# Patient Record
Sex: FEMALE | Race: WHITE | NOT HISPANIC OR LATINO | Employment: OTHER | URBAN - METROPOLITAN AREA
[De-identification: names, ages, dates, MRNs, and addresses within clinical notes are randomized per-mention and may not be internally consistent; named-entity substitution may affect disease eponyms.]

---

## 2017-01-03 ENCOUNTER — LAB VISIT (OUTPATIENT)
Dept: LAB | Facility: HOSPITAL | Age: 67
End: 2017-01-03
Attending: INTERNAL MEDICINE
Payer: MEDICARE

## 2017-01-03 DIAGNOSIS — N18.30 CKD (CHRONIC KIDNEY DISEASE) STAGE 3, GFR 30-59 ML/MIN: ICD-10-CM

## 2017-01-03 DIAGNOSIS — R73.9 HYPERGLYCEMIA: ICD-10-CM

## 2017-01-03 LAB
ALBUMIN SERPL BCP-MCNC: 3.3 G/DL
ANION GAP SERPL CALC-SCNC: 10 MMOL/L
BUN SERPL-MCNC: 23 MG/DL
CALCIUM SERPL-MCNC: 9.4 MG/DL
CHLORIDE SERPL-SCNC: 103 MMOL/L
CO2 SERPL-SCNC: 25 MMOL/L
CREAT SERPL-MCNC: 1.2 MG/DL
EST. GFR  (AFRICAN AMERICAN): 53.7 ML/MIN/1.73 M^2
EST. GFR  (NON AFRICAN AMERICAN): 46.5 ML/MIN/1.73 M^2
GLUCOSE SERPL-MCNC: 97 MG/DL
PHOSPHATE SERPL-MCNC: 2.3 MG/DL
POTASSIUM SERPL-SCNC: 4.1 MMOL/L
PTH-INTACT SERPL-MCNC: 114 PG/ML
SODIUM SERPL-SCNC: 138 MMOL/L

## 2017-01-03 PROCEDURE — 83970 ASSAY OF PARATHORMONE: CPT

## 2017-01-03 PROCEDURE — 83036 HEMOGLOBIN GLYCOSYLATED A1C: CPT

## 2017-01-03 PROCEDURE — 36415 COLL VENOUS BLD VENIPUNCTURE: CPT | Mod: PO

## 2017-01-03 PROCEDURE — 80069 RENAL FUNCTION PANEL: CPT

## 2017-01-04 LAB
ESTIMATED AVG GLUCOSE: 123 MG/DL
HBA1C MFR BLD HPLC: 5.9 %

## 2017-01-10 ENCOUNTER — OFFICE VISIT (OUTPATIENT)
Dept: NEPHROLOGY | Facility: CLINIC | Age: 67
End: 2017-01-10
Payer: MEDICARE

## 2017-01-10 VITALS
TEMPERATURE: 98 F | WEIGHT: 200.63 LBS | DIASTOLIC BLOOD PRESSURE: 58 MMHG | BODY MASS INDEX: 34.71 KG/M2 | HEART RATE: 80 BPM | OXYGEN SATURATION: 94 % | SYSTOLIC BLOOD PRESSURE: 124 MMHG

## 2017-01-10 DIAGNOSIS — I10 ESSENTIAL HYPERTENSION: ICD-10-CM

## 2017-01-10 DIAGNOSIS — E66.01 SEVERE OBESITY (BMI 35.0-39.9) WITH COMORBIDITY: ICD-10-CM

## 2017-01-10 DIAGNOSIS — N18.30 CKD (CHRONIC KIDNEY DISEASE) STAGE 3, GFR 30-59 ML/MIN: Primary | ICD-10-CM

## 2017-01-10 DIAGNOSIS — J44.9 CHRONIC OBSTRUCTIVE PULMONARY DISEASE, UNSPECIFIED COPD TYPE: ICD-10-CM

## 2017-01-10 DIAGNOSIS — J84.9 INTERSTITIAL LUNG DISEASE: ICD-10-CM

## 2017-01-10 DIAGNOSIS — I11.9 LVH (LEFT VENTRICULAR HYPERTROPHY) DUE TO HYPERTENSIVE DISEASE, WITHOUT HEART FAILURE: ICD-10-CM

## 2017-01-10 DIAGNOSIS — I35.0 AORTIC VALVE STENOSIS, UNSPECIFIED ETIOLOGY: ICD-10-CM

## 2017-01-10 PROCEDURE — 99213 OFFICE O/P EST LOW 20 MIN: CPT | Mod: S$PBB,,, | Performed by: INTERNAL MEDICINE

## 2017-01-10 PROCEDURE — 99999 PR PBB SHADOW E&M-EST. PATIENT-LVL II: CPT | Mod: PBBFAC,,, | Performed by: INTERNAL MEDICINE

## 2017-01-10 PROCEDURE — 99212 OFFICE O/P EST SF 10 MIN: CPT | Mod: PBBFAC,PO | Performed by: INTERNAL MEDICINE

## 2017-01-11 NOTE — PROGRESS NOTES
Subjective:       Patient ID: Betty Bacon is a 66 y.o. White female who presents for follow-up evaluation of Chronic Kidney Disease    HPI     She reports she is feeling well. No edema nor SOB, has lung disease without any COPD flares. Her appetite is 'too good' she is trying to lose weight by decreasing calories. No new medications. She does report increased frequency of stress incontinence, otherwise no problems with urination    Review of Systems   Constitutional: Negative for activity change, appetite change, fatigue and unexpected weight change.   HENT: Negative for facial swelling.    Respiratory: Negative for shortness of breath.    Cardiovascular: Negative for chest pain and leg swelling.   Gastrointestinal: Negative for abdominal pain.   Genitourinary: Negative for difficulty urinating and dysuria.   Musculoskeletal: Positive for arthralgias (no NSAID use).   Neurological: Negative for weakness.       Objective:      Physical Exam   Constitutional: She is oriented to person, place, and time. She appears well-nourished. No distress.   HENT:   Mouth/Throat: Oropharynx is clear and moist.   Neck: No JVD present.   Cardiovascular: S1 normal and S2 normal.  Exam reveals no friction rub.    Pulmonary/Chest: Breath sounds normal. She has no wheezes. She has no rales.   Abdominal: Soft.   Musculoskeletal: She exhibits no edema.   Neurological: She is alert and oriented to person, place, and time.   Skin: Skin is warm and dry.   Psychiatric: She has a normal mood and affect.   Vitals reviewed.      Assessment:       1. CKD (chronic kidney disease) stage 3, GFR 30-59 ml/min    2. Essential hypertension    3. LVH (left ventricular hypertrophy) due to hypertensive disease, without heart failure    4. Interstitial lung disease    5. Severe obesity (BMI 35.0-39.9) with comorbidity    6. Aortic valve stenosis, unspecified etiology    7. Chronic obstructive pulmonary disease, unspecified COPD type        Plan:              CKD stage 3 with stable kidney function.      HTN is controlled    FERNANDEZ--po Fe causes severe constipation. Could try iron 2X week       RTC 5 months with labs prior

## 2017-01-23 ENCOUNTER — DOCUMENTATION ONLY (OUTPATIENT)
Dept: FAMILY MEDICINE | Facility: CLINIC | Age: 67
End: 2017-01-23

## 2017-01-23 ENCOUNTER — TELEPHONE (OUTPATIENT)
Dept: FAMILY MEDICINE | Facility: CLINIC | Age: 67
End: 2017-01-23

## 2017-01-23 NOTE — PROGRESS NOTES
Pre-Visit Chart Review  For Appointment Scheduled on 1/24/17    There are no preventive care reminders to display for this patient.

## 2017-01-23 NOTE — TELEPHONE ENCOUNTER
Patient has complaints of chest discomfort burning after eating.notified patient if she develops any chest pain and SOB if any of her symptoms gets worst notified patient to go to ER patient states understanding.

## 2017-01-23 NOTE — TELEPHONE ENCOUNTER
----- Message from Spenser Sotomayor sent at 1/23/2017  8:48 AM CST -----  Contact: same  Patient called in and is requesting to be seen today 1/23/17.  Patient is having bad issues with her acid reflux and being able to eat.  Patient stated she will see one of the NP if they can fit her in.    Patient call back number is [phone]

## 2017-01-25 ENCOUNTER — TELEPHONE (OUTPATIENT)
Dept: FAMILY MEDICINE | Facility: CLINIC | Age: 67
End: 2017-01-25

## 2017-01-25 NOTE — TELEPHONE ENCOUNTER
----- Message from Divya Glover sent at 1/25/2017 10:15 AM CST -----  Contact: self: [phone]  Patient has been in hospital and was advised to see Dr. Bishop (Gastroenterolgy). The hospital advised her to get off of one of her medications. She would like to talk to office concerning this. Please call with advice.

## 2017-01-31 ENCOUNTER — DOCUMENTATION ONLY (OUTPATIENT)
Dept: FAMILY MEDICINE | Facility: CLINIC | Age: 67
End: 2017-01-31

## 2017-01-31 NOTE — PROGRESS NOTES
Pre-Visit Chart Review  For Appointment Scheduled on 2/1/17    There are no preventive care reminders to display for this patient.

## 2017-02-01 ENCOUNTER — OFFICE VISIT (OUTPATIENT)
Dept: FAMILY MEDICINE | Facility: CLINIC | Age: 67
End: 2017-02-01
Payer: MEDICARE

## 2017-02-01 VITALS
TEMPERATURE: 98 F | BODY MASS INDEX: 33.46 KG/M2 | DIASTOLIC BLOOD PRESSURE: 56 MMHG | SYSTOLIC BLOOD PRESSURE: 137 MMHG | WEIGHT: 196 LBS | HEART RATE: 68 BPM | HEIGHT: 64 IN | OXYGEN SATURATION: 95 %

## 2017-02-01 DIAGNOSIS — E66.9 OBESITY (BMI 30.0-34.9): ICD-10-CM

## 2017-02-01 DIAGNOSIS — K30 INDIGESTION: ICD-10-CM

## 2017-02-01 DIAGNOSIS — R13.10 DYSPHAGIA, UNSPECIFIED TYPE: ICD-10-CM

## 2017-02-01 DIAGNOSIS — R07.9 CHEST PAIN, UNSPECIFIED TYPE: ICD-10-CM

## 2017-02-01 DIAGNOSIS — Z09 HOSPITAL DISCHARGE FOLLOW-UP: Primary | ICD-10-CM

## 2017-02-01 DIAGNOSIS — I10 ESSENTIAL HYPERTENSION: ICD-10-CM

## 2017-02-01 PROCEDURE — 99999 PR PBB SHADOW E&M-EST. PATIENT-LVL IV: CPT | Mod: PBBFAC,,, | Performed by: PHYSICIAN ASSISTANT

## 2017-02-01 PROCEDURE — 99214 OFFICE O/P EST MOD 30 MIN: CPT | Mod: S$PBB,,, | Performed by: PHYSICIAN ASSISTANT

## 2017-02-01 PROCEDURE — 99214 OFFICE O/P EST MOD 30 MIN: CPT | Mod: PBBFAC,PO | Performed by: PHYSICIAN ASSISTANT

## 2017-02-01 NOTE — PROGRESS NOTES
Subjective:       Patient ID: Betty Bacon is a 66 y.o. female.    Chief Complaint: Hospital Follow Up    HPI   Patient is a 66 year old  female presenting to the clinic for hospital f/u from [facility] after presenting to the ER on 1/23/17 with c/o CP associated with nausea, left arm numbness, & shortness of breath x 1 week. Patient reports that she had been having some indigestion the week prior to hospitalization, but started getting more concerned when she developed more symptoms. Patient has a history of CAD w/stent placement. Patient was admitted to hospital services to r/o ACS. Cardiac enzymes negative x 3. She underwent Myoview stress test with EF 61% & negative concern for ischemia. JENNIFER shows some mild mitral regurg, mild aortic stenosis, and moderate aortic regurg with preserved EF at 55%. Patient reports seeing Dr. Pink in [facility] & would like us to fax these records to him. She was d/c with apt to see Dr. Trevizo on 2/2/17 due to the concerns of indigestion, difficulty swallowing, sensation of food getting stuck in esophagus. She has continued to take her Nexium 20mg daily. She reports one episode of reguargitation of food on Saturday which caused an episode of vomiting. Otherwise, she feel as though she has been improving. She denies any further CP, left arm numbness. She does report VILLA, but this is stable & chronic due to COPD.  Review of Systems   Constitutional: Negative for activity change, appetite change, chills, diaphoresis, fatigue and fever.   HENT: Negative for congestion, postnasal drip and rhinorrhea.    Respiratory: Positive for shortness of breath (chronic, stable). Negative for cough and wheezing.    Cardiovascular: Negative.  Negative for chest pain.   Gastrointestinal: Positive for nausea and vomiting. Negative for abdominal pain, blood in stool, constipation and diarrhea.        BRBPR x 1 with hard bowel movement; history of hemorrhoids     Genitourinary: Negative for  dysuria, frequency, hematuria and urgency.   Musculoskeletal: Negative.    Skin: Negative.  Negative for color change and rash.   Neurological: Negative for dizziness and syncope.   Psychiatric/Behavioral: Negative for agitation, behavioral problems and confusion.       Objective:      Physical Exam   Constitutional: Vital signs are normal. She appears well-developed and well-nourished. No distress.   Cardiovascular: Normal rate, regular rhythm, S1 normal, S2 normal and normal heart sounds.  Exam reveals no gallop.    No murmur heard.  Pulses:       Radial pulses are 2+ on the right side, and 2+ on the left side.   <2sec cap refill fingers bilat     Pulmonary/Chest: Effort normal and breath sounds normal. No respiratory distress. She has no wheezes. She has no rhonchi.   Abdominal: Normal appearance. There is tenderness in the epigastric area. There is no rigidity, no guarding, no tenderness at McBurney's point and negative Neff's sign.   Skin: Skin is warm and dry. She is not diaphoretic.   Appropriate skin turgor   Psychiatric: She has a normal mood and affect. Her speech is normal and behavior is normal. Judgment and thought content normal. Cognition and memory are normal.       Assessment:       1. Hospital discharge follow-up    2. Chest pain, unspecified type    3. Dysphagia, unspecified type    4. Indigestion    5. Essential hypertension    6. Obesity (BMI 30.0-34.9)        Plan:     Betty was seen today for hospital follow up.    Diagnoses and all orders for this visit:    Hospital discharge follow-up    Chest pain, unspecified type  Resolved; thought to be likely related to upper GI concern due to recent negative cardiac testing    Records faxed to cardiologist, Dr. Pink    Dysphagia, unspecified type  Continue PPI; may need to increase; will leave this up to GI physician tomorrow  -     Ambulatory referral to Gastroenterology  Apt with Dr. Trevizo scheduled for tomorrow    Indigestion  -     Ambulatory  referral to Gastroenterology  Apt with Dr. Trevizo scheduled for tomorrow    Essential hypertension  Good control  No changes needed    Obesity (BMI 30.0-34.9)  Patient readiness: acceptance and barriers:none    During the course of the visit the patient was educated and counseled about the following:     Hypertension:   Medication: no change.  Obesity:   Regular aerobic exercise program discussed.    Goals: Hypertension: Reduce Blood Pressure and Obesity: Reduce calorie intake and BMI    Did patient meet goals/outcomes: No    The following self management tools provided: declined    Patient Instructions (the written plan) was given to the patient/family.     Time spent with patient: 30 minutes

## 2017-02-01 NOTE — MR AVS SNAPSHOT
[facility]  [address]  [place] [address]  Phone: [phone]  Fax: [phone]                  Betty Bacon   2017 11:20 AM   Office Visit    Description:  Female : 1950   Provider:  JEFFREY Lomeli   Department:  [place] [facility]           Reason for Visit     Hospital Follow Up           Diagnoses this Visit        Comments    Dysphagia, unspecified type    -  Primary     Indigestion                To Do List           Future Appointments        Provider Department Dept Phone    2017 10:40 AM Celeste Barker MD [facility] [phone]    2017 10:00 AM LAB, [facility] - [facility] [phone]    2017 10:15 AM LAB, [facility] - Lab [phone]      Goals (5 Years of Data)     None      [facility]     [facility] On Call Nurse [facility] -  Assistance  Registered nurses in the [facility] Center provide clinical advisement, health education, appointment booking, and other advisory services.  Call for this free service at [phone].             Medications           Message regarding Medications     Verify the changes and/or additions to your medication regime listed below are the same as discussed with your clinician today.  If any of these changes or additions are incorrect, please notify your healthcare provider.             Verify that the below list of medications is an accurate representation of the medications you are currently taking.  If none reported, the list may be blank. If incorrect, please contact your healthcare provider. Carry this list with you in case of emergency.           Current Medications     albuterol sulfate (PROAIR RESPICLICK) 90 mcg/actuation AePB Inhale into the lungs.    alendronate (FOSAMAX) 70 MG tablet Take 1 tablet (70 mg total) by mouth every 7 days.    alprazolam (XANAX) 1 MG tablet Take 1 tablet (1 mg total) by mouth 2 (two) times daily as  "needed.    ASMANEX TWISTHALER 220 mcg (60 doses) AePB Take 2 puffs by mouth once daily.    aspirin 81 mg Tab Take 81 mg by mouth once daily. Every day    atorvastatin (LIPITOR) 10 MG tablet TAKE 1 TABLET BY MOUTH EVERY DAY    azelastine (ASTELIN) 137 mcg (0.1 %) nasal spray USE 1 SPRAY INTRANASALLY 2 TIMES PER DAY IN EACH NOSTRIL    clopidogrel (PLAVIX) 75 mg tablet TAKE 1 TABLET BY MOUTH EVERY DAY    econazole nitrate 1 % cream Apply to AA on face and folds BID PRN flare    esomeprazole (NEXIUM) 40 MG capsule TAKE 1 CAPSULE (40 MG TOTAL) BY MOUTH BEFORE BREAKFAST.    isosorbide mononitrate (IMDUR) 60 MG 24 hr tablet TAKE 1 TABLET BY MOUTH EVERY DAY    metoprolol succinate (TOPROL-XL) 50 MG 24 hr tablet TAKE 1 TABLET BY MOUTH EVERY DAY    nitroGLYCERIN (NITROSTAT) 0.3 MG SL tablet Place 1 tablet (0.3 mg total) under the tongue every 5 (five) minutes as needed for Chest pain.    paroxetine (PAXIL) 40 MG tablet TAKE 1 TABLET EVERY MORNING    tiotropium (SPIRIVA WITH HANDIHALER) 18 mcg inhalation capsule USE 1 PUFF DAILY    trazodone (DESYREL) 100 MG tablet Take 1 tablet (100 mg total) by mouth every evening.    triamterene-hydrochlorothiazide 37.5-25 mg (DYAZIDE) 37.5-25 mg per capsule Take 1 capsule by mouth once daily.           Clinical Reference Information           Vital Signs - Last Recorded  Most recent update: 2/1/2017 11:23 AM by Kacy Renee    BP Pulse Temp Ht    (!) 137/56 (BP Location: Right arm, Patient Position: Sitting, BP Method: Automatic) 68 97.9 °F (36.6 °C) (Oral) 5' 3.75" (1.619 m)    Wt SpO2 BMI    88.9 kg (195 lb 15.8 oz) 95% 33.91 kg/m2      Blood Pressure          Most Recent Value    BP  (!)  137/56      Allergies as of 2/1/2017     Darvocet A500 [Propoxyphene N-acetaminophen]    Codeine      Immunizations Administered on Date of Encounter - 2/1/2017     None      Orders Placed During Today's Visit      Normal Orders This Visit    Ambulatory referral to Gastroenterology       "

## 2017-02-02 ENCOUNTER — TELEPHONE (OUTPATIENT)
Dept: CARDIOLOGY | Facility: CLINIC | Age: 67
End: 2017-02-02

## 2017-02-03 ENCOUNTER — TELEPHONE (OUTPATIENT)
Dept: CARDIOLOGY | Facility: CLINIC | Age: 67
End: 2017-02-03

## 2017-03-02 RX ORDER — ISOSORBIDE MONONITRATE 60 MG/1
TABLET, EXTENDED RELEASE ORAL
Qty: 30 TABLET | Refills: 6 | Status: SHIPPED | OUTPATIENT
Start: 2017-03-02 | End: 2017-11-21 | Stop reason: SDUPTHER

## 2017-03-07 ENCOUNTER — TELEPHONE (OUTPATIENT)
Dept: FAMILY MEDICINE | Facility: CLINIC | Age: 67
End: 2017-03-07

## 2017-03-07 DIAGNOSIS — J45.30 MILD PERSISTENT ASTHMA WITHOUT COMPLICATION: ICD-10-CM

## 2017-03-07 RX ORDER — TIOTROPIUM BROMIDE 18 UG/1
CAPSULE ORAL; RESPIRATORY (INHALATION)
Qty: 30 CAPSULE | Refills: 11 | Status: SHIPPED | OUTPATIENT
Start: 2017-03-07 | End: 2017-11-14

## 2017-03-07 NOTE — TELEPHONE ENCOUNTER
----- Message from Carolyn Whyte sent at 3/7/2017 10:19 AM CST -----  [facility] pharmacy called regarding Spiriva Inhaler for the above patient. There are requesting refill  Authorization contact pharmacy . Pharmacy states Feli White wrote original prescriptionThanks.        [facility]/pharmacy #[id number] - [place], [place] - [address] AT [facility]  [address]  [place] [address]  Phone: [phone] Fax: [phone]

## 2017-03-27 ENCOUNTER — LAB VISIT (OUTPATIENT)
Dept: LAB | Facility: HOSPITAL | Age: 67
End: 2017-03-27
Attending: INTERNAL MEDICINE
Payer: MEDICARE

## 2017-03-27 DIAGNOSIS — K58.0 IRRITABLE BOWEL SYNDROME WITH DIARRHEA: Primary | ICD-10-CM

## 2017-03-27 LAB — IGA SERPL-MCNC: 122 MG/DL

## 2017-03-27 PROCEDURE — 83516 IMMUNOASSAY NONANTIBODY: CPT

## 2017-03-27 PROCEDURE — 36415 COLL VENOUS BLD VENIPUNCTURE: CPT

## 2017-03-27 PROCEDURE — 82784 ASSAY IGA/IGD/IGG/IGM EACH: CPT

## 2017-03-29 LAB — TTG IGA SER IA-ACNC: 4 UNITS

## 2017-04-10 RX ORDER — MOMETASONE FUROATE 220 UG/1
INHALANT RESPIRATORY (INHALATION)
Qty: 1 EACH | Refills: 2 | Status: SHIPPED | OUTPATIENT
Start: 2017-04-10 | End: 2017-07-05 | Stop reason: SDUPTHER

## 2017-05-18 ENCOUNTER — DOCUMENTATION ONLY (OUTPATIENT)
Dept: FAMILY MEDICINE | Facility: CLINIC | Age: 67
End: 2017-05-18

## 2017-05-23 ENCOUNTER — OFFICE VISIT (OUTPATIENT)
Dept: FAMILY MEDICINE | Facility: CLINIC | Age: 67
End: 2017-05-23
Payer: MEDICARE

## 2017-05-23 ENCOUNTER — LAB VISIT (OUTPATIENT)
Dept: LAB | Facility: HOSPITAL | Age: 67
End: 2017-05-23
Attending: FAMILY MEDICINE
Payer: MEDICARE

## 2017-05-23 VITALS
DIASTOLIC BLOOD PRESSURE: 50 MMHG | HEIGHT: 64 IN | SYSTOLIC BLOOD PRESSURE: 138 MMHG | TEMPERATURE: 98 F | WEIGHT: 194.88 LBS | BODY MASS INDEX: 33.27 KG/M2 | HEART RATE: 63 BPM

## 2017-05-23 DIAGNOSIS — R73.9 HYPERGLYCEMIA: ICD-10-CM

## 2017-05-23 DIAGNOSIS — E61.1 LOW IRON: ICD-10-CM

## 2017-05-23 DIAGNOSIS — I25.10 CORONARY ARTERY DISEASE INVOLVING NATIVE CORONARY ARTERY OF NATIVE HEART WITHOUT ANGINA PECTORIS: ICD-10-CM

## 2017-05-23 DIAGNOSIS — I10 ESSENTIAL HYPERTENSION: ICD-10-CM

## 2017-05-23 DIAGNOSIS — E66.9 OBESITY, CLASS I, BMI 30-34.9: ICD-10-CM

## 2017-05-23 DIAGNOSIS — I10 ESSENTIAL HYPERTENSION: Primary | ICD-10-CM

## 2017-05-23 DIAGNOSIS — E78.5 HYPERLIPIDEMIA, UNSPECIFIED HYPERLIPIDEMIA TYPE: ICD-10-CM

## 2017-05-23 LAB
ALBUMIN SERPL BCP-MCNC: 3.5 G/DL
ALP SERPL-CCNC: 127 U/L
ALT SERPL W/O P-5'-P-CCNC: 10 U/L
ANION GAP SERPL CALC-SCNC: 8 MMOL/L
AST SERPL-CCNC: 13 U/L
BASOPHILS # BLD AUTO: 0.01 K/UL
BASOPHILS NFR BLD: 0.1 %
BILIRUB SERPL-MCNC: 0.3 MG/DL
BUN SERPL-MCNC: 25 MG/DL
CALCIUM SERPL-MCNC: 9.8 MG/DL
CHLORIDE SERPL-SCNC: 104 MMOL/L
CHOLEST/HDLC SERPL: 4.1 {RATIO}
CO2 SERPL-SCNC: 26 MMOL/L
CREAT SERPL-MCNC: 1.2 MG/DL
DIFFERENTIAL METHOD: ABNORMAL
EOSINOPHIL # BLD AUTO: 0.2 K/UL
EOSINOPHIL NFR BLD: 3.2 %
ERYTHROCYTE [DISTWIDTH] IN BLOOD BY AUTOMATED COUNT: 15 %
EST. GFR  (AFRICAN AMERICAN): 53.3 ML/MIN/1.73 M^2
EST. GFR  (NON AFRICAN AMERICAN): 46.2 ML/MIN/1.73 M^2
FERRITIN SERPL-MCNC: 101 NG/ML
GLUCOSE SERPL-MCNC: 107 MG/DL
HCT VFR BLD AUTO: 39 %
HDL/CHOLESTEROL RATIO: 24.3 %
HDLC SERPL-MCNC: 152 MG/DL
HDLC SERPL-MCNC: 37 MG/DL
HGB BLD-MCNC: 12.3 G/DL
IRON SERPL-MCNC: 50 UG/DL
LDLC SERPL CALC-MCNC: 86.8 MG/DL
LYMPHOCYTES # BLD AUTO: 1.4 K/UL
LYMPHOCYTES NFR BLD: 19.1 %
MCH RBC QN AUTO: 28.9 PG
MCHC RBC AUTO-ENTMCNC: 31.5 %
MCV RBC AUTO: 92 FL
MONOCYTES # BLD AUTO: 0.4 K/UL
MONOCYTES NFR BLD: 5.5 %
NEUTROPHILS # BLD AUTO: 5.4 K/UL
NEUTROPHILS NFR BLD: 71.8 %
NONHDLC SERPL-MCNC: 115 MG/DL
PLATELET # BLD AUTO: 235 K/UL
PMV BLD AUTO: 11 FL
POTASSIUM SERPL-SCNC: 4.4 MMOL/L
PROT SERPL-MCNC: 7 G/DL
RBC # BLD AUTO: 4.25 M/UL
SATURATED IRON: 14 %
SODIUM SERPL-SCNC: 138 MMOL/L
TOTAL IRON BINDING CAPACITY: 352 UG/DL
TRANSFERRIN SERPL-MCNC: 238 MG/DL
TRIGL SERPL-MCNC: 141 MG/DL
WBC # BLD AUTO: 7.47 K/UL

## 2017-05-23 PROCEDURE — 80061 LIPID PANEL: CPT

## 2017-05-23 PROCEDURE — 83540 ASSAY OF IRON: CPT

## 2017-05-23 PROCEDURE — 99999 PR PBB SHADOW E&M-EST. PATIENT-LVL III: CPT | Mod: PBBFAC,,, | Performed by: FAMILY MEDICINE

## 2017-05-23 PROCEDURE — 84466 ASSAY OF TRANSFERRIN: CPT

## 2017-05-23 PROCEDURE — 36415 COLL VENOUS BLD VENIPUNCTURE: CPT | Mod: PO

## 2017-05-23 PROCEDURE — 82728 ASSAY OF FERRITIN: CPT

## 2017-05-23 PROCEDURE — 85025 COMPLETE CBC W/AUTO DIFF WBC: CPT

## 2017-05-23 PROCEDURE — 99214 OFFICE O/P EST MOD 30 MIN: CPT | Mod: S$PBB,,, | Performed by: FAMILY MEDICINE

## 2017-05-23 PROCEDURE — 80053 COMPREHEN METABOLIC PANEL: CPT

## 2017-05-23 PROCEDURE — 83036 HEMOGLOBIN GLYCOSYLATED A1C: CPT

## 2017-05-23 NOTE — PROGRESS NOTES
CHIEF COMPLAINT:  Follow up       HISTORY OF PRESENT ILLNESS:  Betty Bacon is a 67 y.o. female who presents to clinic for follow up    1. HTN: patient is on dyazide, imdur, toprol XL, norvasc. She denies any CP, SOB, edema    2. Hyperlipidemia, CAD: she is on lipitor, plavix. She is due for labs.    3. low iron: due for repeat iron levels. She is no longer on iron supplementation due to constipation.    4. Hyperglycemia:  He has been working on weight loss and lost 10 lbs but has gained some of it back.     REVIEW OF SYSTEMS:  The patient denies any fever, chills, night sweats, headaches, vision changes, difficulty speaking or swallowing, decreased hearing, weight loss, weight gain, chest pain, palpitations, shortness of breath, cough, nausea, vomiting, abdominal pain, dysuria, diarrhea, constipation, hematuria, hematochezia, melena, changes in her hair,nails, numbness or weakness in her extremities, erythema,  swelling over any of her joints, myalgias, swollen glands, easy bruising, fatigue, edema,.      MEDICATIONS:   Reviewed and/or reconciled in EPIC    ALLERGIES:  Reviewed and/or reconciled in [employer]    PAST MEDICAL/SURGICAL HISTORY:   Past Medical History:   Diagnosis Date    Acute coronary artery obstruction without MI 10/2012    Benign hypertension     COPD (chronic obstructive pulmonary disease)     Coronary artery disease     History of electroconvulsive therapy     Hyperlipidemia LDL goal < 70     Left ankle sprain     Major depressive disorder, recurrent episode, severe     s/p ECT    PVD (peripheral vascular disease)       Past Surgical History:   Procedure Laterality Date    CHOLECYSTECTOMY      CORONARY ANGIOPLASTY      CORONARY ANGIOPLASTY WITH STENT PLACEMENT  10/2012    2 stents RCA (100% stenosis)    EYE SURGERY      cataract surgery    HYSTERECTOMY      LINA, ovaries intact. uterine prolapse    ILIAC ARTERY STENT      TONSILLECTOMY      TOTAL VAGINAL HYSTERECTOMY    "      FAMILY HISTORY:    Family History   Problem Relation Age of Onset    Diabetes Mother     Heart disease Mother     Stroke Father     Heart disease Father     Heart disease Brother     Stroke Brother     Hypertension Daughter     Diabetes Maternal Aunt     Heart disease Maternal Aunt     Heart disease Maternal Uncle     Heart disease Paternal Aunt     Heart disease Paternal Uncle     Heart disease Maternal Grandfather     Diabetes Sister     Heart disease Sister     Cancer Sister      lung    Kidney disease Sister      mass, benign    Melanoma Neg Hx     Psoriasis Neg Hx     Lupus Neg Hx     Eczema Neg Hx        SOCIAL HISTORY:    Social History     Social History    Marital status:      Spouse name: N/A    Number of children: N/A    Years of education: N/A     Occupational History    Not on file.     Social History Main Topics    Smoking status: Former Smoker     Packs/day: 2.00     Years: 40.00     Types: Cigarettes     Quit date: 12/5/2009    Smokeless tobacco: Never Used    Alcohol use No    Drug use: No    Sexual activity: No     Other Topics Concern    Not on file     Social History Narrative    No narrative on file       PHYSICAL EXAM:  VITAL SIGNS:   Vitals:    05/23/17 1102   BP: (!) 157/61   BP Location: Left arm   Patient Position: Sitting   BP Method: Automatic   Pulse: 63   Temp: 97.6 °F (36.4 °C)   TempSrc: Oral   Weight: 88.4 kg (194 lb 14.2 oz)   Height: 5' 3.75" (1.619 m)     GENERAL:  Patient appears well nourished, sitting on exam table, in no acute distress.  HEENT:  Atraumatic, normocephalic, PERRLA, EOMI, no conjunctival injection, sclerae are anicteric, normal external auditory canals,TMs clear b/l, gross hearing intact to whisper, MMM, no oropharygneal erythema or exudate.  NECK:  Supple, normal ROM, trachea is midline , no supraclavicular or cervical LAD or masses palpated.  Thyroid gland not palpable.  CARDIOVASCULAR:  RRR, normal S1 and S2, no " m/r/g.  RESPIRATORY:  CTA b/l, no wheezes, rhonchi, rales.  No increased work of breathing, no  use of accessory muscles.  ABDOMEN:  Soft, nontender, nondistended, normoactive bowel sounds in all four quadrants, no rebound or guarding, no HSM or masses palpated.  Normal percussion.  EXTREMITIES:  2+ DP pulses b/l, no edema.  SKIN:  Warm, confluent erythematous areas in b/l inguinal areas, no evidence of skin breakdown at this time  NEUROMUSCULAR: . Cranial nerves II-XII grossly intact.  No clubbing or cyanosis of digits/nails.  PSYCH:  Patient is alert and oriented to person, time, place. They are appropriately dressed and groomed. There is normal eye contact. Rate and tone of speech is normal. Normal insight, judgement. Normal thought content and process.     LABORATORY/IMAGING STUDIES: pending    ASSESSMENT/PLAN: This is a 67 y.o. female who presents to clinic for evaluation of the following concerns  1. HTN: see below  2. Obesity: see below  3. Hyperlipidemia: obtain CMP, lipid panel  4. Low iron: CBC, iron levels  5. Hyperglycemia: FBG, HgA1c     Patient readiness: acceptance and barriers:none    During the course of the visit the patient was educated and counseled about the following:     Hypertension:   Medication: no change.    Obesity:   General weight loss/lifestyle modification strategies discussed (elicit support from others; identify saboteurs; non-food rewards, etc).  Diet interventions: moderate (500 kCal/d) deficit diet.  Informal exercise measures discussed, e.g. taking stairs instead of elevator.  Regular aerobic exercise program discussed.    Goals: Hypertension: Reduce Blood Pressure and Obesity: Reduce calorie intake and BMI    Did patient meet goals/outcomes: No    The following self management tools provided: declined    Patient Instructions (the written plan) was given to the patient/family.     Time spent with patient: 30 minutes        Celeste Barker MD

## 2017-05-24 LAB
ESTIMATED AVG GLUCOSE: 128 MG/DL
HBA1C MFR BLD HPLC: 6.1 %

## 2017-06-01 ENCOUNTER — TELEPHONE (OUTPATIENT)
Dept: NEPHROLOGY | Facility: CLINIC | Age: 67
End: 2017-06-01

## 2017-06-09 ENCOUNTER — LAB VISIT (OUTPATIENT)
Dept: LAB | Facility: HOSPITAL | Age: 67
End: 2017-06-09
Attending: INTERNAL MEDICINE
Payer: MEDICARE

## 2017-06-09 DIAGNOSIS — N18.30 CKD (CHRONIC KIDNEY DISEASE) STAGE 3, GFR 30-59 ML/MIN: ICD-10-CM

## 2017-06-09 LAB
ALBUMIN SERPL BCP-MCNC: 3.4 G/DL
ANION GAP SERPL CALC-SCNC: 8 MMOL/L
BUN SERPL-MCNC: 32 MG/DL
CALCIUM SERPL-MCNC: 9.8 MG/DL
CHLORIDE SERPL-SCNC: 106 MMOL/L
CO2 SERPL-SCNC: 26 MMOL/L
CREAT SERPL-MCNC: 1.3 MG/DL
EST. GFR  (AFRICAN AMERICAN): 48.4 ML/MIN/1.73 M^2
EST. GFR  (NON AFRICAN AMERICAN): 42 ML/MIN/1.73 M^2
GLUCOSE SERPL-MCNC: 109 MG/DL
PHOSPHATE SERPL-MCNC: 3.3 MG/DL
POTASSIUM SERPL-SCNC: 4.3 MMOL/L
PTH-INTACT SERPL-MCNC: 81 PG/ML
SODIUM SERPL-SCNC: 140 MMOL/L

## 2017-06-09 PROCEDURE — 83970 ASSAY OF PARATHORMONE: CPT

## 2017-06-09 PROCEDURE — 36415 COLL VENOUS BLD VENIPUNCTURE: CPT | Mod: PO

## 2017-06-09 PROCEDURE — 80069 RENAL FUNCTION PANEL: CPT

## 2017-06-13 ENCOUNTER — OFFICE VISIT (OUTPATIENT)
Dept: NEPHROLOGY | Facility: CLINIC | Age: 67
End: 2017-06-13
Payer: MEDICARE

## 2017-06-13 VITALS
HEART RATE: 83 BPM | OXYGEN SATURATION: 94 % | BODY MASS INDEX: 34.13 KG/M2 | DIASTOLIC BLOOD PRESSURE: 60 MMHG | SYSTOLIC BLOOD PRESSURE: 148 MMHG | TEMPERATURE: 98 F | WEIGHT: 197.31 LBS

## 2017-06-13 DIAGNOSIS — J84.9 INTERSTITIAL LUNG DISEASE: ICD-10-CM

## 2017-06-13 DIAGNOSIS — Z87.891 FORMER SMOKER: ICD-10-CM

## 2017-06-13 DIAGNOSIS — I35.0 AORTIC VALVE STENOSIS, UNSPECIFIED ETIOLOGY: ICD-10-CM

## 2017-06-13 DIAGNOSIS — I25.10 CORONARY ARTERY DISEASE INVOLVING NATIVE CORONARY ARTERY OF NATIVE HEART WITHOUT ANGINA PECTORIS: ICD-10-CM

## 2017-06-13 DIAGNOSIS — I11.9 LVH (LEFT VENTRICULAR HYPERTROPHY) DUE TO HYPERTENSIVE DISEASE, WITHOUT HEART FAILURE: ICD-10-CM

## 2017-06-13 DIAGNOSIS — I10 ESSENTIAL HYPERTENSION: ICD-10-CM

## 2017-06-13 DIAGNOSIS — N18.30 CKD (CHRONIC KIDNEY DISEASE) STAGE 3, GFR 30-59 ML/MIN: Primary | ICD-10-CM

## 2017-06-13 DIAGNOSIS — I73.9 PAD (PERIPHERAL ARTERY DISEASE): ICD-10-CM

## 2017-06-13 PROCEDURE — 1126F AMNT PAIN NOTED NONE PRSNT: CPT | Mod: ,,, | Performed by: INTERNAL MEDICINE

## 2017-06-13 PROCEDURE — 99999 PR PBB SHADOW E&M-EST. PATIENT-LVL II: CPT | Mod: PBBFAC,,, | Performed by: INTERNAL MEDICINE

## 2017-06-13 PROCEDURE — 99212 OFFICE O/P EST SF 10 MIN: CPT | Mod: PBBFAC,PO | Performed by: INTERNAL MEDICINE

## 2017-06-13 PROCEDURE — 1159F MED LIST DOCD IN RCRD: CPT | Mod: ,,, | Performed by: INTERNAL MEDICINE

## 2017-06-13 PROCEDURE — 99214 OFFICE O/P EST MOD 30 MIN: CPT | Mod: S$PBB,,, | Performed by: INTERNAL MEDICINE

## 2017-06-14 RX ORDER — ALENDRONATE SODIUM 70 MG/1
TABLET ORAL
Qty: 4 TABLET | Refills: 10 | Status: SHIPPED | OUTPATIENT
Start: 2017-06-14 | End: 2018-06-07 | Stop reason: SDUPTHER

## 2017-06-18 ENCOUNTER — TELEPHONE (OUTPATIENT)
Dept: FAMILY MEDICINE | Facility: CLINIC | Age: 67
End: 2017-06-18

## 2017-06-18 NOTE — TELEPHONE ENCOUNTER
Blood sugar and HgA1c are still consistent with prediabetes, needs to continue with low carb diet, weight loss and we will recheck in 6 months.    Renal function is low needs to follow up with nephrology    New quidelines recommend that she be on a high intensity statin such as lipitor 40 or 80 mg, let me know if i can increase it.

## 2017-06-19 RX ORDER — ATORVASTATIN CALCIUM 40 MG/1
40 TABLET, FILM COATED ORAL DAILY
Qty: 90 TABLET | Refills: 1 | Status: SHIPPED | OUTPATIENT
Start: 2017-06-19 | End: 2017-11-24 | Stop reason: SDUPTHER

## 2017-06-30 PROBLEM — Z87.891 FORMER SMOKER: Status: ACTIVE | Noted: 2017-06-30

## 2017-06-30 PROBLEM — I10 HTN (HYPERTENSION): Status: ACTIVE | Noted: 2017-06-30

## 2017-06-30 NOTE — PROGRESS NOTES
Subjective:       Patient ID: Betty Bacon is a 67 y.o. White female who presents for follow-up evaluation of Chronic Kidney Disease    HPI     She reports she is doing very well. No edema and no SOB, she follows a low sodium diet. She remains very active physically and socially. No LUTS. No new medications since last visit. No ER visits, just routine follow ups. She saw The Lion Usman at the [facility] and enjoyed herself thoroughly    Review of Systems   Constitutional: Negative for activity change, appetite change, fatigue and unexpected weight change.   HENT: Negative for facial swelling.    Eyes: Negative for visual disturbance.   Respiratory: Negative for shortness of breath.    Cardiovascular: Negative for chest pain and leg swelling.   Gastrointestinal: Negative for constipation and diarrhea.   Genitourinary: Negative for difficulty urinating, dysuria and hematuria.   Musculoskeletal: Negative for arthralgias.   Neurological: Negative for weakness and headaches.       Objective:      Physical Exam   Constitutional: She is oriented to person, place, and time. She appears well-nourished.   HENT:   Mouth/Throat: Oropharynx is clear and moist.   Neck: No JVD present.   Cardiovascular: S1 normal and S2 normal.  Exam reveals no friction rub.    Pulmonary/Chest: Breath sounds normal. She has no wheezes. She has no rales.   Abdominal: Soft.   Musculoskeletal: She exhibits no edema.   Neurological: She is alert and oriented to person, place, and time.   Skin: Skin is warm and dry.   Psychiatric: She has a normal mood and affect.   Vitals reviewed.      Assessment:       1. CKD (chronic kidney disease) stage 3, GFR 30-59 ml/min    2. Essential hypertension    3. LVH (left ventricular hypertrophy) due to hypertensive disease, without heart failure    4. Coronary artery disease involving native coronary artery of native heart without angina pectoris    5. Interstitial lung disease    6. Aortic valve stenosis,  unspecified etiology    7. PAD (peripheral artery disease)        Plan:             CKD Stage 3 with stable kidney function and proteinuria.      HTN--elevated today. BP log in one week    Mineral and Bone Disease--continue D3    Pulmonary--stable      RTC 5 months with labs prior

## 2017-07-05 RX ORDER — MOMETASONE FUROATE 220 UG/1
INHALANT RESPIRATORY (INHALATION)
Qty: 1 EACH | Refills: 6 | Status: SHIPPED | OUTPATIENT
Start: 2017-07-05 | End: 2018-04-26 | Stop reason: SDUPTHER

## 2017-07-11 ENCOUNTER — TELEPHONE (OUTPATIENT)
Dept: FAMILY MEDICINE | Facility: CLINIC | Age: 67
End: 2017-07-11

## 2017-07-11 DIAGNOSIS — Z12.31 ENCOUNTER FOR SCREENING MAMMOGRAM FOR MALIGNANT NEOPLASM OF BREAST: ICD-10-CM

## 2017-07-11 DIAGNOSIS — Z12.39 SCREENING FOR BREAST CANCER: Primary | ICD-10-CM

## 2017-07-11 NOTE — TELEPHONE ENCOUNTER
----- Message from Eryn Osman sent at 7/11/2017  1:05 PM CDT -----  Contact: Patient  Patient called advising that she received a recall letter for a mammogram.  However, an order is not in the system.  Please call patient back at [phone] to schedule appointment once order is placed.  Thank you!

## 2017-07-19 ENCOUNTER — HOSPITAL ENCOUNTER (OUTPATIENT)
Dept: RADIOLOGY | Facility: CLINIC | Age: 67
Discharge: HOME | End: 2017-07-19
Attending: FAMILY MEDICINE
Payer: MEDICARE

## 2017-07-19 VITALS — BODY MASS INDEX: 33.69 KG/M2 | HEIGHT: 64 IN | WEIGHT: 197.31 LBS

## 2017-07-19 DIAGNOSIS — Z12.31 ENCOUNTER FOR SCREENING MAMMOGRAM FOR MALIGNANT NEOPLASM OF BREAST: ICD-10-CM

## 2017-07-19 DIAGNOSIS — Z12.39 SCREENING FOR BREAST CANCER: ICD-10-CM

## 2017-07-19 PROCEDURE — 77067 SCR MAMMO BI INCL CAD: CPT | Mod: 26,,, | Performed by: RADIOLOGY

## 2017-07-19 PROCEDURE — 77067 SCR MAMMO BI INCL CAD: CPT | Mod: TC

## 2017-07-19 PROCEDURE — 77063 BREAST TOMOSYNTHESIS BI: CPT | Mod: 26,,, | Performed by: RADIOLOGY

## 2017-08-09 DIAGNOSIS — K21.9 GASTROESOPHAGEAL REFLUX DISEASE, ESOPHAGITIS PRESENCE NOT SPECIFIED: ICD-10-CM

## 2017-08-09 RX ORDER — ESOMEPRAZOLE MAGNESIUM 40 MG/1
CAPSULE, DELAYED RELEASE ORAL
Qty: 30 CAPSULE | Refills: 10 | Status: SHIPPED | OUTPATIENT
Start: 2017-08-09 | End: 2018-08-20 | Stop reason: SDUPTHER

## 2017-09-06 RX ORDER — PAROXETINE HYDROCHLORIDE 40 MG/1
TABLET, FILM COATED ORAL
Qty: 30 TABLET | Refills: 11 | Status: SHIPPED | OUTPATIENT
Start: 2017-09-06 | End: 2018-09-17 | Stop reason: SDUPTHER

## 2017-10-31 ENCOUNTER — OFFICE VISIT (OUTPATIENT)
Dept: FAMILY MEDICINE | Facility: CLINIC | Age: 67
End: 2017-10-31
Payer: MEDICARE

## 2017-10-31 VITALS
WEIGHT: 196.19 LBS | HEIGHT: 63 IN | HEART RATE: 74 BPM | TEMPERATURE: 98 F | DIASTOLIC BLOOD PRESSURE: 50 MMHG | SYSTOLIC BLOOD PRESSURE: 126 MMHG | BODY MASS INDEX: 34.76 KG/M2

## 2017-10-31 DIAGNOSIS — J44.9 CHRONIC OBSTRUCTIVE PULMONARY DISEASE, UNSPECIFIED COPD TYPE: ICD-10-CM

## 2017-10-31 DIAGNOSIS — R09.82 PND (POST-NASAL DRIP): ICD-10-CM

## 2017-10-31 DIAGNOSIS — J32.9 RHINOSINUSITIS: Primary | ICD-10-CM

## 2017-10-31 DIAGNOSIS — R05.9 COUGH: ICD-10-CM

## 2017-10-31 PROCEDURE — 99999 PR PBB SHADOW E&M-EST. PATIENT-LVL III: CPT | Mod: PBBFAC,,, | Performed by: NURSE PRACTITIONER

## 2017-10-31 PROCEDURE — 99213 OFFICE O/P EST LOW 20 MIN: CPT | Mod: PBBFAC,PO | Performed by: NURSE PRACTITIONER

## 2017-10-31 PROCEDURE — 99213 OFFICE O/P EST LOW 20 MIN: CPT | Mod: S$PBB,,, | Performed by: NURSE PRACTITIONER

## 2017-10-31 RX ORDER — BENZONATATE 200 MG/1
200 CAPSULE ORAL 3 TIMES DAILY PRN
Qty: 30 CAPSULE | Refills: 0 | Status: SHIPPED | OUTPATIENT
Start: 2017-10-31 | End: 2017-11-24

## 2017-10-31 RX ORDER — FLUTICASONE FUROATE AND VILANTEROL TRIFENATATE 200; 25 UG/1; UG/1
1 POWDER RESPIRATORY (INHALATION) DAILY
Refills: 11 | COMMUNITY
Start: 2017-10-09 | End: 2021-11-24

## 2017-10-31 NOTE — PATIENT INSTRUCTIONS
"  Viral Syndrome (Adult)  A viral illness may cause a number of symptoms. The symptoms depend on the part of the body that the virus affects. If it settles in your nose, throat, and lungs, it may cause cough, sore throat, congestion, and sometimes headache. If it settles in your stomach and intestinal tract, it may cause vomiting and diarrhea. Sometimes it causes vague symptoms like "aching all over," feeling tired, loss of appetite, or fever.  A viral illness usually lasts 1 to 2 weeks, but sometimes it lasts longer. In some cases, a more serious infection can look like a viral syndrome in the first few days of the illness. You may need another exam and additional tests to know the difference. Watch for the warning signs listed below.  Home care  Follow these guidelines for taking care of yourself at home:  · If symptoms are severe, rest at home for the first 2 to 3 days.  · Stay away from cigarette smoke - both your smoke and the smoke from others.  · You may use over-the-counter acetaminophen or ibuprofen for fever, muscle aching, and headache, unless another medicine was prescribed for this. If you have chronic liver or kidney disease or ever had a stomach ulcer or GI bleeding, talk with your doctor before using these medicines. No one who is younger than 18 and ill with a fever should take aspirin. It may cause severe disease or death.  · Your appetite may be poor, so a light diet is fine. Avoid dehydration by drinking 8 to 12 8-ounce glasses of fluids each day. This may include water; orange juice; lemonade; apple, grape, and cranberry juice; clear fruit drinks; electrolyte replacement and sports drinks; and decaffeinated teas and coffee. If you have been diagnosed with a kidney disease, ask your doctor how much and what types of fluids you should drink to prevent dehydration. If you have kidney disease, drinking too much fluid can cause it build up in the your body and be dangerous to your " health.  · Over-the-counter remedies won't shorten the length of the illness but may be helpful for cough, sore throat; and nasal and sinus congestion. Don't use decongestants if you have high blood pressure.  Follow-up care  Follow up with your healthcare provider if you do not improve over the next week.  Call 911  Get emergency medical care if any of the following occur:  · Convulsion  · Feeling weak, dizzy, or like you are going to faint  · Chest pain, shortness of breath, wheezing, or difficulty breathing  When to seek medical advice  Call your healthcare provider right away if any of these occur:  · Cough with lots of colored sputum (mucus) or blood in your sputum  · Chest pain, shortness of breath, wheezing, or difficulty breathing  · Severe headache; face, neck, or ear pain  · Severe, constant pain in the lower right side of your belly (abdominal)  · Continued vomiting (cant keep liquids down)  · Frequent diarrhea (more than 5 times a day); blood (red or black color) or mucus in diarrhea  · Feeling weak, dizzy, or like you are going to faint  · Extreme thirst  · Fever of 100.4°F (38°C) or higher, or as directed by your healthcare provider  Date Last Reviewed: 9/25/2015  © 6887-6362 [employer]. [address] [place] [address], [place] [address]. All rights reserved. This information is not intended as a substitute for professional medical care. Always follow your healthcare professional's instructions.

## 2017-10-31 NOTE — PROGRESS NOTES
Subjective:       Patient ID: Betty Bacon is a 67 y.o. female.    Chief Complaint: Cough    Dr. Curiel is patient's pulmonologist      Cough   This is a new (new episode but does have intermittent cough with COPD ) problem. The current episode started in the past 7 days. The problem has been waxing and waning. The problem occurs every few hours. The cough is productive of sputum. Associated symptoms include chills, ear congestion, headaches (above eyes sinus pressure), nasal congestion, postnasal drip, a sore throat, shortness of breath (intermittent) and wheezing. Pertinent negatives include no chest pain, ear pain, eye redness, fever, heartburn, hemoptysis, myalgias, rash, rhinorrhea or sweats. Nothing aggravates the symptoms. Risk factors for lung disease include animal exposure. She has tried a beta-agonist inhaler, ipratropium inhaler and rest for the symptoms. The treatment provided mild relief. Her past medical history is significant for COPD and environmental allergies. There is no history of asthma, bronchiectasis, bronchitis, emphysema or pneumonia.     Review of Systems   Constitutional: Positive for chills and fatigue. Negative for fever.   HENT: Positive for congestion, postnasal drip, sinus pain, sinus pressure, sore throat and trouble swallowing. Negative for ear pain and rhinorrhea.    Eyes: Negative for pain, redness and itching.   Respiratory: Positive for cough, shortness of breath (intermittent) and wheezing. Negative for hemoptysis and choking.    Cardiovascular: Negative for chest pain and leg swelling.   Gastrointestinal: Negative for abdominal pain, diarrhea, heartburn, nausea and vomiting.   Genitourinary: Negative for difficulty urinating, dyspareunia and dysuria.   Musculoskeletal: Positive for back pain (intermittent chronic ). Negative for myalgias.   Skin: Negative for pallor and rash.   Allergic/Immunologic: Positive for environmental allergies.   Neurological: Positive for  headaches (above eyes sinus pressure).   Hematological: Negative for adenopathy.   All other systems reviewed and are negative.      Objective:      Physical Exam   Constitutional: She is oriented to person, place, and time. She appears well-developed and well-nourished. No distress.   obese   HENT:   Head: Normocephalic and atraumatic.   Right Ear: External ear normal.   Left Ear: External ear normal.   Mouth/Throat: No oropharyngeal exudate.   Nose: nasal mucosa erythematous and edematous more on the right than the left. There is small amount clear rhinorrhea.  Throat: mildly erythematous with small amount post nasal drip.  Airway patent tongue midline.    Eyes: Conjunctivae are normal. Pupils are equal, round, and reactive to light. Right eye exhibits no discharge. Left eye exhibits no discharge. No scleral icterus.   Neck: Normal range of motion. Neck supple. No tracheal deviation present.   Cardiovascular: Normal rate, regular rhythm and normal heart sounds.    Pulmonary/Chest: Effort normal and breath sounds normal. No respiratory distress. She has no wheezes. She has no rales.   Abdominal: Soft. Bowel sounds are normal. She exhibits no distension. There is no tenderness.   Musculoskeletal: Normal range of motion. She exhibits no edema.   Lymphadenopathy:     She has no cervical adenopathy.   Neurological: She is alert and oriented to person, place, and time.   Skin: Skin is warm and dry. Capillary refill takes less than 2 seconds. She is not diaphoretic.   Psychiatric: She has a normal mood and affect. Her behavior is normal.   Nursing note and vitals reviewed.      Assessment:       1. Rhinosinusitis    2. PND (post-nasal drip)    3. Chronic obstructive pulmonary disease, unspecified COPD type    4. Cough        Plan:       Rhinosinusitis  -     benzonatate (TESSALON) 200 MG capsule; Take 1 capsule (200 mg total) by mouth 3 (three) times daily as needed for Cough.  Dispense: 30 capsule; Refill: 0    PND  (post-nasal drip)  -     benzonatate (TESSALON) 200 MG capsule; Take 1 capsule (200 mg total) by mouth 3 (three) times daily as needed for Cough.  Dispense: 30 capsule; Refill: 0    Chronic obstructive pulmonary disease, unspecified COPD type  -     benzonatate (TESSALON) 200 MG capsule; Take 1 capsule (200 mg total) by mouth 3 (three) times daily as needed for Cough.  Dispense: 30 capsule; Refill: 0  -     POCT Influenza A/B    Cough  -     benzonatate (TESSALON) 200 MG capsule; Take 1 capsule (200 mg total) by mouth 3 (three) times daily as needed for Cough.  Dispense: 30 capsule; Refill: 0  -     POCT Influenza A/B      Counseled patient symptoms likely viral.  BBS rales, rhonchi or wheezes no need for steroid or antibiotic therapy.  Pt. To increase oral fluids and rest today.  Continue current home medications and f/u with specialists as scheduled.  Influenza A/B negative today.Tessalon to further aid in symptom relief.      I have reviewed the patient's past medical/surgical and social histories and updated as appropriate. Medications were reviewed and discussed as appropriate including side effects and risks versus benefit.     Plan of care was reviewed and agreed upon with the patient.  An opportunity to ask questions was provided and explanation given. Patient verbalized understanding on all information reviewed and discussed.  The patient will follow up at his/her routinely scheduled appointment with PCP or sooner if needed. If symptoms worsen patient may call for ASAP appointment or report to the emergency department for further evaluation.

## 2017-11-07 ENCOUNTER — LAB VISIT (OUTPATIENT)
Dept: LAB | Facility: HOSPITAL | Age: 67
End: 2017-11-07
Attending: INTERNAL MEDICINE
Payer: MEDICARE

## 2017-11-07 DIAGNOSIS — N18.30 CKD (CHRONIC KIDNEY DISEASE) STAGE 3, GFR 30-59 ML/MIN: ICD-10-CM

## 2017-11-07 LAB
ALBUMIN SERPL BCP-MCNC: 2.6 G/DL
ANION GAP SERPL CALC-SCNC: 11 MMOL/L
BUN SERPL-MCNC: 25 MG/DL
CALCIUM SERPL-MCNC: 9.5 MG/DL
CHLORIDE SERPL-SCNC: 102 MMOL/L
CO2 SERPL-SCNC: 25 MMOL/L
CREAT SERPL-MCNC: 1.2 MG/DL
EST. GFR  (AFRICAN AMERICAN): 53.3 ML/MIN/1.73 M^2
EST. GFR  (NON AFRICAN AMERICAN): 46.2 ML/MIN/1.73 M^2
GLUCOSE SERPL-MCNC: 109 MG/DL
PHOSPHATE SERPL-MCNC: 2.4 MG/DL
POTASSIUM SERPL-SCNC: 4.2 MMOL/L
PTH-INTACT SERPL-MCNC: 154 PG/ML
SODIUM SERPL-SCNC: 138 MMOL/L

## 2017-11-07 PROCEDURE — 83970 ASSAY OF PARATHORMONE: CPT

## 2017-11-07 PROCEDURE — 80069 RENAL FUNCTION PANEL: CPT

## 2017-11-07 PROCEDURE — 36415 COLL VENOUS BLD VENIPUNCTURE: CPT | Mod: PO

## 2017-11-08 ENCOUNTER — TELEPHONE (OUTPATIENT)
Dept: CARDIOLOGY | Facility: CLINIC | Age: 67
End: 2017-11-08

## 2017-11-08 NOTE — TELEPHONE ENCOUNTER
----- Message from Nisha Caal sent at 11/8/2017  2:05 PM CST -----  Contact: pt [phone]  Patient called and asked if you want to see her for her annual appointment she did not receive a letter she just wanted to make sure. If so do you want her to have orders for labs as well.

## 2017-11-14 ENCOUNTER — OFFICE VISIT (OUTPATIENT)
Dept: NEPHROLOGY | Facility: CLINIC | Age: 67
End: 2017-11-14
Payer: MEDICARE

## 2017-11-14 VITALS
TEMPERATURE: 99 F | DIASTOLIC BLOOD PRESSURE: 70 MMHG | OXYGEN SATURATION: 96 % | RESPIRATION RATE: 18 BRPM | HEIGHT: 63 IN | WEIGHT: 193.13 LBS | BODY MASS INDEX: 34.22 KG/M2 | SYSTOLIC BLOOD PRESSURE: 110 MMHG | HEART RATE: 80 BPM

## 2017-11-14 DIAGNOSIS — I10 ESSENTIAL HYPERTENSION: ICD-10-CM

## 2017-11-14 DIAGNOSIS — I73.9 PAD (PERIPHERAL ARTERY DISEASE): ICD-10-CM

## 2017-11-14 DIAGNOSIS — I25.10 CORONARY ARTERY DISEASE INVOLVING NATIVE CORONARY ARTERY OF NATIVE HEART WITHOUT ANGINA PECTORIS: ICD-10-CM

## 2017-11-14 DIAGNOSIS — I35.0 AORTIC VALVE STENOSIS, ETIOLOGY OF CARDIAC VALVE DISEASE UNSPECIFIED: ICD-10-CM

## 2017-11-14 DIAGNOSIS — J44.9 CHRONIC OBSTRUCTIVE PULMONARY DISEASE, UNSPECIFIED COPD TYPE: ICD-10-CM

## 2017-11-14 DIAGNOSIS — Z87.891 FORMER SMOKER: ICD-10-CM

## 2017-11-14 DIAGNOSIS — N18.30 CKD (CHRONIC KIDNEY DISEASE) STAGE 3, GFR 30-59 ML/MIN: Primary | ICD-10-CM

## 2017-11-14 PROCEDURE — 99213 OFFICE O/P EST LOW 20 MIN: CPT | Mod: S$PBB,,, | Performed by: INTERNAL MEDICINE

## 2017-11-14 PROCEDURE — 99214 OFFICE O/P EST MOD 30 MIN: CPT | Mod: PBBFAC,PO | Performed by: INTERNAL MEDICINE

## 2017-11-14 PROCEDURE — 99999 PR PBB SHADOW E&M-EST. PATIENT-LVL IV: CPT | Mod: PBBFAC,,, | Performed by: INTERNAL MEDICINE

## 2017-11-21 ENCOUNTER — DOCUMENTATION ONLY (OUTPATIENT)
Dept: FAMILY MEDICINE | Facility: CLINIC | Age: 67
End: 2017-11-21

## 2017-11-21 NOTE — PROGRESS NOTES
Pre-Visit Chart Review  For Appointment Scheduled on 11/24/17.    There are no preventive care reminders to display for this patient.

## 2017-11-22 RX ORDER — METOPROLOL SUCCINATE 50 MG/1
TABLET, EXTENDED RELEASE ORAL
Qty: 30 TABLET | Refills: 10 | Status: SHIPPED | OUTPATIENT
Start: 2017-11-22 | End: 2018-12-18 | Stop reason: SDUPTHER

## 2017-11-22 RX ORDER — CLOPIDOGREL BISULFATE 75 MG/1
TABLET ORAL
Qty: 30 TABLET | Refills: 10 | Status: SHIPPED | OUTPATIENT
Start: 2017-11-22 | End: 2018-12-18 | Stop reason: SDUPTHER

## 2017-11-22 RX ORDER — ISOSORBIDE MONONITRATE 60 MG/1
TABLET, EXTENDED RELEASE ORAL
Qty: 30 TABLET | Refills: 6 | Status: SHIPPED | OUTPATIENT
Start: 2017-11-22 | End: 2018-07-11 | Stop reason: SDUPTHER

## 2017-11-24 ENCOUNTER — HOSPITAL ENCOUNTER (OUTPATIENT)
Dept: RADIOLOGY | Facility: CLINIC | Age: 67
Discharge: HOME | End: 2017-11-24
Attending: FAMILY MEDICINE
Payer: MEDICARE

## 2017-11-24 ENCOUNTER — OFFICE VISIT (OUTPATIENT)
Dept: FAMILY MEDICINE | Facility: CLINIC | Age: 67
End: 2017-11-24
Payer: MEDICARE

## 2017-11-24 VITALS
DIASTOLIC BLOOD PRESSURE: 50 MMHG | TEMPERATURE: 98 F | HEART RATE: 66 BPM | WEIGHT: 193.13 LBS | SYSTOLIC BLOOD PRESSURE: 158 MMHG | BODY MASS INDEX: 34.22 KG/M2 | HEIGHT: 63 IN

## 2017-11-24 DIAGNOSIS — E66.9 OBESITY, CLASS I, BMI 30-34.9: ICD-10-CM

## 2017-11-24 DIAGNOSIS — R73.9 HYPERGLYCEMIA: ICD-10-CM

## 2017-11-24 DIAGNOSIS — M94.9 DISORDER OF BONE AND CARTILAGE: ICD-10-CM

## 2017-11-24 DIAGNOSIS — I10 ESSENTIAL HYPERTENSION: Primary | ICD-10-CM

## 2017-11-24 DIAGNOSIS — R19.7 DIARRHEA, UNSPECIFIED TYPE: ICD-10-CM

## 2017-11-24 DIAGNOSIS — E78.5 HYPERLIPIDEMIA, UNSPECIFIED HYPERLIPIDEMIA TYPE: ICD-10-CM

## 2017-11-24 DIAGNOSIS — I25.10 CORONARY ARTERY DISEASE INVOLVING NATIVE CORONARY ARTERY OF NATIVE HEART WITHOUT ANGINA PECTORIS: ICD-10-CM

## 2017-11-24 DIAGNOSIS — M85.80 OSTEOPENIA, UNSPECIFIED LOCATION: ICD-10-CM

## 2017-11-24 DIAGNOSIS — M89.9 DISORDER OF BONE AND CARTILAGE: ICD-10-CM

## 2017-11-24 DIAGNOSIS — N18.30 CKD (CHRONIC KIDNEY DISEASE) STAGE 3, GFR 30-59 ML/MIN: ICD-10-CM

## 2017-11-24 PROCEDURE — 99214 OFFICE O/P EST MOD 30 MIN: CPT | Mod: PBBFAC,25,PO | Performed by: FAMILY MEDICINE

## 2017-11-24 PROCEDURE — 99214 OFFICE O/P EST MOD 30 MIN: CPT | Mod: S$PBB,,, | Performed by: FAMILY MEDICINE

## 2017-11-24 PROCEDURE — 77080 DXA BONE DENSITY AXIAL: CPT | Mod: 26,,, | Performed by: RADIOLOGY

## 2017-11-24 PROCEDURE — 99999 PR PBB SHADOW E&M-EST. PATIENT-LVL IV: CPT | Mod: PBBFAC,,, | Performed by: FAMILY MEDICINE

## 2017-11-24 PROCEDURE — 77080 DXA BONE DENSITY AXIAL: CPT | Mod: TC,PO

## 2017-11-24 RX ORDER — MONTELUKAST SODIUM 4 MG/1
1 TABLET, CHEWABLE ORAL 2 TIMES DAILY
Qty: 180 TABLET | Refills: 3 | Status: SHIPPED | OUTPATIENT
Start: 2017-11-24 | End: 2018-10-25 | Stop reason: CLARIF

## 2017-11-24 RX ORDER — TRAZODONE HYDROCHLORIDE 100 MG/1
100 TABLET ORAL NIGHTLY
Qty: 90 TABLET | Refills: 3 | Status: SHIPPED | OUTPATIENT
Start: 2017-11-24 | End: 2018-11-14 | Stop reason: SDUPTHER

## 2017-11-24 RX ORDER — ATORVASTATIN CALCIUM 40 MG/1
40 TABLET, FILM COATED ORAL DAILY
Qty: 90 TABLET | Refills: 3 | Status: SHIPPED | OUTPATIENT
Start: 2017-11-24 | End: 2018-12-18 | Stop reason: SDUPTHER

## 2017-11-24 RX ORDER — ALPRAZOLAM 1 MG/1
1 TABLET ORAL 2 TIMES DAILY PRN
Qty: 60 TABLET | Refills: 1 | Status: SHIPPED | OUTPATIENT
Start: 2017-11-24 | End: 2018-10-25 | Stop reason: CLARIF

## 2017-11-24 NOTE — PROGRESS NOTES
CHIEF COMPLAINT:  Follow up       HISTORY OF PRESENT ILLNESS:  Betty Bacon is a 67 y.o. female who presents to clinic for follow up    1. HTN: Patient is on dyazide, imdur, toprol XL, norvasc. She denies any CP, SOB, edema.  She has an upcoming appointment with cardiology.     2. Hyperlipidemia, CAD: She is on lipitor, plavix. She is due for labs.    3. Hyperglycemia:  He has been working on weight loss.  She is due for repeat labs    4. She continues to have diarrhea and occasional fecal incontinence. She followed up with GI who suggested she use imodium but she finds that this is too constipating after several doses.       REVIEW OF SYSTEMS:  The patient denies any fever, chills, night sweats, headaches, vision changes, difficulty speaking or swallowing, decreased hearing, weight loss, weight gain, chest pain, palpitations, shortness of breath, cough, nausea, vomiting, abdominal pain, dysuria, , constipation, hematuria, hematochezia, melena, changes in her hair,nails, numbness or weakness in her extremities, erythema,  swelling over any of her joints, myalgias, swollen glands, easy bruising, fatigue, edema,.      MEDICATIONS:   Reviewed and/or reconciled in EPIC    ALLERGIES:  Reviewed and/or reconciled in [facility]    PAST MEDICAL/SURGICAL HISTORY:   Past Medical History:   Diagnosis Date    Acute coronary artery obstruction without MI 10/2012    Benign hypertension     COPD (chronic obstructive pulmonary disease)     Coronary artery disease     Disorder of kidney and ureter     Dr. Morrow    History of electroconvulsive therapy     Hyperlipidemia LDL goal < 70     Left ankle sprain     Major depressive disorder, recurrent episode, severe     s/p ECT    PVD (peripheral vascular disease)       Past Surgical History:   Procedure Laterality Date    CHOLECYSTECTOMY      CORONARY ANGIOPLASTY      CORONARY ANGIOPLASTY WITH STENT PLACEMENT  10/2012    2 stents RCA (100% stenosis)    EYE SURGERY    "   cataract surgery    HYSTERECTOMY      LINA, ovaries intact. uterine prolapse    ILIAC ARTERY STENT      TONSILLECTOMY      TOTAL VAGINAL HYSTERECTOMY         FAMILY HISTORY:    Family History   Problem Relation Age of Onset    Diabetes Mother     Heart disease Mother     Stroke Father     Heart disease Father     Heart disease Brother     Stroke Brother     Hypertension Daughter     Diabetes Maternal Aunt     Heart disease Maternal Aunt     Heart disease Maternal Uncle     Heart disease Paternal Aunt     Heart disease Paternal Uncle     Heart disease Maternal Grandfather     Diabetes Sister     Heart disease Sister     Cancer Sister      lung    Kidney disease Sister      mass, benign    Breast cancer Sister     Melanoma Neg Hx     Psoriasis Neg Hx     Lupus Neg Hx     Eczema Neg Hx        SOCIAL HISTORY:    Social History     Social History    Marital status:      Spouse name: N/A    Number of children: N/A    Years of education: N/A     Occupational History    Not on file.     Social History Main Topics    Smoking status: Former Smoker     Packs/day: 2.00     Years: 40.00     Types: Cigarettes     Quit date: 12/5/2009    Smokeless tobacco: Never Used    Alcohol use No    Drug use: No    Sexual activity: No     Other Topics Concern    Not on file     Social History Narrative    No narrative on file       PHYSICAL EXAM:  VITAL SIGNS:   Vitals:    11/24/17 1038   BP: (!) 162/65   BP Location: Right arm   Patient Position: Sitting   BP Method: Small (Automatic)   Pulse: 66   Temp: 98 °F (36.7 °C)   TempSrc: Oral   Weight: 87.6 kg (193 lb 2 oz)   Height: 5' 3" (1.6 m)     GENERAL:  Patient appears well nourished, sitting on exam table, in no acute distress.  HEENT:  Atraumatic, normocephalic, PERRLA, EOMI, no conjunctival injection, sclerae are anicteric, normal external auditory canals,TMs clear b/l, gross hearing intact to whisper, MMM, no oropharygneal erythema or " exudate.  NECK:  Supple, normal ROM, trachea is midline , no supraclavicular or cervical LAD or masses palpated.  Thyroid gland not palpable.  CARDIOVASCULAR:  RRR, normal S1 and S2, no m/r/g.  RESPIRATORY:  CTA b/l, no wheezes, rhonchi, rales.  No increased work of breathing, no  use of accessory muscles.  ABDOMEN:  Soft, nontender, nondistended, normoactive bowel sounds in all four quadrants, no rebound or guarding, no HSM or masses palpated.  Normal percussion.  EXTREMITIES:  2+ DP pulses b/l, no edema.  SKIN:  Warm, confluent erythematous areas in b/l inguinal areas, no evidence of skin breakdown at this time  NEUROMUSCULAR: . Cranial nerves II-XII grossly intact.  No clubbing or cyanosis of digits/nails.  PSYCH:  Patient is alert and oriented to person, time, place. They are appropriately dressed and groomed. There is normal eye contact. Rate and tone of speech is normal. Normal insight, judgement. Normal thought content and process.     LABORATORY/IMAGING STUDIES: pending    ASSESSMENT/PLAN: This is a 67 y.o. female who presents to clinic for evaluation of the following concerns    1. Essential hypertension  See below    2. Hyperlipidemia, unspecified hyperlipidemia type, CAD  - Comprehensive metabolic panel; Future  - Lipid panel; Future    3. CKD (chronic kidney disease) stage 3, GFR 30-59 ml/min  Follow up with nephrology    4. Hyperglycemia  - Comprehensive metabolic panel; Future  - Hemoglobin A1c; Future    5 Osteopenia, unspecified location  - Vitamin D; Future  - DXA Bone Density Spine And Hip; Future    6. Obesity, Class I, BMI 30-34.9  See below    7. Diarrhea, unspecified type  Place on trial of colestipol, consider second opinion from GI    Patient readiness: acceptance and barriers:none    During the course of the visit the patient was educated and counseled about the following:     Hypertension:   Medication: no change.  Keep follow up appointment with cardiology  Obesity:   General weight  loss/lifestyle modification strategies discussed (elicit support from others; identify saboteurs; non-food rewards, etc).  Diet interventions: moderate (500 kCal/d) deficit diet.  Informal exercise measures discussed, e.g. taking stairs instead of elevator.  Regular aerobic exercise program discussed.    Goals: Hypertension: Reduce Blood Pressure and Obesity: Reduce calorie intake and BMI    Did patient meet goals/outcomes: No    The following self management tools provided: declined    Patient Instructions (the written plan) was given to the patient/family.     Time spent with patient: 30 minutes        Celeste Barker MD

## 2017-12-04 ENCOUNTER — OFFICE VISIT (OUTPATIENT)
Dept: CARDIOLOGY | Facility: CLINIC | Age: 67
End: 2017-12-04
Payer: MEDICARE

## 2017-12-04 VITALS
SYSTOLIC BLOOD PRESSURE: 160 MMHG | WEIGHT: 194.88 LBS | DIASTOLIC BLOOD PRESSURE: 63 MMHG | HEART RATE: 64 BPM | HEIGHT: 63 IN | BODY MASS INDEX: 34.53 KG/M2

## 2017-12-04 DIAGNOSIS — I73.9 PAD (PERIPHERAL ARTERY DISEASE): ICD-10-CM

## 2017-12-04 DIAGNOSIS — I10 ESSENTIAL HYPERTENSION: ICD-10-CM

## 2017-12-04 DIAGNOSIS — I25.10 CORONARY ARTERY DISEASE INVOLVING NATIVE CORONARY ARTERY OF NATIVE HEART WITHOUT ANGINA PECTORIS: Primary | ICD-10-CM

## 2017-12-04 DIAGNOSIS — E78.5 HYPERLIPIDEMIA, UNSPECIFIED HYPERLIPIDEMIA TYPE: ICD-10-CM

## 2017-12-04 DIAGNOSIS — I35.0 AORTIC VALVE STENOSIS, ETIOLOGY OF CARDIAC VALVE DISEASE UNSPECIFIED: ICD-10-CM

## 2017-12-04 PROCEDURE — 99212 OFFICE O/P EST SF 10 MIN: CPT | Mod: PBBFAC,PO | Performed by: INTERNAL MEDICINE

## 2017-12-04 PROCEDURE — 99214 OFFICE O/P EST MOD 30 MIN: CPT | Mod: S$PBB,,, | Performed by: INTERNAL MEDICINE

## 2017-12-04 PROCEDURE — 99999 PR PBB SHADOW E&M-EST. PATIENT-LVL II: CPT | Mod: PBBFAC,,, | Performed by: INTERNAL MEDICINE

## 2017-12-04 NOTE — PROGRESS NOTES
Subjective:    Patient ID:  Betty Bacon is a 67 y.o. female who presents for follow-up of cad    HPI  She comes with no complaints, no chest pain, no shortness of breath      Review of Systems   Constitution: Negative for decreased appetite, weakness, malaise/fatigue, weight gain and weight loss.   Cardiovascular: Negative for chest pain, dyspnea on exertion, leg swelling, palpitations and syncope.   Respiratory: Negative for cough and shortness of breath.    Gastrointestinal: Negative.    All other systems reviewed and are negative.       Objective:    Physical Exam   Constitutional: She is oriented to person, place, and time. She appears well-developed and well-nourished.   HENT:   Head: Normocephalic.   Eyes: Pupils are equal, round, and reactive to light.   Neck: Normal range of motion. Neck supple. No JVD present. Carotid bruit is not present. No thyromegaly present.   Cardiovascular: Normal rate, regular rhythm, intact distal pulses and normal pulses.  PMI is not displaced.  Exam reveals no gallop.    Murmur heard.   Harsh midsystolic murmur is present with a grade of 3/6  at the upper right sternal border radiating to the neck  High-pitched blowing decrescendo early diastolic murmur is present with a grade of 2/6  at the upper right sternal border radiating to the apex  Pulmonary/Chest: Effort normal and breath sounds normal.   Abdominal: Soft. Normal appearance. She exhibits no mass. There is no hepatosplenomegaly. There is no tenderness.   Musculoskeletal: Normal range of motion. She exhibits no edema.   Neurological: She is alert and oriented to person, place, and time. She has normal strength and normal reflexes. No sensory deficit.   Skin: Skin is warm and intact.   Psychiatric: She has a normal mood and affect.   Nursing note and vitals reviewed.        Assessment:       1. Coronary artery disease involving native coronary artery of native heart without angina pectoris    2. PAD (peripheral  artery disease)    3. Aortic valve stenosis, etiology of cardiac valve disease unspecified    4. Essential hypertension    5. Hyperlipidemia, unspecified hyperlipidemia type         Plan:     Continue all cardiac medications  Regular exercise program  Weight loss  1 yr f/u with [facility], carotid doppler

## 2017-12-19 RX ORDER — TRIAMTERENE AND HYDROCHLOROTHIAZIDE 37.5; 25 MG/1; MG/1
1 CAPSULE ORAL DAILY
Qty: 90 CAPSULE | Refills: 2 | Status: SHIPPED | OUTPATIENT
Start: 2017-12-19 | End: 2018-11-14 | Stop reason: SDUPTHER

## 2018-02-01 ENCOUNTER — TELEPHONE (OUTPATIENT)
Dept: FAMILY MEDICINE | Facility: CLINIC | Age: 68
End: 2018-02-01

## 2018-02-01 DIAGNOSIS — E55.9 VITAMIN D DEFICIENCY: Primary | ICD-10-CM

## 2018-02-01 RX ORDER — ASPIRIN 325 MG
50000 TABLET, DELAYED RELEASE (ENTERIC COATED) ORAL WEEKLY
Qty: 8 CAPSULE | Refills: 0 | Status: SHIPPED | OUTPATIENT
Start: 2018-02-01 | End: 2018-03-23

## 2018-02-01 NOTE — TELEPHONE ENCOUNTER
The vitamin D level is low. I am starting high dose vitamin D supplementation for 8 weeks to be followed with a repeat vitamin D level.      Cholesterol is at goal.    There is no evidence of diabetes, but blood sugar is still within the prediabetic range. Needs to work on eating low carb/sugar diet, continue with weight loss.    Renal function is improving.

## 2018-02-28 RX ORDER — MOMETASONE FUROATE 220 UG/1
INHALANT RESPIRATORY (INHALATION)
Qty: 1 EACH | Refills: 6 | OUTPATIENT
Start: 2018-02-28

## 2018-03-01 NOTE — TELEPHONE ENCOUNTER
Spoke to Yessica at [facility], she will remove the medication from pts profile

## 2018-03-27 ENCOUNTER — LAB VISIT (OUTPATIENT)
Dept: LAB | Facility: HOSPITAL | Age: 68
End: 2018-03-27
Attending: FAMILY MEDICINE
Payer: MEDICARE

## 2018-03-27 DIAGNOSIS — E55.9 VITAMIN D DEFICIENCY: ICD-10-CM

## 2018-03-27 LAB — 25(OH)D3+25(OH)D2 SERPL-MCNC: 50 NG/ML

## 2018-03-27 PROCEDURE — 36415 COLL VENOUS BLD VENIPUNCTURE: CPT | Mod: PO

## 2018-03-27 PROCEDURE — 82306 VITAMIN D 25 HYDROXY: CPT

## 2018-04-24 ENCOUNTER — LAB VISIT (OUTPATIENT)
Dept: LAB | Facility: HOSPITAL | Age: 68
End: 2018-04-24
Attending: INTERNAL MEDICINE
Payer: MEDICARE

## 2018-04-24 DIAGNOSIS — N18.30 CKD (CHRONIC KIDNEY DISEASE) STAGE 3, GFR 30-59 ML/MIN: ICD-10-CM

## 2018-04-24 LAB
CREAT UR-MCNC: 150 MG/DL
PROT UR-MCNC: 13 MG/DL
PROT/CREAT RATIO, UR: 0.09

## 2018-04-24 PROCEDURE — 82570 ASSAY OF URINE CREATININE: CPT

## 2018-04-26 NOTE — TELEPHONE ENCOUNTER
----- Message from Sandra Rosen sent at 4/26/2018 10:11 AM CDT -----  Type:  RX Refill Request    Who Called:  Betty  Refill or New Rx:  refill  RX Name and Strength:  ASMANEX TWISTHALER 220 mcg (60 doses) AePB  How is the patient currently taking it? (ex. 1XDay):  INHALE 2 PUFFS INTO THE LUNGS ONCE A DAY  Is this a 30 day or 90 day RX: 30 with 6 refills  Preferred Pharmacy with phone number:    [facility]/pharmacy #[id number]   [address]  [place] [address]  Phone: [phone] Fax: [phone]    Local or Mail Order: local  Ordering Provider:  Lucero Montalvo Call Back Number:  [phone]  Additional Information:  Pharmacy can not refill, per her request, they have it noted as cancel.

## 2018-04-26 NOTE — TELEPHONE ENCOUNTER
Pharmacy has medication was cancelled ??  I dont see where it was cancelled.  Spoke to patient,patient states she is taking asmanex,breo and albuterol.  Patient states she takes asmanex daily

## 2018-05-01 ENCOUNTER — OFFICE VISIT (OUTPATIENT)
Dept: NEPHROLOGY | Facility: CLINIC | Age: 68
End: 2018-05-01
Payer: MEDICARE

## 2018-05-01 VITALS
HEIGHT: 62 IN | RESPIRATION RATE: 14 BRPM | SYSTOLIC BLOOD PRESSURE: 136 MMHG | DIASTOLIC BLOOD PRESSURE: 60 MMHG | HEART RATE: 70 BPM | BODY MASS INDEX: 36.71 KG/M2 | WEIGHT: 199.5 LBS

## 2018-05-01 DIAGNOSIS — I11.9 HYPERTENSIVE LEFT VENTRICULAR HYPERTROPHY, WITHOUT HEART FAILURE: ICD-10-CM

## 2018-05-01 DIAGNOSIS — N18.30 CKD (CHRONIC KIDNEY DISEASE) STAGE 3, GFR 30-59 ML/MIN: Primary | ICD-10-CM

## 2018-05-01 DIAGNOSIS — I10 ESSENTIAL HYPERTENSION: ICD-10-CM

## 2018-05-01 DIAGNOSIS — J84.9 INTERSTITIAL LUNG DISEASE: ICD-10-CM

## 2018-05-01 DIAGNOSIS — Z87.891 FORMER SMOKER: ICD-10-CM

## 2018-05-01 PROCEDURE — 99214 OFFICE O/P EST MOD 30 MIN: CPT | Mod: PBBFAC,PO | Performed by: INTERNAL MEDICINE

## 2018-05-01 PROCEDURE — 99999 PR PBB SHADOW E&M-EST. PATIENT-LVL IV: CPT | Mod: PBBFAC,,, | Performed by: INTERNAL MEDICINE

## 2018-05-01 PROCEDURE — 99213 OFFICE O/P EST LOW 20 MIN: CPT | Mod: S$PBB,,, | Performed by: INTERNAL MEDICINE

## 2018-05-07 NOTE — PROGRESS NOTES
Subjective:       Patient ID: Betty Bacon is a 68 y.o. White female who presents for follow-up evaluation of Chronic Kidney Disease    HPI     She reports she is doing well. She escaped the flu. . No LE edema and no SOB. Her appetite is stable and she is pushing fluids. No new medications since last visit No LUTS    Review of Systems   Constitutional: Negative for activity change, appetite change, fatigue and unexpected weight change.   HENT: Negative for facial swelling.    Eyes: Negative for visual disturbance.   Respiratory: Negative for shortness of breath.    Cardiovascular: Negative for chest pain and leg swelling.   Gastrointestinal: Negative for constipation and diarrhea.   Genitourinary: Negative for difficulty urinating, dysuria and hematuria.   Musculoskeletal: Negative for arthralgias.   Neurological: Negative for weakness and headaches.       Objective:      Physical Exam   Constitutional: She is oriented to person, place, and time. She appears well-nourished.   HENT:   Mouth/Throat: Oropharynx is clear and moist.   Neck: No JVD present.   Cardiovascular: S1 normal and S2 normal.  Exam reveals no friction rub.    Pulmonary/Chest: Breath sounds normal. She has no wheezes. She has no rales.   Abdominal: Soft.   Musculoskeletal: She exhibits no edema.   Neurological: She is alert and oriented to person, place, and time.   Skin: Skin is warm and dry.   Psychiatric: She has a normal mood and affect.   Vitals reviewed.      Assessment:       1. CKD (chronic kidney disease) stage 3, GFR 30-59 ml/min    2. Essential hypertension    3. Hypertensive left ventricular hypertrophy, without heart failure    4. Interstitial lung disease    5. Former smoker        Plan:             CKD Stage 3 with stable kidney function and proteinuria.      HTN--controlled    Mineral and Bone Disease--continue D3          RTC 6 months with labs prior

## 2018-05-15 ENCOUNTER — TELEPHONE (OUTPATIENT)
Dept: CARDIOLOGY | Facility: CLINIC | Age: 68
End: 2018-05-15

## 2018-05-15 ENCOUNTER — TELEPHONE (OUTPATIENT)
Dept: FAMILY MEDICINE | Facility: CLINIC | Age: 68
End: 2018-05-15

## 2018-05-15 NOTE — TELEPHONE ENCOUNTER
Studies have actually shown that that is not the case and patient's can be on both nexium and plavix.

## 2018-05-15 NOTE — TELEPHONE ENCOUNTER
----- Message from Malissa Herndon sent at 5/15/2018 10:26 AM CDT -----  Pharmacy is calling to report interaction between esomeprazole (NEXIUM) 40 MG capsule & clopidogrel (PLAVIX) 75 mg tablet, please call to advise    [facility]/pharmacy #[id number] - [place], [place] - [address] AT [facility]  [address]  [place] [address]  Phone: [phone] Fax: [phone]

## 2018-05-15 NOTE — TELEPHONE ENCOUNTER
Spoke to [facility] pharmacist states plavix is less effective while patient is taking nexium patient has high risk for blood clot. Pharmacist is holding rx for nexium

## 2018-05-15 NOTE — TELEPHONE ENCOUNTER
----- Message from Lashawn Bose sent at 5/15/2018 10:56 AM CDT -----  Contact: Chacho with [facility]  Chacho is calling regarding a drug interaction, clopidogrel (PLAVIX) 75 mg tablet is interacting with the esomeprazole (NEXIUM) 40 MG capsule.  Dr. Barker was notified too.  Call Back#[phone]  Thanks     [facility]/pharmacy #[id number] - [place], [place] - [address] AT [facility]  [address]  [place] [address]  Phone: [phone] Fax: [phone]

## 2018-05-30 ENCOUNTER — DOCUMENTATION ONLY (OUTPATIENT)
Dept: FAMILY MEDICINE | Facility: CLINIC | Age: 68
End: 2018-05-30

## 2018-05-30 NOTE — PROGRESS NOTES
Pre-Visit Chart Review  For Appointment Scheduled on 6/7/18.    Health Maintenance Due   Topic Date Due    Mammogram  07/19/2018

## 2018-06-07 ENCOUNTER — OFFICE VISIT (OUTPATIENT)
Dept: FAMILY MEDICINE | Facility: CLINIC | Age: 68
End: 2018-06-07
Payer: MEDICARE

## 2018-06-07 ENCOUNTER — LAB VISIT (OUTPATIENT)
Dept: LAB | Facility: HOSPITAL | Age: 68
End: 2018-06-07
Attending: FAMILY MEDICINE
Payer: MEDICARE

## 2018-06-07 VITALS
WEIGHT: 203.94 LBS | DIASTOLIC BLOOD PRESSURE: 58 MMHG | BODY MASS INDEX: 37.53 KG/M2 | SYSTOLIC BLOOD PRESSURE: 130 MMHG | HEART RATE: 63 BPM | TEMPERATURE: 98 F | HEIGHT: 62 IN

## 2018-06-07 DIAGNOSIS — R73.01 IMPAIRED FASTING BLOOD SUGAR: ICD-10-CM

## 2018-06-07 DIAGNOSIS — E78.5 HYPERLIPIDEMIA, UNSPECIFIED HYPERLIPIDEMIA TYPE: ICD-10-CM

## 2018-06-07 DIAGNOSIS — I10 ESSENTIAL HYPERTENSION: Primary | ICD-10-CM

## 2018-06-07 DIAGNOSIS — I10 ESSENTIAL HYPERTENSION: ICD-10-CM

## 2018-06-07 DIAGNOSIS — H69.92 DYSFUNCTION OF LEFT EUSTACHIAN TUBE: ICD-10-CM

## 2018-06-07 DIAGNOSIS — I25.10 CORONARY ARTERY DISEASE INVOLVING NATIVE CORONARY ARTERY OF NATIVE HEART WITHOUT ANGINA PECTORIS: ICD-10-CM

## 2018-06-07 DIAGNOSIS — E66.01 SEVERE OBESITY (BMI 35.0-39.9): ICD-10-CM

## 2018-06-07 LAB
ALBUMIN SERPL BCP-MCNC: 3.4 G/DL
ALP SERPL-CCNC: 120 U/L
ALT SERPL W/O P-5'-P-CCNC: 23 U/L
ANION GAP SERPL CALC-SCNC: 9 MMOL/L
AST SERPL-CCNC: 23 U/L
BILIRUB SERPL-MCNC: 0.4 MG/DL
BUN SERPL-MCNC: 23 MG/DL
CALCIUM SERPL-MCNC: 9.6 MG/DL
CHLORIDE SERPL-SCNC: 105 MMOL/L
CHOLEST SERPL-MCNC: 185 MG/DL
CHOLEST/HDLC SERPL: 4.5 {RATIO}
CO2 SERPL-SCNC: 26 MMOL/L
CREAT SERPL-MCNC: 1.1 MG/DL
EST. GFR  (AFRICAN AMERICAN): 58.8 ML/MIN/1.73 M^2
EST. GFR  (NON AFRICAN AMERICAN): 51 ML/MIN/1.73 M^2
ESTIMATED AVG GLUCOSE: 114 MG/DL
GLUCOSE SERPL-MCNC: 105 MG/DL
HBA1C MFR BLD HPLC: 5.6 %
HDLC SERPL-MCNC: 41 MG/DL
HDLC SERPL: 22.2 %
LDLC SERPL CALC-MCNC: 119 MG/DL
NONHDLC SERPL-MCNC: 144 MG/DL
POTASSIUM SERPL-SCNC: 4.5 MMOL/L
PROT SERPL-MCNC: 6.8 G/DL
SODIUM SERPL-SCNC: 140 MMOL/L
TRIGL SERPL-MCNC: 125 MG/DL

## 2018-06-07 PROCEDURE — 80053 COMPREHEN METABOLIC PANEL: CPT

## 2018-06-07 PROCEDURE — 99214 OFFICE O/P EST MOD 30 MIN: CPT | Mod: PBBFAC,PO | Performed by: FAMILY MEDICINE

## 2018-06-07 PROCEDURE — 80061 LIPID PANEL: CPT

## 2018-06-07 PROCEDURE — 83036 HEMOGLOBIN GLYCOSYLATED A1C: CPT

## 2018-06-07 PROCEDURE — 99214 OFFICE O/P EST MOD 30 MIN: CPT | Mod: S$PBB,,, | Performed by: FAMILY MEDICINE

## 2018-06-07 PROCEDURE — 99999 PR PBB SHADOW E&M-EST. PATIENT-LVL IV: CPT | Mod: PBBFAC,,, | Performed by: FAMILY MEDICINE

## 2018-06-07 PROCEDURE — 36415 COLL VENOUS BLD VENIPUNCTURE: CPT | Mod: PO

## 2018-06-07 RX ORDER — AMOXICILLIN 500 MG/1
500 TABLET, FILM COATED ORAL EVERY 12 HOURS
Qty: 20 TABLET | Refills: 0 | Status: SHIPPED | OUTPATIENT
Start: 2018-06-07 | End: 2018-06-17

## 2018-06-07 RX ORDER — ALENDRONATE SODIUM 70 MG/1
70 TABLET ORAL
Qty: 4 TABLET | Refills: 11 | Status: SHIPPED | OUTPATIENT
Start: 2018-06-07 | End: 2018-12-04 | Stop reason: SDUPTHER

## 2018-06-07 RX ORDER — FLUTICASONE PROPIONATE 50 MCG
SPRAY, SUSPENSION (ML) NASAL
Qty: 1 BOTTLE | Refills: 1 | Status: SHIPPED | OUTPATIENT
Start: 2018-06-07 | End: 2018-10-25 | Stop reason: CLARIF

## 2018-06-07 NOTE — PROGRESS NOTES
CHIEF COMPLAINT:  Follow up HTN, hyperlipidemia, CAD      HISTORY OF PRESENT ILLNESS:  Betty Bacon is a 68 y.o. female who presents to clinic for follow up    1. HTN: Patient is on dyazide, imdur, toprol XL. She denies any CP, SOB, edema.      2. Hyperlipidemia, CAD: She is on lipitor, plavix. She denies any myalgia, dark colored urine.She is due for labs.    3. Hyperglycemia:  She has been working on weight loss but recently had weight gain due to eating out.  She is due for repeat labs    4. She describes 2 week course of left ear pain, popping, cracking sound. She has had some nasal congestion. She is not treating this with anything.       REVIEW OF SYSTEMS:  The patient denies any fever, chills, night sweats, headaches, vision changes, difficulty speaking or swallowing, decreased hearing, , chest pain, palpitations, shortness of breath, cough, nausea, vomiting, abdominal pain, dysuria, , constipation, hematuria, hematochezia, melena, changes in her hair,nails, numbness or weakness in her extremities, erythema,  swelling over any of her joints, myalgias, swollen glands, easy bruising, fatigue, edema,.      MEDICATIONS:   Reviewed and/or reconciled in EPIC    ALLERGIES:  Reviewed and/or reconciled in Psychiatric    PAST MEDICAL/SURGICAL HISTORY:   Past Medical History:   Diagnosis Date    Acute coronary artery obstruction without MI 10/2012    Benign hypertension     COPD (chronic obstructive pulmonary disease)     Coronary artery disease     Disorder of kidney and ureter     Dr. Morrow    History of electroconvulsive therapy     Hyperlipidemia LDL goal < 70     Left ankle sprain     Major depressive disorder, recurrent episode, severe     s/p ECT    PVD (peripheral vascular disease)       Past Surgical History:   Procedure Laterality Date    CHOLECYSTECTOMY      CORONARY ANGIOPLASTY      CORONARY ANGIOPLASTY WITH STENT PLACEMENT  10/2012    2 stents RCA (100% stenosis)    EYE SURGERY       cataract surgery    HYSTERECTOMY      LINA, ovaries intact. uterine prolapse    ILIAC ARTERY STENT      TONSILLECTOMY      TOTAL VAGINAL HYSTERECTOMY         FAMILY HISTORY:    Family History   Problem Relation Age of Onset    Diabetes Mother     Heart disease Mother     Stroke Father     Heart disease Father     Heart disease Brother     Stroke Brother     Hypertension Daughter     Diabetes Maternal Aunt     Heart disease Maternal Aunt     Heart disease Maternal Uncle     Heart disease Paternal Aunt     Heart disease Paternal Uncle     Heart disease Maternal Grandfather     Diabetes Sister     Heart disease Sister     Cancer Sister         lung    Kidney disease Sister         mass, benign    Breast cancer Sister     Stroke Sister     Dementia Sister     Melanoma Neg Hx     Psoriasis Neg Hx     Lupus Neg Hx     Eczema Neg Hx        SOCIAL HISTORY:    Social History     Social History    Marital status:      Spouse name: N/A    Number of children: N/A    Years of education: N/A     Occupational History    Not on file.     Social History Main Topics    Smoking status: Former Smoker     Packs/day: 2.00     Years: 40.00     Types: Cigarettes     Quit date: 12/5/2009    Smokeless tobacco: Never Used    Alcohol use No    Drug use: No    Sexual activity: No     Other Topics Concern    Not on file     Social History Narrative    No narrative on file       PHYSICAL EXAM:  VITAL SIGNS:   There were no vitals filed for this visit.  GENERAL:  Patient appears well nourished, sitting on exam table, in no acute distress.  HEENT:  Atraumatic, normocephalic, PERRLA, EOMI, no conjunctival injection, sclerae are anicteric, normal external auditory canals,TMs clear b/l, however there is fluid behind left TM, gross hearing intact to whisper, MMM, no oropharygneal erythema or exudate.  NECK:  Supple, normal ROM, trachea is midline , no supraclavicular or cervical LAD or masses palpated.   Thyroid gland not palpable.  CARDIOVASCULAR:  RRR, normal S1 and S2, no m/r/g.  RESPIRATORY:  CTA b/l, no wheezes, rhonchi, rales.  No increased work of breathing, no  use of accessory muscles.  ABDOMEN:  Soft, nontender, nondistended, normoactive bowel sounds in all four quadrants, no rebound or guarding, no HSM or masses palpated.  Normal percussion.  EXTREMITIES:  2+ DP pulses b/l, no edema.  SKIN:  Warm, confluent erythematous areas in b/l inguinal areas, no evidence of skin breakdown at this time  NEUROMUSCULAR: . Cranial nerves II-XII grossly intact.  No clubbing or cyanosis of digits/nails.  PSYCH:  Patient is alert and oriented to person, time, place. They are appropriately dressed and groomed. There is normal eye contact. Rate and tone of speech is normal. Normal insight, judgement. Normal thought content and process.     LABORATORY/IMAGING STUDIES: pending    ASSESSMENT/PLAN: This is a 68 y.o. female who presents to clinic for evaluation of the following concerns      1. Essential hypertension  See below  - Comprehensive metabolic panel; Future    2. Coronary artery disease involving native coronary artery of native heart without angina pectoris  - Comprehensive metabolic panel; Future  - Lipid panel; Future    3. Hyperlipidemia, unspecified hyperlipidemia type  - Comprehensive metabolic panel; Future  - Lipid panel; Future    4. Impaired fasting blood sugar  - Hemoglobin A1c; Future    5. Severe obesity (BMI 35.0-39.9)  See below    6. Left eustacian tube dysfunction  Start flonase, add OTC antihistamine, and place on course of amoxicillin    Patient readiness: acceptance and barriers:none    During the course of the visit the patient was educated and counseled about the following:     Hypertension:   Medication: no change.    Obesity:   General weight loss/lifestyle modification strategies discussed (elicit support from others; identify saboteurs; non-food rewards, etc).  Diet interventions: moderate  (500 kCal/d) deficit diet.  Informal exercise measures discussed, e.g. taking stairs instead of elevator.  Regular aerobic exercise program discussed.    Goals: Hypertension: Reduce Blood Pressure and Obesity: Reduce calorie intake and BMI    Did patient meet goals/outcomes: No    The following self management tools provided: declined    Patient Instructions (the written plan) was given to the patient/family.     Time spent with patient: 30 minutes        Celeste Barker MD

## 2018-07-11 RX ORDER — ISOSORBIDE MONONITRATE 60 MG/1
TABLET, EXTENDED RELEASE ORAL
Qty: 30 TABLET | Refills: 6 | Status: SHIPPED | OUTPATIENT
Start: 2018-07-11 | End: 2019-01-07 | Stop reason: SDUPTHER

## 2018-07-18 ENCOUNTER — TELEPHONE (OUTPATIENT)
Dept: FAMILY MEDICINE | Facility: CLINIC | Age: 68
End: 2018-07-18

## 2018-07-18 DIAGNOSIS — Z12.39 SCREENING FOR BREAST CANCER: Primary | ICD-10-CM

## 2018-07-18 NOTE — TELEPHONE ENCOUNTER
----- Message from Desi Olmos sent at 7/18/2018 10:47 AM CDT -----  Contact: Michel  Type:  Mammogram    Caller is requesting to schedule their annual mammogram appointment.  Order is not listed in EPIC.  Please enter order and contact patient to schedule.    Name of Caller:  patient  Where would they like the mammogram performed?  [facility] on 7/25/18 as has an appointment that day for 10 am.  Best Call Back Number:  [phone]  Additional Information: na

## 2018-07-25 ENCOUNTER — HOSPITAL ENCOUNTER (OUTPATIENT)
Dept: RADIOLOGY | Facility: CLINIC | Age: 68
Discharge: HOME | End: 2018-07-25
Attending: FAMILY MEDICINE
Payer: MEDICARE

## 2018-07-25 ENCOUNTER — OFFICE VISIT (OUTPATIENT)
Dept: DERMATOLOGY | Facility: CLINIC | Age: 68
End: 2018-07-25
Payer: MEDICARE

## 2018-07-25 VITALS — WEIGHT: 203 LBS | BODY MASS INDEX: 37.36 KG/M2 | HEIGHT: 62 IN

## 2018-07-25 DIAGNOSIS — K13.0 ANGULAR CHEILITIS: Primary | ICD-10-CM

## 2018-07-25 DIAGNOSIS — Z12.39 SCREENING FOR BREAST CANCER: ICD-10-CM

## 2018-07-25 DIAGNOSIS — L71.0 PERIORAL DERMATITIS: ICD-10-CM

## 2018-07-25 DIAGNOSIS — L29.9 PRURITUS: ICD-10-CM

## 2018-07-25 DIAGNOSIS — L82.1 SEBORRHEIC KERATOSIS: ICD-10-CM

## 2018-07-25 DIAGNOSIS — L91.8 SKIN TAG: ICD-10-CM

## 2018-07-25 PROCEDURE — 99213 OFFICE O/P EST LOW 20 MIN: CPT | Mod: PBBFAC,PO | Performed by: DERMATOLOGY

## 2018-07-25 PROCEDURE — 99999 PR PBB SHADOW E&M-EST. PATIENT-LVL III: CPT | Mod: PBBFAC,,, | Performed by: DERMATOLOGY

## 2018-07-25 PROCEDURE — 77063 BREAST TOMOSYNTHESIS BI: CPT | Mod: TC,PO

## 2018-07-25 PROCEDURE — 77063 BREAST TOMOSYNTHESIS BI: CPT | Mod: 26,,, | Performed by: RADIOLOGY

## 2018-07-25 PROCEDURE — 77067 SCR MAMMO BI INCL CAD: CPT | Mod: 26,,, | Performed by: RADIOLOGY

## 2018-07-25 PROCEDURE — 99214 OFFICE O/P EST MOD 30 MIN: CPT | Mod: S$PBB,,, | Performed by: DERMATOLOGY

## 2018-07-25 RX ORDER — METRONIDAZOLE 7.5 MG/G
CREAM TOPICAL
Qty: 45 G | Refills: 3 | Status: SHIPPED | OUTPATIENT
Start: 2018-07-25 | End: 2019-04-16 | Stop reason: ALTCHOICE

## 2018-07-25 RX ORDER — TRIAMCINOLONE ACETONIDE 1 MG/G
CREAM TOPICAL
Qty: 60 G | Refills: 0 | Status: SHIPPED | OUTPATIENT
Start: 2018-07-25 | End: 2019-04-16

## 2018-07-25 RX ORDER — KETOCONAZOLE 20 MG/G
CREAM TOPICAL
Qty: 30 G | Refills: 0 | Status: SHIPPED | OUTPATIENT
Start: 2018-07-25 | End: 2019-04-16 | Stop reason: ALTCHOICE

## 2018-07-25 NOTE — PROGRESS NOTES
Subjective:       Patient ID:  Betty Bacon is a 68 y.o. female who presents for   Chief Complaint   Patient presents with    Rash     face     New patient presents today with complaint of redness and itching intermittingly to face over last two months.  Also has redness and itching to both upper arms.  No treatment tried.  Previously seen in 6/2016 by Dr. Soto for intertrigo and treated with diflucan and econazole cream.  Worked well and never needed again.    Neg PMHx skin cancer  Neg FMHx skin cancer      Rash         Review of Systems   Constitutional: Negative for weight loss, weight gain and night sweats.   Skin: Positive for rash, activity-related sunscreen use and wears hat. Negative for daily sunscreen use.   Hematologic/Lymphatic: Bruises/bleeds easily.        Objective:    Physical Exam   Constitutional: She appears well-developed and well-nourished. She is obese.  No distress.   HENT:   Mouth/Throat: Abnormal lips.    Eyes: Lids are normal.  No conjunctival no injection.   Neurological: She is alert and oriented to person, place, and time. She is not disoriented.   Psychiatric: She has a normal mood and affect.   Skin:   Areas Examined (abnormalities noted in diagram):   Head / Face Inspection Performed  Neck Inspection Performed  Chest / Axilla Inspection Performed  Back Inspection Performed  RUE Inspected  LUE Inspection Performed                   Diagram Legend     Erythematous scaling macule/papule c/w actinic keratosis       Vascular papule c/w angioma      Pigmented verrucoid papule/plaque c/w seborrheic keratosis      Yellow umbilicated papule c/w sebaceous hyperplasia      Irregularly shaped tan macule c/w lentigo     1-2 mm smooth white papules consistent with Milia      Movable subcutaneous cyst with punctum c/w epidermal inclusion cyst      Subcutaneous movable cyst c/w pilar cyst      Firm pink to brown papule c/w dermatofibroma      Pedunculated fleshy papule(s) c/w skin  tag(s)      Evenly pigmented macule c/w junctional nevus     Mildly variegated pigmented, slightly irregular-bordered macule c/w mildly atypical nevus      Flesh colored to evenly pigmented papule c/w intradermal nevus       Pink pearly papule/plaque c/w basal cell carcinoma      Erythematous hyperkeratotic cursted plaque c/w SCC      Surgical scar with no sign of skin cancer recurrence      Open and closed comedones      Inflammatory papules and pustules      Verrucoid papule consistent consistent with wart     Erythematous eczematous patches and plaques     Dystrophic onycholytic nail with subungual debris c/w onychomycosis     Umbilicated papule    Erythematous-base heme-crusted tan verrucoid plaque consistent with inflamed seborrheic keratosis     Erythematous Silvery Scaling Plaque c/w Psoriasis     See annotation      Assessment / Plan:        Angular cheilitis  Discussed contributing factors: moisture, poor fitting dentures, downward curvature of angles of mouth, deep rhytids at angles of mouth, and drooling.    Recommend patient apply white vinegar on a cotton-tipped applicator to area then blot dry then apply zinc oxide or other barrier to corners of mouth before bedtime.     -     ketoconazole (NIZORAL) 2 % cream; AAA corners of mouth bid  Dispense: 30 g; Refill: 0    Pruritus  No significant dermatitis today  Trial TCS  discussed avoiding chronic use and to use only on affected areas- risk atrophy, striae, ecchymoses, hypopigmentation    -     triamcinolone acetonide 0.1% (KENALOG) 0.1 % cream; AAA arms bid only prn itching  Dispense: 60 g; Refill: 0    Perioral dermatitis vs other, mild today  -     metronidazole 0.75% (METROCREAM) 0.75 % Crea; AAA nose and cheeks bid  Dispense: 45 g; Refill: 3    Seborrheic keratosis, neck  These are benign inherited growths without a malignant potential. Reassurance given to patient. No treatment is necessary.       Skin tag, neck  Reassurance given to patient. No  treatment is necessary.   Treatment of benign, asymptomatic lesions may be considered cosmetic.               Follow-up in about 6 weeks (around 9/5/2018).

## 2018-08-20 DIAGNOSIS — K21.9 GASTROESOPHAGEAL REFLUX DISEASE, ESOPHAGITIS PRESENCE NOT SPECIFIED: ICD-10-CM

## 2018-08-20 RX ORDER — ESOMEPRAZOLE MAGNESIUM 40 MG/1
CAPSULE, DELAYED RELEASE ORAL
Qty: 30 CAPSULE | Refills: 10 | Status: SHIPPED | OUTPATIENT
Start: 2018-08-20 | End: 2018-11-21 | Stop reason: SDUPTHER

## 2018-09-17 RX ORDER — PAROXETINE HYDROCHLORIDE 40 MG/1
TABLET, FILM COATED ORAL
Qty: 30 TABLET | Refills: 10 | Status: SHIPPED | OUTPATIENT
Start: 2018-09-17 | End: 2018-11-12 | Stop reason: SDUPTHER

## 2018-09-18 ENCOUNTER — PES CALL (OUTPATIENT)
Dept: ADMINISTRATIVE | Facility: CLINIC | Age: 68
End: 2018-09-18

## 2018-10-25 ENCOUNTER — DOCUMENTATION ONLY (OUTPATIENT)
Dept: FAMILY MEDICINE | Facility: CLINIC | Age: 68
End: 2018-10-25

## 2018-10-25 ENCOUNTER — HOSPITAL ENCOUNTER (EMERGENCY)
Facility: HOSPITAL | Age: 68
Discharge: HOME | End: 2018-10-25
Attending: EMERGENCY MEDICINE
Payer: MEDICARE

## 2018-10-25 ENCOUNTER — TELEPHONE (OUTPATIENT)
Dept: FAMILY MEDICINE | Facility: CLINIC | Age: 68
End: 2018-10-25

## 2018-10-25 VITALS
TEMPERATURE: 98 F | OXYGEN SATURATION: 93 % | WEIGHT: 205 LBS | SYSTOLIC BLOOD PRESSURE: 152 MMHG | HEIGHT: 63 IN | RESPIRATION RATE: 17 BRPM | BODY MASS INDEX: 36.32 KG/M2 | DIASTOLIC BLOOD PRESSURE: 62 MMHG | HEART RATE: 72 BPM

## 2018-10-25 DIAGNOSIS — M79.605 LEFT LEG PAIN: ICD-10-CM

## 2018-10-25 PROCEDURE — 99284 EMERGENCY DEPT VISIT MOD MDM: CPT

## 2018-10-25 PROCEDURE — 25000003 PHARM REV CODE 250: Performed by: PHYSICIAN ASSISTANT

## 2018-10-25 RX ORDER — HYDROCODONE BITARTRATE AND ACETAMINOPHEN 5; 325 MG/1; MG/1
1 TABLET ORAL
Status: COMPLETED | OUTPATIENT
Start: 2018-10-25 | End: 2018-10-25

## 2018-10-25 RX ORDER — HYDROCODONE BITARTRATE AND ACETAMINOPHEN 5; 325 MG/1; MG/1
1 TABLET ORAL 4 TIMES DAILY PRN
Qty: 12 TABLET | Refills: 0 | Status: SHIPPED | OUTPATIENT
Start: 2018-10-25 | End: 2018-11-13

## 2018-10-25 RX ADMIN — HYDROCODONE BITARTRATE AND ACETAMINOPHEN 1 TABLET: 5; 325 TABLET ORAL at 05:10

## 2018-10-25 NOTE — TELEPHONE ENCOUNTER
Called pt regarding below message. Informed pt that Dr. Barker will not be able to call in medication without seeing pt. Encouraged pt to go to ER or urgent care if pain worsens. Pt verbalized understanding with no further questions.     ----- Message from Divya Glover sent at 10/25/2018  2:32 PM CDT -----  Type:  Patient Returning Call    Who Called:  Patient  Who Left Message for Patient:  WILLOW MEDINA  Does the patient know what this is regarding?:  Her foot  Best Call Back Number:  [phone]. She is having trouble with her phone.

## 2018-10-25 NOTE — ED PROVIDER NOTES
Encounter Date: 10/25/2018    SCRIBE #1 NOTE: Toma POLANCO, gómez scribing for, and in the presence of, Shoshana Palacio PA-C.       History     Chief Complaint   Patient presents with    Leg Pain     atraumatic, Lt ankle to Lt knee since early this AM       Time seen by provider: 5:37 PM on 10/25/2018    Betty Bacon is a 68 y.o. female who presents to the ED complaining of leg pain since she woke up this morning. She describes a constant, sharp, shooting pain that starts in her toes and extends to below her knee. She also notes intermittent loss of feeling in her toes. Pt rates her pain as 8/10 and states it is worsened with movement. She took Tylenol with no relief. The patient denies any recent fall or injury. She denies fever, nausea, or vomiting. PMHx includes HLD, HTN, and major depressive disorder. SHx includes cholecystectomy and coronary angioplasty. Allergies include Darvocet A500 and Codeine.      The history is provided by the patient.     Review of patient's allergies indicates:   Allergen Reactions    Darvocet a500 [propoxyphene n-acetaminophen]      Other reaction(s): Unknown    Codeine Anxiety     Past Medical History:   Diagnosis Date    Acute coronary artery obstruction without MI 10/2012    Benign hypertension     COPD (chronic obstructive pulmonary disease)     Coronary artery disease     Disorder of kidney and ureter     Dr. Morrow    History of electroconvulsive therapy     Hyperlipidemia LDL goal < 70     Left ankle sprain     Major depressive disorder, recurrent episode, severe     s/p ECT    PVD (peripheral vascular disease)      Past Surgical History:   Procedure Laterality Date    CATHETERIZATION, HEART, LEFT Left 3/28/2014    Performed by [facility]don[facility] at [facility] C[facility] LAB    CHOLECYSTECTOMY      CORONARY ANGIOPLASTY      CORONARY ANGIOPLASTY WITH STENT PLACEMENT  10/2012    2 stents RCA (100% stenosis)    EYE SURGERY      cataract surgery     HYSTERECTOMY      LINA, ovaries intact. uterine prolapse    ILIAC ARTERY STENT      TONSILLECTOMY      TOTAL VAGINAL HYSTERECTOMY       Family History   Problem Relation Age of Onset    Diabetes Mother     Heart disease Mother     Stroke Father     Heart disease Father     Heart disease Brother     Stroke Brother     Hypertension Daughter     Diabetes Maternal Aunt     Heart disease Maternal Aunt     Heart disease Maternal Uncle     Heart disease Paternal Aunt     Heart disease Paternal Uncle     Heart disease Maternal Grandfather     Diabetes Sister     Heart disease Sister     Cancer Sister         lung    Kidney disease Sister         mass, benign    Breast cancer Sister     Stroke Sister     Dementia Sister     Melanoma Neg Hx     Psoriasis Neg Hx     Lupus Neg Hx     Eczema Neg Hx      Social History     Tobacco Use    Smoking status: Former Smoker     Packs/day: 2.00     Years: 40.00     Pack years: 80.00     Types: Cigarettes     Last attempt to quit: 2009     Years since quittin.8    Smokeless tobacco: Never Used   Substance Use Topics    Alcohol use: No    Drug use: No     Review of Systems   Constitutional: Negative for chills and fever.   HENT: Negative for facial swelling and trouble swallowing.    Eyes: Negative for discharge.   Respiratory: Negative for cough, chest tightness, shortness of breath and wheezing.    Cardiovascular: Negative for chest pain and palpitations.   Gastrointestinal: Negative for abdominal pain, diarrhea, nausea and vomiting.   Genitourinary: Negative for dysuria and hematuria.   Musculoskeletal: Positive for myalgias (left leg). Negative for arthralgias, back pain, joint swelling, neck pain and neck stiffness.   Skin: Negative for color change, pallor, rash and wound.   Neurological: Positive for numbness (left toes). Negative for dizziness, syncope, weakness, light-headedness and headaches.   Hematological: Does not bruise/bleed easily.    Psychiatric/Behavioral: The patient is not nervous/anxious.    All other systems reviewed and are negative.      Physical Exam     Initial Vitals [10/25/18 1705]   BP Pulse Resp Temp SpO2   (!) 214/85 72 17 98 °F (36.7 °C) 95 %      MAP       --         Physical Exam    Nursing note reviewed.  Constitutional: She appears well-developed and well-nourished. She is not diaphoretic. No distress.   HENT:   Head: Normocephalic and atraumatic.   Cardiovascular: Intact distal pulses.   Murmur heard.  Musculoskeletal: Normal range of motion. She exhibits tenderness. She exhibits no edema.        Left lower leg: She exhibits tenderness.        Left foot: There is tenderness.   Mild left calf tenderness noted. Tenderness to palpation of left dorsal foot, but no bony tenderness noted to ankle or knee.  No erythema or warmth.  No swelling or edema.  No decreased ROM, decreased strength or loss of sensation to LLE.  Palpable 2+ pedal pulse.    Neurological: She is alert and oriented to person, place, and time. She has normal strength. No sensory deficit.   Skin: Skin is warm and dry. No rash and no abscess noted. No erythema.   Psychiatric: She has a normal mood and affect.         ED Course   Procedures  Labs Reviewed - No data to display       Imaging Results    None          Medical Decision Making:   History:   Old Medical Records: I decided to obtain old medical records.  Differential Diagnosis:   Fracture  Dislocation  Sprain  Contusion  Strain  Spasm  Arterial insufficiency  DVT    Clinical Tests:   Radiological Study: Ordered and Reviewed            Scribe Attestation:   Scribe #1: I performed the above scribed service and the documentation accurately describes the services I performed. I attest to the accuracy of the note.    Attending Attestation:     Physician Attestation Statement for NP/PA:   I have conducted a face to face encounter with this patient in addition to the NP/PA, due to Medical Complexity    Other  NP/PA Attestation Additions:      Medical Decision Making: Betty Bacon is a 68 y.o. female presenting with atraumatic left leg pain. Low suspicion for acute infectious cause with no erythema in this well-appearing, afebrile patient.  Unclear etiology but with bounding, strong peripheral pulses and brisk capillary refill and arterial Dopplers with no critical stenoses.  ABIs also reviewed.  I do not think emergent vascular intervention is indicated.  She is appropriate for outpatient follow-up.  No history of trauma.  There is ecchymosis to the anterior tib-fib area noted with x-ray of the tib-fib showing no clear fracture or dislocation.  I do not think further CT is indicated as there is no traumatic history and she is able to bear weight.  There is no distal neurovascular compromise.  At this point close outpatient PCP is vascular follow-up seems most appropriate with detailed return precautions reviewed with the patient.  No sign of compartment syndrome and compartments are soft.  Detailed return precautions reviewed.                I, Shoshana Palacio PA-C, personally performed the services described in this documentation. All medical record entries made by the scribe were at my direction and in my presence.  I have reviewed the chart and agree that the record reflects my personal performance and is accurate and complete. Shoshana Palacio PA-C.  1:17 AM 10/26/2018        ED Course as of Oct 25 2059   Thu Oct 25, 2018   1920 XR L tib-fib:  No fx or dislocation. (my read)  [MR]   2049 US b/l arteries LEs:  No acute arterial occlusion or evidence of focal hemodynamically significant stenosis.  Borderline left JIMBO  (rad read)  [MR]      ED Course User Index  [MR] Cristian Jeffery MD     Clinical Impression:   The encounter diagnosis was Left leg pain.  1. Left leg pain          Disposition:   Disposition: Discharged  Condition: Stable                        Shoshana Palacio PA-C  10/26/18  0110

## 2018-10-25 NOTE — TELEPHONE ENCOUNTER
Called pt regarding below message. Pt states she was awoken from sleep with pain in Left foot to knee. Pain with flexion/extension pain in arch to back of leg to knee.   Pt has scheduled appt for tomorrow at 3pm with Seda MURPHY but is requesting pain medication until appt. Informed pt I will send this information to Dr. Barker and return her call with any additional information.     ----- Message from Jose Silver sent at 10/25/2018 11:15 AM CDT -----  Type: Needs Medical Advice    Who Called:  Patient  Best Call Back Number: [phone]  Additional Information: Patient would like to see if a few pain tablets can be called in for her pain - please call to advise.

## 2018-10-25 NOTE — PROGRESS NOTES
Pre-Visit Chart Review  For Appointment Scheduled on 10/26/2018    There are no preventive care reminders to display for this patient.

## 2018-10-26 ENCOUNTER — OFFICE VISIT (OUTPATIENT)
Dept: FAMILY MEDICINE | Facility: CLINIC | Age: 68
End: 2018-10-26
Payer: MEDICARE

## 2018-10-26 VITALS
DIASTOLIC BLOOD PRESSURE: 69 MMHG | SYSTOLIC BLOOD PRESSURE: 166 MMHG | HEIGHT: 63 IN | BODY MASS INDEX: 36.29 KG/M2 | HEART RATE: 72 BPM | WEIGHT: 204.81 LBS | TEMPERATURE: 98 F

## 2018-10-26 DIAGNOSIS — M79.672 LEFT FOOT PAIN: Primary | ICD-10-CM

## 2018-10-26 DIAGNOSIS — A46 ERYSIPELAS: ICD-10-CM

## 2018-10-26 DIAGNOSIS — M79.675 PAIN AND SWELLING OF TOE OF LEFT FOOT: ICD-10-CM

## 2018-10-26 DIAGNOSIS — M79.89 PAIN AND SWELLING OF TOE OF LEFT FOOT: ICD-10-CM

## 2018-10-26 PROCEDURE — 99999 PR PBB SHADOW E&M-EST. PATIENT-LVL V: CPT | Mod: PBBFAC,,, | Performed by: PHYSICIAN ASSISTANT

## 2018-10-26 PROCEDURE — 99213 OFFICE O/P EST LOW 20 MIN: CPT | Mod: S$PBB,,, | Performed by: PHYSICIAN ASSISTANT

## 2018-10-26 PROCEDURE — 99215 OFFICE O/P EST HI 40 MIN: CPT | Mod: PBBFAC,PO | Performed by: PHYSICIAN ASSISTANT

## 2018-10-26 RX ORDER — CEPHALEXIN 500 MG/1
500 CAPSULE ORAL EVERY 12 HOURS
Qty: 20 CAPSULE | Refills: 0 | Status: SHIPPED | OUTPATIENT
Start: 2018-10-26 | End: 2018-11-13

## 2018-10-26 NOTE — ED NOTES
Pt in room 8 for evaluation of left leg pain. Pt is awake, alert and oriented. Resp even and unlabored. Pt denies chest pain or sob.  Pt denies injury to left leg.

## 2018-10-26 NOTE — PROGRESS NOTES
Subjective:       Patient ID: Betty Bacon is a 68 y.o. female.    Chief Complaint: Follow-up    Patient presents for ER follow-up of acute left foot and lower extremity pain. She was seen in the emergency department last night.  X-ray of the tib-fib was unremarkable.  ABIs were unremarkable.  The patient has a history of peripheral artery disease. She was treated with oral pain medication and pain improved slightly.  She has been taking the pain medication every few hours with some relief of the pain.  She is now able to bear weight on the left foot where is yesterday she was not able to bear any weight at all.  She denies injury.  Patients patient medical/surgical, social and family histories have been reviewed         Review of Systems   Constitutional: Positive for fatigue. Negative for appetite change, chills, diaphoresis and fever.   Musculoskeletal: Positive for arthralgias, gait problem and joint swelling.   Skin: Positive for color change and wound. Negative for rash.   Hematological: Negative for adenopathy.       Objective:      Physical Exam   Constitutional: She appears well-developed and well-nourished. No distress.   Cardiovascular: Normal rate, regular rhythm and normal heart sounds.   Pulmonary/Chest: Effort normal and breath sounds normal.   Musculoskeletal:        Left ankle: She exhibits swelling. She exhibits normal range of motion. No tenderness. Achilles tendon normal.        Left lower leg: She exhibits no tenderness and no edema.        Left foot: There is tenderness and swelling. There is normal range of motion.        Feet:    Skin:   There is ill-defined blanchable erythema of the entire aspect of the dorsum of left foot   Vitals reviewed.      Assessment:       1. Left foot pain    2. Pain and swelling of toe of left foot    3. Erysipelas        Plan:       Betty was seen today for follow-up.    Diagnoses and all orders for this visit:    Left foot pain    Pain and swelling  of toe of left foot    Erysipelas  -     cephALEXin (KEFLEX) 500 MG capsule; Take 1 capsule (500 mg total) by mouth every 12 (twelve) hours.  Elevation and ice recommended.  If symptoms worsen over the weekend seek emergency room for further evaluation recommendations  Follow-up for 7-10 days with me .

## 2018-10-31 ENCOUNTER — DOCUMENTATION ONLY (OUTPATIENT)
Dept: FAMILY MEDICINE | Facility: CLINIC | Age: 68
End: 2018-10-31

## 2018-10-31 NOTE — PROGRESS NOTES
Pre-Visit Chart Review  For Appointment Scheduled on 11/01/2018    There are no preventive care reminders to display for this patient.

## 2018-11-01 ENCOUNTER — OFFICE VISIT (OUTPATIENT)
Dept: FAMILY MEDICINE | Facility: CLINIC | Age: 68
End: 2018-11-01
Payer: MEDICARE

## 2018-11-01 VITALS
HEIGHT: 63 IN | WEIGHT: 205 LBS | DIASTOLIC BLOOD PRESSURE: 64 MMHG | HEART RATE: 62 BPM | TEMPERATURE: 98 F | SYSTOLIC BLOOD PRESSURE: 134 MMHG | BODY MASS INDEX: 36.32 KG/M2

## 2018-11-01 DIAGNOSIS — A46 ERYSIPELAS: Primary | ICD-10-CM

## 2018-11-01 DIAGNOSIS — M79.89 PAIN AND SWELLING OF TOE OF LEFT FOOT: ICD-10-CM

## 2018-11-01 DIAGNOSIS — M79.672 LEFT FOOT PAIN: ICD-10-CM

## 2018-11-01 DIAGNOSIS — M79.675 PAIN AND SWELLING OF TOE OF LEFT FOOT: ICD-10-CM

## 2018-11-01 PROCEDURE — 99215 OFFICE O/P EST HI 40 MIN: CPT | Mod: PBBFAC,PO | Performed by: PHYSICIAN ASSISTANT

## 2018-11-01 PROCEDURE — 99213 OFFICE O/P EST LOW 20 MIN: CPT | Mod: S$PBB,,, | Performed by: PHYSICIAN ASSISTANT

## 2018-11-01 PROCEDURE — 99999 PR PBB SHADOW E&M-EST. PATIENT-LVL V: CPT | Mod: PBBFAC,,, | Performed by: PHYSICIAN ASSISTANT

## 2018-11-01 NOTE — TELEPHONE ENCOUNTER
Returned call to patient regarding her message , unable to leave message due to mailbox not being set up, will send email.

## 2018-11-01 NOTE — PROGRESS NOTES
Subjective:       Patient ID: Betty Bacon is a 68 y.o. female.    Chief Complaint: Follow-up foot pain    Patient presents for 1 week follow-up of pain and swelling to the left foot secondary to erysipelas.  She was started on oral Keflex.  She reports significant improvement in her left ft pain swelling and redness.  She has no other complaints today.  She is tolerating the oral antibiotics without side effects.  Patients patient medical/surgical, social and family histories have been reviewed         Review of Systems   Constitutional: Negative for appetite change, chills, diaphoresis, fatigue and fever.   Musculoskeletal: Negative for arthralgias, gait problem and joint swelling.   Skin: Negative for rash and wound.   Neurological: Negative for weakness and numbness.       Objective:      Physical Exam   Constitutional: She appears well-developed and well-nourished. No distress.   Musculoskeletal:        Left ankle: She exhibits normal range of motion. No tenderness. Achilles tendon normal.        Left foot: There is normal range of motion and no swelling.   Skin: Skin is warm and dry. Capillary refill takes less than 2 seconds. No erythema.   Vitals reviewed.      Assessment:       1. Erysipelas    2. Left foot pain    3. Pain and swelling of toe of left foot        Plan:       Betty was seen today for follow-up foot pain.    Diagnoses and all orders for this visit:    Erysipelas, significantly improved   Complete antibiotics as prescribed  Left foot pain, resolved    Pain and swelling of toe of left foot, resolved    May discontinue elevation and ice

## 2018-11-12 ENCOUNTER — LAB VISIT (OUTPATIENT)
Dept: LAB | Facility: HOSPITAL | Age: 68
End: 2018-11-12
Attending: INTERNAL MEDICINE
Payer: MEDICARE

## 2018-11-12 DIAGNOSIS — K21.9 GASTROESOPHAGEAL REFLUX DISEASE, ESOPHAGITIS PRESENCE NOT SPECIFIED: ICD-10-CM

## 2018-11-12 DIAGNOSIS — F32.A DEPRESSION, UNSPECIFIED DEPRESSION TYPE: Primary | ICD-10-CM

## 2018-11-12 DIAGNOSIS — N18.30 CKD (CHRONIC KIDNEY DISEASE) STAGE 3, GFR 30-59 ML/MIN: ICD-10-CM

## 2018-11-12 LAB
ALBUMIN SERPL BCP-MCNC: 3.3 G/DL
ANION GAP SERPL CALC-SCNC: 8 MMOL/L
BASOPHILS # BLD AUTO: 0.05 K/UL
BASOPHILS NFR BLD: 0.7 %
BUN SERPL-MCNC: 25 MG/DL
CALCIUM SERPL-MCNC: 9.9 MG/DL
CHLORIDE SERPL-SCNC: 106 MMOL/L
CO2 SERPL-SCNC: 26 MMOL/L
CREAT SERPL-MCNC: 1.3 MG/DL
DIFFERENTIAL METHOD: ABNORMAL
EOSINOPHIL # BLD AUTO: 0.2 K/UL
EOSINOPHIL NFR BLD: 3.1 %
ERYTHROCYTE [DISTWIDTH] IN BLOOD BY AUTOMATED COUNT: 13.8 %
EST. GFR  (AFRICAN AMERICAN): 48 ML/MIN/1.73 M^2
EST. GFR  (NON AFRICAN AMERICAN): 41.7 ML/MIN/1.73 M^2
GLUCOSE SERPL-MCNC: 97 MG/DL
HCT VFR BLD AUTO: 39.8 %
HGB BLD-MCNC: 12.1 G/DL
IMM GRANULOCYTES # BLD AUTO: 0.05 K/UL
IMM GRANULOCYTES NFR BLD AUTO: 0.7 %
LYMPHOCYTES # BLD AUTO: 1.4 K/UL
LYMPHOCYTES NFR BLD: 18.8 %
MCH RBC QN AUTO: 28.8 PG
MCHC RBC AUTO-ENTMCNC: 30.4 G/DL
MCV RBC AUTO: 95 FL
MONOCYTES # BLD AUTO: 0.5 K/UL
MONOCYTES NFR BLD: 6.9 %
NEUTROPHILS # BLD AUTO: 5.1 K/UL
NEUTROPHILS NFR BLD: 69.8 %
NRBC BLD-RTO: 0 /100 WBC
PHOSPHATE SERPL-MCNC: 3.2 MG/DL
PLATELET # BLD AUTO: 208 K/UL
PMV BLD AUTO: 11.5 FL
POTASSIUM SERPL-SCNC: 4.8 MMOL/L
PTH-INTACT SERPL-MCNC: 68 PG/ML
RBC # BLD AUTO: 4.2 M/UL
SODIUM SERPL-SCNC: 140 MMOL/L
WBC # BLD AUTO: 7.36 K/UL

## 2018-11-12 PROCEDURE — 83970 ASSAY OF PARATHORMONE: CPT

## 2018-11-12 PROCEDURE — 80069 RENAL FUNCTION PANEL: CPT

## 2018-11-12 PROCEDURE — 85025 COMPLETE CBC W/AUTO DIFF WBC: CPT

## 2018-11-12 PROCEDURE — 36415 COLL VENOUS BLD VENIPUNCTURE: CPT | Mod: PO

## 2018-11-12 RX ORDER — ESOMEPRAZOLE MAGNESIUM 40 MG/1
40 CAPSULE, DELAYED RELEASE ORAL
Qty: 30 CAPSULE | Refills: 10 | Status: CANCELLED | OUTPATIENT
Start: 2018-11-12

## 2018-11-12 RX ORDER — PAROXETINE HYDROCHLORIDE 40 MG/1
40 TABLET, FILM COATED ORAL EVERY MORNING
Qty: 30 TABLET | Refills: 2 | Status: SHIPPED | OUTPATIENT
Start: 2018-11-12 | End: 2019-11-15 | Stop reason: SDUPTHER

## 2018-11-13 ENCOUNTER — OFFICE VISIT (OUTPATIENT)
Dept: NEPHROLOGY | Facility: CLINIC | Age: 68
End: 2018-11-13
Payer: MEDICARE

## 2018-11-13 DIAGNOSIS — I25.10 CORONARY ARTERY DISEASE INVOLVING NATIVE CORONARY ARTERY OF NATIVE HEART WITHOUT ANGINA PECTORIS: ICD-10-CM

## 2018-11-13 DIAGNOSIS — N25.81 SECONDARY HYPERPARATHYROIDISM OF RENAL ORIGIN: ICD-10-CM

## 2018-11-13 DIAGNOSIS — Z87.891 FORMER SMOKER: ICD-10-CM

## 2018-11-13 DIAGNOSIS — I11.9 HYPERTENSIVE LEFT VENTRICULAR HYPERTROPHY, WITHOUT HEART FAILURE: ICD-10-CM

## 2018-11-13 DIAGNOSIS — J84.9 INTERSTITIAL LUNG DISEASE: ICD-10-CM

## 2018-11-13 DIAGNOSIS — N18.30 CKD (CHRONIC KIDNEY DISEASE) STAGE 3, GFR 30-59 ML/MIN: Primary | ICD-10-CM

## 2018-11-13 DIAGNOSIS — J44.9 CHRONIC OBSTRUCTIVE PULMONARY DISEASE, UNSPECIFIED COPD TYPE: ICD-10-CM

## 2018-11-13 DIAGNOSIS — E66.01 CLASS 2 SEVERE OBESITY WITH SERIOUS COMORBIDITY AND BODY MASS INDEX (BMI) OF 35.0 TO 35.9 IN ADULT, UNSPECIFIED OBESITY TYPE: ICD-10-CM

## 2018-11-13 DIAGNOSIS — E78.2 MIXED HYPERLIPIDEMIA: ICD-10-CM

## 2018-11-13 DIAGNOSIS — I10 ESSENTIAL HYPERTENSION: ICD-10-CM

## 2018-11-13 PROCEDURE — 99999 PR PBB SHADOW E&M-EST. PATIENT-LVL III: CPT | Mod: PBBFAC,,, | Performed by: INTERNAL MEDICINE

## 2018-11-13 PROCEDURE — 99213 OFFICE O/P EST LOW 20 MIN: CPT | Mod: PBBFAC,PO | Performed by: INTERNAL MEDICINE

## 2018-11-13 PROCEDURE — 99214 OFFICE O/P EST MOD 30 MIN: CPT | Mod: S$PBB,,, | Performed by: INTERNAL MEDICINE

## 2018-11-14 RX ORDER — TRAZODONE HYDROCHLORIDE 100 MG/1
100 TABLET ORAL NIGHTLY
Qty: 90 TABLET | Refills: 3 | Status: SHIPPED | OUTPATIENT
Start: 2018-11-14 | End: 2019-05-20

## 2018-11-14 RX ORDER — TRIAMTERENE AND HYDROCHLOROTHIAZIDE 37.5; 25 MG/1; MG/1
1 CAPSULE ORAL DAILY
Qty: 90 CAPSULE | Refills: 2 | Status: SHIPPED | OUTPATIENT
Start: 2018-11-14 | End: 2019-09-24

## 2018-11-21 DIAGNOSIS — K21.9 GASTROESOPHAGEAL REFLUX DISEASE, ESOPHAGITIS PRESENCE NOT SPECIFIED: ICD-10-CM

## 2018-11-21 RX ORDER — ESOMEPRAZOLE MAGNESIUM 40 MG/1
40 CAPSULE, DELAYED RELEASE ORAL
Qty: 90 CAPSULE | Refills: 3 | Status: SHIPPED | OUTPATIENT
Start: 2018-11-21 | End: 2019-04-16

## 2018-11-23 VITALS
SYSTOLIC BLOOD PRESSURE: 182 MMHG | HEART RATE: 65 BPM | BODY MASS INDEX: 36.72 KG/M2 | WEIGHT: 207.25 LBS | HEIGHT: 63 IN | DIASTOLIC BLOOD PRESSURE: 64 MMHG | OXYGEN SATURATION: 93 %

## 2018-11-23 PROBLEM — E66.9 OBESITY: Status: ACTIVE | Noted: 2018-11-23

## 2018-11-23 PROBLEM — N25.81 SECONDARY HYPERPARATHYROIDISM OF RENAL ORIGIN: Status: ACTIVE | Noted: 2018-11-23

## 2018-11-23 NOTE — PROGRESS NOTES
Subjective:       Patient ID: Betty Bacon is a 68 y.o. White female who presents for follow-up evaluation of Chronic Kidney Disease    HPI     She reports she is doing well. She escaped the flu. . No LE edema and no SOB. Her appetite is too good and she is still pushing fluids. No new medications since last visit. No LUTS. Since last visit her nephew and her sister with advanced dementia passed away. She is not taking her BP at home    She received the flu vaccine. Repeat O2 level was 93% after resting for 10 minutes     Review of Systems   Constitutional: Negative for activity change, appetite change, fatigue and unexpected weight change.   HENT: Negative for facial swelling.    Eyes: Negative for visual disturbance.   Respiratory: Negative for shortness of breath.    Cardiovascular: Negative for chest pain and leg swelling.   Gastrointestinal: Negative for constipation and diarrhea.   Genitourinary: Negative for difficulty urinating, dysuria and hematuria.   Musculoskeletal: Negative for arthralgias.   Neurological: Negative for weakness and headaches.       Objective:      Physical Exam   Constitutional: She is oriented to person, place, and time. She appears well-nourished.   HENT:   Mouth/Throat: Oropharynx is clear and moist.   Neck: No JVD present.   Cardiovascular: S1 normal and S2 normal. Exam reveals no friction rub.   Pulmonary/Chest: Breath sounds normal. She has no wheezes. She has no rales.   Abdominal: Soft.   Musculoskeletal: She exhibits no edema.   Neurological: She is alert and oriented to person, place, and time.   Skin: Skin is warm and dry.   Psychiatric: She has a normal mood and affect.   Vitals reviewed.      Assessment:       1. CKD (chronic kidney disease) stage 3, GFR 30-59 ml/min    2. Former smoker    3. Essential hypertension    4. Coronary artery disease involving native coronary artery of native heart without angina pectoris    5. Mixed hyperlipidemia    6. Hypertensive  left ventricular hypertrophy, without heart failure    7. Interstitial lung disease    8. Chronic obstructive pulmonary disease, unspecified COPD type    9. Class 2 severe obesity with serious comorbidity and body mass index (BMI) of 35.0 to 35.9 in adult, unspecified obesity type    10. Secondary hyperparathyroidism of renal origin        Plan:             CKD Stage 3 with stable kidney function and proteinuria.      HTN--BP elevated today. Home BP log in one week    Mineral and Bone Disease--continue D3, D level and PTH at goal    Hyperglycemia--highest Hba1c was 6.2, most recent is 5.6    Former smoker/COPD/ILD--continue Pulmonary follow up         RTC 5 months with labs prior

## 2018-12-04 RX ORDER — ALENDRONATE SODIUM 70 MG/1
70 TABLET ORAL
Qty: 4 TABLET | Refills: 11 | Status: SHIPPED | OUTPATIENT
Start: 2018-12-04 | End: 2019-12-13 | Stop reason: SDUPTHER

## 2018-12-17 DIAGNOSIS — J44.9 CHRONIC OBSTRUCTIVE PULMONARY DISEASE, UNSPECIFIED COPD TYPE: ICD-10-CM

## 2018-12-17 DIAGNOSIS — J84.9 INTERSTITIAL LUNG DISEASE: Primary | ICD-10-CM

## 2018-12-18 DIAGNOSIS — Z79.01 LONG TERM (CURRENT) USE OF ANTICOAGULANTS: ICD-10-CM

## 2018-12-18 DIAGNOSIS — I10 ESSENTIAL HYPERTENSION: Primary | ICD-10-CM

## 2018-12-18 RX ORDER — ATORVASTATIN CALCIUM 40 MG/1
40 TABLET, FILM COATED ORAL DAILY
Qty: 90 TABLET | Refills: 3 | Status: SHIPPED | OUTPATIENT
Start: 2018-12-18 | End: 2019-12-23 | Stop reason: SDUPTHER

## 2018-12-18 RX ORDER — METOPROLOL SUCCINATE 50 MG/1
50 TABLET, EXTENDED RELEASE ORAL DAILY
Qty: 90 TABLET | Refills: 3 | Status: SHIPPED | OUTPATIENT
Start: 2018-12-18 | End: 2019-09-24 | Stop reason: SDUPTHER

## 2018-12-18 RX ORDER — CLOPIDOGREL BISULFATE 75 MG/1
75 TABLET ORAL DAILY
Qty: 90 TABLET | Refills: 3 | Status: SHIPPED | OUTPATIENT
Start: 2018-12-18 | End: 2020-01-02

## 2018-12-19 ENCOUNTER — DOCUMENTATION ONLY (OUTPATIENT)
Dept: FAMILY MEDICINE | Facility: CLINIC | Age: 68
End: 2018-12-19

## 2018-12-19 NOTE — PROGRESS NOTES
Pre-Visit Chart Review  For Appointment Scheduled on 12/26/18.    There are no preventive care reminders to display for this patient.

## 2018-12-21 ENCOUNTER — TELEPHONE (OUTPATIENT)
Dept: FAMILY MEDICINE | Facility: CLINIC | Age: 68
End: 2018-12-21

## 2018-12-21 NOTE — TELEPHONE ENCOUNTER
Called pt regarding below message. Discussed on separate encounter.     ----- Message from Anisha Quintanilla sent at 12/21/2018  8:30 AM CST -----  Type: Needs Medical Advice    Who Called:  Self   Symptoms (please be specific):  NA   How long has patient had these symptoms:  NA   Pharmacy name and phone #:  [facility] [place]  Best Call Back Number: [phone] Additional Information: Patient called asking for an update for Copd medication inhaler

## 2018-12-21 NOTE — TELEPHONE ENCOUNTER
Called pt regarding below message. Pt states she takes both Asmanex and Breo Elipta. Pt is requesting refill be sent to [place] due to currently being out of medication. Informed pt I will send this request to Navya MURPHY and return her call with any concerns. Pt verbalized understanding with no further questions.

## 2018-12-26 ENCOUNTER — LAB VISIT (OUTPATIENT)
Dept: LAB | Facility: HOSPITAL | Age: 68
End: 2018-12-26
Attending: FAMILY MEDICINE
Payer: MEDICARE

## 2018-12-26 ENCOUNTER — OFFICE VISIT (OUTPATIENT)
Dept: FAMILY MEDICINE | Facility: CLINIC | Age: 68
End: 2018-12-26
Payer: MEDICARE

## 2018-12-26 VITALS
WEIGHT: 206.38 LBS | HEIGHT: 63 IN | DIASTOLIC BLOOD PRESSURE: 58 MMHG | HEART RATE: 63 BPM | TEMPERATURE: 98 F | SYSTOLIC BLOOD PRESSURE: 164 MMHG | BODY MASS INDEX: 36.57 KG/M2

## 2018-12-26 DIAGNOSIS — H91.90 HEARING LOSS, UNSPECIFIED HEARING LOSS TYPE, UNSPECIFIED LATERALITY: ICD-10-CM

## 2018-12-26 DIAGNOSIS — I25.10 CORONARY ARTERY DISEASE INVOLVING NATIVE CORONARY ARTERY OF NATIVE HEART WITHOUT ANGINA PECTORIS: ICD-10-CM

## 2018-12-26 DIAGNOSIS — E66.01 SEVERE OBESITY WITH BODY MASS INDEX (BMI) OF 35.0 TO 39.9 WITH SERIOUS COMORBIDITY: ICD-10-CM

## 2018-12-26 DIAGNOSIS — R73.01 IMPAIRED FASTING BLOOD SUGAR: ICD-10-CM

## 2018-12-26 DIAGNOSIS — E78.2 MIXED HYPERLIPIDEMIA: ICD-10-CM

## 2018-12-26 DIAGNOSIS — H69.93 DYSFUNCTION OF BOTH EUSTACHIAN TUBES: ICD-10-CM

## 2018-12-26 DIAGNOSIS — F32.A DEPRESSION, UNSPECIFIED DEPRESSION TYPE: ICD-10-CM

## 2018-12-26 DIAGNOSIS — M85.80 OSTEOPENIA, UNSPECIFIED LOCATION: ICD-10-CM

## 2018-12-26 DIAGNOSIS — I10 ESSENTIAL HYPERTENSION: Primary | ICD-10-CM

## 2018-12-26 LAB
25(OH)D3+25(OH)D2 SERPL-MCNC: 32 NG/ML
ALBUMIN SERPL BCP-MCNC: 3.4 G/DL
ALP SERPL-CCNC: 138 U/L
ALT SERPL W/O P-5'-P-CCNC: 11 U/L
ANION GAP SERPL CALC-SCNC: 11 MMOL/L
AST SERPL-CCNC: 14 U/L
BILIRUB SERPL-MCNC: 0.5 MG/DL
BUN SERPL-MCNC: 24 MG/DL
CALCIUM SERPL-MCNC: 9.8 MG/DL
CHLORIDE SERPL-SCNC: 104 MMOL/L
CHOLEST SERPL-MCNC: 131 MG/DL
CHOLEST/HDLC SERPL: 3.4 {RATIO}
CO2 SERPL-SCNC: 25 MMOL/L
CREAT SERPL-MCNC: 1.2 MG/DL
EST. GFR  (AFRICAN AMERICAN): 52.9 ML/MIN/1.73 M^2
EST. GFR  (NON AFRICAN AMERICAN): 45.9 ML/MIN/1.73 M^2
ESTIMATED AVG GLUCOSE: 111 MG/DL
GLUCOSE SERPL-MCNC: 101 MG/DL
HBA1C MFR BLD HPLC: 5.5 %
HDLC SERPL-MCNC: 38 MG/DL
HDLC SERPL: 29 %
LDLC SERPL CALC-MCNC: 73 MG/DL
NONHDLC SERPL-MCNC: 93 MG/DL
POTASSIUM SERPL-SCNC: 4.2 MMOL/L
PROT SERPL-MCNC: 7.3 G/DL
SODIUM SERPL-SCNC: 140 MMOL/L
TRIGL SERPL-MCNC: 100 MG/DL

## 2018-12-26 PROCEDURE — 99999 PR PBB SHADOW E&M-EST. PATIENT-LVL IV: CPT | Mod: PBBFAC,,, | Performed by: FAMILY MEDICINE

## 2018-12-26 PROCEDURE — 82306 VITAMIN D 25 HYDROXY: CPT

## 2018-12-26 PROCEDURE — 80061 LIPID PANEL: CPT

## 2018-12-26 PROCEDURE — 99214 OFFICE O/P EST MOD 30 MIN: CPT | Mod: PBBFAC,PO | Performed by: FAMILY MEDICINE

## 2018-12-26 PROCEDURE — 83036 HEMOGLOBIN GLYCOSYLATED A1C: CPT

## 2018-12-26 PROCEDURE — 36415 COLL VENOUS BLD VENIPUNCTURE: CPT | Mod: PO

## 2018-12-26 PROCEDURE — 99214 OFFICE O/P EST MOD 30 MIN: CPT | Mod: S$PBB,,, | Performed by: FAMILY MEDICINE

## 2018-12-26 PROCEDURE — 80053 COMPREHEN METABOLIC PANEL: CPT

## 2018-12-26 RX ORDER — MONTELUKAST SODIUM 4 MG/1
1 TABLET, CHEWABLE ORAL 2 TIMES DAILY
Qty: 180 TABLET | Refills: 3 | Status: SHIPPED | OUTPATIENT
Start: 2018-12-26 | End: 2020-12-21 | Stop reason: ALTCHOICE

## 2018-12-26 RX ORDER — PAROXETINE 10 MG/1
10 TABLET, FILM COATED ORAL EVERY MORNING
Qty: 90 TABLET | Refills: 3 | Status: SHIPPED | OUTPATIENT
Start: 2018-12-26 | End: 2019-11-29 | Stop reason: SDUPTHER

## 2018-12-26 NOTE — PROGRESS NOTES
CHIEF COMPLAINT:  Follow up HTN, hyperlipidemia, CAD      HISTORY OF PRESENT ILLNESS:  Betty Bacon is a 68 y.o. female who presents to clinic for follow up    1. HTN: Patient is on dyazide, imdur, toprol XL. She denies any CP, SOB, edema.      2. Hyperlipidemia, CAD: She is on lipitor, plavix. She denies any myalgia, dark colored urine.She is due for labs.    3. Hyperglycemia:  She has been working on weight loss but recently had weight gain due to eating out.  She is due for repeat labs    4. Osteopenia: She is currently on fosamax. She denies any side effects.     5. She feels that her depression is slightly worse around the holidays due to her  passing and her sister and a nephew passed away several months ago.     6. She continues to have b/l eustachian tube dysfunction and is also now noticing some hearing loss.    REVIEW OF SYSTEMS:  The patient denies any fever, chills, night sweats, headaches, vision changes, difficulty speaking or swallowing, decreased hearing, , chest pain, palpitations, shortness of breath, cough, nausea, vomiting, abdominal pain, dysuria, , constipation, hematuria, hematochezia, melena, changes in her hair,nails, numbness or weakness in her extremities, erythema,  swelling over any of her joints, myalgias, swollen glands, easy bruising, fatigue, edema,.      MEDICATIONS:   Reviewed and/or reconciled in EPIC    ALLERGIES:  Reviewed and/or reconciled in [facility]    PAST MEDICAL/SURGICAL HISTORY:   Past Medical History:   Diagnosis Date    Acute coronary artery obstruction without MI 10/2012    Benign hypertension     COPD (chronic obstructive pulmonary disease)     Coronary artery disease     Disorder of kidney and ureter     Dr. Morrow    History of electroconvulsive therapy     Hyperlipidemia LDL goal < 70     Left ankle sprain     Major depressive disorder, recurrent episode, severe     s/p ECT    PVD (peripheral vascular disease)       Past Surgical History:    Procedure Laterality Date    CATHETERIZATION, HEART, LEFT Left 3/28/2014    Performed by Maldonado Borjas MD at [facility]    CHOLECYSTECTOMY      CORONARY ANGIOPLASTY      CORONARY ANGIOPLASTY WITH STENT PLACEMENT  10/2012    2 stents RCA (100% stenosis)    EYE SURGERY      cataract surgery    HYSTERECTOMY      LINA, ovaries intact. uterine prolapse    ILIAC ARTERY STENT      TONSILLECTOMY      TOTAL VAGINAL HYSTERECTOMY         FAMILY HISTORY:    Family History   Problem Relation Age of Onset    Diabetes Mother     Heart disease Mother     Stroke Father     Heart disease Father     Heart disease Brother     Stroke Brother     Hypertension Daughter     Diabetes Maternal Aunt     Heart disease Maternal Aunt     Heart disease Maternal Uncle     Heart disease Paternal Aunt     Heart disease Paternal Uncle     Heart disease Maternal Grandfather     Diabetes Sister     Heart disease Sister     Cancer Sister         lung    Kidney disease Sister         mass, benign    Breast cancer Sister     Stroke Sister     Dementia Sister     Melanoma Neg Hx     Psoriasis Neg Hx     Lupus Neg Hx     Eczema Neg Hx        SOCIAL HISTORY:    Social History     Socioeconomic History    Marital status:      Spouse name: Not on file    Number of children: Not on file    Years of education: Not on file    Highest education level: Not on file   Social Needs    Financial resource strain: Not on file    Food insecurity - worry: Not on file    Food insecurity - inability: Not on file    Transportation needs - medical: Not on file    Transportation needs - non-medical: Not on file   Occupational History    Not on file   Tobacco Use    Smoking status: Former Smoker     Packs/day: 2.00     Years: 40.00     Pack years: 80.00     Types: Cigarettes     Last attempt to quit: 2009     Years since quittin.0    Smokeless tobacco: Never Used   Substance and Sexual Activity    Alcohol  "use: No    Drug use: No    Sexual activity: No     Birth control/protection: Abstinence   Other Topics Concern    Are you pregnant or think you may be? Not Asked    Breast-feeding Not Asked   Social History Narrative    Not on file       PHYSICAL EXAM:  VITAL SIGNS:   Vitals:    12/26/18 0957   BP: (!) 178/59   Pulse: 63   Temp: 97.8 °F (36.6 °C)   Weight: 93.6 kg (206 lb 5.6 oz)   Height: 5' 3" (1.6 m)     GENERAL:  Patient appears well nourished, sitting on exam table, in no acute distress.  HEENT:  Atraumatic, normocephalic, PERRLA, EOMI, no conjunctival injection, sclerae are anicteric, normal external auditory canals,TMs clear b/l, however there is fluid behind left TM, gross hearing intact to whisper, MMM, no oropharygneal erythema or exudate.  NECK:  Supple, normal ROM, trachea is midline , no supraclavicular or cervical LAD or masses palpated.  Thyroid gland not palpable.  CARDIOVASCULAR:  RRR, normal S1 and S2, no m/r/g.  RESPIRATORY:  CTA b/l, no wheezes, rhonchi, rales.  No increased work of breathing, no  use of accessory muscles.  ABDOMEN:  Soft, nontender, nondistended, normoactive bowel sounds in all four quadrants, no rebound or guarding, no HSM or masses palpated.  Normal percussion.  EXTREMITIES:  2+ DP pulses b/l, no edema.  SKIN:  Warm, confluent erythematous areas in b/l inguinal areas, no evidence of skin breakdown at this time  NEUROMUSCULAR: . Cranial nerves II-XII grossly intact.  No clubbing or cyanosis of digits/nails.  PSYCH:  Patient is alert and oriented to person, time, place. They are appropriately dressed and groomed. There is normal eye contact. Rate and tone of speech is normal. Normal insight, judgement. Normal thought content and process.     LABORATORY/IMAGING STUDIES: pending    ASSESSMENT/PLAN: This is a 68 y.o. female who presents to clinic for evaluation of the following concerns      1. Essential hypertension  See below    2. Coronary artery disease involving native " coronary artery of native heart without angina pectoris  - Comprehensive metabolic panel; Future  - Lipid panel; Future    3. Mixed hyperlipidemia  - Comprehensive metabolic panel; Future  - Lipid panel; Future    4. Impaired fasting blood sugar  - Hemoglobin A1c; Future    5. Severe obesity with body mass index (BMI) of 35.0 to 39.9 with serious comorbidity  See below    6. Osteopenia, unspecified location  - Vitamin D; Future  -will need repeat DEXA in 2019.    7. Depression, unspecified depression type  Increase paxil to 50 mg daily. She will update clinic in 2 weeks. She declines referral to [facility] at this time.    8. Hearing loss, unspecified hearing loss type, unspecified laterality  - Ambulatory referral to ENT    9. Dysfunction of both eustachian tubes  - Ambulatory referral to ENT      Patient readiness: acceptance and barriers:none    During the course of the visit the patient was educated and counseled about the following:     Hypertension:   Medication: no change.  Discussed BP monitoring and if greater than 140/90 she was instructed to notify her cardiologist.   Obesity:   General weight loss/lifestyle modification strategies discussed (elicit support from others; identify saboteurs; non-food rewards, etc).  Diet interventions: moderate (500 kCal/d) deficit diet.  Informal exercise measures discussed, e.g. taking stairs instead of elevator.  Regular aerobic exercise program discussed.    Goals: Hypertension: Reduce Blood Pressure and Obesity: Reduce calorie intake and BMI    Did patient meet goals/outcomes: No    The following self management tools provided: declined    Patient Instructions (the written plan) was given to the patient/family.     Time spent with patient: 30 minutes        Celeste Barker MD

## 2019-01-07 DIAGNOSIS — I10 ESSENTIAL HYPERTENSION: Primary | ICD-10-CM

## 2019-01-08 RX ORDER — ISOSORBIDE MONONITRATE 60 MG/1
60 TABLET, EXTENDED RELEASE ORAL DAILY
Qty: 90 TABLET | Refills: 3 | Status: SHIPPED | OUTPATIENT
Start: 2019-01-08 | End: 2019-01-16 | Stop reason: SDUPTHER

## 2019-01-14 DIAGNOSIS — J44.9 CHRONIC OBSTRUCTIVE PULMONARY DISEASE, UNSPECIFIED COPD TYPE: ICD-10-CM

## 2019-01-14 DIAGNOSIS — J84.9 INTERSTITIAL LUNG DISEASE: ICD-10-CM

## 2019-01-15 ENCOUNTER — PATIENT MESSAGE (OUTPATIENT)
Dept: CARDIOLOGY | Facility: CLINIC | Age: 69
End: 2019-01-15

## 2019-01-15 DIAGNOSIS — I20.0 UNSTABLE ANGINA: ICD-10-CM

## 2019-01-15 DIAGNOSIS — E78.2 MIXED HYPERLIPIDEMIA: ICD-10-CM

## 2019-01-15 DIAGNOSIS — I25.10 CORONARY ARTERY DISEASE INVOLVING NATIVE CORONARY ARTERY OF NATIVE HEART WITHOUT ANGINA PECTORIS: Primary | ICD-10-CM

## 2019-01-15 DIAGNOSIS — I73.9 PAD (PERIPHERAL ARTERY DISEASE): ICD-10-CM

## 2019-01-15 DIAGNOSIS — I35.0 AORTIC VALVE STENOSIS, ETIOLOGY OF CARDIAC VALVE DISEASE UNSPECIFIED: ICD-10-CM

## 2019-01-16 DIAGNOSIS — I10 ESSENTIAL HYPERTENSION: ICD-10-CM

## 2019-01-17 RX ORDER — ISOSORBIDE MONONITRATE 60 MG/1
60 TABLET, EXTENDED RELEASE ORAL DAILY
Qty: 90 TABLET | Refills: 3 | Status: SHIPPED | OUTPATIENT
Start: 2019-01-17 | End: 2020-03-27

## 2019-01-31 ENCOUNTER — TELEPHONE (OUTPATIENT)
Dept: FAMILY MEDICINE | Facility: CLINIC | Age: 69
End: 2019-01-31

## 2019-01-31 DIAGNOSIS — R74.8 ELEVATED ALKALINE PHOSPHATASE LEVEL: Primary | ICD-10-CM

## 2019-01-31 NOTE — TELEPHONE ENCOUNTER
Pt verbalized understanding of results. Lab scheduled for 2/28/19      FYL for Dr Barker  Pt states she has been seeing Dr AMANDA Cuevas just wanted Dr Barker to know.              ----- Message from Radha Chester sent at 1/31/2019  4:05 PM CST -----  Contact: patient  Type:  Patient Returning Call    Who Called:  patient  Who Left Message for Patient:    Does the patient know what this is regarding?:    Best Call Back Number:  [phone]  Additional Information:

## 2019-02-28 ENCOUNTER — LAB VISIT (OUTPATIENT)
Dept: LAB | Facility: HOSPITAL | Age: 69
End: 2019-02-28
Attending: NURSE PRACTITIONER
Payer: MEDICARE

## 2019-02-28 DIAGNOSIS — R74.8 ELEVATED ALKALINE PHOSPHATASE LEVEL: ICD-10-CM

## 2019-02-28 LAB — GGT SERPL-CCNC: 18 U/L

## 2019-02-28 PROCEDURE — 36415 COLL VENOUS BLD VENIPUNCTURE: CPT | Mod: PO

## 2019-02-28 PROCEDURE — 82977 ASSAY OF GGT: CPT

## 2019-03-06 ENCOUNTER — LAB VISIT (OUTPATIENT)
Dept: LAB | Facility: HOSPITAL | Age: 69
End: 2019-03-06
Attending: NURSE PRACTITIONER
Payer: MEDICARE

## 2019-03-06 DIAGNOSIS — R74.8 ELEVATED ALKALINE PHOSPHATASE LEVEL: ICD-10-CM

## 2019-03-06 LAB
ALBUMIN SERPL BCP-MCNC: 3.1 G/DL
ALP SERPL-CCNC: 132 U/L
ALT SERPL W/O P-5'-P-CCNC: 10 U/L
ANION GAP SERPL CALC-SCNC: 9 MMOL/L
AST SERPL-CCNC: 15 U/L
BILIRUB SERPL-MCNC: 0.6 MG/DL
BUN SERPL-MCNC: 20 MG/DL
CALCIUM SERPL-MCNC: 9.6 MG/DL
CHLORIDE SERPL-SCNC: 102 MMOL/L
CO2 SERPL-SCNC: 25 MMOL/L
CREAT SERPL-MCNC: 1.3 MG/DL
EST. GFR  (AFRICAN AMERICAN): 47.7 ML/MIN/1.73 M^2
EST. GFR  (NON AFRICAN AMERICAN): 41.4 ML/MIN/1.73 M^2
GLUCOSE SERPL-MCNC: 96 MG/DL
POTASSIUM SERPL-SCNC: 4.5 MMOL/L
PROT SERPL-MCNC: 7 G/DL
SODIUM SERPL-SCNC: 136 MMOL/L

## 2019-03-06 PROCEDURE — 36415 COLL VENOUS BLD VENIPUNCTURE: CPT | Mod: PO

## 2019-03-06 PROCEDURE — 80053 COMPREHEN METABOLIC PANEL: CPT

## 2019-03-08 ENCOUNTER — CLINICAL SUPPORT (OUTPATIENT)
Dept: URGENT CARE | Facility: CLINIC | Age: 69
End: 2019-03-08
Payer: MEDICARE

## 2019-03-08 ENCOUNTER — TELEPHONE (OUTPATIENT)
Dept: FAMILY MEDICINE | Facility: CLINIC | Age: 69
End: 2019-03-08

## 2019-03-08 VITALS
SYSTOLIC BLOOD PRESSURE: 153 MMHG | HEART RATE: 70 BPM | OXYGEN SATURATION: 95 % | HEIGHT: 63 IN | BODY MASS INDEX: 35.61 KG/M2 | TEMPERATURE: 98 F | DIASTOLIC BLOOD PRESSURE: 53 MMHG | RESPIRATION RATE: 16 BRPM | WEIGHT: 201 LBS

## 2019-03-08 DIAGNOSIS — L03.116 CELLULITIS OF LEFT LOWER EXTREMITY: Primary | ICD-10-CM

## 2019-03-08 PROCEDURE — 99204 OFFICE O/P NEW MOD 45 MIN: CPT | Mod: S$GLB,,, | Performed by: NURSE PRACTITIONER

## 2019-03-08 PROCEDURE — 99204 PR OFFICE/OUTPT VISIT, NEW, LEVL IV, 45-59 MIN: ICD-10-PCS | Mod: S$GLB,,, | Performed by: NURSE PRACTITIONER

## 2019-03-08 RX ORDER — CEPHALEXIN 500 MG/1
500 CAPSULE ORAL EVERY 12 HOURS
Qty: 20 CAPSULE | Refills: 0 | Status: SHIPPED | OUTPATIENT
Start: 2019-03-08 | End: 2019-03-18

## 2019-03-08 RX ORDER — HYDROCODONE BITARTRATE AND ACETAMINOPHEN 5; 325 MG/1; MG/1
1 TABLET ORAL EVERY 6 HOURS PRN
Qty: 15 TABLET | Refills: 0 | Status: SHIPPED | OUTPATIENT
Start: 2019-03-08 | End: 2019-04-16 | Stop reason: ALTCHOICE

## 2019-03-08 NOTE — PROGRESS NOTES
"Subjective:       Patient ID: Betty Bacon is a 69 y.o. female.    Vitals:  height is 5' 3" (1.6 m) and weight is 91.2 kg (201 lb). Her oral temperature is 97.9 °F (36.6 °C). Her blood pressure is 153/53 (abnormal) and her pulse is 70. Her respiration is 16 and oxygen saturation is 95%.     Chief Complaint: Ankle Pain    Ankle Pain    The incident occurred 2 days ago. The incident occurred at home. There was no injury mechanism. The pain is present in the left ankle. The quality of the pain is described as aching. The pain is at a severity of 8/10. The pain has been constant since onset. Associated symptoms include an inability to bear weight, muscle weakness, numbness and tingling. She reports no foreign bodies present. The symptoms are aggravated by weight bearing. She has tried nothing for the symptoms. The treatment provided no relief.       Constitution: Negative for chills, fatigue and fever.   HENT: Negative for congestion and sore throat.    Neck: Negative for painful lymph nodes.   Cardiovascular: Negative for chest pain and leg swelling.   Eyes: Negative for double vision and blurred vision.   Respiratory: Negative for cough and shortness of breath.    Gastrointestinal: Negative for nausea, vomiting and diarrhea.   Genitourinary: Negative for dysuria, frequency, urgency and history of kidney stones.   Musculoskeletal: Negative for joint pain, joint swelling, muscle cramps and muscle ache.   Skin: Positive for erythema. Negative for color change, pale, rash and bruising.   Allergic/Immunologic: Negative for seasonal allergies.   Neurological: Positive for numbness. Negative for dizziness, history of vertigo, light-headedness, passing out and headaches.   Hematologic/Lymphatic: Negative for swollen lymph nodes.   Psychiatric/Behavioral: Negative for nervous/anxious, sleep disturbance and depression. The patient is not nervous/anxious.        Objective:      Physical Exam   Constitutional: She is " oriented to person, place, and time. She appears well-developed and well-nourished. She is cooperative.  Non-toxic appearance. She does not appear ill. No distress.   HENT:   Head: Normocephalic and atraumatic.   Right Ear: Hearing, tympanic membrane, external ear and ear canal normal.   Left Ear: Hearing, tympanic membrane, external ear and ear canal normal.   Nose: Nose normal. No mucosal edema, rhinorrhea or nasal deformity. No epistaxis. Right sinus exhibits no maxillary sinus tenderness and no frontal sinus tenderness. Left sinus exhibits no maxillary sinus tenderness and no frontal sinus tenderness.   Mouth/Throat: Uvula is midline, oropharynx is clear and moist and mucous membranes are normal. No trismus in the jaw. Normal dentition. No uvula swelling. No posterior oropharyngeal erythema.   Eyes: Conjunctivae and lids are normal. Right eye exhibits no discharge. Left eye exhibits no discharge. No scleral icterus.   Sclera clear bilat   Neck: Trachea normal, normal range of motion, full passive range of motion without pain and phonation normal. Neck supple.   Cardiovascular: Normal rate, regular rhythm, normal heart sounds, intact distal pulses and normal pulses.   Pulmonary/Chest: Effort normal and breath sounds normal. No respiratory distress.   Abdominal: Soft. Normal appearance and bowel sounds are normal. She exhibits no distension, no pulsatile midline mass and no mass. There is no tenderness.   Musculoskeletal: Normal range of motion. She exhibits no edema or deformity.        Feet:    Neurological: She is alert and oriented to person, place, and time. She exhibits normal muscle tone. Coordination normal.   Skin: Skin is warm, dry and intact. She is not diaphoretic. There is erythema. No pallor.   Psychiatric: She has a normal mood and affect. Her speech is normal and behavior is normal. Judgment and thought content normal. Cognition and memory are normal.   Nursing note and vitals reviewed.       Assessment:       1. Cellulitis of left lower extremity        Plan:         Cellulitis of left lower extremity    Other orders  -     cephALEXin (KEFLEX) 500 MG capsule; Take 1 capsule (500 mg total) by mouth every 12 (twelve) hours. for 10 days  Dispense: 20 capsule; Refill: 0  -     HYDROcodone-acetaminophen (NORCO) 5-325 mg per tablet; Take 1 tablet by mouth every 6 (six) hours as needed for Pain.  Dispense: 15 tablet; Refill: 0

## 2019-03-08 NOTE — TELEPHONE ENCOUNTER
I recommend that the patient be seen for an urgent care visit today if possible.  If no one here in the clinic has any availability recommend [facility] with follow-up here next week

## 2019-03-08 NOTE — TELEPHONE ENCOUNTER
Pt stated that she has soreness/redness on the left foot. She stated she was seen by JEFFREY Lewis before. Now she's having a hard time walking on it. Per note on 11/01/18 visit, pt was moriah w/Amy. Offered earliest appt on 03/11/19 @ 8:40am, pt accepted. Advised urgent care if the pain worsened, warm to touch or any infection characteristics. Understanding verbalized.

## 2019-03-08 NOTE — PATIENT INSTRUCTIONS
Discharge Instructions for Cellulitis  You have been diagnosed with cellulitis. This is an infection in the deepest layer of the skin. In some cases, the infection also affects the muscle. Cellulitis is caused by bacteria. The bacteria can enter the body through broken skin. This can happen with a cut, scratch, animal bite, or an insect bite that has been scratched. You may have been treated in the hospital with antibiotics and fluids. You will likely be given a prescription for antibiotics to take at home. This sheet will help you take care of yourself at home.  Home care  When you are home:  · Take the prescribed antibiotic medicine you are given as directed until it is gone. Take it even if you feel better. It treats the infection and stops it from returning. Not taking all the medicine can make future infections hard to treat.  · Keep the infected area clean.  · When possible, raise the infected area above the level of your heart. This helps keep swelling down.  · Talk with your healthcare provider if you are in pain. Ask what kind of over-the-counter medicine you can take for pain.  · Apply clean bandages as advised.  · Take your temperature once a day for a week.  · Wash your hands often to prevent spreading the infection.  In the future, wash your hands before and after you touch cuts, scratches, or bandages. This will help prevent infection.   When to call your healthcare provider  Call your healthcare provider immediately if you have any of the following:  · Difficulty or pain when moving the joints above or below the infected area  · Discharge or pus draining from the area  · Fever of 100.4°F (38°C) or higher, or as directed by your healthcare provider  · Pain that gets worse in or around the infected   · Redness that gets worse in or around the infected area, particularly if the area of redness expands to a wider area  · Shaking chills  · Swelling of the infected area  · Vomiting   Date Last Reviewed:  8/1/2016  © 8178-1012 [employer]. [address] [place] [address], [place] [address]. All rights reserved. This information is not intended as a substitute for professional medical care. Always follow your healthcare professional's instructions.

## 2019-03-08 NOTE — TELEPHONE ENCOUNTER
Advised pt message from provider. Understanding verbalized. She stated that she is going to go to [facility] today.

## 2019-03-08 NOTE — TELEPHONE ENCOUNTER
----- Message from Ruben Fountain sent at 3/8/2019 10:50 AM CST -----  Type:  Same Day Appointment Request    Caller is requesting a same day appointment.  Caller declined first available appointment listed below.      Name of Caller:  Patient  When is the first available appointment?  03/11  Symptoms:  Pain and redness in left foot  Best Call Back Number:  [phone]  Additional Information:

## 2019-03-18 ENCOUNTER — TELEPHONE (OUTPATIENT)
Dept: FAMILY MEDICINE | Facility: CLINIC | Age: 69
End: 2019-03-18

## 2019-03-18 ENCOUNTER — HOSPITAL ENCOUNTER (OUTPATIENT)
Dept: RADIOLOGY | Facility: HOSPITAL | Age: 69
Discharge: HOME | End: 2019-03-18
Attending: PHYSICIAN ASSISTANT
Payer: MEDICARE

## 2019-03-18 ENCOUNTER — OFFICE VISIT (OUTPATIENT)
Dept: FAMILY MEDICINE | Facility: CLINIC | Age: 69
End: 2019-03-18
Payer: MEDICARE

## 2019-03-18 ENCOUNTER — DOCUMENTATION ONLY (OUTPATIENT)
Dept: FAMILY MEDICINE | Facility: CLINIC | Age: 69
End: 2019-03-18

## 2019-03-18 VITALS
SYSTOLIC BLOOD PRESSURE: 130 MMHG | BODY MASS INDEX: 35.55 KG/M2 | DIASTOLIC BLOOD PRESSURE: 54 MMHG | HEART RATE: 66 BPM | HEIGHT: 63 IN | WEIGHT: 200.63 LBS | TEMPERATURE: 98 F

## 2019-03-18 DIAGNOSIS — M79.662 PAIN OF LEFT CALF: ICD-10-CM

## 2019-03-18 DIAGNOSIS — M79.662 PAIN OF LEFT CALF: Primary | ICD-10-CM

## 2019-03-18 DIAGNOSIS — L03.116 CELLULITIS OF LEFT FOOT: ICD-10-CM

## 2019-03-18 PROCEDURE — 93971 EXTREMITY STUDY: CPT | Mod: TC,LT

## 2019-03-18 PROCEDURE — 99213 PR OFFICE/OUTPT VISIT, EST, LEVL III, 20-29 MIN: ICD-10-PCS | Mod: S$PBB,,, | Performed by: PHYSICIAN ASSISTANT

## 2019-03-18 PROCEDURE — 99213 OFFICE O/P EST LOW 20 MIN: CPT | Mod: PBBFAC,25,PO | Performed by: PHYSICIAN ASSISTANT

## 2019-03-18 PROCEDURE — 99999 PR PBB SHADOW E&M-EST. PATIENT-LVL III: ICD-10-PCS | Mod: PBBFAC,,, | Performed by: PHYSICIAN ASSISTANT

## 2019-03-18 PROCEDURE — 93971 EXTREMITY STUDY: CPT | Mod: 26,LT,, | Performed by: RADIOLOGY

## 2019-03-18 PROCEDURE — 99213 OFFICE O/P EST LOW 20 MIN: CPT | Mod: S$PBB,,, | Performed by: PHYSICIAN ASSISTANT

## 2019-03-18 PROCEDURE — 93971 US LOWER EXTREMITY VEINS LEFT: ICD-10-PCS | Mod: 26,LT,, | Performed by: RADIOLOGY

## 2019-03-18 PROCEDURE — 99999 PR PBB SHADOW E&M-EST. PATIENT-LVL III: CPT | Mod: PBBFAC,,, | Performed by: PHYSICIAN ASSISTANT

## 2019-03-18 RX ORDER — DOXYCYCLINE HYCLATE 100 MG
100 TABLET ORAL 2 TIMES DAILY
Qty: 20 TABLET | Refills: 0 | Status: SHIPPED | OUTPATIENT
Start: 2019-03-18 | End: 2019-09-24 | Stop reason: ALTCHOICE

## 2019-03-18 NOTE — TELEPHONE ENCOUNTER
----- Message from RT Ekta sent at 3/18/2019  3:07 PM CDT -----  Contact: pt    pt , called pod, returned missed call, thanks.

## 2019-03-18 NOTE — PROGRESS NOTES
Subjective:       Patient ID: Betty Bacon is a 69 y.o. female.    Chief Complaint: Follow-up    HPI     This is a 69 year old  female who presents to the clinic for left foot redness and swelling. She was seen at [facility] [place] urgent [facility] 3/8 and diagnosed with cellulitis. She was given Keflex 500 BID for 10 days. She states her symptoms have improved greatly since beginning Keflex, however she still reports residual redness and mild swelling. Of note, she was seen 10/25/18 for similar symptoms and treated for erysipelas with same dose of Keflex. She states symptoms seemed to improve more quickly at that time.     She is additionally complaining of new left calf pain when ambulating. Denies any history of DVT. Denies any CP or SOB.   Review of Systems   Constitutional: Negative for activity change, appetite change, chills, diaphoresis, fatigue and fever.   HENT: Negative for congestion, postnasal drip and rhinorrhea.    Respiratory: Negative.  Negative for cough, shortness of breath and wheezing.    Cardiovascular: Negative.  Negative for chest pain.   Gastrointestinal: Negative for abdominal pain, blood in stool, constipation, diarrhea, nausea and vomiting.   Genitourinary: Negative for dysuria, frequency, hematuria and urgency.   Musculoskeletal: Negative.         Left calf pain   Skin: Positive for color change.        Left foot redness and swelling   Neurological: Negative for dizziness and syncope.   Psychiatric/Behavioral: Negative for agitation, behavioral problems and confusion.       Objective:      Physical Exam   Constitutional: She is oriented to person, place, and time. Vital signs are normal. She appears well-developed and well-nourished. No distress.   HENT:   Head: Normocephalic and atraumatic.   Right Ear: External ear normal.   Left Ear: External ear normal.   Cardiovascular: Normal rate, regular rhythm, S1 normal, S2 normal and normal heart sounds. Exam reveals no gallop.   No  murmur heard.  Pulses:       Radial pulses are 2+ on the right side, and 2+ on the left side.        Dorsalis pedis pulses are 2+ on the right side, and 2+ on the left side.        Posterior tibial pulses are 2+ on the right side, and 2+ on the left side.   <2sec cap refill fingers bilat     Pulmonary/Chest: Effort normal and breath sounds normal. No respiratory distress. She has no wheezes. She has no rhonchi.   Musculoskeletal:        Feet:    Full range of motion in bilateral feet. 5/5 strength in bilateral feet and ankles. Mild pain on active range of motion in left foot. No tenderness in bilateral calves. Negative Nir's sign.   Neurological: She is alert and oriented to person, place, and time.   Skin: Skin is warm and dry. She is not diaphoretic.   Appropriate skin turgor   Psychiatric: She has a normal mood and affect. Her speech is normal and behavior is normal. Judgment and thought content normal. Cognition and memory are normal.       Assessment:       1. Pain of left calf    2. Cellulitis of left foot        Plan:       Betty was seen today for follow-up.    Diagnoses and all orders for this visit:    Pain of left calf  -     US Lower Extremity Veins Left; Future  -     Will follow up with US results. Please go directly to the ED if you experience any worsened pain, chest pain or shortness of breath.    Cellulitis of left foot  -     doxycycline (VIBRA-TABS) 100 MG tablet; Take 1 tablet (100 mg total) by mouth 2 (two) times daily.        -      Discontinue Kelflex. Start doxycycline. Follow up with me in 1 week.             Patient readiness: acceptance and barriers:none    During the course of the visit the patient was educated and counseled about the following:     Hypertension:   Regular aerobic exercise.  Obesity:   General weight loss/lifestyle modification strategies discussed (elicit support from others; identify saboteurs; non-food rewards, etc).    Goals: Hypertension: Reduce Blood Pressure  and Obesity: Reduce calorie intake and BMI    Did patient meet goals/outcomes: HTN: yes, Obesity: No    The following self management tools provided: blood pressure log  excercise log    Patient Instructions (the written plan) was given to the patient/family.     Time spent with patient: 30 minutes    Barriers to medications present (no )    Adverse reactions to current medications (no)    Over the counter medications reviewed (Yes)

## 2019-03-18 NOTE — PROGRESS NOTES
Pre-Visit Chart Review  For Appointment Scheduled on 03/18/19    Health Maintenance Due   Topic Date Due    LDCT Lung Screen  06/19/2016

## 2019-03-25 ENCOUNTER — TELEPHONE (OUTPATIENT)
Dept: FAMILY MEDICINE | Facility: CLINIC | Age: 69
End: 2019-03-25

## 2019-03-25 ENCOUNTER — OFFICE VISIT (OUTPATIENT)
Dept: FAMILY MEDICINE | Facility: CLINIC | Age: 69
End: 2019-03-25
Payer: MEDICARE

## 2019-03-25 VITALS
OXYGEN SATURATION: 92 % | HEART RATE: 62 BPM | BODY MASS INDEX: 35.7 KG/M2 | HEIGHT: 63 IN | SYSTOLIC BLOOD PRESSURE: 152 MMHG | TEMPERATURE: 98 F | WEIGHT: 201.5 LBS | DIASTOLIC BLOOD PRESSURE: 54 MMHG

## 2019-03-25 DIAGNOSIS — L03.116 CELLULITIS OF LEFT FOOT: Primary | ICD-10-CM

## 2019-03-25 DIAGNOSIS — I10 ESSENTIAL HYPERTENSION: ICD-10-CM

## 2019-03-25 PROCEDURE — 99999 PR PBB SHADOW E&M-EST. PATIENT-LVL V: CPT | Mod: PBBFAC,,, | Performed by: NURSE PRACTITIONER

## 2019-03-25 PROCEDURE — 99215 OFFICE O/P EST HI 40 MIN: CPT | Mod: PBBFAC,PO | Performed by: NURSE PRACTITIONER

## 2019-03-25 PROCEDURE — 99213 PR OFFICE/OUTPT VISIT, EST, LEVL III, 20-29 MIN: ICD-10-PCS | Mod: S$PBB,,, | Performed by: NURSE PRACTITIONER

## 2019-03-25 PROCEDURE — 99213 OFFICE O/P EST LOW 20 MIN: CPT | Mod: S$PBB,,, | Performed by: NURSE PRACTITIONER

## 2019-03-25 PROCEDURE — 99999 PR PBB SHADOW E&M-EST. PATIENT-LVL V: ICD-10-PCS | Mod: PBBFAC,,, | Performed by: NURSE PRACTITIONER

## 2019-03-25 NOTE — PROGRESS NOTES
Subjective:       Patient ID: Betty Bacon is a 69 y.o. female.    Chief Complaint: Follow-up    HPI   Ms. Bacon is a 70 yo female who presetns today for one week follow up.  She was previously seen on 3/18/2019 by ALEKSANDRA Ortiz PA-C for left foot pain and swelling.  See previous HPI below.  U/S of the lower extremity negative for DVT.  Given doxycycline and told to follow up in one week.  Today she reports the foot and pain are much improved.  She reports only mild tenderness to the tenderness remains.  She has not quite completed the course of doxycycline.  Her blood pressure is somewhat elevated today, she has not taken her medication yet this morning.        HPI  This is a 69 year old  female who presents to the clinic for left foot redness and swelling. She was seen at [place] urgent [facility] 3/8 and diagnosed with cellulitis. She was given Keflex 500 BID for 10 days. She states her symptoms have improved greatly since beginning Keflex, however she still reports residual redness and mild swelling. Of note, she was seen 10/25/18 for similar symptoms and treated for erysipelas with same dose of Keflex. She states symptoms seemed to improve more quickly at that time.     Review of Systems   Constitutional: Negative for chills, fatigue, fever and unexpected weight change.   HENT: Negative for congestion, hearing loss, nosebleeds, postnasal drip, sinus pressure, sinus pain and sore throat.    Eyes: Negative for pain, discharge, redness and visual disturbance.   Respiratory: Negative for cough, chest tightness and shortness of breath.    Cardiovascular: Negative for chest pain and palpitations.   Gastrointestinal: Negative for abdominal pain, constipation, diarrhea, nausea and vomiting.   Endocrine: Negative for cold intolerance and heat intolerance.   Musculoskeletal: Negative for back pain.        Left foot pain   Neurological: Negative for dizziness, syncope, light-headedness and headaches.    Psychiatric/Behavioral: Negative for agitation and dysphoric mood. The patient is not nervous/anxious.        Objective:      Physical Exam   Constitutional: She is oriented to person, place, and time. She appears well-developed and well-nourished.   HENT:   Head: Normocephalic and atraumatic.   Neck: Normal range of motion.   Cardiovascular: Normal rate and regular rhythm.   Pulses:       Dorsalis pedis pulses are 2+ on the left side.   Pulmonary/Chest: Effort normal and breath sounds normal.   Musculoskeletal: Normal range of motion.        Left foot: There is tenderness (per patinet mild). There is normal range of motion, no bony tenderness, no swelling, normal capillary refill and no deformity.   No edema or erythema noted to left foot.     Feet:   Left Foot:   Skin Integrity: Negative for ulcer, blister, skin breakdown, erythema, warmth, callus or dry skin.   Neurological: She is alert and oriented to person, place, and time.   Skin: Skin is warm and dry. No pallor.       Assessment:       1. Cellulitis of left foot    2. Essential hypertension        Plan:       Cellulitis of left foot  - Improved, with only slight tenderness reamining  - Complete course of antibiotics  - Notify provider if edema, erythema, or pain reoccurs   Essential hypertension  - Elevated today, patient has not taken BP medication this morning      Patient readiness: acceptance and barriers:none    During the course of the visit the patient was educated and counseled about the following:     Hypertension:   Medication: no change.    Goals: Hypertension: Reduce Blood Pressure    Did patient meet goals/outcomes: Yes    The following self management tools provided: declined    Patient Instructions (the written plan) was given to the patient/family.     Time spent with patient: 15 minutes    Barriers to medications present (no )    Adverse reactions to current medications (no)    Over the counter medications reviewed (Yes)

## 2019-03-26 ENCOUNTER — CLINICAL SUPPORT (OUTPATIENT)
Dept: CARDIOLOGY | Facility: CLINIC | Age: 69
End: 2019-03-26
Attending: INTERNAL MEDICINE
Payer: MEDICARE

## 2019-03-26 VITALS
HEIGHT: 63 IN | HEART RATE: 70 BPM | SYSTOLIC BLOOD PRESSURE: 160 MMHG | DIASTOLIC BLOOD PRESSURE: 50 MMHG | WEIGHT: 201 LBS | BODY MASS INDEX: 35.61 KG/M2

## 2019-03-26 DIAGNOSIS — I20.0 UNSTABLE ANGINA: ICD-10-CM

## 2019-03-26 DIAGNOSIS — I25.10 CORONARY ARTERY DISEASE INVOLVING NATIVE CORONARY ARTERY OF NATIVE HEART WITHOUT ANGINA PECTORIS: ICD-10-CM

## 2019-03-26 DIAGNOSIS — I73.9 PAD (PERIPHERAL ARTERY DISEASE): ICD-10-CM

## 2019-03-26 DIAGNOSIS — I35.0 AORTIC VALVE STENOSIS, ETIOLOGY OF CARDIAC VALVE DISEASE UNSPECIFIED: ICD-10-CM

## 2019-03-26 DIAGNOSIS — E78.2 MIXED HYPERLIPIDEMIA: ICD-10-CM

## 2019-03-26 LAB
LEFT ARM DIASTOLIC BLOOD PRESSURE: 50 MMHG
LEFT ARM SYSTOLIC BLOOD PRESSURE: 160 MMHG
LEFT CBA DIAS: 7 CM/S
LEFT CBA SYS: 78 CM/S
LEFT CCA DIST DIAS: 6 CM/S
LEFT CCA DIST SYS: 68 CM/S
LEFT CCA MID DIAS: 8 CM/S
LEFT CCA MID SYS: 71 CM/S
LEFT CCA PROX DIAS: 4 CM/S
LEFT CCA PROX SYS: 70 CM/S
LEFT ECA DIAS: 0 CM/S
LEFT ECA SYS: 182 CM/S
LEFT ICA DIST DIAS: 9 CM/S
LEFT ICA DIST SYS: 111 CM/S
LEFT ICA MID DIAS: 9 CM/S
LEFT ICA MID SYS: 103 CM/S
LEFT ICA PROX DIAS: 10 CM/S
LEFT ICA PROX SYS: 119 CM/S
LEFT VERTEBRAL DIAS: 5 CM/S
LEFT VERTEBRAL SYS: 33 CM/S
OHS CV CAROTID RIGHT ICA EDV HIGHEST: 10
OHS CV CAROTID ULTRASOUND LEFT ICA/CCA RATIO: 1.68
OHS CV CAROTID ULTRASOUND RIGHT ICA/CCA RATIO: 1.42
OHS CV PV CAROTID LEFT HIGHEST CCA: 71
OHS CV PV CAROTID LEFT HIGHEST ICA: 119
OHS CV PV CAROTID RIGHT HIGHEST CCA: 84
OHS CV PV CAROTID RIGHT HIGHEST ICA: 119
OHS CV US CAROTID LEFT HIGHEST EDV: 10
RIGHT ARM DIASTOLIC BLOOD PRESSURE: 50 MMHG
RIGHT ARM SYSTOLIC BLOOD PRESSURE: 160 MMHG
RIGHT CBA DIAS: 4 CM/S
RIGHT CBA SYS: 171 CM/S
RIGHT CCA DIST DIAS: 6 CM/S
RIGHT CCA DIST SYS: 83 CM/S
RIGHT CCA MID DIAS: 3 CM/S
RIGHT CCA MID SYS: 84 CM/S
RIGHT CCA PROX DIAS: 7 CM/S
RIGHT CCA PROX SYS: 73 CM/S
RIGHT ECA DIAS: 0 CM/S
RIGHT ECA SYS: 215 CM/S
RIGHT ICA DIST DIAS: 10 CM/S
RIGHT ICA DIST SYS: 119 CM/S
RIGHT ICA MID DIAS: 7 CM/S
RIGHT ICA MID SYS: 95 CM/S
RIGHT ICA PROX DIAS: 5 CM/S
RIGHT ICA PROX SYS: 89 CM/S
RIGHT VERTEBRAL DIAS: 8 CM/S
RIGHT VERTEBRAL SYS: 77 CM/S

## 2019-03-26 PROCEDURE — 99999 PR PBB SHADOW E&M-EST. PATIENT-LVL II: ICD-10-PCS | Mod: PBBFAC,,,

## 2019-03-26 PROCEDURE — 93306 TRANSTHORACIC ECHO (TTE) COMPLETE (CUPID ONLY): ICD-10-PCS | Mod: 26,S$PBB,, | Performed by: INTERNAL MEDICINE

## 2019-03-26 PROCEDURE — 93880 CV US DOPPLER CAROTID (CUPID ONLY): ICD-10-PCS | Mod: 26,S$PBB,, | Performed by: INTERNAL MEDICINE

## 2019-03-26 PROCEDURE — 93306 TTE W/DOPPLER COMPLETE: CPT | Mod: PBBFAC,PO | Performed by: INTERNAL MEDICINE

## 2019-03-26 PROCEDURE — 93880 EXTRACRANIAL BILAT STUDY: CPT | Mod: PBBFAC,PO | Performed by: INTERNAL MEDICINE

## 2019-03-26 PROCEDURE — 99999 PR PBB SHADOW E&M-EST. PATIENT-LVL II: CPT | Mod: PBBFAC,,,

## 2019-03-26 PROCEDURE — 99212 OFFICE O/P EST SF 10 MIN: CPT | Mod: PBBFAC,25,PO

## 2019-03-28 LAB
ASCENDING AORTA: 3.58 CM
AV INDEX (PROSTH): 0.31
AV MEAN GRADIENT: 20.79 MMHG
AV PEAK GRADIENT: 36.48 MMHG
AV VALVE AREA: 1.08 CM2
AV VELOCITY RATIO: 0.31
BSA FOR ECHO PROCEDURE: 2.01 M2
CV ECHO LV RWT: 0.39 CM
DOP CALC AO PEAK VEL: 3.02 M/S
DOP CALC AO VTI: 84.04 CM
DOP CALC LVOT AREA: 3.46 CM2
DOP CALC LVOT DIAMETER: 2.1 CM
DOP CALC LVOT PEAK VEL: 0.92 M/S
DOP CALC LVOT STROKE VOLUME: 90.39 CM3
DOP CALCLVOT PEAK VEL VTI: 26.11 CM
E WAVE DECELERATION TIME: 155.34 MSEC
E/A RATIO: 1.17
E/E' RATIO: 12.94
ECHO LV POSTERIOR WALL: 1.02 CM (ref 0.6–1.1)
FRACTIONAL SHORTENING: 32 % (ref 28–44)
INTERVENTRICULAR SEPTUM: 1.04 CM (ref 0.6–1.1)
IVRT: 0.05 MSEC
LA MAJOR: 5.53 CM
LA MINOR: 5.19 CM
LA WIDTH: 4.71 CM
LEFT ATRIUM SIZE: 4.55 CM
LEFT ATRIUM VOLUME INDEX: 50.3 ML/M2
LEFT ATRIUM VOLUME: 97.54 CM3
LEFT INTERNAL DIMENSION IN SYSTOLE: 3.54 CM (ref 2.1–4)
LEFT VENTRICLE DIASTOLIC VOLUME INDEX: 67.06 ML/M2
LEFT VENTRICLE DIASTOLIC VOLUME: 129.95 ML
LEFT VENTRICLE MASS INDEX: 104.6 G/M2
LEFT VENTRICLE SYSTOLIC VOLUME INDEX: 27.1 ML/M2
LEFT VENTRICLE SYSTOLIC VOLUME: 52.45 ML
LEFT VENTRICULAR INTERNAL DIMENSION IN DIASTOLE: 5.21 CM (ref 3.5–6)
LEFT VENTRICULAR MASS: 202.63 G
LV LATERAL E/E' RATIO: 11
LV SEPTAL E/E' RATIO: 15.71
MV PEAK A VEL: 0.94 M/S
MV PEAK E VEL: 1.1 M/S
PISA TR MAX VEL: 2.89 M/S
PULM VEIN S/D RATIO: 1
PV PEAK D VEL: 0.49 M/S
PV PEAK S VEL: 0.49 M/S
RA MAJOR: 4.94 CM
RA PRESSURE: 3 MMHG
RA WIDTH: 4.36 CM
RIGHT VENTRICULAR END-DIASTOLIC DIMENSION: 4.35 CM
RV TISSUE DOPPLER FREE WALL SYSTOLIC VELOCITY 1 (APICAL 4 CHAMBER VIEW): 12.43 M/S
SINUS: 3.41 CM
STJ: 3.23 CM
TDI LATERAL: 0.1
TDI SEPTAL: 0.07
TDI: 0.09
TR MAX PG: 33.41 MMHG
TRICUSPID ANNULAR PLANE SYSTOLIC EXCURSION: 2.29 CM
TV REST PULMONARY ARTERY PRESSURE: 36 MMHG

## 2019-04-05 ENCOUNTER — OFFICE VISIT (OUTPATIENT)
Dept: CARDIOLOGY | Facility: CLINIC | Age: 69
End: 2019-04-05
Attending: INTERNAL MEDICINE
Payer: MEDICARE

## 2019-04-05 VITALS
DIASTOLIC BLOOD PRESSURE: 69 MMHG | HEIGHT: 63 IN | WEIGHT: 199.75 LBS | HEART RATE: 72 BPM | BODY MASS INDEX: 35.39 KG/M2 | SYSTOLIC BLOOD PRESSURE: 169 MMHG

## 2019-04-05 DIAGNOSIS — I35.0 AORTIC VALVE STENOSIS, ETIOLOGY OF CARDIAC VALVE DISEASE UNSPECIFIED: ICD-10-CM

## 2019-04-05 DIAGNOSIS — I11.9 HYPERTENSIVE LEFT VENTRICULAR HYPERTROPHY, WITHOUT HEART FAILURE: ICD-10-CM

## 2019-04-05 DIAGNOSIS — E78.2 MIXED HYPERLIPIDEMIA: ICD-10-CM

## 2019-04-05 DIAGNOSIS — I25.10 CORONARY ARTERY DISEASE INVOLVING NATIVE CORONARY ARTERY OF NATIVE HEART WITHOUT ANGINA PECTORIS: Primary | ICD-10-CM

## 2019-04-05 DIAGNOSIS — I10 ESSENTIAL HYPERTENSION: ICD-10-CM

## 2019-04-05 DIAGNOSIS — I73.9 PAD (PERIPHERAL ARTERY DISEASE): ICD-10-CM

## 2019-04-05 PROCEDURE — 99213 OFFICE O/P EST LOW 20 MIN: CPT | Mod: PBBFAC,PO | Performed by: INTERNAL MEDICINE

## 2019-04-05 PROCEDURE — 99214 PR OFFICE/OUTPT VISIT, EST, LEVL IV, 30-39 MIN: ICD-10-PCS | Mod: S$PBB,,, | Performed by: INTERNAL MEDICINE

## 2019-04-05 PROCEDURE — 99999 PR PBB SHADOW E&M-EST. PATIENT-LVL III: ICD-10-PCS | Mod: PBBFAC,,, | Performed by: INTERNAL MEDICINE

## 2019-04-05 PROCEDURE — 99214 OFFICE O/P EST MOD 30 MIN: CPT | Mod: S$PBB,,, | Performed by: INTERNAL MEDICINE

## 2019-04-05 PROCEDURE — 99999 PR PBB SHADOW E&M-EST. PATIENT-LVL III: CPT | Mod: PBBFAC,,, | Performed by: INTERNAL MEDICINE

## 2019-04-05 NOTE — PROGRESS NOTES
Subjective:    Patient ID:  Betty Bacon is a 69 y.o. female who presents for follow-up of cad, as    HPI  She comes with no complaints, no chest pain, no shortness of breath  FC II    Review of Systems   Constitution: Negative for decreased appetite, malaise/fatigue, weight gain and weight loss.   Cardiovascular: Negative for chest pain, dyspnea on exertion, leg swelling, palpitations and syncope.   Respiratory: Negative for cough and shortness of breath.    Gastrointestinal: Negative.    Neurological: Negative for weakness.   All other systems reviewed and are negative.       Objective:      Physical Exam   Constitutional: She is oriented to person, place, and time. She appears well-developed and well-nourished.   HENT:   Head: Normocephalic.   Eyes: Pupils are equal, round, and reactive to light.   Neck: Normal range of motion. Neck supple. No JVD present. Carotid bruit is not present. No thyromegaly present.   Cardiovascular: Normal rate, regular rhythm, intact distal pulses and normal pulses. PMI is not displaced. Exam reveals no gallop.   Murmur heard.   Harsh midsystolic murmur is present with a grade of 3/6 at the upper right sternal border radiating to the neck.  Pulmonary/Chest: Effort normal and breath sounds normal.   Abdominal: Soft. Normal appearance. She exhibits no mass. There is no hepatosplenomegaly. There is no tenderness.   Musculoskeletal: Normal range of motion. She exhibits no edema.   Neurological: She is alert and oriented to person, place, and time. She has normal strength and normal reflexes. No sensory deficit.   Skin: Skin is warm and intact.   Psychiatric: She has a normal mood and affect.   Nursing note and vitals reviewed.    Echo, carotids reviewed      Assessment:       1. Coronary artery disease involving native coronary artery of native heart without angina pectoris    2. Hypertensive left ventricular hypertrophy, without heart failure    3. PAD (peripheral artery  disease)    4. Aortic valve stenosis, etiology of cardiac valve disease unspecified    5. Essential hypertension    6. Mixed hyperlipidemia         Plan:     Continue all cardiac medications  Regular exercise program  Weight loss  1 yr f/u with [facility]

## 2019-04-09 ENCOUNTER — LAB VISIT (OUTPATIENT)
Dept: LAB | Facility: HOSPITAL | Age: 69
End: 2019-04-09
Attending: INTERNAL MEDICINE
Payer: MEDICARE

## 2019-04-09 DIAGNOSIS — J84.9 INTERSTITIAL LUNG DISEASE: ICD-10-CM

## 2019-04-09 DIAGNOSIS — N18.30 CKD (CHRONIC KIDNEY DISEASE) STAGE 3, GFR 30-59 ML/MIN: ICD-10-CM

## 2019-04-09 DIAGNOSIS — I25.10 CORONARY ARTERY DISEASE INVOLVING NATIVE CORONARY ARTERY OF NATIVE HEART WITHOUT ANGINA PECTORIS: ICD-10-CM

## 2019-04-09 DIAGNOSIS — Z87.891 FORMER SMOKER: ICD-10-CM

## 2019-04-09 DIAGNOSIS — I10 ESSENTIAL HYPERTENSION: ICD-10-CM

## 2019-04-09 LAB
ALBUMIN SERPL BCP-MCNC: 2.9 G/DL (ref 3.5–5.2)
ANION GAP SERPL CALC-SCNC: 7 MMOL/L (ref 8–16)
BUN SERPL-MCNC: 16 MG/DL (ref 8–23)
CALCIUM SERPL-MCNC: 9.6 MG/DL (ref 8.7–10.5)
CHLORIDE SERPL-SCNC: 105 MMOL/L (ref 95–110)
CO2 SERPL-SCNC: 26 MMOL/L (ref 23–29)
CREAT SERPL-MCNC: 1.1 MG/DL (ref 0.5–1.4)
EST. GFR  (AFRICAN AMERICAN): 58.4 ML/MIN/1.73 M^2
EST. GFR  (NON AFRICAN AMERICAN): 50.6 ML/MIN/1.73 M^2
GLUCOSE SERPL-MCNC: 99 MG/DL (ref 70–110)
PHOSPHATE SERPL-MCNC: 2.5 MG/DL (ref 2.7–4.5)
POTASSIUM SERPL-SCNC: 4 MMOL/L (ref 3.5–5.1)
PTH-INTACT SERPL-MCNC: 84 PG/ML (ref 9–77)
SODIUM SERPL-SCNC: 138 MMOL/L (ref 136–145)

## 2019-04-09 PROCEDURE — 80069 RENAL FUNCTION PANEL: CPT

## 2019-04-09 PROCEDURE — 36415 COLL VENOUS BLD VENIPUNCTURE: CPT | Mod: PO

## 2019-04-09 PROCEDURE — 83970 ASSAY OF PARATHORMONE: CPT

## 2019-04-16 ENCOUNTER — OFFICE VISIT (OUTPATIENT)
Dept: NEPHROLOGY | Facility: CLINIC | Age: 69
End: 2019-04-16
Payer: MEDICARE

## 2019-04-16 VITALS
HEIGHT: 63 IN | OXYGEN SATURATION: 93 % | BODY MASS INDEX: 34.88 KG/M2 | HEART RATE: 70 BPM | WEIGHT: 196.88 LBS | DIASTOLIC BLOOD PRESSURE: 58 MMHG | SYSTOLIC BLOOD PRESSURE: 126 MMHG

## 2019-04-16 DIAGNOSIS — I11.9 HYPERTENSIVE LEFT VENTRICULAR HYPERTROPHY, WITHOUT HEART FAILURE: ICD-10-CM

## 2019-04-16 DIAGNOSIS — E78.2 MIXED HYPERLIPIDEMIA: ICD-10-CM

## 2019-04-16 DIAGNOSIS — J84.9 INTERSTITIAL LUNG DISEASE: ICD-10-CM

## 2019-04-16 DIAGNOSIS — I73.9 PAD (PERIPHERAL ARTERY DISEASE): ICD-10-CM

## 2019-04-16 DIAGNOSIS — F32.A DEPRESSION, UNSPECIFIED DEPRESSION TYPE: ICD-10-CM

## 2019-04-16 DIAGNOSIS — N18.30 CKD (CHRONIC KIDNEY DISEASE) STAGE 3, GFR 30-59 ML/MIN: Primary | ICD-10-CM

## 2019-04-16 DIAGNOSIS — N25.81 SECONDARY HYPERPARATHYROIDISM OF RENAL ORIGIN: ICD-10-CM

## 2019-04-16 DIAGNOSIS — E66.01 SEVERE OBESITY WITH BODY MASS INDEX (BMI) OF 35.0 TO 39.9 WITH SERIOUS COMORBIDITY: ICD-10-CM

## 2019-04-16 DIAGNOSIS — J44.9 CHRONIC OBSTRUCTIVE PULMONARY DISEASE, UNSPECIFIED COPD TYPE: ICD-10-CM

## 2019-04-16 DIAGNOSIS — F41.1 GENERALIZED ANXIETY DISORDER: ICD-10-CM

## 2019-04-16 DIAGNOSIS — I25.10 CORONARY ARTERY DISEASE INVOLVING NATIVE CORONARY ARTERY OF NATIVE HEART WITHOUT ANGINA PECTORIS: ICD-10-CM

## 2019-04-16 DIAGNOSIS — I35.0 AORTIC VALVE STENOSIS, ETIOLOGY OF CARDIAC VALVE DISEASE UNSPECIFIED: ICD-10-CM

## 2019-04-16 DIAGNOSIS — Z87.891 FORMER SMOKER: ICD-10-CM

## 2019-04-16 DIAGNOSIS — I10 ESSENTIAL HYPERTENSION: ICD-10-CM

## 2019-04-16 PROCEDURE — 99214 PR OFFICE/OUTPT VISIT, EST, LEVL IV, 30-39 MIN: ICD-10-PCS | Mod: S$PBB,,, | Performed by: INTERNAL MEDICINE

## 2019-04-16 PROCEDURE — 99213 OFFICE O/P EST LOW 20 MIN: CPT | Mod: PBBFAC,PO | Performed by: INTERNAL MEDICINE

## 2019-04-16 PROCEDURE — 99214 OFFICE O/P EST MOD 30 MIN: CPT | Mod: S$PBB,,, | Performed by: INTERNAL MEDICINE

## 2019-04-16 PROCEDURE — 99999 PR PBB SHADOW E&M-EST. PATIENT-LVL III: CPT | Mod: PBBFAC,,, | Performed by: INTERNAL MEDICINE

## 2019-04-16 PROCEDURE — 99999 PR PBB SHADOW E&M-EST. PATIENT-LVL III: ICD-10-PCS | Mod: PBBFAC,,, | Performed by: INTERNAL MEDICINE

## 2019-04-24 ENCOUNTER — HOSPITAL ENCOUNTER (OUTPATIENT)
Dept: RADIOLOGY | Facility: CLINIC | Age: 69
Discharge: HOME | End: 2019-04-24
Attending: NURSE PRACTITIONER
Payer: MEDICARE

## 2019-04-24 ENCOUNTER — TELEPHONE (OUTPATIENT)
Dept: FAMILY MEDICINE | Facility: CLINIC | Age: 69
End: 2019-04-24

## 2019-04-24 ENCOUNTER — OFFICE VISIT (OUTPATIENT)
Dept: FAMILY MEDICINE | Facility: CLINIC | Age: 69
End: 2019-04-24
Payer: MEDICARE

## 2019-04-24 ENCOUNTER — HOSPITAL ENCOUNTER (OUTPATIENT)
Dept: RADIOLOGY | Facility: HOSPITAL | Age: 69
Discharge: HOME | End: 2019-04-24
Attending: NURSE PRACTITIONER
Payer: MEDICARE

## 2019-04-24 VITALS
OXYGEN SATURATION: 96 % | HEIGHT: 63 IN | SYSTOLIC BLOOD PRESSURE: 140 MMHG | BODY MASS INDEX: 35.08 KG/M2 | WEIGHT: 198 LBS | TEMPERATURE: 99 F | DIASTOLIC BLOOD PRESSURE: 57 MMHG | HEART RATE: 64 BPM

## 2019-04-24 DIAGNOSIS — R07.9 ACUTE CHEST PAIN: ICD-10-CM

## 2019-04-24 DIAGNOSIS — J44.9 CHRONIC OBSTRUCTIVE PULMONARY DISEASE, UNSPECIFIED COPD TYPE: ICD-10-CM

## 2019-04-24 DIAGNOSIS — N25.81 SECONDARY HYPERPARATHYROIDISM OF RENAL ORIGIN: ICD-10-CM

## 2019-04-24 DIAGNOSIS — R07.89 COSTOCHONDRAL CHEST PAIN: ICD-10-CM

## 2019-04-24 DIAGNOSIS — I10 ESSENTIAL HYPERTENSION: ICD-10-CM

## 2019-04-24 DIAGNOSIS — E66.01 SEVERE OBESITY WITH BODY MASS INDEX (BMI) OF 35.0 TO 39.9 WITH SERIOUS COMORBIDITY: ICD-10-CM

## 2019-04-24 DIAGNOSIS — R07.89 COSTOCHONDRAL CHEST PAIN: Primary | ICD-10-CM

## 2019-04-24 PROCEDURE — 99999 PR PBB SHADOW E&M-EST. PATIENT-LVL V: ICD-10-PCS | Mod: PBBFAC,,, | Performed by: NURSE PRACTITIONER

## 2019-04-24 PROCEDURE — 71275 CT ANGIOGRAPHY CHEST: CPT | Mod: 26,,, | Performed by: RADIOLOGY

## 2019-04-24 PROCEDURE — 71046 X-RAY EXAM CHEST 2 VIEWS: CPT | Mod: 26,,, | Performed by: RADIOLOGY

## 2019-04-24 PROCEDURE — 71046 XR CHEST PA AND LATERAL: ICD-10-PCS | Mod: 26,,, | Performed by: RADIOLOGY

## 2019-04-24 PROCEDURE — 99214 PR OFFICE/OUTPT VISIT, EST, LEVL IV, 30-39 MIN: ICD-10-PCS | Mod: S$PBB,,, | Performed by: NURSE PRACTITIONER

## 2019-04-24 PROCEDURE — 71275 CTA CHEST NON CORONARY: ICD-10-PCS | Mod: 26,,, | Performed by: RADIOLOGY

## 2019-04-24 PROCEDURE — 99214 OFFICE O/P EST MOD 30 MIN: CPT | Mod: S$PBB,,, | Performed by: NURSE PRACTITIONER

## 2019-04-24 PROCEDURE — 99999 PR PBB SHADOW E&M-EST. PATIENT-LVL V: CPT | Mod: PBBFAC,,, | Performed by: NURSE PRACTITIONER

## 2019-04-24 PROCEDURE — 71275 CT ANGIOGRAPHY CHEST: CPT | Mod: TC

## 2019-04-24 PROCEDURE — 25500020 PHARM REV CODE 255: Performed by: NURSE PRACTITIONER

## 2019-04-24 PROCEDURE — 99215 OFFICE O/P EST HI 40 MIN: CPT | Mod: PBBFAC,PO | Performed by: NURSE PRACTITIONER

## 2019-04-24 PROCEDURE — 71046 X-RAY EXAM CHEST 2 VIEWS: CPT | Mod: TC,FY,PO

## 2019-04-24 RX ORDER — METHYLPREDNISOLONE 4 MG/1
TABLET ORAL
Qty: 1 PACKAGE | Refills: 0 | Status: SHIPPED | OUTPATIENT
Start: 2019-04-24 | End: 2019-09-24 | Stop reason: ALTCHOICE

## 2019-04-24 RX ADMIN — IOHEXOL 100 ML: 350 INJECTION, SOLUTION INTRAVENOUS at 05:04

## 2019-04-24 NOTE — PROGRESS NOTES
Subjective:       Patient ID: Betty Bacon is a 69 y.o. female.    Chief Complaint: No chief complaint on file.    Ms. Bacon presents to the clinic today for left sided thoracic back pain radiating to chest below left breast x 1 week.  This occurs with certain movements (potting plants) and with deep breaths. She is slightly more short of breath than usual. She has COPD.  She sees Dr. Grant and had pneumonia about 2 months ago.  She is still coughing but this is not unusual for her.  She had follow up with her Cardiologist on 4/5.    Review of Systems   Constitutional: Negative for chills and fever.   Respiratory: Positive for cough and shortness of breath. Negative for wheezing.    Cardiovascular: Positive for chest pain. Negative for leg swelling.       Objective:      Physical Exam   Constitutional: She is oriented to person, place, and time. She appears well-nourished. No distress.   HENT:   Head: Normocephalic and atraumatic.   Right Ear: External ear normal.   Left Ear: External ear normal.   Mouth/Throat: Oropharynx is clear and moist. No oropharyngeal exudate.   Eyes: Pupils are equal, round, and reactive to light. Right eye exhibits no discharge. Left eye exhibits no discharge.   Neck: Neck supple. No thyromegaly present.   Cardiovascular: Normal rate and regular rhythm. Exam reveals no gallop and no friction rub.   Murmur heard.   Systolic murmur is present with a grade of 3/6.  No LE edema noted   Pulmonary/Chest: Effort normal and breath sounds normal. No respiratory distress. She has no decreased breath sounds. She has no wheezes. She has no rhonchi. She has no rales. She exhibits tenderness.       Lymphadenopathy:     She has no cervical adenopathy.   Neurological: She is alert and oriented to person, place, and time. Coordination normal.   Skin: Skin is warm and dry.   Psychiatric: She has a normal mood and affect. Her behavior is normal. Thought content normal.   Vitals reviewed.           Current Outpatient Medications:     albuterol sulfate (PROAIR RESPICLICK) 90 mcg/actuation AePB, Inhale 2 puffs into the lungs every 4 to 6 hours as needed., Disp: 1 each, Rfl: 3    alendronate (FOSAMAX) 70 MG tablet, Take 1 tablet (70 mg total) by mouth every 7 days., Disp: 4 tablet, Rfl: 11    aspirin 81 mg Tab, Take 81 mg by mouth once daily. Every day, Disp: , Rfl:     atorvastatin (LIPITOR) 40 MG tablet, Take 1 tablet (40 mg total) by mouth once daily., Disp: 90 tablet, Rfl: 3    BREO ELLIPTA 200-25 mcg/dose DsDv diskus inhaler, INHALE 1 PUFF DAILY, Disp: , Rfl: 11    clopidogrel (PLAVIX) 75 mg tablet, Take 1 tablet (75 mg total) by mouth once daily., Disp: 90 tablet, Rfl: 3    colestipol (COLESTID) 1 gram Tab, Take 1 tablet (1 g total) by mouth 2 (two) times daily., Disp: 180 tablet, Rfl: 3    doxycycline (VIBRA-TABS) 100 MG tablet, Take 1 tablet (100 mg total) by mouth 2 (two) times daily., Disp: 20 tablet, Rfl: 0    isosorbide mononitrate (IMDUR) 60 MG 24 hr tablet, Take 1 tablet (60 mg total) by mouth once daily., Disp: 90 tablet, Rfl: 3    methylPREDNISolone (MEDROL DOSEPACK) 4 mg tablet, use as directed, Disp: 1 Package, Rfl: 0    metoprolol succinate (TOPROL-XL) 50 MG 24 hr tablet, Take 1 tablet (50 mg total) by mouth once daily., Disp: 90 tablet, Rfl: 3    paroxetine (PAXIL) 10 MG tablet, Take 1 tablet (10 mg total) by mouth every morning. In addition to 40 mg tablet for a total of 50 mg daily., Disp: 90 tablet, Rfl: 3    paroxetine (PAXIL) 40 MG tablet, Take 1 tablet (40 mg total) by mouth every morning., Disp: 30 tablet, Rfl: 2    traZODone (DESYREL) 100 MG tablet, Take 1 tablet (100 mg total) by mouth every evening., Disp: 90 tablet, Rfl: 3    triamterene-hydrochlorothiazide 37.5-25 mg (DYAZIDE) 37.5-25 mg per capsule, TAKE 1 CAPSULE BY MOUTH ONCE DAILY., Disp: 90 capsule, Rfl: 2  Assessment:       1. Costochondral chest pain    2. Chronic obstructive pulmonary disease,  unspecified COPD type    3. Essential hypertension    4. Secondary hyperparathyroidism of renal origin    5. Severe obesity with body mass index (BMI) of 35.0 to 39.9 with serious comorbidity        Plan:       Costochondral chest pain  Low suspicion for cardiac chest pain.  This sounds more like pleuritic chest pain.  Will rule out pneumonia and PE.    -     D dimer, quantitative; Future; Expected date: 04/24/2019  -     X-Ray Chest PA And Lateral; Future; Expected date: 04/24/2019  -     methylPREDNISolone (MEDROL DOSEPACK) 4 mg tablet; use as directed  Dispense: 1 Package; Refill: 0    Chronic obstructive pulmonary disease, unspecified COPD type  Stable, follow up Dr. Grant.    Essential hypertension  Stable, continue current medication.    Secondary hyperparathyroidism of renal origin  Stable, follow up Nephrology.    Severe obesity with body mass index (BMI) of 35.0 to 39.9 with serious comorbidity  Patient readiness: acceptance and barriers:none    During the course of the visit the patient was educated and counseled about the following:     Hypertension:   Medication: no change.  Dietary sodium restriction.  Regular aerobic exercise.  Obesity:   General weight loss/lifestyle modification strategies discussed (elicit support from others; identify saboteurs; non-food rewards, etc).    Goals: Obesity: Reduce calorie intake and BMI    Did patient meet goals/outcomes: No    The following self management tools provided: declined    Patient Instructions (the written plan) was given to the patient/family.     Time spent with patient: 15 minutes    Barriers to medications present (no )    Adverse reactions to current medications (no)    Over the counter medications reviewed (Yes)

## 2019-04-25 ENCOUNTER — TELEPHONE (OUTPATIENT)
Dept: FAMILY MEDICINE | Facility: CLINIC | Age: 69
End: 2019-04-25

## 2019-04-25 NOTE — TELEPHONE ENCOUNTER
----- Message from Gio Oliveira sent at 4/25/2019  9:38 AM CDT -----  Contact: pt  Type:  Test Results    Who Called:  pt  Name of Test (Lab/Mammo/Etc):  Blood work   Date of Test:  4.25.19  Ordering Provider:  Dread  Where the test was performed:  [facility]   Best Call Back Number:  [phone]  Additional Information:

## 2019-04-25 NOTE — TELEPHONE ENCOUNTER
Please let patient know her CTA chest was negative for PE but there is some inflammation in the lungs which is likely causing her symptoms so she should take the medrol dose pack and let me know if there is no improvement let me know.

## 2019-04-26 ENCOUNTER — TELEPHONE (OUTPATIENT)
Dept: FAMILY MEDICINE | Facility: CLINIC | Age: 69
End: 2019-04-26

## 2019-04-26 NOTE — TELEPHONE ENCOUNTER
----- Message from Radha Chester sent at 4/26/2019  8:40 AM CDT -----  Contact: patient  Type:  Patient Returning Call    Who Called:  patient  Who Left Message for Patient:  neel  Does the patient know what this is regarding?:    Best Call Back Number:  [phone]  Additional Information:

## 2019-04-27 NOTE — PROGRESS NOTES
Subjective:       Patient ID: Betty Bacon is a 69 y.o. White female who presents for follow-up evaluation of Chronic Kidney Disease    HPI     She reports she is doing well. She escaped the flu. . No LE edema and no SOB. Her appetite is too good and she is still pushing fluids. No new medications since last visit. No LUTS. Since last visit her nephew and her sister with advanced dementia passed away. She is not taking her BP at home  She received the flu vaccine. Repeat O2 level was 93% after resting for 10 minutes     Interval history April 2019:     Review of Systems   Constitutional: Negative for activity change, appetite change, fatigue and unexpected weight change.   HENT: Negative for facial swelling.    Eyes: Negative for visual disturbance.   Respiratory: Positive for shortness of breath (chronic).    Cardiovascular: Negative for chest pain and leg swelling.   Gastrointestinal: Negative for constipation and diarrhea.   Genitourinary: Negative for difficulty urinating, dysuria and hematuria.   Musculoskeletal: Negative for arthralgias.   Skin: Negative for rash.   Neurological: Negative for weakness and headaches.   Hematological: Bruises/bleeds easily.   Psychiatric/Behavioral: Negative for confusion.       Objective:      Physical Exam   Constitutional: She is oriented to person, place, and time. She appears well-nourished. No distress.   HENT:   Mouth/Throat: Oropharynx is clear and moist.   Eyes: No scleral icterus.   Neck: No JVD present.   Cardiovascular: S1 normal and S2 normal. Exam reveals no friction rub.   Pulmonary/Chest: Breath sounds normal. She has no wheezes. She has no rales.   Abdominal: Soft.   Musculoskeletal: She exhibits no edema.   Neurological: She is alert and oriented to person, place, and time.   Skin: Skin is warm and dry.   Psychiatric: She has a normal mood and affect.   Nursing note and vitals reviewed.      Assessment:       1. CKD (chronic kidney disease) stage 3,  GFR 30-59 ml/min    2. Essential hypertension    3. Hypertensive left ventricular hypertrophy, without heart failure    4. PAD (peripheral artery disease)    5. Coronary artery disease involving native coronary artery of native heart without angina pectoris    6. Aortic valve stenosis, etiology of cardiac valve disease unspecified    7. Secondary hyperparathyroidism of renal origin    8. Severe obesity with body mass index (BMI) of 35.0 to 39.9 with serious comorbidity    9. Former smoker    10. Interstitial lung disease    11. Chronic obstructive pulmonary disease, unspecified COPD type    12. Generalized anxiety disorder    13. Depression, unspecified depression type    14. Mixed hyperlipidemia        Plan:             CKD Stage 3 with stable kidney function and proteinuria.      HTN--is controlled    Mineral and Bone Disease--continue D3, D level and PTH at goal    Hyperglycemia--highest Hba1c was 6.2, most recent is 5.5    Former smoker/COPD/ILD--continue Pulmonary follow up         RTC 6 months with labs prior

## 2019-05-15 DIAGNOSIS — K21.9 GASTROESOPHAGEAL REFLUX DISEASE, ESOPHAGITIS PRESENCE NOT SPECIFIED: ICD-10-CM

## 2019-05-15 RX ORDER — ESOMEPRAZOLE MAGNESIUM 40 MG/1
40 CAPSULE, DELAYED RELEASE ORAL
Qty: 90 CAPSULE | Refills: 3 | Status: SHIPPED | OUTPATIENT
Start: 2019-05-15 | End: 2020-06-03 | Stop reason: SDUPTHER

## 2019-05-15 NOTE — TELEPHONE ENCOUNTER
----- Message from Kavon Renee sent at 5/15/2019  3:24 PM CDT -----  Contact: Pt  Pt would like to be called back asap regarding  why Nexium 40mg was canceled.   Pt stated she still needs to take medication.    Pt can be reached at [phone]

## 2019-05-15 NOTE — TELEPHONE ENCOUNTER
"Spoke to patient, advised that Nexium was d/c on 04/16/19 during visit with her nephrology with reason of "patient is no longer taking." Pt stated that she is still taking it and not sure why it was marked that she's not taking it anymore. Pt is requesting for refill.     LOV: 04/24/19  Lab: 03/06/19  "

## 2019-05-15 NOTE — TELEPHONE ENCOUNTER
Advised pt medication has been sent to [facility] formerly [facility] in [place]. Understanding verbalized.

## 2019-05-20 ENCOUNTER — OFFICE VISIT (OUTPATIENT)
Dept: FAMILY MEDICINE | Facility: CLINIC | Age: 69
End: 2019-05-20
Payer: MEDICARE

## 2019-05-20 VITALS
DIASTOLIC BLOOD PRESSURE: 54 MMHG | SYSTOLIC BLOOD PRESSURE: 145 MMHG | WEIGHT: 200.63 LBS | HEART RATE: 63 BPM | BODY MASS INDEX: 35.55 KG/M2 | HEIGHT: 63 IN | TEMPERATURE: 98 F

## 2019-05-20 DIAGNOSIS — N18.30 CKD (CHRONIC KIDNEY DISEASE) STAGE 3, GFR 30-59 ML/MIN: ICD-10-CM

## 2019-05-20 DIAGNOSIS — G89.29 CHRONIC LEFT-SIDED THORACIC BACK PAIN: Primary | ICD-10-CM

## 2019-05-20 DIAGNOSIS — G47.00 INSOMNIA, UNSPECIFIED TYPE: ICD-10-CM

## 2019-05-20 DIAGNOSIS — J44.9 CHRONIC OBSTRUCTIVE PULMONARY DISEASE, UNSPECIFIED COPD TYPE: ICD-10-CM

## 2019-05-20 DIAGNOSIS — F41.9 ANXIETY: Primary | ICD-10-CM

## 2019-05-20 DIAGNOSIS — I10 ESSENTIAL HYPERTENSION: ICD-10-CM

## 2019-05-20 DIAGNOSIS — E66.01 SEVERE OBESITY WITH BODY MASS INDEX (BMI) OF 35.0 TO 39.9 WITH SERIOUS COMORBIDITY: ICD-10-CM

## 2019-05-20 DIAGNOSIS — M54.6 CHRONIC LEFT-SIDED THORACIC BACK PAIN: Primary | ICD-10-CM

## 2019-05-20 DIAGNOSIS — Z28.39 IMMUNIZATION DEFICIENCY: ICD-10-CM

## 2019-05-20 PROCEDURE — 99999 PR PBB SHADOW E&M-EST. PATIENT-LVL V: CPT | Mod: PBBFAC,,, | Performed by: NURSE PRACTITIONER

## 2019-05-20 PROCEDURE — 99214 PR OFFICE/OUTPT VISIT, EST, LEVL IV, 30-39 MIN: ICD-10-PCS | Mod: S$PBB,,, | Performed by: NURSE PRACTITIONER

## 2019-05-20 PROCEDURE — 99214 OFFICE O/P EST MOD 30 MIN: CPT | Mod: S$PBB,,, | Performed by: NURSE PRACTITIONER

## 2019-05-20 PROCEDURE — 99215 OFFICE O/P EST HI 40 MIN: CPT | Mod: PBBFAC,PO | Performed by: NURSE PRACTITIONER

## 2019-05-20 PROCEDURE — 99999 PR PBB SHADOW E&M-EST. PATIENT-LVL V: ICD-10-PCS | Mod: PBBFAC,,, | Performed by: NURSE PRACTITIONER

## 2019-05-20 RX ORDER — ALPRAZOLAM 1 MG/1
1 TABLET ORAL 2 TIMES DAILY PRN
Qty: 60 TABLET | Refills: 3 | Status: ON HOLD | OUTPATIENT
Start: 2019-05-20 | End: 2023-01-31 | Stop reason: HOSPADM

## 2019-05-20 RX ORDER — TRAZODONE HYDROCHLORIDE 150 MG/1
150 TABLET ORAL NIGHTLY
Qty: 90 TABLET | Refills: 3 | Status: SHIPPED | OUTPATIENT
Start: 2019-05-20 | End: 2019-11-19

## 2019-05-20 RX ORDER — DICLOFENAC SODIUM 10 MG/G
2 GEL TOPICAL 3 TIMES DAILY PRN
Qty: 100 G | Refills: 5 | Status: SHIPPED | OUTPATIENT
Start: 2019-05-20 | End: 2019-09-24 | Stop reason: SDUPTHER

## 2019-05-20 NOTE — PATIENT INSTRUCTIONS
Back Spasm (No Trauma)    Spasm of the back muscles can occur after a sudden forceful twisting or bending force (such as in a car accident), after a simple awkward movement, or after lifting something heavy with poor body positioning. In any case, muscle spasm adds to the pain. Sleeping in an awkward position or on a poor quality mattress can also cause this. Some people respond to emotional stress by tensing the muscles of their back.  Pain that continues may need further evaluation or other types of treatment such as physical therapy.  You don't always need X-rays for the initial evaluation of back pain, unless you had a physical injury such as from a car accident or fall. If your pain continues and doesn't respond to medical treatment, X-rays and other tests may then be done.   Home care  · As soon as possible, start sitting or walking again to avoid problems from prolonged bed rest (muscle weakness, worsening back stiffness and pain, blood clots in the legs).  · When in bed, try to find a position of comfort. A firm mattress is best. Try lying flat on your back with pillows under your knees. You can also try lying on your side with your knees bent up toward your chest and a pillow between your knees.  · Avoid prolonged sitting, long car rides, or travel. This puts more stress on the lower back than standing or walking.   · During the first 24 to 72 hours after an injury or flare-up, apply an ice pack to the painful area for 20 minutes, then remove it for 20 minutes. Do this over a period of 60 to 90 minutes or several times a day. This will reduce swelling and pain. Always wrap ice packs in a thin towel.  · You can start with ice, then switch to heat. Heat (hot shower, hot bath, or heating pad) reduces pain, and works well for muscle spasms. Apply heat to the painful area for 20 minutes, then remove it for 20 minutes. Do this over a period of 60 to 90 minutes or several times a day. Do not sleep on a heating  pad as it can burn or damage skin.  · Alternate ice and heat therapies.  · Be aware of safe lifting methods and do not lift anything over 15 pounds until all the pain is gone.  Gentle stretching will help your back heal faster. Do this simple routine 2 to 3 times a day until your back is feeling better.  · Lie on your back with your knees bent and both feet on the ground  · Slowly raise your left knee to your chest as you flatten your lower back against the floor. Hold for 20 to 30 seconds.  · Relax and repeat the exercise with your right knee.  · Do 2 to 3 of these exercises for each leg.  · Repeat, hugging both knees to your chest at the same time.  · Do not bounce, but use a gentle pull.  Medicines  Talk to your doctor before using medicine, especially if you have other medical problems or are taking other medicines.  You may use acetaminophen or ibuprofen to control pain, unless your healthcare provider prescribed another pain medicine. If you have a chronic condition such as diabetes, liver or kidney disease, stomach ulcer, or gastrointestinal bleeding, or are taking blood thinners, talk with your healthcare provider before taking any medicines.  Be careful if you are given prescription pain medicine, narcotics, or medicine for muscle spasm. They can cause drowsiness, affect your coordination, reflexes, or judgment. Do not drive or operate heavy machinery when taking these medicines. Take pain medicine only as prescribed by your healthcare provider.  Follow-up care  Follow up with your doctor, or as advised. Physical therapy or further tests may be needed.  If X-rays were taken, they may be reviewed by a radiologist. You will be notified of any new findings that may affect your care.  Call 911  Seek emergency medical care if any of these occur:  · Trouble breathing  · Confusion  · Drowsiness or trouble awakening  · Fainting or loss of consciousness  · Rapid or very slow heart rate  · Loss of bowel or bladder  control  When to seek medical advice  Call your healthcare provider right away if any of these occur:  · Pain becomes worse or spreads to your legs  · Weakness or numbness in one or both legs  · Numbness in the groin or genital area  · Unexplained fever over 100.4ºF (38.0ºC)  · Burning or pain when passing urine  Date Last Reviewed: 6/1/2016  © 3648-7753 [employer]. [address] [place] [address], [place] [id number]. All rights reserved. This information is not intended as a substitute for professional medical care. Always follow your healthcare professional's instructions.

## 2019-05-22 NOTE — PROGRESS NOTES
Subjective:       Patient ID: Betty Bacon is a 69 y.o. female.    Chief Complaint: Back Pain and Breast Pain    Ms. Bacon presents to the clinic today for follow up for thoracic back pain radiating to left chest wall.  This is worse with activity and certain movements.  She reports this is more in the thoracic back area lately and she was having spasms in that area which has since improved.  She took medrol dose pack with no improvement.  She also complains of insomnia.     Review of Systems   Constitutional: Negative for chills and fever.   HENT: Negative for congestion, ear pain and sinus pressure.    Respiratory: Negative for cough and shortness of breath.    Cardiovascular: Negative for chest pain, palpitations and leg swelling.   Gastrointestinal: Negative for abdominal pain, constipation and diarrhea.   Musculoskeletal: Positive for back pain. Negative for gait problem.   Psychiatric/Behavioral: Positive for sleep disturbance. The patient is nervous/anxious.        Objective:      Physical Exam   Constitutional: She is oriented to person, place, and time. She appears well-nourished. No distress.   HENT:   Head: Normocephalic and atraumatic.   Right Ear: External ear normal.   Left Ear: External ear normal.   Mouth/Throat: Oropharynx is clear and moist. No oropharyngeal exudate.   Eyes: Pupils are equal, round, and reactive to light. Right eye exhibits no discharge. Left eye exhibits no discharge.   Neck: Neck supple. No thyromegaly present.   Cardiovascular: Normal rate and regular rhythm. Exam reveals no gallop and no friction rub.   No murmur heard.  Pulmonary/Chest: Effort normal and breath sounds normal. No respiratory distress. She has no wheezes. She has no rales.   Abdominal: Soft. She exhibits no distension. There is no tenderness.   Musculoskeletal:        Thoracic back: She exhibits tenderness. She exhibits no bony tenderness.        Back:    Lymphadenopathy:     She has no cervical  adenopathy.   Neurological: She is alert and oriented to person, place, and time. Coordination normal.   Skin: Skin is warm and dry.   Psychiatric: She has a normal mood and affect. Her behavior is normal. Thought content normal.   Vitals reviewed.          Current Outpatient Medications:     albuterol sulfate (PROAIR RESPICLICK) 90 mcg/actuation AePB, Inhale 2 puffs into the lungs every 4 to 6 hours as needed., Disp: 1 each, Rfl: 3    alendronate (FOSAMAX) 70 MG tablet, Take 1 tablet (70 mg total) by mouth every 7 days., Disp: 4 tablet, Rfl: 11    ALPRAZolam (XANAX) 1 MG tablet, Take 1 tablet (1 mg total) by mouth 2 (two) times daily as needed for Anxiety., Disp: 60 tablet, Rfl: 3    aspirin 81 mg Tab, Take 81 mg by mouth once daily. Every day, Disp: , Rfl:     atorvastatin (LIPITOR) 40 MG tablet, Take 1 tablet (40 mg total) by mouth once daily., Disp: 90 tablet, Rfl: 3    BREO ELLIPTA 200-25 mcg/dose DsDv diskus inhaler, INHALE 1 PUFF DAILY, Disp: , Rfl: 11    clopidogrel (PLAVIX) 75 mg tablet, Take 1 tablet (75 mg total) by mouth once daily., Disp: 90 tablet, Rfl: 3    colestipol (COLESTID) 1 gram Tab, Take 1 tablet (1 g total) by mouth 2 (two) times daily., Disp: 180 tablet, Rfl: 3    diclofenac sodium (VOLTAREN) 1 % Gel, Apply 2 g topically 3 (three) times daily as needed., Disp: 100 g, Rfl: 5    doxycycline (VIBRA-TABS) 100 MG tablet, Take 1 tablet (100 mg total) by mouth 2 (two) times daily., Disp: 20 tablet, Rfl: 0    esomeprazole (NEXIUM) 40 MG capsule, Take 1 capsule (40 mg total) by mouth before breakfast., Disp: 90 capsule, Rfl: 3    isosorbide mononitrate (IMDUR) 60 MG 24 hr tablet, Take 1 tablet (60 mg total) by mouth once daily., Disp: 90 tablet, Rfl: 3    methylPREDNISolone (MEDROL DOSEPACK) 4 mg tablet, use as directed, Disp: 1 Package, Rfl: 0    metoprolol succinate (TOPROL-XL) 50 MG 24 hr tablet, Take 1 tablet (50 mg total) by mouth once daily., Disp: 90 tablet, Rfl: 3    paroxetine  (PAXIL) 10 MG tablet, Take 1 tablet (10 mg total) by mouth every morning. In addition to 40 mg tablet for a total of 50 mg daily., Disp: 90 tablet, Rfl: 3    paroxetine (PAXIL) 40 MG tablet, Take 1 tablet (40 mg total) by mouth every morning., Disp: 30 tablet, Rfl: 2    traZODone (DESYREL) 150 MG tablet, Take 1 tablet (150 mg total) by mouth every evening., Disp: 90 tablet, Rfl: 3    triamterene-hydrochlorothiazide 37.5-25 mg (DYAZIDE) 37.5-25 mg per capsule, TAKE 1 CAPSULE BY MOUTH ONCE DAILY., Disp: 90 capsule, Rfl: 2  Assessment:       1. Chronic left-sided thoracic back pain    2. Insomnia, unspecified type    3. Immunization deficiency    4. Chronic obstructive pulmonary disease, unspecified COPD type    5. Essential hypertension    6. CKD (chronic kidney disease) stage 3, GFR 30-59 ml/min    7. Severe obesity with body mass index (BMI) of 35.0 to 39.9 with serious comorbidity        Plan:       Chronic left-sided thoracic back pain  Start PT. RTC 6 wks if no improvement.  -     Ambulatory Referral to Physical/Occupational Therapy  -     diclofenac sodium (VOLTAREN) 1 % Gel; Apply 2 g topically 3 (three) times daily as needed.  Dispense: 100 g; Refill: 5    Insomnia, unspecified type  Increase:  -     traZODone (DESYREL) 150 MG tablet; Take 1 tablet (150 mg total) by mouth every evening.  Dispense: 90 tablet; Refill: 3    Immunization deficiency  -     varicella-zoster [id number], PF, (SHINGRIX, PF,) 50 mcg/0.5 mL injection; Inject 0.5 mLs into the muscle once. for 1 dose  Dispense: 0.5 mL; Refill: 0    Chronic obstructive pulmonary disease, unspecified COPD type  Stable, continue current medication.    Essential hypertension  Stable, continue current medication.    CKD (chronic kidney disease) stage 3, GFR 30-59 ml/min  Stable, follow up Nephrology.    Severe obesity with body mass index (BMI) of 35.0 to 39.9 with serious comorbidity  3 lb weight gain    Patient readiness: acceptance and  barriers:none    During the course of the visit the patient was educated and counseled about the following:     Hypertension:   Medication: no change.  Obesity:   General weight loss/lifestyle modification strategies discussed (elicit support from others; identify saboteurs; non-food rewards, etc).  Diet interventions: moderate (500 kCal/d) deficit diet and qualitative changes (increase low-fat,  high-fiber foods).  Regular aerobic exercise program discussed.    Goals: Hypertension: Reduce Blood Pressure and Obesity: Reduce calorie intake and BMI    Did patient meet goals/outcomes: Yes    The following self management tools provided: declined    Patient Instructions (the written plan) was given to the patient/family.     Time spent with patient: 30 minutes    Barriers to medications present (no )    Adverse reactions to current medications (no)    Over the counter medications reviewed (Yes)    RTC 3 mos

## 2019-05-24 ENCOUNTER — DOCUMENTATION ONLY (OUTPATIENT)
Dept: FAMILY MEDICINE | Facility: CLINIC | Age: 69
End: 2019-05-24

## 2019-05-27 ENCOUNTER — CLINICAL SUPPORT (OUTPATIENT)
Dept: REHABILITATION | Facility: HOSPITAL | Age: 69
End: 2019-05-27
Payer: MEDICARE

## 2019-05-27 DIAGNOSIS — G89.29 CHRONIC LEFT-SIDED THORACIC BACK PAIN: Primary | ICD-10-CM

## 2019-05-27 DIAGNOSIS — M54.6 CHRONIC LEFT-SIDED THORACIC BACK PAIN: Primary | ICD-10-CM

## 2019-05-27 PROCEDURE — 97161 PT EVAL LOW COMPLEX 20 MIN: CPT | Mod: PN

## 2019-05-27 NOTE — PLAN OF CARE
TIME RECORD    Date: 05/27/2019    Start Time:  1100  Stop Time:  1145    PROCEDURES:    TIMED  Procedure Time Min.    Start:  Stop:     Start:  Stop:     Start:  Stop:     Start:  Stop:          UNTIMED  Procedure Time Min.   EVAL Start:  Stop:     Start:  Stop:      Total Timed Minutes:    Total Timed Units:    Total Untimed Units:  1  Charges Billed/# of units:  1    OUTPATIENT PHYSICAL THERAPY   PATIENT EVALUATION  Onset Date: Insidious.  Primary Diagnosis:   1. Chronic left-sided thoracic back pain       Treatment Diagnosis: Mid back pain.  Past Medical History:   Diagnosis Date    Acute coronary artery obstruction without MI 10/2012    Benign hypertension     COPD (chronic obstructive pulmonary disease)     Coronary artery disease     Disorder of kidney and ureter     Dr. Morrow    History of electroconvulsive therapy     Hyperlipidemia LDL goal < 70     Left ankle sprain     Major depressive disorder, recurrent episode, severe     s/p ECT    PVD (peripheral vascular disease)      Precautions: STANDARD.  Prior Therapy: ~ 10 YRS AGO for LBP.  Medications: Betty Bacon has a current medication list which includes the following prescription(s): albuterol sulfate, alendronate, alprazolam, aspirin, atorvastatin, breo ellipta, clopidogrel, colestipol, diclofenac sodium, doxycycline, esomeprazole, isosorbide mononitrate, methylprednisolone, metoprolol succinate, paroxetine, paroxetine, trazodone, and triamterene-hydrochlorothiazide 37.5-25 mg.  Nutrition:  Obese  History of Present Illness: Mid back pain started ~ 2/12 ago without trauma.  Prior Level of Function: Independent  Social History: Not working.  Place of Residence (Steps/Adaptations): In mobile home alone.3 steps to enter.  Functional Deficits Leading to Referral/Nature of Injury: Difficulties with ADLs,functional activities,homemaking,self care.  Patient Therapy Goals: Decrease pain.    Subjective     Betty Bacon  states Left side hurts more.    Pain:  Location: back ,thoracic.   Description: Aching, Dull, Burning, Throbbing, Sharp and Variable  Activities Which Increase Pain: Standing, Bending, Walking, Extension, Flexing and Lifting  Activities Which Decrease Pain: relaxation, pain medication and lying down  Pain Scale: 0/10 at best 2/10 now  10/10 at worst    Objective     Posture: Scoliosis,right lateral shift,lt shoulder elevated.  Palpation: Thoracic paraspinals tender,LT>RT.  Sensation: Intact.  DTRs:  Range of Motion/Strength: ROM of thoracic spine is WNL/WFL.  Strength is grossly 3+/5 in all planes.     Flexibility: Decreased due to pain.  Gait: Without AD  Analysis: SBA - guarded.  Bed Mobility:Independent  Transfers: Independent  Special Tests: Compression test neg (-).  Other: OSWESTRY 14/50, 28% IMPAIRED.   Treatment: EVAL    Assessment       Initial Assessment (Pertinent finding, problem list and factors affecting outcome): Pt presents ROM and strength deficits,decreased functional status due to pain and above listed deficits.  Rehab Potiential: good    Short Term Goals (3 Weeks): 1.Decrease pain x 1 grade. 2.Start HEP.  Long Term Goals (6 Weeks): 1.Pain 0-4/10.2.Independent HEP..3.Strength 5/5. 4.Posture WNL.5.OSWESTRY 10/50. 6.Restore previous LIZETH.    Plan     Certification Period: 5/27/19 to 7/27/19  Recommended Treatment Plan: 2 times per week for 6 weeks: Electrical Stimulation PRN., Manual Therapy, Moist Heat/ Ice, Patient Education, Therapeutic Activites, Therapeutic Exercise and Ultrasound  Other Recommendations: Dry needling PRN.IMS PRN.      Therapist: Nicholas Douglas, PT    I CERTIFY THE NEED FOR THESE SERVICES FURNISHED UNDER THIS PLAN OF TREATMENT AND WHILE UNDER MY CARE    Physician's comments: ________________________________________________________________________________________________________________________________________________      Physician's Name: ___________________________________

## 2019-06-05 ENCOUNTER — CLINICAL SUPPORT (OUTPATIENT)
Dept: REHABILITATION | Facility: HOSPITAL | Age: 69
End: 2019-06-05
Attending: NURSE PRACTITIONER
Payer: MEDICARE

## 2019-06-05 DIAGNOSIS — M54.6 CHRONIC LEFT-SIDED THORACIC BACK PAIN: Primary | ICD-10-CM

## 2019-06-05 DIAGNOSIS — G89.29 CHRONIC LEFT-SIDED THORACIC BACK PAIN: Primary | ICD-10-CM

## 2019-06-05 PROCEDURE — 97110 THERAPEUTIC EXERCISES: CPT | Mod: PN

## 2019-06-05 NOTE — PROGRESS NOTES
"Name: Betty Yang [facility] Number: [id number]  Date of Treatment: 06/05/2019   Diagnosis:   Encounter Diagnosis   Name Primary?    Chronic left-sided thoracic back pain Yes       Time in: 1301  Time Out: 1400  Total Treatment Time: 59      Subjective:    Betty reports "I don't feel any different that I did at my eval, I was not sore or anything afterwards."  Patient reports their pain to be 6/10 on a 0-10 scale with 0 being no pain and 10 being the worst pain imaginable.    Objective  Betty received therapeutic exercises to develop strength, endurance, ROM, flexibility and posture for 59 minutes including:   UBE 3'fwd/3'bwd to increase UE strengthening and endurance  OH pulleys x 4' in flexion for increase endurance with B UE's  Posterior shld rolls 2/10  Scap squeeze 3" hold 2/10  Shld shrugs 2/10  Scap squeeze 2/10 with LF RTB  Shld ext 2/10 with LF RTB  Horiz shld abd 2/10 LF RTB  Shld retraction  2/10 LF RTB    Written Home Exercises Provided: Dist thru Epic with LF RTB  Pt demo good understanding of the education provided. Betty demonstrated good return demonstration of activities.     Assessment:   Patient required TC/VC's for technique with therex, fatigued quickly.  Motivated to progress.  Cues to stand tall with upright posture.    Pt will continue to benefit from skilled PT intervention. Medical Necessity is demonstrated by:  Continued inability to participate in vocational pursuits, Pain limits function of effected part for some activities, Unable to participate fully in daily activities, Requires skilled supervision to complete and progress HEP and Weakness.    Patient is making good progress towards established goals.    Plan:  Continue with established Plan of Care towards PT goals.   "

## 2019-06-05 NOTE — PATIENT INSTRUCTIONS
Scapular Retraction (Standing)        With arms at sides, pinch shoulder blades together.  Repeat 10 times per set. Do 3 sets per session. Do 1 sessions per day.     [email or web address]     Copyright © [employer]. All rights reserved.   Strengthening: Resisted Extension        Hold tubing in right hand, arm forward. Pull arm back, elbow straight.  Repeat 10 times per set. Do 3 sets per session. Do 1 sessions per day.     https://[email or web address]     Copyright © [employer]. All rights reserved.   Strengthening: Chest Pull - Resisted        With resistive band looped around each hand, and arms straight out in front, stretch band across chest.  Repeat 10 times per set. Do 3 sets per session. Do 1 sessions per day.     [email or web address]     Copyright © [employer]. All rights reserved.     Scapular Retraction: Bilateral        Facing anchor, pull arms back, bringing shoulder blades together.  Repeat 10 times per set. Do 3 sets per session. Do 1 sessions per day.     https://[email or web address]     Copyright © [employer]. All rights reserved.   Extension: Resisted        Sitting backward in chair with resistive band held against chair back and looped around upper body, lean back against resistance of band.  Repeat 10 times per set. Do 3 sets per session. Do 1 sessions per day.     [email or web address]     Copyright © [employer]. All rights reserved.

## 2019-06-07 ENCOUNTER — CLINICAL SUPPORT (OUTPATIENT)
Dept: REHABILITATION | Facility: HOSPITAL | Age: 69
End: 2019-06-07
Attending: NURSE PRACTITIONER
Payer: MEDICARE

## 2019-06-07 DIAGNOSIS — M54.6 CHRONIC LEFT-SIDED THORACIC BACK PAIN: Primary | ICD-10-CM

## 2019-06-07 DIAGNOSIS — G89.29 CHRONIC LEFT-SIDED THORACIC BACK PAIN: Primary | ICD-10-CM

## 2019-06-07 PROCEDURE — 97110 THERAPEUTIC EXERCISES: CPT | Mod: PN

## 2019-06-07 NOTE — PROGRESS NOTES
"Name: Betty Yang [facility] Number: [id number]  Date of Treatment: 06/07/2019   Diagnosis:   Encounter Diagnosis   Name Primary?    Chronic left-sided thoracic back pain Yes       Time in: 1100  Time Out: 1200  Total Treatment Time: 60  Group Time: 0      Subjective:    Betty reports having low back pain today.  Patient reports their pain to be 5/10 on a 0-10 scale with 0 being no pain and 10 being the worst pain imaginable.    Objective    Betty received therapeutic exercises to develop strength, endurance, flexibility and core stabilization for 60 minutes including:     NuStep Lv3 10' with UE's  Hamstring stretch 3x30" sit R/L  Piriformis stretch 3x30" sit R/L  PPT x30  LTR x20 R/L  SLR 2x10 R/L  Pulleys 20# x20: rows, high rows, ext, adduction R/L    Written Home Exercises Provided: yes    Pt demo good understanding of the education provided. Betty demonstrated good return demonstration of activities with cues for direction and proper form.     Assessment:     Pt will continue to benefit from skilled PT intervention. Medical Necessity is demonstrated by:  Pain limits function of effected part for some activities, Unable to participate fully in daily activities, Requires skilled supervision to complete and progress HEP and Weakness.    Plan:    Continue with established Plan of Care towards PT goals.   "

## 2019-06-12 ENCOUNTER — CLINICAL SUPPORT (OUTPATIENT)
Dept: REHABILITATION | Facility: HOSPITAL | Age: 69
End: 2019-06-12
Attending: NURSE PRACTITIONER
Payer: MEDICARE

## 2019-06-12 DIAGNOSIS — M54.6 CHRONIC LEFT-SIDED THORACIC BACK PAIN: Primary | ICD-10-CM

## 2019-06-12 DIAGNOSIS — G89.29 CHRONIC LEFT-SIDED THORACIC BACK PAIN: Primary | ICD-10-CM

## 2019-06-12 PROCEDURE — 97110 THERAPEUTIC EXERCISES: CPT | Mod: PN

## 2019-06-12 NOTE — PROGRESS NOTES
"Name: Betty Yang [facility] Number: [id number]  Date of Treatment: 06/12/2019   Diagnosis:   Encounter Diagnosis   Name Primary?    Chronic left-sided thoracic back pain Yes       Time in: 1200  Time Out: 1300  Total Treatment Time: 60      Subjective:    Betty reports "I am getting better, my pain is going down, It is more in my middle back.  I have been doing my exercises, not all of them every day, but I do some one day and other the next."  Patient reports their pain to be 4/10 on a 0-10 scale with 0 being no pain and 10 being the worst pain imaginable.    Objective  Betty received therapeutic exercises to develop strength, endurance, ROM, flexibility, posture and core stabilization for 60 minutes including:    NuStep Lv3 10' with UE's   Hamstring stretch 3x30" sit R/L   Piriformis stretch 3x30" sit R/L   PPT x30   LTR x30 R/L   SLR 2x10 R/L   Pulleys 20# x20: rows, ext, adduction R/L    Written Home Exercises Provided: Cont with HEP  Pt demo good understanding of the education provided. Betty demonstrated good return demonstration of activities.     Assessment:   Pt with good technique with stretches and TE with min VC's.  Patient progressing well with therex, increasing reps without difficulty.    Pt will continue to benefit from skilled PT intervention. Medical Necessity is demonstrated by:  Pain limits function of effected part for some activities, Requires skilled supervision to complete and progress HEP and Weakness.    Patient is making good progress towards established goals.      Plan:  Continue with established Plan of Care towards PT goals, cont with core strengthening and stretches to increase flexibility.   "

## 2019-06-19 ENCOUNTER — CLINICAL SUPPORT (OUTPATIENT)
Dept: REHABILITATION | Facility: HOSPITAL | Age: 69
End: 2019-06-19
Attending: NURSE PRACTITIONER
Payer: MEDICARE

## 2019-06-19 DIAGNOSIS — G89.29 CHRONIC LEFT-SIDED THORACIC BACK PAIN: Primary | ICD-10-CM

## 2019-06-19 DIAGNOSIS — M54.6 CHRONIC LEFT-SIDED THORACIC BACK PAIN: Primary | ICD-10-CM

## 2019-06-19 PROCEDURE — 97110 THERAPEUTIC EXERCISES: CPT | Mod: PN

## 2019-06-19 NOTE — PROGRESS NOTES
Name: Betty Yang [facility] Number: [id number]  Date of Treatment: 06/19/2019   Diagnosis:   Encounter Diagnosis   Name Primary?    Chronic left-sided thoracic back pain Yes       Time in: 1200  Time Out: 1254  Total Treatment Time: 54        Subjective:    Betty reports she is having pain in her mid thaddeus lower back.  Patient reports their pain to be 4-5/10 on a 0-10 scale with 0 being no pain and 10 being the worst pain imaginable.    Objective  Betty received therapeutic exercises to develop strength, endurance, ROM, flexibility and posture for 54 minutes including:       nustep x 10 min L-4  HS 3 x 30 sec  Piriformis stretch 3 x 30 sec  Seated trunk flexion with large SB x 10 reps with 5 sec hold  PPT 3 x 10 reps  Bridging 3 x 10 reps  Ball squeezes 3 x 10 reps  LTR x 30 reps with SB  Pulleys 20# shld ext, rows, add x 30 reps each      Pt demo good understanding of the education provided. Betty demonstrated good return demonstration of activities.     Assessment:     No increased pain.  Minimal SOB noted/reported by patient.  Pt having to use asthma inhaler x 1 during treatment.  Pt will continue to benefit from skilled PT intervention. Medical Necessity is demonstrated by:  Requires skilled supervision to complete and progress HEP and Weakness.    Patient is making good progress towards established goals.    Plan:  Continue with established Plan of Care towards PT goals.

## 2019-07-02 ENCOUNTER — OUTPATIENT CASE MANAGEMENT (OUTPATIENT)
Dept: ADMINISTRATIVE | Facility: OTHER | Age: 69
End: 2019-07-02

## 2019-07-02 DIAGNOSIS — M54.6 CHRONIC LEFT-SIDED THORACIC BACK PAIN: ICD-10-CM

## 2019-07-02 DIAGNOSIS — I10 ESSENTIAL HYPERTENSION: Primary | ICD-10-CM

## 2019-07-02 DIAGNOSIS — G89.29 CHRONIC LEFT-SIDED THORACIC BACK PAIN: ICD-10-CM

## 2019-07-02 NOTE — PROGRESS NOTES
Please note, patient's information has been sent to Outpatient Care Management for high risk screening.    Reason for Referral:   Chronic pain  hypertension    Please contact [facility] with any questions (ext. [phone]).    Thank you,    Feli Damon RN,CCM

## 2019-07-15 ENCOUNTER — OUTPATIENT CASE MANAGEMENT (OUTPATIENT)
Dept: ADMINISTRATIVE | Facility: OTHER | Age: 69
End: 2019-07-15

## 2019-07-25 ENCOUNTER — TELEPHONE (OUTPATIENT)
Dept: FAMILY MEDICINE | Facility: CLINIC | Age: 69
End: 2019-07-25

## 2019-07-25 DIAGNOSIS — Z12.31 SCREENING MAMMOGRAM, ENCOUNTER FOR: Primary | ICD-10-CM

## 2019-07-25 NOTE — TELEPHONE ENCOUNTER
----- Message from Gregorio Mehta sent at 7/25/2019 12:27 PM CDT -----  Contact: Patient  Type:  Mammogram    Caller is requesting to schedule their annual mammogram appointment.  Order is not listed in EPIC.  Please enter order and contact patient to schedule.    Name of Caller:  Patient  Where would they like the mammogram performed?  [facility] Call Back Number:  [phone]  Additional Information: NA

## 2019-07-29 ENCOUNTER — HOSPITAL ENCOUNTER (OUTPATIENT)
Dept: RADIOLOGY | Facility: CLINIC | Age: 69
Discharge: HOME | End: 2019-07-29
Attending: FAMILY MEDICINE
Payer: MEDICARE

## 2019-07-29 DIAGNOSIS — Z12.31 SCREENING MAMMOGRAM, ENCOUNTER FOR: ICD-10-CM

## 2019-07-29 PROCEDURE — 77067 SCR MAMMO BI INCL CAD: CPT | Mod: 26,,, | Performed by: RADIOLOGY

## 2019-07-29 PROCEDURE — 77067 MAMMO DIGITAL SCREENING BILAT WITH TOMOSYNTHESIS_CAD: ICD-10-PCS | Mod: 26,,, | Performed by: RADIOLOGY

## 2019-07-29 PROCEDURE — 77067 SCR MAMMO BI INCL CAD: CPT | Mod: TC,PO

## 2019-07-29 PROCEDURE — 77063 BREAST TOMOSYNTHESIS BI: CPT | Mod: 26,,, | Performed by: RADIOLOGY

## 2019-07-29 PROCEDURE — 77063 MAMMO DIGITAL SCREENING BILAT WITH TOMOSYNTHESIS_CAD: ICD-10-PCS | Mod: 26,,, | Performed by: RADIOLOGY

## 2019-09-11 ENCOUNTER — OFFICE VISIT (OUTPATIENT)
Dept: FAMILY MEDICINE | Facility: CLINIC | Age: 69
End: 2019-09-11
Payer: MEDICARE

## 2019-09-11 VITALS
DIASTOLIC BLOOD PRESSURE: 60 MMHG | OXYGEN SATURATION: 95 % | BODY MASS INDEX: 36.25 KG/M2 | TEMPERATURE: 98 F | HEIGHT: 63 IN | HEART RATE: 63 BPM | SYSTOLIC BLOOD PRESSURE: 128 MMHG | WEIGHT: 204.56 LBS | RESPIRATION RATE: 16 BRPM

## 2019-09-11 DIAGNOSIS — M79.672 FOOT PAIN, LEFT: Primary | ICD-10-CM

## 2019-09-11 DIAGNOSIS — L03.116 CELLULITIS OF LEFT FOOT: ICD-10-CM

## 2019-09-11 DIAGNOSIS — E78.2 MIXED HYPERLIPIDEMIA: ICD-10-CM

## 2019-09-11 DIAGNOSIS — I10 ESSENTIAL HYPERTENSION: ICD-10-CM

## 2019-09-11 DIAGNOSIS — E66.9 OBESITY (BMI 30-39.9): ICD-10-CM

## 2019-09-11 DIAGNOSIS — G47.00 INSOMNIA, UNSPECIFIED TYPE: ICD-10-CM

## 2019-09-11 PROCEDURE — 99215 OFFICE O/P EST HI 40 MIN: CPT | Mod: PBBFAC,PO | Performed by: NURSE PRACTITIONER

## 2019-09-11 PROCEDURE — 99214 OFFICE O/P EST MOD 30 MIN: CPT | Mod: S$PBB,,, | Performed by: NURSE PRACTITIONER

## 2019-09-11 PROCEDURE — 99214 PR OFFICE/OUTPT VISIT, EST, LEVL IV, 30-39 MIN: ICD-10-PCS | Mod: S$PBB,,, | Performed by: NURSE PRACTITIONER

## 2019-09-11 PROCEDURE — 99999 PR PBB SHADOW E&M-EST. PATIENT-LVL V: ICD-10-PCS | Mod: PBBFAC,,, | Performed by: NURSE PRACTITIONER

## 2019-09-11 PROCEDURE — 99999 PR PBB SHADOW E&M-EST. PATIENT-LVL V: CPT | Mod: PBBFAC,,, | Performed by: NURSE PRACTITIONER

## 2019-09-11 RX ORDER — DOXYCYCLINE 100 MG/1
100 CAPSULE ORAL 2 TIMES DAILY
Qty: 20 CAPSULE | Refills: 0 | Status: SHIPPED | OUTPATIENT
Start: 2019-09-11 | End: 2019-09-21

## 2019-09-11 NOTE — PROGRESS NOTES
Subjective:       Patient ID: Betty Bacon is a 69 y.o. female.    Chief Complaint: foot pain (left foot )    Pain   This is a recurrent (left lateral foot) problem. The current episode started more than 1 month ago. The problem occurs intermittently. The problem has been waxing and waning. Associated symptoms include numbness. Pertinent negatives include no abdominal pain, chest pain, fever, headaches, rash, sore throat or weakness. The symptoms are aggravated by walking. She has tried acetaminophen for the symptoms. The treatment provided mild relief.            Past Medical History:   Diagnosis Date    Acute coronary artery obstruction without MI 10/2012    Benign hypertension     COPD (chronic obstructive pulmonary disease)     Coronary artery disease     Disorder of kidney and ureter     Dr. Morrow    History of electroconvulsive therapy     Hyperlipidemia LDL goal < 70     Left ankle sprain     Major depressive disorder, recurrent episode, severe     s/p ECT    PVD (peripheral vascular disease)        Review of patient's allergies indicates:   Allergen Reactions    Propoxyphene n-acetaminophen Other (See Comments)     Other reaction(s): Unknown    Codeine Anxiety         Current Outpatient Medications:     albuterol sulfate (PROAIR RESPICLICK) 90 mcg/actuation AePB, Inhale 2 puffs into the lungs every 4 to 6 hours as needed., Disp: 1 each, Rfl: 3    alendronate (FOSAMAX) 70 MG tablet, Take 1 tablet (70 mg total) by mouth every 7 days., Disp: 4 tablet, Rfl: 11    aspirin 81 mg Tab, Take 81 mg by mouth once daily. Every day, Disp: , Rfl:     atorvastatin (LIPITOR) 40 MG tablet, Take 1 tablet (40 mg total) by mouth once daily., Disp: 90 tablet, Rfl: 3    BREO ELLIPTA 200-25 mcg/dose DsDv diskus inhaler, INHALE 1 PUFF DAILY, Disp: , Rfl: 11    clopidogrel (PLAVIX) 75 mg tablet, Take 1 tablet (75 mg total) by mouth once daily., Disp: 90 tablet, Rfl: 3    colestipol (COLESTID) 1 gram  Tab, Take 1 tablet (1 g total) by mouth 2 (two) times daily., Disp: 180 tablet, Rfl: 3    diclofenac sodium (VOLTAREN) 1 % Gel, Apply 2 g topically 3 (three) times daily as needed., Disp: 100 g, Rfl: 5    doxycycline (VIBRA-TABS) 100 MG tablet, Take 1 tablet (100 mg total) by mouth 2 (two) times daily., Disp: 20 tablet, Rfl: 0    esomeprazole (NEXIUM) 40 MG capsule, Take 1 capsule (40 mg total) by mouth before breakfast., Disp: 90 capsule, Rfl: 3    isosorbide mononitrate (IMDUR) 60 MG 24 hr tablet, Take 1 tablet (60 mg total) by mouth once daily., Disp: 90 tablet, Rfl: 3    methylPREDNISolone (MEDROL DOSEPACK) 4 mg tablet, use as directed, Disp: 1 Package, Rfl: 0    metoprolol succinate (TOPROL-XL) 50 MG 24 hr tablet, Take 1 tablet (50 mg total) by mouth once daily., Disp: 90 tablet, Rfl: 3    paroxetine (PAXIL) 10 MG tablet, Take 1 tablet (10 mg total) by mouth every morning. In addition to 40 mg tablet for a total of 50 mg daily., Disp: 90 tablet, Rfl: 3    paroxetine (PAXIL) 40 MG tablet, Take 1 tablet (40 mg total) by mouth every morning., Disp: 30 tablet, Rfl: 2    traZODone (DESYREL) 150 MG tablet, Take 1 tablet (150 mg total) by mouth every evening., Disp: 90 tablet, Rfl: 3    triamterene-hydrochlorothiazide 37.5-25 mg (DYAZIDE) 37.5-25 mg per capsule, TAKE 1 CAPSULE BY MOUTH ONCE DAILY., Disp: 90 capsule, Rfl: 2    ALPRAZolam (XANAX) 1 MG tablet, Take 1 tablet (1 mg total) by mouth 2 (two) times daily as needed for Anxiety., Disp: 60 tablet, Rfl: 3    doxycycline (MONODOX) 100 MG capsule, Take 1 capsule (100 mg total) by mouth 2 (two) times daily. for 10 days, Disp: 20 capsule, Rfl: 0    Review of Systems   Constitutional: Negative for fever and unexpected weight change.   HENT: Negative for sore throat and trouble swallowing.    Eyes: Negative for visual disturbance.   Respiratory: Negative for shortness of breath.    Cardiovascular: Negative for chest pain, palpitations and leg swelling.  "  Gastrointestinal: Negative for abdominal pain and blood in stool.   Genitourinary: Negative for hematuria.   Skin: Negative for rash.   Allergic/Immunologic: Negative for immunocompromised state.   Neurological: Positive for numbness. Negative for weakness and headaches.   Hematological: Does not bruise/bleed easily.   Psychiatric/Behavioral: Negative for agitation. The patient is not nervous/anxious.        Objective:      /60 (BP Location: Right arm, Patient Position: Sitting, BP Method: Large (Manual))   Pulse 63   Temp 97.7 °F (36.5 °C) (Oral)   Resp 16   Ht 5' 3" (1.6 m)   Wt 92.8 kg (204 lb 9.4 oz)   SpO2 95%   BMI 36.24 kg/m²   Physical Exam   Constitutional: She is oriented to person, place, and time. She appears well-developed and well-nourished.   Eyes: Pupils are equal, round, and reactive to light. EOM are normal.   Neck: Normal range of motion.   Cardiovascular: Normal rate, regular rhythm and normal heart sounds.   Pulmonary/Chest: Effort normal and breath sounds normal.   Abdominal: Soft. Bowel sounds are normal.   Musculoskeletal: Normal range of motion.   Neurological: She is alert and oriented to person, place, and time.   Skin: Skin is warm and dry. Rash noted.   Psychiatric: She has a normal mood and affect. Her behavior is normal. Judgment and thought content normal.       Assessment:       1. Foot pain, left    2. Cellulitis of left foot    3. Essential hypertension    4. Mixed hyperlipidemia    5. Insomnia, unspecified type    6. Obesity (BMI 30-39.9)        Plan:       Foot pain, left  -     X-Ray Foot Complete Left; Future; Expected date: 09/11/2019  -     Ambulatory referral to Podiatry  -     doxycycline (MONODOX) 100 MG capsule; Take 1 capsule (100 mg total) by mouth 2 (two) times daily. for 10 days  Dispense: 20 capsule; Refill: 0    Cellulitis of left foot  -     doxycycline (MONODOX) 100 MG capsule; Take 1 capsule (100 mg total) by mouth 2 (two) times daily. for 10 days  " "Dispense: 20 capsule; Refill: 0    Essential hypertension  Good reading today  Low sodium diet  continue medication  BP Readings from Last 3 Encounters:   09/11/19 128/60   05/20/19 (!) 145/54   04/24/19 (!) 140/57     Mixed hyperlipidemia  On lipitor  The 10-year ASCVD risk score (Naye HENDERSON Jr., et al., 2013) is: 13.3%    Values used to calculate the score:      Age: 69 years      Sex: Female      Is Non- : No      Diabetic: No      Tobacco smoker: No      Systolic Blood Pressure: 128 mmHg      Is BP treated: Yes      HDL Cholesterol: 38 mg/dL      Total Cholesterol: 131 mg/dL  Insomnia, unspecified type  Stable, continue medication  Obesity (BMI 30-39.9)  Counseled patient on his ideal body weight, health consequences of being obese and current recommendations including weekly exercise and a heart healthy diet.  Current BMI is:Estimated body mass index is 36.24 kg/m² as calculated from the following:    Height as of this encounter: 5' 3" (1.6 m).    Weight as of this encounter: 92.8 kg (204 lb 9.4 oz)..  Patient is aware that ideal BMI < 25 or Weight in (lb) to have BMI = 25: 140.8.            Patient readiness: acceptance and barriers:none    During the course of the visit the patient was educated and counseled about the following:     Hypertension:   Dietary sodium restriction.  Regular aerobic exercise.  Obesity:   General weight loss/lifestyle modification strategies discussed (elicit support from others; identify saboteurs; non-food rewards, etc).  Regular aerobic exercise program discussed.    Goals: Hypertension: Reduce Blood Pressure and Obesity: Reduce calorie intake and BMI    Did patient meet goals/outcomes: No    The following self management tools provided: declined    Patient Instructions (the written plan) was given to the patient/family.     Time spent with patient: 30 minutes    Barriers to medications present (no )    Adverse reactions to current medications (no)    Over the " counter medications reviewed (Yes)

## 2019-09-12 ENCOUNTER — HOSPITAL ENCOUNTER (OUTPATIENT)
Dept: RADIOLOGY | Facility: CLINIC | Age: 69
Discharge: HOME | End: 2019-09-12
Attending: NURSE PRACTITIONER
Payer: MEDICARE

## 2019-09-12 DIAGNOSIS — M79.672 FOOT PAIN, LEFT: ICD-10-CM

## 2019-09-12 PROCEDURE — 73630 X-RAY EXAM OF FOOT: CPT | Mod: 26,LT,S$GLB, | Performed by: RADIOLOGY

## 2019-09-12 PROCEDURE — 73630 X-RAY EXAM OF FOOT: CPT | Mod: TC,FY,PO,LT

## 2019-09-12 PROCEDURE — 73630 XR FOOT COMPLETE 3 VIEW LEFT: ICD-10-PCS | Mod: 26,LT,S$GLB, | Performed by: RADIOLOGY

## 2019-09-19 ENCOUNTER — OFFICE VISIT (OUTPATIENT)
Dept: PODIATRY | Facility: CLINIC | Age: 69
End: 2019-09-19
Payer: MEDICARE

## 2019-09-19 ENCOUNTER — LAB VISIT (OUTPATIENT)
Dept: LAB | Facility: HOSPITAL | Age: 69
End: 2019-09-19
Attending: PODIATRIST
Payer: MEDICARE

## 2019-09-19 VITALS — WEIGHT: 202.38 LBS | BODY MASS INDEX: 35.86 KG/M2 | HEIGHT: 63 IN

## 2019-09-19 DIAGNOSIS — M10.9 ACUTE GOUT OF LEFT FOOT, UNSPECIFIED CAUSE: ICD-10-CM

## 2019-09-19 DIAGNOSIS — M10.9 ACUTE GOUT OF LEFT FOOT, UNSPECIFIED CAUSE: Primary | ICD-10-CM

## 2019-09-19 PROCEDURE — 99203 PR OFFICE/OUTPT VISIT, NEW, LEVL III, 30-44 MIN: ICD-10-PCS | Mod: S$PBB,,, | Performed by: PODIATRIST

## 2019-09-19 PROCEDURE — 99999 PR PBB SHADOW E&M-EST. PATIENT-LVL III: ICD-10-PCS | Mod: PBBFAC,,, | Performed by: PODIATRIST

## 2019-09-19 PROCEDURE — 36415 COLL VENOUS BLD VENIPUNCTURE: CPT | Mod: PO

## 2019-09-19 PROCEDURE — 99203 OFFICE O/P NEW LOW 30 MIN: CPT | Mod: S$PBB,,, | Performed by: PODIATRIST

## 2019-09-19 PROCEDURE — 84550 ASSAY OF BLOOD/URIC ACID: CPT

## 2019-09-19 PROCEDURE — 99999 PR PBB SHADOW E&M-EST. PATIENT-LVL III: CPT | Mod: PBBFAC,,, | Performed by: PODIATRIST

## 2019-09-19 PROCEDURE — 99213 OFFICE O/P EST LOW 20 MIN: CPT | Mod: PBBFAC,PO | Performed by: PODIATRIST

## 2019-09-19 NOTE — PATIENT INSTRUCTIONS
Warm packs affected part and limb foot/eriberto - protect skin against thermal damage with towel/other insulator.    Adequately hydrate up to about 4 liters water unless on fluid restriction.    Avoid purine containing foods - list provided.        Gout Diet  Gout is a painful condition caused by an excess of uric acid, a waste product made by the body. Uric acid forms crystals that collect in the joints. The immune response to these crystals brings on symptoms of joint pain and swelling. This is called a gout attack. Often, medications and diet changes are combined to manage gout. Below are some guidelines for changing your diet to help you manage gout and prevent attacks. Your health care provider will help you determine the best eating plan for you.     Eating to manage gout  Weight loss for those who are overweight may help reduce gout attacks.  Eat less of these foods  Eating too many foods containing purines may raise the levels of uric acid in your body. This raises your risk for a gout attack. Try to limit these foods and drinks:  · Alcohol, such as beer and red wine. You may be told to avoid alcohol completely.  · Soft drinks that contain sugar or high fructose corn syrup  · Certain fish, including anchovies, sardines, fish eggs, and herring  · Shellfish  · Certain meats, such as red meat, hot dogs, luncheon meats, and turkey  · Organ meats, such as liver, kidneys, and sweetbreads  · Legumes, such as dried beans and peas  · Other high fat foods such as gravy, whole milk, and high fat cheeses  · Vegetables such as asparagus, cauliflower, spinach, and mushrooms used to be thought to contribute to an increased risk for a gout attack, but recent studies show that high purine vegetables don't increase the risk for a gout attack.  Eat more of these foods  Other foods may be helpful for people with gout. Add some of these foods to your diet:  · Cherries contain chemicals that may lower uric acid.  · Omega fatty acids.  These are found in some fatty fish such as salmon, certain oils (flax, olive, or nut), and nuts themselves. Omega fatty acids may help prevent inflammation due to gout.  · Dairy products that are low-fat or fat-free, such as cheese and yogurt  · Complex carbohydrate foods, including whole grains, brown rice, oats, and beans  · Coffee, in moderation  · Water, approximately 64 ounces per day  Follow-up care  Follow up with your healthcare provider as advised.  When to seek medical advice  Call your healthcare provider right away if any of these occur:  · Return of gout symptoms, usually at night:  · Severe pain, swelling, and heat in a joint, especially the base of the big toe  · Affected joint is hard to move  · Skin of the affected joint is purple or red  · Fever of 100.4°F (38°C) or higher  · Pain that doesn't get better even with prescribed medicine   Date Last Reviewed: 1/12/2016  © 0532-1827 [employer]. [address] [place] [address], [place] [address]. All rights reserved. This information is not intended as a substitute for professional medical care. Always follow your healthcare professional's instructions.

## 2019-09-19 NOTE — LETTER
September 23, 2019      Juancarlos Varghese, RAMON  [address] [address]           [facility]  [address] [address]  Phone: [phone]          Patient: Betty Bacon   MR Number: [id number]   YOB: 1950   Date of Visit: 9/19/2019       Dear Juancarlos Varghese:    Thank you for referring Betty Bacon to me for evaluation. Attached you will find relevant portions of my assessment and plan of care.    If you have questions, please do not hesitate to call me. I look forward to following Betty Bacon along with you.    Sincerely,    Mindi Morales  CC:  No Recipients    If you would like to receive this communication electronically, please contact [email or web address] or [phone] to request more information on [employer] Link access.    For providers and/or their staff who would like to refer a patient to [facility], please contact us through our one-stop-shop provider referral line, Dahiana Ramírez, at [phone].    If you feel you have received this communication in error or would no longer like to receive these types of communications, please e-mail [email or web address]

## 2019-09-20 LAB — URATE SERPL-MCNC: 9.8 MG/DL (ref 2.4–5.7)

## 2019-09-24 ENCOUNTER — OFFICE VISIT (OUTPATIENT)
Dept: FAMILY MEDICINE | Facility: CLINIC | Age: 69
End: 2019-09-24
Payer: MEDICARE

## 2019-09-24 VITALS
OXYGEN SATURATION: 95 % | TEMPERATURE: 98 F | WEIGHT: 199.94 LBS | HEART RATE: 66 BPM | RESPIRATION RATE: 14 BRPM | BODY MASS INDEX: 35.43 KG/M2 | HEIGHT: 63 IN | DIASTOLIC BLOOD PRESSURE: 60 MMHG | SYSTOLIC BLOOD PRESSURE: 120 MMHG

## 2019-09-24 DIAGNOSIS — G89.29 CHRONIC LEFT-SIDED THORACIC BACK PAIN: ICD-10-CM

## 2019-09-24 DIAGNOSIS — Z87.891 PERSONAL HISTORY OF NICOTINE DEPENDENCE: ICD-10-CM

## 2019-09-24 DIAGNOSIS — R73.01 IMPAIRED FASTING BLOOD SUGAR: ICD-10-CM

## 2019-09-24 DIAGNOSIS — E78.2 MIXED HYPERLIPIDEMIA: ICD-10-CM

## 2019-09-24 DIAGNOSIS — I10 ESSENTIAL HYPERTENSION: ICD-10-CM

## 2019-09-24 DIAGNOSIS — E55.9 VITAMIN D DEFICIENCY: ICD-10-CM

## 2019-09-24 DIAGNOSIS — I25.10 CORONARY ARTERY DISEASE INVOLVING NATIVE CORONARY ARTERY OF NATIVE HEART WITHOUT ANGINA PECTORIS: ICD-10-CM

## 2019-09-24 DIAGNOSIS — N18.30 CKD (CHRONIC KIDNEY DISEASE) STAGE 3, GFR 30-59 ML/MIN: Primary | ICD-10-CM

## 2019-09-24 DIAGNOSIS — E66.01 SEVERE OBESITY WITH BODY MASS INDEX (BMI) OF 35.0 TO 39.9 WITH SERIOUS COMORBIDITY: ICD-10-CM

## 2019-09-24 DIAGNOSIS — M10.9 ACUTE GOUT OF LEFT FOOT, UNSPECIFIED CAUSE: ICD-10-CM

## 2019-09-24 DIAGNOSIS — M54.6 CHRONIC LEFT-SIDED THORACIC BACK PAIN: ICD-10-CM

## 2019-09-24 DIAGNOSIS — N25.81 SECONDARY HYPERPARATHYROIDISM OF RENAL ORIGIN: ICD-10-CM

## 2019-09-24 DIAGNOSIS — Z12.9 SCREENING FOR CANCER: ICD-10-CM

## 2019-09-24 DIAGNOSIS — I35.0 AORTIC VALVE STENOSIS, ETIOLOGY OF CARDIAC VALVE DISEASE UNSPECIFIED: ICD-10-CM

## 2019-09-24 PROCEDURE — 99214 PR OFFICE/OUTPT VISIT, EST, LEVL IV, 30-39 MIN: ICD-10-PCS | Mod: S$PBB,,, | Performed by: FAMILY MEDICINE

## 2019-09-24 PROCEDURE — 99999 PR PBB SHADOW E&M-EST. PATIENT-LVL IV: CPT | Mod: PBBFAC,,, | Performed by: FAMILY MEDICINE

## 2019-09-24 PROCEDURE — 99214 OFFICE O/P EST MOD 30 MIN: CPT | Mod: S$PBB,,, | Performed by: FAMILY MEDICINE

## 2019-09-24 PROCEDURE — 99999 PR PBB SHADOW E&M-EST. PATIENT-LVL IV: ICD-10-PCS | Mod: PBBFAC,,, | Performed by: FAMILY MEDICINE

## 2019-09-24 PROCEDURE — 99214 OFFICE O/P EST MOD 30 MIN: CPT | Mod: PBBFAC,PO | Performed by: FAMILY MEDICINE

## 2019-09-24 RX ORDER — METOPROLOL SUCCINATE 100 MG/1
100 TABLET, EXTENDED RELEASE ORAL DAILY
Qty: 90 TABLET | Refills: 3 | Status: SHIPPED | OUTPATIENT
Start: 2019-09-24 | End: 2020-03-04 | Stop reason: DRUGHIGH

## 2019-09-24 RX ORDER — DICLOFENAC SODIUM 10 MG/G
2 GEL TOPICAL 3 TIMES DAILY PRN
Qty: 100 G | Refills: 5 | Status: SHIPPED | OUTPATIENT
Start: 2019-09-24 | End: 2022-12-21

## 2019-09-24 NOTE — PATIENT INSTRUCTIONS
Stop dyazide (triamterene/hctz).   Increase metoprolol XL to 100mg a day  Follow low purine (gout) diet      Walking for Fitness  Fitness walking has something for everyone, even people who are already fit. Walking is one of the safest ways to condition your body aerobically. It can boost energy, help you lose weight, and reduce stress.    Physical benefits  · Walking strengthens your heart and lungs, and tones your muscles.  · When walking, your feet land with less impact than in other sports. This reduces chances of muscle, bone, and joint injury.  · Regular walking improves your cholesterol levels and lowers your risk of heart disease. And it helps you control your blood sugar if you have diabetes.  · Walking is a weight-bearing activity, which helps maintain bone density. This can help prevent osteoporosis.  Personal rewards  · Taking walks can help you relax and manage stress. And fitness walking may make you feel better about yourself.  · Walking can help you sleep better at night and make you less likely to be depressed.  · Regular walking may help maintain your memory as you get older.  · Walking is a great way to spend extra time with friends and family members. Be sure to invite your dog along!  Q&A about fitness walking  Q: Will walking keep me fit?  A: Yes. Regular walking at the right pace gives you all the benefits of other aerobic activities, such as jogging and swimming.  Q: Will walking help me lose weight and keep it off?  A: Yes. Per mile, walking can burn as many calories as jogging. Your health care provider can help work walking into your weight-loss plan.  Q: Is walking safe for my health?  A: Yes. Walking is safe if you have high blood pressure, diabetes, heart disease, or other conditions. Talk to your healthcare provider before you start.  Date Last Reviewed: 4/1/2017  © 1817-2964 [employer]. [address] [place] [address], [place] [address]. All rights reserved. This information  is not intended as a substitute for professional medical care. Always follow your healthcare professional's instructions.            Established High Blood Pressure    High blood pressure (hypertension) is a chronic disease. Often, healthcare providers dont know what causes it. But it can be caused by certain health conditions and medicines.  If you have high blood pressure, you may not have any symptoms. If you do have symptoms, they may include headache, dizziness, changes in your vision, chest pain, and shortness of breath. But even without symptoms, high blood pressure thats not treated raises your risk for heart attack and stroke. High blood pressure is a serious health risk and shouldnt be ignored.  A blood pressure reading is made up of two numbers: a higher number over a lower number. The top number is the systolic pressure. The bottom number is the diastolic pressure. A normal blood pressure is a systolic pressure of  less than 120 over a diastolic pressure of less than 80. You will see your blood pressure readings written together. For example, a person with a systolic pressure of 188 and a diastolic pressure of 78 will have 118/78 written in the medical record.  High blood pressure is when either the top number is 140 or higher, or the bottom number is 90 or higher. This must be the result when taking your blood pressure a number of times. The blood pressures between normal and high are called prehypertension.  Home care  If you have high blood pressure, you should do what is listed below to lower your blood pressure. If you are taking medicines for high blood pressure, these methods may reduce or end your need for medicines in the future.  · Begin a weight-loss program if you are overweight.  · Cut back on how much salt you get in your diet. Heres how to do this:  ¨ Dont eat foods that have a lot of salt. These include olives, pickles, smoked meats, and salted potato chips.  ¨ Dont add salt to your  food at the table.  ¨ Use only small amounts of salt when cooking.  · Start an exercise program. Talk with your healthcare provider about the type of exercise program that would be best for you. It doesn't have to be hard. Even brisk walking for 20 minutes 3 times a week is a good form of exercise.  · Dont take medicines that stimulate the heart. This includes many over-the-counter cold and sinus decongestant pills and sprays, as well as diet pills. Check the warnings about hypertension on the label. Before buying any over-the-counter medicines or supplements, always ask the pharmacist about the product's potential interaction with your high blood pressure and your high blood pressure medicines.  · Stimulants such as amphetamine or cocaine could be deadly for someone with high blood pressure. Never take these.  · Limit how much caffeine you get in your diet. Switch to caffeine-free products.  · Stop smoking. If you are a long-time smoker, this can be hard. Talk to your healthcare provider about medicines and nicotine replacement options to help you. Also, enroll in a stop-smoking program to make it more likely that you will quit for good.  · Learn how to handle stress. This is an important part of any program to lower blood pressure. Learn about relaxation methods like meditation, yoga, or biofeedback.  · If your provider prescribed medicines, take them exactly as directed. Missing doses may cause your blood pressure get out of control.  · If you miss a dose or doses, check with your healthcare provider or pharmacist about what to do.  · Consider buying an automatic blood pressure machine. Ask your provider for a recommendation. You can get one of these at most pharmacies.     The American Heart Association recommends the following guidelines for home blood pressure monitoring:  · Don't smoke or drink coffee for 30 minutes before taking your blood pressure.  · Go to the bathroom before the test.  · Relax for 5  minutes before taking the measurement.  · Sit with your back supported (don't sit on a couch or soft chair); keep your feet on the floor uncrossed. Place your arm on a solid flat surface (like a table) with the upper part of the arm at heart level. Place the middle of the cuff directly above the eye of the elbow. Check the monitor's instruction manual for an illustration.  · Take multiple readings. When you measure, take 2 to 3 readings one minute apart and record all of the results.  · Take your blood pressure at the same time every day, or as your healthcare provider recommends.  · Record the date, time, and blood pressure reading.  · Take the record with you to your next medical appointment. If your blood pressure monitor has a built-in memory, simply take the monitor with you to your next appointment.  · Call your provider if you have several high readings. Don't be frightened by a single high blood pressure reading, but if you get several high readings, check in with your healthcare provider.  · Note: When blood pressure reaches a systolic (top number) of 180 or higher OR diastolic (bottom number) of 110 or higher, seek emergency medical treatment.  Follow-up care  You will need to see your healthcare provider regularly. This is to check your blood pressure and to make changes to your medicines. Make a follow-up appointment as directed. Bring the record of your home blood pressure readings to the appointment.  When to seek medical advice  Call your healthcare provider right away if any of these occur:  · Blood pressure reaches a systolic (upper number) of 180 or higher OR a diastolic (bottom number) of 110 or higher  · Chest pain or shortness of breath  · Severe headache  · Throbbing or rushing sound in the ears  · Nosebleed  · Sudden severe pain in your belly (abdomen)  · Extreme drowsiness, confusion, or fainting  · Dizziness or spinning sensation (vertigo)  · Weakness of an arm or leg or one side of the  face  · You have problems speaking or seeing   Date Last Reviewed: 12/1/2016  © 0903-7367 [employer]. [address] [place] [address], [place] [address]. All rights reserved. This information is not intended as a substitute for professional medical care. Always follow your healthcare professional's instructions.            Weight Management: Getting Started  Healthy bodies come in all shapes and sizes. Not all bodies are made to be thin. For some people, a healthy weight is higher than the average weight listed on weight charts. Your healthcare provider can help you decide on a healthy weight for you.    Reasons to lose weight  Losing weight can help with some health problems, such as high blood pressure, heart disease, diabetes, sleep apnea, and arthritis. You may also feel more energy.  Set your long-term goal  Your goal doesn't even have to be a specific weight. You may decide on a fitness goal (such as being able to walk 10 miles a week), or a health goal (such as lowering your blood pressure). Choose a goal that is measurable and reasonable, so you know when you've reached it. A goal of reaching a BMI of less than 25 is not always reasonable (or possible).   Make an action plan  Habits dont change overnight. Setting your goals too high can leave you feeling discouraged if you cant reach them. Be realistic. Choose one or two small changes you can make now. Set an action plan for how you are going to make these changes. When you can stick to this plan, keep making a few more small changes. Taking small steps will help you stay on the path to success.  Track your progress  Write down your goals. Then, keep a daily record of your progress. Write down what you eat and how active you are. This record lets you look back on how much youve done. It may also help when youre feeling frustrated. Reward yourself for success. Even if you dont reach every goal, give yourself credit for what you do get done.  Get  support  Encouragement from others can help make losing weight easier. Ask your family members and friends for support. They may even want to join you. Also look to your healthcare provider, registered dietitian, and  for help. Your local hospital can give you more information about nutrition, exercise, and weight loss.  Date Last Reviewed: 1/31/2016  © 6502-5993 [employer]. [address] [place] [address], [place] [address]. All rights reserved. This information is not intended as a substitute for professional medical care. Always follow your healthcare professional's instructions.

## 2019-09-24 NOTE — PROGRESS NOTES
Subjective:       Patient ID: Betty Bacon is a 69 y.o. female.    Chief Complaint: Establish Care    HPI  Review of Systems   Constitutional: Negative for fatigue and unexpected weight change.   Respiratory: Negative for chest tightness and shortness of breath.    Cardiovascular: Negative for chest pain, palpitations and leg swelling.   Gastrointestinal: Negative for abdominal pain.   Musculoskeletal: Positive for arthralgias.   Neurological: Negative for dizziness, syncope, light-headedness and headaches.       Patient Active Problem List   Diagnosis    CKD (chronic kidney disease) stage 3, GFR 30-59 ml/min    COPD (chronic obstructive pulmonary disease)    LVH (left ventricular hypertrophy) due to hypertensive disease    Depression    Unstable angina    PAD (peripheral artery disease)    Generalized anxiety disorder    Aortic stenosis    CAD (coronary artery disease)    Abnormal stress test    GERD (gastroesophageal reflux disease)    Interstitial lung disease    Osteopenia    History of electroconvulsive therapy    Hyperlipidemia    HTN (hypertension)    Former smoker    Impaired fasting blood sugar    Severe obesity with body mass index (BMI) of 35.0 to 39.9 with serious comorbidity    Secondary hyperparathyroidism of renal origin    Chronic left-sided thoracic back pain     Patient is here to establish care  Card Dr. Luis Alberto BLAKE s/p stents, PVD on plavix and imdur    Psych hospitalized for depression in past 2003-04. Had ECT therapy.  Lives alone. Drives, does all ADLs by herself, cooks, cleans.  Xanax prn .  Lost  of 50 years.  Has good support from children and friends    Nephrology Dr. Morrow CKd stage 3, uric acid , PTH elevation    GI Dr. Santhosh mccarthy, dysphagia, IBS with diarrhea. meds helping    Podiatry Dr. Malave- gout attack.  Uric acid 9.8 . Has been counseled on low purine diet  Objective:      Physical Exam   Constitutional: She is oriented to person,  place, and time. She appears well-developed and well-nourished.   Cardiovascular: Normal rate and regular rhythm.   Murmur heard.  Pulmonary/Chest: Effort normal and breath sounds normal.   Musculoskeletal: She exhibits no edema.        Feet:    Neurological: She is alert and oriented to person, place, and time.   Skin: Skin is warm and dry.   Psychiatric: She has a normal mood and affect.   Nursing note and vitals reviewed.      Assessment:       1. CKD (chronic kidney disease) stage 3, GFR 30-59 ml/min    2. Screening for cancer    3. Personal history of nicotine dependence     4. Essential hypertension    5. Mixed hyperlipidemia    6. Impaired fasting blood sugar    7. Severe obesity with body mass index (BMI) of 35.0 to 39.9 with serious comorbidity    8. Secondary hyperparathyroidism of renal origin    9. Acute gout of left foot, unspecified cause    10. Vitamin D deficiency    11. Chronic left-sided thoracic back pain    12. Aortic valve stenosis, etiology of cardiac valve disease unspecified    13. Coronary artery disease involving native coronary artery of native heart without angina pectoris        Plan:         1. Screening for cancer  Screen and treat as indicated:    - CT Chest Lung Screening Low Dose; Future    2. Personal history of nicotine dependence   Screen and treat as indicated:    - CT Chest Lung Screening Low Dose; Future    3. Essential hypertension  D/c dyazide due to gout and increase to  - metoprolol succinate (TOPROL-XL) 100 MG 24 hr tablet; Take 1 tablet (100 mg total) by mouth once daily.  Dispense: 90 tablet; Refill: 3    4. CKD (chronic kidney disease) stage 3, GFR 30-59 ml/min  Stable and chronic.  Will continue to monitor q3-6 months and control chronic conditions as optimally as possible to preserve function.      5. Mixed hyperlipidemia  Stable condition.  Continue current medications.  Will adjust based on lab findings or if condition changes.    - Lipid panel; Future    6.  "Impaired fasting blood sugar   Your blood sugar is borderline high.  This means you are at risk for developing type 2 diabetes mellitus.  To lessen your risk you should exercise regularly, avoid excess carbohydrates and work toward a body mass index of less than 25.      - Comprehensive metabolic panel; Future  - Hemoglobin A1c; Future    7. Severe obesity with body mass index (BMI) of 35.0 to 39.9 with serious comorbidity  Counseled patient on his ideal body weight, health consequences of being obese and current recommendations including weekly exercise and a heart healthy diet.  Current BMI is:Estimated body mass index is 35.42 kg/m² as calculated from the following:    Height as of this encounter: 5' 3" (1.6 m).    Weight as of this encounter: 90.7 kg (199 lb 15.3 oz)..  Patient is aware that ideal BMI < 25 or Weight in (lb) to have BMI = 25: 140.8.        8. Secondary hyperparathyroidism of renal origin  Cont monitoring    9. Acute gout of left foot, unspecified cause  Cont low purine diet.  Stop diuretics.    - CBC auto differential; Future  - Uric acid; Future  Use prn  - diclofenac sodium (VOLTAREN) 1 % Gel; Apply 2 g topically 3 (three) times daily as needed.  Dispense: 100 g; Refill: 5    10. Vitamin D deficiency  Screen and treat as indicated:    - Vitamin D; Future    11. Chronic left-sided thoracic back pain  Cont current mgmt    12. Aortic valve stenosis, etiology of cardiac valve disease unspecified  Cont card monitoring    13. Coronary artery disease involving native coronary artery of native heart without angina pectoris  Cont card monitoring        Time spent with patient: 20 minutes    Patient with be reevaluated in 6 months and 4 weeks RAMON Mancera to f/u labs or sooner prn    Greater than 50% of this visit was spent counseling as described in above documentation:Yes  "

## 2019-09-30 NOTE — PROGRESS NOTES
Subjective:      Patient ID: Betty Bacon is a 69 y.o. female.    Chief Complaint: Foot Pain (Left)    Betty is a 69 y.o. female who presents to the podiatry clinic  with complaint of  left foot pain. Onset of the symptoms was several days ago. Precipitating event: none known. Current symptoms include: ability to bear weight, but with some pain, redness and swelling. Aggravating factors: any weight bearing and pressure to the area of concern. Symptoms have gradually worsened. Patient has had prior foot problems. Evaluation to date: none. Treatment to date: none. Patients rates pain 3/10 on pain scale.        Review of Systems   Constitution: Negative for chills and fever.   Cardiovascular: Positive for leg swelling. Negative for claudication.   Respiratory: Negative for shortness of breath.    Skin: Positive for nail changes. Negative for itching and rash.   Musculoskeletal: Positive for gout and joint pain. Negative for muscle cramps, muscle weakness and myalgias.   Gastrointestinal: Negative for nausea and vomiting.   Neurological: Negative for focal weakness, loss of balance, numbness and paresthesias.           Objective:      Physical Exam   Constitutional: She is oriented to person, place, and time. She appears well-developed and well-nourished. No distress.   Cardiovascular:   Pulses:       Dorsalis pedis pulses are 2+ on the right side, and 2+ on the left side.        Posterior tibial pulses are 2+ on the right side, and 2+ on the left side.   < 3 sec capillary refill time to toes 1-5 bilateral. Toes and feet are warm to touch proximally with normal distal cooling b/l. There is some hair growth on the feet and toes b/l. There is no edema b/l. No spider veins or varicosities present b/l.      Musculoskeletal:   Left medial foot pain to palpation with focal edema erythema and warmth to touch    Equinus noted b/l ankles with < 10 deg DF noted. MMT 5/5 in DF/PF/Inv/Ev resistance with no reproduction  of pain in any direction. Passive range of motion of ankle and pedal joints is painless b/l.     Neurological: She is alert and oriented to person, place, and time. She has normal strength. She displays no atrophy and no tremor. No sensory deficit. She exhibits normal muscle tone.   Negative tinel sign bilateral.   Skin: Skin is warm, dry and intact. No abrasion, no bruising, no burn, no ecchymosis, no laceration, no lesion, no petechiae and no rash noted. She is not diaphoretic. No cyanosis or erythema. No pallor. Nails show no clubbing.   Skin temperature, texture and turgor within normal limits.   Psychiatric: She has a normal mood and affect. Her behavior is normal.             Assessment:       Encounter Diagnosis   Name Primary?    Acute gout of left foot, unspecified cause Yes         Plan:       Betty was seen today for foot pain.    Diagnoses and all orders for this visit:    Acute gout of left foot, unspecified cause  -     Uric acid; Future      I counseled the patient on her conditions, their implications and medical management.    Has colchicine at home    Warm packs affected part and limb foot/eriberto - protect skin against thermal damage with towel/other insulator.    Adequately hydrate up to about 4 liters water unless on fluid restriction.    Avoid purine containing foods - list provided.    Return PRN if pain persists    Lenny Koch DPM

## 2019-10-04 ENCOUNTER — PATIENT MESSAGE (OUTPATIENT)
Dept: NEPHROLOGY | Facility: CLINIC | Age: 69
End: 2019-10-04

## 2019-10-08 ENCOUNTER — TELEPHONE (OUTPATIENT)
Dept: NEPHROLOGY | Facility: CLINIC | Age: 69
End: 2019-10-08

## 2019-10-08 ENCOUNTER — HOSPITAL ENCOUNTER (OUTPATIENT)
Dept: RADIOLOGY | Facility: HOSPITAL | Age: 69
Discharge: HOME | End: 2019-10-08
Attending: FAMILY MEDICINE
Payer: MEDICARE

## 2019-10-08 ENCOUNTER — PATIENT MESSAGE (OUTPATIENT)
Dept: NEPHROLOGY | Facility: CLINIC | Age: 69
End: 2019-10-08

## 2019-10-08 DIAGNOSIS — Z87.891 PERSONAL HISTORY OF NICOTINE DEPENDENCE: ICD-10-CM

## 2019-10-08 DIAGNOSIS — Z12.9 SCREENING FOR CANCER: ICD-10-CM

## 2019-10-08 DIAGNOSIS — N18.30 CKD (CHRONIC KIDNEY DISEASE) STAGE 3, GFR 30-59 ML/MIN: Primary | ICD-10-CM

## 2019-10-08 PROCEDURE — G0297 CT CHEST LUNG SCREENING LOW DOSE: ICD-10-PCS | Mod: 26,,, | Performed by: RADIOLOGY

## 2019-10-08 PROCEDURE — G0297 LDCT FOR LUNG CA SCREEN: HCPCS | Mod: 26,,, | Performed by: RADIOLOGY

## 2019-10-08 PROCEDURE — G0297 LDCT FOR LUNG CA SCREEN: HCPCS | Mod: TC

## 2019-10-08 NOTE — TELEPHONE ENCOUNTER
I spoke with the patient.  She requested her appointment be RS'd for Dr Morrow to April and coordinate with Suad.      Advised patient will do, so.   She is having labs on Oct 21.  I will ask Dr Foster to review those and advise if she needs to be seen sooner and what labs she will need for her April visit.

## 2019-10-21 ENCOUNTER — LAB VISIT (OUTPATIENT)
Dept: LAB | Facility: HOSPITAL | Age: 69
End: 2019-10-21
Attending: FAMILY MEDICINE
Payer: MEDICARE

## 2019-10-21 DIAGNOSIS — M10.9 ACUTE GOUT OF LEFT FOOT, UNSPECIFIED CAUSE: ICD-10-CM

## 2019-10-21 DIAGNOSIS — E55.9 VITAMIN D DEFICIENCY: ICD-10-CM

## 2019-10-21 DIAGNOSIS — R73.01 IMPAIRED FASTING BLOOD SUGAR: ICD-10-CM

## 2019-10-21 DIAGNOSIS — E78.2 MIXED HYPERLIPIDEMIA: ICD-10-CM

## 2019-10-21 DIAGNOSIS — N18.30 CKD (CHRONIC KIDNEY DISEASE) STAGE 3, GFR 30-59 ML/MIN: ICD-10-CM

## 2019-10-21 LAB
25(OH)D3+25(OH)D2 SERPL-MCNC: 32 NG/ML (ref 30–96)
ALBUMIN SERPL BCP-MCNC: 3.2 G/DL (ref 3.5–5.2)
ALP SERPL-CCNC: 153 U/L (ref 55–135)
ALT SERPL W/O P-5'-P-CCNC: 11 U/L (ref 10–44)
ANION GAP SERPL CALC-SCNC: 9 MMOL/L (ref 8–16)
AST SERPL-CCNC: 14 U/L (ref 10–40)
BASOPHILS # BLD AUTO: 0.02 K/UL (ref 0–0.2)
BASOPHILS NFR BLD: 0.3 % (ref 0–1.9)
BILIRUB SERPL-MCNC: 0.4 MG/DL (ref 0.1–1)
BUN SERPL-MCNC: 19 MG/DL (ref 8–23)
CALCIUM SERPL-MCNC: 9.2 MG/DL (ref 8.7–10.5)
CHLORIDE SERPL-SCNC: 107 MMOL/L (ref 95–110)
CHOLEST SERPL-MCNC: 116 MG/DL (ref 120–199)
CHOLEST/HDLC SERPL: 3.4 {RATIO} (ref 2–5)
CO2 SERPL-SCNC: 26 MMOL/L (ref 23–29)
CREAT SERPL-MCNC: 1.1 MG/DL (ref 0.5–1.4)
DIFFERENTIAL METHOD: ABNORMAL
EOSINOPHIL # BLD AUTO: 0.2 K/UL (ref 0–0.5)
EOSINOPHIL NFR BLD: 2.6 % (ref 0–8)
ERYTHROCYTE [DISTWIDTH] IN BLOOD BY AUTOMATED COUNT: 14.4 % (ref 11.5–14.5)
EST. GFR  (AFRICAN AMERICAN): 58.4 ML/MIN/1.73 M^2
EST. GFR  (NON AFRICAN AMERICAN): 50.6 ML/MIN/1.73 M^2
ESTIMATED AVG GLUCOSE: 126 MG/DL (ref 68–131)
GLUCOSE SERPL-MCNC: 100 MG/DL (ref 70–110)
HBA1C MFR BLD HPLC: 6 % (ref 4–5.6)
HCT VFR BLD AUTO: 36.8 % (ref 37–48.5)
HDLC SERPL-MCNC: 34 MG/DL (ref 40–75)
HDLC SERPL: 29.3 % (ref 20–50)
HGB BLD-MCNC: 11.4 G/DL (ref 12–16)
IMM GRANULOCYTES # BLD AUTO: 0.05 K/UL (ref 0–0.04)
IMM GRANULOCYTES NFR BLD AUTO: 0.6 % (ref 0–0.5)
LDLC SERPL CALC-MCNC: 62.4 MG/DL (ref 63–159)
LYMPHOCYTES # BLD AUTO: 0.9 K/UL (ref 1–4.8)
LYMPHOCYTES NFR BLD: 11.6 % (ref 18–48)
MCH RBC QN AUTO: 28.8 PG (ref 27–31)
MCHC RBC AUTO-ENTMCNC: 31 G/DL (ref 32–36)
MCV RBC AUTO: 93 FL (ref 82–98)
MONOCYTES # BLD AUTO: 0.5 K/UL (ref 0.3–1)
MONOCYTES NFR BLD: 6.8 % (ref 4–15)
NEUTROPHILS # BLD AUTO: 6.2 K/UL (ref 1.8–7.7)
NEUTROPHILS NFR BLD: 78.1 % (ref 38–73)
NONHDLC SERPL-MCNC: 82 MG/DL
NRBC BLD-RTO: 0 /100 WBC
PHOSPHATE SERPL-MCNC: 2.2 MG/DL (ref 2.7–4.5)
PLATELET # BLD AUTO: 191 K/UL (ref 150–350)
PMV BLD AUTO: 11.3 FL (ref 9.2–12.9)
POTASSIUM SERPL-SCNC: 4 MMOL/L (ref 3.5–5.1)
PROT SERPL-MCNC: 6.6 G/DL (ref 6–8.4)
RBC # BLD AUTO: 3.96 M/UL (ref 4–5.4)
SODIUM SERPL-SCNC: 142 MMOL/L (ref 136–145)
TRIGL SERPL-MCNC: 98 MG/DL (ref 30–150)
URATE SERPL-MCNC: 8.3 MG/DL (ref 2.4–5.7)
WBC # BLD AUTO: 7.96 K/UL (ref 3.9–12.7)

## 2019-10-21 PROCEDURE — 84550 ASSAY OF BLOOD/URIC ACID: CPT

## 2019-10-21 PROCEDURE — 80061 LIPID PANEL: CPT

## 2019-10-21 PROCEDURE — 80053 COMPREHEN METABOLIC PANEL: CPT

## 2019-10-21 PROCEDURE — 83036 HEMOGLOBIN GLYCOSYLATED A1C: CPT

## 2019-10-21 PROCEDURE — 84100 ASSAY OF PHOSPHORUS: CPT

## 2019-10-21 PROCEDURE — 85025 COMPLETE CBC W/AUTO DIFF WBC: CPT

## 2019-10-21 PROCEDURE — 82306 VITAMIN D 25 HYDROXY: CPT

## 2019-10-21 PROCEDURE — 36415 COLL VENOUS BLD VENIPUNCTURE: CPT | Mod: PO

## 2019-10-28 ENCOUNTER — PATIENT MESSAGE (OUTPATIENT)
Dept: CARDIOLOGY | Facility: CLINIC | Age: 69
End: 2019-10-28

## 2019-10-28 ENCOUNTER — TELEPHONE (OUTPATIENT)
Dept: CARDIOLOGY | Facility: CLINIC | Age: 69
End: 2019-10-28

## 2019-10-28 NOTE — TELEPHONE ENCOUNTER
Spoke with patient daughter. Patient scheduled to see ALEKSANDRA Bonds 10/29/2019 at 3:30pm. She verbally agreed----- Message from Fariha Gonzales sent at 10/28/2019 12:04 PM CDT -----  Type:  Same Day Appointment Request    Caller is requesting a same day appointment.  Caller declined first available appointment listed below.      Name of Caller:  Loree Sanchez                                                                                             When is the first available appointment?  12/16   Symptoms:  Saw Dr Grant / advised she see Dr Pink / for congestive heart failure   Best Call Back Number:  [phone]   Additional Information: she is on oxygen (only wants to see Dr Pink)

## 2019-10-29 ENCOUNTER — OFFICE VISIT (OUTPATIENT)
Dept: CARDIOLOGY | Facility: CLINIC | Age: 69
End: 2019-10-29
Payer: MEDICARE

## 2019-10-29 VITALS
DIASTOLIC BLOOD PRESSURE: 54 MMHG | OXYGEN SATURATION: 93 % | HEIGHT: 63 IN | HEART RATE: 70 BPM | BODY MASS INDEX: 35.82 KG/M2 | SYSTOLIC BLOOD PRESSURE: 147 MMHG | WEIGHT: 202.19 LBS

## 2019-10-29 DIAGNOSIS — I73.9 PAD (PERIPHERAL ARTERY DISEASE): ICD-10-CM

## 2019-10-29 DIAGNOSIS — I10 ESSENTIAL HYPERTENSION: ICD-10-CM

## 2019-10-29 DIAGNOSIS — I50.33 ACUTE ON CHRONIC DIASTOLIC HEART FAILURE: ICD-10-CM

## 2019-10-29 DIAGNOSIS — N18.30 CKD (CHRONIC KIDNEY DISEASE) STAGE 3, GFR 30-59 ML/MIN: ICD-10-CM

## 2019-10-29 DIAGNOSIS — N18.30 STAGE 3 CHRONIC KIDNEY DISEASE: Primary | ICD-10-CM

## 2019-10-29 DIAGNOSIS — I25.10 CORONARY ARTERY DISEASE INVOLVING NATIVE CORONARY ARTERY OF NATIVE HEART WITHOUT ANGINA PECTORIS: ICD-10-CM

## 2019-10-29 DIAGNOSIS — E66.01 SEVERE OBESITY WITH BODY MASS INDEX (BMI) OF 35.0 TO 39.9 WITH SERIOUS COMORBIDITY: ICD-10-CM

## 2019-10-29 DIAGNOSIS — I35.0 AORTIC VALVE STENOSIS, ETIOLOGY OF CARDIAC VALVE DISEASE UNSPECIFIED: ICD-10-CM

## 2019-10-29 DIAGNOSIS — J44.9 CHRONIC OBSTRUCTIVE PULMONARY DISEASE, UNSPECIFIED COPD TYPE: ICD-10-CM

## 2019-10-29 PROCEDURE — 99999 PR PBB SHADOW E&M-EST. PATIENT-LVL IV: ICD-10-PCS | Mod: PBBFAC,,, | Performed by: PHYSICIAN ASSISTANT

## 2019-10-29 PROCEDURE — 99214 OFFICE O/P EST MOD 30 MIN: CPT | Mod: PBBFAC,PO | Performed by: PHYSICIAN ASSISTANT

## 2019-10-29 PROCEDURE — 99214 PR OFFICE/OUTPT VISIT, EST, LEVL IV, 30-39 MIN: ICD-10-PCS | Mod: S$PBB,,, | Performed by: PHYSICIAN ASSISTANT

## 2019-10-29 PROCEDURE — 99214 OFFICE O/P EST MOD 30 MIN: CPT | Mod: S$PBB,,, | Performed by: PHYSICIAN ASSISTANT

## 2019-10-29 PROCEDURE — 99999 PR PBB SHADOW E&M-EST. PATIENT-LVL IV: CPT | Mod: PBBFAC,,, | Performed by: PHYSICIAN ASSISTANT

## 2019-10-29 RX ORDER — FUROSEMIDE 20 MG/1
20 TABLET ORAL DAILY
Refills: 0 | Status: ON HOLD | COMMUNITY
Start: 2019-10-23 | End: 2023-01-31 | Stop reason: SDUPTHER

## 2019-10-29 RX ORDER — SPIRONOLACTONE 25 MG/1
25 TABLET ORAL DAILY
Refills: 0 | COMMUNITY
Start: 2019-10-23 | End: 2023-01-01

## 2019-10-29 NOTE — ASSESSMENT & PLAN NOTE
Goal BP < 140/90.  Needs improved BP control.   Continue current medications for now, will adjust if she remains out of range.

## 2019-10-29 NOTE — ASSESSMENT & PLAN NOTE
Moderate  Aortic valve area is 1.08 cm2; peak velocity is 3.02 m/s; mean gradient is 20.79 mmHg, EF= 55% by echo in March 2019.  Needs repeat.   If severe, will refer for consideration of TAVR.

## 2019-10-29 NOTE — PROGRESS NOTES
Subjective:    Patient ID:  Betty Bacon is a 69 y.o. female who presents for follow-up of Congestive Heart Failure (Diagnosed last week  w/ Dr. Cuevas (Pulmonologist)) and Shortness of Breath      HPI  Ms. Bacon is a very pleasant lady who follows with Dr. Borjas. She has a hx of moderate AS, mild-moderate AI, COPD, Stage III CKD, Renal artery stenosis s/p RRA PTA, HTN, and HLD. She has noted worsening VILLA and orthopnea over the last 1.5 weeks, but it became particularly bad last Thursday. She thought it was her COPD, so she saw her pulmonologist, Dr. Cuevas. He examined her and felt she was volume overloaded. A BNP was >500. He started her on lasix and aldactone and asked her to f/u with us.     She has noted improvement in her symptoms since starting lasix and aldactone, but she continues to have a dry cough and VILLA. She has had some sharp pain under her left arm, but it is reproducible and worse with cough. She does not normally weigh herself. She does admit to increased salt in her diet.     Review of Systems   Constitution: Positive for malaise/fatigue. Negative for chills, diaphoresis, fever, weight gain and weight loss.   HENT: Negative for sore throat.    Eyes: Negative for blurred vision, vision loss in left eye, vision loss in right eye and visual disturbance.   Cardiovascular: Positive for dyspnea on exertion and orthopnea. Negative for chest pain, claudication, leg swelling, near-syncope, palpitations, paroxysmal nocturnal dyspnea and syncope.   Respiratory: Positive for cough and sputum production. Negative for hemoptysis, shortness of breath and wheezing.    Endocrine: Negative for cold intolerance and heat intolerance.   Hematologic/Lymphatic: Negative for adenopathy. Does not bruise/bleed easily.   Skin: Negative for rash.   Musculoskeletal: Negative for falls, muscle weakness and myalgias.   Gastrointestinal: Negative for abdominal pain, change in bowel habit, constipation,  "diarrhea, melena and nausea.   Genitourinary: Negative for bladder incontinence.   Neurological: Negative for dizziness, focal weakness, headaches, light-headedness, numbness and weakness.   Psychiatric/Behavioral: Negative for altered mental status.         Vitals:    10/29/19 1542   BP: (!) 147/54   BP Location: Right arm   Patient Position: Sitting   BP Method: Medium (Automatic)   Pulse: 70   SpO2: (!) 93%   Weight: 91.7 kg (202 lb 2.6 oz)   Height: 5' 3" (1.6 m)   Body mass index is 35.81 kg/m².    Objective:    Physical Exam   Constitutional: She is oriented to person, place, and time. She appears well-developed and well-nourished. No distress.   HENT:   Head: Normocephalic and atraumatic.   Mouth/Throat: Oropharynx is clear and moist.   Eyes: Pupils are equal, round, and reactive to light. Conjunctivae and EOM are normal. No scleral icterus.   Neck: Neck supple. No JVD present. No tracheal deviation present.   Cardiovascular: Normal rate and regular rhythm. Exam reveals no gallop and no friction rub.   Murmur heard.   Harsh midsystolic murmur is present with a grade of 3/6 at the upper right sternal border radiating to the neck.  Pulmonary/Chest: Effort normal. No respiratory distress. She has no wheezes. She has rales in the right lower field and the left lower field. She exhibits no tenderness.   Abdominal: Soft. Bowel sounds are normal. She exhibits no distension. There is no hepatosplenomegaly. There is no tenderness.   Musculoskeletal: She exhibits no edema or tenderness.   Neurological: She is alert and oriented to person, place, and time.   Skin: Skin is warm and dry. No rash noted. No erythema.   Psychiatric: She has a normal mood and affect. Her behavior is normal.         Assessment:       Aortic stenosis  Moderate  Aortic valve area is 1.08 cm2; peak velocity is 3.02 m/s; mean gradient is 20.79 mmHg, EF= 55% by echo in March 2019.  Needs repeat.   If severe, will refer for consideration of TAVR. "     Acute on chronic diastolic heart failure  Improving with diuretics.   Will repeat echo to assess LVSF.   See plan below.     HTN (hypertension)  Goal BP < 140/90.  Needs improved BP control.   Continue current medications for now, will adjust if she remains out of range.     CAD (coronary artery disease)  Non-obstructive disease by angiogram in 2014.  Hx of RCA PCI in 2012 and 2004.   Continue ASA, statin, Imdur, and b-blocker.     CKD (chronic kidney disease) stage 3, GFR 30-59 ml/min  Stable.   Follows with Dr. Morrow.     Severe obesity with body mass index (BMI) of 35.0 to 39.9 with serious comorbidity  Body mass index is 35.81 kg/m².  Discussed diet and exercise.     PAD (peripheral artery disease)  Hx of R Renal artery PTA.   BIlateral IRINA stents in 2004.     COPD (chronic obstructive pulmonary disease)  Follows with Dr. Cuevas.        Plan:       Echo to evaluate EF and aortic valve.   Continue Lasix and aldactone.   Check BMP next week if not already done by Pulmonology.   Written instructions given to weigh daily and increase lasix for weight gain >3lbs overnight or >5lbs in 1 week.   Educated about the importance of salt restrictions (2gm sodium restriction given).   Will contact patient with results of echo.   F/U with Dr. Borjas as scheduled.

## 2019-10-29 NOTE — ASSESSMENT & PLAN NOTE
Non-obstructive disease by angiogram in 2014.  Hx of RCA PCI in 2012 and 2004.   Continue ASA, statin, Imdur, and b-blocker.

## 2019-10-30 ENCOUNTER — PATIENT MESSAGE (OUTPATIENT)
Dept: FAMILY MEDICINE | Facility: CLINIC | Age: 69
End: 2019-10-30

## 2019-11-01 ENCOUNTER — LAB VISIT (OUTPATIENT)
Dept: LAB | Facility: HOSPITAL | Age: 69
End: 2019-11-01
Attending: PHYSICIAN ASSISTANT
Payer: MEDICARE

## 2019-11-01 DIAGNOSIS — N18.30 STAGE 3 CHRONIC KIDNEY DISEASE: ICD-10-CM

## 2019-11-01 LAB
ANION GAP SERPL CALC-SCNC: 10 MMOL/L (ref 8–16)
BUN SERPL-MCNC: 21 MG/DL (ref 8–23)
CALCIUM SERPL-MCNC: 10.3 MG/DL (ref 8.7–10.5)
CHLORIDE SERPL-SCNC: 102 MMOL/L (ref 95–110)
CO2 SERPL-SCNC: 27 MMOL/L (ref 23–29)
CREAT SERPL-MCNC: 1.2 MG/DL (ref 0.5–1.4)
EST. GFR  (AFRICAN AMERICAN): 52.5 ML/MIN/1.73 M^2
EST. GFR  (NON AFRICAN AMERICAN): 45.6 ML/MIN/1.73 M^2
GLUCOSE SERPL-MCNC: 96 MG/DL (ref 70–110)
POTASSIUM SERPL-SCNC: 4.3 MMOL/L (ref 3.5–5.1)
SODIUM SERPL-SCNC: 139 MMOL/L (ref 136–145)

## 2019-11-01 PROCEDURE — 36415 COLL VENOUS BLD VENIPUNCTURE: CPT | Mod: PO

## 2019-11-01 PROCEDURE — 80048 BASIC METABOLIC PNL TOTAL CA: CPT

## 2019-11-05 RX ORDER — TRIAMTERENE AND HYDROCHLOROTHIAZIDE 37.5; 25 MG/1; MG/1
1 CAPSULE ORAL DAILY
Qty: 30 CAPSULE | Refills: 8 | Status: SHIPPED | OUTPATIENT
Start: 2019-11-05 | End: 2020-08-27

## 2019-11-06 ENCOUNTER — TELEPHONE (OUTPATIENT)
Dept: NEPHROLOGY | Facility: CLINIC | Age: 69
End: 2019-11-06

## 2019-11-06 NOTE — TELEPHONE ENCOUNTER
----- Message from Nathan Foster MD sent at 10/22/2019  5:57 AM CDT -----  Labs look great.  Kidney function is normal.  Urine is normal.  No f/u this year needed.    ----- Message -----  From: Virginia Contreras LPN  Sent: 10/22/2019  To: Virginia Contreras LPN, Nathan Foster MD    She requested her appointment be RS'd for Dr Morrow to April and coordinate with Suad.    She is having labs on Oct 21.  I will ask Dr Foster to review those and advise if she needs to be seen sooner and what labs she will need for her April visit.

## 2019-11-15 ENCOUNTER — TELEPHONE (OUTPATIENT)
Dept: FAMILY MEDICINE | Facility: CLINIC | Age: 69
End: 2019-11-15

## 2019-11-15 DIAGNOSIS — F32.A DEPRESSION, UNSPECIFIED DEPRESSION TYPE: ICD-10-CM

## 2019-11-15 NOTE — TELEPHONE ENCOUNTER
----- Message from Dot Smith sent at 11/15/2019  4:35 PM CST -----  Contact: Michel  Type:  RX Refill Request    Who Called:  patient  Refill or New Rx:  refill  RX Name and Strength:  paroxetine (PAXIL) 40 MG tablet  How is the patient currently taking it? (ex. 1XDay):  As directed  Is this a 30 day or 90 day RX:  30  Preferred Pharmacy with phone number:    [facility]/pharmacy #[id number] - [place], [place] - [address] AT [facility]  [address]  [place] [address]  Phone: [phone] Fax: [phone]    Local or Mail Order:  local  Ordering Provider:  Lucero but now sees Júnior  [facility] Call Back Number:  [phone] (home)   Additional Information:  patient has 2 pills left--states pharmacy has been sending faxes but that they haven't heard anything--please advise--thank you

## 2019-11-15 NOTE — TELEPHONE ENCOUNTER
Called patient and received no answer and could not leave a voice message. Refill request sent to on call provider.

## 2019-11-17 RX ORDER — PAROXETINE HYDROCHLORIDE 40 MG/1
TABLET, FILM COATED ORAL
Qty: 30 TABLET | Refills: 5 | Status: SHIPPED | OUTPATIENT
Start: 2019-11-17 | End: 2020-01-29 | Stop reason: SDUPTHER

## 2019-11-18 ENCOUNTER — TELEPHONE (OUTPATIENT)
Dept: FAMILY MEDICINE | Facility: CLINIC | Age: 69
End: 2019-11-18

## 2019-11-18 NOTE — TELEPHONE ENCOUNTER
----- Message from Bernadette Bojorquez sent at 11/18/2019  9:10 AM CST -----  Contact: daughter, Loree Sanchez  Type:  Same Day Appointment Request    Caller is requesting a same day appointment.  Caller declined first available appointment listed below.      Name of Caller:  DaughterLoree  When is the first available appointment?  11/19/19  Symptoms:  Depression   Best Call Back Number:  [phone]  Additional Information:   Patient's daughter states patient needs to be seen today. She has taken her Xanax and it is not helping. Please call daughter to schedule. Thanks!

## 2019-11-18 NOTE — TELEPHONE ENCOUNTER
Pt daughter stating that mother feels like she is falling apart emotionally. Pt has been doing great on medications until recently. Pt has appt scheduled for tomorrow. Daughter requesting that appt be kept. Pt daughter advised that is they feel that mother is in danger to bring to ED for urgent treatment. Daughter verbalized understanding and confirm appt for tomorrow.

## 2019-11-19 ENCOUNTER — OFFICE VISIT (OUTPATIENT)
Dept: FAMILY MEDICINE | Facility: CLINIC | Age: 69
End: 2019-11-19
Payer: MEDICARE

## 2019-11-19 VITALS
RESPIRATION RATE: 14 BRPM | SYSTOLIC BLOOD PRESSURE: 138 MMHG | OXYGEN SATURATION: 95 % | BODY MASS INDEX: 34.8 KG/M2 | TEMPERATURE: 98 F | HEIGHT: 63 IN | WEIGHT: 196.44 LBS | DIASTOLIC BLOOD PRESSURE: 60 MMHG | HEART RATE: 80 BPM

## 2019-11-19 DIAGNOSIS — F41.9 ANXIETY: ICD-10-CM

## 2019-11-19 DIAGNOSIS — F32.2 SEVERE MAJOR DEPRESSION: ICD-10-CM

## 2019-11-19 DIAGNOSIS — G47.00 INSOMNIA, UNSPECIFIED TYPE: Primary | ICD-10-CM

## 2019-11-19 PROCEDURE — 99999 PR PBB SHADOW E&M-EST. PATIENT-LVL IV: ICD-10-PCS | Mod: PBBFAC,,, | Performed by: FAMILY MEDICINE

## 2019-11-19 PROCEDURE — 99214 PR OFFICE/OUTPT VISIT, EST, LEVL IV, 30-39 MIN: ICD-10-PCS | Mod: S$PBB,,, | Performed by: FAMILY MEDICINE

## 2019-11-19 PROCEDURE — 99999 PR PBB SHADOW E&M-EST. PATIENT-LVL IV: CPT | Mod: PBBFAC,,, | Performed by: FAMILY MEDICINE

## 2019-11-19 PROCEDURE — 99214 OFFICE O/P EST MOD 30 MIN: CPT | Mod: S$PBB,,, | Performed by: FAMILY MEDICINE

## 2019-11-19 PROCEDURE — 99214 OFFICE O/P EST MOD 30 MIN: CPT | Mod: PBBFAC,PO | Performed by: FAMILY MEDICINE

## 2019-11-19 RX ORDER — QUETIAPINE FUMARATE 50 MG/1
50 TABLET, FILM COATED ORAL NIGHTLY
Qty: 30 TABLET | Refills: 11 | Status: SHIPPED | OUTPATIENT
Start: 2019-11-19 | End: 2019-12-26 | Stop reason: SDUPTHER

## 2019-11-19 RX ORDER — CLONAZEPAM 0.5 MG/1
0.5 TABLET, ORALLY DISINTEGRATING ORAL 2 TIMES DAILY PRN
Qty: 60 TABLET | Refills: 0 | Status: SHIPPED | OUTPATIENT
Start: 2019-11-19 | End: 2020-01-15 | Stop reason: SDUPTHER

## 2019-11-19 NOTE — PROGRESS NOTES
Subjective:       Patient ID: Betty Bacon is a 69 y.o. female.    Chief Complaint: Follow-up (F/U depression)    HPI  Review of Systems   Constitutional: Negative for fatigue and unexpected weight change.   Respiratory: Negative for chest tightness and shortness of breath.    Cardiovascular: Negative for chest pain, palpitations and leg swelling.   Gastrointestinal: Negative for abdominal pain.   Musculoskeletal: Negative for arthralgias.   Neurological: Negative for dizziness, syncope, light-headedness and headaches.       Patient Active Problem List   Diagnosis    CKD (chronic kidney disease) stage 3, GFR 30-59 ml/min    COPD (chronic obstructive pulmonary disease)    LVH (left ventricular hypertrophy) due to hypertensive disease    Depression    Unstable angina    PAD (peripheral artery disease)    Generalized anxiety disorder    Aortic stenosis    CAD (coronary artery disease)    Abnormal stress test    GERD (gastroesophageal reflux disease)    Interstitial lung disease    Osteopenia    History of electroconvulsive therapy    Hyperlipidemia    HTN (hypertension)    Former smoker    Impaired fasting blood sugar    Severe obesity with body mass index (BMI) of 35.0 to 39.9 with serious comorbidity    Secondary hyperparathyroidism of renal origin    Chronic left-sided thoracic back pain    Acute on chronic diastolic heart failure     Patient is here for a chronic conditions follow up.    Patient brought in by her daughter due to significant mood change in last week.  Was doing well on paxil 50mg a day and stable until last week. Suddenly became very anxious, nervous, difficulty sleeping, cries easily, feeling down and social anxiety.  Has Had h/o severe depression requiring hospitalizations x 3 for overdose of xanax in suicidal attempts, ECT therapy 2004.  Also lost her  of 50 years 6 years ago.  Holidays are hard.  There has been no change in meds except diuretic restarted  by Dr. Grant after CHF exacerbation.  She has been avoiding taking xanax for fear of dependence and her family being scared she would try to overdose. She denies having any SI/HI now or intent.  She denies hallucinations.  Took 1 xanax last night and slept well. Use is limited to 1 x month.  She is eating, dressing, bathing and feels fine except for the mood change. She does not want to change paxil.  Trazodone she takes occ but is not helping  Objective:      Physical Exam   Constitutional: She is oriented to person, place, and time. She appears well-developed and well-nourished.   Cardiovascular: Normal rate, regular rhythm and normal heart sounds.   Pulmonary/Chest: Effort normal and breath sounds normal.   Musculoskeletal: She exhibits no edema.   Neurological: She is alert and oriented to person, place, and time.   Skin: Skin is warm and dry.   Psychiatric: She has a normal mood and affect.   Nursing note and vitals reviewed.      Assessment:       1. Insomnia, unspecified type    2. Severe major depression    3. Anxiety        Plan:     1. Insomnia, unspecified type  Add  - QUEtiapine (SEROQUEL) 50 MG tablet; Take 1 tablet (50 mg total) by mouth every evening.  Dispense: 30 tablet; Refill: 11  Stop trazodone  2. Severe major depression  Cont paxil and add for adjunctive treatment  - QUEtiapine (SEROQUEL) 50 MG tablet; Take 1 tablet (50 mg total) by mouth every evening.  Dispense: 30 tablet; Refill: 11  - Ambulatory referral to Psychology  I discussed with the patient the risks, side effects and the benefits of the medication including the black box warning regarding suicidal ideation/risk if applicable.  I counseled the patient on medication titration, length of time before maximum benefits are reached, and duration of treatment expected.  I advised the patient to return to clinic or go to the emergency department if suicidal thoughts occur, thought of hurting others, hallucinations, or other serious  symptoms.  Patient voiced no intention of self-harm.  The patient expressed verbal understanding and elected to proceed with treatment.  All questions were answered.    Patient agreed verbally to seek help immediately if SI occurs     3. Anxiety  Add as needed  - clonazePAM (KLONOPIN) 0.5 MG disintegrating tablet; Take 1 tablet (0.5 mg total) by mouth 2 (two) times daily as needed.  Dispense: 60 tablet; Refill: 0  - Ambulatory referral to Psychology    .      Time spent with patient: 20 minutes    Patient with be reevaluated in 2 weeks or sooner prn    Greater than 50% of this visit was spent counseling as described in above documentation:Yes

## 2019-12-01 RX ORDER — PAROXETINE 10 MG/1
10 TABLET, FILM COATED ORAL EVERY MORNING
Qty: 90 TABLET | Refills: 3 | Status: SHIPPED | OUTPATIENT
Start: 2019-12-01 | End: 2020-12-17

## 2019-12-04 ENCOUNTER — OFFICE VISIT (OUTPATIENT)
Dept: FAMILY MEDICINE | Facility: CLINIC | Age: 69
End: 2019-12-04
Payer: MEDICARE

## 2019-12-04 VITALS
WEIGHT: 193.81 LBS | SYSTOLIC BLOOD PRESSURE: 120 MMHG | HEART RATE: 62 BPM | OXYGEN SATURATION: 95 % | TEMPERATURE: 98 F | DIASTOLIC BLOOD PRESSURE: 60 MMHG | BODY MASS INDEX: 34.34 KG/M2 | HEIGHT: 63 IN | RESPIRATION RATE: 12 BRPM

## 2019-12-04 DIAGNOSIS — F32.A DEPRESSION, UNSPECIFIED DEPRESSION TYPE: ICD-10-CM

## 2019-12-04 DIAGNOSIS — F41.1 GENERALIZED ANXIETY DISORDER: Primary | ICD-10-CM

## 2019-12-04 PROCEDURE — 99999 PR PBB SHADOW E&M-EST. PATIENT-LVL III: CPT | Mod: PBBFAC,,, | Performed by: FAMILY MEDICINE

## 2019-12-04 PROCEDURE — 1126F PR PAIN SEVERITY QUANTIFIED, NO PAIN PRESENT: ICD-10-PCS | Mod: ,,, | Performed by: FAMILY MEDICINE

## 2019-12-04 PROCEDURE — 1159F MED LIST DOCD IN RCRD: CPT | Mod: ,,, | Performed by: FAMILY MEDICINE

## 2019-12-04 PROCEDURE — 99213 OFFICE O/P EST LOW 20 MIN: CPT | Mod: S$PBB,,, | Performed by: FAMILY MEDICINE

## 2019-12-04 PROCEDURE — 1159F PR MEDICATION LIST DOCUMENTED IN MEDICAL RECORD: ICD-10-PCS | Mod: ,,, | Performed by: FAMILY MEDICINE

## 2019-12-04 PROCEDURE — 99999 PR PBB SHADOW E&M-EST. PATIENT-LVL III: ICD-10-PCS | Mod: PBBFAC,,, | Performed by: FAMILY MEDICINE

## 2019-12-04 PROCEDURE — 1126F AMNT PAIN NOTED NONE PRSNT: CPT | Mod: ,,, | Performed by: FAMILY MEDICINE

## 2019-12-04 PROCEDURE — 99213 PR OFFICE/OUTPT VISIT, EST, LEVL III, 20-29 MIN: ICD-10-PCS | Mod: S$PBB,,, | Performed by: FAMILY MEDICINE

## 2019-12-04 PROCEDURE — 99213 OFFICE O/P EST LOW 20 MIN: CPT | Mod: PBBFAC,PO | Performed by: FAMILY MEDICINE

## 2019-12-04 NOTE — PROGRESS NOTES
Subjective:       Patient ID: Betty Bacon is a 69 y.o. female.    Chief Complaint: Follow-up (2wk f/u)    Patient Active Problem List   Diagnosis    CKD (chronic kidney disease) stage 3, GFR 30-59 ml/min    COPD (chronic obstructive pulmonary disease)    LVH (left ventricular hypertrophy) due to hypertensive disease    Depression    Unstable angina    PAD (peripheral artery disease)    Generalized anxiety disorder    Aortic stenosis    CAD (coronary artery disease)    Abnormal stress test    GERD (gastroesophageal reflux disease)    Interstitial lung disease    Osteopenia    History of electroconvulsive therapy    Hyperlipidemia    HTN (hypertension)    Former smoker    Impaired fasting blood sugar    Severe obesity with body mass index (BMI) of 35.0 to 39.9 with serious comorbidity    Secondary hyperparathyroidism of renal origin    Chronic left-sided thoracic back pain    Acute on chronic diastolic heart failure   Patient here with her daughter to follow up  significant mood change for which she was seen 11/19/19.  Was doing well on paxil 50mg a day and stable until the week prior. Suddenly became very anxious, nervous, difficulty sleeping, cries easily, feeling down and social anxiety.  Has Had h/o severe depression requiring hospitalizations x 3 for overdose of xanax in suicidal attempts, ECT therapy 2004.  Also lost her  of 50 years 6 years ago.  Holidays are hard.  There has been no change in meds except diuretic restarted by Dr. Grant after CHF exacerbation.  She has been avoiding taking xanax for fear of dependence and her family being scared she would try to overdose. She denies having any SI/HI now or intent.  She denies hallucinations. Xanax has helped her sleep well. Use is limited to 1 x month.  She is eating, dressing, bathing and feels fine except for the mood change. She does not want to change paxil.  Trazodone she takes occ but is not helping.  Seroquel  50mg added and has helped. She has taken klonopin a couple of times and at bedtime  Once which has helped anxiety. Mood has been better and she has been social going on outings daily for last 2 weeks.  Has cried less and been less anxious. Has spells but quick to resolve. Has great support with family and friends. Has not looked into counselor yet    HPI  Review of Systems   Constitutional: Negative for fatigue and unexpected weight change.   Respiratory: Negative for chest tightness and shortness of breath.    Cardiovascular: Negative for chest pain, palpitations and leg swelling.   Gastrointestinal: Negative for abdominal pain.   Musculoskeletal: Negative for arthralgias.   Neurological: Negative for dizziness, syncope, light-headedness and headaches.   Psychiatric/Behavioral: Negative for agitation, dysphoric mood and sleep disturbance. The patient is not nervous/anxious.        Objective:      Physical Exam   Constitutional: She is oriented to person, place, and time. She appears well-developed and well-nourished.   Cardiovascular: Normal rate, regular rhythm and normal heart sounds.   Pulmonary/Chest: Effort normal and breath sounds normal.   Musculoskeletal: She exhibits no edema.   Neurological: She is alert and oriented to person, place, and time.   Skin: Skin is warm and dry.   Psychiatric: She has a normal mood and affect.   Smiling several times during visit and appears much more relaxed   Nursing note and vitals reviewed.      Assessment:      1. Generalized anxiety disorder    2. Depression, unspecified depression type        Plan:     1. Generalized anxiety disorder  Controlled on current medications.  Continue current medications.  Use klonopin as needed       2. Depression, unspecified depression type  Improved with seroquel 50mg qhs.  Recommend counseling    Reeval 3 months or sooner prn

## 2019-12-13 NOTE — TELEPHONE ENCOUNTER
----- Message from Gio Oliveira sent at 12/13/2019 10:51 AM CST -----  Contact: pt  Type:  RX Refill Request    Who Called:  pt  Refill or New Rx:  refill  RX Name and Strength:  alendronate (FOSAMAX) 70 MG tablet  How is the patient currently taking it? (ex. 1XDay):  1 x week  Is this a 30 day or 90 day RX:  30  Preferred Pharmacy with phone number:    [facility]/pharmacy #[id number] - [place], [place] - [address] AT [facility]  [address]  [place] [address]  Phone: [phone] Fax: [phone]    Local or Mail Order:    Ordering Provider:    Best Call Back Number:  [phone]  Additional Information:  Pt is out a this time and wont need one until Monday.

## 2019-12-15 RX ORDER — ALENDRONATE SODIUM 70 MG/1
70 TABLET ORAL
Qty: 4 TABLET | Refills: 11 | Status: SHIPPED | OUTPATIENT
Start: 2019-12-15 | End: 2020-12-16

## 2019-12-20 ENCOUNTER — PATIENT MESSAGE (OUTPATIENT)
Dept: FAMILY MEDICINE | Facility: CLINIC | Age: 69
End: 2019-12-20

## 2019-12-23 RX ORDER — ATORVASTATIN CALCIUM 40 MG/1
40 TABLET, FILM COATED ORAL DAILY
Qty: 90 TABLET | Refills: 3 | COMMUNITY
Start: 2019-12-23 | End: 2020-02-26 | Stop reason: SDUPTHER

## 2019-12-26 ENCOUNTER — PATIENT MESSAGE (OUTPATIENT)
Dept: FAMILY MEDICINE | Facility: CLINIC | Age: 69
End: 2019-12-26

## 2019-12-26 DIAGNOSIS — F32.2 SEVERE MAJOR DEPRESSION: ICD-10-CM

## 2019-12-26 DIAGNOSIS — G47.00 INSOMNIA, UNSPECIFIED TYPE: ICD-10-CM

## 2019-12-26 RX ORDER — QUETIAPINE FUMARATE 100 MG/1
100 TABLET, FILM COATED ORAL NIGHTLY
Qty: 30 TABLET | Refills: 11 | Status: SHIPPED | OUTPATIENT
Start: 2019-12-26 | End: 2020-01-15 | Stop reason: SDUPTHER

## 2019-12-26 NOTE — TELEPHONE ENCOUNTER
Patient has enough quetiapine 50 mg tabs to take 100 mg daily for 1 week. Please send new rx for 100 mg tabs.

## 2019-12-31 ENCOUNTER — OFFICE VISIT (OUTPATIENT)
Dept: PSYCHIATRY | Facility: CLINIC | Age: 69
End: 2019-12-31
Payer: MEDICARE

## 2019-12-31 DIAGNOSIS — F41.1 GENERALIZED ANXIETY DISORDER: Primary | ICD-10-CM

## 2019-12-31 DIAGNOSIS — F43.21 GRIEF: ICD-10-CM

## 2019-12-31 PROCEDURE — 99999 PR PBB SHADOW E&M-EST. PATIENT-LVL I: ICD-10-PCS | Mod: PBBFAC,,, | Performed by: SOCIAL WORKER

## 2019-12-31 PROCEDURE — 90791 PR PSYCHIATRIC DIAGNOSTIC EVALUATION: ICD-10-PCS | Mod: ,,, | Performed by: SOCIAL WORKER

## 2019-12-31 PROCEDURE — 99211 OFF/OP EST MAY X REQ PHY/QHP: CPT | Mod: PBBFAC,PO | Performed by: SOCIAL WORKER

## 2019-12-31 PROCEDURE — 99999 PR PBB SHADOW E&M-EST. PATIENT-LVL I: CPT | Mod: PBBFAC,,, | Performed by: SOCIAL WORKER

## 2019-12-31 PROCEDURE — 90791 PSYCH DIAGNOSTIC EVALUATION: CPT | Mod: ,,, | Performed by: SOCIAL WORKER

## 2019-12-31 NOTE — PROGRESS NOTES
"Psychiatry Initial Visit (PhD/LCSW)  Diagnostic Interview - CPT 18063    Date: 2019    Site: [facility]  [facility] [place]    Referral source: Johanne Callejas MD    Clinical status of patient: Outpatient    Betty Bacon, a 69 y.o. female, for initial evaluation visit.  Met with patient.    Chief complaint/reason for encounter: depression and anxiety    History of present illness: Reviewed chart. No flowsheet data found. No flowsheet data found. No flowsheet data found. No flowsheet data found. No flowsheet data found.        Client reported that she was  for 50 years and that her spouse  6 years ago. She also reports that she had 6 siblings and the last one  around 2018. Client reports that her grief resurfaced around 2018 regarding her . Client also reports that her anxiety, which she always remembers having, has been worse for the past few months as well. Client reports that she catastrophizes a lot. Client states that her father had been an alcoholic growing up, and in speaking about his drinking, she felt anxious. Client states that she has difficulty setting boundaries and tends to be a "people pleaser." Therapist asked client to begin working on setting boundaries with others.     Pain: noncontributory    Symptoms:   · Mood: depressed mood and diminished interest  · Anxiety: excessive anxiety/worry and social phobia  · Substance abuse: denied  · Cognitive functioning: denied  · Health behaviors: noncontributory    Psychiatric history: none     Medical history:   Past Medical History:   Diagnosis Date    Acute coronary artery obstruction without MI 10/2012    Benign hypertension     COPD (chronic obstructive pulmonary disease)     Coronary artery disease     Disorder of kidney and ureter     Dr. Morrow    History of electroconvulsive therapy     Hyperlipidemia LDL goal < 70     Left ankle sprain  "    Major depressive disorder, recurrent episode, severe     s/p ECT    PVD (peripheral vascular disease)        Family history of psychiatric illness:   Family History   Problem Relation Age of Onset    Diabetes Mother     Heart disease Mother     Stroke Father     Heart disease Father     Heart disease Brother     Stroke Brother     Hypertension Daughter     Diabetes Maternal Aunt     Heart disease Maternal Aunt     Heart disease Maternal Uncle     Heart disease Paternal Aunt     Heart disease Paternal Uncle     Heart disease Maternal Grandfather     Diabetes Sister     Heart disease Sister     Cancer Sister         lung    Kidney disease Sister         mass, benign    Breast cancer Sister     Stroke Sister     Dementia Sister     Melanoma Neg Hx     Psoriasis Neg Hx     Lupus Neg Hx     Eczema Neg Hx        Social history (marriage, employment, etc.):   Social History     Tobacco Use    Smoking status: Former Smoker     Packs/day: 2.00     Years: 40.00     Pack years: 80.00     Types: Cigarettes     Last attempt to quit: 12/5/2009     Years since quitting: 10.0    Smokeless tobacco: Never Used   Substance Use Topics    Alcohol use: No    Drug use: No       Current medications and drug reactions (include OTC, herbal): see medication list     Strengths and liabilities: Strength: Patient accepts guidance/feedback, Strength: Patient is expressive/articulate., Strength: Patient is intelligent., Strength: Patient is motivated for change., Strength: Patient is physically healthy., Strength: Patient has positive support network., Strength: Patient has reasonable judgment., Strength: Patient is stable.    Current Evaluation:     Mental Status Exam:  General Appearance:  unremarkable, age appropriate   Speech: normal tone, normal rate, normal pitch, normal volume      Level of Cooperation: cooperative      Thought Processes: normal and logical   Mood: steady, anxious      Thought Content:  normal, no suicidality, no homicidality, delusions, or paranoia   Affect: congruent and appropriate   Orientation: Oriented x3   Memory: recent >  intact   Attention Span & Concentration: intact   Fund of General Knowledge: intact and appropriate to age and level of education   Abstract Reasoning: interpretation of similarities was abstract   Judgment & Insight: good     Language  intact     Diagnostic Impression - Plan:       ICD-10-CM ICD-9-CM   1. Generalized anxiety disorder F41.1 300.02   2. Grief F43.21 309.0       Plan:individual psychotherapy    Return to Clinic: 3 weeks    Length of Service (minutes): 45

## 2020-01-01 DIAGNOSIS — Z79.01 LONG TERM (CURRENT) USE OF ANTICOAGULANTS: ICD-10-CM

## 2020-01-02 RX ORDER — CLOPIDOGREL BISULFATE 75 MG/1
TABLET ORAL
Qty: 30 TABLET | Refills: 11 | Status: SHIPPED | OUTPATIENT
Start: 2020-01-02 | End: 2021-03-15

## 2020-01-03 DIAGNOSIS — I10 ESSENTIAL HYPERTENSION: ICD-10-CM

## 2020-01-03 RX ORDER — METOPROLOL SUCCINATE 50 MG/1
TABLET, EXTENDED RELEASE ORAL
Qty: 30 TABLET | Refills: 11 | Status: SHIPPED | OUTPATIENT
Start: 2020-01-03 | End: 2021-03-15

## 2020-01-10 ENCOUNTER — TELEPHONE (OUTPATIENT)
Dept: FAMILY MEDICINE | Facility: CLINIC | Age: 70
End: 2020-01-10

## 2020-01-10 NOTE — TELEPHONE ENCOUNTER
----- Message from Saeid Montes sent at 1/10/2020  1:50 PM CST -----  Contact: self  Pt calling in regards to medications she is currently on, is not helping her     Pt needs provider to contact her     Please advise pt can be reached at [phone]

## 2020-01-10 NOTE — TELEPHONE ENCOUNTER
She can take up to three in a day. May take every 8 hours as needed for extreme anxiety.  Make appt if feels she needs to take more than once a day consistently

## 2020-01-10 NOTE — TELEPHONE ENCOUNTER
Patient states she took her clonazepam this morning at 10 AM and it has not helped at all. Want to know what else she should do. She is exteremly nervous.  Reports hse has had other days where the clonazepam has not worked but this is the worst day by far.

## 2020-01-10 NOTE — TELEPHONE ENCOUNTER
No voicemail on mobile number. Attempted to call both numbers multiple times.  Left detailed voicemail at home number to inform of instructions.

## 2020-01-15 ENCOUNTER — OFFICE VISIT (OUTPATIENT)
Dept: FAMILY MEDICINE | Facility: CLINIC | Age: 70
End: 2020-01-15
Payer: MEDICARE

## 2020-01-15 ENCOUNTER — PATIENT MESSAGE (OUTPATIENT)
Dept: FAMILY MEDICINE | Facility: CLINIC | Age: 70
End: 2020-01-15

## 2020-01-15 ENCOUNTER — TELEPHONE (OUTPATIENT)
Dept: FAMILY MEDICINE | Facility: CLINIC | Age: 70
End: 2020-01-15

## 2020-01-15 VITALS
BODY MASS INDEX: 34.5 KG/M2 | WEIGHT: 194.69 LBS | TEMPERATURE: 98 F | HEIGHT: 63 IN | SYSTOLIC BLOOD PRESSURE: 137 MMHG | DIASTOLIC BLOOD PRESSURE: 57 MMHG | RESPIRATION RATE: 16 BRPM | HEART RATE: 88 BPM

## 2020-01-15 DIAGNOSIS — F32.2 SEVERE MAJOR DEPRESSION: ICD-10-CM

## 2020-01-15 DIAGNOSIS — G47.00 INSOMNIA, UNSPECIFIED TYPE: ICD-10-CM

## 2020-01-15 DIAGNOSIS — F41.9 ANXIETY: ICD-10-CM

## 2020-01-15 PROCEDURE — 99214 OFFICE O/P EST MOD 30 MIN: CPT | Mod: PBBFAC,PO | Performed by: FAMILY MEDICINE

## 2020-01-15 PROCEDURE — 1126F PR PAIN SEVERITY QUANTIFIED, NO PAIN PRESENT: ICD-10-PCS | Mod: ,,, | Performed by: FAMILY MEDICINE

## 2020-01-15 PROCEDURE — 1126F AMNT PAIN NOTED NONE PRSNT: CPT | Mod: ,,, | Performed by: FAMILY MEDICINE

## 2020-01-15 PROCEDURE — 1159F MED LIST DOCD IN RCRD: CPT | Mod: ,,, | Performed by: FAMILY MEDICINE

## 2020-01-15 PROCEDURE — 99213 OFFICE O/P EST LOW 20 MIN: CPT | Mod: S$PBB,,, | Performed by: FAMILY MEDICINE

## 2020-01-15 PROCEDURE — 99999 PR PBB SHADOW E&M-EST. PATIENT-LVL IV: ICD-10-PCS | Mod: PBBFAC,,, | Performed by: FAMILY MEDICINE

## 2020-01-15 PROCEDURE — 99999 PR PBB SHADOW E&M-EST. PATIENT-LVL IV: CPT | Mod: PBBFAC,,, | Performed by: FAMILY MEDICINE

## 2020-01-15 PROCEDURE — 1159F PR MEDICATION LIST DOCUMENTED IN MEDICAL RECORD: ICD-10-PCS | Mod: ,,, | Performed by: FAMILY MEDICINE

## 2020-01-15 PROCEDURE — 99213 PR OFFICE/OUTPT VISIT, EST, LEVL III, 20-29 MIN: ICD-10-PCS | Mod: S$PBB,,, | Performed by: FAMILY MEDICINE

## 2020-01-15 RX ORDER — CLONAZEPAM 1 MG/1
1 TABLET, ORALLY DISINTEGRATING ORAL 2 TIMES DAILY PRN
Qty: 60 TABLET | Refills: 0 | Status: SHIPPED | OUTPATIENT
Start: 2020-01-15 | End: 2020-03-04 | Stop reason: SDUPTHER

## 2020-01-15 RX ORDER — QUETIAPINE FUMARATE 50 MG/1
150 TABLET, FILM COATED ORAL NIGHTLY
Qty: 90 TABLET | Refills: 5 | Status: SHIPPED | OUTPATIENT
Start: 2020-01-15 | End: 2020-09-19

## 2020-01-15 NOTE — TELEPHONE ENCOUNTER
Patient requesting to be seen. Having a hard time with depression and anxiety. Scheduled to see Dr Callejas at 1:30 PM today.

## 2020-01-15 NOTE — TELEPHONE ENCOUNTER
----- Message from Jen Lee sent at 1/15/2020 10:29 AM CST -----  Patient needs call back to schedule appointment.[phone] (home)

## 2020-01-15 NOTE — TELEPHONE ENCOUNTER
Ms Bacon has been scheduled to be seen this afternoon at 1:30 PM. Patient and Daughter agree to time of appointment.

## 2020-01-15 NOTE — PROGRESS NOTES
Subjective:       Patient ID: Betty Bacon is a 69 y.o. female.    Chief Complaint: Anxiety and Depression    Patient Active Problem List   Diagnosis    CKD (chronic kidney disease) stage 3, GFR 30-59 ml/min    COPD (chronic obstructive pulmonary disease)    LVH (left ventricular hypertrophy) due to hypertensive disease    Depression    Unstable angina    PAD (peripheral artery disease)    Generalized anxiety disorder    Aortic stenosis    CAD (coronary artery disease)    Abnormal stress test    GERD (gastroesophageal reflux disease)    Interstitial lung disease    Osteopenia    History of electroconvulsive therapy    Hyperlipidemia    HTN (hypertension)    Former smoker    Impaired fasting blood sugar    Severe obesity with body mass index (BMI) of 35.0 to 39.9 with serious comorbidity    Secondary hyperparathyroidism of renal origin    Chronic left-sided thoracic back pain    Acute on chronic diastolic heart failure   Sx of anxiety and depression are worsening. Socially isolated. Sad mood, intense anxiety about leaving home.  Cries easily  HPI  Review of Systems   Constitutional: Negative for fatigue and unexpected weight change.   Respiratory: Negative for chest tightness and shortness of breath.    Cardiovascular: Negative for chest pain, palpitations and leg swelling.   Gastrointestinal: Negative for abdominal pain.   Musculoskeletal: Negative for arthralgias.   Neurological: Negative for dizziness, syncope, light-headedness and headaches.   Psychiatric/Behavioral: Positive for dysphoric mood. The patient is nervous/anxious.        Objective:      Physical Exam   Constitutional: She is oriented to person, place, and time. She appears well-developed and well-nourished.   Cardiovascular: Normal rate, regular rhythm and normal heart sounds.   Pulmonary/Chest: Effort normal and breath sounds normal.   Musculoskeletal: She exhibits no edema.   Neurological: She is alert and oriented  to person, place, and time.   Skin: Skin is warm and dry.   Psychiatric: Her speech is normal and behavior is normal. Thought content normal. Her mood appears anxious. She exhibits a depressed mood.   Nursing note and vitals reviewed.      Assessment:       1. Anxiety    2. Insomnia, unspecified type    3. Severe major depression        Plan:       1. Anxiety  Refer for eval and treat  - Ambulatory referral to Psychiatry  Increase to bid as needed  - clonazePAM (KLONOPIN) 1 MG disintegrating tablet; Take 1 tablet (1 mg total) by mouth 2 (two) times daily as needed.  Dispense: 60 tablet; Refill: 0    2. Insomnia, unspecified type  Increase  - Ambulatory referral to Psychiatry  - QUEtiapine (SEROQUEL) 50 MG tablet; Take 3 tablets (150 mg total) by mouth every evening.  Dispense: 90 tablet; Refill: 5    3. Severe major depression  Has responded well to ECT in past. Sx may be worsening to this point now  - Ambulatory referral to Psychiatry  - QUEtiapine (SEROQUEL) 50 MG tablet; Take 3 tablets (150 mg total) by mouth every evening.  Dispense: 90 tablet; Refill: 5      Reeval 2 weeks

## 2020-01-29 DIAGNOSIS — F32.A DEPRESSION, UNSPECIFIED DEPRESSION TYPE: ICD-10-CM

## 2020-01-29 RX ORDER — PAROXETINE HYDROCHLORIDE 40 MG/1
40 TABLET, FILM COATED ORAL DAILY
Qty: 90 TABLET | Refills: 3 | Status: SHIPPED | OUTPATIENT
Start: 2020-01-29 | End: 2021-02-25

## 2020-02-03 ENCOUNTER — PATIENT MESSAGE (OUTPATIENT)
Dept: FAMILY MEDICINE | Facility: CLINIC | Age: 70
End: 2020-02-03

## 2020-02-03 ENCOUNTER — OFFICE VISIT (OUTPATIENT)
Dept: FAMILY MEDICINE | Facility: CLINIC | Age: 70
End: 2020-02-03
Payer: MEDICARE

## 2020-02-03 VITALS
HEIGHT: 63 IN | DIASTOLIC BLOOD PRESSURE: 60 MMHG | HEART RATE: 70 BPM | OXYGEN SATURATION: 95 % | TEMPERATURE: 98 F | WEIGHT: 201.06 LBS | BODY MASS INDEX: 35.62 KG/M2 | SYSTOLIC BLOOD PRESSURE: 120 MMHG | RESPIRATION RATE: 14 BRPM

## 2020-02-03 DIAGNOSIS — F32.2 SEVERE MAJOR DEPRESSION: Primary | ICD-10-CM

## 2020-02-03 DIAGNOSIS — F41.9 ANXIETY: ICD-10-CM

## 2020-02-03 PROCEDURE — 99999 PR PBB SHADOW E&M-EST. PATIENT-LVL III: CPT | Mod: PBBFAC,,, | Performed by: FAMILY MEDICINE

## 2020-02-03 PROCEDURE — 99213 PR OFFICE/OUTPT VISIT, EST, LEVL III, 20-29 MIN: ICD-10-PCS | Mod: S$PBB,,, | Performed by: FAMILY MEDICINE

## 2020-02-03 PROCEDURE — 99213 OFFICE O/P EST LOW 20 MIN: CPT | Mod: PBBFAC,PO | Performed by: FAMILY MEDICINE

## 2020-02-03 PROCEDURE — 99213 OFFICE O/P EST LOW 20 MIN: CPT | Mod: S$PBB,,, | Performed by: FAMILY MEDICINE

## 2020-02-03 PROCEDURE — 99999 PR PBB SHADOW E&M-EST. PATIENT-LVL III: ICD-10-PCS | Mod: PBBFAC,,, | Performed by: FAMILY MEDICINE

## 2020-02-03 NOTE — PROGRESS NOTES
Subjective:       Patient ID: Betty Bacon is a 69 y.o. female.    Chief Complaint: Follow-up (2wk f/u anxiety)    HPI  Review of Systems   Constitutional: Negative for fatigue and unexpected weight change.   Respiratory: Negative for chest tightness and shortness of breath.    Cardiovascular: Negative for chest pain, palpitations and leg swelling.   Gastrointestinal: Negative for abdominal pain.   Musculoskeletal: Negative for arthralgias.   Neurological: Negative for dizziness, syncope, light-headedness and headaches.   Psychiatric/Behavioral: Negative for agitation, behavioral problems, dysphoric mood, self-injury, sleep disturbance and suicidal ideas. The patient is not nervous/anxious.        Patient Active Problem List   Diagnosis    CKD (chronic kidney disease) stage 3, GFR 30-59 ml/min    COPD (chronic obstructive pulmonary disease)    LVH (left ventricular hypertrophy) due to hypertensive disease    Depression    Unstable angina    PAD (peripheral artery disease)    Generalized anxiety disorder    Aortic stenosis    CAD (coronary artery disease)    Abnormal stress test    GERD (gastroesophageal reflux disease)    Interstitial lung disease    Osteopenia    History of electroconvulsive therapy    Hyperlipidemia    HTN (hypertension)    Former smoker    Impaired fasting blood sugar    Severe obesity with body mass index (BMI) of 35.0 to 39.9 with serious comorbidity    Secondary hyperparathyroidism of renal origin    Chronic left-sided thoracic back pain    Acute on chronic diastolic heart failure     Patient is here for f/u mood. Has been feeling much better with daily klonopin and increase in seroquel to 150mg.  Has been socializing more with friends and family. Less tearfulness and fearfulness of leaving home. Sleeping well. Has not been successful yet at getting list of psych MDs that accept her inusrance but is working on it    Objective:      Physical Exam    Constitutional: She is oriented to person, place, and time. She appears well-developed and well-nourished.   Cardiovascular: Normal rate, regular rhythm and normal heart sounds.   Pulmonary/Chest: Effort normal and breath sounds normal.   Musculoskeletal: She exhibits no edema.   Neurological: She is alert and oriented to person, place, and time.   Skin: Skin is warm and dry.   Psychiatric: She has a normal mood and affect. Her speech is normal and behavior is normal. Thought content normal.   Nursing note and vitals reviewed.      Assessment:       1. Severe major depression    2. Anxiety        Plan:       1. Severe major depression  Improving. Cont current meds. Seek psychiatry support     2. Anxiety  Cont current mgmt    Time spent with patient: 20 minutes    Patient with be reevaluated in 4 weeks or sooner prn    Greater than 50% of this visit was spent counseling as described in above documentation:Yes

## 2020-02-26 RX ORDER — ATORVASTATIN CALCIUM 40 MG/1
40 TABLET, FILM COATED ORAL DAILY
Qty: 90 TABLET | Refills: 3 | Status: SHIPPED | OUTPATIENT
Start: 2020-02-26 | End: 2021-02-25

## 2020-02-26 NOTE — TELEPHONE ENCOUNTER
Prescription refill request.   LOV: 02/03/2020  NOV: 03/04/2020  LDR: 12/03/2019  Last Labs: 11/01/2019

## 2020-03-04 ENCOUNTER — PATIENT MESSAGE (OUTPATIENT)
Dept: CARDIOLOGY | Facility: CLINIC | Age: 70
End: 2020-03-04

## 2020-03-04 ENCOUNTER — OFFICE VISIT (OUTPATIENT)
Dept: FAMILY MEDICINE | Facility: CLINIC | Age: 70
End: 2020-03-04
Payer: MEDICARE

## 2020-03-04 VITALS
BODY MASS INDEX: 35.55 KG/M2 | WEIGHT: 200.63 LBS | TEMPERATURE: 98 F | RESPIRATION RATE: 14 BRPM | HEART RATE: 68 BPM | DIASTOLIC BLOOD PRESSURE: 57 MMHG | HEIGHT: 63 IN | SYSTOLIC BLOOD PRESSURE: 110 MMHG | OXYGEN SATURATION: 95 %

## 2020-03-04 DIAGNOSIS — F41.9 ANXIETY: ICD-10-CM

## 2020-03-04 DIAGNOSIS — R73.01 IMPAIRED FASTING BLOOD SUGAR: ICD-10-CM

## 2020-03-04 DIAGNOSIS — F32.A DEPRESSION, UNSPECIFIED DEPRESSION TYPE: ICD-10-CM

## 2020-03-04 DIAGNOSIS — I25.10 CORONARY ARTERY DISEASE INVOLVING NATIVE CORONARY ARTERY OF NATIVE HEART WITHOUT ANGINA PECTORIS: ICD-10-CM

## 2020-03-04 DIAGNOSIS — E66.01 SEVERE OBESITY WITH BODY MASS INDEX (BMI) OF 35.0 TO 39.9 WITH SERIOUS COMORBIDITY: ICD-10-CM

## 2020-03-04 DIAGNOSIS — N18.30 CKD (CHRONIC KIDNEY DISEASE) STAGE 3, GFR 30-59 ML/MIN: Primary | ICD-10-CM

## 2020-03-04 DIAGNOSIS — I10 ESSENTIAL HYPERTENSION: ICD-10-CM

## 2020-03-04 DIAGNOSIS — E79.0 HYPERURICEMIA: ICD-10-CM

## 2020-03-04 DIAGNOSIS — E83.39 HYPOPHOSPHATEMIA: ICD-10-CM

## 2020-03-04 DIAGNOSIS — E78.2 MIXED HYPERLIPIDEMIA: ICD-10-CM

## 2020-03-04 DIAGNOSIS — N25.81 SECONDARY HYPERPARATHYROIDISM OF RENAL ORIGIN: ICD-10-CM

## 2020-03-04 PROCEDURE — 99214 OFFICE O/P EST MOD 30 MIN: CPT | Mod: PBBFAC,PO | Performed by: FAMILY MEDICINE

## 2020-03-04 PROCEDURE — 99214 OFFICE O/P EST MOD 30 MIN: CPT | Mod: S$PBB,,, | Performed by: FAMILY MEDICINE

## 2020-03-04 PROCEDURE — 99999 PR PBB SHADOW E&M-EST. PATIENT-LVL IV: ICD-10-PCS | Mod: PBBFAC,,, | Performed by: FAMILY MEDICINE

## 2020-03-04 PROCEDURE — 99214 PR OFFICE/OUTPT VISIT, EST, LEVL IV, 30-39 MIN: ICD-10-PCS | Mod: S$PBB,,, | Performed by: FAMILY MEDICINE

## 2020-03-04 PROCEDURE — 99999 PR PBB SHADOW E&M-EST. PATIENT-LVL IV: CPT | Mod: PBBFAC,,, | Performed by: FAMILY MEDICINE

## 2020-03-04 RX ORDER — CLONAZEPAM 1 MG/1
1 TABLET, ORALLY DISINTEGRATING ORAL 2 TIMES DAILY PRN
Qty: 60 TABLET | Refills: 0 | Status: SHIPPED | OUTPATIENT
Start: 2020-03-04 | End: 2021-09-14 | Stop reason: SDUPTHER

## 2020-03-04 NOTE — PATIENT INSTRUCTIONS

## 2020-03-04 NOTE — PROGRESS NOTES
Subjective:       Patient ID: Betty Bacon is a 70 y.o. female.    Chief Complaint: Follow-up (3mth f/u hypertension)    HPI  Review of Systems   Constitutional: Negative for fatigue and unexpected weight change.   Respiratory: Negative for chest tightness and shortness of breath.    Cardiovascular: Negative for chest pain, palpitations and leg swelling.   Gastrointestinal: Negative for abdominal pain.   Musculoskeletal: Negative for arthralgias.   Neurological: Negative for dizziness, syncope, light-headedness and headaches.       Patient Active Problem List   Diagnosis    CKD (chronic kidney disease) stage 3, GFR 30-59 ml/min    COPD (chronic obstructive pulmonary disease)    LVH (left ventricular hypertrophy) due to hypertensive disease    Depression    Unstable angina    PAD (peripheral artery disease)    Generalized anxiety disorder    Aortic stenosis    CAD (coronary artery disease)    Abnormal stress test    GERD (gastroesophageal reflux disease)    Interstitial lung disease    Osteopenia    History of electroconvulsive therapy    Hyperlipidemia    HTN (hypertension)    Former smoker    Impaired fasting blood sugar    Severe obesity with body mass index (BMI) of 35.0 to 39.9 with serious comorbidity    Secondary hyperparathyroidism of renal origin    Chronic left-sided thoracic back pain    Acute on chronic diastolic heart failure     Patient is here for a chronic conditions follow up.   Here to f/u mood which has been consistently better since increasing seroquel.  Has been more social, less down, mood is less labile and anxiety has been manageable.        History:   Card Dr. Sahu CAD s/p stents, PVD on plavix and imdur     Psych hospitalized for depression in past 2003-04. Had ECT therapy.  Lives alone. Drives, does all ADLs by herself, cooks, cleans.  Xanax prn .  Lost  of 50 years.  Has good support from children and friends     Nephrology Dr. Morrow CKd  stage 3, uric acid , PTH elevation     GI Dr. Trevizo -gerd, dysphagia, IBS with diarrhea. meds helping     Podiatry Dr. Malave- gout attack.  Uric acid 9.8 . Has been counseled on low purine diet  Objective:      Physical Exam   Constitutional: She is oriented to person, place, and time. She appears well-developed and well-nourished.   Cardiovascular: Normal rate, regular rhythm and normal heart sounds.   Pulmonary/Chest: Effort normal and breath sounds normal.   Musculoskeletal: She exhibits no edema.   Neurological: She is alert and oriented to person, place, and time.   Skin: Skin is warm and dry.   Psychiatric: She has a normal mood and affect.   Nursing note and vitals reviewed.      Assessment:       1. CKD (chronic kidney disease) stage 3, GFR 30-59 ml/min    2. Anxiety    3. Depression, unspecified depression type    4. Coronary artery disease involving native coronary artery of native heart without angina pectoris    5. Mixed hyperlipidemia    6. Essential hypertension    7. Impaired fasting blood sugar    8. Secondary hyperparathyroidism of renal origin    9. Severe obesity with body mass index (BMI) of 35.0 to 39.9 with serious comorbidity    10. Hypophosphatemia    11. Hyperuricemia        Plan:         1. Anxiety  Cont prn  - clonazePAM (KLONOPIN) 1 MG disintegrating tablet; Take 1 tablet (1 mg total) by mouth 2 (two) times daily as needed.  Dispense: 60 tablet; Refill: 0    2. CKD (chronic kidney disease) stage 3, GFR 30-59 ml/min  Stable and chronic.  Will continue to monitor q3-6 months and control chronic conditions as optimally as possible to preserve function.      3. Depression, unspecified depression type  Improved. monitor    4. Coronary artery disease involving native coronary artery of native heart without angina pectoris  Asx. Cont current mgmt    5. Mixed hyperlipidemia  Stable condition.  Continue current medications.  Will adjust based on lab findings or if condition changes.      6.  "Essential hypertension  Controlled on current medications.  Continue current medications.    - Lipid panel; Future    7. Impaired fasting blood sugar   Your blood sugar is borderline high.  This means you are at risk for developing type 2 diabetes mellitus.  To lessen your risk you should exercise regularly, avoid excess carbohydrates and work toward a body mass index of less than 25.      - CBC auto differential; Future  - Comprehensive metabolic panel; Future  - Hemoglobin A1c; Future    8. Secondary hyperparathyroidism of renal origin  Cont monitoring    9. Severe obesity with body mass index (BMI) of 35.0 to 39.9 with serious comorbidity  Counseled patient on his ideal body weight, health consequences of being obese and current recommendations including weekly exercise and a heart healthy diet.  Current BMI is:Estimated body mass index is 35.54 kg/m² as calculated from the following:    Height as of this encounter: 5' 3" (1.6 m).    Weight as of this encounter: 91 kg (200 lb 9.9 oz)..  Patient is aware that ideal BMI < 25 or Weight in (lb) to have BMI = 25: 140.8.        10. Hypophosphatemia  Screen and treat as indicated:    - PHOSPHORUS; Future    11. Hyperuricemia  Screen and treat as indicated:    - Uric acid; Future        Time spent with patient: 20 minutes    Patient with be reevaluated in 3 months or sooner prn    Greater than 50% of this visit was spent counseling as described in above documentation:Yes  "

## 2020-03-05 ENCOUNTER — PATIENT MESSAGE (OUTPATIENT)
Dept: CARDIOLOGY | Facility: CLINIC | Age: 70
End: 2020-03-05

## 2020-03-16 ENCOUNTER — PATIENT MESSAGE (OUTPATIENT)
Dept: CARDIOLOGY | Facility: CLINIC | Age: 70
End: 2020-03-16

## 2020-03-26 ENCOUNTER — TELEPHONE (OUTPATIENT)
Dept: NEPHROLOGY | Facility: CLINIC | Age: 70
End: 2020-03-26

## 2020-03-26 DIAGNOSIS — N18.30 CKD (CHRONIC KIDNEY DISEASE) STAGE 3, GFR 30-59 ML/MIN: Primary | ICD-10-CM

## 2020-03-26 DIAGNOSIS — I10 ESSENTIAL HYPERTENSION: ICD-10-CM

## 2020-03-27 RX ORDER — ISOSORBIDE MONONITRATE 60 MG/1
60 TABLET, EXTENDED RELEASE ORAL DAILY
Qty: 30 TABLET | Refills: 11 | Status: SHIPPED | OUTPATIENT
Start: 2020-03-27 | End: 2021-04-12

## 2020-04-02 NOTE — TELEPHONE ENCOUNTER
Advised patient Dr Morrow advised to move her appointments out to June or July.      Advised patient will send message to Dr Callejas staff for their advice as well, since their labs are coordinated with ours.     Also will message Dr Borjas since she like to coordinate her visits to [place] on same day.

## 2020-04-02 NOTE — PROGRESS NOTES
Outpatient Therapy Discharge Summary     Name: Betty Yang [facility] Number: [id number]    Therapy Diagnosis:   Encounter Diagnosis   Name Primary?    Chronic left-sided thoracic back pain Yes     Physician: Yuni Canada FNP*    Physician Orders: eval and treat  Medical Diagnosis: back pain  Evaluation Date: 5/27/19      Date of Last visit: 6/19/19  Total Visits Received: 5  Cancelled Visits: NA  No Show Visits: NA    Assessment    Goals: Not met    Discharge reason: Patient has not attended therapy since 6/19/19    Plan   This patient is discharged from Physical Therapy due to nonattendance.

## 2020-05-05 ENCOUNTER — PATIENT MESSAGE (OUTPATIENT)
Dept: ADMINISTRATIVE | Facility: HOSPITAL | Age: 70
End: 2020-05-05

## 2020-05-29 NOTE — PATIENT INSTRUCTIONS
Costochondritis    Costochondritis is inflammation of a rib or the cartilage that connects a rib to your breastbone (sternum). It causes tenderness, and sometimes chest pain may be sharp or aching, or it may feel like pressure. Pain may get worse with deep breathing, movement, or exercise. In some cases, the pain is mistaken for a heart attack. Despite this, the condition is not serious. Read on to learn more about the condition and how it can be treated.  What causes costochondritis?  The cause of costochondritis is not completely clear, but it may happen after a chest injury, chest infection, or coughing episode. Some physical activities can sometimes lead to costochondritis. Large-breasted women may be more likely to have the condition. Often, the reason for the inflammation is unknown.  Diagnosing costochondritis  There is no test for costochondritis. The condition is diagnosed by the symptoms you have. Your healthcare provider will perform a physical exam. He or she will ask you about your symptoms and examine your chest for tenderness. In some cases, tests are done to rule out more serious problems. These tests may include imaging tests such as chest X-ray, CT scan, or an ECG.  Treating costochondritis  If an underlying cause is found, treatment for that will likely relieve the problem. Costochondritis often goes away on its own. The course of the condition varies from person to person. It usually lasts from weeks to months. In some cases, mild symptoms continue for months to years. To ease symptoms:  · Take medicine as directed. These relieve pain and swelling. Ibuprofen or other NSAIDs are often recommended. In some cases, you may be given prescription medicine, such as muscle relaxants.  · Avoid activities that put stress on the chest or spine.  · Apply a heating pad (set to warm, not too high, heat) to the breastbone several times a day.  · Perform stretching exercises as directed.  Call the healthcare  provider right away if you have any of the following:  · Pain that is not relieved by medicine  · Shortness of breath  · Lightheadedness, dizziness, or fainting  · Feeling of irregular heartbeat or fast pulse  Anyone with chest pain should see a healthcare provider, especially those who are older and may be at risk for heart disease.   Date Last Reviewed: 10/1/2016  © 3426-1294 [employer]. [address] [place] [address], [place] [address]. All rights reserved. This information is not intended as a substitute for professional medical care. Always follow your healthcare professional's instructions.         Axis 57 degrees    R Axis 61 degrees    T Axis -

## 2020-06-03 DIAGNOSIS — K21.9 GASTROESOPHAGEAL REFLUX DISEASE, ESOPHAGITIS PRESENCE NOT SPECIFIED: ICD-10-CM

## 2020-06-03 NOTE — TELEPHONE ENCOUNTER
----- Message from Emilie Tyrell sent at 6/3/2020  2:20 PM CDT -----  Contact: Patient [phone]  Requesting an RX refill or new RX.  Is this a refill or new RX:  refill  RX name and strength: esomeprazole (NEXIUM) 40 MG capsule  Directions (copy/paste from chart):    Is this a 30 day or 90 day RX:    Local pharmacy or mail order pharmacy:  local  Pharmacy name and phone # (copy/paste from chart):   [id number] - [place], [place] - [address] AT [facility] [phone] (Phone)  [phone] (Fax)    Comments:

## 2020-06-05 RX ORDER — ESOMEPRAZOLE MAGNESIUM 40 MG/1
40 CAPSULE, DELAYED RELEASE ORAL
Qty: 90 CAPSULE | Refills: 3 | Status: SHIPPED | OUTPATIENT
Start: 2020-06-05 | End: 2021-07-12

## 2020-06-16 ENCOUNTER — PATIENT OUTREACH (OUTPATIENT)
Dept: ADMINISTRATIVE | Facility: OTHER | Age: 70
End: 2020-06-16

## 2020-06-17 ENCOUNTER — OFFICE VISIT (OUTPATIENT)
Dept: NEPHROLOGY | Facility: CLINIC | Age: 70
End: 2020-06-17
Payer: MEDICARE

## 2020-06-17 VITALS
WEIGHT: 205.5 LBS | HEART RATE: 72 BPM | BODY MASS INDEX: 36.41 KG/M2 | SYSTOLIC BLOOD PRESSURE: 134 MMHG | HEIGHT: 63 IN | DIASTOLIC BLOOD PRESSURE: 58 MMHG

## 2020-06-17 DIAGNOSIS — E78.2 MIXED HYPERLIPIDEMIA: ICD-10-CM

## 2020-06-17 DIAGNOSIS — E66.01 SEVERE OBESITY WITH BODY MASS INDEX (BMI) OF 35.0 TO 39.9 WITH SERIOUS COMORBIDITY: ICD-10-CM

## 2020-06-17 DIAGNOSIS — I11.9 HYPERTENSIVE LEFT VENTRICULAR HYPERTROPHY, WITHOUT HEART FAILURE: ICD-10-CM

## 2020-06-17 DIAGNOSIS — J84.9 INTERSTITIAL LUNG DISEASE: ICD-10-CM

## 2020-06-17 DIAGNOSIS — F41.1 GENERALIZED ANXIETY DISORDER: ICD-10-CM

## 2020-06-17 DIAGNOSIS — Z87.891 FORMER SMOKER: ICD-10-CM

## 2020-06-17 DIAGNOSIS — N25.81 SECONDARY HYPERPARATHYROIDISM OF RENAL ORIGIN: ICD-10-CM

## 2020-06-17 DIAGNOSIS — N18.30 CKD (CHRONIC KIDNEY DISEASE) STAGE 3, GFR 30-59 ML/MIN: Primary | ICD-10-CM

## 2020-06-17 DIAGNOSIS — E66.01 CLASS 2 SEVERE OBESITY WITH SERIOUS COMORBIDITY AND BODY MASS INDEX (BMI) OF 35.0 TO 35.9 IN ADULT, UNSPECIFIED OBESITY TYPE: ICD-10-CM

## 2020-06-17 DIAGNOSIS — I10 ESSENTIAL HYPERTENSION: ICD-10-CM

## 2020-06-17 DIAGNOSIS — I73.9 PAD (PERIPHERAL ARTERY DISEASE): ICD-10-CM

## 2020-06-17 DIAGNOSIS — I25.10 CORONARY ARTERY DISEASE INVOLVING NATIVE CORONARY ARTERY OF NATIVE HEART WITHOUT ANGINA PECTORIS: ICD-10-CM

## 2020-06-17 DIAGNOSIS — F32.A DEPRESSION, UNSPECIFIED DEPRESSION TYPE: ICD-10-CM

## 2020-06-17 DIAGNOSIS — I35.0 AORTIC VALVE STENOSIS, ETIOLOGY OF CARDIAC VALVE DISEASE UNSPECIFIED: ICD-10-CM

## 2020-06-17 DIAGNOSIS — J44.9 CHRONIC OBSTRUCTIVE PULMONARY DISEASE, UNSPECIFIED COPD TYPE: ICD-10-CM

## 2020-06-17 PROCEDURE — 99214 OFFICE O/P EST MOD 30 MIN: CPT | Mod: S$PBB,,, | Performed by: INTERNAL MEDICINE

## 2020-06-17 PROCEDURE — 99214 OFFICE O/P EST MOD 30 MIN: CPT | Mod: PBBFAC,PO | Performed by: INTERNAL MEDICINE

## 2020-06-17 PROCEDURE — 99999 PR PBB SHADOW E&M-EST. PATIENT-LVL IV: CPT | Mod: PBBFAC,,, | Performed by: INTERNAL MEDICINE

## 2020-06-17 PROCEDURE — 99999 PR PBB SHADOW E&M-EST. PATIENT-LVL IV: ICD-10-PCS | Mod: PBBFAC,,, | Performed by: INTERNAL MEDICINE

## 2020-06-17 PROCEDURE — 99214 PR OFFICE/OUTPT VISIT, EST, LEVL IV, 30-39 MIN: ICD-10-PCS | Mod: S$PBB,,, | Performed by: INTERNAL MEDICINE

## 2020-06-17 NOTE — PROGRESS NOTES
Subjective:       Patient ID: Betty Bacon is a 70 y.o. White female who presents for follow-up evaluation of Follow-up    Follow-up  Pertinent negatives include no arthralgias, chest pain, fatigue, headaches, rash or weakness.      She reports she is doing well. She escaped the flu. . No LE edema and no SOB. Her appetite is too good and she is still pushing fluids. No new medications since last visit. No LUTS. Since last visit her nephew and her sister with advanced dementia passed away. She is not taking her BP at home  She received the flu vaccine. Repeat O2 level was 93% after resting for 10 minutes     Interval history June 2020: She is doing very well. No recent bronchitis. No change in LE edema.     Review of Systems   Constitutional: Negative for activity change, appetite change, fatigue and unexpected weight change.   HENT: Negative for facial swelling.    Eyes: Negative for visual disturbance.   Respiratory: Positive for shortness of breath (chronic).    Cardiovascular: Negative for chest pain and leg swelling.   Gastrointestinal: Negative for constipation and diarrhea.   Genitourinary: Negative for difficulty urinating, dysuria and hematuria.   Musculoskeletal: Negative for arthralgias.   Skin: Negative for rash.   Neurological: Negative for weakness and headaches.   Hematological: Bruises/bleeds easily.   Psychiatric/Behavioral: Negative for confusion.       Objective:      Physical Exam  Vitals signs and nursing note reviewed.   Constitutional:       General: She is not in acute distress.  Eyes:      General: No scleral icterus.  Neck:      Vascular: No JVD.   Cardiovascular:      Heart sounds: S1 normal and S2 normal. No friction rub.   Pulmonary:      Breath sounds: Normal breath sounds. No wheezing or rales.   Abdominal:      Palpations: Abdomen is soft.   Skin:     General: Skin is warm and dry.   Neurological:      Mental Status: She is alert and oriented to person, place, and time.          Assessment:       1. CKD (chronic kidney disease) stage 3, GFR 30-59 ml/min    2. Essential hypertension    3. Hypertensive left ventricular hypertrophy, without heart failure    4. PAD (peripheral artery disease)    5. Coronary artery disease involving native coronary artery of native heart without angina pectoris    6. Aortic valve stenosis, etiology of cardiac valve disease unspecified    7. Secondary hyperparathyroidism of renal origin    8. Severe obesity with body mass index (BMI) of 35.0 to 39.9 with serious comorbidity    9. Former smoker    10. Interstitial lung disease    11. Chronic obstructive pulmonary disease, unspecified COPD type    12. Generalized anxiety disorder    13. Depression, unspecified depression type    14. Mixed hyperlipidemia    15. Class 2 severe obesity with serious comorbidity and body mass index (BMI) of 35.0 to 35.9 in adult, unspecified obesity type        Plan:             CKD Stage 3 with stable kidney function and proteinuria.  Discussed lab results in detail    HTN--is controlled    Mineral and Bone Disease--continue D3, D level and PTH were at goal, recheck PTH at next visit    Hyperglycemia--highest Hba1c was 6.2, most recent is 5.7    Former smoker/COPD/ILD--continue Pulmonary follow up         RTC 6 months with labs prior

## 2020-06-22 ENCOUNTER — OFFICE VISIT (OUTPATIENT)
Dept: FAMILY MEDICINE | Facility: CLINIC | Age: 70
End: 2020-06-22
Payer: MEDICARE

## 2020-06-22 ENCOUNTER — CLINICAL SUPPORT (OUTPATIENT)
Dept: CARDIOLOGY | Facility: CLINIC | Age: 70
End: 2020-06-22
Attending: PHYSICIAN ASSISTANT
Payer: MEDICARE

## 2020-06-22 ENCOUNTER — LAB VISIT (OUTPATIENT)
Dept: LAB | Facility: HOSPITAL | Age: 70
End: 2020-06-22
Attending: FAMILY MEDICINE
Payer: MEDICARE

## 2020-06-22 VITALS — WEIGHT: 205.5 LBS | BODY MASS INDEX: 37.81 KG/M2 | HEART RATE: 71 BPM | HEIGHT: 62 IN

## 2020-06-22 VITALS
DIASTOLIC BLOOD PRESSURE: 56 MMHG | HEART RATE: 65 BPM | RESPIRATION RATE: 14 BRPM | WEIGHT: 205.5 LBS | BODY MASS INDEX: 36.41 KG/M2 | SYSTOLIC BLOOD PRESSURE: 138 MMHG | OXYGEN SATURATION: 95 % | HEIGHT: 63 IN | TEMPERATURE: 97 F

## 2020-06-22 DIAGNOSIS — E78.2 MIXED HYPERLIPIDEMIA: ICD-10-CM

## 2020-06-22 DIAGNOSIS — N25.81 SECONDARY HYPERPARATHYROIDISM OF RENAL ORIGIN: ICD-10-CM

## 2020-06-22 DIAGNOSIS — J44.9 CHRONIC OBSTRUCTIVE PULMONARY DISEASE, UNSPECIFIED COPD TYPE: ICD-10-CM

## 2020-06-22 DIAGNOSIS — E66.01 SEVERE OBESITY WITH BODY MASS INDEX (BMI) OF 35.0 TO 39.9 WITH SERIOUS COMORBIDITY: ICD-10-CM

## 2020-06-22 DIAGNOSIS — I73.9 PAD (PERIPHERAL ARTERY DISEASE): ICD-10-CM

## 2020-06-22 DIAGNOSIS — N17.9 AKI (ACUTE KIDNEY INJURY): ICD-10-CM

## 2020-06-22 DIAGNOSIS — I50.32 CHRONIC DIASTOLIC HEART FAILURE: ICD-10-CM

## 2020-06-22 DIAGNOSIS — F32.A DEPRESSION, UNSPECIFIED DEPRESSION TYPE: ICD-10-CM

## 2020-06-22 DIAGNOSIS — Z12.31 ENCOUNTER FOR SCREENING MAMMOGRAM FOR MALIGNANT NEOPLASM OF BREAST: ICD-10-CM

## 2020-06-22 DIAGNOSIS — R73.01 IMPAIRED FASTING BLOOD SUGAR: ICD-10-CM

## 2020-06-22 DIAGNOSIS — I10 ESSENTIAL HYPERTENSION: Primary | ICD-10-CM

## 2020-06-22 DIAGNOSIS — I35.0 AORTIC VALVE STENOSIS, ETIOLOGY OF CARDIAC VALVE DISEASE UNSPECIFIED: ICD-10-CM

## 2020-06-22 DIAGNOSIS — N18.30 CKD (CHRONIC KIDNEY DISEASE) STAGE 3, GFR 30-59 ML/MIN: ICD-10-CM

## 2020-06-22 DIAGNOSIS — N95.9 MENOPAUSAL AND PERIMENOPAUSAL DISORDER: ICD-10-CM

## 2020-06-22 DIAGNOSIS — N18.30 STAGE 3 CHRONIC KIDNEY DISEASE: ICD-10-CM

## 2020-06-22 DIAGNOSIS — J84.9 INTERSTITIAL LUNG DISEASE: ICD-10-CM

## 2020-06-22 LAB
ANION GAP SERPL CALC-SCNC: 10 MMOL/L (ref 8–16)
ASCENDING AORTA: 3.19 CM
AV INDEX (PROSTH): 0.33
AV MEAN GRADIENT: 22 MMHG
AV PEAK GRADIENT: 36 MMHG
AV REGURGITATION PRESSURE HALF TIME: 600 MS
AV VALVE AREA: 1.09 CM2
AV VELOCITY RATIO: 0.32
BSA FOR ECHO PROCEDURE: 2.02 M2
BUN SERPL-MCNC: 28 MG/DL (ref 8–23)
CALCIUM SERPL-MCNC: 9.1 MG/DL (ref 8.7–10.5)
CHLORIDE SERPL-SCNC: 109 MMOL/L (ref 95–110)
CO2 SERPL-SCNC: 23 MMOL/L (ref 23–29)
CREAT SERPL-MCNC: 1.5 MG/DL (ref 0.5–1.4)
CV ECHO LV RWT: 0.36 CM
DOP CALC AO PEAK VEL: 2.99 M/S
DOP CALC AO VTI: 89.02 CM
DOP CALC LVOT AREA: 3.3 CM2
DOP CALC LVOT DIAMETER: 2.05 CM
DOP CALC LVOT PEAK VEL: 0.97 M/S
DOP CALC LVOT STROKE VOLUME: 97.09 CM3
DOP CALCLVOT PEAK VEL VTI: 29.43 CM
E WAVE DECELERATION TIME: 179.62 MSEC
E/A RATIO: 1.27
E/E' RATIO: 16.5 M/S
ECHO LV POSTERIOR WALL: 1.03 CM (ref 0.6–1.1)
EST. GFR  (AFRICAN AMERICAN): 39.8 ML/MIN/1.73 M^2
EST. GFR  (NON AFRICAN AMERICAN): 34.6 ML/MIN/1.73 M^2
FRACTIONAL SHORTENING: 44 % (ref 28–44)
GLUCOSE SERPL-MCNC: 115 MG/DL (ref 70–110)
INTERVENTRICULAR SEPTUM: 0.95 CM (ref 0.6–1.1)
IVRT: 156.86 MSEC
LA MAJOR: 5.63 CM
LA MINOR: 5.58 CM
LA WIDTH: 3.72 CM
LEFT ATRIUM SIZE: 4.13 CM
LEFT ATRIUM VOLUME INDEX: 37.9 ML/M2
LEFT ATRIUM VOLUME: 73.19 CM3
LEFT INTERNAL DIMENSION IN SYSTOLE: 3.19 CM (ref 2.1–4)
LEFT VENTRICLE DIASTOLIC VOLUME INDEX: 82.73 ML/M2
LEFT VENTRICLE DIASTOLIC VOLUME: 159.96 ML
LEFT VENTRICLE MASS INDEX: 116 G/M2
LEFT VENTRICLE SYSTOLIC VOLUME INDEX: 21 ML/M2
LEFT VENTRICLE SYSTOLIC VOLUME: 40.55 ML
LEFT VENTRICULAR INTERNAL DIMENSION IN DIASTOLE: 5.7 CM (ref 3.5–6)
LEFT VENTRICULAR MASS: 223.4 G
LV LATERAL E/E' RATIO: 14.14 M/S
LV SEPTAL E/E' RATIO: 19.8 M/S
MV PEAK A VEL: 0.78 M/S
MV PEAK E VEL: 0.99 M/S
MV STENOSIS PRESSURE HALF TIME: 52.09 MS
MV VALVE AREA P 1/2 METHOD: 4.22 CM2
PISA TR MAX VEL: 2.83 M/S
POTASSIUM SERPL-SCNC: 4.5 MMOL/L (ref 3.5–5.1)
PULM VEIN S/D RATIO: 1.18
PV PEAK D VEL: 0.28 M/S
PV PEAK S VEL: 0.33 M/S
PV PEAK VELOCITY: 0.97 CM/S
RA MAJOR: 5.48 CM
RA PRESSURE: 3 MMHG
RA WIDTH: 3.9 CM
RIGHT VENTRICULAR END-DIASTOLIC DIMENSION: 2.71 CM
RV TISSUE DOPPLER FREE WALL SYSTOLIC VELOCITY 1 (APICAL 4 CHAMBER VIEW): 10.62 CM/S
SINUS: 2.23 CM
SODIUM SERPL-SCNC: 142 MMOL/L (ref 136–145)
STJ: 2.77 CM
TDI LATERAL: 0.07 M/S
TDI SEPTAL: 0.05 M/S
TDI: 0.06 M/S
TR MAX PG: 32 MMHG
TRICUSPID ANNULAR PLANE SYSTOLIC EXCURSION: 2.05 CM
TV REST PULMONARY ARTERY PRESSURE: 35 MMHG

## 2020-06-22 PROCEDURE — 93356 MYOCRD STRAIN IMG SPCKL TRCK: CPT

## 2020-06-22 PROCEDURE — 93306 TTE W/DOPPLER COMPLETE: CPT | Mod: PBBFAC,PO | Performed by: INTERNAL MEDICINE

## 2020-06-22 PROCEDURE — 99999 PR PBB SHADOW E&M-EST. PATIENT-LVL IV: CPT | Mod: PBBFAC,,, | Performed by: FAMILY MEDICINE

## 2020-06-22 PROCEDURE — 99999 PR PBB SHADOW E&M-EST. PATIENT-LVL IV: ICD-10-PCS | Mod: PBBFAC,,, | Performed by: FAMILY MEDICINE

## 2020-06-22 PROCEDURE — 99212 OFFICE O/P EST SF 10 MIN: CPT | Mod: PBBFAC,27,PO,25

## 2020-06-22 PROCEDURE — 93356 PR IMG, MYOCARDIAL STRAIN, SPECKLE TRACKING: ICD-10-PCS | Mod: S$PBB,,, | Performed by: INTERNAL MEDICINE

## 2020-06-22 PROCEDURE — 99214 OFFICE O/P EST MOD 30 MIN: CPT | Mod: S$PBB,,, | Performed by: FAMILY MEDICINE

## 2020-06-22 PROCEDURE — 99999 PR PBB SHADOW E&M-EST. PATIENT-LVL II: ICD-10-PCS | Mod: PBBFAC,,,

## 2020-06-22 PROCEDURE — 99214 OFFICE O/P EST MOD 30 MIN: CPT | Mod: PBBFAC,PO | Performed by: FAMILY MEDICINE

## 2020-06-22 PROCEDURE — 99214 PR OFFICE/OUTPT VISIT, EST, LEVL IV, 30-39 MIN: ICD-10-PCS | Mod: S$PBB,,, | Performed by: FAMILY MEDICINE

## 2020-06-22 PROCEDURE — 93306 ECHO (CUPID ONLY): ICD-10-PCS | Mod: 26,S$PBB,, | Performed by: INTERNAL MEDICINE

## 2020-06-22 PROCEDURE — 80048 BASIC METABOLIC PNL TOTAL CA: CPT

## 2020-06-22 PROCEDURE — 99999 PR PBB SHADOW E&M-EST. PATIENT-LVL II: CPT | Mod: PBBFAC,,,

## 2020-06-22 PROCEDURE — 36415 COLL VENOUS BLD VENIPUNCTURE: CPT | Mod: PO

## 2020-06-22 PROCEDURE — 93356 MYOCRD STRAIN IMG SPCKL TRCK: CPT | Mod: S$PBB,,, | Performed by: INTERNAL MEDICINE

## 2020-06-22 NOTE — PROGRESS NOTES
Subjective:       Patient ID: Betty Bacon is a 70 y.o. female.    Chief Complaint: Follow-up (6mth f/u hypertension)    HPI  Review of Systems   Constitutional: Negative for fatigue and unexpected weight change.   Respiratory: Negative for chest tightness and shortness of breath.    Cardiovascular: Negative for chest pain, palpitations and leg swelling.   Gastrointestinal: Negative for abdominal pain.   Musculoskeletal: Negative for arthralgias.   Neurological: Negative for dizziness, syncope, light-headedness and headaches.       Patient Active Problem List   Diagnosis    CKD (chronic kidney disease) stage 3, GFR 30-59 ml/min    COPD (chronic obstructive pulmonary disease)    LVH (left ventricular hypertrophy) due to hypertensive disease    Depression    Unstable angina    PAD (peripheral artery disease)    Generalized anxiety disorder    Aortic stenosis    CAD (coronary artery disease)    Abnormal stress test    GERD (gastroesophageal reflux disease)    Interstitial lung disease    Osteopenia    History of electroconvulsive therapy    Hyperlipidemia    HTN (hypertension)    Former smoker    Impaired fasting blood sugar    Severe obesity with body mass index (BMI) of 35.0 to 39.9 with serious comorbidity    Secondary hyperparathyroidism of renal origin    Chronic left-sided thoracic back pain    Chronic diastolic heart failure     Patient is here for a chronic conditions follow up.   Reviewed labs 6/20 -uric acid 10.5. no gout attacks in over a year     IGT/prediabetes- A1c 5.7. lipids at goal. On asa 81mg, lipitor    GFR dropped from 51 to 35. BUN/cr indicated probable dehydration    Here to f/u mood with h/o severe depression. has been consistently better since increasing seroquel.  Has been more social, less down, mood is less labile and anxiety has been manageable.  Had some counseling LCSW 12/19. On paxil 50mg a day and seroquel 50mg 3 qhs         History:   Card Dr. Sahu  CAD s/p stents, PVD on plavix and imdur     Psych hospitalized for depression in past 2003-04. Had ECT therapy.  Lives alone. Drives, does all ADLs by herself, cooks, cleans.  Xanax prn .  Lost  of 50 years.  Has good support from children and friends     Nephrology Dr. Morrow CKd stage 3, uric acid , PTH elevation, mild anemia     GI Dr. Trevizo -gerd, dysphagia, IBS with diarrhea. meds helping     Podiatry Dr. Malave- gout attack.  Uric acid 9.8 . Has been counseled on low purine diet  Objective:      Physical Exam  Vitals signs and nursing note reviewed.   Constitutional:       Appearance: She is well-developed.   Cardiovascular:      Rate and Rhythm: Normal rate and regular rhythm.      Heart sounds: Normal heart sounds.   Pulmonary:      Effort: Pulmonary effort is normal.      Breath sounds: Normal breath sounds.   Skin:     General: Skin is warm and dry.   Neurological:      Mental Status: She is alert and oriented to person, place, and time.         Assessment:       1. Essential hypertension    2. Mixed hyperlipidemia    3. Chronic diastolic heart failure    4. Aortic valve stenosis, etiology of cardiac valve disease unspecified    5. CKD (chronic kidney disease) stage 3, GFR 30-59 ml/min    6. Impaired fasting blood sugar    7. Secondary hyperparathyroidism of renal origin    8. Severe obesity with body mass index (BMI) of 35.0 to 39.9 with serious comorbidity    9. Depression, unspecified depression type    10. Chronic obstructive pulmonary disease, unspecified COPD type    11. PAD (peripheral artery disease)    12. Interstitial lung disease    13. CARMELITA (acute kidney injury)    14. Encounter for screening mammogram for malignant neoplasm of breast    15. Menopausal and perimenopausal disorder        Plan:         1. Essential hypertension  Controlled on current medications.  Continue current medications.      2. Mixed hyperlipidemia  Controlled on current medications.  Continue current  "medications.    - Lipid Panel; Future    3. Chronic diastolic heart failure  Well compensated. Cont current mgmt    4. Aortic valve stenosis, etiology of cardiac valve disease unspecified  Cont card monitoring    5. CKD (chronic kidney disease) stage 3, GFR 30-59 ml/min  Stable and chronic.  Will continue to monitor q3-6 months and control chronic conditions as optimally as possible to preserve function.      6. Impaired fasting blood sugar  Controlled on current medications.  Continue current medications.    - CBC auto differential; Future  - Comprehensive metabolic panel; Future  - Hemoglobin A1C; Future    7. Secondary hyperparathyroidism of renal origin  Cont nephro monitoring    8. Severe obesity with body mass index (BMI) of 35.0 to 39.9 with serious comorbidity  Counseled patient on his ideal body weight, health consequences of being obese and current recommendations including weekly exercise and a heart healthy diet.  Current BMI is:Estimated body mass index is 36.4 kg/m² as calculated from the following:    Height as of this encounter: 5' 3" (1.6 m).    Weight as of this encounter: 93.2 kg (205 lb 7.5 oz)..  Patient is aware that ideal BMI < 25 or Weight in (lb) to have BMI = 25: 140.8.        9. Depression, unspecified depression type  Controlled on current medications.  Continue current medications.      10. Chronic obstructive pulmonary disease, unspecified COPD type  Controlled on current medications.  Continue current medications.      11. PAD (peripheral artery disease)  Cont monitoring    12. Interstitial lung disease  Cont current mgmt and monitoring    13. CARMELITA (acute kidney injury)  Repeat labs. Likely IVVD  - Basic metabolic panel; Future    14. Encounter for screening mammogram for malignant neoplasm of breast  Screen and treat as indicated:    - Mammo Digital Screening Bilateral With CAD; Future    15. Menopausal and perimenopausal disorder  Screen and treat as indicated:    - DXA Bone Density " Spine And Hip; Future        Time spent with patient: 20 minutes    Patient with be reevaluated in 6 months or sooner prn    Greater than 50% of this visit was spent counseling as described in above documentation:Yes

## 2020-06-23 ENCOUNTER — OFFICE VISIT (OUTPATIENT)
Dept: CARDIOLOGY | Facility: CLINIC | Age: 70
End: 2020-06-23
Attending: INTERNAL MEDICINE
Payer: MEDICARE

## 2020-06-23 VITALS
DIASTOLIC BLOOD PRESSURE: 56 MMHG | HEART RATE: 60 BPM | SYSTOLIC BLOOD PRESSURE: 139 MMHG | BODY MASS INDEX: 36.52 KG/M2 | WEIGHT: 206.13 LBS | HEIGHT: 63 IN

## 2020-06-23 DIAGNOSIS — I25.10 CORONARY ARTERY DISEASE INVOLVING NATIVE CORONARY ARTERY OF NATIVE HEART WITHOUT ANGINA PECTORIS: Primary | ICD-10-CM

## 2020-06-23 DIAGNOSIS — I50.32 CHRONIC DIASTOLIC HEART FAILURE: ICD-10-CM

## 2020-06-23 DIAGNOSIS — I10 ESSENTIAL HYPERTENSION: ICD-10-CM

## 2020-06-23 DIAGNOSIS — I73.9 PAD (PERIPHERAL ARTERY DISEASE): ICD-10-CM

## 2020-06-23 DIAGNOSIS — E78.2 MIXED HYPERLIPIDEMIA: ICD-10-CM

## 2020-06-23 DIAGNOSIS — I35.0 AORTIC VALVE STENOSIS, ETIOLOGY OF CARDIAC VALVE DISEASE UNSPECIFIED: ICD-10-CM

## 2020-06-23 PROCEDURE — 99999 PR PBB SHADOW E&M-EST. PATIENT-LVL III: ICD-10-PCS | Mod: PBBFAC,,, | Performed by: INTERNAL MEDICINE

## 2020-06-23 PROCEDURE — 99213 OFFICE O/P EST LOW 20 MIN: CPT | Mod: PBBFAC,PO | Performed by: INTERNAL MEDICINE

## 2020-06-23 PROCEDURE — 99999 PR PBB SHADOW E&M-EST. PATIENT-LVL III: CPT | Mod: PBBFAC,,, | Performed by: INTERNAL MEDICINE

## 2020-06-23 PROCEDURE — 99214 PR OFFICE/OUTPT VISIT, EST, LEVL IV, 30-39 MIN: ICD-10-PCS | Mod: S$PBB,,, | Performed by: INTERNAL MEDICINE

## 2020-06-23 PROCEDURE — 99214 OFFICE O/P EST MOD 30 MIN: CPT | Mod: S$PBB,,, | Performed by: INTERNAL MEDICINE

## 2020-06-23 NOTE — PROGRESS NOTES
Subjective:    Patient ID:  Betty Bacon is a 70 y.o. female who presents for follow-up of cad    HPI  She comes with no complaints, no chest pain, no shortness of breath  FC II    Review of Systems   Constitution: Negative for decreased appetite, malaise/fatigue, weight gain and weight loss.   Cardiovascular: Negative for chest pain, dyspnea on exertion, leg swelling, palpitations and syncope.   Respiratory: Negative for cough and shortness of breath.    Gastrointestinal: Negative.    Neurological: Negative for weakness.   All other systems reviewed and are negative.       Objective:      Physical Exam   Constitutional: She is oriented to person, place, and time. She appears well-developed and well-nourished.   HENT:   Head: Normocephalic.   Eyes: Pupils are equal, round, and reactive to light.   Neck: Normal range of motion. Neck supple. No JVD present. Carotid bruit is not present. No thyromegaly present.   Cardiovascular: Normal rate, regular rhythm, normal heart sounds, intact distal pulses and normal pulses. PMI is not displaced. Exam reveals no gallop.   No murmur heard.  Pulmonary/Chest: Effort normal and breath sounds normal.   Abdominal: Soft. Normal appearance. She exhibits no mass. There is no hepatosplenomegaly. There is no abdominal tenderness.   Musculoskeletal: Normal range of motion.         General: No edema.   Neurological: She is alert and oriented to person, place, and time. She has normal strength and normal reflexes. No sensory deficit.   Skin: Skin is warm and intact.   Psychiatric: She has a normal mood and affect.   Nursing note and vitals reviewed.    Echo reviewed      Assessment:       1. Coronary artery disease involving native coronary artery of native heart without angina pectoris    2. Aortic valve stenosis, etiology of cardiac valve disease unspecified    3. PAD (peripheral artery disease)    4. Mixed hyperlipidemia    5. Chronic diastolic heart failure    6. Essential  hypertension         Plan:     Continue all cardiac medications  Regular exercise program  Weight loss  1 yr f/u with [facility]cfd

## 2020-07-22 ENCOUNTER — OFFICE VISIT (OUTPATIENT)
Dept: PRIMARY CARE CLINIC | Facility: CLINIC | Age: 70
End: 2020-07-22
Payer: MEDICARE

## 2020-07-22 ENCOUNTER — NURSE TRIAGE (OUTPATIENT)
Dept: ADMINISTRATIVE | Facility: CLINIC | Age: 70
End: 2020-07-22

## 2020-07-22 VITALS
TEMPERATURE: 97 F | HEART RATE: 70 BPM | RESPIRATION RATE: 18 BRPM | SYSTOLIC BLOOD PRESSURE: 135 MMHG | OXYGEN SATURATION: 90 % | DIASTOLIC BLOOD PRESSURE: 63 MMHG

## 2020-07-22 DIAGNOSIS — Z20.822 SUSPECTED COVID-19 VIRUS INFECTION: Primary | ICD-10-CM

## 2020-07-22 DIAGNOSIS — Z03.818 ENCOUNTER FOR OBSERVATION FOR SUSPECTED EXPOSURE TO OTHER BIOLOGICAL AGENTS RULED OUT: ICD-10-CM

## 2020-07-22 PROCEDURE — U0003 INFECTIOUS AGENT DETECTION BY NUCLEIC ACID (DNA OR RNA); SEVERE ACUTE RESPIRATORY SYNDROME CORONAVIRUS 2 (SARS-COV-2) (CORONAVIRUS DISEASE [COVID-19]), AMPLIFIED PROBE TECHNIQUE, MAKING USE OF HIGH THROUGHPUT TECHNOLOGIES AS DESCRIBED BY CMS-2020-01-R: HCPCS

## 2020-07-22 PROCEDURE — 99213 OFFICE O/P EST LOW 20 MIN: CPT | Mod: S$GLB,,, | Performed by: NURSE PRACTITIONER

## 2020-07-22 PROCEDURE — 99213 PR OFFICE/OUTPT VISIT, EST, LEVL III, 20-29 MIN: ICD-10-PCS | Mod: S$GLB,,, | Performed by: NURSE PRACTITIONER

## 2020-07-22 NOTE — PATIENT INSTRUCTIONS
Instructions for Patients with Confirmed or Suspected COVID-19    If you are awaiting your test result, you will either be called or it will be released to the patient portal.  If you have any questions about your test, please visit [email or web address] or call our COVID-19 information line at [phone].      Instructions for non-hospitalized or discharged patients with confirmed or suspected COVID-19:       Stay home except to get medical care.    Separate yourself from other people and animals in your home.    Call ahead before visiting your doctor.    Wear a face mask.    Cover your coughs and sneezes.    Clean your hands often.    Avoid sharing personal household items.    Clean all high-touch surfaces every day.    Monitor your symptoms. Seek prompt medical attention if your illness is worsening (e.g., difficulty breathing). Before seeking care, call your healthcare provider.    If you have a medical emergency and must call 911, notify the dispatcher that you have or are being evaluated for COVID-19. If possible, put on a face mask before emergency medical services arrive.    Use the following symptom-based strategy to return to normal activity following a suspected or confirmed case of COVID-19. Continue isolation until:   o At least 3 days (72 hours) have passed since recovery defined as resolution of fever without the use of fever-reducing medications and improvement in respiratory symptoms (e.g. cough, shortness of breath), and   o At least 10 days have passed since the first positive test.       As one of the next steps, you will receive a call or text from the [place] Department of Health ([facility]) COVID-19 Tracing Team. See the contact information below so you know not to ignore the health departments call. It is important that you contact them back immediately so they can help.     Contact Tracer Number:  [phone]  Caller ID for most carriers: [place] Dept [facility]    What is  contact tracing?   Contact tracing is a process that helps identify everyone who has been in close contact with an infected person. Contact tracers let those people know they may have been exposed and guide them on next steps. Confidentiality is important for everyone; no one will be told who may have exposed them to the virus.   Your involvement is important. The more we know about where and how this virus is spreading, the better chance we have at stopping it from spreading further.  What does exposure mean?   Exposure means you have been within 6 feet for more than 15 minutes with a person who has or had COVID-19.  What kind of questions do the contact tracers ask?   A contact tracer will confirm your basic contact information including name, address, phone number, and next of kin, as well as asking about any symptoms you may have had. Theyll also ask you how you think you may have gotten sick, such as places where you may have been exposed to the virus, and people you were with. Those names will never be shared with anyone outside of that call, and will only be used to help trace and stop the spread of the virus.   I have privacy concerns. How will the state use my information?   Your privacy about your health is important. All calls are completed using call centers that use the appropriate health privacy protection measures (HIPAA compliance), meaning that your patient information is safe. No one will ever ask you any questions related to immigration status. Your health comes first.   Do I have to participate?   You do not have to participate, but we strongly encourage you to. Contact tracing can help us catch and control new outbreaks as theyre developing to keep your friends and family safe.   What if I dont hear from anyone?   If you dont receive a call within 24 hours, you can call the number above right away to inquire about your status. That line is open from 8:00 am - 8:00 p.m., 7 days a  week.  Contact tracing saves lives! Together, we have the power to beat this virus and keep our loved ones and neighbors safe.       Instructions for household members, intimate partners and caregivers in a non-healthcare setting of a patient with confirmed or suspected COVID-19:         Close contacts should monitor their health and call their healthcare provider right away if they develop symptoms suggestive of COVID-19 (e.g., fever, cough, shortness of breath).    Stay home except to get medical care. Separate yourself from other people and animals in the home.   Monitor the patients symptoms. If the patient is getting sicker, call his or her healthcare provider. If the patient has a medical emergency and you need to call 911, notify the dispatch personnel that the patient has or is being evaluated for COVID-19.    Wear a facemask when around other people such as sharing a room or vehicle and before entering a healthcare provider's office.   Cover coughs and sneezes with a tissue. Throw used tissues in a lined trash can immediately and wash hands.   Clean hands often with soap and water for at least 20 seconds or with an alcohol-based hand , rubbing hands together until they feel dry. Avoid touching your eyes, nose, and mouth with unwashed hands.   Clean all high-touch; surfaces every day, including counters, tabletops, doorknobs, bathroom fixtures, toilets, phones, keyboards, tablets, bedside tables, etc. Use a household cleaning spray or wipe according to label instructions.   Avoid sharing personal household items such as dishes, drinking glasses, cups, towels, bedding, etc. After these items are used, they should be washed thoroughly with soap and water.   Continue isolation until:   At least 3 days (72 hours) have passed since recovery defined as resolution of fever without the use of fever-reducing medications and improvement in respiratory symptoms (e.g. cough, shortness of breath),  and    At least 10 days have passed since the patients first positive test.    https://www.cdc.gov/coronavirus/2019-ncov/your-health/index.htm

## 2020-07-22 NOTE — PROGRESS NOTES
Subjective:        Time seen by provider: 3:43 PM on 07/22/2020    Betty Bacon is a 70 y.o. female with HTN and COPD who presents for an evaluation of possible COVID-19. She complains of SOB and generalized weakness. The patient states her symptoms began a couple of days ago and reports she was exposed to her son and DIL, who both recently tested positive. She is on home oxygen PRN and uses her Breo inhaler PRN. The patient called her PCP and was preferred to the clinic for testing. No pertinent PSHx.     Review of Systems   Constitutional: Negative for activity change, appetite change, fatigue and fever.   HENT: Negative for congestion, rhinorrhea and sore throat.    Respiratory: Positive for shortness of breath. Negative for cough, chest tightness and wheezing.    Cardiovascular: Negative for chest pain and palpitations.   Gastrointestinal: Negative for diarrhea, nausea and vomiting.   Musculoskeletal: Negative for arthralgias and myalgias.   Skin: Negative for rash.   Neurological: Positive for weakness (generalized). Negative for light-headedness, numbness and headaches.       Objective:      Physical Exam  Vitals signs and nursing note reviewed.   Constitutional:       General: She is not in acute distress.     Appearance: She is well-developed. She is not diaphoretic.   HENT:      Head: Normocephalic and atraumatic.      Nose: Nose normal.   Eyes:      Conjunctiva/sclera: Conjunctivae normal.   Neck:      Musculoskeletal: Normal range of motion.   Cardiovascular:      Rate and Rhythm: Normal rate and regular rhythm.      Heart sounds: Normal heart sounds. No murmur.   Pulmonary:      Effort: No respiratory distress.      Breath sounds: Normal breath sounds. No wheezing.   Musculoskeletal: Normal range of motion.   Skin:     General: Skin is warm and dry.   Neurological:      Mental Status: She is alert and oriented to person, place, and time.         Assessment:       1. Suspected Covid-19 Virus Infection     2. Encounter for observation for suspected exposure to other biological agents ruled out        Plan:       1. Suspected Covid-19 Virus Infection    2. Encounter for observation for suspected exposure to other biological agents ruled out    - COVID-19 Routine Screening    2. Discharge home and await results.   3. Return to clinic or ED for new or worsening symptoms.   4. Follow-up with PCP as needed.     Scribe Attestation:   I, Lilo Ocampo, am scribing for, and in the presence of, ADELFO Henderson. I performed the above scribed service and the documentation accurately describes the services I performed. I attest to the accuracy of the note.    I, ADELFO Arndt, personally performed the services described in this documentation. All medical record entries made by the scribe were at my direction and in my presence.  I have reviewed the chart and agree that the record reflects my personal performance and is accurate and complete. ADELFO Arndt.  3:57 PM 07/22/2020

## 2020-07-22 NOTE — TELEPHONE ENCOUNTER
Pt states her son and daughter in law tested positive for covid 19, last known exposure was yesterday. Pt states she is exposed to them often. Pt c/o COVID 19 symptoms that began over one week ago. Pt has COPD and c/o SOB. Pt able to speak in complete sentences and is a/a. Pt advised per protocol to ED now and pt verbalizes understanding.     Reason for Disposition   MODERATE difficulty breathing (e.g., speaks in phrases, SOB even at rest, pulse 100-120)    Additional Information   Negative: SEVERE difficulty breathing (e.g., struggling for each breath, speaks in single words)   Negative: Difficult to awaken or acting confused (e.g., disoriented, slurred speech)   Negative: Bluish (or gray) lips or face now   Negative: Shock suspected (e.g., cold/pale/clammy skin, too weak to stand, low BP, rapid pulse)   Negative: Sounds like a life-threatening emergency to the triager   Negative: SEVERE or constant chest pain or pressure (Exception: mild central chest pain, present only when coughing)    Protocols used: CORONAVIRUS (COVID-19) DIAGNOSED OR [id number]

## 2020-07-23 ENCOUNTER — TELEPHONE (OUTPATIENT)
Dept: FAMILY MEDICINE | Facility: CLINIC | Age: 70
End: 2020-07-23

## 2020-07-23 DIAGNOSIS — U07.1 COVID-19 VIRUS DETECTED: ICD-10-CM

## 2020-07-23 LAB — SARS-COV-2 RNA RESP QL NAA+PROBE: DETECTED

## 2020-07-23 NOTE — TELEPHONE ENCOUNTER
Patient wanted to notify office she is COVID positive.         ----- Message from Shoshana Sadler sent at 7/23/2020  2:31 PM CDT -----  Regarding: Info  Contact: Patient  Type: Needs Medical Advice    Who Called:  Patient    Best Call Back Number: [phone]    Additional Information:   Patient called to let office know she tested positive for COVID.  Stated she was not sure if she needed to let office know but her children advised her to do so.

## 2020-08-04 DIAGNOSIS — N17.9 AKI (ACUTE KIDNEY INJURY): Primary | ICD-10-CM

## 2020-08-06 ENCOUNTER — TELEPHONE (OUTPATIENT)
Dept: FAMILY MEDICINE | Facility: CLINIC | Age: 70
End: 2020-08-06

## 2020-08-06 DIAGNOSIS — Z12.31 ENCOUNTER FOR SCREENING MAMMOGRAM FOR MALIGNANT NEOPLASM OF BREAST: Primary | ICD-10-CM

## 2020-08-20 ENCOUNTER — LAB VISIT (OUTPATIENT)
Dept: LAB | Facility: HOSPITAL | Age: 70
End: 2020-08-20
Attending: FAMILY MEDICINE
Payer: MEDICARE

## 2020-08-20 DIAGNOSIS — N17.9 AKI (ACUTE KIDNEY INJURY): ICD-10-CM

## 2020-08-20 LAB
ANION GAP SERPL CALC-SCNC: 8 MMOL/L (ref 8–16)
BUN SERPL-MCNC: 26 MG/DL (ref 8–23)
CALCIUM SERPL-MCNC: 9.4 MG/DL (ref 8.7–10.5)
CHLORIDE SERPL-SCNC: 108 MMOL/L (ref 95–110)
CO2 SERPL-SCNC: 25 MMOL/L (ref 23–29)
CREAT SERPL-MCNC: 1.4 MG/DL (ref 0.5–1.4)
EST. GFR  (AFRICAN AMERICAN): 43.3 ML/MIN/1.73 M^2
EST. GFR  (NON AFRICAN AMERICAN): 37.6 ML/MIN/1.73 M^2
GLUCOSE SERPL-MCNC: 99 MG/DL (ref 70–110)
POTASSIUM SERPL-SCNC: 4.8 MMOL/L (ref 3.5–5.1)
SODIUM SERPL-SCNC: 141 MMOL/L (ref 136–145)

## 2020-08-20 PROCEDURE — 80048 BASIC METABOLIC PNL TOTAL CA: CPT

## 2020-08-20 PROCEDURE — 36415 COLL VENOUS BLD VENIPUNCTURE: CPT | Mod: PO

## 2020-09-28 ENCOUNTER — HOSPITAL ENCOUNTER (OUTPATIENT)
Dept: RADIOLOGY | Facility: HOSPITAL | Age: 70
Discharge: HOME | End: 2020-09-28
Attending: INTERNAL MEDICINE
Payer: MEDICARE

## 2020-09-28 DIAGNOSIS — U07.1 INFECTION DUE TO 2019-NCOV: Primary | ICD-10-CM

## 2020-09-28 DIAGNOSIS — U07.1 INFECTION DUE TO 2019-NCOV: ICD-10-CM

## 2020-09-28 PROCEDURE — 71046 X-RAY EXAM CHEST 2 VIEWS: CPT | Mod: TC,PO

## 2020-09-29 ENCOUNTER — PATIENT MESSAGE (OUTPATIENT)
Dept: OTHER | Facility: OTHER | Age: 70
End: 2020-09-29

## 2020-10-13 ENCOUNTER — PATIENT MESSAGE (OUTPATIENT)
Dept: FAMILY MEDICINE | Facility: CLINIC | Age: 70
End: 2020-10-13

## 2020-10-19 ENCOUNTER — TELEPHONE (OUTPATIENT)
Dept: FAMILY MEDICINE | Facility: CLINIC | Age: 70
End: 2020-10-19

## 2020-10-20 NOTE — TELEPHONE ENCOUNTER
Notify patient:Pharmacist reviewed meds and recommended  Change from nexium to h2 antagonist like pepcid or zantac twice daily due to possible risks (low magnesium, b12, possible increased risk of osteoporosis and/or kidney disease)      Also asking to discontinue either spironolactone or triamterene due to duplication of therapy. Will forward for review to your treating cardiologist to decide which to stop if agrees

## 2020-11-13 ENCOUNTER — TELEPHONE (OUTPATIENT)
Dept: NEPHROLOGY | Facility: CLINIC | Age: 70
End: 2020-11-13

## 2020-11-13 NOTE — TELEPHONE ENCOUNTER
----- Message from Aristeo Swan sent at 11/13/2020  4:24 PM CST -----  Regarding: pt  .Type:  Patient Returning Call    Who Called:py   Who Left Message for Patient:nurse   Does the patient know what this is regarding?:yes - her appt   Would the patient rather a call back or a response via [facility]? Call back   Best Call Back Number:.[phone] (home)   Additional Information:       Thank You ,   Aristeo Swan

## 2020-12-11 ENCOUNTER — PATIENT MESSAGE (OUTPATIENT)
Dept: OTHER | Facility: OTHER | Age: 70
End: 2020-12-11

## 2020-12-16 ENCOUNTER — HOSPITAL ENCOUNTER (OUTPATIENT)
Dept: RADIOLOGY | Facility: CLINIC | Age: 70
Discharge: HOME | End: 2020-12-16
Attending: FAMILY MEDICINE
Payer: MEDICARE

## 2020-12-16 DIAGNOSIS — N95.9 MENOPAUSAL AND PERIMENOPAUSAL DISORDER: ICD-10-CM

## 2020-12-16 DIAGNOSIS — Z12.31 ENCOUNTER FOR SCREENING MAMMOGRAM FOR MALIGNANT NEOPLASM OF BREAST: ICD-10-CM

## 2020-12-16 PROCEDURE — 77080 DXA BONE DENSITY AXIAL: CPT | Mod: TC,PO

## 2020-12-16 PROCEDURE — 77067 SCR MAMMO BI INCL CAD: CPT | Mod: 26,,, | Performed by: RADIOLOGY

## 2020-12-16 PROCEDURE — 77067 MAMMO DIGITAL SCREENING BILAT WITH TOMO: ICD-10-PCS | Mod: 26,,, | Performed by: RADIOLOGY

## 2020-12-16 PROCEDURE — 77080 DXA BONE DENSITY AXIAL: CPT | Mod: 26,,, | Performed by: RADIOLOGY

## 2020-12-16 PROCEDURE — 77063 BREAST TOMOSYNTHESIS BI: CPT | Mod: 26,,, | Performed by: RADIOLOGY

## 2020-12-16 PROCEDURE — 77063 MAMMO DIGITAL SCREENING BILAT WITH TOMO: ICD-10-PCS | Mod: 26,,, | Performed by: RADIOLOGY

## 2020-12-16 PROCEDURE — 77080 DEXA BONE DENSITY SPINE HIP: ICD-10-PCS | Mod: 26,,, | Performed by: RADIOLOGY

## 2020-12-16 PROCEDURE — 77067 SCR MAMMO BI INCL CAD: CPT | Mod: TC,PO

## 2020-12-21 ENCOUNTER — OFFICE VISIT (OUTPATIENT)
Dept: NEPHROLOGY | Facility: CLINIC | Age: 70
End: 2020-12-21
Payer: MEDICARE

## 2020-12-21 VITALS
HEART RATE: 50 BPM | DIASTOLIC BLOOD PRESSURE: 72 MMHG | OXYGEN SATURATION: 95 % | HEIGHT: 62 IN | SYSTOLIC BLOOD PRESSURE: 142 MMHG | WEIGHT: 210 LBS | BODY MASS INDEX: 38.64 KG/M2

## 2020-12-21 DIAGNOSIS — E66.01 SEVERE OBESITY WITH BODY MASS INDEX (BMI) OF 35.0 TO 39.9 WITH SERIOUS COMORBIDITY: ICD-10-CM

## 2020-12-21 DIAGNOSIS — I50.32 CHRONIC DIASTOLIC HEART FAILURE: ICD-10-CM

## 2020-12-21 DIAGNOSIS — E78.2 MIXED HYPERLIPIDEMIA: ICD-10-CM

## 2020-12-21 DIAGNOSIS — Z87.891 FORMER SMOKER: ICD-10-CM

## 2020-12-21 DIAGNOSIS — I11.9 HYPERTENSIVE LEFT VENTRICULAR HYPERTROPHY, WITHOUT HEART FAILURE: ICD-10-CM

## 2020-12-21 DIAGNOSIS — I35.0 AORTIC VALVE STENOSIS, ETIOLOGY OF CARDIAC VALVE DISEASE UNSPECIFIED: ICD-10-CM

## 2020-12-21 DIAGNOSIS — M85.80 OSTEOPENIA, UNSPECIFIED LOCATION: ICD-10-CM

## 2020-12-21 DIAGNOSIS — I73.9 PAD (PERIPHERAL ARTERY DISEASE): ICD-10-CM

## 2020-12-21 DIAGNOSIS — J44.9 CHRONIC OBSTRUCTIVE PULMONARY DISEASE, UNSPECIFIED COPD TYPE: ICD-10-CM

## 2020-12-21 DIAGNOSIS — N18.32 STAGE 3B CHRONIC KIDNEY DISEASE: Primary | ICD-10-CM

## 2020-12-21 DIAGNOSIS — I10 ESSENTIAL HYPERTENSION: ICD-10-CM

## 2020-12-21 DIAGNOSIS — N25.81 SECONDARY HYPERPARATHYROIDISM OF RENAL ORIGIN: ICD-10-CM

## 2020-12-21 DIAGNOSIS — K21.9 GASTROESOPHAGEAL REFLUX DISEASE, UNSPECIFIED WHETHER ESOPHAGITIS PRESENT: ICD-10-CM

## 2020-12-21 DIAGNOSIS — I25.10 CORONARY ARTERY DISEASE INVOLVING NATIVE CORONARY ARTERY OF NATIVE HEART WITHOUT ANGINA PECTORIS: ICD-10-CM

## 2020-12-21 DIAGNOSIS — J84.9 INTERSTITIAL LUNG DISEASE: ICD-10-CM

## 2020-12-21 PROCEDURE — 99214 OFFICE O/P EST MOD 30 MIN: CPT | Mod: S$PBB,,, | Performed by: INTERNAL MEDICINE

## 2020-12-21 PROCEDURE — 99214 PR OFFICE/OUTPT VISIT, EST, LEVL IV, 30-39 MIN: ICD-10-PCS | Mod: S$PBB,,, | Performed by: INTERNAL MEDICINE

## 2020-12-21 PROCEDURE — 99214 OFFICE O/P EST MOD 30 MIN: CPT | Mod: PBBFAC,PO | Performed by: INTERNAL MEDICINE

## 2020-12-21 PROCEDURE — 99999 PR PBB SHADOW E&M-EST. PATIENT-LVL IV: ICD-10-PCS | Mod: PBBFAC,,, | Performed by: INTERNAL MEDICINE

## 2020-12-21 PROCEDURE — 99999 PR PBB SHADOW E&M-EST. PATIENT-LVL IV: CPT | Mod: PBBFAC,,, | Performed by: INTERNAL MEDICINE

## 2020-12-21 RX ORDER — QUETIAPINE FUMARATE 100 MG/1
TABLET, FILM COATED ORAL
COMMUNITY
Start: 2020-09-19 | End: 2020-12-22 | Stop reason: SDUPTHER

## 2020-12-21 RX ORDER — CHOLECALCIFEROL (VITAMIN D3) 25 MCG
1000 TABLET ORAL DAILY
COMMUNITY
End: 2022-12-21

## 2020-12-21 RX ORDER — A/SINGAPORE/GP1908/2015 IVR-180 (AN A/MICHIGAN/45/2015 (H1N1)PDM09-LIKE VIRUS, A/HONG KONG/4801/2014, NYMC X-263B (H3N2) (AN A/HONG KONG/4801/2014-LIKE VIRUS), AND B/BRISBANE/60/2008, WILD TYPE (A B/BRISBANE/60/2008-LIKE VIRUS) 15; 15; 15 UG/.5ML; UG/.5ML; UG/.5ML
INJECTION, SUSPENSION INTRAMUSCULAR
COMMUNITY
Start: 2020-09-21 | End: 2021-09-14

## 2020-12-21 NOTE — PROGRESS NOTES
Subjective:       Patient ID: Betty Bacon is a 70 y.o. White female who presents for follow-up evaluation of Chronic Kidney Disease    Follow-up  Pertinent negatives include no arthralgias, chest pain, fatigue, headaches, rash or weakness.      She reports she is doing well. She escaped the flu. . No LE edema and no SOB. Her appetite is too good and she is still pushing fluids. No new medications since last visit. No LUTS. Since last visit her nephew and her sister with advanced dementia passed away. She is not taking her BP at home  She received the flu vaccine. Repeat O2 level was 93% after resting for 10 minutes     Interval history June 2020: She is doing very well. No recent bronchitis. No change in LE edema.     Interval history Dec 2020: She contracted COVID in July--did not require hospital stay. She feels great currently. No change in LE edema.     Review of Systems   Constitutional: Negative for activity change, appetite change, fatigue and unexpected weight change.   HENT: Negative for facial swelling.    Eyes: Negative for visual disturbance.   Respiratory: Positive for shortness of breath (chronic).    Cardiovascular: Negative for chest pain and leg swelling.   Gastrointestinal: Negative for constipation and diarrhea.   Genitourinary: Negative for difficulty urinating, dysuria and hematuria.   Musculoskeletal: Negative for arthralgias.   Skin: Negative for rash.   Neurological: Negative for weakness and headaches.   Hematological: Bruises/bleeds easily.   Psychiatric/Behavioral: Negative for confusion.       Objective:      Physical Exam  Vitals signs and nursing note reviewed.   Constitutional:       General: She is not in acute distress.  Eyes:      General: No scleral icterus.  Neck:      Vascular: No JVD.   Cardiovascular:      Heart sounds: S1 normal and S2 normal. No friction rub.   Pulmonary:      Breath sounds: Normal breath sounds. No wheezing or rales.   Abdominal:      Palpations:  Abdomen is soft.   Skin:     General: Skin is warm and dry.   Neurological:      Mental Status: She is alert and oriented to person, place, and time.         Assessment:       1. Stage 3b chronic kidney disease    2. Hypertensive left ventricular hypertrophy, without heart failure    3. Mixed hyperlipidemia    4. Essential hypertension    5. Chronic diastolic heart failure    6. Coronary artery disease involving native coronary artery of native heart without angina pectoris    7. Aortic valve stenosis, etiology of cardiac valve disease unspecified    8. Gastroesophageal reflux disease, unspecified whether esophagitis present    9. Secondary hyperparathyroidism of renal origin    10. Former smoker    11. Severe obesity with body mass index (BMI) of 35.0 to 39.9 with serious comorbidity    12. Osteopenia, unspecified location    13. PAD (peripheral artery disease)    14. Chronic obstructive pulmonary disease, unspecified COPD type    15. Interstitial lung disease        Plan:             CKD Stage 3 with stable kidney function and proteinuria.  Discussed lab results in detail    HTN--is controlled    Mineral and Bone Disease--continue D3, D level and PTH were at goal, recheck PTH at next visit prior to adding D analogue    Hyperglycemia--highest Hba1c was 6.2, most recent is 5.8    Former smoker/COPD/ILD--continue Pulmonary follow up         RTC 6 months with labs prior

## 2020-12-22 ENCOUNTER — LAB VISIT (OUTPATIENT)
Dept: LAB | Facility: HOSPITAL | Age: 70
End: 2020-12-22
Attending: FAMILY MEDICINE
Payer: MEDICARE

## 2020-12-22 ENCOUNTER — OFFICE VISIT (OUTPATIENT)
Dept: FAMILY MEDICINE | Facility: CLINIC | Age: 70
End: 2020-12-22
Payer: MEDICARE

## 2020-12-22 DIAGNOSIS — Z12.2 ENCOUNTER FOR SCREENING FOR MALIGNANT NEOPLASM OF RESPIRATORY ORGANS: ICD-10-CM

## 2020-12-22 DIAGNOSIS — Z87.891 PERSONAL HISTORY OF NICOTINE DEPENDENCE: ICD-10-CM

## 2020-12-22 DIAGNOSIS — R74.8 ELEVATED ALKALINE PHOSPHATASE LEVEL: ICD-10-CM

## 2020-12-22 DIAGNOSIS — R74.8 ELEVATED LIVER ENZYMES: Primary | ICD-10-CM

## 2020-12-22 DIAGNOSIS — G47.00 INSOMNIA, UNSPECIFIED TYPE: ICD-10-CM

## 2020-12-22 DIAGNOSIS — F32.2 SEVERE MAJOR DEPRESSION: ICD-10-CM

## 2020-12-22 LAB
ALBUMIN SERPL BCP-MCNC: 3.4 G/DL (ref 3.5–5.2)
ALP SERPL-CCNC: 173 U/L (ref 55–135)
ALT SERPL W/O P-5'-P-CCNC: 10 U/L (ref 10–44)
ANION GAP SERPL CALC-SCNC: 9 MMOL/L (ref 8–16)
AST SERPL-CCNC: 14 U/L (ref 10–40)
BILIRUB SERPL-MCNC: 0.4 MG/DL (ref 0.1–1)
BUN SERPL-MCNC: 24 MG/DL (ref 8–23)
CALCIUM SERPL-MCNC: 9.2 MG/DL (ref 8.7–10.5)
CHLORIDE SERPL-SCNC: 107 MMOL/L (ref 95–110)
CO2 SERPL-SCNC: 25 MMOL/L (ref 23–29)
CREAT SERPL-MCNC: 1.3 MG/DL (ref 0.5–1.4)
EST. GFR  (AFRICAN AMERICAN): 47.4 ML/MIN/1.73 M^2
EST. GFR  (NON AFRICAN AMERICAN): 41.1 ML/MIN/1.73 M^2
GGT SERPL-CCNC: 21 U/L (ref 8–55)
GLUCOSE SERPL-MCNC: 111 MG/DL (ref 70–110)
POTASSIUM SERPL-SCNC: 4.3 MMOL/L (ref 3.5–5.1)
PROT SERPL-MCNC: 7 G/DL (ref 6–8.4)
SODIUM SERPL-SCNC: 141 MMOL/L (ref 136–145)

## 2020-12-22 PROCEDURE — 82977 ASSAY OF GGT: CPT

## 2020-12-22 PROCEDURE — 99999 PR PBB SHADOW E&M-EST. PATIENT-LVL IV: ICD-10-PCS | Mod: PBBFAC,,, | Performed by: FAMILY MEDICINE

## 2020-12-22 PROCEDURE — 36415 COLL VENOUS BLD VENIPUNCTURE: CPT | Mod: PO

## 2020-12-22 PROCEDURE — 84080 ASSAY ALKALINE PHOSPHATASES: CPT

## 2020-12-22 PROCEDURE — 99214 OFFICE O/P EST MOD 30 MIN: CPT | Mod: PBBFAC,PO | Performed by: FAMILY MEDICINE

## 2020-12-22 PROCEDURE — 99999 PR PBB SHADOW E&M-EST. PATIENT-LVL IV: CPT | Mod: PBBFAC,,, | Performed by: FAMILY MEDICINE

## 2020-12-22 PROCEDURE — 80053 COMPREHEN METABOLIC PANEL: CPT

## 2020-12-22 PROCEDURE — 84075 ASSAY ALKALINE PHOSPHATASE: CPT

## 2020-12-22 PROCEDURE — 99214 OFFICE O/P EST MOD 30 MIN: CPT | Mod: S$PBB,,, | Performed by: FAMILY MEDICINE

## 2020-12-22 PROCEDURE — 99214 PR OFFICE/OUTPT VISIT, EST, LEVL IV, 30-39 MIN: ICD-10-PCS | Mod: S$PBB,,, | Performed by: FAMILY MEDICINE

## 2020-12-22 RX ORDER — QUETIAPINE FUMARATE 100 MG/1
TABLET, FILM COATED ORAL
Qty: 90 TABLET | Refills: 3 | Status: SHIPPED | OUTPATIENT
Start: 2020-12-22 | End: 2021-03-15

## 2020-12-22 RX ORDER — QUETIAPINE FUMARATE 50 MG/1
50 TABLET, FILM COATED ORAL NIGHTLY
Qty: 90 TABLET | Refills: 3 | Status: SHIPPED | OUTPATIENT
Start: 2020-12-22 | End: 2021-03-15

## 2020-12-28 ENCOUNTER — PATIENT MESSAGE (OUTPATIENT)
Dept: NEPHROLOGY | Facility: CLINIC | Age: 70
End: 2020-12-28

## 2020-12-29 LAB
ALP BONE CFR SERPL: 16 % (ref 28–66)
ALP INTEST CFR SERPL: 0 % (ref 1–24)
ALP ISOS SERPL-IMP: ABNORMAL
ALP LIVER CFR SERPL: 84 % (ref 25–69)
ALP MACROHEPATIC CFR SERPL: ABNORMAL %
ALP PLAC CFR SERPL: 0 %
ALP SERPL-CCNC: 157 U/L (ref 37–153)

## 2021-01-03 VITALS
DIASTOLIC BLOOD PRESSURE: 80 MMHG | RESPIRATION RATE: 14 BRPM | WEIGHT: 211.44 LBS | OXYGEN SATURATION: 95 % | SYSTOLIC BLOOD PRESSURE: 130 MMHG | TEMPERATURE: 98 F | BODY MASS INDEX: 38.91 KG/M2 | HEIGHT: 62 IN | HEART RATE: 68 BPM

## 2021-01-05 ENCOUNTER — HOSPITAL ENCOUNTER (OUTPATIENT)
Dept: RADIOLOGY | Facility: HOSPITAL | Age: 71
Discharge: HOME | End: 2021-01-05
Attending: FAMILY MEDICINE
Payer: MEDICARE

## 2021-01-05 DIAGNOSIS — Z87.891 PERSONAL HISTORY OF NICOTINE DEPENDENCE: ICD-10-CM

## 2021-01-05 DIAGNOSIS — Z12.2 ENCOUNTER FOR SCREENING FOR MALIGNANT NEOPLASM OF RESPIRATORY ORGANS: ICD-10-CM

## 2021-01-05 PROCEDURE — 71271 CT THORAX LUNG CANCER SCR C-: CPT | Mod: TC

## 2021-01-05 PROCEDURE — 71271 CT THORAX LUNG CANCER SCR C-: CPT | Mod: 26,,, | Performed by: RADIOLOGY

## 2021-01-05 PROCEDURE — 71271 CT CHEST LUNG SCREENING LOW DOSE: ICD-10-PCS | Mod: 26,,, | Performed by: RADIOLOGY

## 2021-02-16 DIAGNOSIS — R74.8 ELEVATED ALKALINE PHOSPHATASE LEVEL: Primary | ICD-10-CM

## 2021-02-17 ENCOUNTER — PATIENT MESSAGE (OUTPATIENT)
Dept: FAMILY MEDICINE | Facility: CLINIC | Age: 71
End: 2021-02-17

## 2021-02-19 ENCOUNTER — HOSPITAL ENCOUNTER (OUTPATIENT)
Dept: RADIOLOGY | Facility: HOSPITAL | Age: 71
Discharge: HOME | End: 2021-02-19
Attending: FAMILY MEDICINE
Payer: MEDICARE

## 2021-02-19 DIAGNOSIS — R74.8 ELEVATED ALKALINE PHOSPHATASE LEVEL: ICD-10-CM

## 2021-02-19 PROCEDURE — 76705 ECHO EXAM OF ABDOMEN: CPT | Mod: TC

## 2021-02-19 PROCEDURE — 76705 US ABDOMEN LIMITED: ICD-10-PCS | Mod: 26,,, | Performed by: RADIOLOGY

## 2021-02-19 PROCEDURE — 76705 ECHO EXAM OF ABDOMEN: CPT | Mod: 26,,, | Performed by: RADIOLOGY

## 2021-02-24 ENCOUNTER — OFFICE VISIT (OUTPATIENT)
Dept: HEPATOLOGY | Facility: CLINIC | Age: 71
End: 2021-02-24
Payer: MEDICARE

## 2021-02-24 ENCOUNTER — LAB VISIT (OUTPATIENT)
Dept: LAB | Facility: HOSPITAL | Age: 71
End: 2021-02-24
Payer: MEDICARE

## 2021-02-24 VITALS
RESPIRATION RATE: 18 BRPM | BODY MASS INDEX: 39.36 KG/M2 | HEIGHT: 62 IN | WEIGHT: 213.88 LBS | SYSTOLIC BLOOD PRESSURE: 162 MMHG | TEMPERATURE: 97 F | DIASTOLIC BLOOD PRESSURE: 78 MMHG | OXYGEN SATURATION: 97 % | HEART RATE: 73 BPM

## 2021-02-24 DIAGNOSIS — I10 BENIGN HYPERTENSION: ICD-10-CM

## 2021-02-24 DIAGNOSIS — R74.8 ELEVATED ALKALINE PHOSPHATASE LEVEL: ICD-10-CM

## 2021-02-24 DIAGNOSIS — R74.8 ELEVATED ALKALINE PHOSPHATASE LEVEL: Primary | ICD-10-CM

## 2021-02-24 DIAGNOSIS — E78.5 HYPERLIPIDEMIA WITH TARGET LOW DENSITY LIPOPROTEIN (LDL) CHOLESTEROL LESS THAN 70 MG/DL: ICD-10-CM

## 2021-02-24 DIAGNOSIS — K75.9 INFLAMMATORY LIVER DISEASE, UNSPECIFIED: ICD-10-CM

## 2021-02-24 LAB
A1AT SERPL-MCNC: 167 MG/DL (ref 100–190)
CERULOPLASMIN SERPL-MCNC: 35 MG/DL (ref 15–45)
INR PPP: 0.9 (ref 0.8–1.2)
PROTHROMBIN TIME: 10.1 SEC (ref 9–12.5)

## 2021-02-24 PROCEDURE — 86235 NUCLEAR ANTIGEN ANTIBODY: CPT | Mod: 59

## 2021-02-24 PROCEDURE — 99215 OFFICE O/P EST HI 40 MIN: CPT | Mod: PBBFAC | Performed by: NURSE PRACTITIONER

## 2021-02-24 PROCEDURE — 86706 HEP B SURFACE ANTIBODY: CPT

## 2021-02-24 PROCEDURE — 80053 COMPREHEN METABOLIC PANEL: CPT

## 2021-02-24 PROCEDURE — 86790 VIRUS ANTIBODY NOS: CPT

## 2021-02-24 PROCEDURE — 99214 PR OFFICE/OUTPT VISIT, EST, LEVL IV, 30-39 MIN: ICD-10-PCS | Mod: S$PBB,,, | Performed by: NURSE PRACTITIONER

## 2021-02-24 PROCEDURE — 85610 PROTHROMBIN TIME: CPT

## 2021-02-24 PROCEDURE — 86038 ANTINUCLEAR ANTIBODIES: CPT

## 2021-02-24 PROCEDURE — 82728 ASSAY OF FERRITIN: CPT

## 2021-02-24 PROCEDURE — 82784 ASSAY IGA/IGD/IGG/IGM EACH: CPT | Mod: 59

## 2021-02-24 PROCEDURE — 87340 HEPATITIS B SURFACE AG IA: CPT

## 2021-02-24 PROCEDURE — 86039 ANTINUCLEAR ANTIBODIES (ANA): CPT

## 2021-02-24 PROCEDURE — 99999 PR PBB SHADOW E&M-EST. PATIENT-LVL V: CPT | Mod: PBBFAC,,, | Performed by: NURSE PRACTITIONER

## 2021-02-24 PROCEDURE — 99214 OFFICE O/P EST MOD 30 MIN: CPT | Mod: S$PBB,,, | Performed by: NURSE PRACTITIONER

## 2021-02-24 PROCEDURE — 86235 NUCLEAR ANTIGEN ANTIBODY: CPT

## 2021-02-24 PROCEDURE — 82784 ASSAY IGA/IGD/IGG/IGM EACH: CPT

## 2021-02-24 PROCEDURE — 99999 PR PBB SHADOW E&M-EST. PATIENT-LVL V: ICD-10-PCS | Mod: PBBFAC,,, | Performed by: NURSE PRACTITIONER

## 2021-02-24 PROCEDURE — 86803 HEPATITIS C AB TEST: CPT

## 2021-02-24 PROCEDURE — 86256 FLUORESCENT ANTIBODY TITER: CPT | Mod: 91

## 2021-02-24 PROCEDURE — 82390 ASSAY OF CERULOPLASMIN: CPT

## 2021-02-24 PROCEDURE — 86704 HEP B CORE ANTIBODY TOTAL: CPT

## 2021-02-25 LAB
ALBUMIN SERPL BCP-MCNC: 3.4 G/DL (ref 3.5–5.2)
ALP SERPL-CCNC: 153 U/L (ref 55–135)
ALT SERPL W/O P-5'-P-CCNC: 15 U/L (ref 10–44)
ANA PATTERN 1: NORMAL
ANA SER QL IF: POSITIVE
ANA TITR SER IF: NORMAL {TITER}
ANION GAP SERPL CALC-SCNC: 9 MMOL/L (ref 8–16)
AST SERPL-CCNC: 16 U/L (ref 10–40)
BILIRUB SERPL-MCNC: 0.4 MG/DL (ref 0.1–1)
BUN SERPL-MCNC: 24 MG/DL (ref 8–23)
CALCIUM SERPL-MCNC: 8.5 MG/DL (ref 8.7–10.5)
CHLORIDE SERPL-SCNC: 108 MMOL/L (ref 95–110)
CO2 SERPL-SCNC: 24 MMOL/L (ref 23–29)
CREAT SERPL-MCNC: 1.4 MG/DL (ref 0.5–1.4)
EST. GFR  (AFRICAN AMERICAN): 43 ML/MIN/1.73 M^2
EST. GFR  (NON AFRICAN AMERICAN): 37.3 ML/MIN/1.73 M^2
FERRITIN SERPL-MCNC: 65 NG/ML (ref 20–300)
GLUCOSE SERPL-MCNC: 93 MG/DL (ref 70–110)
HBV CORE AB SERPL QL IA: POSITIVE
HBV SURFACE AB SER-ACNC: NEGATIVE M[IU]/ML
HBV SURFACE AG SERPL QL IA: NEGATIVE
HCV AB SERPL QL IA: NEGATIVE
HEPATITIS A ANTIBODY, IGG: POSITIVE
IGG SERPL-MCNC: 893 MG/DL (ref 650–1600)
IGM SERPL-MCNC: 66 MG/DL (ref 50–300)
POTASSIUM SERPL-SCNC: 4.4 MMOL/L (ref 3.5–5.1)
PROT SERPL-MCNC: 6.6 G/DL (ref 6–8.4)
SODIUM SERPL-SCNC: 141 MMOL/L (ref 136–145)

## 2021-02-26 LAB
ACE SERPL-CCNC: 44 U/L (ref 16–85)
MITOCHONDRIA AB TITR SER IF: NORMAL {TITER}
SMOOTH MUSCLE AB TITR SER IF: NORMAL {TITER}

## 2021-03-01 ENCOUNTER — TELEPHONE (OUTPATIENT)
Dept: FAMILY MEDICINE | Facility: CLINIC | Age: 71
End: 2021-03-01

## 2021-03-01 LAB
ANTI SM ANTIBODY: 0.07 RATIO (ref 0–0.99)
ANTI SM/RNP ANTIBODY: 0.07 RATIO (ref 0–0.99)
ANTI-SM INTERPRETATION: NEGATIVE
ANTI-SM/RNP INTERPRETATION: NEGATIVE
ANTI-SSA ANTIBODY: 0.05 RATIO (ref 0–0.99)
ANTI-SSA INTERPRETATION: NEGATIVE
ANTI-SSB ANTIBODY: 0.05 RATIO (ref 0–0.99)
ANTI-SSB INTERPRETATION: NEGATIVE
DSDNA AB SER-ACNC: NORMAL [IU]/ML

## 2021-03-03 ENCOUNTER — OFFICE VISIT (OUTPATIENT)
Dept: HEPATOLOGY | Facility: CLINIC | Age: 71
End: 2021-03-03
Payer: MEDICARE

## 2021-03-03 ENCOUNTER — TELEPHONE (OUTPATIENT)
Dept: HEPATOLOGY | Facility: CLINIC | Age: 71
End: 2021-03-03

## 2021-03-03 ENCOUNTER — PROCEDURE VISIT (OUTPATIENT)
Dept: HEPATOLOGY | Facility: CLINIC | Age: 71
End: 2021-03-03
Payer: MEDICARE

## 2021-03-03 VITALS
DIASTOLIC BLOOD PRESSURE: 85 MMHG | HEIGHT: 62 IN | WEIGHT: 213 LBS | SYSTOLIC BLOOD PRESSURE: 200 MMHG | RESPIRATION RATE: 16 BRPM | BODY MASS INDEX: 39.2 KG/M2 | HEART RATE: 88 BPM | OXYGEN SATURATION: 92 %

## 2021-03-03 DIAGNOSIS — R74.8 ELEVATED ALKALINE PHOSPHATASE LEVEL: ICD-10-CM

## 2021-03-03 DIAGNOSIS — K76.0 FATTY LIVER: Primary | ICD-10-CM

## 2021-03-03 DIAGNOSIS — R76.8 POSITIVE ANA (ANTINUCLEAR ANTIBODY): ICD-10-CM

## 2021-03-03 DIAGNOSIS — R76.8 HEPATITIS B CORE ANTIBODY POSITIVE: ICD-10-CM

## 2021-03-03 DIAGNOSIS — Z23 NEED FOR HEPATITIS B VACCINATION: ICD-10-CM

## 2021-03-03 PROCEDURE — 91200 LIVER ELASTOGRAPHY: CPT | Mod: PBBFAC | Performed by: NURSE PRACTITIONER

## 2021-03-03 PROCEDURE — 99215 OFFICE O/P EST HI 40 MIN: CPT | Mod: S$PBB,,, | Performed by: NURSE PRACTITIONER

## 2021-03-03 PROCEDURE — 99215 OFFICE O/P EST HI 40 MIN: CPT | Mod: PBBFAC | Performed by: NURSE PRACTITIONER

## 2021-03-03 PROCEDURE — 99999 PR PBB SHADOW E&M-EST. PATIENT-LVL V: CPT | Mod: PBBFAC,,, | Performed by: NURSE PRACTITIONER

## 2021-03-03 PROCEDURE — 99215 PR OFFICE/OUTPT VISIT, EST, LEVL V, 40-54 MIN: ICD-10-PCS | Mod: S$PBB,,, | Performed by: NURSE PRACTITIONER

## 2021-03-03 PROCEDURE — 91200 LIVER ELASTOGRAPHY: CPT | Mod: 26,S$PBB,, | Performed by: NURSE PRACTITIONER

## 2021-03-03 PROCEDURE — 99999 PR PBB SHADOW E&M-EST. PATIENT-LVL V: ICD-10-PCS | Mod: PBBFAC,,, | Performed by: NURSE PRACTITIONER

## 2021-03-03 PROCEDURE — 91200 FIBROSCAN (VIBRATION CONTROLLED TRANSIENT ELASTOGRAPHY): ICD-10-PCS | Mod: 26,S$PBB,, | Performed by: NURSE PRACTITIONER

## 2021-03-03 RX ORDER — HEPATITIS B VACCINE (RECOMBINANT) ADJUVANTED 20 UG/.5ML
20 INJECTION, SOLUTION INTRAMUSCULAR ONCE
Qty: 0.5 ML | Refills: 0 | Status: SHIPPED | OUTPATIENT
Start: 2021-03-03 | End: 2021-03-03

## 2021-03-04 ENCOUNTER — PATIENT MESSAGE (OUTPATIENT)
Dept: FAMILY MEDICINE | Facility: CLINIC | Age: 71
End: 2021-03-04

## 2021-03-24 ENCOUNTER — IMMUNIZATION (OUTPATIENT)
Dept: PRIMARY CARE CLINIC | Facility: CLINIC | Age: 71
End: 2021-03-24

## 2021-03-24 DIAGNOSIS — Z23 NEED FOR VACCINATION: Primary | ICD-10-CM

## 2021-03-24 PROCEDURE — 0001A COVID-19, MRNA, LNP-S, PF, 30 MCG/0.3 ML DOSE VACCINE: CPT | Mod: CV19,S$GLB,, | Performed by: FAMILY MEDICINE

## 2021-03-24 PROCEDURE — 91300 COVID-19, MRNA, LNP-S, PF, 30 MCG/0.3 ML DOSE VACCINE: CPT | Mod: S$GLB,,, | Performed by: FAMILY MEDICINE

## 2021-03-24 PROCEDURE — 0001A COVID-19, MRNA, LNP-S, PF, 30 MCG/0.3 ML DOSE VACCINE: ICD-10-PCS | Mod: CV19,S$GLB,, | Performed by: FAMILY MEDICINE

## 2021-03-24 PROCEDURE — 91300 COVID-19, MRNA, LNP-S, PF, 30 MCG/0.3 ML DOSE VACCINE: ICD-10-PCS | Mod: S$GLB,,, | Performed by: FAMILY MEDICINE

## 2021-04-14 ENCOUNTER — IMMUNIZATION (OUTPATIENT)
Dept: PRIMARY CARE CLINIC | Facility: CLINIC | Age: 71
End: 2021-04-14
Payer: MEDICARE

## 2021-04-14 DIAGNOSIS — Z23 NEED FOR VACCINATION: Primary | ICD-10-CM

## 2021-04-14 PROCEDURE — 91300 COVID-19, MRNA, LNP-S, PF, 30 MCG/0.3 ML DOSE VACCINE: ICD-10-PCS | Mod: S$GLB,,, | Performed by: FAMILY MEDICINE

## 2021-04-14 PROCEDURE — 0002A COVID-19, MRNA, LNP-S, PF, 30 MCG/0.3 ML DOSE VACCINE: CPT | Mod: CV19,S$GLB,, | Performed by: FAMILY MEDICINE

## 2021-04-14 PROCEDURE — 91300 COVID-19, MRNA, LNP-S, PF, 30 MCG/0.3 ML DOSE VACCINE: CPT | Mod: S$GLB,,, | Performed by: FAMILY MEDICINE

## 2021-04-14 PROCEDURE — 0002A COVID-19, MRNA, LNP-S, PF, 30 MCG/0.3 ML DOSE VACCINE: ICD-10-PCS | Mod: CV19,S$GLB,, | Performed by: FAMILY MEDICINE

## 2021-04-27 ENCOUNTER — TELEPHONE (OUTPATIENT)
Dept: NEPHROLOGY | Facility: CLINIC | Age: 71
End: 2021-04-27

## 2021-05-03 ENCOUNTER — LAB VISIT (OUTPATIENT)
Dept: LAB | Facility: HOSPITAL | Age: 71
End: 2021-05-03
Attending: INTERNAL MEDICINE
Payer: MEDICARE

## 2021-05-03 DIAGNOSIS — N18.32 STAGE 3B CHRONIC KIDNEY DISEASE: ICD-10-CM

## 2021-05-03 LAB
ALBUMIN SERPL BCP-MCNC: 3.2 G/DL (ref 3.5–5.2)
ANION GAP SERPL CALC-SCNC: 9 MMOL/L (ref 8–16)
BASOPHILS # BLD AUTO: 0.06 K/UL (ref 0–0.2)
BASOPHILS NFR BLD: 0.7 % (ref 0–1.9)
BUN SERPL-MCNC: 27 MG/DL (ref 8–23)
CALCIUM SERPL-MCNC: 8.7 MG/DL (ref 8.7–10.5)
CHLORIDE SERPL-SCNC: 106 MMOL/L (ref 95–110)
CO2 SERPL-SCNC: 24 MMOL/L (ref 23–29)
CREAT SERPL-MCNC: 1.3 MG/DL (ref 0.5–1.4)
DIFFERENTIAL METHOD: ABNORMAL
EOSINOPHIL # BLD AUTO: 0.3 K/UL (ref 0–0.5)
EOSINOPHIL NFR BLD: 3.7 % (ref 0–8)
ERYTHROCYTE [DISTWIDTH] IN BLOOD BY AUTOMATED COUNT: 14.6 % (ref 11.5–14.5)
EST. GFR  (AFRICAN AMERICAN): 47 ML/MIN/1.73 M^2
EST. GFR  (NON AFRICAN AMERICAN): 40.8 ML/MIN/1.73 M^2
GLUCOSE SERPL-MCNC: 112 MG/DL (ref 70–110)
HCT VFR BLD AUTO: 38 % (ref 37–48.5)
HGB BLD-MCNC: 12 G/DL (ref 12–16)
IMM GRANULOCYTES # BLD AUTO: 0.06 K/UL (ref 0–0.04)
IMM GRANULOCYTES NFR BLD AUTO: 0.7 % (ref 0–0.5)
LYMPHOCYTES # BLD AUTO: 1.2 K/UL (ref 1–4.8)
LYMPHOCYTES NFR BLD: 14.1 % (ref 18–48)
MCH RBC QN AUTO: 28.6 PG (ref 27–31)
MCHC RBC AUTO-ENTMCNC: 31.6 G/DL (ref 32–36)
MCV RBC AUTO: 91 FL (ref 82–98)
MONOCYTES # BLD AUTO: 0.6 K/UL (ref 0.3–1)
MONOCYTES NFR BLD: 7 % (ref 4–15)
NEUTROPHILS # BLD AUTO: 6.3 K/UL (ref 1.8–7.7)
NEUTROPHILS NFR BLD: 73.8 % (ref 38–73)
NRBC BLD-RTO: 0 /100 WBC
PHOSPHATE SERPL-MCNC: 2.9 MG/DL (ref 2.7–4.5)
PLATELET # BLD AUTO: 205 K/UL (ref 150–450)
PMV BLD AUTO: 12.8 FL (ref 9.2–12.9)
POTASSIUM SERPL-SCNC: 4.2 MMOL/L (ref 3.5–5.1)
PTH-INTACT SERPL-MCNC: 286 PG/ML (ref 9–77)
RBC # BLD AUTO: 4.2 M/UL (ref 4–5.4)
SODIUM SERPL-SCNC: 139 MMOL/L (ref 136–145)
WBC # BLD AUTO: 8.54 K/UL (ref 3.9–12.7)

## 2021-05-03 PROCEDURE — 85025 COMPLETE CBC W/AUTO DIFF WBC: CPT | Performed by: INTERNAL MEDICINE

## 2021-05-03 PROCEDURE — 83970 ASSAY OF PARATHORMONE: CPT | Performed by: INTERNAL MEDICINE

## 2021-05-03 PROCEDURE — 80069 RENAL FUNCTION PANEL: CPT | Performed by: INTERNAL MEDICINE

## 2021-05-03 PROCEDURE — 36415 COLL VENOUS BLD VENIPUNCTURE: CPT | Mod: PO | Performed by: INTERNAL MEDICINE

## 2021-05-05 ENCOUNTER — TELEPHONE (OUTPATIENT)
Dept: NEPHROLOGY | Facility: CLINIC | Age: 71
End: 2021-05-05

## 2021-05-20 ENCOUNTER — TELEPHONE (OUTPATIENT)
Dept: NEPHROLOGY | Facility: CLINIC | Age: 71
End: 2021-05-20

## 2021-06-01 ENCOUNTER — TELEPHONE (OUTPATIENT)
Dept: FAMILY MEDICINE | Facility: CLINIC | Age: 71
End: 2021-06-01

## 2021-06-01 ENCOUNTER — HOSPITAL ENCOUNTER (OUTPATIENT)
Dept: CARDIOLOGY | Facility: HOSPITAL | Age: 71
Discharge: HOME | End: 2021-06-01
Attending: INTERNAL MEDICINE
Payer: MEDICARE

## 2021-06-01 VITALS — BODY MASS INDEX: 39.2 KG/M2 | HEART RATE: 68 BPM | HEIGHT: 62 IN | WEIGHT: 213 LBS

## 2021-06-01 PROCEDURE — 93306 TTE W/DOPPLER COMPLETE: CPT | Mod: PO

## 2021-06-01 PROCEDURE — 93306 ECHO (CUPID ONLY): ICD-10-PCS | Mod: 26,,, | Performed by: INTERNAL MEDICINE

## 2021-06-01 PROCEDURE — 93306 TTE W/DOPPLER COMPLETE: CPT | Mod: 26,,, | Performed by: INTERNAL MEDICINE

## 2021-06-02 ENCOUNTER — TELEPHONE (OUTPATIENT)
Dept: FAMILY MEDICINE | Facility: CLINIC | Age: 71
End: 2021-06-02

## 2021-06-03 LAB
ASCENDING AORTA: 2.54 CM
AV INDEX (PROSTH): 0.37
AV MEAN GRADIENT: 21 MMHG
AV PEAK GRADIENT: 36 MMHG
AV VALVE AREA: 1.17 CM2
AV VELOCITY RATIO: 0.35
BSA FOR ECHO PROCEDURE: 2.06 M2
CV ECHO LV RWT: 0.55 CM
DOP CALC AO PEAK VEL: 2.98 M/S
DOP CALC AO VTI: 82.53 CM
DOP CALC LVOT AREA: 3.1 CM2
DOP CALC LVOT DIAMETER: 2 CM
DOP CALC LVOT PEAK VEL: 1.05 M/S
DOP CALC LVOT STROKE VOLUME: 96.46 CM3
DOP CALCLVOT PEAK VEL VTI: 30.72 CM
E WAVE DECELERATION TIME: 160.01 MSEC
E/A RATIO: 0.9
E/E' RATIO: 13 M/S
ECHO LV POSTERIOR WALL: 1.22 CM (ref 0.6–1.1)
EJECTION FRACTION: 55 %
FRACTIONAL SHORTENING: 32 % (ref 28–44)
INTERVENTRICULAR SEPTUM: 1.64 CM (ref 0.6–1.1)
IVRT: 82.78 MSEC
LA MAJOR: 6.13 CM
LA MINOR: 5.62 CM
LA WIDTH: 4.24 CM
LEFT ATRIUM SIZE: 4.53 CM
LEFT ATRIUM VOLUME INDEX: 48.8 ML/M2
LEFT ATRIUM VOLUME: 95.74 CM3
LEFT INTERNAL DIMENSION IN SYSTOLE: 3.03 CM (ref 2.1–4)
LEFT VENTRICLE DIASTOLIC VOLUME INDEX: 46.2 ML/M2
LEFT VENTRICLE DIASTOLIC VOLUME: 90.56 ML
LEFT VENTRICLE MASS INDEX: 129 G/M2
LEFT VENTRICLE SYSTOLIC VOLUME INDEX: 18.3 ML/M2
LEFT VENTRICLE SYSTOLIC VOLUME: 35.91 ML
LEFT VENTRICULAR INTERNAL DIMENSION IN DIASTOLE: 4.46 CM (ref 3.5–6)
LEFT VENTRICULAR MASS: 253.12 G
LV LATERAL E/E' RATIO: 10.11 M/S
LV SEPTAL E/E' RATIO: 18.2 M/S
MV A" WAVE DURATION": 13.7 MSEC
MV PEAK A VEL: 1.01 M/S
MV PEAK E VEL: 0.91 M/S
MV STENOSIS PRESSURE HALF TIME: 46.4 MS
MV VALVE AREA P 1/2 METHOD: 4.74 CM2
PISA MRMAX VEL: 0.04 M/S
PISA TR MAX VEL: 3.08 M/S
PULM VEIN S/D RATIO: 1.21
PV PEAK D VEL: 0.39 M/S
PV PEAK S VEL: 0.47 M/S
RA MAJOR: 5.92 CM
RA PRESSURE: 3 MMHG
RA WIDTH: 3.39 CM
RIGHT VENTRICULAR END-DIASTOLIC DIMENSION: 3.66 CM
RV TISSUE DOPPLER FREE WALL SYSTOLIC VELOCITY 1 (APICAL 4 CHAMBER VIEW): 14.65 CM/S
SINUS: 3.17 CM
STJ: 2.55 CM
TDI LATERAL: 0.09 M/S
TDI SEPTAL: 0.05 M/S
TDI: 0.07 M/S
TR MAX PG: 38 MMHG
TRICUSPID ANNULAR PLANE SYSTOLIC EXCURSION: 2.98 CM
TV REST PULMONARY ARTERY PRESSURE: 41 MMHG

## 2021-06-16 ENCOUNTER — OFFICE VISIT (OUTPATIENT)
Dept: CARDIOLOGY | Facility: CLINIC | Age: 71
End: 2021-06-16
Payer: MEDICARE

## 2021-06-16 VITALS
HEART RATE: 72 BPM | SYSTOLIC BLOOD PRESSURE: 154 MMHG | DIASTOLIC BLOOD PRESSURE: 64 MMHG | WEIGHT: 213.88 LBS | HEIGHT: 63 IN | BODY MASS INDEX: 37.89 KG/M2

## 2021-06-16 DIAGNOSIS — N18.30 STAGE 3 CHRONIC KIDNEY DISEASE, UNSPECIFIED WHETHER STAGE 3A OR 3B CKD: ICD-10-CM

## 2021-06-16 DIAGNOSIS — I73.9 PAD (PERIPHERAL ARTERY DISEASE): ICD-10-CM

## 2021-06-16 DIAGNOSIS — I25.10 CORONARY ARTERY DISEASE INVOLVING NATIVE CORONARY ARTERY OF NATIVE HEART WITHOUT ANGINA PECTORIS: Primary | ICD-10-CM

## 2021-06-16 DIAGNOSIS — I35.0 NONRHEUMATIC AORTIC VALVE STENOSIS: ICD-10-CM

## 2021-06-16 PROCEDURE — 99999 PR PBB SHADOW E&M-EST. PATIENT-LVL IV: CPT | Mod: PBBFAC,,, | Performed by: INTERNAL MEDICINE

## 2021-06-16 PROCEDURE — 99214 OFFICE O/P EST MOD 30 MIN: CPT | Mod: PBBFAC,PO | Performed by: INTERNAL MEDICINE

## 2021-06-16 PROCEDURE — 99214 OFFICE O/P EST MOD 30 MIN: CPT | Mod: S$PBB,,, | Performed by: INTERNAL MEDICINE

## 2021-06-16 PROCEDURE — 99214 PR OFFICE/OUTPT VISIT, EST, LEVL IV, 30-39 MIN: ICD-10-PCS | Mod: S$PBB,,, | Performed by: INTERNAL MEDICINE

## 2021-06-16 PROCEDURE — 99999 PR PBB SHADOW E&M-EST. PATIENT-LVL IV: ICD-10-PCS | Mod: PBBFAC,,, | Performed by: INTERNAL MEDICINE

## 2021-07-09 DIAGNOSIS — K21.9 GASTROESOPHAGEAL REFLUX DISEASE: ICD-10-CM

## 2021-07-12 RX ORDER — ESOMEPRAZOLE MAGNESIUM 40 MG/1
40 CAPSULE, DELAYED RELEASE ORAL
Qty: 90 CAPSULE | Refills: 3 | Status: SHIPPED | OUTPATIENT
Start: 2021-07-12 | End: 2021-11-24 | Stop reason: SDUPTHER

## 2021-08-02 ENCOUNTER — OFFICE VISIT (OUTPATIENT)
Dept: NEPHROLOGY | Facility: CLINIC | Age: 71
End: 2021-08-02
Payer: MEDICARE

## 2021-08-02 VITALS
SYSTOLIC BLOOD PRESSURE: 122 MMHG | BODY MASS INDEX: 37.03 KG/M2 | OXYGEN SATURATION: 89 % | HEIGHT: 63 IN | WEIGHT: 209 LBS | HEART RATE: 62 BPM | DIASTOLIC BLOOD PRESSURE: 80 MMHG

## 2021-08-02 DIAGNOSIS — I73.9 PAD (PERIPHERAL ARTERY DISEASE): ICD-10-CM

## 2021-08-02 DIAGNOSIS — Z87.891 FORMER SMOKER: ICD-10-CM

## 2021-08-02 DIAGNOSIS — I10 BENIGN HYPERTENSION: ICD-10-CM

## 2021-08-02 DIAGNOSIS — K21.9 GASTROESOPHAGEAL REFLUX DISEASE, UNSPECIFIED WHETHER ESOPHAGITIS PRESENT: ICD-10-CM

## 2021-08-02 DIAGNOSIS — N25.81 SECONDARY HYPERPARATHYROIDISM OF RENAL ORIGIN: ICD-10-CM

## 2021-08-02 DIAGNOSIS — E66.01 SEVERE OBESITY WITH BODY MASS INDEX (BMI) OF 35.0 TO 39.9 WITH SERIOUS COMORBIDITY: ICD-10-CM

## 2021-08-02 DIAGNOSIS — J44.9 CHRONIC OBSTRUCTIVE PULMONARY DISEASE, UNSPECIFIED COPD TYPE: ICD-10-CM

## 2021-08-02 DIAGNOSIS — E78.2 MIXED HYPERLIPIDEMIA: ICD-10-CM

## 2021-08-02 DIAGNOSIS — I35.0 AORTIC VALVE STENOSIS, ETIOLOGY OF CARDIAC VALVE DISEASE UNSPECIFIED: ICD-10-CM

## 2021-08-02 DIAGNOSIS — M85.80 OSTEOPENIA, UNSPECIFIED LOCATION: ICD-10-CM

## 2021-08-02 DIAGNOSIS — I50.32 CHRONIC DIASTOLIC HEART FAILURE: ICD-10-CM

## 2021-08-02 DIAGNOSIS — I10 ESSENTIAL HYPERTENSION: ICD-10-CM

## 2021-08-02 DIAGNOSIS — N18.32 STAGE 3B CHRONIC KIDNEY DISEASE: Primary | ICD-10-CM

## 2021-08-02 DIAGNOSIS — E55.9 VITAMIN D DEFICIENCY: ICD-10-CM

## 2021-08-02 DIAGNOSIS — I25.10 CORONARY ARTERY DISEASE INVOLVING NATIVE CORONARY ARTERY OF NATIVE HEART WITHOUT ANGINA PECTORIS: ICD-10-CM

## 2021-08-02 DIAGNOSIS — I11.9 HYPERTENSIVE LEFT VENTRICULAR HYPERTROPHY, WITHOUT HEART FAILURE: ICD-10-CM

## 2021-08-02 PROCEDURE — 99215 OFFICE O/P EST HI 40 MIN: CPT | Mod: S$PBB,,, | Performed by: INTERNAL MEDICINE

## 2021-08-02 PROCEDURE — 99215 PR OFFICE/OUTPT VISIT, EST, LEVL V, 40-54 MIN: ICD-10-PCS | Mod: S$PBB,,, | Performed by: INTERNAL MEDICINE

## 2021-08-02 PROCEDURE — 99999 PR PBB SHADOW E&M-EST. PATIENT-LVL IV: ICD-10-PCS | Mod: PBBFAC,,, | Performed by: INTERNAL MEDICINE

## 2021-08-02 PROCEDURE — 99999 PR PBB SHADOW E&M-EST. PATIENT-LVL IV: CPT | Mod: PBBFAC,,, | Performed by: INTERNAL MEDICINE

## 2021-08-02 PROCEDURE — 99214 OFFICE O/P EST MOD 30 MIN: CPT | Mod: PBBFAC,PO | Performed by: INTERNAL MEDICINE

## 2021-08-02 RX ORDER — ALENDRONATE SODIUM 70 MG/1
70 TABLET ORAL
COMMUNITY
Start: 2021-06-13 | End: 2021-09-13

## 2021-08-02 RX ORDER — QUETIAPINE FUMARATE 100 MG/1
100 TABLET, FILM COATED ORAL DAILY
Status: ON HOLD | COMMUNITY
Start: 2021-03-25 | End: 2023-06-14 | Stop reason: SDUPTHER

## 2021-08-05 ENCOUNTER — TELEPHONE (OUTPATIENT)
Dept: NEPHROLOGY | Facility: CLINIC | Age: 71
End: 2021-08-05

## 2021-08-13 DIAGNOSIS — G47.33 OSA (OBSTRUCTIVE SLEEP APNEA): Primary | ICD-10-CM

## 2021-08-13 DIAGNOSIS — Z01.818 OTHER SPECIFIED PRE-OPERATIVE EXAMINATION: ICD-10-CM

## 2021-08-13 DIAGNOSIS — G47.19 EXCESSIVE DAYTIME SLEEPINESS: ICD-10-CM

## 2021-08-13 DIAGNOSIS — R53.83 LOSS OF ENERGY: ICD-10-CM

## 2021-08-13 DIAGNOSIS — R06.83 SNORING: ICD-10-CM

## 2021-09-01 ENCOUNTER — PATIENT MESSAGE (OUTPATIENT)
Dept: FAMILY MEDICINE | Facility: CLINIC | Age: 71
End: 2021-09-01

## 2021-09-01 ENCOUNTER — OFFICE VISIT (OUTPATIENT)
Dept: FAMILY MEDICINE | Facility: CLINIC | Age: 71
End: 2021-09-01
Payer: MEDICARE

## 2021-09-01 ENCOUNTER — HOSPITAL ENCOUNTER (OUTPATIENT)
Dept: RADIOLOGY | Facility: HOSPITAL | Age: 71
Discharge: HOME | End: 2021-09-01
Attending: FAMILY MEDICINE
Payer: MEDICARE

## 2021-09-01 VITALS
HEIGHT: 63 IN | BODY MASS INDEX: 37.31 KG/M2 | HEART RATE: 79 BPM | WEIGHT: 210.56 LBS | DIASTOLIC BLOOD PRESSURE: 60 MMHG | OXYGEN SATURATION: 89 % | SYSTOLIC BLOOD PRESSURE: 120 MMHG

## 2021-09-01 DIAGNOSIS — M79.662 PAIN AND SWELLING OF LEFT LOWER LEG: Primary | ICD-10-CM

## 2021-09-01 DIAGNOSIS — Z79.899 NEW MEDICATION ADDED: ICD-10-CM

## 2021-09-01 DIAGNOSIS — M10.9 ACUTE GOUT OF LEFT KNEE, UNSPECIFIED CAUSE: ICD-10-CM

## 2021-09-01 DIAGNOSIS — M79.662 PAIN AND SWELLING OF LEFT LOWER LEG: ICD-10-CM

## 2021-09-01 DIAGNOSIS — M79.89 PAIN AND SWELLING OF LEFT LOWER LEG: Primary | ICD-10-CM

## 2021-09-01 DIAGNOSIS — M79.89 PAIN AND SWELLING OF LEFT LOWER LEG: ICD-10-CM

## 2021-09-01 DIAGNOSIS — M10.9 GOUT, UNSPECIFIED CAUSE, UNSPECIFIED CHRONICITY, UNSPECIFIED SITE: ICD-10-CM

## 2021-09-01 PROCEDURE — 93970 US LOWER EXTREMITY VEINS BILATERAL: ICD-10-PCS | Mod: 26,,, | Performed by: RADIOLOGY

## 2021-09-01 PROCEDURE — 99999 PR PBB SHADOW E&M-EST. PATIENT-LVL V: ICD-10-PCS | Mod: PBBFAC,,, | Performed by: FAMILY MEDICINE

## 2021-09-01 PROCEDURE — 99999 PR PBB SHADOW E&M-EST. PATIENT-LVL V: CPT | Mod: PBBFAC,,, | Performed by: FAMILY MEDICINE

## 2021-09-01 PROCEDURE — 93970 EXTREMITY STUDY: CPT | Mod: 26,,, | Performed by: RADIOLOGY

## 2021-09-01 PROCEDURE — 99214 PR OFFICE/OUTPT VISIT, EST, LEVL IV, 30-39 MIN: ICD-10-PCS | Mod: S$PBB,,, | Performed by: FAMILY MEDICINE

## 2021-09-01 PROCEDURE — 99215 OFFICE O/P EST HI 40 MIN: CPT | Mod: PBBFAC,PO | Performed by: FAMILY MEDICINE

## 2021-09-01 PROCEDURE — 99214 OFFICE O/P EST MOD 30 MIN: CPT | Mod: S$PBB,,, | Performed by: FAMILY MEDICINE

## 2021-09-01 PROCEDURE — 93970 EXTREMITY STUDY: CPT | Mod: TC

## 2021-09-01 RX ORDER — ALLOPURINOL 100 MG/1
100 TABLET ORAL DAILY
Qty: 30 TABLET | Refills: 0 | Status: SHIPPED | OUTPATIENT
Start: 2021-09-01 | End: 2021-09-14

## 2021-09-01 RX ORDER — COLCHICINE 0.6 MG/1
0.6 TABLET ORAL EVERY 6 HOURS
Qty: 3 TABLET | Refills: 0 | Status: SHIPPED | OUTPATIENT
Start: 2021-09-01 | End: 2022-02-10 | Stop reason: SDUPTHER

## 2021-09-02 ENCOUNTER — TELEPHONE (OUTPATIENT)
Dept: FAMILY MEDICINE | Facility: CLINIC | Age: 71
End: 2021-09-02

## 2021-09-02 ENCOUNTER — PATIENT MESSAGE (OUTPATIENT)
Dept: FAMILY MEDICINE | Facility: CLINIC | Age: 71
End: 2021-09-02

## 2021-09-07 ENCOUNTER — TELEPHONE (OUTPATIENT)
Dept: FAMILY MEDICINE | Facility: CLINIC | Age: 71
End: 2021-09-07

## 2021-09-07 ENCOUNTER — HOSPITAL ENCOUNTER (EMERGENCY)
Facility: HOSPITAL | Age: 71
Discharge: HOME | End: 2021-09-07
Attending: EMERGENCY MEDICINE
Payer: MEDICARE

## 2021-09-07 ENCOUNTER — PATIENT MESSAGE (OUTPATIENT)
Dept: FAMILY MEDICINE | Facility: CLINIC | Age: 71
End: 2021-09-07

## 2021-09-07 VITALS
HEART RATE: 79 BPM | WEIGHT: 210 LBS | BODY MASS INDEX: 37.2 KG/M2 | RESPIRATION RATE: 18 BRPM | TEMPERATURE: 98 F | OXYGEN SATURATION: 94 % | SYSTOLIC BLOOD PRESSURE: 188 MMHG | DIASTOLIC BLOOD PRESSURE: 74 MMHG

## 2021-09-07 DIAGNOSIS — K29.70 GASTRITIS, PRESENCE OF BLEEDING UNSPECIFIED, UNSPECIFIED CHRONICITY, UNSPECIFIED GASTRITIS TYPE: Primary | ICD-10-CM

## 2021-09-07 DIAGNOSIS — R19.5 DARK STOOLS: ICD-10-CM

## 2021-09-07 LAB
ABO + RH BLD: NORMAL
ALBUMIN SERPL BCP-MCNC: 3.5 G/DL (ref 3.5–5.2)
ALP SERPL-CCNC: 130 U/L (ref 55–135)
ALT SERPL W/O P-5'-P-CCNC: 12 U/L (ref 10–44)
ANION GAP SERPL CALC-SCNC: 11 MMOL/L (ref 8–16)
APTT PPP: 27.7 SEC (ref 25.6–35.8)
AST SERPL-CCNC: 18 U/L (ref 10–40)
BASOPHILS # BLD AUTO: 0.05 K/UL (ref 0–0.2)
BASOPHILS NFR BLD: 0.7 % (ref 0–1.9)
BILIRUB SERPL-MCNC: 0.7 MG/DL (ref 0.1–1)
BLD GP AB SCN CELLS X3 SERPL QL: NORMAL
BUN SERPL-MCNC: 27 MG/DL (ref 8–23)
CALCIUM SERPL-MCNC: 9.4 MG/DL (ref 8.7–10.5)
CHLORIDE SERPL-SCNC: 106 MMOL/L (ref 95–110)
CO2 SERPL-SCNC: 22 MMOL/L (ref 23–29)
CREAT SERPL-MCNC: 1.3 MG/DL (ref 0.5–1.4)
DIFFERENTIAL METHOD: ABNORMAL
EOSINOPHIL # BLD AUTO: 0.4 K/UL (ref 0–0.5)
EOSINOPHIL NFR BLD: 5.3 % (ref 0–8)
ERYTHROCYTE [DISTWIDTH] IN BLOOD BY AUTOMATED COUNT: 14.6 % (ref 11.5–14.5)
EST. GFR  (AFRICAN AMERICAN): 47 ML/MIN/1.73 M^2
EST. GFR  (NON AFRICAN AMERICAN): 40.8 ML/MIN/1.73 M^2
GLUCOSE SERPL-MCNC: 153 MG/DL (ref 70–110)
HCT VFR BLD AUTO: 39.1 % (ref 37–48.5)
HGB BLD-MCNC: 12.4 G/DL (ref 12–16)
IMM GRANULOCYTES # BLD AUTO: 0.04 K/UL (ref 0–0.04)
IMM GRANULOCYTES NFR BLD AUTO: 0.6 % (ref 0–0.5)
INR PPP: 1
LYMPHOCYTES # BLD AUTO: 1.1 K/UL (ref 1–4.8)
LYMPHOCYTES NFR BLD: 15.6 % (ref 18–48)
MCH RBC QN AUTO: 29.5 PG (ref 27–31)
MCHC RBC AUTO-ENTMCNC: 31.7 G/DL (ref 32–36)
MCV RBC AUTO: 93 FL (ref 82–98)
MONOCYTES # BLD AUTO: 0.4 K/UL (ref 0.3–1)
MONOCYTES NFR BLD: 6.1 % (ref 4–15)
NEUTROPHILS # BLD AUTO: 4.8 K/UL (ref 1.8–7.7)
NEUTROPHILS NFR BLD: 71.7 % (ref 38–73)
NRBC BLD-RTO: 0 /100 WBC
OB PNL STL: NEGATIVE
PLATELET # BLD AUTO: 225 K/UL (ref 150–450)
PMV BLD AUTO: 11.2 FL (ref 9.2–12.9)
POTASSIUM SERPL-SCNC: 4.4 MMOL/L (ref 3.5–5.1)
PROT SERPL-MCNC: 6.9 G/DL (ref 6–8.4)
PROTHROMBIN TIME: 12.8 SEC (ref 11.8–14.3)
RBC # BLD AUTO: 4.2 M/UL (ref 4–5.4)
SARS-COV-2 RDRP RESP QL NAA+PROBE: NEGATIVE
SODIUM SERPL-SCNC: 139 MMOL/L (ref 136–145)
WBC # BLD AUTO: 6.74 K/UL (ref 3.9–12.7)

## 2021-09-07 PROCEDURE — 80053 COMPREHEN METABOLIC PANEL: CPT | Performed by: NURSE PRACTITIONER

## 2021-09-07 PROCEDURE — 86900 BLOOD TYPING SEROLOGIC ABO: CPT | Performed by: NURSE PRACTITIONER

## 2021-09-07 PROCEDURE — 82272 OCCULT BLD FECES 1-3 TESTS: CPT | Performed by: EMERGENCY MEDICINE

## 2021-09-07 PROCEDURE — 85025 COMPLETE CBC W/AUTO DIFF WBC: CPT | Performed by: NURSE PRACTITIONER

## 2021-09-07 PROCEDURE — 99284 EMERGENCY DEPT VISIT MOD MDM: CPT | Mod: 25

## 2021-09-07 PROCEDURE — 85730 THROMBOPLASTIN TIME PARTIAL: CPT | Performed by: NURSE PRACTITIONER

## 2021-09-07 PROCEDURE — 96374 THER/PROPH/DIAG INJ IV PUSH: CPT

## 2021-09-07 PROCEDURE — 63600175 PHARM REV CODE 636 W HCPCS: Performed by: EMERGENCY MEDICINE

## 2021-09-07 PROCEDURE — 85610 PROTHROMBIN TIME: CPT | Performed by: NURSE PRACTITIONER

## 2021-09-07 PROCEDURE — U0002 COVID-19 LAB TEST NON-CDC: HCPCS | Performed by: NURSE PRACTITIONER

## 2021-09-07 PROCEDURE — 86870 RBC ANTIBODY IDENTIFICATION: CPT | Performed by: NURSE PRACTITIONER

## 2021-09-07 PROCEDURE — C9113 INJ PANTOPRAZOLE SODIUM, VIA: HCPCS | Performed by: EMERGENCY MEDICINE

## 2021-09-07 RX ORDER — PANTOPRAZOLE SODIUM 20 MG/1
40 TABLET, DELAYED RELEASE ORAL DAILY
Qty: 30 TABLET | Refills: 0 | Status: SHIPPED | OUTPATIENT
Start: 2021-09-07 | End: 2021-11-24

## 2021-09-07 RX ORDER — PANTOPRAZOLE SODIUM 40 MG/10ML
40 INJECTION, POWDER, LYOPHILIZED, FOR SOLUTION INTRAVENOUS
Status: COMPLETED | OUTPATIENT
Start: 2021-09-07 | End: 2021-09-07

## 2021-09-07 RX ADMIN — PANTOPRAZOLE SODIUM 40 MG: 40 INJECTION, POWDER, LYOPHILIZED, FOR SOLUTION INTRAVENOUS at 06:09

## 2021-09-08 ENCOUNTER — TELEPHONE (OUTPATIENT)
Dept: FAMILY MEDICINE | Facility: CLINIC | Age: 71
End: 2021-09-08

## 2021-09-08 DIAGNOSIS — K92.1 MELENA: Primary | ICD-10-CM

## 2021-09-08 LAB — BLOOD GROUP ANTIBODIES SERPL: NORMAL

## 2021-09-09 ENCOUNTER — TELEPHONE (OUTPATIENT)
Dept: FAMILY MEDICINE | Facility: CLINIC | Age: 71
End: 2021-09-09

## 2021-09-14 ENCOUNTER — OFFICE VISIT (OUTPATIENT)
Dept: FAMILY MEDICINE | Facility: CLINIC | Age: 71
End: 2021-09-14
Payer: MEDICARE

## 2021-09-14 VITALS
HEIGHT: 63 IN | BODY MASS INDEX: 36.91 KG/M2 | OXYGEN SATURATION: 95 % | SYSTOLIC BLOOD PRESSURE: 139 MMHG | HEART RATE: 73 BPM | TEMPERATURE: 98 F | WEIGHT: 208.31 LBS | DIASTOLIC BLOOD PRESSURE: 58 MMHG | RESPIRATION RATE: 20 BRPM

## 2021-09-14 DIAGNOSIS — I10 BENIGN HYPERTENSION: Primary | ICD-10-CM

## 2021-09-14 DIAGNOSIS — R19.7 DIARRHEA, UNSPECIFIED TYPE: ICD-10-CM

## 2021-09-14 DIAGNOSIS — K21.9 GASTROESOPHAGEAL REFLUX DISEASE, UNSPECIFIED WHETHER ESOPHAGITIS PRESENT: ICD-10-CM

## 2021-09-14 DIAGNOSIS — R73.01 IMPAIRED FASTING BLOOD SUGAR: ICD-10-CM

## 2021-09-14 DIAGNOSIS — F41.9 ANXIETY: ICD-10-CM

## 2021-09-14 DIAGNOSIS — E66.01 SEVERE OBESITY WITH BODY MASS INDEX (BMI) OF 35.0 TO 39.9 WITH SERIOUS COMORBIDITY: ICD-10-CM

## 2021-09-14 DIAGNOSIS — F32.A DEPRESSION, UNSPECIFIED DEPRESSION TYPE: ICD-10-CM

## 2021-09-14 DIAGNOSIS — R74.8 ELEVATED ALKALINE PHOSPHATASE LEVEL: ICD-10-CM

## 2021-09-14 DIAGNOSIS — N18.32 STAGE 3B CHRONIC KIDNEY DISEASE: ICD-10-CM

## 2021-09-14 DIAGNOSIS — E78.5 HYPERLIPIDEMIA WITH TARGET LOW DENSITY LIPOPROTEIN (LDL) CHOLESTEROL LESS THAN 70 MG/DL: ICD-10-CM

## 2021-09-14 PROCEDURE — 99999 PR PBB SHADOW E&M-EST. PATIENT-LVL III: CPT | Mod: PBBFAC,,, | Performed by: FAMILY MEDICINE

## 2021-09-14 PROCEDURE — 99214 PR OFFICE/OUTPT VISIT, EST, LEVL IV, 30-39 MIN: ICD-10-PCS | Mod: S$PBB,,, | Performed by: FAMILY MEDICINE

## 2021-09-14 PROCEDURE — 99213 OFFICE O/P EST LOW 20 MIN: CPT | Mod: PBBFAC,PO | Performed by: FAMILY MEDICINE

## 2021-09-14 PROCEDURE — 99999 PR PBB SHADOW E&M-EST. PATIENT-LVL III: ICD-10-PCS | Mod: PBBFAC,,, | Performed by: FAMILY MEDICINE

## 2021-09-14 PROCEDURE — 99214 OFFICE O/P EST MOD 30 MIN: CPT | Mod: S$PBB,,, | Performed by: FAMILY MEDICINE

## 2021-09-14 RX ORDER — MONTELUKAST SODIUM 4 MG/1
1 TABLET, CHEWABLE ORAL 2 TIMES DAILY PRN
Qty: 180 TABLET | Refills: 3 | Status: ON HOLD | OUTPATIENT
Start: 2021-09-14 | End: 2023-06-14 | Stop reason: HOSPADM

## 2021-09-14 RX ORDER — CLONAZEPAM 1 MG/1
1 TABLET, ORALLY DISINTEGRATING ORAL 2 TIMES DAILY PRN
Qty: 60 TABLET | Refills: 0 | Status: SHIPPED | OUTPATIENT
Start: 2021-09-14 | End: 2022-12-30 | Stop reason: SDUPTHER

## 2021-09-17 ENCOUNTER — HOSPITAL ENCOUNTER (OUTPATIENT)
Dept: PREADMISSION TESTING | Facility: HOSPITAL | Age: 71
Discharge: HOME | End: 2021-09-17
Attending: INTERNAL MEDICINE
Payer: MEDICARE

## 2021-09-17 DIAGNOSIS — Z01.818 OTHER SPECIFIED PRE-OPERATIVE EXAMINATION: ICD-10-CM

## 2021-09-17 LAB — SARS-COV-2 RDRP RESP QL NAA+PROBE: NEGATIVE

## 2021-09-17 PROCEDURE — U0002 COVID-19 LAB TEST NON-CDC: HCPCS | Performed by: INTERNAL MEDICINE

## 2021-09-19 ENCOUNTER — PROCEDURE VISIT (OUTPATIENT)
Dept: SLEEP MEDICINE | Facility: HOSPITAL | Age: 71
End: 2021-09-19
Attending: INTERNAL MEDICINE
Payer: MEDICARE

## 2021-09-19 DIAGNOSIS — G47.33 OSA (OBSTRUCTIVE SLEEP APNEA): ICD-10-CM

## 2021-09-19 DIAGNOSIS — R53.83 LOSS OF ENERGY: ICD-10-CM

## 2021-09-19 DIAGNOSIS — R06.83 SNORING: ICD-10-CM

## 2021-09-19 DIAGNOSIS — G47.19 EXCESSIVE DAYTIME SLEEPINESS: ICD-10-CM

## 2021-09-19 PROCEDURE — 95810 POLYSOM 6/> YRS 4/> PARAM: CPT

## 2021-10-13 DIAGNOSIS — I50.9 CHF (CONGESTIVE HEART FAILURE): ICD-10-CM

## 2021-10-13 DIAGNOSIS — G47.19 EXCESSIVE DAYTIME SLEEPINESS: ICD-10-CM

## 2021-10-13 DIAGNOSIS — Z01.818 OTHER SPECIFIED PRE-OPERATIVE EXAMINATION: ICD-10-CM

## 2021-10-13 DIAGNOSIS — G47.33 OSA (OBSTRUCTIVE SLEEP APNEA): Primary | ICD-10-CM

## 2021-10-13 DIAGNOSIS — R53.83 LOSS OF ENERGY: ICD-10-CM

## 2021-10-13 DIAGNOSIS — I25.10 CAD (CORONARY ARTERY DISEASE): ICD-10-CM

## 2021-10-13 DIAGNOSIS — R06.83 SNORING: ICD-10-CM

## 2021-10-21 ENCOUNTER — PATIENT MESSAGE (OUTPATIENT)
Dept: FAMILY MEDICINE | Facility: CLINIC | Age: 71
End: 2021-10-21
Payer: MEDICARE

## 2021-10-21 DIAGNOSIS — K92.1 MELENA: Primary | ICD-10-CM

## 2021-10-28 ENCOUNTER — PATIENT MESSAGE (OUTPATIENT)
Dept: FAMILY MEDICINE | Facility: CLINIC | Age: 71
End: 2021-10-28
Payer: MEDICARE

## 2021-11-08 ENCOUNTER — LAB VISIT (OUTPATIENT)
Dept: LAB | Facility: HOSPITAL | Age: 71
End: 2021-11-08
Attending: FAMILY MEDICINE
Payer: MEDICARE

## 2021-11-08 DIAGNOSIS — K92.1 MELENA: ICD-10-CM

## 2021-11-08 LAB
ALBUMIN SERPL BCP-MCNC: 3.2 G/DL (ref 3.5–5.2)
ALP SERPL-CCNC: 163 U/L (ref 55–135)
ALT SERPL W/O P-5'-P-CCNC: 12 U/L (ref 10–44)
ANION GAP SERPL CALC-SCNC: 11 MMOL/L (ref 8–16)
AST SERPL-CCNC: 15 U/L (ref 10–40)
BASOPHILS # BLD AUTO: 0.05 K/UL (ref 0–0.2)
BASOPHILS NFR BLD: 0.6 % (ref 0–1.9)
BILIRUB SERPL-MCNC: 0.5 MG/DL (ref 0.1–1)
BUN SERPL-MCNC: 20 MG/DL (ref 8–23)
CALCIUM SERPL-MCNC: 9.1 MG/DL (ref 8.7–10.5)
CHLORIDE SERPL-SCNC: 104 MMOL/L (ref 95–110)
CO2 SERPL-SCNC: 23 MMOL/L (ref 23–29)
CREAT SERPL-MCNC: 1.4 MG/DL (ref 0.5–1.4)
DIFFERENTIAL METHOD: ABNORMAL
EOSINOPHIL # BLD AUTO: 0.3 K/UL (ref 0–0.5)
EOSINOPHIL NFR BLD: 3.3 % (ref 0–8)
ERYTHROCYTE [DISTWIDTH] IN BLOOD BY AUTOMATED COUNT: 14.8 % (ref 11.5–14.5)
EST. GFR  (AFRICAN AMERICAN): 43 ML/MIN/1.73 M^2
EST. GFR  (NON AFRICAN AMERICAN): 37.3 ML/MIN/1.73 M^2
FERRITIN SERPL-MCNC: 138 NG/ML (ref 20–300)
GLUCOSE SERPL-MCNC: 115 MG/DL (ref 70–110)
HCT VFR BLD AUTO: 40.5 % (ref 37–48.5)
HGB BLD-MCNC: 12.2 G/DL (ref 12–16)
IMM GRANULOCYTES # BLD AUTO: 0.03 K/UL (ref 0–0.04)
IMM GRANULOCYTES NFR BLD AUTO: 0.3 % (ref 0–0.5)
IRON SERPL-MCNC: 59 UG/DL (ref 30–160)
LYMPHOCYTES # BLD AUTO: 1.1 K/UL (ref 1–4.8)
LYMPHOCYTES NFR BLD: 12.9 % (ref 18–48)
MCH RBC QN AUTO: 28.5 PG (ref 27–31)
MCHC RBC AUTO-ENTMCNC: 30.1 G/DL (ref 32–36)
MCV RBC AUTO: 95 FL (ref 82–98)
MONOCYTES # BLD AUTO: 0.5 K/UL (ref 0.3–1)
MONOCYTES NFR BLD: 5.9 % (ref 4–15)
NEUTROPHILS # BLD AUTO: 6.7 K/UL (ref 1.8–7.7)
NEUTROPHILS NFR BLD: 77 % (ref 38–73)
NRBC BLD-RTO: 0 /100 WBC
PLATELET # BLD AUTO: 201 K/UL (ref 150–450)
PMV BLD AUTO: 11.9 FL (ref 9.2–12.9)
POTASSIUM SERPL-SCNC: 4.4 MMOL/L (ref 3.5–5.1)
PROT SERPL-MCNC: 6.8 G/DL (ref 6–8.4)
RBC # BLD AUTO: 4.28 M/UL (ref 4–5.4)
SATURATED IRON: 16 % (ref 20–50)
SODIUM SERPL-SCNC: 138 MMOL/L (ref 136–145)
TOTAL IRON BINDING CAPACITY: 360 UG/DL (ref 250–450)
TRANSFERRIN SERPL-MCNC: 243 MG/DL (ref 200–375)
WBC # BLD AUTO: 8.75 K/UL (ref 3.9–12.7)

## 2021-11-08 PROCEDURE — 36415 COLL VENOUS BLD VENIPUNCTURE: CPT | Mod: PO | Performed by: FAMILY MEDICINE

## 2021-11-08 PROCEDURE — 80053 COMPREHEN METABOLIC PANEL: CPT | Performed by: FAMILY MEDICINE

## 2021-11-08 PROCEDURE — 85025 COMPLETE CBC W/AUTO DIFF WBC: CPT | Performed by: FAMILY MEDICINE

## 2021-11-08 PROCEDURE — 84466 ASSAY OF TRANSFERRIN: CPT | Performed by: FAMILY MEDICINE

## 2021-11-08 PROCEDURE — 82728 ASSAY OF FERRITIN: CPT | Performed by: FAMILY MEDICINE

## 2021-11-10 ENCOUNTER — TELEPHONE (OUTPATIENT)
Dept: FAMILY MEDICINE | Facility: CLINIC | Age: 71
End: 2021-11-10
Payer: MEDICARE

## 2021-11-24 ENCOUNTER — OFFICE VISIT (OUTPATIENT)
Dept: FAMILY MEDICINE | Facility: CLINIC | Age: 71
End: 2021-11-24
Payer: MEDICARE

## 2021-11-24 VITALS
TEMPERATURE: 99 F | BODY MASS INDEX: 36.88 KG/M2 | WEIGHT: 208.13 LBS | HEIGHT: 63 IN | HEART RATE: 77 BPM | SYSTOLIC BLOOD PRESSURE: 130 MMHG | DIASTOLIC BLOOD PRESSURE: 55 MMHG | OXYGEN SATURATION: 95 % | RESPIRATION RATE: 16 BRPM

## 2021-11-24 DIAGNOSIS — N18.32 STAGE 3B CHRONIC KIDNEY DISEASE: ICD-10-CM

## 2021-11-24 DIAGNOSIS — Z23 FLU VACCINE NEED: ICD-10-CM

## 2021-11-24 DIAGNOSIS — K21.9 GASTROESOPHAGEAL REFLUX DISEASE, UNSPECIFIED WHETHER ESOPHAGITIS PRESENT: ICD-10-CM

## 2021-11-24 DIAGNOSIS — K92.1 MELENA: Primary | ICD-10-CM

## 2021-11-24 DIAGNOSIS — K21.9 GASTROESOPHAGEAL REFLUX DISEASE: ICD-10-CM

## 2021-11-24 DIAGNOSIS — R73.01 IMPAIRED FASTING BLOOD SUGAR: ICD-10-CM

## 2021-11-24 DIAGNOSIS — G47.33 OSA (OBSTRUCTIVE SLEEP APNEA): ICD-10-CM

## 2021-11-24 DIAGNOSIS — Z12.31 ENCOUNTER FOR SCREENING MAMMOGRAM FOR MALIGNANT NEOPLASM OF BREAST: ICD-10-CM

## 2021-11-24 DIAGNOSIS — J44.9 CHRONIC OBSTRUCTIVE PULMONARY DISEASE, UNSPECIFIED COPD TYPE: ICD-10-CM

## 2021-11-24 DIAGNOSIS — R21 RASH OF FACE: ICD-10-CM

## 2021-11-24 PROCEDURE — 99214 OFFICE O/P EST MOD 30 MIN: CPT | Mod: PBBFAC,PO | Performed by: FAMILY MEDICINE

## 2021-11-24 PROCEDURE — 99999 PR PBB SHADOW E&M-EST. PATIENT-LVL IV: CPT | Mod: PBBFAC,,, | Performed by: FAMILY MEDICINE

## 2021-11-24 PROCEDURE — 99214 PR OFFICE/OUTPT VISIT, EST, LEVL IV, 30-39 MIN: ICD-10-PCS | Mod: S$PBB,,, | Performed by: FAMILY MEDICINE

## 2021-11-24 PROCEDURE — 99214 OFFICE O/P EST MOD 30 MIN: CPT | Mod: S$PBB,,, | Performed by: FAMILY MEDICINE

## 2021-11-24 PROCEDURE — 99999 PR PBB SHADOW E&M-EST. PATIENT-LVL IV: ICD-10-PCS | Mod: PBBFAC,,, | Performed by: FAMILY MEDICINE

## 2021-11-24 RX ORDER — ESOMEPRAZOLE MAGNESIUM 40 MG/1
40 CAPSULE, DELAYED RELEASE ORAL
Qty: 90 CAPSULE | Refills: 3 | Status: SHIPPED | OUTPATIENT
Start: 2021-11-24 | End: 2022-11-25

## 2021-11-24 RX ORDER — FLUTICASONE FUROATE, UMECLIDINIUM BROMIDE AND VILANTEROL TRIFENATATE 200; 62.5; 25 UG/1; UG/1; UG/1
1 POWDER RESPIRATORY (INHALATION) DAILY
Qty: 90 EACH | Refills: 3 | Status: SHIPPED | OUTPATIENT
Start: 2021-11-24 | End: 2022-06-30

## 2021-11-24 RX ORDER — CLOTRIMAZOLE AND BETAMETHASONE DIPROPIONATE 10; .64 MG/G; MG/G
CREAM TOPICAL 2 TIMES DAILY
Qty: 15 G | Refills: 0 | Status: SHIPPED | OUTPATIENT
Start: 2021-11-24 | End: 2022-03-14

## 2021-12-15 ENCOUNTER — PROCEDURE VISIT (OUTPATIENT)
Dept: SLEEP MEDICINE | Facility: HOSPITAL | Age: 71
End: 2021-12-15
Attending: INTERNAL MEDICINE
Payer: MEDICARE

## 2021-12-15 DIAGNOSIS — I25.10 CAD (CORONARY ARTERY DISEASE): ICD-10-CM

## 2021-12-15 DIAGNOSIS — R53.83 LOSS OF ENERGY: ICD-10-CM

## 2021-12-15 DIAGNOSIS — G47.19 EXCESSIVE DAYTIME SLEEPINESS: ICD-10-CM

## 2021-12-15 DIAGNOSIS — R06.83 SNORING: ICD-10-CM

## 2021-12-15 DIAGNOSIS — G47.33 OSA (OBSTRUCTIVE SLEEP APNEA): ICD-10-CM

## 2021-12-15 DIAGNOSIS — I50.9 CHF (CONGESTIVE HEART FAILURE): ICD-10-CM

## 2021-12-15 PROCEDURE — 95811 POLYSOM 6/>YRS CPAP 4/> PARM: CPT

## 2021-12-20 DIAGNOSIS — Z01.818 OTHER SPECIFIED PRE-OPERATIVE EXAMINATION: ICD-10-CM

## 2021-12-20 DIAGNOSIS — G47.33 OSA (OBSTRUCTIVE SLEEP APNEA): Primary | ICD-10-CM

## 2021-12-20 DIAGNOSIS — G47.19 EXCESSIVE DAYTIME SLEEPINESS: ICD-10-CM

## 2021-12-20 DIAGNOSIS — R53.83 LOSS OF ENERGY: ICD-10-CM

## 2021-12-20 DIAGNOSIS — R06.83 SNORING: ICD-10-CM

## 2022-01-14 ENCOUNTER — PROCEDURE VISIT (OUTPATIENT)
Dept: SLEEP MEDICINE | Facility: HOSPITAL | Age: 72
End: 2022-01-14
Attending: INTERNAL MEDICINE
Payer: MEDICARE

## 2022-01-14 DIAGNOSIS — G47.33 OSA (OBSTRUCTIVE SLEEP APNEA): ICD-10-CM

## 2022-01-14 DIAGNOSIS — R06.83 SNORING: ICD-10-CM

## 2022-01-14 DIAGNOSIS — R53.83 LOSS OF ENERGY: ICD-10-CM

## 2022-01-14 DIAGNOSIS — G47.19 EXCESSIVE DAYTIME SLEEPINESS: ICD-10-CM

## 2022-01-14 PROCEDURE — 95811 POLYSOM 6/>YRS CPAP 4/> PARM: CPT

## 2022-01-18 ENCOUNTER — LAB VISIT (OUTPATIENT)
Dept: LAB | Facility: HOSPITAL | Age: 72
End: 2022-01-18
Attending: FAMILY MEDICINE
Payer: MEDICARE

## 2022-01-18 DIAGNOSIS — R73.01 IMPAIRED FASTING BLOOD SUGAR: ICD-10-CM

## 2022-01-18 DIAGNOSIS — E78.5 HYPERLIPIDEMIA WITH TARGET LOW DENSITY LIPOPROTEIN (LDL) CHOLESTEROL LESS THAN 70 MG/DL: ICD-10-CM

## 2022-01-18 LAB
ALBUMIN SERPL BCP-MCNC: 3.2 G/DL (ref 3.5–5.2)
ALP SERPL-CCNC: 163 U/L (ref 55–135)
ALT SERPL W/O P-5'-P-CCNC: 10 U/L (ref 10–44)
ANION GAP SERPL CALC-SCNC: 8 MMOL/L (ref 8–16)
AST SERPL-CCNC: 16 U/L (ref 10–40)
BASOPHILS # BLD AUTO: 0.05 K/UL (ref 0–0.2)
BASOPHILS NFR BLD: 0.6 % (ref 0–1.9)
BILIRUB SERPL-MCNC: 0.4 MG/DL (ref 0.1–1)
BUN SERPL-MCNC: 24 MG/DL (ref 8–23)
CALCIUM SERPL-MCNC: 8.9 MG/DL (ref 8.7–10.5)
CHLORIDE SERPL-SCNC: 106 MMOL/L (ref 95–110)
CHOLEST SERPL-MCNC: 122 MG/DL (ref 120–199)
CHOLEST/HDLC SERPL: 3.4 {RATIO} (ref 2–5)
CO2 SERPL-SCNC: 24 MMOL/L (ref 23–29)
CREAT SERPL-MCNC: 1.3 MG/DL (ref 0.5–1.4)
DIFFERENTIAL METHOD: ABNORMAL
EOSINOPHIL # BLD AUTO: 0.3 K/UL (ref 0–0.5)
EOSINOPHIL NFR BLD: 3.8 % (ref 0–8)
ERYTHROCYTE [DISTWIDTH] IN BLOOD BY AUTOMATED COUNT: 14.7 % (ref 11.5–14.5)
EST. GFR  (AFRICAN AMERICAN): 47 ML/MIN/1.73 M^2
EST. GFR  (NON AFRICAN AMERICAN): 40.8 ML/MIN/1.73 M^2
ESTIMATED AVG GLUCOSE: 123 MG/DL (ref 68–131)
GLUCOSE SERPL-MCNC: 106 MG/DL (ref 70–110)
HBA1C MFR BLD: 5.9 % (ref 4–5.6)
HCT VFR BLD AUTO: 40 % (ref 37–48.5)
HDLC SERPL-MCNC: 36 MG/DL (ref 40–75)
HDLC SERPL: 29.5 % (ref 20–50)
HGB BLD-MCNC: 12.2 G/DL (ref 12–16)
IMM GRANULOCYTES # BLD AUTO: 0.05 K/UL (ref 0–0.04)
IMM GRANULOCYTES NFR BLD AUTO: 0.6 % (ref 0–0.5)
LDLC SERPL CALC-MCNC: 63.2 MG/DL (ref 63–159)
LYMPHOCYTES # BLD AUTO: 1 K/UL (ref 1–4.8)
LYMPHOCYTES NFR BLD: 12.5 % (ref 18–48)
MCH RBC QN AUTO: 28.6 PG (ref 27–31)
MCHC RBC AUTO-ENTMCNC: 30.5 G/DL (ref 32–36)
MCV RBC AUTO: 94 FL (ref 82–98)
MONOCYTES # BLD AUTO: 0.5 K/UL (ref 0.3–1)
MONOCYTES NFR BLD: 6.4 % (ref 4–15)
NEUTROPHILS # BLD AUTO: 6 K/UL (ref 1.8–7.7)
NEUTROPHILS NFR BLD: 76.1 % (ref 38–73)
NONHDLC SERPL-MCNC: 86 MG/DL
NRBC BLD-RTO: 0 /100 WBC
PLATELET # BLD AUTO: 193 K/UL (ref 150–450)
PMV BLD AUTO: 11.9 FL (ref 9.2–12.9)
POTASSIUM SERPL-SCNC: 4.4 MMOL/L (ref 3.5–5.1)
PROT SERPL-MCNC: 6.8 G/DL (ref 6–8.4)
RBC # BLD AUTO: 4.26 M/UL (ref 4–5.4)
SODIUM SERPL-SCNC: 138 MMOL/L (ref 136–145)
TRIGL SERPL-MCNC: 114 MG/DL (ref 30–150)
WBC # BLD AUTO: 7.91 K/UL (ref 3.9–12.7)

## 2022-01-18 PROCEDURE — 83036 HEMOGLOBIN GLYCOSYLATED A1C: CPT | Performed by: FAMILY MEDICINE

## 2022-01-18 PROCEDURE — 36415 COLL VENOUS BLD VENIPUNCTURE: CPT | Mod: PO | Performed by: FAMILY MEDICINE

## 2022-01-18 PROCEDURE — 80053 COMPREHEN METABOLIC PANEL: CPT | Performed by: FAMILY MEDICINE

## 2022-01-18 PROCEDURE — 85025 COMPLETE CBC W/AUTO DIFF WBC: CPT | Performed by: FAMILY MEDICINE

## 2022-01-18 PROCEDURE — 80061 LIPID PANEL: CPT | Performed by: FAMILY MEDICINE

## 2022-01-24 ENCOUNTER — OFFICE VISIT (OUTPATIENT)
Dept: FAMILY MEDICINE | Facility: CLINIC | Age: 72
End: 2022-01-24
Payer: MEDICARE

## 2022-01-24 VITALS
HEART RATE: 65 BPM | TEMPERATURE: 98 F | SYSTOLIC BLOOD PRESSURE: 140 MMHG | OXYGEN SATURATION: 95 % | DIASTOLIC BLOOD PRESSURE: 56 MMHG | WEIGHT: 217.13 LBS | HEIGHT: 63 IN | RESPIRATION RATE: 18 BRPM | BODY MASS INDEX: 38.47 KG/M2

## 2022-01-24 DIAGNOSIS — Z87.891 PERSONAL HISTORY OF NICOTINE DEPENDENCE: ICD-10-CM

## 2022-01-24 DIAGNOSIS — E78.5 HYPERLIPIDEMIA WITH TARGET LOW DENSITY LIPOPROTEIN (LDL) CHOLESTEROL LESS THAN 70 MG/DL: ICD-10-CM

## 2022-01-24 DIAGNOSIS — J44.9 CHRONIC OBSTRUCTIVE PULMONARY DISEASE, UNSPECIFIED COPD TYPE: ICD-10-CM

## 2022-01-24 DIAGNOSIS — R73.01 IMPAIRED FASTING BLOOD SUGAR: Primary | ICD-10-CM

## 2022-01-24 DIAGNOSIS — I10 BENIGN HYPERTENSION: ICD-10-CM

## 2022-01-24 DIAGNOSIS — N18.32 STAGE 3B CHRONIC KIDNEY DISEASE: ICD-10-CM

## 2022-01-24 DIAGNOSIS — K21.9 GASTROESOPHAGEAL REFLUX DISEASE, UNSPECIFIED WHETHER ESOPHAGITIS PRESENT: ICD-10-CM

## 2022-01-24 DIAGNOSIS — Z12.2 ENCOUNTER FOR SCREENING FOR LUNG CANCER: ICD-10-CM

## 2022-01-24 PROCEDURE — 99999 PR PBB SHADOW E&M-EST. PATIENT-LVL IV: CPT | Mod: PBBFAC,,, | Performed by: FAMILY MEDICINE

## 2022-01-24 PROCEDURE — 99999 PR PBB SHADOW E&M-EST. PATIENT-LVL IV: ICD-10-PCS | Mod: PBBFAC,,, | Performed by: FAMILY MEDICINE

## 2022-01-24 PROCEDURE — 99214 OFFICE O/P EST MOD 30 MIN: CPT | Mod: PBBFAC,PO | Performed by: FAMILY MEDICINE

## 2022-01-24 PROCEDURE — 99214 PR OFFICE/OUTPT VISIT, EST, LEVL IV, 30-39 MIN: ICD-10-PCS | Mod: S$PBB,,, | Performed by: FAMILY MEDICINE

## 2022-01-24 PROCEDURE — 99214 OFFICE O/P EST MOD 30 MIN: CPT | Mod: S$PBB,,, | Performed by: FAMILY MEDICINE

## 2022-01-24 NOTE — PROGRESS NOTES
Subjective:       Patient ID: Betty Bacon is a 71 y.o. female.    Chief Complaint: Follow-up (4mth f/u hypertension)    HPI  Review of Systems   Constitutional: Negative for fatigue and unexpected weight change.   Respiratory: Negative for chest tightness and shortness of breath.    Cardiovascular: Negative for chest pain, palpitations and leg swelling.   Gastrointestinal: Negative for abdominal pain and blood in stool.   Musculoskeletal: Negative for arthralgias.   Neurological: Negative for dizziness, syncope, light-headedness and headaches.       Patient Active Problem List   Diagnosis    CKD (chronic kidney disease) stage 3, GFR 30-59 ml/min    Hyperlipidemia LDL goal < 70    COPD (chronic obstructive pulmonary disease)    Benign hypertension    LVH (left ventricular hypertrophy) due to hypertensive disease    Depression    Unstable angina    PAD (peripheral artery disease)    Generalized anxiety disorder    Aortic stenosis    CAD (coronary artery disease)    Abnormal stress test    GERD (gastroesophageal reflux disease)    Interstitial lung disease    Osteopenia    History of electroconvulsive therapy    Former smoker    Impaired fasting blood sugar    Severe obesity with body mass index (BMI) of 35.0 to 39.9 with serious comorbidity    Secondary hyperparathyroidism of renal origin    Chronic left-sided thoracic back pain    Chronic diastolic heart failure    Elevated alkaline phosphatase level    Positive AKIRA (antinuclear antibody)    Hepatitis B core antibody positive    Acute gout of left knee    Pain and swelling of left lower leg     Patient is here for a chronic conditions follow up.    Reviewed labs 1/2022       Had COVID 19 7/2020 requiring hospitalization     Pulm  Dr. 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 Placed on protonix 40mg daily. Had been on  allopurinol for gout flare . Gi Dr. Trevizo did colonoscopy at [facility] 10/20/2021- 2 polyps removed and int hemorrhoids non bleeding seen. Black stools have cleared. Has stopped allopurinol and has had no further gout flares. EGD done 9/22/2021                          Found to have erythematous AVM in the stomach- ablated                          with gold probe                          - Normal esophagus.                          - 2 cm hiatal hernia.                          - A single non-bleeding angioectasia in the stomach.                          Treated with bipolar cautery.                          - Normal examined duodenum.                          - No specimens collected.             CT lung screening 1/2021 showed 1/2021-neg for lung cancer. Probable interstitial fibrosis (known and under care of Pulm).  A small calcified pleural plaque is seen posteriorly at the left midlung field.  Atherosclerosis including the coronary arteries.     Referred to hepatology NP Scroggs 2/2021 for elevated alk phos (liver source), enlarged liver on U/s . Serological workup was negative for Ranjit's, alpha-1 antitrypsin deficiency, hemochromatosis, autoimmune etiology, and viral hepatitis.         dexa and mammo 12/20-osteopenia, negative respectively. On fosomax > 5 years      IGT/prediabetes- A1c 5.8. lipids at goal. On asa 81mg, lipitor     GFR dropped from 51 to 35. BUN/cr indicated probable dehydration     Here to f/u mood with h/o severe depression. has been consistently better since increasing seroquel.  Has been more social, less down, mood is less labile and anxiety has been manageable.  Had some counseling LCSW 12/19. On paxil 50mg a day and seroquel 50mg 3 qhs         History:   Card Dr. Sahu CAD s/p stents, PVD on plavix and imdur     Psych hospitalized for depression in past 2003-04. Had ECT therapy.  Lives alone. Drives, does all ADLs by herself, cooks, cleans.  Xanax prn .  Lost  of 50 years.   Has good support from children and friends     Nephrology Dr. Morrow CKd stage 3, uric acid , PTH elevation, mild anemia     GI Dr. Trevizo -gerd, dysphagia, IBS with diarrhea. meds helping     Podiatry Dr. Malave- gout attack.  Uric acid 9.8 . Has been counseled on low purine diet  Objective:      Physical Exam  Vitals and nursing note reviewed.   Constitutional:       Appearance: She is well-developed and well-nourished.   Cardiovascular:      Rate and Rhythm: Normal rate and regular rhythm.      Heart sounds: Normal heart sounds.   Pulmonary:      Effort: Pulmonary effort is normal.      Breath sounds: Normal breath sounds.   Musculoskeletal:         General: No edema.   Skin:     General: Skin is warm and dry.   Neurological:      Mental Status: She is alert and oriented to person, place, and time.   Psychiatric:         Mood and Affect: Mood and affect normal.         Assessment:       1. Impaired fasting blood sugar    2. Encounter for screening for lung cancer    3. Personal history of nicotine dependence     4. Stage 3b chronic kidney disease    5. Hyperlipidemia with target low density lipoprotein (LDL) cholesterol less than 70 mg/dL    6. Benign hypertension    7. Chronic obstructive pulmonary disease, unspecified COPD type    8. Gastroesophageal reflux disease, unspecified whether esophagitis present        Plan:         1. Impaired fasting blood sugar   Your blood sugar is borderline high.  This means you are at risk for developing type 2 diabetes mellitus.  To lessen your risk you should exercise regularly, avoid excess carbohydrates and work toward a body mass index of less than 25.      - Hemoglobin A1C; Future    2. Encounter for screening for lung cancer  Screen and treat as indicated:    - CT Chest Lung Screening Low Dose; Future    3. Personal history of nicotine dependence   Screen and treat as indicated:    - CT Chest Lung Screening Low Dose; Future    4. Stage 3b chronic kidney  disease  Stable and chronic.  Will continue to monitor q3-6 months and control chronic conditions as optimally as possible to preserve function.      5. Hyperlipidemia with target low density lipoprotein (LDL) cholesterol less than 70 mg/dL  Stable condition.  Continue current medications.  Will adjust based on lab findings or if condition changes.    - CBC Auto Differential; Future  - Comprehensive Metabolic Panel; Future  - Lipid Panel; Future    6. Benign hypertension  Borderline. I counseled the patient on HTN education, management and recommendations.  I recommended weight loss toward a BMI < 25, avoidance of salt and the DASH diet, regular cardio exercise a minimum of 150 minutes per week and medications if indicated.  Printed materials were given. The goal is < 140/90 unless otherwise specified.      7. Chronic obstructive pulmonary disease, unspecified COPD type  Cont current regimen    8. Gastroesophageal reflux disease, unspecified whether esophagitis present  Counseled patient on prevention of reflux with changes in diet and behavior.  I recommended avoidance of greasy and spicy foods, caffeine and eating within 3 hours of bedtime.  I counseled the patient to avoid eating large meals and instead eating more frequent small meals.  I also recommended weight loss and elevation of the head of the bed by 6 inches.  If symptoms persist after these changes medication may be needed to control GERD.            Time spent with patient: 20 minutes    Patient with be reevaluated in 6 months or sooner prn    Greater than 50% of this visit was spent counseling as described in above documentation:Yes

## 2022-01-31 ENCOUNTER — HOSPITAL ENCOUNTER (OUTPATIENT)
Dept: RADIOLOGY | Facility: HOSPITAL | Age: 72
Discharge: HOME | End: 2022-01-31
Attending: FAMILY MEDICINE
Payer: MEDICARE

## 2022-01-31 DIAGNOSIS — Z87.891 PERSONAL HISTORY OF NICOTINE DEPENDENCE: ICD-10-CM

## 2022-01-31 DIAGNOSIS — Z12.2 ENCOUNTER FOR SCREENING FOR LUNG CANCER: ICD-10-CM

## 2022-01-31 PROCEDURE — 71271 CT CHEST LUNG SCREENING LOW DOSE: ICD-10-PCS | Mod: 26,,, | Performed by: RADIOLOGY

## 2022-01-31 PROCEDURE — 71271 CT THORAX LUNG CANCER SCR C-: CPT | Mod: 26,,, | Performed by: RADIOLOGY

## 2022-01-31 PROCEDURE — 71271 CT THORAX LUNG CANCER SCR C-: CPT | Mod: TC

## 2022-03-08 DIAGNOSIS — F32.A DEPRESSION, UNSPECIFIED DEPRESSION TYPE: ICD-10-CM

## 2022-03-08 DIAGNOSIS — R21 RASH OF FACE: ICD-10-CM

## 2022-03-08 NOTE — TELEPHONE ENCOUNTER
No new care gaps identified.  Powered by [employer]. Reference number: [id number].   3/08/2022 5:09:33 PM CST

## 2022-03-14 ENCOUNTER — TELEPHONE (OUTPATIENT)
Dept: FAMILY MEDICINE | Facility: CLINIC | Age: 72
End: 2022-03-14
Payer: MEDICARE

## 2022-03-14 RX ORDER — CLOTRIMAZOLE AND BETAMETHASONE DIPROPIONATE 10; .64 MG/G; MG/G
CREAM TOPICAL
Qty: 15 G | Refills: 0 | Status: SHIPPED | OUTPATIENT
Start: 2022-03-14 | End: 2022-03-27

## 2022-03-14 RX ORDER — PAROXETINE HYDROCHLORIDE 40 MG/1
TABLET, FILM COATED ORAL
Qty: 90 TABLET | Refills: 3 | Status: SHIPPED | OUTPATIENT
Start: 2022-03-14 | End: 2023-02-13

## 2022-03-14 RX ORDER — ATORVASTATIN CALCIUM 40 MG/1
TABLET, FILM COATED ORAL
Qty: 90 TABLET | Refills: 3 | Status: SHIPPED | OUTPATIENT
Start: 2022-03-14 | End: 2023-02-13

## 2022-03-14 NOTE — TELEPHONE ENCOUNTER
----- Message from Lucie Nuno sent at 3/14/2022  9:54 AM CDT -----  Type:  Patient Call Back    Who Called: Betty     What is the reqeust in detail: pt is requesting a call to refill some medication, she is sure the name of them. Please Advise     Can the clinic reply by [facility]?    Best Call Back Number: [phone]

## 2022-03-14 NOTE — TELEPHONE ENCOUNTER
Refill Routing Note   Medication(s) are not appropriate for processing by [facility] for the following reason(s):      - Outside of protocol  - Drug-Disease Interaction (paroxetine and Secondary hyperparathyroidism of renal origin)    [facility] action(s):  Defer  Route  Approve Medication-related problems identified: Drug-disease interaction        --->Care Gap information included in message below if applicable.   Medication reconciliation completed: No   Automatic Epic Generated Protocol Data:        Requested Prescriptions   Pending Prescriptions Disp Refills    clotrimazole-betamethasone 1-0.05% (LOTRISONE) cream [Pharmacy Med Name: CLOTRIMAZOLE-BETAMETHASONE CRM] 15 g 0     Sig: APPLY TO AFFECTED AREA TWICE A DAY       There is no refill protocol information for this order       paroxetine (PAXIL) 40 MG tablet [Pharmacy Med Name: PAROXETINE HCL 40 MG TABLET] 90 tablet 3     Sig: TAKE 1 TABLET BY MOUTH EVERY DAY       Psychiatry:  Antidepressants - SSRI Passed - 3/8/2022  5:08 PM        Passed - Patient is at least 18 years old        Passed - Valid encounter within last 15 months     Recent Visits  Date Type Provider Dept   01/24/22 Office Visit Johanne Callejas MD [facility]   11/24/21 Office Visit Johanne Callejas MD [facility]   09/14/21 Office Visit Johanne Callejas MD [facility]   12/22/20 Office Visit Johnane Callejas MD [facility]   06/22/20 Office Visit Johanne Callejas MD [facility]   Showing recent visits within past 720 days and meeting all other requirements  Future Appointments  No visits were found meeting these conditions.  Showing future appointments within next 150 days and meeting all other requirements      Future Appointments              In 2 months LYNDON ODOM - Cardiology, [place]    In 2 months MD Lyndon Pina - Cardiology, [place]    In 4 months [facility], [place]    In  4 months MD Layne Woodson - Family Medicine, [place]    In 4 months LAB, SL[facility]LL [facility] - Lab, [place]    In 4 months MD Jere Lester - Nephrology, [place]                  atorvastatin (LIPITOR) 40 MG tablet [Pharmacy Med Name: ATORVASTATIN 40 MG TABLET] 90 tablet 3     Sig: TAKE 1 TABLET BY MOUTH EVERY DAY       Cardiovascular:  Antilipid - Statins Passed - 3/8/2022  5:08 PM        Passed - Patient is at least 18 years old        Passed - Valid encounter within last 15 months     Recent Visits  Date Type Provider Dept   01/24/22 Office Visit Johanne Callejas MD [facility]   11/24/21 Office Visit Johanne Callejas MD [facility]   09/14/21 Office Visit Johanne Callejas MD [facility]   12/22/20 Office Visit Johanne Callejas MD [facility]   06/22/20 Office Visit Johanne Callejas MD [facility]   Showing recent visits within past 720 days and meeting all other requirements  Future Appointments  No visits were found meeting these conditions.  Showing future appointments within next 150 days and meeting all other requirements      Future Appointments              In 2 months ECHO, [facility]NDON [place] - Cardiology, [place]    In 2 months Maldonado Borjas MD [place] [facility]iology, [place]    In 4 months LAB, [facility] - [facility], [place]    In 4 months Johanne Callejas MD [place] [facility] [place] [facility], [place]    In 4 months LAB, [facility] - [facility], [place]    In 4 months MD Jere Lester - Nephrology, [place]                Passed - ALT is 131 or below and within 360 days     ALT   Date Value Ref Range Status   01/18/2022 10 10 - 44 U/L Final   11/08/2021 12 10 - 44 U/L Final   09/07/2021 12 10 - 44 U/L Final              Passed - AST is 119 or below and within 360 days     AST   Date Value Ref Range Status   01/18/2022 16 10 - 40 U/L Final   11/08/2021 15 10 - 40 U/L Final   09/07/2021 18 10 -  40 U/L Final   10/10/2012 19 10 - 30 UNIT/L Final              Passed - Total Cholesterol within 360 days     Lab Results   Component Value Date    CHOL 122 01/18/2022    CHOL 130 12/16/2020    CHOL 120 06/15/2020              Passed - LDL within 360 days     LDL Calculated   Date Value Ref Range Status   10/11/2012 63 MG/DL Final     Comment:     OPTIMAL:  <100 mg/dL               NEAR/ABOVE OPTIMAL: 100-129 mg/dL  BORDERLINE HIGH: 130-159 mg/dL     HIGH: 160-189 mg/dL  VERY HIGH: >/= 190 mg/dL     LDL Cholesterol   Date Value Ref Range Status   01/18/2022 63.2 63.0 - 159.0 mg/dL Final     Comment:     The National Cholesterol Education Program (NCEP) has set the  following guidelines (reference values) for LDL Cholesterol:  Optimal.......................<130 mg/dL  Borderline High...............130-159 mg/dL  High..........................160-189 mg/dL  Very High.....................>190 mg/dL              Passed - HDL within 360 days     HDL   Date Value Ref Range Status   01/18/2022 36 (L) 40 - 75 mg/dL Final     Comment:     The National Cholesterol Education Program (NCEP) has set the  following guidelines (reference values) for HDL Cholesterol:  Low...............<40 mg/dL  Optimal...........>60 mg/dL              Passed - Triglycerides within 360 days     Lab Results   Component Value Date    TRIG 114 01/18/2022    TRIG 159 (H) 12/16/2020    TRIG 127 06/15/2020                    Appointments  past 12m or future 3m with PCP    Date Provider   Last Visit   1/24/2022 Johanne Callejas MD   Next Visit   7/25/2022 Johanne Callejas MD   ED visits in past 90 days: 0        Note composed:3:39 PM 03/14/2022

## 2022-03-23 ENCOUNTER — PES CALL (OUTPATIENT)
Dept: ADMINISTRATIVE | Facility: CLINIC | Age: 72
End: 2022-03-23
Payer: MEDICARE

## 2022-03-24 DIAGNOSIS — R21 RASH OF FACE: ICD-10-CM

## 2022-03-27 RX ORDER — CLOTRIMAZOLE AND BETAMETHASONE DIPROPIONATE 10; .64 MG/G; MG/G
CREAM TOPICAL
Qty: 15 G | Refills: 0 | Status: SHIPPED | OUTPATIENT
Start: 2022-03-27 | End: 2022-05-30

## 2022-04-01 ENCOUNTER — OFFICE VISIT (OUTPATIENT)
Dept: FAMILY MEDICINE | Facility: CLINIC | Age: 72
End: 2022-04-01
Payer: MEDICARE

## 2022-04-01 VITALS
BODY MASS INDEX: 37.23 KG/M2 | OXYGEN SATURATION: 93 % | DIASTOLIC BLOOD PRESSURE: 56 MMHG | HEIGHT: 63 IN | SYSTOLIC BLOOD PRESSURE: 134 MMHG | HEART RATE: 82 BPM | WEIGHT: 210.13 LBS | TEMPERATURE: 98 F

## 2022-04-01 DIAGNOSIS — Z00.00 ENCOUNTER FOR PREVENTIVE HEALTH EXAMINATION: Primary | ICD-10-CM

## 2022-04-01 DIAGNOSIS — I50.32 CHRONIC DIASTOLIC HEART FAILURE: ICD-10-CM

## 2022-04-01 DIAGNOSIS — N18.32 STAGE 3B CHRONIC KIDNEY DISEASE: ICD-10-CM

## 2022-04-01 DIAGNOSIS — N25.81 SECONDARY HYPERPARATHYROIDISM OF RENAL ORIGIN: ICD-10-CM

## 2022-04-01 DIAGNOSIS — R26.9 ABNORMALITY OF GAIT AND MOBILITY: ICD-10-CM

## 2022-04-01 DIAGNOSIS — F33.41 RECURRENT MAJOR DEPRESSIVE DISORDER, IN PARTIAL REMISSION: ICD-10-CM

## 2022-04-01 DIAGNOSIS — I70.0 AORTIC ATHEROSCLEROSIS: ICD-10-CM

## 2022-04-01 DIAGNOSIS — J44.9 CHRONIC OBSTRUCTIVE PULMONARY DISEASE, UNSPECIFIED COPD TYPE: ICD-10-CM

## 2022-04-01 DIAGNOSIS — I73.9 PAD (PERIPHERAL ARTERY DISEASE): ICD-10-CM

## 2022-04-01 PROCEDURE — 99215 OFFICE O/P EST HI 40 MIN: CPT | Mod: PBBFAC,PO | Performed by: NURSE PRACTITIONER

## 2022-04-01 PROCEDURE — 99999 PR PBB SHADOW E&M-EST. PATIENT-LVL V: CPT | Mod: PBBFAC,,, | Performed by: NURSE PRACTITIONER

## 2022-04-01 PROCEDURE — G0439 PPPS, SUBSEQ VISIT: HCPCS | Mod: ,,, | Performed by: NURSE PRACTITIONER

## 2022-04-01 PROCEDURE — 99999 PR PBB SHADOW E&M-EST. PATIENT-LVL V: ICD-10-PCS | Mod: PBBFAC,,, | Performed by: NURSE PRACTITIONER

## 2022-04-01 PROCEDURE — G0439 PR MEDICARE ANNUAL WELLNESS SUBSEQUENT VISIT: ICD-10-PCS | Mod: ,,, | Performed by: NURSE PRACTITIONER

## 2022-04-01 NOTE — PATIENT INSTRUCTIONS
Counseling and Referral of Other Preventative  (Italic type indicates deductible and co-insurance are waived)    Patient Name: Betty Bacon  Today's Date: 4/1/2022    Health Maintenance       Date Due Completion Date    COVID-19 Vaccine (3 - Booster for Pfizer series) 09/14/2021 4/14/2021    Mammogram 12/16/2021 12/16/2020    DEXA Scan 12/16/2022 12/16/2020    LDCT Lung Screen 01/31/2023 1/31/2022    High Dose Statin 04/01/2023 4/1/2022    Aspirin/Antiplatelet Therapy 04/01/2023 4/1/2022    TETANUS VACCINE 06/16/2025 6/16/2015    Colorectal Cancer Screening 10/20/2026 10/20/2021    Override on 9/21/2009: Done    Lipid Panel 01/18/2027 1/18/2022        No orders of the defined types were placed in this encounter.    The following information is provided to all patients.  This information is to help you find resources for any of the problems found today that may be affecting your health:                Living healthy guide: www.[facility].[place].gov      Understanding Diabetes: www.diabetes.org      Eating healthy: www.cdc.gov/healthyweight      CDC home safety checklist: www.cdc.gov/steadi/patient.html      Agency on Aging: www.goea.[place].gov      Alcoholics anonymous (AA): www.aa.org      Physical Activity: www.aden.nih.gov/zo7dvoa      Tobacco use: www.quitwithusla.org

## 2022-04-01 NOTE — PROGRESS NOTES
"  Betty Bacon presented for a  Medicare AWV and comprehensive Health Risk Assessment today. The following components were reviewed and updated:    · Medical history  · Family History  · Social history  · Allergies and Current Medications  · Health Risk Assessment  · Health Maintenance  · Care Team         ** See Completed Assessments for Annual Wellness Visit within the encounter summary.**         The following assessments were completed:  · Living Situation  · CAGE  · Depression Screening  · Timed Get Up and Go  · Whisper Test  · Cognitive Function Screening  · Nutrition Screening  · ADL Screening  · PAQ Screening            Vitals:    04/01/22 1315   BP: (!) 134/56   Pulse: 82   Temp: 98.1 °F (36.7 °C)   SpO2: (!) 93%   Weight: 95.3 kg (210 lb 1.6 oz)   Height: 5' 3" (1.6 m)     Body mass index is 37.22 kg/m².  Physical Exam  Constitutional:       Appearance: She is well-developed.   HENT:      Head: Normocephalic and atraumatic.      Nose: Nose normal.   Eyes:      General: Lids are normal.      Conjunctiva/sclera: Conjunctivae normal.      Pupils: Pupils are equal, round, and reactive to light.   Cardiovascular:      Rate and Rhythm: Normal rate.   Pulmonary:      Effort: Pulmonary effort is normal.   Musculoskeletal:      Cervical back: Full passive range of motion without pain.   Skin:     General: Skin is warm and dry.   Neurological:      Mental Status: She is alert and oriented to person, place, and time.   Psychiatric:         Speech: Speech normal.         Behavior: Behavior normal.               Diagnoses and health risks identified today and associated recommendations/orders:    1. Encounter for preventive health examination  Discussed health maintenance guidelines appropriate for age.    Mammogram- deferred     2. Chronic obstructive pulmonary disease, unspecified COPD type  Stable, continue current medications/oxygen use prn  Followed by pulmonology     3. PAD (peripheral artery " disease)  Stable, continue to monitor  Stents in place  Followed by cardiology     4. Chronic diastolic heart failure  Stable, continue current medication regimen  Followed by cardiology     5. Aortic atherosclerosis  Stable, continue to monitor  Followed by pcp    6. Secondary hyperparathyroidism of renal origin  Stable, continue to monitor  Followed by nephrology     7. Recurrent major depressive disorder, in partial remission  Stable, continue current medication regimen  Followed by pcp    8. Abnormality of gait and mobility  Stable, continue to monitor     9. CKD stage 3 b  -Stable, continue to monitor  Followed by nephrology     Provided Betty with a 5-10 year written screening schedule and personal prevention plan. Recommendations were developed using the USPSTF age appropriate recommendations. Education, counseling, and referrals were provided as needed. After Visit Summary printed and given to patient which includes a list of additional screenings\tests needed.    Follow up for One year for Annual Wellness Visit.    Shivani Richter NP    I offered to discuss advanced care planning, including how to pick a person who would make decisions for you if you were unable to make them for yourself, called a health care power of , and what kind of decisions you might make such as use of life sustaining treatments such as ventilators and tube feeding when faced with a life limiting illness recorded on a living will that they will need to know. (How you want to be cared for as you near the end of your natural life)     X  Patient has advanced directives written and agrees to provide copies to the institution.

## 2022-05-17 ENCOUNTER — OFFICE VISIT (OUTPATIENT)
Dept: DERMATOLOGY | Facility: CLINIC | Age: 72
End: 2022-05-17
Payer: MEDICARE

## 2022-05-17 DIAGNOSIS — L71.0 PERIORAL DERMATITIS: Primary | ICD-10-CM

## 2022-05-17 DIAGNOSIS — L82.1 SEBORRHEIC KERATOSES: ICD-10-CM

## 2022-05-17 PROCEDURE — 99999 PR PBB SHADOW E&M-EST. PATIENT-LVL II: ICD-10-PCS | Mod: PBBFAC,,, | Performed by: STUDENT IN AN ORGANIZED HEALTH CARE EDUCATION/TRAINING PROGRAM

## 2022-05-17 PROCEDURE — 99212 OFFICE O/P EST SF 10 MIN: CPT | Mod: PBBFAC,PO | Performed by: STUDENT IN AN ORGANIZED HEALTH CARE EDUCATION/TRAINING PROGRAM

## 2022-05-17 PROCEDURE — 99999 PR PBB SHADOW E&M-EST. PATIENT-LVL II: CPT | Mod: PBBFAC,,, | Performed by: STUDENT IN AN ORGANIZED HEALTH CARE EDUCATION/TRAINING PROGRAM

## 2022-05-17 PROCEDURE — 99203 OFFICE O/P NEW LOW 30 MIN: CPT | Mod: S$PBB,,, | Performed by: STUDENT IN AN ORGANIZED HEALTH CARE EDUCATION/TRAINING PROGRAM

## 2022-05-17 PROCEDURE — 99203 PR OFFICE/OUTPT VISIT, NEW, LEVL III, 30-44 MIN: ICD-10-PCS | Mod: S$PBB,,, | Performed by: STUDENT IN AN ORGANIZED HEALTH CARE EDUCATION/TRAINING PROGRAM

## 2022-05-17 RX ORDER — DOXYCYCLINE HYCLATE 100 MG
TABLET ORAL
Qty: 60 TABLET | Refills: 1 | Status: SHIPPED | OUTPATIENT
Start: 2022-05-17 | End: 2022-06-29

## 2022-05-17 RX ORDER — PIMECROLIMUS 10 MG/G
CREAM TOPICAL 2 TIMES DAILY
Qty: 60 G | Refills: 0 | Status: SHIPPED | OUTPATIENT
Start: 2022-05-17 | End: 2022-12-21

## 2022-05-17 NOTE — PROGRESS NOTES
Subjective:       Patient ID:  Betty Bacon is a 72 y.o. female who presents for   Chief Complaint   Patient presents with    Rash     Around mouth, under nose, left eye     LOV 7/25/18 [facility]    Patient here today for rash around mouth, under nose, and near left eye x 2 months  It is sometimes itchy and burns.  Patient states she has had this before, used Ketoconazole cream and it resolved. Not resolving this time. She was given betamethasone-clotrimazole cream by PCP, not resolving.  Also uses an inhaler for COPD that has fluticasone in it.       Review of Systems   Constitutional: Negative for fever, chills and fatigue.   Respiratory: Negative for cough and shortness of breath.    Gastrointestinal: Negative for nausea and vomiting.   Skin: Positive for itching and rash.        Objective:    Physical Exam   Constitutional: She appears well-developed and well-nourished.   Neurological: She is alert and oriented to person, place, and time.   Psychiatric: She has a normal mood and affect.   Skin:   Areas Examined (abnormalities noted in diagram):   Head / Face Inspection Performed  Neck Inspection Performed                   Diagram Legend     Erythematous scaling macule/papule c/w actinic keratosis       Vascular papule c/w angioma      Pigmented verrucoid papule/plaque c/w seborrheic keratosis      Yellow umbilicated papule c/w sebaceous hyperplasia      Irregularly shaped tan macule c/w lentigo     1-2 mm smooth white papules consistent with Milia      Movable subcutaneous cyst with punctum c/w epidermal inclusion cyst      Subcutaneous movable cyst c/w pilar cyst      Firm pink to brown papule c/w dermatofibroma      Pedunculated fleshy papule(s) c/w skin tag(s)      Evenly pigmented macule c/w junctional nevus     Mildly variegated pigmented, slightly irregular-bordered macule c/w mildly atypical nevus      Flesh colored to evenly pigmented papule c/w intradermal nevus       Pink pearly  papule/plaque c/w basal cell carcinoma      Erythematous hyperkeratotic cursted plaque c/w SCC      Surgical scar with no sign of skin cancer recurrence      Open and closed comedones      Inflammatory papules and pustules      Verrucoid papule consistent consistent with wart     Erythematous eczematous patches and plaques     Dystrophic onycholytic nail with subungual debris c/w onychomycosis     Umbilicated papule    Erythematous-base heme-crusted tan verrucoid plaque consistent with inflamed seborrheic keratosis     Erythematous Silvery Scaling Plaque c/w Psoriasis     See annotation              Assessment / Plan:        Perioral dermatitis  -     doxycycline (VIBRA-TABS) 100 MG tablet; Take 1 po BID with food and not within 1 hour prior to lying down  Dispense: 60 tablet; Refill: 1  -     pimecrolimus (ELIDEL) 1 % cream; Apply topically 2 (two) times daily.  Dispense: 60 g; Refill: 0  Discussed benefits and risks of doxycyline therapy including but not limited to GI discomfort, esophageal irritation/ulceration, and increased sun sensitivity. Patient was counseled to take medicine with meals and at least 1 hour before lying down.   Likely worsened by topical steroid- stop all topical steroids    Seborrheic keratoses  These are benign inherited growths without a malignant potential. Reassurance given to patient. No treatment is necessary.            6 weeks  No follow-ups on file.

## 2022-05-23 ENCOUNTER — TELEPHONE (OUTPATIENT)
Dept: DERMATOLOGY | Facility: CLINIC | Age: 72
End: 2022-05-23
Payer: MEDICARE

## 2022-05-23 DIAGNOSIS — L30.9 DERMATITIS: Primary | ICD-10-CM

## 2022-05-23 RX ORDER — TACROLIMUS 1 MG/G
OINTMENT TOPICAL 2 TIMES DAILY
Qty: 30 G | Refills: 1 | Status: SHIPPED | OUTPATIENT
Start: 2022-05-23 | End: 2022-12-21

## 2022-05-23 NOTE — TELEPHONE ENCOUNTER
----- Message from Lupe Bartlett MD sent at 5/23/2022  1:52 PM CDT -----  Contact: Pt  Please let her know I sent protopic. Please let me know if this is not covered.   ----- Message -----  From: Ganesh Rodriguez LPN  Sent: 5/23/2022   1:47 PM CDT  To: Lupe Bartlett MD    Please advise?  ----- Message -----  From: Damaris Barajas  Sent: 5/23/2022  11:21 AM CDT  To: Dhruv Samaniego Staff    Type: Needs Medical Advice    Who Called:Pt  Best Call Back Number:[phone]    Additional Information Requesting a call back regarding Pt was calling in regards to medication for the pimecrolimus (ELIDEL) 1 % cream pt stated there insurance will not cover medication pt stated to please call when available Thank you  Please Advise-Thank you

## 2022-05-25 DIAGNOSIS — R21 RASH OF FACE: ICD-10-CM

## 2022-05-30 RX ORDER — CLOTRIMAZOLE AND BETAMETHASONE DIPROPIONATE 10; .64 MG/G; MG/G
CREAM TOPICAL
Qty: 15 G | Refills: 0 | Status: SHIPPED | OUTPATIENT
Start: 2022-05-30 | End: 2022-06-17

## 2022-06-02 ENCOUNTER — CLINICAL SUPPORT (OUTPATIENT)
Dept: CARDIOLOGY | Facility: HOSPITAL | Age: 72
End: 2022-06-02
Attending: INTERNAL MEDICINE
Payer: MEDICARE

## 2022-06-02 VITALS
SYSTOLIC BLOOD PRESSURE: 140 MMHG | BODY MASS INDEX: 37.21 KG/M2 | HEIGHT: 63 IN | WEIGHT: 210 LBS | DIASTOLIC BLOOD PRESSURE: 56 MMHG

## 2022-06-02 DIAGNOSIS — I35.0 NONRHEUMATIC AORTIC VALVE STENOSIS: ICD-10-CM

## 2022-06-02 LAB
ASCENDING AORTA: 3.34 CM
AV INDEX (PROSTH): 0.31
AV MEAN GRADIENT: 29 MMHG
AV PEAK GRADIENT: 46 MMHG
AV VALVE AREA: 1 CM2
AV VELOCITY RATIO: 0.27
BSA FOR ECHO PROCEDURE: 2.06 M2
CV ECHO LV RWT: 0.56 CM
DOP CALC AO PEAK VEL: 3.38 M/S
DOP CALC AO VTI: 91.35 CM
DOP CALC LVOT AREA: 3.3 CM2
DOP CALC LVOT DIAMETER: 2.04 CM
DOP CALC LVOT PEAK VEL: 0.9 M/S
DOP CALC LVOT STROKE VOLUME: 91.11 CM3
DOP CALCLVOT PEAK VEL VTI: 27.89 CM
E WAVE DECELERATION TIME: 180.58 MSEC
E/A RATIO: 0.88
E/E' RATIO: 12.46 M/S
ECHO LV POSTERIOR WALL: 1.4 CM (ref 0.6–1.1)
EJECTION FRACTION: 55 %
FRACTIONAL SHORTENING: 30 % (ref 28–44)
INTERVENTRICULAR SEPTUM: 1.24 CM (ref 0.6–1.1)
IVRT: 125.59 MSEC
LA MAJOR: 6.27 CM
LA MINOR: 5.64 CM
LA WIDTH: 4.2 CM
LEFT ATRIUM SIZE: 4.48 CM
LEFT ATRIUM VOLUME INDEX: 48.2 ML/M2
LEFT ATRIUM VOLUME: 94.98 CM3
LEFT INTERNAL DIMENSION IN SYSTOLE: 3.49 CM (ref 2.1–4)
LEFT VENTRICLE DIASTOLIC VOLUME INDEX: 60.53 ML/M2
LEFT VENTRICLE DIASTOLIC VOLUME: 119.25 ML
LEFT VENTRICLE MASS INDEX: 137 G/M2
LEFT VENTRICLE SYSTOLIC VOLUME INDEX: 25.6 ML/M2
LEFT VENTRICLE SYSTOLIC VOLUME: 50.42 ML
LEFT VENTRICULAR INTERNAL DIMENSION IN DIASTOLE: 5.02 CM (ref 3.5–6)
LEFT VENTRICULAR MASS: 269.29 G
LV LATERAL E/E' RATIO: 10.13 M/S
LV SEPTAL E/E' RATIO: 16.2 M/S
MV A" WAVE DURATION": 10.56 MSEC
MV PEAK A VEL: 0.92 M/S
MV PEAK E VEL: 0.81 M/S
MV STENOSIS PRESSURE HALF TIME: 52.37 MS
MV VALVE AREA P 1/2 METHOD: 4.2 CM2
PISA TR MAX VEL: 3.48 M/S
PULM VEIN S/D RATIO: 0.81
PV PEAK D VEL: 0.43 M/S
PV PEAK S VEL: 0.35 M/S
RA MAJOR: 5.7 CM
RA PRESSURE: 3 MMHG
RA WIDTH: 3.53 CM
RIGHT VENTRICULAR END-DIASTOLIC DIMENSION: 3.93 CM
RV TISSUE DOPPLER FREE WALL SYSTOLIC VELOCITY 1 (APICAL 4 CHAMBER VIEW): 10.37 CM/S
SINUS: 3.36 CM
STJ: 2.65 CM
TDI LATERAL: 0.08 M/S
TDI SEPTAL: 0.05 M/S
TDI: 0.07 M/S
TR MAX PG: 48 MMHG
TRICUSPID ANNULAR PLANE SYSTOLIC EXCURSION: 1.91 CM
TV REST PULMONARY ARTERY PRESSURE: 51 MMHG

## 2022-06-02 PROCEDURE — 93306 TTE W/DOPPLER COMPLETE: CPT | Mod: PO

## 2022-06-02 PROCEDURE — 93306 ECHO (CUPID ONLY): ICD-10-PCS | Mod: 26,,, | Performed by: INTERNAL MEDICINE

## 2022-06-02 PROCEDURE — 93306 TTE W/DOPPLER COMPLETE: CPT | Mod: 26,,, | Performed by: INTERNAL MEDICINE

## 2022-06-09 ENCOUNTER — PATIENT MESSAGE (OUTPATIENT)
Dept: NEPHROLOGY | Facility: CLINIC | Age: 72
End: 2022-06-09
Payer: MEDICARE

## 2022-06-21 ENCOUNTER — OFFICE VISIT (OUTPATIENT)
Dept: CARDIOLOGY | Facility: CLINIC | Age: 72
End: 2022-06-21
Payer: MEDICARE

## 2022-06-21 VITALS
BODY MASS INDEX: 37.62 KG/M2 | WEIGHT: 212.31 LBS | HEART RATE: 74 BPM | SYSTOLIC BLOOD PRESSURE: 178 MMHG | DIASTOLIC BLOOD PRESSURE: 69 MMHG | HEIGHT: 63 IN

## 2022-06-21 DIAGNOSIS — I73.9 PAD (PERIPHERAL ARTERY DISEASE): ICD-10-CM

## 2022-06-21 DIAGNOSIS — I25.10 CORONARY ARTERY DISEASE INVOLVING NATIVE CORONARY ARTERY OF NATIVE HEART WITHOUT ANGINA PECTORIS: ICD-10-CM

## 2022-06-21 DIAGNOSIS — I11.9 HYPERTENSIVE LEFT VENTRICULAR HYPERTROPHY, WITHOUT HEART FAILURE: ICD-10-CM

## 2022-06-21 DIAGNOSIS — N18.30 STAGE 3 CHRONIC KIDNEY DISEASE, UNSPECIFIED WHETHER STAGE 3A OR 3B CKD: ICD-10-CM

## 2022-06-21 DIAGNOSIS — I50.32 CHRONIC DIASTOLIC HEART FAILURE: ICD-10-CM

## 2022-06-21 DIAGNOSIS — I35.0 AORTIC VALVE STENOSIS, ETIOLOGY OF CARDIAC VALVE DISEASE UNSPECIFIED: Primary | ICD-10-CM

## 2022-06-21 PROCEDURE — 99999 PR PBB SHADOW E&M-EST. PATIENT-LVL IV: CPT | Mod: PBBFAC,,, | Performed by: INTERNAL MEDICINE

## 2022-06-21 PROCEDURE — 99214 OFFICE O/P EST MOD 30 MIN: CPT | Mod: PBBFAC,PO | Performed by: INTERNAL MEDICINE

## 2022-06-21 PROCEDURE — 99999 PR PBB SHADOW E&M-EST. PATIENT-LVL IV: ICD-10-PCS | Mod: PBBFAC,,, | Performed by: INTERNAL MEDICINE

## 2022-06-21 PROCEDURE — 99214 PR OFFICE/OUTPT VISIT, EST, LEVL IV, 30-39 MIN: ICD-10-PCS | Mod: S$PBB,,, | Performed by: INTERNAL MEDICINE

## 2022-06-21 PROCEDURE — 99214 OFFICE O/P EST MOD 30 MIN: CPT | Mod: S$PBB,,, | Performed by: INTERNAL MEDICINE

## 2022-06-21 NOTE — PROGRESS NOTES
Subjective:    Patient ID:  Betty Bacon is a 72 y.o. female who presents for follow-up of cad, as    HPI  She comes with no complaints, no chest pain, no worsening shortness of breath, no syncope/near syncope      Review of Systems   Constitutional: Negative for decreased appetite, malaise/fatigue, weight gain and weight loss.   Cardiovascular: Negative for chest pain, dyspnea on exertion, leg swelling, palpitations and syncope.   Respiratory: Negative for cough and shortness of breath.    Gastrointestinal: Negative.    Neurological: Negative for weakness.   All other systems reviewed and are negative.       Objective:      Physical Exam  Vitals and nursing note reviewed.   Constitutional:       Appearance: Normal appearance. She is well-developed.   HENT:      Head: Normocephalic.   Eyes:      Pupils: Pupils are equal, round, and reactive to light.   Neck:      Thyroid: No thyromegaly.      Vascular: No carotid bruit or JVD.   Cardiovascular:      Rate and Rhythm: Normal rate and regular rhythm.      Chest Wall: PMI is not displaced.      Pulses: Normal pulses and intact distal pulses.      Heart sounds: Murmur heard.    Harsh midsystolic murmur is present with a grade of 3/6 at the upper right sternal border radiating to the neck.    No gallop.   Pulmonary:      Effort: Pulmonary effort is normal.      Breath sounds: Normal breath sounds.   Abdominal:      Palpations: Abdomen is soft. There is no mass.      Tenderness: There is no abdominal tenderness.   Musculoskeletal:         General: Normal range of motion.      Cervical back: Normal range of motion and neck supple.   Skin:     General: Skin is warm.   Neurological:      Mental Status: She is alert and oriented to person, place, and time.      Sensory: No sensory deficit.      Deep Tendon Reflexes: Reflexes are normal and symmetric.     Echocardiogram reviewed      Assessment:       1. Aortic valve stenosis, etiology of cardiac valve disease  unspecified    2. Coronary artery disease involving native coronary artery of native heart without angina pectoris    3. Chronic diastolic heart failure    4. Hypertensive left ventricular hypertrophy, without heart failure    5. PAD (peripheral artery disease)    6. Stage 3 chronic kidney disease, unspecified whether stage 3a or 3b CKD         Plan:     Continue all cardiac medications  Regular exercise program  Weight loss  One year follow-up with echocardiogram or sooner if necessary

## 2022-07-20 ENCOUNTER — LAB VISIT (OUTPATIENT)
Dept: LAB | Facility: HOSPITAL | Age: 72
End: 2022-07-20
Attending: FAMILY MEDICINE
Payer: MEDICARE

## 2022-07-20 DIAGNOSIS — E78.5 HYPERLIPIDEMIA WITH TARGET LOW DENSITY LIPOPROTEIN (LDL) CHOLESTEROL LESS THAN 70 MG/DL: ICD-10-CM

## 2022-07-20 DIAGNOSIS — R73.01 IMPAIRED FASTING BLOOD SUGAR: ICD-10-CM

## 2022-07-20 DIAGNOSIS — E55.9 VITAMIN D DEFICIENCY: ICD-10-CM

## 2022-07-20 DIAGNOSIS — N18.32 STAGE 3B CHRONIC KIDNEY DISEASE: ICD-10-CM

## 2022-07-20 LAB
25(OH)D3+25(OH)D2 SERPL-MCNC: 18 NG/ML (ref 30–96)
ALBUMIN SERPL BCP-MCNC: 3.5 G/DL (ref 3.5–5.2)
ALBUMIN SERPL BCP-MCNC: 3.5 G/DL (ref 3.5–5.2)
ALP SERPL-CCNC: 155 U/L (ref 55–135)
ALT SERPL W/O P-5'-P-CCNC: 13 U/L (ref 10–44)
ANION GAP SERPL CALC-SCNC: 10 MMOL/L (ref 8–16)
ANION GAP SERPL CALC-SCNC: 11 MMOL/L (ref 8–16)
AST SERPL-CCNC: 15 U/L (ref 10–40)
BASOPHILS # BLD AUTO: 0.05 K/UL (ref 0–0.2)
BASOPHILS # BLD AUTO: 0.05 K/UL (ref 0–0.2)
BASOPHILS NFR BLD: 0.6 % (ref 0–1.9)
BASOPHILS NFR BLD: 0.6 % (ref 0–1.9)
BILIRUB SERPL-MCNC: 0.5 MG/DL (ref 0.1–1)
BUN SERPL-MCNC: 25 MG/DL (ref 8–23)
BUN SERPL-MCNC: 26 MG/DL (ref 8–23)
CALCIUM SERPL-MCNC: 9.6 MG/DL (ref 8.7–10.5)
CALCIUM SERPL-MCNC: 9.6 MG/DL (ref 8.7–10.5)
CHLORIDE SERPL-SCNC: 106 MMOL/L (ref 95–110)
CHLORIDE SERPL-SCNC: 107 MMOL/L (ref 95–110)
CHOLEST SERPL-MCNC: 128 MG/DL (ref 120–199)
CHOLEST/HDLC SERPL: 3.6 {RATIO} (ref 2–5)
CO2 SERPL-SCNC: 23 MMOL/L (ref 23–29)
CO2 SERPL-SCNC: 24 MMOL/L (ref 23–29)
CREAT SERPL-MCNC: 1.2 MG/DL (ref 0.5–1.4)
CREAT SERPL-MCNC: 1.3 MG/DL (ref 0.5–1.4)
DIFFERENTIAL METHOD: ABNORMAL
DIFFERENTIAL METHOD: ABNORMAL
EOSINOPHIL # BLD AUTO: 0.3 K/UL (ref 0–0.5)
EOSINOPHIL # BLD AUTO: 0.3 K/UL (ref 0–0.5)
EOSINOPHIL NFR BLD: 3.6 % (ref 0–8)
EOSINOPHIL NFR BLD: 3.8 % (ref 0–8)
ERYTHROCYTE [DISTWIDTH] IN BLOOD BY AUTOMATED COUNT: 14.6 % (ref 11.5–14.5)
ERYTHROCYTE [DISTWIDTH] IN BLOOD BY AUTOMATED COUNT: 14.9 % (ref 11.5–14.5)
EST. GFR  (AFRICAN AMERICAN): 46.7 ML/MIN/1.73 M^2
EST. GFR  (AFRICAN AMERICAN): 51.4 ML/MIN/1.73 M^2
EST. GFR  (NON AFRICAN AMERICAN): 40.5 ML/MIN/1.73 M^2
EST. GFR  (NON AFRICAN AMERICAN): 44.6 ML/MIN/1.73 M^2
ESTIMATED AVG GLUCOSE: 123 MG/DL (ref 68–131)
GLUCOSE SERPL-MCNC: 100 MG/DL (ref 70–110)
GLUCOSE SERPL-MCNC: 102 MG/DL (ref 70–110)
HBA1C MFR BLD: 5.9 % (ref 4–5.6)
HCT VFR BLD AUTO: 41.6 % (ref 37–48.5)
HCT VFR BLD AUTO: 42.4 % (ref 37–48.5)
HDLC SERPL-MCNC: 36 MG/DL (ref 40–75)
HDLC SERPL: 28.1 % (ref 20–50)
HGB BLD-MCNC: 12.8 G/DL (ref 12–16)
HGB BLD-MCNC: 12.8 G/DL (ref 12–16)
IMM GRANULOCYTES # BLD AUTO: 0.04 K/UL (ref 0–0.04)
IMM GRANULOCYTES # BLD AUTO: 0.05 K/UL (ref 0–0.04)
IMM GRANULOCYTES NFR BLD AUTO: 0.5 % (ref 0–0.5)
IMM GRANULOCYTES NFR BLD AUTO: 0.6 % (ref 0–0.5)
LDLC SERPL CALC-MCNC: 67.8 MG/DL (ref 63–159)
LYMPHOCYTES # BLD AUTO: 1.1 K/UL (ref 1–4.8)
LYMPHOCYTES # BLD AUTO: 1.1 K/UL (ref 1–4.8)
LYMPHOCYTES NFR BLD: 13.1 % (ref 18–48)
LYMPHOCYTES NFR BLD: 13.4 % (ref 18–48)
MCH RBC QN AUTO: 29.5 PG (ref 27–31)
MCH RBC QN AUTO: 29.6 PG (ref 27–31)
MCHC RBC AUTO-ENTMCNC: 30.2 G/DL (ref 32–36)
MCHC RBC AUTO-ENTMCNC: 30.8 G/DL (ref 32–36)
MCV RBC AUTO: 96 FL (ref 82–98)
MCV RBC AUTO: 98 FL (ref 82–98)
MONOCYTES # BLD AUTO: 0.5 K/UL (ref 0.3–1)
MONOCYTES # BLD AUTO: 0.6 K/UL (ref 0.3–1)
MONOCYTES NFR BLD: 6.3 % (ref 4–15)
MONOCYTES NFR BLD: 7 % (ref 4–15)
NEUTROPHILS # BLD AUTO: 6 K/UL (ref 1.8–7.7)
NEUTROPHILS # BLD AUTO: 6.1 K/UL (ref 1.8–7.7)
NEUTROPHILS NFR BLD: 75.2 % (ref 38–73)
NEUTROPHILS NFR BLD: 75.3 % (ref 38–73)
NONHDLC SERPL-MCNC: 92 MG/DL
NRBC BLD-RTO: 0 /100 WBC
NRBC BLD-RTO: 0 /100 WBC
PHOSPHATE SERPL-MCNC: 2.8 MG/DL (ref 2.7–4.5)
PLATELET # BLD AUTO: 205 K/UL (ref 150–450)
PLATELET # BLD AUTO: 217 K/UL (ref 150–450)
PMV BLD AUTO: 11.8 FL (ref 9.2–12.9)
PMV BLD AUTO: 12.3 FL (ref 9.2–12.9)
POTASSIUM SERPL-SCNC: 4.7 MMOL/L (ref 3.5–5.1)
POTASSIUM SERPL-SCNC: 4.8 MMOL/L (ref 3.5–5.1)
PROT SERPL-MCNC: 7.2 G/DL (ref 6–8.4)
PTH-INTACT SERPL-MCNC: 243.7 PG/ML (ref 9–77)
RBC # BLD AUTO: 4.32 M/UL (ref 4–5.4)
RBC # BLD AUTO: 4.34 M/UL (ref 4–5.4)
SODIUM SERPL-SCNC: 140 MMOL/L (ref 136–145)
SODIUM SERPL-SCNC: 141 MMOL/L (ref 136–145)
TRIGL SERPL-MCNC: 121 MG/DL (ref 30–150)
WBC # BLD AUTO: 7.97 K/UL (ref 3.9–12.7)
WBC # BLD AUTO: 8.12 K/UL (ref 3.9–12.7)

## 2022-07-20 PROCEDURE — 83036 HEMOGLOBIN GLYCOSYLATED A1C: CPT | Performed by: FAMILY MEDICINE

## 2022-07-20 PROCEDURE — 80061 LIPID PANEL: CPT | Performed by: FAMILY MEDICINE

## 2022-07-20 PROCEDURE — 82306 VITAMIN D 25 HYDROXY: CPT | Performed by: INTERNAL MEDICINE

## 2022-07-20 PROCEDURE — 80053 COMPREHEN METABOLIC PANEL: CPT | Performed by: FAMILY MEDICINE

## 2022-07-20 PROCEDURE — 85025 COMPLETE CBC W/AUTO DIFF WBC: CPT | Mod: 91 | Performed by: FAMILY MEDICINE

## 2022-07-20 PROCEDURE — 83970 ASSAY OF PARATHORMONE: CPT | Performed by: INTERNAL MEDICINE

## 2022-07-20 PROCEDURE — 85025 COMPLETE CBC W/AUTO DIFF WBC: CPT | Performed by: INTERNAL MEDICINE

## 2022-07-20 PROCEDURE — 36415 COLL VENOUS BLD VENIPUNCTURE: CPT | Mod: PO | Performed by: FAMILY MEDICINE

## 2022-07-25 ENCOUNTER — OFFICE VISIT (OUTPATIENT)
Dept: FAMILY MEDICINE | Facility: CLINIC | Age: 72
End: 2022-07-25
Payer: MEDICARE

## 2022-07-25 VITALS
TEMPERATURE: 98 F | DIASTOLIC BLOOD PRESSURE: 60 MMHG | BODY MASS INDEX: 37.57 KG/M2 | HEIGHT: 63 IN | RESPIRATION RATE: 18 BRPM | OXYGEN SATURATION: 95 % | WEIGHT: 212.06 LBS | SYSTOLIC BLOOD PRESSURE: 170 MMHG | HEART RATE: 77 BPM

## 2022-07-25 DIAGNOSIS — E78.5 HYPERLIPIDEMIA WITH TARGET LOW DENSITY LIPOPROTEIN (LDL) CHOLESTEROL LESS THAN 70 MG/DL: ICD-10-CM

## 2022-07-25 DIAGNOSIS — E55.9 VITAMIN D DEFICIENCY: ICD-10-CM

## 2022-07-25 DIAGNOSIS — I50.32 CHRONIC DIASTOLIC HEART FAILURE: ICD-10-CM

## 2022-07-25 DIAGNOSIS — I73.9 PAD (PERIPHERAL ARTERY DISEASE): ICD-10-CM

## 2022-07-25 DIAGNOSIS — I10 BENIGN HYPERTENSION: ICD-10-CM

## 2022-07-25 DIAGNOSIS — Z12.2 SCREENING FOR LUNG CANCER: ICD-10-CM

## 2022-07-25 DIAGNOSIS — E66.01 SEVERE OBESITY WITH BODY MASS INDEX (BMI) OF 35.0 TO 39.9 WITH SERIOUS COMORBIDITY: ICD-10-CM

## 2022-07-25 DIAGNOSIS — F33.41 RECURRENT MAJOR DEPRESSIVE DISORDER, IN PARTIAL REMISSION: ICD-10-CM

## 2022-07-25 DIAGNOSIS — N95.9 MENOPAUSAL AND POSTMENOPAUSAL DISORDER: ICD-10-CM

## 2022-07-25 DIAGNOSIS — N25.81 SECONDARY HYPERPARATHYROIDISM OF RENAL ORIGIN: ICD-10-CM

## 2022-07-25 DIAGNOSIS — R73.01 IMPAIRED FASTING BLOOD SUGAR: ICD-10-CM

## 2022-07-25 DIAGNOSIS — K21.9 GASTROESOPHAGEAL REFLUX DISEASE, UNSPECIFIED WHETHER ESOPHAGITIS PRESENT: ICD-10-CM

## 2022-07-25 DIAGNOSIS — M10.9 ACUTE GOUT, UNSPECIFIED CAUSE, UNSPECIFIED SITE: ICD-10-CM

## 2022-07-25 DIAGNOSIS — Z12.31 ENCOUNTER FOR SCREENING MAMMOGRAM FOR MALIGNANT NEOPLASM OF BREAST: ICD-10-CM

## 2022-07-25 DIAGNOSIS — R74.8 ELEVATED ALKALINE PHOSPHATASE LEVEL: ICD-10-CM

## 2022-07-25 DIAGNOSIS — J44.9 CHRONIC OBSTRUCTIVE PULMONARY DISEASE, UNSPECIFIED COPD TYPE: ICD-10-CM

## 2022-07-25 DIAGNOSIS — I70.0 AORTIC ATHEROSCLEROSIS: ICD-10-CM

## 2022-07-25 DIAGNOSIS — N18.32 STAGE 3B CHRONIC KIDNEY DISEASE: Primary | ICD-10-CM

## 2022-07-25 PROCEDURE — 99214 OFFICE O/P EST MOD 30 MIN: CPT | Mod: PBBFAC,PO | Performed by: FAMILY MEDICINE

## 2022-07-25 PROCEDURE — 99999 PR PBB SHADOW E&M-EST. PATIENT-LVL IV: ICD-10-PCS | Mod: PBBFAC,,, | Performed by: FAMILY MEDICINE

## 2022-07-25 PROCEDURE — 99999 PR PBB SHADOW E&M-EST. PATIENT-LVL IV: CPT | Mod: PBBFAC,,, | Performed by: FAMILY MEDICINE

## 2022-07-25 PROCEDURE — 99214 PR OFFICE/OUTPT VISIT, EST, LEVL IV, 30-39 MIN: ICD-10-PCS | Mod: S$PBB,,, | Performed by: FAMILY MEDICINE

## 2022-07-25 PROCEDURE — 99214 OFFICE O/P EST MOD 30 MIN: CPT | Mod: S$PBB,,, | Performed by: FAMILY MEDICINE

## 2022-07-25 RX ORDER — ERGOCALCIFEROL 1.25 MG/1
50000 CAPSULE ORAL
Qty: 12 CAPSULE | Refills: 1 | Status: SHIPPED | OUTPATIENT
Start: 2022-07-25 | End: 2022-11-01 | Stop reason: SDUPTHER

## 2022-07-25 NOTE — PROGRESS NOTES
Subjective:       Patient ID: Betty Bacon is a 72 y.o. female.    Chief Complaint: Follow-up (6mth f/u)    HPI  Review of Systems   Constitutional: Negative for fatigue and unexpected weight change.   Respiratory: Negative for chest tightness and shortness of breath.    Cardiovascular: Negative for chest pain, palpitations and leg swelling.   Gastrointestinal: Negative for abdominal pain.   Musculoskeletal: Negative for arthralgias.   Neurological: Negative for dizziness, syncope, light-headedness and headaches.       Patient Active Problem List   Diagnosis    CKD (chronic kidney disease) stage 3, GFR 30-59 ml/min    Hyperlipidemia LDL goal < 70    COPD (chronic obstructive pulmonary disease)    Benign hypertension    LVH (left ventricular hypertrophy) due to hypertensive disease    Depression    Unstable angina    PAD (peripheral artery disease)    Generalized anxiety disorder    Aortic stenosis    CAD (coronary artery disease)    Abnormal stress test    GERD (gastroesophageal reflux disease)    Interstitial lung disease    Osteopenia    History of electroconvulsive therapy    Former smoker    Impaired fasting blood sugar    Severe obesity with body mass index (BMI) of 35.0 to 39.9 with serious comorbidity    Secondary hyperparathyroidism of renal origin    Chronic left-sided thoracic back pain    Chronic diastolic heart failure    Elevated alkaline phosphatase level    Positive AKIRA (antinuclear antibody)    Hepatitis B core antibody positive    Acute gout of left knee    Pain and swelling of left lower leg    Aortic atherosclerosis    Recurrent major depressive disorder, in partial remission     Patient is here for a chronic conditions follow up.    Reviewed labs 7/2022-vit d 18,         Had COVID 19 7/2020 requiring hospitalization     Pulm  Dr. 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 Placed on protonix 40mg daily. Had been on allopurinol for gout flare . Gi Dr. Trevizo did colonoscopy at [facility] 10/20/2021- 2 polyps removed and int hemorrhoids non bleeding seen. Black stools have cleared. Has stopped allopurinol and has had no further gout flares. EGD done 9/22/2021                          Found to have erythematous AVM in the stomach- ablated                          with gold probe                          - Normal esophagus.                          - 2 cm hiatal hernia.                          - A single non-bleeding angioectasia in the stomach.                          Treated with bipolar cautery.                          - Normal examined duodenum.                          - No specimens collected.             CT lung screening 1/2021 showed 1/2021-neg for lung cancer. Probable interstitial fibrosis (known and under care of Pulm).  A small calcified pleural plaque is seen posteriorly at the left midlung field.  Atherosclerosis including the coronary arteries.     Referred to hepatology NP Scroggs 2/2021 for elevated alk phos (liver source), enlarged liver on U/s . Serological workup was negative for Ranjit's, alpha-1 antitrypsin deficiency, hemochromatosis, autoimmune etiology, and viral hepatitis.         dexa and mammo 12/20-osteopenia, negative respectively. On fosomax > 5 years . Stopped 2020.     IGT/prediabetes- A1c 5.9. lipids at goal. On asa 81mg, lipitor      Here to f/u mood with h/o severe depression. has been consistently better since increasing seroquel.  Has been more social, less down, mood is less labile and anxiety has been manageable.  Had some counseling LCSW 12/19. On paxil 50mg a day and seroquel 50mg 3 qhs         History:   Card Dr. Sahu CAD s/p stents, PVD on plavix and imdur     Psych hospitalized for depression in past 2003-04. Had ECT therapy.  Lives alone. Drives, does all ADLs by herself, cooks, cleans.  Xanax  prn .  Lost  of 50 years.  Has good support from children and friends     Nephrology Dr. Morrow CKd stage 3, uric acid , PTH elevation, mild anemia     GI Dr. Trevizo -gerd, dysphagia, IBS with diarrhea. meds helping     Podiatry Dr. Malave- gout attack.  Uric acid 9.8 . Has been counseled on low purine diet  Objective:      Physical Exam  Vitals and nursing note reviewed.   Constitutional:       Appearance: She is well-developed.   Cardiovascular:      Rate and Rhythm: Normal rate and regular rhythm.      Heart sounds: Normal heart sounds.   Pulmonary:      Effort: Pulmonary effort is normal.      Breath sounds: Normal breath sounds.   Skin:     General: Skin is warm and dry.   Neurological:      Mental Status: She is alert and oriented to person, place, and time.         Assessment:       1. Stage 3b chronic kidney disease    2. Chronic obstructive pulmonary disease, unspecified COPD type    3. PAD (peripheral artery disease)    4. Chronic diastolic heart failure    5. Aortic atherosclerosis    6. Secondary hyperparathyroidism of renal origin    7. Impaired fasting blood sugar    8. Acute gout, unspecified cause, unspecified site    9. Elevated alkaline phosphatase level    10. Recurrent major depressive disorder, in partial remission    11. Hyperlipidemia LDL goal < 70    12. Benign hypertension    13. Severe obesity with body mass index (BMI) of 35.0 to 39.9 with serious comorbidity    14. Gastroesophageal reflux disease, unspecified whether esophagitis present    15. Encounter for screening mammogram for malignant neoplasm of breast    16. Menopausal and postmenopausal disorder    17. Screening for lung cancer    18. Vitamin D deficiency        Plan:         1. Stage 3b chronic kidney disease  Stable and chronic.  Will continue to monitor q3-6 months and control chronic conditions as optimally as possible to preserve function.    - CBC Auto Differential; Future    2. Chronic obstructive pulmonary  "disease, unspecified COPD type  Cont current mgmt    3. PAD (peripheral artery disease)  Cont current mgmt and lower risk factors    4. Chronic diastolic heart failure  Cont current mgmt    5. Aortic atherosclerosis  Cont to lower risk factors    6. Secondary hyperparathyroidism of renal origin  Stable and chronic.  Will continue to monitor q3-6 months and control chronic conditions as optimally as possible to preserve function.      7. Impaired fasting blood sugar  Stable and chronic.  Will continue to monitor q3-6 months and control chronic conditions as optimally as possible to preserve function.    - Hemoglobin A1C; Future    8. Acute gout, unspecified cause, unspecified site  Screen and treat as indicated:    - Uric Acid; Future    9. Elevated alkaline phosphatase level  Cont monitoring and treat as indicated    10. Recurrent major depressive disorder, in partial remission  In remission. Cont current mgmt    11. Hyperlipidemia LDL goal < 70  Stable condition.  Continue current medications.  Will adjust based on lab findings or if condition changes.    - Comprehensive Metabolic Panel; Future  - Lipid Panel; Future    12. Benign hypertension  I counseled the patient on HTN education, management and recommendations.  I recommended weight loss toward a BMI < 25, avoidance of salt and the DASH diet, regular cardio exercise a minimum of 150 minutes per week and medications if indicated.  Printed materials were given. The goal is < 140/90 unless otherwise specified.      13. Severe obesity with body mass index (BMI) of 35.0 to 39.9 with serious comorbidity  Counseled patient on his ideal body weight, health consequences of being obese and current recommendations including weekly exercise and a heart healthy diet.  Current BMI is:Estimated body mass index is 38.17 kg/m² as calculated from the following:    Height as of this encounter: 5' 2.5" (1.588 m).    Weight as of this encounter: 96.2 kg (212 lb 1.3 oz)..  Patient " is aware that ideal BMI < 25 or Weight in (lb) to have BMI = 25: 138.6.        14. Gastroesophageal reflux disease, unspecified whether esophagitis present  Counseled patient on prevention of reflux with changes in diet and behavior.  I recommended avoidance of greasy and spicy foods, caffeine and eating within 3 hours of bedtime.  I counseled the patient to avoid eating large meals and instead eating more frequent small meals.  I also recommended weight loss and elevation of the head of the bed by 6 inches.  If symptoms persist after these changes medication may be needed to control GERD.        15. Encounter for screening mammogram for malignant neoplasm of breast  Screen and treat as indicated:    - Mammo Digital Screening Bilat; Future    16. Menopausal and postmenopausal disorder  Screen and treat as indicated:    - DXA Bone Density Spine And Hip; Future    17. Screening for lung cancer  Cont monitoring    18. Vitamin D deficiency  Screen and treat as indicated:    - Vitamin D; Future        Time spent with patient: 20 minutes    Patient with be reevaluated in 6 months or sooner prn    Greater than 50% of this visit was spent counseling as described in above documentation:Yes

## 2022-09-02 ENCOUNTER — HOSPITAL ENCOUNTER (OUTPATIENT)
Dept: RADIOLOGY | Facility: CLINIC | Age: 72
Discharge: HOME | End: 2022-09-02
Attending: FAMILY MEDICINE
Payer: MEDICARE

## 2022-09-02 DIAGNOSIS — Z12.31 ENCOUNTER FOR SCREENING MAMMOGRAM FOR MALIGNANT NEOPLASM OF BREAST: ICD-10-CM

## 2022-09-02 PROCEDURE — 77063 BREAST TOMOSYNTHESIS BI: CPT | Mod: 26,,, | Performed by: RADIOLOGY

## 2022-09-02 PROCEDURE — 77063 MAMMO DIGITAL SCREENING BILAT WITH TOMO: ICD-10-PCS | Mod: 26,,, | Performed by: RADIOLOGY

## 2022-09-02 PROCEDURE — 77067 MAMMO DIGITAL SCREENING BILAT WITH TOMO: ICD-10-PCS | Mod: 26,,, | Performed by: RADIOLOGY

## 2022-09-02 PROCEDURE — 77067 SCR MAMMO BI INCL CAD: CPT | Mod: 26,,, | Performed by: RADIOLOGY

## 2022-09-02 PROCEDURE — 77063 BREAST TOMOSYNTHESIS BI: CPT | Mod: TC,PO

## 2022-10-28 ENCOUNTER — TELEPHONE (OUTPATIENT)
Dept: NEPHROLOGY | Facility: CLINIC | Age: 72
End: 2022-10-28
Payer: MEDICARE

## 2022-10-28 DIAGNOSIS — M85.80 OSTEOPENIA, UNSPECIFIED LOCATION: ICD-10-CM

## 2022-10-28 DIAGNOSIS — N18.32 STAGE 3B CHRONIC KIDNEY DISEASE: Primary | ICD-10-CM

## 2022-10-31 ENCOUNTER — LAB VISIT (OUTPATIENT)
Dept: LAB | Facility: HOSPITAL | Age: 72
End: 2022-10-31
Attending: FAMILY MEDICINE
Payer: MEDICARE

## 2022-10-31 DIAGNOSIS — M85.80 OSTEOPENIA, UNSPECIFIED LOCATION: ICD-10-CM

## 2022-10-31 DIAGNOSIS — N18.32 STAGE 3B CHRONIC KIDNEY DISEASE: ICD-10-CM

## 2022-10-31 LAB
25(OH)D3+25(OH)D2 SERPL-MCNC: 21 NG/ML (ref 30–96)
ALBUMIN SERPL BCP-MCNC: 3.3 G/DL (ref 3.5–5.2)
ANION GAP SERPL CALC-SCNC: 9 MMOL/L (ref 8–16)
BASOPHILS # BLD AUTO: 0.04 K/UL (ref 0–0.2)
BASOPHILS NFR BLD: 0.5 % (ref 0–1.9)
BUN SERPL-MCNC: 22 MG/DL (ref 8–23)
CALCIUM SERPL-MCNC: 9.2 MG/DL (ref 8.7–10.5)
CHLORIDE SERPL-SCNC: 106 MMOL/L (ref 95–110)
CO2 SERPL-SCNC: 25 MMOL/L (ref 23–29)
CREAT SERPL-MCNC: 1.3 MG/DL (ref 0.5–1.4)
DIFFERENTIAL METHOD: ABNORMAL
EOSINOPHIL # BLD AUTO: 0.4 K/UL (ref 0–0.5)
EOSINOPHIL NFR BLD: 4.4 % (ref 0–8)
ERYTHROCYTE [DISTWIDTH] IN BLOOD BY AUTOMATED COUNT: 14.2 % (ref 11.5–14.5)
EST. GFR  (NO RACE VARIABLE): 43.1 ML/MIN/1.73 M^2
GLUCOSE SERPL-MCNC: 105 MG/DL (ref 70–110)
HCT VFR BLD AUTO: 43.4 % (ref 37–48.5)
HGB BLD-MCNC: 13.1 G/DL (ref 12–16)
IMM GRANULOCYTES # BLD AUTO: 0.06 K/UL (ref 0–0.04)
IMM GRANULOCYTES NFR BLD AUTO: 0.7 % (ref 0–0.5)
LYMPHOCYTES # BLD AUTO: 1.1 K/UL (ref 1–4.8)
LYMPHOCYTES NFR BLD: 13.2 % (ref 18–48)
MCH RBC QN AUTO: 28.8 PG (ref 27–31)
MCHC RBC AUTO-ENTMCNC: 30.2 G/DL (ref 32–36)
MCV RBC AUTO: 95 FL (ref 82–98)
MONOCYTES # BLD AUTO: 0.4 K/UL (ref 0.3–1)
MONOCYTES NFR BLD: 5.2 % (ref 4–15)
NEUTROPHILS # BLD AUTO: 6.4 K/UL (ref 1.8–7.7)
NEUTROPHILS NFR BLD: 76 % (ref 38–73)
NRBC BLD-RTO: 0 /100 WBC
PHOSPHATE SERPL-MCNC: 2.3 MG/DL (ref 2.7–4.5)
PLATELET # BLD AUTO: 230 K/UL (ref 150–450)
PMV BLD AUTO: 11.5 FL (ref 9.2–12.9)
POTASSIUM SERPL-SCNC: 4.8 MMOL/L (ref 3.5–5.1)
PTH-INTACT SERPL-MCNC: 213.3 PG/ML (ref 9–77)
RBC # BLD AUTO: 4.55 M/UL (ref 4–5.4)
SODIUM SERPL-SCNC: 140 MMOL/L (ref 136–145)
WBC # BLD AUTO: 8.42 K/UL (ref 3.9–12.7)

## 2022-10-31 PROCEDURE — 83970 ASSAY OF PARATHORMONE: CPT | Performed by: NURSE PRACTITIONER

## 2022-10-31 PROCEDURE — 80069 RENAL FUNCTION PANEL: CPT | Performed by: NURSE PRACTITIONER

## 2022-10-31 PROCEDURE — 82306 VITAMIN D 25 HYDROXY: CPT | Performed by: NURSE PRACTITIONER

## 2022-10-31 PROCEDURE — 36415 COLL VENOUS BLD VENIPUNCTURE: CPT | Mod: PO | Performed by: NURSE PRACTITIONER

## 2022-10-31 PROCEDURE — 85025 COMPLETE CBC W/AUTO DIFF WBC: CPT | Performed by: NURSE PRACTITIONER

## 2022-11-01 ENCOUNTER — OFFICE VISIT (OUTPATIENT)
Dept: NEPHROLOGY | Facility: CLINIC | Age: 72
End: 2022-11-01
Payer: MEDICARE

## 2022-11-01 DIAGNOSIS — I50.32 CHRONIC DIASTOLIC HEART FAILURE: ICD-10-CM

## 2022-11-01 DIAGNOSIS — J84.9 INTERSTITIAL LUNG DISEASE: ICD-10-CM

## 2022-11-01 DIAGNOSIS — J44.9 CHRONIC OBSTRUCTIVE PULMONARY DISEASE, UNSPECIFIED COPD TYPE: ICD-10-CM

## 2022-11-01 DIAGNOSIS — Z87.891 FORMER SMOKER: ICD-10-CM

## 2022-11-01 DIAGNOSIS — N25.81 SECONDARY HYPERPARATHYROIDISM OF RENAL ORIGIN: ICD-10-CM

## 2022-11-01 DIAGNOSIS — N18.32 STAGE 3B CHRONIC KIDNEY DISEASE: Primary | ICD-10-CM

## 2022-11-01 DIAGNOSIS — E55.9 VITAMIN D DEFICIENCY: ICD-10-CM

## 2022-11-01 DIAGNOSIS — E66.01 SEVERE OBESITY WITH BODY MASS INDEX (BMI) OF 35.0 TO 39.9 WITH SERIOUS COMORBIDITY: ICD-10-CM

## 2022-11-01 PROCEDURE — 99999 PR PBB SHADOW E&M-EST. PATIENT-LVL IV: ICD-10-PCS | Mod: PBBFAC,,, | Performed by: INTERNAL MEDICINE

## 2022-11-01 PROCEDURE — 99215 PR OFFICE/OUTPT VISIT, EST, LEVL V, 40-54 MIN: ICD-10-PCS | Mod: S$PBB,,, | Performed by: INTERNAL MEDICINE

## 2022-11-01 PROCEDURE — 99999 PR PBB SHADOW E&M-EST. PATIENT-LVL IV: CPT | Mod: PBBFAC,,, | Performed by: INTERNAL MEDICINE

## 2022-11-01 PROCEDURE — 99214 OFFICE O/P EST MOD 30 MIN: CPT | Mod: PBBFAC,PO | Performed by: INTERNAL MEDICINE

## 2022-11-01 PROCEDURE — 99215 OFFICE O/P EST HI 40 MIN: CPT | Mod: S$PBB,,, | Performed by: INTERNAL MEDICINE

## 2022-11-01 RX ORDER — ALENDRONATE SODIUM 70 MG/1
70 TABLET ORAL
COMMUNITY
Start: 2022-08-22 | End: 2022-11-01 | Stop reason: ALTCHOICE

## 2022-11-01 RX ORDER — ERGOCALCIFEROL 1.25 MG/1
50000 CAPSULE ORAL
Qty: 12 CAPSULE | Refills: 3 | Status: SHIPPED | OUTPATIENT
Start: 2022-11-01 | End: 2023-05-24

## 2022-11-01 NOTE — PROGRESS NOTES
Subjective:       Patient ID: Betty Bacon is a 72 y.o. White female who presents for follow-up evaluation of Chronic Kidney Disease       She reports she is doing well. She escaped the flu. . No LE edema and no SOB. Her appetite is too good and she is still pushing fluids. No new medications since last visit. No LUTS. Since last visit her nephew and her sister with advanced dementia passed away. She is not taking her BP at home  She received the flu vaccine. Repeat O2 level was 93% after resting for 10 minutes     Interval history June 2020: She is doing very well. No recent bronchitis. No change in LE edema.     Interval history Dec 2020: She contracted COVID in July--did not require hospital stay. She feels great currently. No change in LE edema.     Interval history Aug 2021: She feels well. Yes to COVID vaccine. Still on oxygen at night and prn. No LE edema. Seeing [facility] at [facility]     Review of Systems   Constitutional:  Negative for activity change, appetite change, fatigue and unexpected weight change.   HENT:  Negative for facial swelling.    Respiratory:  Positive for shortness of breath (chronic).    Cardiovascular:  Negative for chest pain and leg swelling.   Gastrointestinal:  Negative for constipation and diarrhea.   Genitourinary:  Negative for difficulty urinating.   Musculoskeletal:  Positive for arthralgias.   Allergic/Immunologic: Positive for immunocompromised state (due to CKD).   Hematological:  Bruises/bleeds easily.   Psychiatric/Behavioral:  Negative for confusion and decreased concentration.      Objective:      Physical Exam  Vitals and nursing note reviewed.   Constitutional:       General: She is not in acute distress.     Appearance: Normal appearance. She is not ill-appearing.   Eyes:      General: No scleral icterus.  Neck:      Vascular: No JVD.   Cardiovascular:      Rate and Rhythm: Normal rate.      Heart sounds: S1 normal and S2 normal.     No friction  rub.   Pulmonary:      Breath sounds: Normal breath sounds. No wheezing or rales.   Abdominal:      General: There is no distension.   Musculoskeletal:      Right lower leg: No edema.      Left lower leg: No edema.   Skin:     General: Skin is warm and dry.   Neurological:      Mental Status: She is alert and oriented to person, place, and time. Mental status is at baseline.   Psychiatric:         Mood and Affect: Mood normal.         Behavior: Behavior normal.         Thought Content: Thought content normal.         Judgment: Judgment normal.       Assessment:       1. Stage 3b chronic kidney disease    2. Chronic diastolic heart failure    3. Chronic obstructive pulmonary disease, unspecified COPD type    4. Interstitial lung disease    5. Former smoker    6. Secondary hyperparathyroidism of renal origin    7. Vitamin D deficiency    8. Severe obesity with body mass index (BMI) of 35.0 to 39.9 with serious comorbidity          Plan:             CKD Stage 3 with stable kidney function and proteinuria.      HTN--is adequately controlled    Mineral and Bone Disease/SHPT--PTH increased, monitor on increase D3. May need D analogue added     Hyperglycemia--highest Hba1c was 6.2, most recent is 5.8    Former smoker/COPD/ILD--continue inhalers and O2      RTC 6 months with labs prior    49 minutes of total time spent on the encounter, which includes face to face time and non-face to face time preparing to see the patient (eg, review of tests), Obtaining and/or reviewing separately obtained history, Documenting clinical information in the electronic or other health record, Independently interpreting results (not separately reported) and communicating results to the patient/family/caregiver, or Care coordination (not separately reported).

## 2022-11-13 VITALS
OXYGEN SATURATION: 96 % | HEART RATE: 76 BPM | DIASTOLIC BLOOD PRESSURE: 77 MMHG | HEIGHT: 63 IN | SYSTOLIC BLOOD PRESSURE: 132 MMHG | BODY MASS INDEX: 37.41 KG/M2 | WEIGHT: 211.13 LBS

## 2022-12-01 ENCOUNTER — HOSPITAL ENCOUNTER (INPATIENT)
Facility: HOSPITAL | Age: 72
LOS: 3 days | Discharge: HOME | DRG: 193 | End: 2022-12-04
Attending: EMERGENCY MEDICINE | Admitting: INTERNAL MEDICINE
Payer: MEDICARE

## 2022-12-01 DIAGNOSIS — N17.9 ACUTE KIDNEY INJURY: ICD-10-CM

## 2022-12-01 DIAGNOSIS — I35.0 AS (AORTIC STENOSIS): ICD-10-CM

## 2022-12-01 DIAGNOSIS — R06.02 SOB (SHORTNESS OF BREATH): ICD-10-CM

## 2022-12-01 DIAGNOSIS — R06.02 SHORTNESS OF BREATH: ICD-10-CM

## 2022-12-01 DIAGNOSIS — R09.02 HYPOXIA: ICD-10-CM

## 2022-12-01 DIAGNOSIS — J44.1 COPD EXACERBATION: ICD-10-CM

## 2022-12-01 DIAGNOSIS — J15.8 PNEUMONIA DUE TO AEROBIC BACTERIA: Primary | ICD-10-CM

## 2022-12-01 LAB
ALBUMIN SERPL BCP-MCNC: 3.1 G/DL (ref 3.5–5.2)
ALP SERPL-CCNC: 111 U/L (ref 55–135)
ALT SERPL W/O P-5'-P-CCNC: 17 U/L (ref 10–44)
ANION GAP SERPL CALC-SCNC: 12 MMOL/L (ref 8–16)
AST SERPL-CCNC: 26 U/L (ref 10–40)
BACTERIA #/AREA URNS HPF: ABNORMAL /HPF
BASOPHILS # BLD AUTO: 0.04 K/UL (ref 0–0.2)
BASOPHILS NFR BLD: 0.3 % (ref 0–1.9)
BILIRUB SERPL-MCNC: 1.4 MG/DL (ref 0.1–1)
BILIRUB UR QL STRIP: NEGATIVE
BNP SERPL-MCNC: 532 PG/ML (ref 0–99)
BUN SERPL-MCNC: 37 MG/DL (ref 8–23)
CALCIUM SERPL-MCNC: 8.1 MG/DL (ref 8.7–10.5)
CHLORIDE SERPL-SCNC: 104 MMOL/L (ref 95–110)
CLARITY UR: ABNORMAL
CO2 SERPL-SCNC: 18 MMOL/L (ref 23–29)
COLOR UR: YELLOW
CREAT SERPL-MCNC: 2 MG/DL (ref 0.5–1.4)
DIFFERENTIAL METHOD: ABNORMAL
EOSINOPHIL # BLD AUTO: 0.1 K/UL (ref 0–0.5)
EOSINOPHIL NFR BLD: 0.8 % (ref 0–8)
ERYTHROCYTE [DISTWIDTH] IN BLOOD BY AUTOMATED COUNT: 14.8 % (ref 11.5–14.5)
EST. GFR  (NO RACE VARIABLE): 25.7 ML/MIN/1.73 M^2
GLUCOSE SERPL-MCNC: 200 MG/DL (ref 70–110)
GLUCOSE SERPL-MCNC: 205 MG/DL (ref 70–110)
GLUCOSE SERPL-MCNC: 266 MG/DL (ref 70–110)
GLUCOSE UR QL STRIP: NEGATIVE
HCT VFR BLD AUTO: 36.1 % (ref 37–48.5)
HGB BLD-MCNC: 11.3 G/DL (ref 12–16)
HGB UR QL STRIP: ABNORMAL
HYALINE CASTS #/AREA URNS LPF: 41 /LPF
IMM GRANULOCYTES # BLD AUTO: 0.07 K/UL (ref 0–0.04)
IMM GRANULOCYTES NFR BLD AUTO: 0.5 % (ref 0–0.5)
INFLUENZA A, MOLECULAR: NEGATIVE
INFLUENZA B, MOLECULAR: NEGATIVE
KETONES UR QL STRIP: ABNORMAL
LACTATE SERPL-SCNC: 1.2 MMOL/L (ref 0.5–1.9)
LACTATE SERPL-SCNC: 1.9 MMOL/L (ref 0.5–1.9)
LEUKOCYTE ESTERASE UR QL STRIP: NEGATIVE
LYMPHOCYTES # BLD AUTO: 0.6 K/UL (ref 1–4.8)
LYMPHOCYTES NFR BLD: 4.5 % (ref 18–48)
MAGNESIUM SERPL-MCNC: 1 MG/DL (ref 1.6–2.6)
MCH RBC QN AUTO: 29.2 PG (ref 27–31)
MCHC RBC AUTO-ENTMCNC: 31.3 G/DL (ref 32–36)
MCV RBC AUTO: 93 FL (ref 82–98)
MICROSCOPIC COMMENT: ABNORMAL
MONOCYTES # BLD AUTO: 0.8 K/UL (ref 0.3–1)
MONOCYTES NFR BLD: 6.4 % (ref 4–15)
NEUTROPHILS # BLD AUTO: 11.4 K/UL (ref 1.8–7.7)
NEUTROPHILS NFR BLD: 87.5 % (ref 38–73)
NITRITE UR QL STRIP: NEGATIVE
NRBC BLD-RTO: 0 /100 WBC
PH UR STRIP: 6 [PH] (ref 5–8)
PLATELET # BLD AUTO: 206 K/UL (ref 150–450)
PMV BLD AUTO: 10.5 FL (ref 9.2–12.9)
POTASSIUM SERPL-SCNC: 3.9 MMOL/L (ref 3.5–5.1)
PROT SERPL-MCNC: 7.2 G/DL (ref 6–8.4)
PROT UR QL STRIP: ABNORMAL
RBC # BLD AUTO: 3.87 M/UL (ref 4–5.4)
RBC #/AREA URNS HPF: 40 /HPF (ref 0–4)
SARS-COV-2 RDRP RESP QL NAA+PROBE: NEGATIVE
SODIUM SERPL-SCNC: 134 MMOL/L (ref 136–145)
SP GR UR STRIP: 1.03 (ref 1–1.03)
SPECIMEN SOURCE: NORMAL
SQUAMOUS #/AREA URNS HPF: 19 /HPF
TROPONIN I SERPL HS-MCNC: 389.5 PG/ML (ref 0–14.9)
TROPONIN I SERPL HS-MCNC: 511 PG/ML (ref 0–14.9)
URN SPEC COLLECT METH UR: ABNORMAL
UROBILINOGEN UR STRIP-ACNC: NEGATIVE EU/DL
WBC # BLD AUTO: 13.05 K/UL (ref 3.9–12.7)
WBC #/AREA URNS HPF: 5 /HPF (ref 0–5)

## 2022-12-01 PROCEDURE — 99900035 HC TECH TIME PER 15 MIN (STAT)

## 2022-12-01 PROCEDURE — 84484 ASSAY OF TROPONIN QUANT: CPT | Performed by: EMERGENCY MEDICINE

## 2022-12-01 PROCEDURE — 25000242 PHARM REV CODE 250 ALT 637 W/ HCPCS: Performed by: INTERNAL MEDICINE

## 2022-12-01 PROCEDURE — 83880 ASSAY OF NATRIURETIC PEPTIDE: CPT | Performed by: NURSE PRACTITIONER

## 2022-12-01 PROCEDURE — 25000003 PHARM REV CODE 250: Performed by: INTERNAL MEDICINE

## 2022-12-01 PROCEDURE — 80053 COMPREHEN METABOLIC PANEL: CPT | Performed by: NURSE PRACTITIONER

## 2022-12-01 PROCEDURE — 93010 ELECTROCARDIOGRAM REPORT: CPT | Mod: ,,, | Performed by: INTERNAL MEDICINE

## 2022-12-01 PROCEDURE — 83605 ASSAY OF LACTIC ACID: CPT | Performed by: EMERGENCY MEDICINE

## 2022-12-01 PROCEDURE — 96367 TX/PROPH/DG ADDL SEQ IV INF: CPT

## 2022-12-01 PROCEDURE — 94640 AIRWAY INHALATION TREATMENT: CPT

## 2022-12-01 PROCEDURE — 63600175 PHARM REV CODE 636 W HCPCS: Performed by: EMERGENCY MEDICINE

## 2022-12-01 PROCEDURE — 85025 COMPLETE CBC W/AUTO DIFF WBC: CPT | Performed by: NURSE PRACTITIONER

## 2022-12-01 PROCEDURE — 21400001 HC TELEMETRY ROOM

## 2022-12-01 PROCEDURE — 87040 BLOOD CULTURE FOR BACTERIA: CPT | Performed by: EMERGENCY MEDICINE

## 2022-12-01 PROCEDURE — 96366 THER/PROPH/DIAG IV INF ADDON: CPT

## 2022-12-01 PROCEDURE — 87502 INFLUENZA DNA AMP PROBE: CPT | Performed by: EMERGENCY MEDICINE

## 2022-12-01 PROCEDURE — 84484 ASSAY OF TROPONIN QUANT: CPT | Mod: 91 | Performed by: INTERNAL MEDICINE

## 2022-12-01 PROCEDURE — 63600175 PHARM REV CODE 636 W HCPCS: Performed by: INTERNAL MEDICINE

## 2022-12-01 PROCEDURE — 93005 ELECTROCARDIOGRAM TRACING: CPT | Performed by: INTERNAL MEDICINE

## 2022-12-01 PROCEDURE — 81001 URINALYSIS AUTO W/SCOPE: CPT | Performed by: EMERGENCY MEDICINE

## 2022-12-01 PROCEDURE — 87086 URINE CULTURE/COLONY COUNT: CPT | Performed by: INTERNAL MEDICINE

## 2022-12-01 PROCEDURE — U0002 COVID-19 LAB TEST NON-CDC: HCPCS | Performed by: EMERGENCY MEDICINE

## 2022-12-01 PROCEDURE — 96365 THER/PROPH/DIAG IV INF INIT: CPT

## 2022-12-01 PROCEDURE — 96368 THER/DIAG CONCURRENT INF: CPT

## 2022-12-01 PROCEDURE — 99285 EMERGENCY DEPT VISIT HI MDM: CPT | Mod: 25

## 2022-12-01 PROCEDURE — 83735 ASSAY OF MAGNESIUM: CPT | Performed by: NURSE PRACTITIONER

## 2022-12-01 PROCEDURE — 25000003 PHARM REV CODE 250: Performed by: EMERGENCY MEDICINE

## 2022-12-01 PROCEDURE — 93010 EKG 12-LEAD: ICD-10-PCS | Mod: ,,, | Performed by: INTERNAL MEDICINE

## 2022-12-01 RX ORDER — IPRATROPIUM BROMIDE AND ALBUTEROL SULFATE 2.5; .5 MG/3ML; MG/3ML
3 SOLUTION RESPIRATORY (INHALATION)
Status: ACTIVE | OUTPATIENT
Start: 2022-12-01 | End: 2022-12-01

## 2022-12-01 RX ORDER — INSULIN ASPART 100 [IU]/ML
0-5 INJECTION, SOLUTION INTRAVENOUS; SUBCUTANEOUS
Status: DISCONTINUED | OUTPATIENT
Start: 2022-12-01 | End: 2022-12-04 | Stop reason: HOSPADM

## 2022-12-01 RX ORDER — METHYLPREDNISOLONE SOD SUCC 125 MG
125 VIAL (EA) INJECTION
Status: DISCONTINUED | OUTPATIENT
Start: 2022-12-01 | End: 2022-12-01

## 2022-12-01 RX ORDER — QUETIAPINE FUMARATE 100 MG/1
100 TABLET, FILM COATED ORAL NIGHTLY
Status: DISCONTINUED | OUTPATIENT
Start: 2022-12-01 | End: 2022-12-04 | Stop reason: HOSPADM

## 2022-12-01 RX ORDER — ALPRAZOLAM 0.5 MG/1
1 TABLET ORAL 2 TIMES DAILY PRN
Status: DISCONTINUED | OUTPATIENT
Start: 2022-12-01 | End: 2022-12-04 | Stop reason: HOSPADM

## 2022-12-01 RX ORDER — BUDESONIDE 0.5 MG/2ML
1 INHALANT ORAL EVERY 12 HOURS
Status: DISCONTINUED | OUTPATIENT
Start: 2022-12-01 | End: 2022-12-04 | Stop reason: HOSPADM

## 2022-12-01 RX ORDER — FUROSEMIDE 20 MG/1
20 TABLET ORAL DAILY
Status: DISCONTINUED | OUTPATIENT
Start: 2022-12-01 | End: 2022-12-02

## 2022-12-01 RX ORDER — ISOSORBIDE MONONITRATE 60 MG/1
60 TABLET, EXTENDED RELEASE ORAL DAILY
Status: DISCONTINUED | OUTPATIENT
Start: 2022-12-01 | End: 2022-12-04 | Stop reason: HOSPADM

## 2022-12-01 RX ORDER — METOPROLOL SUCCINATE 50 MG/1
50 TABLET, EXTENDED RELEASE ORAL DAILY
Status: DISCONTINUED | OUTPATIENT
Start: 2022-12-01 | End: 2022-12-04 | Stop reason: HOSPADM

## 2022-12-01 RX ORDER — FLUTICASONE FUROATE AND VILANTEROL 200; 25 UG/1; UG/1
1 POWDER RESPIRATORY (INHALATION) DAILY
Status: DISCONTINUED | OUTPATIENT
Start: 2022-12-01 | End: 2022-12-01

## 2022-12-01 RX ORDER — MAGNESIUM SULFATE HEPTAHYDRATE 40 MG/ML
2 INJECTION, SOLUTION INTRAVENOUS ONCE
Status: COMPLETED | OUTPATIENT
Start: 2022-12-01 | End: 2022-12-01

## 2022-12-01 RX ORDER — SODIUM CHLORIDE 0.9 % (FLUSH) 0.9 %
2 SYRINGE (ML) INJECTION EVERY 6 HOURS PRN
Status: DISCONTINUED | OUTPATIENT
Start: 2022-12-01 | End: 2022-12-04 | Stop reason: HOSPADM

## 2022-12-01 RX ORDER — IPRATROPIUM BROMIDE AND ALBUTEROL SULFATE 2.5; .5 MG/3ML; MG/3ML
SOLUTION RESPIRATORY (INHALATION)
Status: DISPENSED
Start: 2022-12-01 | End: 2022-12-01

## 2022-12-01 RX ORDER — IBUPROFEN 200 MG
24 TABLET ORAL
Status: DISCONTINUED | OUTPATIENT
Start: 2022-12-01 | End: 2022-12-04 | Stop reason: HOSPADM

## 2022-12-01 RX ORDER — ARFORMOTEROL TARTRATE 15 UG/2ML
15 SOLUTION RESPIRATORY (INHALATION) 2 TIMES DAILY
Status: DISCONTINUED | OUTPATIENT
Start: 2022-12-01 | End: 2022-12-04 | Stop reason: HOSPADM

## 2022-12-01 RX ORDER — NAPROXEN SODIUM 220 MG/1
81 TABLET, FILM COATED ORAL DAILY
Status: DISCONTINUED | OUTPATIENT
Start: 2022-12-02 | End: 2022-12-04 | Stop reason: HOSPADM

## 2022-12-01 RX ORDER — PAROXETINE 10 MG/1
10 TABLET, FILM COATED ORAL EVERY MORNING
Status: DISCONTINUED | OUTPATIENT
Start: 2022-12-01 | End: 2022-12-04 | Stop reason: HOSPADM

## 2022-12-01 RX ORDER — ATORVASTATIN CALCIUM 40 MG/1
40 TABLET, FILM COATED ORAL DAILY
Status: DISCONTINUED | OUTPATIENT
Start: 2022-12-01 | End: 2022-12-04 | Stop reason: HOSPADM

## 2022-12-01 RX ORDER — GLUCAGON 1 MG
1 KIT INJECTION
Status: DISCONTINUED | OUTPATIENT
Start: 2022-12-01 | End: 2022-12-04 | Stop reason: HOSPADM

## 2022-12-01 RX ORDER — CLOPIDOGREL BISULFATE 75 MG/1
75 TABLET ORAL DAILY
Status: DISCONTINUED | OUTPATIENT
Start: 2022-12-01 | End: 2022-12-04 | Stop reason: HOSPADM

## 2022-12-01 RX ORDER — ALBUTEROL SULFATE 0.83 MG/ML
2.5 SOLUTION RESPIRATORY (INHALATION)
Status: DISCONTINUED | OUTPATIENT
Start: 2022-12-01 | End: 2022-12-04 | Stop reason: HOSPADM

## 2022-12-01 RX ORDER — PANTOPRAZOLE SODIUM 40 MG/1
40 TABLET, DELAYED RELEASE ORAL DAILY
Status: DISCONTINUED | OUTPATIENT
Start: 2022-12-01 | End: 2022-12-04 | Stop reason: HOSPADM

## 2022-12-01 RX ORDER — ASPIRIN 325 MG
325 TABLET, DELAYED RELEASE (ENTERIC COATED) ORAL ONCE
Status: COMPLETED | OUTPATIENT
Start: 2022-12-01 | End: 2022-12-01

## 2022-12-01 RX ORDER — IBUPROFEN 200 MG
16 TABLET ORAL
Status: DISCONTINUED | OUTPATIENT
Start: 2022-12-01 | End: 2022-12-04 | Stop reason: HOSPADM

## 2022-12-01 RX ORDER — IPRATROPIUM BROMIDE AND ALBUTEROL SULFATE 2.5; .5 MG/3ML; MG/3ML
3 SOLUTION RESPIRATORY (INHALATION) EVERY 6 HOURS
Status: DISCONTINUED | OUTPATIENT
Start: 2022-12-01 | End: 2022-12-02

## 2022-12-01 RX ADMIN — IPRATROPIUM BROMIDE AND ALBUTEROL SULFATE 3 ML: .5; 3 SOLUTION RESPIRATORY (INHALATION) at 02:12

## 2022-12-01 RX ADMIN — QUETIAPINE 100 MG: 100 TABLET ORAL at 09:12

## 2022-12-01 RX ADMIN — MAGNESIUM SULFATE HEPTAHYDRATE 2 G: 40 INJECTION, SOLUTION INTRAVENOUS at 01:12

## 2022-12-01 RX ADMIN — DOXYCYCLINE 100 MG: 100 INJECTION, POWDER, LYOPHILIZED, FOR SOLUTION INTRAVENOUS at 10:12

## 2022-12-01 RX ADMIN — SODIUM CHLORIDE, SODIUM LACTATE, POTASSIUM CHLORIDE, AND CALCIUM CHLORIDE 2871 ML: .6; .31; .03; .02 INJECTION, SOLUTION INTRAVENOUS at 11:12

## 2022-12-01 RX ADMIN — MAGNESIUM SULFATE HEPTAHYDRATE 2 G: 40 INJECTION, SOLUTION INTRAVENOUS at 10:12

## 2022-12-01 RX ADMIN — PANTOPRAZOLE SODIUM 40 MG: 40 TABLET, DELAYED RELEASE ORAL at 01:12

## 2022-12-01 RX ADMIN — DOXYCYCLINE 100 MG: 100 INJECTION, POWDER, LYOPHILIZED, FOR SOLUTION INTRAVENOUS at 11:12

## 2022-12-01 RX ADMIN — CEFTRIAXONE 2 G: 2 INJECTION, SOLUTION INTRAVENOUS at 10:12

## 2022-12-01 RX ADMIN — METHYLPREDNISOLONE SODIUM SUCCINATE 60 MG: 40 INJECTION, POWDER, FOR SOLUTION INTRAMUSCULAR; INTRAVENOUS at 01:12

## 2022-12-01 RX ADMIN — ASPIRIN 325 MG: 325 TABLET, COATED ORAL at 01:12

## 2022-12-01 NOTE — ED PROVIDER NOTES
Encounter Date: 12/1/2022       History     Chief Complaint   Patient presents with    Shortness of Breath     72-year-old female presented emergency department with complaints of shortness of breath gradually worsening over the past 3-4 days.  Patient also complains of generalized malaise and fatigue and lack of appetite.  Denies dysuria or hematuria.  Patient has history of COPD.  Patient also has history of CHF.  Denies dysuria or hematuria.    Review of patient's allergies indicates:   Allergen Reactions    Propoxyphene n-acetaminophen Other (See Comments)     Other reaction(s): Unknown    Codeine Anxiety     Past Medical History:   Diagnosis Date    Acute coronary artery obstruction without MI 10/2012    Benign hypertension     COPD (chronic obstructive pulmonary disease)     Coronary artery disease     Disorder of kidney and ureter     Dr. Morrow    Hepatitis B core antibody positive 03/03/2021    Negative sAg, suggests previous exposure but no chronic/active Hep B. At risk for reactivation with any immunosuppression medication, steroids, chemo, etc.      History of electroconvulsive therapy     Hyperlipidemia LDL goal < 70     Left ankle sprain     Major depressive disorder, recurrent episode, severe     s/p ECT    Positive AKIRA (antinuclear antibody) 03/03/2021    PVD (peripheral vascular disease)      Past Surgical History:   Procedure Laterality Date    CHOLECYSTECTOMY      CORONARY ANGIOPLASTY      CORONARY ANGIOPLASTY WITH STENT PLACEMENT  10/2012    2 stents RCA (100% stenosis)    EYE SURGERY      cataract surgery    HYSTERECTOMY      LINA, ovaries intact. uterine prolapse    ILIAC ARTERY STENT      TONSILLECTOMY      TOTAL VAGINAL HYSTERECTOMY       Family History   Problem Relation Age of Onset    Diabetes Mother     Heart disease Mother     Stroke Father     Heart disease Father     Heart disease Brother     Stroke Brother     Hypertension Daughter     Diabetes Maternal Aunt     Heart disease  Maternal Aunt     Heart disease Maternal Uncle     Heart disease Paternal Aunt     Heart disease Paternal Uncle     Heart disease Maternal Grandfather     Diabetes Sister     Heart disease Sister     Cancer Sister         lung    Kidney disease Sister         mass, benign    Breast cancer Sister     Stroke Sister     Dementia Sister     Liver disease Sister     Melanoma Neg Hx     Psoriasis Neg Hx     Lupus Neg Hx     Eczema Neg Hx      Social History     Tobacco Use    Smoking status: Former     Packs/day: 2.00     Years: 40.00     Pack years: 80.00     Types: Cigarettes     Quit date: 2009     Years since quittin.9    Smokeless tobacco: Never   Substance Use Topics    Alcohol use: Yes     Comment: social    Drug use: No     Review of Systems   Constitutional:  Positive for fatigue.   HENT: Negative.     Eyes: Negative.    Respiratory:  Positive for cough, shortness of breath and wheezing.    Cardiovascular: Negative.  Negative for chest pain.   Gastrointestinal: Negative.    Endocrine: Negative.    Genitourinary: Negative.    Musculoskeletal: Negative.    Skin: Negative.    Allergic/Immunologic: Negative.    Neurological: Negative.    Hematological: Negative.    Psychiatric/Behavioral: Negative.     All other systems reviewed and are negative.    Physical Exam     Initial Vitals [22 0833]   BP Pulse Resp Temp SpO2   (!) 163/67 102 (!) 24 98.4 °F (36.9 °C) (!) 93 %      MAP       --         Physical Exam    Nursing note and vitals reviewed.  Constitutional: She appears well-developed and well-nourished.   HENT:   Head: Normocephalic and atraumatic.   Nose: Nose normal.   Mouth/Throat: Oropharynx is clear and moist.   Eyes: Conjunctivae and EOM are normal. Pupils are equal, round, and reactive to light.   Neck: Neck supple. No thyromegaly present. No tracheal deviation present. No JVD present.   Normal range of motion.  Cardiovascular:  Normal rate, regular rhythm, normal heart sounds and intact  distal pulses.           No murmur heard.  Pulmonary/Chest: No stridor. She is in respiratory distress. She has wheezes. She has no rales.   Abdominal: Abdomen is soft. Bowel sounds are normal. There is no abdominal tenderness.   Musculoskeletal:         General: No tenderness or edema. Normal range of motion.      Cervical back: Normal range of motion and neck supple.     Neurological: She is alert and oriented to person, place, and time. She has normal strength. No cranial nerve deficit or sensory deficit. GCS score is 15. GCS eye subscore is 4. GCS verbal subscore is 5. GCS motor subscore is 6.   Skin: Skin is warm. Capillary refill takes less than 2 seconds.   Psychiatric: She has a normal mood and affect. Thought content normal.       ED Course   Procedures  Labs Reviewed   CBC W/ AUTO DIFFERENTIAL - Abnormal; Notable for the following components:       Result Value    WBC 13.05 (*)     RBC 3.87 (*)     Hemoglobin 11.3 (*)     Hematocrit 36.1 (*)     MCHC 31.3 (*)     RDW 14.8 (*)     Gran # (ANC) 11.4 (*)     Immature Grans (Abs) 0.07 (*)     Lymph # 0.6 (*)     Gran % 87.5 (*)     Lymph % 4.5 (*)     All other components within normal limits   COMPREHENSIVE METABOLIC PANEL - Abnormal; Notable for the following components:    Sodium 134 (*)     CO2 18 (*)     Glucose 200 (*)     BUN 37 (*)     Creatinine 2.0 (*)     Calcium 8.1 (*)     Albumin 3.1 (*)     Total Bilirubin 1.4 (*)     eGFR 25.7 (*)     All other components within normal limits   MAGNESIUM - Abnormal; Notable for the following components:    Magnesium 1.0 (*)     All other components within normal limits   CULTURE, BLOOD   CULTURE, BLOOD   B-TYPE NATRIURETIC PEPTIDE   LACTIC ACID, PLASMA   LACTIC ACID, PLASMA   URINALYSIS, REFLEX TO URINE CULTURE   INFLUENZA A AND B ANTIGEN   SARS-COV-2 RNA AMPLIFICATION, QUAL     EKG Readings: (Independently Interpreted)   Initial Reading: No STEMI. Rhythm: Normal Sinus Rhythm. Ectopy: No Ectopy. Conduction:  Normal.     Imaging Results              X-Ray Chest PA And Lateral (Final result)  Result time 12/01/22 09:27:57      Final result by Miguel Dumont MD (12/01/22 09:27:57)                   Narrative:    Reason: Shortness of breath.    FINDINGS:    PA and lateral chest with comparison chest x-ray September 28, 2020 show normal cardiomediastinal silhouette.  Prominence of the interstitial lung is similar to previous exam. Hazy ill-defined opacity in the periphery of the right midlung noted. There are also linear and hazy opacities of the lung bases. Pulmonary vasculature is normal. No acute osseous abnormality.    IMPRESSION:    1.  Hazy ill-defined opacity in the periphery of the right midlung could reflect infectious infiltrate.  2.  Linear and hazy opacities of the lung bases could reflect atelectasis or infiltrate.  3.  Prominence of the interstitium of the lungs is similar to previous exam, consistent with COPD.    Electronically signed by:  Miguel Dumont DO  12/1/2022 9:27 AM CST Workstation: [id number]                                     Medications   albuterol-ipratropium 2.5 mg-0.5 mg/3 mL nebulizer solution 3 mL (has no administration in time range)   lactated ringers bolus 2,871 mL (has no administration in time range)   cefTRIAXone (ROCEPHIN) 2 g/50 mL D5W IVPB (has no administration in time range)   doxycycline (VIBRAMYCIN) 100mg in dextrose 5% 250 mL IVPB (ready to mix) (has no administration in time range)   magnesium sulfate 2g in water 50mL IVPB (premix) (has no administration in time range)   methylPREDNISolone sodium succinate injection 125 mg (has no administration in time range)     Medical Decision Making:   Differential Diagnosis:   72-year-old female presented emergency department with shortness of breath and generalized malaise and weakness and fatigue and decreased appetite and decreased oral intake over the past few days.  Patient does have evidence of pneumonia.  Hypomagnesemia  "treated.  Breathing treatments given and patient did have improvement of symptoms.  Given presentation with hypoxia and persistent shortness of breath Hospital Medicine consulted for evaluation for admission and further management.  Patient to be hydrated as well.  Clinical Tests:   Lab Tests: Reviewed  Radiological Study: Reviewed  Medical Tests: Reviewed  Sepsis Perfusion Assessment: "I attest a sepsis perfusion exam was performed within 6 hours of sepsis, severe sepsis, or septic shock presentation, following fluid resuscitation."                        Clinical Impression:   Final diagnoses:  [R06.02] SOB (shortness of breath)  [J15.8] Pneumonia due to aerobic bacteria (Primary)  [J44.1] COPD exacerbation  [R09.02] Hypoxia  [R06.02] Shortness of breath  [N17.9] Acute kidney injury        ED Disposition Condition    Admit Stable                Fawad Mar MD  12/01/22 1020       Fawad Mar MD  12/01/22 1022    "

## 2022-12-01 NOTE — PROGRESS NOTES
Automatic Inhaler to Nebulizer Interchange    fluticasone/vilanterol (Breo Ellipta) 200 mcg/25 mcg changed to budesonide 1 mg twice daily AND arformoterol 15 mcg twice daily per [facility] Automatic Therapeutic Substitutions Protocol.    Please contact pharmacy at extension [phone] with any questions.     Thank you,   Gregorio Waddell

## 2022-12-01 NOTE — FIRST PROVIDER EVALUATION
Medical screening examination initiated.  I have conducted a focused provider triage encounter, findings are as follows:    Brief history of present illness:  Pt is a 73 yo female presenting for increased SOB, worsening over the past few days.  Pt typically wears 4L NC Prn and nightly at home.  Pt states SOB worse this morning.      Vitals:    12/01/22 0833   BP: (!) 163/67   Pulse: 102   Resp: (!) 24   Temp: 98.4 °F (36.9 °C)   TempSrc: Oral   SpO2: (!) 93%   Weight: 95.7 kg (211 lb)       Pertinent physical exam:  Tachypnea on initial exam.  Scattered wheezing noted.      Brief workup plan:  labs, imaging, oxygen    Preliminary workup initiated; this workup will be continued and followed by the physician or advanced practice provider that is assigned to the patient when roomed.

## 2022-12-02 ENCOUNTER — CLINICAL SUPPORT (OUTPATIENT)
Dept: CARDIOLOGY | Facility: HOSPITAL | Age: 72
DRG: 193 | End: 2022-12-02
Payer: MEDICARE

## 2022-12-02 VITALS — WEIGHT: 204 LBS | HEIGHT: 62 IN | BODY MASS INDEX: 37.54 KG/M2

## 2022-12-02 PROBLEM — R06.02 SHORTNESS OF BREATH: Status: ACTIVE | Noted: 2022-12-02

## 2022-12-02 LAB
ANION GAP SERPL CALC-SCNC: 11 MMOL/L (ref 8–16)
AV INDEX (PROSTH): 0.34
AV MEAN GRADIENT: 31 MMHG
AV PEAK GRADIENT: 52 MMHG
AV REGURGITATION PRESSURE HALF TIME: 331.11 MS
AV VALVE AREA: 1.16 CM2
AV VELOCITY RATIO: 0.3
BASOPHILS # BLD AUTO: 0.01 K/UL (ref 0–0.2)
BASOPHILS NFR BLD: 0.1 % (ref 0–1.9)
BSA FOR ECHO PROCEDURE: 2.01 M2
BUN SERPL-MCNC: 51 MG/DL (ref 8–23)
CALCIUM SERPL-MCNC: 8.3 MG/DL (ref 8.7–10.5)
CHLORIDE SERPL-SCNC: 102 MMOL/L (ref 95–110)
CO2 SERPL-SCNC: 21 MMOL/L (ref 23–29)
CREAT SERPL-MCNC: 2.2 MG/DL (ref 0.5–1.4)
CV ECHO LV RWT: 0.74 CM
DIFFERENTIAL METHOD: ABNORMAL
DOP CALC AO PEAK VEL: 3.59 M/S
DOP CALC AO VTI: 80.7 CM
DOP CALC LVOT AREA: 3.4 CM2
DOP CALC LVOT DIAMETER: 2.09 CM
DOP CALC LVOT PEAK VEL: 1.06 M/S
DOP CALC LVOT STROKE VOLUME: 93.27 CM3
DOP CALCLVOT PEAK VEL VTI: 27.2 CM
E WAVE DECELERATION TIME: 158.85 MSEC
E/A RATIO: 0.96
E/E' RATIO: 11.56 M/S
ECHO LV POSTERIOR WALL: 1.61 CM (ref 0.6–1.1)
EJECTION FRACTION: 55 %
EOSINOPHIL # BLD AUTO: 0 K/UL (ref 0–0.5)
EOSINOPHIL NFR BLD: 0 % (ref 0–8)
ERYTHROCYTE [DISTWIDTH] IN BLOOD BY AUTOMATED COUNT: 14.9 % (ref 11.5–14.5)
EST. GFR  (NO RACE VARIABLE): 23 ML/MIN/1.73 M^2
FRACTIONAL SHORTENING: 9 % (ref 28–44)
GLUCOSE SERPL-MCNC: 170 MG/DL (ref 70–110)
GLUCOSE SERPL-MCNC: 170 MG/DL (ref 70–110)
GLUCOSE SERPL-MCNC: 177 MG/DL (ref 70–110)
GLUCOSE SERPL-MCNC: 182 MG/DL (ref 70–110)
GLUCOSE SERPL-MCNC: 209 MG/DL (ref 70–110)
HCT VFR BLD AUTO: 34.7 % (ref 37–48.5)
HGB BLD-MCNC: 10.7 G/DL (ref 12–16)
IMM GRANULOCYTES # BLD AUTO: 0.03 K/UL (ref 0–0.04)
IMM GRANULOCYTES NFR BLD AUTO: 0.3 % (ref 0–0.5)
INTERVENTRICULAR SEPTUM: 1.82 CM (ref 0.6–1.1)
LEFT ATRIUM SIZE: 4.49 CM
LEFT ATRIUM VOLUME INDEX MOD: 38.6 ML/M2
LEFT ATRIUM VOLUME MOD: 74.51 CM3
LEFT INTERNAL DIMENSION IN SYSTOLE: 3.96 CM (ref 2.1–4)
LEFT VENTRICLE DIASTOLIC VOLUME INDEX: 44.44 ML/M2
LEFT VENTRICLE DIASTOLIC VOLUME: 85.77 ML
LEFT VENTRICLE MASS INDEX: 168 G/M2
LEFT VENTRICLE SYSTOLIC VOLUME INDEX: 20.5 ML/M2
LEFT VENTRICLE SYSTOLIC VOLUME: 39.62 ML
LEFT VENTRICULAR INTERNAL DIMENSION IN DIASTOLE: 4.36 CM (ref 3.5–6)
LEFT VENTRICULAR MASS: 324.95 G
LV LATERAL E/E' RATIO: 11.56 M/S
LV SEPTAL E/E' RATIO: 11.56 M/S
LVOT MG: 2.66 MMHG
LVOT MV: 0.77 CM/S
LYMPHOCYTES # BLD AUTO: 0.6 K/UL (ref 1–4.8)
LYMPHOCYTES NFR BLD: 5.5 % (ref 18–48)
MAGNESIUM SERPL-MCNC: 2.1 MG/DL (ref 1.6–2.6)
MCH RBC QN AUTO: 28.9 PG (ref 27–31)
MCHC RBC AUTO-ENTMCNC: 30.8 G/DL (ref 32–36)
MCV RBC AUTO: 94 FL (ref 82–98)
MONOCYTES # BLD AUTO: 0.4 K/UL (ref 0.3–1)
MONOCYTES NFR BLD: 4.3 % (ref 4–15)
MV PEAK A VEL: 1.08 M/S
MV PEAK E VEL: 1.04 M/S
MV STENOSIS PRESSURE HALF TIME: 46.07 MS
MV VALVE AREA P 1/2 METHOD: 4.78 CM2
NEUTROPHILS # BLD AUTO: 8.9 K/UL (ref 1.8–7.7)
NEUTROPHILS NFR BLD: 89.8 % (ref 38–73)
NRBC BLD-RTO: 0 /100 WBC
PHOSPHATE SERPL-MCNC: 3.7 MG/DL (ref 2.7–4.5)
PISA MRMAX VEL: 3.7 M/S
PISA TR MAX VEL: 3.55 M/S
PLATELET # BLD AUTO: 192 K/UL (ref 150–450)
PMV BLD AUTO: 11 FL (ref 9.2–12.9)
POTASSIUM SERPL-SCNC: 3.4 MMOL/L (ref 3.5–5.1)
PV MV: 0.88 M/S
PV PEAK VELOCITY: 1.2 CM/S
RA PRESSURE: 3 MMHG
RA VOL SYS: 48.31 ML
RBC # BLD AUTO: 3.7 M/UL (ref 4–5.4)
RV TISSUE DOPPLER FREE WALL SYSTOLIC VELOCITY 1 (APICAL 4 CHAMBER VIEW): 0.01 CM/S
SODIUM SERPL-SCNC: 134 MMOL/L (ref 136–145)
TDI LATERAL: 0.09 M/S
TDI SEPTAL: 0.09 M/S
TDI: 0.09 M/S
TR MAX PG: 50 MMHG
TRICUSPID ANNULAR PLANE SYSTOLIC EXCURSION: 1.96 CM
TV REST PULMONARY ARTERY PRESSURE: 53 MMHG
WBC # BLD AUTO: 9.92 K/UL (ref 3.9–12.7)

## 2022-12-02 PROCEDURE — 25000242 PHARM REV CODE 250 ALT 637 W/ HCPCS: Performed by: INTERNAL MEDICINE

## 2022-12-02 PROCEDURE — 93306 TTE W/DOPPLER COMPLETE: CPT

## 2022-12-02 PROCEDURE — 21400001 HC TELEMETRY ROOM

## 2022-12-02 PROCEDURE — 25000003 PHARM REV CODE 250: Performed by: INTERNAL MEDICINE

## 2022-12-02 PROCEDURE — 84100 ASSAY OF PHOSPHORUS: CPT | Performed by: INTERNAL MEDICINE

## 2022-12-02 PROCEDURE — 63600175 PHARM REV CODE 636 W HCPCS: Performed by: INTERNAL MEDICINE

## 2022-12-02 PROCEDURE — 94761 N-INVAS EAR/PLS OXIMETRY MLT: CPT

## 2022-12-02 PROCEDURE — 93306 TTE W/DOPPLER COMPLETE: CPT | Mod: 26,,, | Performed by: INTERNAL MEDICINE

## 2022-12-02 PROCEDURE — 93306 ECHO (CUPID ONLY): ICD-10-PCS | Mod: 26,,, | Performed by: INTERNAL MEDICINE

## 2022-12-02 PROCEDURE — 99900035 HC TECH TIME PER 15 MIN (STAT)

## 2022-12-02 PROCEDURE — 27000221 HC OXYGEN, UP TO 24 HOURS

## 2022-12-02 PROCEDURE — 94799 UNLISTED PULMONARY SVC/PX: CPT

## 2022-12-02 PROCEDURE — 99900031 HC PATIENT EDUCATION (STAT)

## 2022-12-02 PROCEDURE — 83735 ASSAY OF MAGNESIUM: CPT | Performed by: INTERNAL MEDICINE

## 2022-12-02 PROCEDURE — 36415 COLL VENOUS BLD VENIPUNCTURE: CPT | Performed by: INTERNAL MEDICINE

## 2022-12-02 PROCEDURE — 80048 BASIC METABOLIC PNL TOTAL CA: CPT | Performed by: INTERNAL MEDICINE

## 2022-12-02 PROCEDURE — 94640 AIRWAY INHALATION TREATMENT: CPT

## 2022-12-02 PROCEDURE — 85025 COMPLETE CBC W/AUTO DIFF WBC: CPT | Performed by: INTERNAL MEDICINE

## 2022-12-02 RX ORDER — IPRATROPIUM BROMIDE AND ALBUTEROL SULFATE 2.5; .5 MG/3ML; MG/3ML
3 SOLUTION RESPIRATORY (INHALATION)
Status: DISCONTINUED | OUTPATIENT
Start: 2022-12-03 | End: 2022-12-04 | Stop reason: HOSPADM

## 2022-12-02 RX ORDER — FUROSEMIDE 10 MG/ML
20 INJECTION INTRAMUSCULAR; INTRAVENOUS ONCE
Status: COMPLETED | OUTPATIENT
Start: 2022-12-02 | End: 2022-12-02

## 2022-12-02 RX ORDER — FUROSEMIDE 10 MG/ML
20 INJECTION INTRAMUSCULAR; INTRAVENOUS DAILY
Status: DISCONTINUED | OUTPATIENT
Start: 2022-12-03 | End: 2022-12-04 | Stop reason: HOSPADM

## 2022-12-02 RX ORDER — DOXYLAMINE SUCCINATE 25 MG
TABLET ORAL 2 TIMES DAILY
Status: DISCONTINUED | OUTPATIENT
Start: 2022-12-02 | End: 2022-12-04 | Stop reason: HOSPADM

## 2022-12-02 RX ADMIN — ARFORMOTEROL TARTRATE 15 MCG: 15 SOLUTION RESPIRATORY (INHALATION) at 09:12

## 2022-12-02 RX ADMIN — IPRATROPIUM BROMIDE AND ALBUTEROL SULFATE 3 ML: .5; 3 SOLUTION RESPIRATORY (INHALATION) at 02:12

## 2022-12-02 RX ADMIN — ATORVASTATIN CALCIUM 40 MG: 40 TABLET, FILM COATED ORAL at 10:12

## 2022-12-02 RX ADMIN — METHYLPREDNISOLONE SODIUM SUCCINATE 60 MG: 40 INJECTION, POWDER, FOR SOLUTION INTRAMUSCULAR; INTRAVENOUS at 05:12

## 2022-12-02 RX ADMIN — IPRATROPIUM BROMIDE AND ALBUTEROL SULFATE 3 ML: .5; 3 SOLUTION RESPIRATORY (INHALATION) at 09:12

## 2022-12-02 RX ADMIN — METOPROLOL SUCCINATE 50 MG: 50 TABLET, FILM COATED, EXTENDED RELEASE ORAL at 10:12

## 2022-12-02 RX ADMIN — METHYLPREDNISOLONE SODIUM SUCCINATE 60 MG: 40 INJECTION, POWDER, FOR SOLUTION INTRAMUSCULAR; INTRAVENOUS at 12:12

## 2022-12-02 RX ADMIN — PAROXETINE 10 MG: 10 TABLET, FILM COATED ORAL at 10:12

## 2022-12-02 RX ADMIN — DOXYCYCLINE 100 MG: 100 INJECTION, POWDER, LYOPHILIZED, FOR SOLUTION INTRAVENOUS at 11:12

## 2022-12-02 RX ADMIN — FUROSEMIDE 20 MG: 10 INJECTION, SOLUTION INTRAMUSCULAR; INTRAVENOUS at 11:12

## 2022-12-02 RX ADMIN — IPRATROPIUM BROMIDE AND ALBUTEROL SULFATE 3 ML: .5; 3 SOLUTION RESPIRATORY (INHALATION) at 01:12

## 2022-12-02 RX ADMIN — PANTOPRAZOLE SODIUM 40 MG: 40 TABLET, DELAYED RELEASE ORAL at 10:12

## 2022-12-02 RX ADMIN — ASPIRIN 81 MG CHEWABLE TABLET 81 MG: 81 TABLET CHEWABLE at 10:12

## 2022-12-02 RX ADMIN — QUETIAPINE 100 MG: 100 TABLET ORAL at 09:12

## 2022-12-02 RX ADMIN — METHYLPREDNISOLONE SODIUM SUCCINATE 60 MG: 40 INJECTION, POWDER, FOR SOLUTION INTRAMUSCULAR; INTRAVENOUS at 06:12

## 2022-12-02 RX ADMIN — ISOSORBIDE MONONITRATE 60 MG: 60 TABLET, EXTENDED RELEASE ORAL at 10:12

## 2022-12-02 RX ADMIN — METHYLPREDNISOLONE SODIUM SUCCINATE 60 MG: 40 INJECTION, POWDER, FOR SOLUTION INTRAMUSCULAR; INTRAVENOUS at 11:12

## 2022-12-02 RX ADMIN — DOXYCYCLINE 100 MG: 100 INJECTION, POWDER, LYOPHILIZED, FOR SOLUTION INTRAVENOUS at 10:12

## 2022-12-02 RX ADMIN — MICONAZOLE NITRATE: 20 CREAM TOPICAL at 09:12

## 2022-12-02 RX ADMIN — BUDESONIDE 1 MG: 0.5 INHALANT RESPIRATORY (INHALATION) at 09:12

## 2022-12-02 RX ADMIN — MICONAZOLE NITRATE: 20 CREAM TOPICAL at 03:12

## 2022-12-02 RX ADMIN — FUROSEMIDE 20 MG: 20 TABLET ORAL at 10:12

## 2022-12-02 RX ADMIN — CLOPIDOGREL BISULFATE 75 MG: 75 TABLET, FILM COATED ORAL at 10:12

## 2022-12-02 RX ADMIN — CEFTRIAXONE SODIUM 1 G: 1 INJECTION, POWDER, FOR SOLUTION INTRAMUSCULAR; INTRAVENOUS at 10:12

## 2022-12-02 RX ADMIN — POTASSIUM BICARBONATE 25 MEQ: 977.5 TABLET, EFFERVESCENT ORAL at 10:12

## 2022-12-02 NOTE — HPI
72-year-old female with history of CAD status post stent and CKD HTN Former smoker/COPD on O2 at home, presented emergency department with complaints of shortness of breath gradually worsening over the past 3-4 days.  Patient also complains of generalized malaise and fatigue and lack of appetite, states that she has had episodes of chest pain for the past couple days last time was yesterday on retrosternal area with no radiation she denies vomit + nausea.  Denies dysuria or hematuria.  Denies dysuria or hematuria.  Chest x-ray shows signs of pneumonia in the ER she received ceftriaxone Solu-Medrol breathing treatments.  She will be admitted.

## 2022-12-02 NOTE — CONSULTS
[facility]  Department of Cardiology  Consult Note      PATIENT NAME: Betty Bacon    MRN: [id number]  TODAY'S DATE: 12/02/2022  ADMIT DATE: 12/1/2022                          CONSULT REQUESTED BY: Bashir Bermudez MD    SUBJECTIVE     PRINCIPAL PROBLEM: Shortness of breath      REASON FOR CONSULT:  From H&P: 72-year-old female with history of CAD status post stent and CKD HTN Former smoker/COPD on O2 at home, presented emergency department with complaints of shortness of breath gradually worsening over the past 3-4 days.  Patient also complains of generalized malaise and fatigue and lack of appetite, states that she has had episodes of chest pain for the past couple days last time was yesterday on retrosternal area with no radiation she denies vomit + nausea.  Denies dysuria or hematuria.  Denies dysuria or hematuria.  Chest x-ray shows signs of pneumonia in the ER she received ceftriaxone Solu-Medrol breathing treatments.  She will be admitted.         HPI:    Patient is a 72-year-old female who presented to the emergency room with complaints of shortness of breath which is gradually worsening over the last few days.  She also reports episodes of chest discomfort as well.  She does have a history of coronary artery disease with stent placement in the past, CKD, hypertension, former smoker, COPD, and home O2 use.  Patient is noted have a nondiagnostic troponin elevation with a negative delta curve.  She also has mild acute anemia, hypokalemia, and CARMELITA.  On recent echocardiogram in June, she was noted to have moderate to severe aortic stenosis.        Review of patient's allergies indicates:   Allergen Reactions    Propoxyphene n-acetaminophen Other (See Comments)     Other reaction(s): Unknown    Codeine Anxiety       Past Medical History:   Diagnosis Date    Acute coronary artery obstruction without MI 10/2012    Benign hypertension     COPD (chronic obstructive pulmonary disease)      Coronary artery disease     Disorder of kidney and ureter     Dr. Morrow    Hepatitis B core antibody positive 2021    Negative sAg, suggests previous exposure but no chronic/active Hep B. At risk for reactivation with any immunosuppression medication, steroids, chemo, etc.      History of electroconvulsive therapy     Hyperlipidemia LDL goal < 70     Left ankle sprain     Major depressive disorder, recurrent episode, severe     s/p ECT    Positive AKIRA (antinuclear antibody) 2021    PVD (peripheral vascular disease)      Past Surgical History:   Procedure Laterality Date    CHOLECYSTECTOMY      CORONARY ANGIOPLASTY      CORONARY ANGIOPLASTY WITH STENT PLACEMENT  10/2012    2 stents RCA (100% stenosis)    EYE SURGERY      cataract surgery    HYSTERECTOMY      LINA, ovaries intact. uterine prolapse    ILIAC ARTERY STENT      TONSILLECTOMY      TOTAL VAGINAL HYSTERECTOMY       Social History     Tobacco Use    Smoking status: Former     Packs/day: 2.00     Years: 40.00     Pack years: 80.00     Types: Cigarettes     Quit date: 2009     Years since quittin.0    Smokeless tobacco: Never   Substance Use Topics    Alcohol use: Yes     Comment: social    Drug use: No        REVIEW OF SYSTEMS  CONSTITUTIONAL: Negative for chills, fatigue and fever.   EYES: No double vision, No blurred vision  NEURO: No headaches, No dizziness  RESPIRATORY: Negative for cough, +shortness of breath   CARDIOVASCULAR: Negative for chest pain. +leg swelling.   GI: Negative for abdominal pain, No melena, diarrhea, nausea and vomiting.   : Negative for dysuria and frequency, Negative for hematuria  SKIN: Negative for bruising, Negative for edema or discoloration noted.   ENDOCRINE: Negative for polyphagia, Negative for heat intolerance, Negative for cold intolerance  PSYCHIATRIC: Negative for depression, Negative for anxiety, Negative for memory loss  MUSCULOSKELETAL: Negative for neck pain, Negative for muscle weakness,  Negative for back pain     OBJECTIVE     VITAL SIGNS (Most Recent)  Temp: 97.5 °F (36.4 °C) (12/02/22 1118)  Pulse: 86 (12/02/22 1118)  Resp: 18 (12/02/22 1118)  BP: (!) 156/68 (12/02/22 1118)  SpO2: 95 % (12/02/22 1118)    VENTILATION STATUS  Resp: 18 (12/02/22 1118)  SpO2: 95 % (12/02/22 1118)       I & O (Last 24H):  Intake/Output Summary (Last 24 hours) at 12/2/2022 1142  Last data filed at 12/2/2022 1038  Gross per 24 hour   Intake 4051 ml   Output 1000 ml   Net 3051 ml       WEIGHTS  Wt Readings from Last 1 Encounters:   12/02/22 0300 92.5 kg (204 lb)   12/01/22 0833 95.7 kg (211 lb)       PHYSICAL EXAM  CONSTITUTIONAL: No fever, no chills  HEENT: Normocephalic, atraumatic,pupils reactive to light                 NECK:  No JVD no carotid bruit  CVS: S1S2+, RRR  LUNGS:  Wheezing throughout  ABDOMEN: Soft, NT, BS+  EXTREMITIES: No cyanosis, 1+ BLE edema  : No yoder catheter  NEURO: AAO X 3  PSY: Normal affect      HOME MEDICATIONS:  No current facility-administered medications on file prior to encounter.     Current Outpatient Medications on File Prior to Encounter   Medication Sig Dispense Refill    albuterol sulfate (PROAIR RESPICLICK) 90 mcg/actuation AePB Inhale 2 puffs into the lungs every 4 to 6 hours as needed. 1 each 3    ALPRAZolam (XANAX) 1 MG tablet Take 1 tablet (1 mg total) by mouth 2 (two) times daily as needed for Anxiety. 60 tablet 3    aspirin 81 mg Tab Take 81 mg by mouth once daily. Every day      atorvastatin (LIPITOR) 40 MG tablet TAKE 1 TABLET BY MOUTH EVERY DAY (Patient taking differently: Take 40 mg by mouth once daily.) 90 tablet 3    clonazePAM (KLONOPIN) 1 MG disintegrating tablet Take 1 tablet (1 mg total) by mouth 2 (two) times daily as needed (anxiety). 60 tablet 0    clopidogreL (PLAVIX) 75 mg tablet TAKE 1 TABLET BY MOUTH EVERY DAY (Patient taking differently: Take 75 mg by mouth once daily.) 90 tablet 3    colchicine (COLCRYS) 0.6 mg tablet Take 2 po at gout flare onset, may  repeat 1 in an hour prn, then 1 po bid until pain resolves ,or n/V/D starts (Patient taking differently: Take 0.6 mg by mouth as needed. Take 2 po at gout flare onset, may repeat 1 in an hour prn, then 1 po bid until pain resolves ,or n/V/D starts) 20 tablet 0    diclofenac sodium (VOLTAREN) 1 % Gel Apply 2 g topically 3 (three) times daily as needed. 100 g 5    esomeprazole (NEXIUM) 40 MG capsule TAKE 1 CAPSULE BY MOUTH BEFORE BREAKFAST. (Patient taking differently: Take 40 mg by mouth before breakfast. FRIDAYS) 90 capsule 3    furosemide (LASIX) 20 MG tablet Take 20 mg by mouth once daily.  0    isosorbide mononitrate (IMDUR) 60 MG 24 hr tablet TAKE 1 TABLET BY MOUTH ONCE DAILY (Patient taking differently: Take 60 mg by mouth once daily.) 90 tablet 3    metoprolol succinate (TOPROL-XL) 50 MG 24 hr tablet TAKE 1 TABLET BY MOUTH EVERY DAY (Patient taking differently: Take 50 mg by mouth once daily. DO NOT CRUSH OR CHEW; SWALLOW WHOLE.) 90 tablet 3    paroxetine (PAXIL) 10 MG tablet TAKE 1 TABLET (10 MG TOTAL) BY MOUTH EVERY MORNING. IN ADDITION TO 40 MG TABLET FOR A TOTAL OF 50 MG DAILY. 90 tablet 3    paroxetine (PAXIL) 40 MG tablet TAKE 1 TABLET BY MOUTH EVERY DAY (Patient taking differently: Take 40 mg by mouth once daily. Total 40 mg) 90 tablet 3    QUEtiapine (SEROQUEL) 100 MG Tab Take 100 mg by mouth once daily.      spironolactone (ALDACTONE) 25 MG tablet Take 25 mg by mouth once daily.  0    TRELEGY ELLIPTA 200-62.5-25 mcg inhaler INHALE 1 PUFF INTO THE LUNGS ONCE DAILY. 180 each 2    triamterene-hydrochlorothiazide 37.5-25 mg (DYAZIDE) 37.5-25 mg per capsule TAKE 1 CAPSULE BY MOUTH ONCE DAILY 90 capsule 2    clotrimazole-betamethasone 1-0.05% (LOTRISONE) cream APPLY TO AFFECTED AREA TWICE A DAY (Patient taking differently: Apply topically 2 (two) times daily.) 15 g 0    colestipoL (COLESTID) 1 gram Tab Take 1 tablet (1 g total) by mouth 2 (two) times daily as needed (diarrhea). 180 tablet 3     ergocalciferol (ERGOCALCIFEROL) 50,000 unit Cap Take 1 capsule (50,000 Units total) by mouth every 7 days. 12 capsule 3    Hydrocortisone 1%-Econazole 1% Topical Cream Apply 1 application topically 2 (two) times a day.      pimecrolimus (ELIDEL) 1 % cream Apply topically 2 (two) times daily. (Patient taking differently: Apply 1 application topically 2 (two) times daily.) 60 g 0    QUEtiapine (SEROQUEL) 50 MG tablet TAKE 3 TABLETS (150 MG TOTAL) BY MOUTH EVERY EVENING. (Patient taking differently: Take 50 mg by mouth nightly.) 270 tablet 1    tacrolimus (PROTOPIC) 0.1 % ointment Apply topically 2 (two) times daily. 30 g 1    vitamin D (VITAMIN D3) 1000 units Tab Take 1,000 Units by mouth once daily.         SCHEDULED MEDS:   albuterol-ipratropium  3 mL Nebulization Q6H    arformoteroL  15 mcg Nebulization BID    aspirin  81 mg Oral Daily    atorvastatin  40 mg Oral Daily    budesonide  1 mg Nebulization Q12H    cefTRIAXone (ROCEPHIN) IVPB  1 g Intravenous Q24H    clopidogreL  75 mg Oral Daily    doxycycline (VIBRAMYCIN) IVPB  100 mg Intravenous Q12H    [START ON 12/3/2022] furosemide (LASIX) injection  20 mg Intravenous Daily    isosorbide mononitrate  60 mg Oral Daily    methylPREDNISolone sodium succinate injection  60 mg Intravenous Q6H    metoprolol succinate  50 mg Oral Daily    miconazole   Topical (Top) BID    pantoprazole  40 mg Oral Daily    paroxetine  10 mg Oral QAM    QUEtiapine  100 mg Oral QHS       CONTINUOUS INFUSIONS:    PRN MEDS:albuterol sulfate, ALPRAZolam, dextrose 10%, dextrose 10%, glucagon (human recombinant), glucose, glucose, insulin aspart U-100, sodium chloride 0.9%    LABS AND DIAGNOSTICS     CBC LAST 3 DAYS  Recent Labs   Lab 12/01/22  0844 12/02/22  0438   WBC 13.05* 9.92   RBC 3.87* 3.70*   HGB 11.3* 10.7*   HCT 36.1* 34.7*   MCV 93 94   MCH 29.2 28.9   MCHC 31.3* 30.8*   RDW 14.8* 14.9*    192   MPV 10.5 11.0   GRAN 87.5*  11.4* 89.8*  8.9*   LYMPH 4.5*  0.6* 5.5*  0.6*    MONO 6.4  0.8 4.3  0.4   BASO 0.04 0.01   NRBC 0 0       COAGULATION LAST 3 DAYS  No results for input(s): LABPT, INR, APTT in the last 168 hours.    CHEMISTRY LAST 3 DAYS  Recent Labs   Lab 12/01/22  0844 12/02/22  0438   * 134*   K 3.9 3.4*    102   CO2 18* 21*   ANIONGAP 12 11   BUN 37* 51*   CREATININE 2.0* 2.2*   * 182*   CALCIUM 8.1* 8.3*   MG 1.0*  --    ALBUMIN 3.1*  --    PROT 7.2  --    ALKPHOS 111  --    ALT 17  --    AST 26  --    BILITOT 1.4*  --        CARDIAC PROFILE LAST 3 DAYS  Recent Labs   Lab 12/01/22  0844 12/01/22  1053 12/01/22  1320   *  --   --    TROPONINIHS  --  511.0* 389.5*       ENDOCRINE LAST 3 DAYS  No results for input(s): TSH, PROCAL in the last 168 hours.    LAST ARTERIAL BLOOD GAS  ABG  No results for input(s): PH, PO2, PCO2, HCO3, BE in the last 168 hours.    LAST 7 DAYS MICROBIOLOGY   Microbiology Results (last 7 days)       Procedure Component Value Units Date/Time    Urine culture [[id number]] Collected: 12/01/22 1025    Order Status: Completed Specimen: Urine Updated: 12/02/22 0812     Urine Culture, Routine No growth to date    Blood culture x two cultures. Draw prior to antibiotics. [[id number]] Collected: 12/01/22 1018    Order Status: Completed Specimen: Blood from Peripheral, Antecubital, Right Updated: 12/01/22 1717     Blood Culture, Routine No Growth to date    Narrative:      Aerobic and anaerobic    Blood culture x two cultures. Draw prior to antibiotics. [[id number]] Collected: 12/01/22 1000    Order Status: Completed Specimen: Blood from Peripheral, Antecubital, Left Updated: 12/01/22 1717     Blood Culture, Routine No Growth to date    Narrative:      Aerobic and anaerobic    Urine Culture High Risk [[id number]]     Order Status: Completed Specimen: Urine, Clean Catch     Urine Culture High Risk [[id number]]     Order Status: Canceled Specimen: Urine, Clean Catch     Culture, Respiratory with Gram Stain [[id number]]     Order Status: No  result Specimen: Respiratory             MOST RECENT IMAGING  X-Ray Chest PA And Lateral  Reason: Shortness of breath.    FINDINGS:    PA and lateral chest with comparison chest x-ray September 28, 2020 show normal cardiomediastinal silhouette.  Prominence of the interstitial lung is similar to previous exam. Hazy ill-defined opacity in the periphery of the right midlung noted. There are also linear and hazy opacities of the lung bases. Pulmonary vasculature is normal. No acute osseous abnormality.    IMPRESSION:    1.  Hazy ill-defined opacity in the periphery of the right midlung could reflect infectious infiltrate.  2.  Linear and hazy opacities of the lung bases could reflect atelectasis or infiltrate.  3.  Prominence of the interstitium of the lungs is similar to previous exam, consistent with COPD.    Electronically signed by:  Miguel Dumotn DO  12/1/2022 9:27 AM CST Workstation: [id number]      ECHOCARDIOGRAM RESULTS (last 5)  Results for orders placed in visit on 06/02/22    Echo    Interpretation Summary  · The left ventricle is normal in size with concentric hypertrophy and normal systolic function.  · The estimated ejection fraction is 55%.  · Grade II left ventricular diastolic dysfunction.  · Normal right ventricular size with normal right ventricular systolic function.  · Severe left atrial enlargement.  · Mild right atrial enlargement.  · The aortic valve is trileaflet but malformed.  · Moderate aortic regurgitation.  · There is moderate-to-severe aortic valve stenosis.  · Aortic valve area is 1.00 cm2; peak velocity is 3.38 m/s; mean gradient is 29 mmHg.  · Mild mitral regurgitation.  · Mild to moderate tricuspid regurgitation.  · Normal central venous pressure (3 mmHg).  · The estimated PA systolic pressure is 51 mmHg.  · There is pulmonary hypertension.      Results for orders placed in visit on 06/23/20    Echo Color Flow Doppler? Yes    Interpretation Summary  · The left ventricle is normal in  size with mild concentric hypertrophy and normal systolic function.  · The estimated ejection fraction is 55%.  · Grade II left ventricular diastolic dysfunction.  · Normal right ventricular size with normal right ventricular systolic function.  · Moderate left atrial enlargement.  · There is moderate aortic valve stenosis.  · Aortic valve area is 1.17 cm2; peak velocity is 2.98 m/s; mean gradient is 21 mmHg.  · Mild aortic regurgitation.  · Mild mitral regurgitation.  · Normal central venous pressure (3 mmHg).  · The estimated PA systolic pressure is 41 mmHg.      Results for orders placed in visit on 06/22/20    Echo Color Flow Doppler? Yes    Interpretation Summary  · Normal left ventricular systolic function. The estimated ejection fraction is 55%.  · Mild eccentric left ventricular hypertrophy.  · Grade II (moderate) left ventricular diastolic dysfunction consistent with pseudonormalization.  · Normal right ventricular systolic function.  · Mild aortic regurgitation.  · Moderate aortic valve stenosis.  · Aortic valve area is 1.09 cm2; peak velocity is 2.99 m/s; mean gradient is 22 mmHg.  · Normal central venous pressure (3 mmHg).  · The estimated PA systolic pressure is 35 mmHg.      Results for orders placed in visit on 03/26/19    Transthoracic echo (TTE) complete (Cupid Only)    Interpretation Summary  · Normal left ventricular systolic function. The estimated ejection fraction is 55%  · Eccentric left ventricular hypertrophy.  · Normal right ventricular systolic function.  · Severe left atrial enlargement.  · Mild right atrial enlargement.  · Moderate aortic valve stenosis.  · Aortic valve area is 1.08 cm2; peak velocity is 3.02 m/s; mean gradient is 20.79 mmHg.  · Mild-to-moderate aortic regurgitation.  · Mild mitral regurgitation.  · The estimated PA systolic pressure is 36 mm Hg      CURRENT/PREVIOUS VISIT EKG  Results for orders placed or performed during the hospital encounter of 12/01/22   EKG 12-lead     Collection Time: 12/01/22 10:24 AM    Narrative    Test Reason : R06.02,    Vent. Rate : 091 BPM     Atrial Rate : 091 BPM     P-R Int : 162 ms          QRS Dur : 088 ms      QT Int : 374 ms       P-R-T Axes : 065 027 016 degrees     QTc Int : 460 ms    Normal sinus rhythm  Inferior infarct ,age undetermined  Abnormal ECG  When compared with ECG of 28-MAR-2014 09:04,  Vent. rate has increased BY  31 BPM    Referred By: [email or web address]   SELF           Confirmed By:            ASSESSMENT/PLAN:     Active Hospital Problems    Diagnosis    *Shortness of breath    CAD (coronary artery disease)     NEFTALY RCA 7/2004  Stents x 2 RCA 10/2012      Generalized anxiety disorder    Benign hypertension    CKD (chronic kidney disease) stage 3, GFR 30-59 ml/min       ASSESSMENT & PLAN:   Pneumonia  Acute congestive heart failure with preserved ejection fraction  Moderate to severe aortic stenosis  CARMELITA on CKD stage 3  Hypokalemia  Nondiagnostic troponin elevation  CAD with history of stent      RECOMMENDATIONS:    Repeat a 2D echocardiogram to evaluate aortic valve, ejection fraction, and wall motion.  Continues to diurese with furosemide 20 mg IV daily.  Monitor renal function closely.  May need to consider Nephrology consultation if renal function worsens.  Strict I&O.  Continue to check and gently replace potassium and magnesium. Goal for potassium is 4.0, and goal for magnesium is 2.0.   Suspect pneumonia is the driving force for her current illness.  Appreciate hospitalist input regarding treatment.  Further recommendations to follow. Thank you for the consultation.         Renee Levi NP  [facility]  Department of Cardiology  Date of Service: 12/02/2022  11:42 AM     PATIENT'S SYMPTOMS AND TROPONIN ELEVATION ARE MOSTLY TYPE 2 MYOCARDIAL MI IN THE SETTING OF SEVERE COPD EXACERBATION AND PNEUMONIA.  AGREE WITH ECHO.  MEDICAL MANAGEMENT FROM CARDIAC POINT OF VIEW WILL FOLLOW WITH YOU THANK YOU.    I have personally  interviewed and examined the patient, I have reviewed the Nurse Practitioner's history and physical, assessment, and plan. I have personally evaluated the patient at bedside and agree with the findings and made appropriate changes as necessary in recommendations.    Ruddy Vicente MD  Department of Cardiology  [facility]  12/02/2022 11:58 AM

## 2022-12-02 NOTE — H&P
[facility]  History & Physical    Patient Name: Betty Bacon  MRN: [id number]  Patient Class: IP- Inpatient  Admission Date: 12/1/2022  Attending Physician: Bashir Bermudez MD   Primary Care Provider: Johanne Callejas MD         Patient information was obtained from patient, past medical records and ER records.     Subjective:     Principal Problem:Shortness of breath    Chief Complaint:   Chief Complaint   Patient presents with    Shortness of Breath        HPI: 72-year-old female with history of CAD status post stent and CKD HTN Former smoker/COPD on O2 at home, presented emergency department with complaints of shortness of breath gradually worsening over the past 3-4 days.  Patient also complains of generalized malaise and fatigue and lack of appetite, states that she has had episodes of chest pain for the past couple days last time was yesterday on retrosternal area with no radiation she denies vomit + nausea.  Denies dysuria or hematuria.  Denies dysuria or hematuria.  Chest x-ray shows signs of pneumonia in the ER she received ceftriaxone Solu-Medrol breathing treatments.  She will be admitted.          Past Medical History:   Diagnosis Date    Acute coronary artery obstruction without MI 10/2012    Benign hypertension     COPD (chronic obstructive pulmonary disease)     Coronary artery disease     Disorder of kidney and ureter     Dr. Morrow    Hepatitis B core antibody positive 03/03/2021    Negative sAg, suggests previous exposure but no chronic/active Hep B. At risk for reactivation with any immunosuppression medication, steroids, chemo, etc.      History of electroconvulsive therapy     Hyperlipidemia LDL goal < 70     Left ankle sprain     Major depressive disorder, recurrent episode, severe     s/p ECT    Positive AKIRA (antinuclear antibody) 03/03/2021    PVD (peripheral vascular disease)        Past Surgical History:   Procedure Laterality Date     CHOLECYSTECTOMY      CORONARY ANGIOPLASTY      CORONARY ANGIOPLASTY WITH STENT PLACEMENT  10/2012    2 stents RCA (100% stenosis)    EYE SURGERY      cataract surgery    HYSTERECTOMY      LINA, ovaries intact. uterine prolapse    ILIAC ARTERY STENT      TONSILLECTOMY      TOTAL VAGINAL HYSTERECTOMY         Review of patient's allergies indicates:   Allergen Reactions    Propoxyphene n-acetaminophen Other (See Comments)     Other reaction(s): Unknown    Codeine Anxiety       No current facility-administered medications on file prior to encounter.     Current Outpatient Medications on File Prior to Encounter   Medication Sig    albuterol sulfate (PROAIR RESPICLICK) 90 mcg/actuation AePB Inhale 2 puffs into the lungs every 4 to 6 hours as needed.    ALPRAZolam (XANAX) 1 MG tablet Take 1 tablet (1 mg total) by mouth 2 (two) times daily as needed for Anxiety.    aspirin 81 mg Tab Take 81 mg by mouth once daily. Every day    atorvastatin (LIPITOR) 40 MG tablet TAKE 1 TABLET BY MOUTH EVERY DAY (Patient taking differently: Take 40 mg by mouth once daily.)    clonazePAM (KLONOPIN) 1 MG disintegrating tablet Take 1 tablet (1 mg total) by mouth 2 (two) times daily as needed (anxiety).    clopidogreL (PLAVIX) 75 mg tablet TAKE 1 TABLET BY MOUTH EVERY DAY (Patient taking differently: Take 75 mg by mouth once daily.)    colchicine (COLCRYS) 0.6 mg tablet Take 2 po at gout flare onset, may repeat 1 in an hour prn, then 1 po bid until pain resolves ,or n/V/D starts (Patient taking differently: Take 0.6 mg by mouth as needed. Take 2 po at gout flare onset, may repeat 1 in an hour prn, then 1 po bid until pain resolves ,or n/V/D starts)    diclofenac sodium (VOLTAREN) 1 % Gel Apply 2 g topically 3 (three) times daily as needed.    esomeprazole (NEXIUM) 40 MG capsule TAKE 1 CAPSULE BY MOUTH BEFORE BREAKFAST. (Patient taking differently: Take 40 mg by mouth before breakfast. FRIDAYS)    furosemide (LASIX) 20 MG tablet  Take 20 mg by mouth once daily.    isosorbide mononitrate (IMDUR) 60 MG 24 hr tablet TAKE 1 TABLET BY MOUTH ONCE DAILY (Patient taking differently: Take 60 mg by mouth once daily.)    metoprolol succinate (TOPROL-XL) 50 MG 24 hr tablet TAKE 1 TABLET BY MOUTH EVERY DAY (Patient taking differently: Take 50 mg by mouth once daily. DO NOT CRUSH OR CHEW; SWALLOW WHOLE.)    paroxetine (PAXIL) 10 MG tablet TAKE 1 TABLET (10 MG TOTAL) BY MOUTH EVERY MORNING. IN ADDITION TO 40 MG TABLET FOR A TOTAL OF 50 MG DAILY.    paroxetine (PAXIL) 40 MG tablet TAKE 1 TABLET BY MOUTH EVERY DAY (Patient taking differently: Take 40 mg by mouth once daily. Total 40 mg)    QUEtiapine (SEROQUEL) 100 MG Tab Take 100 mg by mouth once daily.    spironolactone (ALDACTONE) 25 MG tablet Take 25 mg by mouth once daily.    TRELEGY ELLIPTA 200-62.5-25 mcg inhaler INHALE 1 PUFF INTO THE LUNGS ONCE DAILY.    triamterene-hydrochlorothiazide 37.5-25 mg (DYAZIDE) 37.5-25 mg per capsule TAKE 1 CAPSULE BY MOUTH ONCE DAILY    clotrimazole-betamethasone 1-0.05% (LOTRISONE) cream APPLY TO AFFECTED AREA TWICE A DAY (Patient taking differently: Apply topically 2 (two) times daily.)    colestipoL (COLESTID) 1 gram Tab Take 1 tablet (1 g total) by mouth 2 (two) times daily as needed (diarrhea).    ergocalciferol (ERGOCALCIFEROL) 50,000 unit Cap Take 1 capsule (50,000 Units total) by mouth every 7 days.    Hydrocortisone 1%-Econazole 1% Topical Cream Apply 1 application topically 2 (two) times a day.    pimecrolimus (ELIDEL) 1 % cream Apply topically 2 (two) times daily. (Patient taking differently: Apply 1 application topically 2 (two) times daily.)    QUEtiapine (SEROQUEL) 50 MG tablet TAKE 3 TABLETS (150 MG TOTAL) BY MOUTH EVERY EVENING. (Patient taking differently: Take 50 mg by mouth nightly.)    tacrolimus (PROTOPIC) 0.1 % ointment Apply topically 2 (two) times daily.    vitamin D (VITAMIN D3) 1000 units Tab Take 1,000 Units by mouth once  daily.     Family History       Problem Relation (Age of Onset)    Breast cancer Sister    Cancer Sister    Dementia Sister    Diabetes Mother, Maternal Aunt, Sister    Heart disease Mother, Father, Brother, Maternal Aunt, Maternal Uncle, Paternal Aunt, Paternal Uncle, Maternal Grandfather, Sister    Hypertension Daughter    Kidney disease Sister    Liver disease Sister    Stroke Father, Brother, Sister          Tobacco Use    Smoking status: Former     Packs/day: 2.00     Years: 40.00     Pack years: 80.00     Types: Cigarettes     Quit date: 2009     Years since quittin.0    Smokeless tobacco: Never   Substance and Sexual Activity    Alcohol use: Yes     Comment: social    Drug use: No    Sexual activity: Never     Birth control/protection: Abstinence     Review of Systems   Reason unable to perform ROS: Ten point system review pertinent positives and negatives are present HPI.   Objective:     Vital Signs (Most Recent):  Temp: 97.8 °F (36.6 °C) (22 0300)  Pulse: 79 (22 0715)  Resp: 20 (22)  BP: 138/60 (22)  SpO2: (!) 93 % (22)   Vital Signs (24h Range):  Temp:  [97.6 °F (36.4 °C)-98.4 °F (36.9 °C)] 97.8 °F (36.6 °C)  Pulse:  [] 79  Resp:  [17-33] 20  SpO2:  [92 %-97 %] 93 %  BP: (130-163)/(53-86) 138/60     Weight: 92.5 kg (204 lb)  Body mass index is 37.31 kg/m².    Physical Exam  Constitutional:       General: She is not in acute distress.     Appearance: She is obese. She is not ill-appearing, toxic-appearing or diaphoretic.   HENT:      Head: Normocephalic and atraumatic.      Right Ear: External ear normal.      Left Ear: External ear normal.   Eyes:      General: No scleral icterus.        Right eye: No discharge.   Cardiovascular:      Rate and Rhythm: Normal rate and regular rhythm.      Pulses: Normal pulses.      Heart sounds: Normal heart sounds. No murmur heard.    No gallop.   Pulmonary:      Effort: Pulmonary effort is normal. No  respiratory distress.      Breath sounds: Normal breath sounds. No stridor. No wheezing or rhonchi.   Abdominal:      General: There is no distension.      Tenderness: There is no abdominal tenderness. There is no guarding or rebound.   Musculoskeletal:      Cervical back: Neck supple.   Skin:     Coloration: Skin is not jaundiced.      Findings: No bruising, erythema or rash.   Neurological:      General: No focal deficit present.      Mental Status: She is alert.           Significant Labs: All pertinent labs within the past 24 hours have been reviewed.  BMP:   Recent Labs   Lab 12/01/22  0844 12/02/22  0438   * 182*   * 134*   K 3.9 3.4*    102   CO2 18* 21*   BUN 37* 51*   CREATININE 2.0* 2.2*   CALCIUM 8.1* 8.3*   MG 1.0*  --      CBC:   Recent Labs   Lab 12/01/22  0844 12/02/22  0438   WBC 13.05* 9.92   HGB 11.3* 10.7*   HCT 36.1* 34.7*    192     CMP:   Recent Labs   Lab 12/01/22  0844 12/02/22  0438   * 134*   K 3.9 3.4*    102   CO2 18* 21*   * 182*   BUN 37* 51*   CREATININE 2.0* 2.2*   CALCIUM 8.1* 8.3*   PROT 7.2  --    ALBUMIN 3.1*  --    BILITOT 1.4*  --    ALKPHOS 111  --    AST 26  --    ALT 17  --    ANIONGAP 12 11       Significant Imaging: I have reviewed all pertinent imaging results/findings within the past 24 hours.    Assessment/Plan:     * Shortness of breath    Likely due to pneumonia and COPD exacerbation possible cardiac etiology   Continue with ceftriaxone and doxycycline  Will follow-up results of blood culture sputum culture  Continue with Solu-Medrol IV 60 mg q.6 hours breathing treatments scheduled and p.r.n.  Will repeat high sensitivity troponin since the 1st 1 is elevated and also consult Cardiology since patient was complaining of chest pain this past couple days       CAD (coronary artery disease)    Continue with home medications including aspirin and statin we will give 325 mg of aspirin now    Generalized anxiety  disorder    Continue with home management    Benign hypertension    Continue with home management    CKD (chronic kidney disease) stage 3, GFR 30-59 ml/min    Continue with home management      VTE Risk Mitigation (From admission, onward)         Ordered     IP VTE HIGH RISK PATIENT  Once         12/01/22 1237     Place sequential compression device  Until discontinued         12/01/22 1237                   Bashir Bermudez MD  Department of Hospital Medicine   [facility]

## 2022-12-02 NOTE — CARE UPDATE
12/02/22 0111   Patient Assessment/Suction   Level of Consciousness (AVPU) alert   Respiratory Effort Normal;Unlabored   Expansion/Accessory Muscles/Retractions no use of accessory muscles   All Lung Fields Breath Sounds diminished;wheezes, expiratory   Rhythm/Pattern, Respiratory unlabored;pattern regular   Cough Frequency frequent   Cough Type dry   PRE-TX-O2   O2 Device (Oxygen Therapy) nasal cannula   $ Is the patient on Low Flow Oxygen? Yes   Flow (L/min) 4   SpO2 96 %   Pulse Oximetry Type Intermittent   $ Pulse Oximetry - Multiple Charge Pulse Oximetry - Multiple   Pulse 78   Resp 18   Aerosol Therapy   $ Aerosol Therapy Charges Aerosol Treatment   Daily Review of Necessity (SVN) completed   Respiratory Treatment Status (SVN) given   Treatment Route (SVN) mask;oxygen   Patient Position (SVN) HOB elevated   Post Treatment Assessment (SVN) increased aeration   Signs of Intolerance (SVN) none   Education   $ Education Bronchodilator;15 min   Respiratory Evaluation   $ Care Plan Tech Time 15 min   $ Eval/Re-eval Charges Evaluation

## 2022-12-02 NOTE — PLAN OF CARE
12/02/22 0917   Patient Assessment/Suction   Level of Consciousness (AVPU) alert   Respiratory Effort Unlabored   All Lung Fields Breath Sounds diminished;wheezes, expiratory   Cough Type nonproductive   PRE-TX-O2   O2 Device (Oxygen Therapy) nasal cannula   $ Is the patient on Low Flow Oxygen? Yes   Flow (L/min) 4  (Pt on home o2 @ 4LPM.)   SpO2 (!) 94 %   Pulse Oximetry Type Intermittent   $ Pulse Oximetry - Multiple Charge Pulse Oximetry - Multiple   Pulse 78   Resp 20   Aerosol Therapy   $ Aerosol Therapy Charges Aerosol Treatment   Respiratory Treatment Status (SVN) given   Treatment Route (SVN) mask   Patient Position (SVN) semi-Lieberman's   Post Treatment Assessment (SVN) increased aeration   Signs of Intolerance (SVN) none   Breath Sounds Post-Respiratory Treatment   Post-treatment Heart Rate (beats/min) 77   Post-treatment Resp Rate (breaths/min) 20   Education   $ Education Bronchodilator;15 min   Respiratory Evaluation   $ Care Plan Tech Time 15 min

## 2022-12-02 NOTE — SUBJECTIVE & OBJECTIVE
Past Medical History:   Diagnosis Date    Acute coronary artery obstruction without MI 10/2012    Benign hypertension     COPD (chronic obstructive pulmonary disease)     Coronary artery disease     Disorder of kidney and ureter     Dr. Morrow    Hepatitis B core antibody positive 03/03/2021    Negative sAg, suggests previous exposure but no chronic/active Hep B. At risk for reactivation with any immunosuppression medication, steroids, chemo, etc.      History of electroconvulsive therapy     Hyperlipidemia LDL goal < 70     Left ankle sprain     Major depressive disorder, recurrent episode, severe     s/p ECT    Positive AKIRA (antinuclear antibody) 03/03/2021    PVD (peripheral vascular disease)        Past Surgical History:   Procedure Laterality Date    CHOLECYSTECTOMY      CORONARY ANGIOPLASTY      CORONARY ANGIOPLASTY WITH STENT PLACEMENT  10/2012    2 stents RCA (100% stenosis)    EYE SURGERY      cataract surgery    HYSTERECTOMY      LINA, ovaries intact. uterine prolapse    ILIAC ARTERY STENT      TONSILLECTOMY      TOTAL VAGINAL HYSTERECTOMY         Review of patient's allergies indicates:   Allergen Reactions    Propoxyphene n-acetaminophen Other (See Comments)     Other reaction(s): Unknown    Codeine Anxiety       No current facility-administered medications on file prior to encounter.     Current Outpatient Medications on File Prior to Encounter   Medication Sig    albuterol sulfate (PROAIR RESPICLICK) 90 mcg/actuation AePB Inhale 2 puffs into the lungs every 4 to 6 hours as needed.    ALPRAZolam (XANAX) 1 MG tablet Take 1 tablet (1 mg total) by mouth 2 (two) times daily as needed for Anxiety.    aspirin 81 mg Tab Take 81 mg by mouth once daily. Every day    atorvastatin (LIPITOR) 40 MG tablet TAKE 1 TABLET BY MOUTH EVERY DAY (Patient taking differently: Take 40 mg by mouth once daily.)    clonazePAM (KLONOPIN) 1 MG disintegrating tablet Take 1 tablet (1 mg total) by mouth 2 (two) times daily as needed  (anxiety).    clopidogreL (PLAVIX) 75 mg tablet TAKE 1 TABLET BY MOUTH EVERY DAY (Patient taking differently: Take 75 mg by mouth once daily.)    colchicine (COLCRYS) 0.6 mg tablet Take 2 po at gout flare onset, may repeat 1 in an hour prn, then 1 po bid until pain resolves ,or n/V/D starts (Patient taking differently: Take 0.6 mg by mouth as needed. Take 2 po at gout flare onset, may repeat 1 in an hour prn, then 1 po bid until pain resolves ,or n/V/D starts)    diclofenac sodium (VOLTAREN) 1 % Gel Apply 2 g topically 3 (three) times daily as needed.    esomeprazole (NEXIUM) 40 MG capsule TAKE 1 CAPSULE BY MOUTH BEFORE BREAKFAST. (Patient taking differently: Take 40 mg by mouth before breakfast. FRIDAYS)    furosemide (LASIX) 20 MG tablet Take 20 mg by mouth once daily.    isosorbide mononitrate (IMDUR) 60 MG 24 hr tablet TAKE 1 TABLET BY MOUTH ONCE DAILY (Patient taking differently: Take 60 mg by mouth once daily.)    metoprolol succinate (TOPROL-XL) 50 MG 24 hr tablet TAKE 1 TABLET BY MOUTH EVERY DAY (Patient taking differently: Take 50 mg by mouth once daily. DO NOT CRUSH OR CHEW; SWALLOW WHOLE.)    paroxetine (PAXIL) 10 MG tablet TAKE 1 TABLET (10 MG TOTAL) BY MOUTH EVERY MORNING. IN ADDITION TO 40 MG TABLET FOR A TOTAL OF 50 MG DAILY.    paroxetine (PAXIL) 40 MG tablet TAKE 1 TABLET BY MOUTH EVERY DAY (Patient taking differently: Take 40 mg by mouth once daily. Total 40 mg)    QUEtiapine (SEROQUEL) 100 MG Tab Take 100 mg by mouth once daily.    spironolactone (ALDACTONE) 25 MG tablet Take 25 mg by mouth once daily.    TRELEGY ELLIPTA 200-62.5-25 mcg inhaler INHALE 1 PUFF INTO THE LUNGS ONCE DAILY.    triamterene-hydrochlorothiazide 37.5-25 mg (DYAZIDE) 37.5-25 mg per capsule TAKE 1 CAPSULE BY MOUTH ONCE DAILY    clotrimazole-betamethasone 1-0.05% (LOTRISONE) cream APPLY TO AFFECTED AREA TWICE A DAY (Patient taking differently: Apply topically 2 (two) times daily.)    colestipoL (COLESTID) 1 gram Tab Take 1  tablet (1 g total) by mouth 2 (two) times daily as needed (diarrhea).    ergocalciferol (ERGOCALCIFEROL) 50,000 unit Cap Take 1 capsule (50,000 Units total) by mouth every 7 days.    Hydrocortisone 1%-Econazole 1% Topical Cream Apply 1 application topically 2 (two) times a day.    pimecrolimus (ELIDEL) 1 % cream Apply topically 2 (two) times daily. (Patient taking differently: Apply 1 application topically 2 (two) times daily.)    QUEtiapine (SEROQUEL) 50 MG tablet TAKE 3 TABLETS (150 MG TOTAL) BY MOUTH EVERY EVENING. (Patient taking differently: Take 50 mg by mouth nightly.)    tacrolimus (PROTOPIC) 0.1 % ointment Apply topically 2 (two) times daily.    vitamin D (VITAMIN D3) 1000 units Tab Take 1,000 Units by mouth once daily.     Family History       Problem Relation (Age of Onset)    Breast cancer Sister    Cancer Sister    Dementia Sister    Diabetes Mother, Maternal Aunt, Sister    Heart disease Mother, Father, Brother, Maternal Aunt, Maternal Uncle, Paternal Aunt, Paternal Uncle, Maternal Grandfather, Sister    Hypertension Daughter    Kidney disease Sister    Liver disease Sister    Stroke Father, Brother, Sister          Tobacco Use    Smoking status: Former     Packs/day: 2.00     Years: 40.00     Pack years: 80.00     Types: Cigarettes     Quit date: 2009     Years since quittin.0    Smokeless tobacco: Never   Substance and Sexual Activity    Alcohol use: Yes     Comment: social    Drug use: No    Sexual activity: Never     Birth control/protection: Abstinence     Review of Systems   Reason unable to perform ROS: Ten point system review pertinent positives and negatives are present HPI.   Objective:     Vital Signs (Most Recent):  Temp: 97.8 °F (36.6 °C) (22 0300)  Pulse: 79 (22)  Resp: 20 (22)  BP: 138/60 (22)  SpO2: (!) 93 % (22)   Vital Signs (24h Range):  Temp:  [97.6 °F (36.4 °C)-98.4 °F (36.9 °C)] 97.8 °F (36.6 °C)  Pulse:  []  79  Resp:  [17-33] 20  SpO2:  [92 %-97 %] 93 %  BP: (130-163)/(53-86) 138/60     Weight: 92.5 kg (204 lb)  Body mass index is 37.31 kg/m².    Physical Exam  Constitutional:       General: She is not in acute distress.     Appearance: She is obese. She is not ill-appearing, toxic-appearing or diaphoretic.   HENT:      Head: Normocephalic and atraumatic.      Right Ear: External ear normal.      Left Ear: External ear normal.   Eyes:      General: No scleral icterus.        Right eye: No discharge.   Cardiovascular:      Rate and Rhythm: Normal rate and regular rhythm.      Pulses: Normal pulses.      Heart sounds: Normal heart sounds. No murmur heard.    No gallop.   Pulmonary:      Effort: Pulmonary effort is normal. No respiratory distress.      Breath sounds: Normal breath sounds. No stridor. No wheezing or rhonchi.   Abdominal:      General: There is no distension.      Tenderness: There is no abdominal tenderness. There is no guarding or rebound.   Musculoskeletal:      Cervical back: Neck supple.   Skin:     Coloration: Skin is not jaundiced.      Findings: No bruising, erythema or rash.   Neurological:      General: No focal deficit present.      Mental Status: She is alert.           Significant Labs: All pertinent labs within the past 24 hours have been reviewed.  BMP:   Recent Labs   Lab 12/01/22  0844 12/02/22 0438   * 182*   * 134*   K 3.9 3.4*    102   CO2 18* 21*   BUN 37* 51*   CREATININE 2.0* 2.2*   CALCIUM 8.1* 8.3*   MG 1.0*  --      CBC:   Recent Labs   Lab 12/01/22  0844 12/02/22 0438   WBC 13.05* 9.92   HGB 11.3* 10.7*   HCT 36.1* 34.7*    192     CMP:   Recent Labs   Lab 12/01/22  0844 12/02/22 0438   * 134*   K 3.9 3.4*    102   CO2 18* 21*   * 182*   BUN 37* 51*   CREATININE 2.0* 2.2*   CALCIUM 8.1* 8.3*   PROT 7.2  --    ALBUMIN 3.1*  --    BILITOT 1.4*  --    ALKPHOS 111  --    AST 26  --    ALT 17  --    ANIONGAP 12 11       Significant  Imaging: I have reviewed all pertinent imaging results/findings within the past 24 hours.

## 2022-12-02 NOTE — PLAN OF CARE
[facility]  Initial Discharge Assessment       Primary Care Provider: Johanne Callejas MD    Admission Diagnosis: Pneumonia due to aerobic bacteria [J15.8]    Admission Date: 12/1/2022  Expected Discharge Date: 12/3/2022    Social work intern met with Pt at bedside to complete discharge assessment. Pt receiving care at time. Social work intern completed discharge assessment with Pt's daughter. Demographics, PCP, and insurance verified. No home health. No dialysis. Pt's daughter reports ability to complete ADLs with the assistance of a shower chair 4L of Oxygen and a CPAP. Pt's daughter verbalized plan to discharge home via family transport. Pt has no other needs to be addressed at this time.    Discharge Barriers Identified: None    Payor: MEDICARE / Plan: MEDICARE PART A & B / Product Type: Government /     Extended Emergency Contact Information  Primary Emergency Contact: Loree Sanchez  Address: UNKNOWN   [place]  Mobile Phone: [phone]  Relation: Daughter  Preferred language: English   needed? No  Secondary Emergency Contact: Barbara Bacon  Address: UNKNOWN   [place]  Mobile Phone: [phone]  Relation: Relative  Preferred language: English   needed? No    Discharge Plan A: Home with family  Discharge Plan B: Home with family      [id number] - [place], [place] - [address] AT [facility]  [address]  [place] [address]  Phone: [phone] Fax: [phone]    [facility]  [address]  [facility] [address]  Phone: [phone] Fax: [phone]    [facility] - [place], [place]  [address]  [place] [address]  Phone: [phone] Fax: [phone]      Initial Assessment (most recent)       Adult Discharge Assessment - 12/02/22 1540          Discharge Assessment    Assessment Type Discharge Planning Assessment     Confirmed/corrected address,  phone number and insurance Yes     Confirmed Demographics Correct on Facesheet     Source of Information family     If unable to respond/provide information was family/caregiver contacted? Yes     Contact Name/Number Loree Sanchez (Daughter)   [phone]     Does patient/caregiver understand observation status Yes     Communicated BOB with patient/caregiver Yes     Reason For Admission Shortness of breath     Lives With alone     Facility Arrived From: Home     Do you expect to return to your current living situation? Other (see comments)   Pt's children are deciding if Pt will live withthem or someone will stay with Pt    Prior to hospitilization cognitive status: Unable to Assess     Current cognitive status: Unable to Assess     Walking or Climbing Stairs Difficulty none     Dressing/Bathing Difficulty bathing difficulty, requires equipment     Dressing/Bathing Management Pt uses shower chair for bathing.     Home Accessibility stairs to enter home     Number of Stairs, Main Entrance two     Surface of Stairs, Main Entrance hardwood     Stair Railings, Main Entrance railings safe and in good condition;railings on both sides of stairs     Landing, Stairs, Main Entrance adequate turning radius     Home Layout Able to live on 1st floor     Equipment Currently Used at Home shower chair;CPAP;oxygen     Readmission within 30 days? No     Patient currently being followed by outpatient case management? No     Do you currently have service(s) that help you manage your care at home? No     Do you take prescription medications? Yes     Do you have prescription coverage? Yes     Coverage Payor:  MEDICARE - MEDICARE PART A & B     Do you have any problems affording any of your prescribed medications? No     Is the patient taking medications as prescribed? yes     Who is going to help you get home at discharge? Loree Sanchez (Daughter)   [phone]     Are you on dialysis? No     Do you take coumadin? No     Discharge Plan A  Home with family     Discharge Plan B Home with family     DME Needed Upon Discharge  none     Discharge Plan discussed with: Adult children     Discharge Barriers Identified None

## 2022-12-03 LAB
ANION GAP SERPL CALC-SCNC: 9 MMOL/L (ref 8–16)
BUN SERPL-MCNC: 62 MG/DL (ref 8–23)
CALCIUM SERPL-MCNC: 8.3 MG/DL (ref 8.7–10.5)
CHLORIDE SERPL-SCNC: 102 MMOL/L (ref 95–110)
CO2 SERPL-SCNC: 24 MMOL/L (ref 23–29)
CREAT SERPL-MCNC: 1.9 MG/DL (ref 0.5–1.4)
EST. GFR  (NO RACE VARIABLE): 27.4 ML/MIN/1.73 M^2
GLUCOSE SERPL-MCNC: 111 MG/DL (ref 70–110)
GLUCOSE SERPL-MCNC: 161 MG/DL (ref 70–110)
GLUCOSE SERPL-MCNC: 179 MG/DL (ref 70–110)
GLUCOSE SERPL-MCNC: 185 MG/DL (ref 70–110)
GLUCOSE SERPL-MCNC: 311 MG/DL (ref 70–110)
MAGNESIUM SERPL-MCNC: 1.8 MG/DL (ref 1.6–2.6)
POTASSIUM SERPL-SCNC: 3.9 MMOL/L (ref 3.5–5.1)
SODIUM SERPL-SCNC: 135 MMOL/L (ref 136–145)

## 2022-12-03 PROCEDURE — 94640 AIRWAY INHALATION TREATMENT: CPT

## 2022-12-03 PROCEDURE — 83735 ASSAY OF MAGNESIUM: CPT | Performed by: INTERNAL MEDICINE

## 2022-12-03 PROCEDURE — 99900035 HC TECH TIME PER 15 MIN (STAT)

## 2022-12-03 PROCEDURE — 94761 N-INVAS EAR/PLS OXIMETRY MLT: CPT

## 2022-12-03 PROCEDURE — 97161 PT EVAL LOW COMPLEX 20 MIN: CPT

## 2022-12-03 PROCEDURE — 94760 N-INVAS EAR/PLS OXIMETRY 1: CPT

## 2022-12-03 PROCEDURE — 94799 UNLISTED PULMONARY SVC/PX: CPT

## 2022-12-03 PROCEDURE — 99900031 HC PATIENT EDUCATION (STAT)

## 2022-12-03 PROCEDURE — 21400001 HC TELEMETRY ROOM

## 2022-12-03 PROCEDURE — 97116 GAIT TRAINING THERAPY: CPT

## 2022-12-03 PROCEDURE — 25000003 PHARM REV CODE 250: Performed by: INTERNAL MEDICINE

## 2022-12-03 PROCEDURE — 63600175 PHARM REV CODE 636 W HCPCS: Performed by: INTERNAL MEDICINE

## 2022-12-03 PROCEDURE — 80048 BASIC METABOLIC PNL TOTAL CA: CPT | Performed by: INTERNAL MEDICINE

## 2022-12-03 PROCEDURE — 27000221 HC OXYGEN, UP TO 24 HOURS

## 2022-12-03 PROCEDURE — 25000242 PHARM REV CODE 250 ALT 637 W/ HCPCS: Performed by: INTERNAL MEDICINE

## 2022-12-03 PROCEDURE — 36415 COLL VENOUS BLD VENIPUNCTURE: CPT | Performed by: INTERNAL MEDICINE

## 2022-12-03 RX ORDER — POLYETHYLENE GLYCOL 3350 17 G/17G
17 POWDER, FOR SOLUTION ORAL DAILY PRN
Status: DISCONTINUED | OUTPATIENT
Start: 2022-12-03 | End: 2022-12-04 | Stop reason: HOSPADM

## 2022-12-03 RX ADMIN — METHYLPREDNISOLONE SODIUM SUCCINATE 60 MG: 40 INJECTION, POWDER, FOR SOLUTION INTRAMUSCULAR; INTRAVENOUS at 12:12

## 2022-12-03 RX ADMIN — MICONAZOLE NITRATE: 20 CREAM TOPICAL at 08:12

## 2022-12-03 RX ADMIN — METHYLPREDNISOLONE SODIUM SUCCINATE 60 MG: 40 INJECTION, POWDER, FOR SOLUTION INTRAMUSCULAR; INTRAVENOUS at 05:12

## 2022-12-03 RX ADMIN — ISOSORBIDE MONONITRATE 60 MG: 60 TABLET, EXTENDED RELEASE ORAL at 10:12

## 2022-12-03 RX ADMIN — ATORVASTATIN CALCIUM 40 MG: 40 TABLET, FILM COATED ORAL at 10:12

## 2022-12-03 RX ADMIN — BUDESONIDE 1 MG: 0.5 INHALANT RESPIRATORY (INHALATION) at 08:12

## 2022-12-03 RX ADMIN — CLOPIDOGREL BISULFATE 75 MG: 75 TABLET, FILM COATED ORAL at 10:12

## 2022-12-03 RX ADMIN — FUROSEMIDE 20 MG: 10 INJECTION, SOLUTION INTRAMUSCULAR; INTRAVENOUS at 08:12

## 2022-12-03 RX ADMIN — METOPROLOL SUCCINATE 50 MG: 50 TABLET, FILM COATED, EXTENDED RELEASE ORAL at 10:12

## 2022-12-03 RX ADMIN — BUDESONIDE 1 MG: 0.5 INHALANT RESPIRATORY (INHALATION) at 09:12

## 2022-12-03 RX ADMIN — PAROXETINE 10 MG: 10 TABLET, FILM COATED ORAL at 10:12

## 2022-12-03 RX ADMIN — POTASSIUM BICARBONATE 20 MEQ: 391 TABLET, EFFERVESCENT ORAL at 10:12

## 2022-12-03 RX ADMIN — IPRATROPIUM BROMIDE AND ALBUTEROL SULFATE 3 ML: .5; 3 SOLUTION RESPIRATORY (INHALATION) at 08:12

## 2022-12-03 RX ADMIN — IPRATROPIUM BROMIDE AND ALBUTEROL SULFATE 3 ML: .5; 3 SOLUTION RESPIRATORY (INHALATION) at 02:12

## 2022-12-03 RX ADMIN — DOXYCYCLINE 100 MG: 100 INJECTION, POWDER, LYOPHILIZED, FOR SOLUTION INTRAVENOUS at 10:12

## 2022-12-03 RX ADMIN — QUETIAPINE 100 MG: 100 TABLET ORAL at 10:12

## 2022-12-03 RX ADMIN — METHYLPREDNISOLONE SODIUM SUCCINATE 60 MG: 40 INJECTION, POWDER, FOR SOLUTION INTRAMUSCULAR; INTRAVENOUS at 11:12

## 2022-12-03 RX ADMIN — ARFORMOTEROL TARTRATE 15 MCG: 15 SOLUTION RESPIRATORY (INHALATION) at 08:12

## 2022-12-03 RX ADMIN — CEFTRIAXONE SODIUM 1 G: 1 INJECTION, POWDER, FOR SOLUTION INTRAMUSCULAR; INTRAVENOUS at 09:12

## 2022-12-03 RX ADMIN — PANTOPRAZOLE SODIUM 40 MG: 40 TABLET, DELAYED RELEASE ORAL at 10:12

## 2022-12-03 RX ADMIN — ARFORMOTEROL TARTRATE 15 MCG: 15 SOLUTION RESPIRATORY (INHALATION) at 09:12

## 2022-12-03 RX ADMIN — IPRATROPIUM BROMIDE AND ALBUTEROL SULFATE 3 ML: .5; 3 SOLUTION RESPIRATORY (INHALATION) at 09:12

## 2022-12-03 RX ADMIN — MICONAZOLE NITRATE: 20 CREAM TOPICAL at 10:12

## 2022-12-03 RX ADMIN — POLYETHYLENE GLYCOL 3350 17 G: 17 POWDER, FOR SOLUTION ORAL at 09:12

## 2022-12-03 RX ADMIN — ASPIRIN 81 MG CHEWABLE TABLET 81 MG: 81 TABLET CHEWABLE at 10:12

## 2022-12-03 RX ADMIN — INSULIN ASPART 4 UNITS: 100 INJECTION, SOLUTION INTRAVENOUS; SUBCUTANEOUS at 12:12

## 2022-12-03 NOTE — PLAN OF CARE
CM was informed via nurse YARI Chester that patient's daughter Loree contacted her on 12/3/2022 and requested to be contacted via .    10:24AM  CM called patient's daughter Loree [phone]. Loree advised that she was deemed responsible for making all decisions pertaining to her mother's health care needs and requested that the CM staff contact her once discharge orders were in place to discuss services. CM was receptive. Patient's daughter also advised that despite the report, Patient does not have a shower chair, or a CPAP machine at home and will require a shower chair once discharged for care. Patient's daughter confirmed that the patient's portable O2 was already dropped off to the hospital/patient's bedside via patient's son to support patient upon discharge. Loree also informed that patient's family will come in rotation to hospital to care for patient as needed.

## 2022-12-03 NOTE — CARE UPDATE
12/02/22 2109   Patient Assessment/Suction   Level of Consciousness (AVPU) alert   Respiratory Effort Normal;Unlabored   Expansion/Accessory Muscles/Retractions no use of accessory muscles   All Lung Fields Breath Sounds Anterior:;clear   JOAQUÍN Breath Sounds clear   LLL Breath Sounds clear   RUL Breath Sounds clear   RML Breath Sounds clear   RLL Breath Sounds clear   Rhythm/Pattern, Respiratory unlabored   Cough Frequency infrequent   PRE-TX-O2   O2 Device (Oxygen Therapy) nasal cannula   $ Is the patient on Low Flow Oxygen? Yes   Flow (L/min) 4   SpO2 95 %   Pulse Oximetry Type Intermittent   $ Pulse Oximetry - Multiple Charge Pulse Oximetry - Multiple   Pulse 71   Resp 18   Aerosol Therapy   $ Aerosol Therapy Charges Aerosol Treatment   Daily Review of Necessity (SVN) completed   Respiratory Treatment Status (SVN) given   Treatment Route (SVN) mask;oxygen   Patient Position (SVN) HOB elevated   Post Treatment Assessment (SVN) increased aeration   Signs of Intolerance (SVN) none   Breath Sounds Post-Respiratory Treatment   Post-treatment Heart Rate (beats/min) 88   Post-treatment Resp Rate (breaths/min) 20   Education   $ Education Bronchodilator;15 min   Respiratory Evaluation   $ Care Plan Tech Time 15 min

## 2022-12-03 NOTE — PT/OT/SLP EVAL
Physical Therapy Evaluation    Patient Name:  Betty Bacon   MRN:  [id number]    Recommendations:     Discharge Recommendations:  home health PT   Discharge Equipment Recommendations: none   Barriers to discharge:  medical status    Assessment:     Betty Bacon is a 72 y.o. female admitted with a medical diagnosis of Shortness of breath.  She presents with the following impairments/functional limitations:  weakness, impaired endurance, impaired self care skills, impaired functional mobility, gait instability, impaired balance, decreased lower extremity function, decreased safety awareness, impaired cardiopulmonary response to activity.    Pt found in bed with HOB elevated on 4L O2 via NC. Son and daughter in law present. Pt performed bed mobility and transfers with supervision. Pt ambulated 80 ft with no AD and CGA single loss of balance requiring min A. SAO2 WFL throughout session on 4L.     Rehab Prognosis: Fair; patient would benefit from acute skilled PT services to address these deficits and reach maximum level of function.    Recent Surgery: * No surgery found *      Plan:     During this hospitalization, patient to be seen 5 x/week to address the identified rehab impairments via gait training, therapeutic activities, therapeutic exercises, neuromuscular re-education and progress toward the following goals:    Plan of Care Expires:  01/03/23    Subjective     Chief Complaint: none  Patient/Family Comments/goals: return home  Pain/Comfort:  Pain Rating 1: 0/10    Patients cultural, spiritual, Adventism conflicts given the current situation: no    Living Environment:  Pt lives alone in a mobile home with 3 JASON and single HR. (+)   Prior to admission, patients level of function was Independent.  Equipment used at home: shower chair, oxygen.  DME owned (not currently used): none.  Upon discharge, patient will have assistance from family.    Objective:     Communicated with Rn prior to  session.  Patient found HOB elevated with peripheral IV, telemetry, oxygen  upon PT entry to room.    General Precautions: Standard, fall   Orthopedic Precautions:N/A   Braces: N/A  Respiratory Status: Nasal cannula, flow 4 L/min    Exams:  Cognitive Exam:  Patient is oriented to Person, Place, Time, and Situation  RLE ROM: WFL  RLE Strength: WFL  LLE ROM: WFL  LLE Strength: WFL    Functional Mobility:  Bed Mobility:     Supine to Sit: supervision  Transfers:     Sit to Stand:  supervision with no AD  Gait: 80 ft with no AD CGA, single loss of balance requiring min A      AM-PAC 6 CLICK MOBILITY  Total Score:20       Treatment & Education:  Pt educated on POC, discharge recommendation, pacing/energy conservation, pursed lip breathing, importance of time OOB, need for assist with mobility, use of call bell to seek assistance as needed and fall prevention      Patient left sitting edge of bed with all lines intact, call button in reach, RN notified, and son and daughter in law present.    GOALS:   Multidisciplinary Problems       Physical Therapy Goals          Problem: Physical Therapy    Goal Priority Disciplines Outcome Goal Variances Interventions   Physical Therapy Goal     PT, PT/OT Ongoing, Progressing     Description: Goals to be met by: 1/3/22     Patient will increase functional independence with mobility by performin. Supine to sit with Independent  2. Sit to stand transfer with Supervision  3. Bed to chair transfer with Supervision using Rolling Walker  4. Gait  x 150 feet with Supervision using Rolling Walker.                              History:     Past Medical History:   Diagnosis Date    Acute coronary artery obstruction without MI 10/2012    Benign hypertension     COPD (chronic obstructive pulmonary disease)     Coronary artery disease     Disorder of kidney and ureter     Dr. Morrow    Hepatitis B core antibody positive 2021    Negative sAg, suggests previous exposure but no  chronic/active Hep B. At risk for reactivation with any immunosuppression medication, steroids, chemo, etc.      History of electroconvulsive therapy     Hyperlipidemia LDL goal < 70     Left ankle sprain     Major depressive disorder, recurrent episode, severe     s/p ECT    Positive AKIRA (antinuclear antibody) 03/03/2021    PVD (peripheral vascular disease)        Past Surgical History:   Procedure Laterality Date    CHOLECYSTECTOMY      CORONARY ANGIOPLASTY      CORONARY ANGIOPLASTY WITH STENT PLACEMENT  10/2012    2 stents RCA (100% stenosis)    EYE SURGERY      cataract surgery    HYSTERECTOMY      LINA, ovaries intact. uterine prolapse    ILIAC ARTERY STENT      TONSILLECTOMY      TOTAL VAGINAL HYSTERECTOMY         Time Tracking:     PT Received On: 12/03/22  PT Start Time: 0912     PT Stop Time: 0926  PT Total Time (min): 14 min     Billable Minutes: Evaluation 6 and Gait Training 8      12/03/2022

## 2022-12-03 NOTE — PROGRESS NOTES
[facility] Medicine  Progress Note    Patient name: Betty Bacon  MRN: [id number]  Admit Date: 12/1/2022   LOS: 1 day     SUBJECTIVE:     Principal problem: Shortness of breath    Interval History:  Patient states she is better but still not at her baseline.  Denies chest pain    Scheduled Meds:   albuterol-ipratropium  3 mL Nebulization Q6H    arformoteroL  15 mcg Nebulization BID    aspirin  81 mg Oral Daily    atorvastatin  40 mg Oral Daily    budesonide  1 mg Nebulization Q12H    cefTRIAXone (ROCEPHIN) IVPB  1 g Intravenous Q24H    clopidogreL  75 mg Oral Daily    doxycycline (VIBRAMYCIN) IVPB  100 mg Intravenous Q12H    [START ON 12/3/2022] furosemide (LASIX) injection  20 mg Intravenous Daily    isosorbide mononitrate  60 mg Oral Daily    methylPREDNISolone sodium succinate injection  60 mg Intravenous Q6H    metoprolol succinate  50 mg Oral Daily    miconazole   Topical (Top) BID    pantoprazole  40 mg Oral Daily    paroxetine  10 mg Oral QAM    QUEtiapine  100 mg Oral QHS     Continuous Infusions:  PRN Meds:albuterol sulfate, ALPRAZolam, dextrose 10%, dextrose 10%, glucagon (human recombinant), glucose, glucose, insulin aspart U-100, sodium chloride 0.9%    Review of patient's allergies indicates:   Allergen Reactions    Propoxyphene n-acetaminophen Other (See Comments)     Other reaction(s): Unknown    Codeine Anxiety       Review of Systems: As per interval history    OBJECTIVE:     Vital Signs (Most Recent)  Temp: 98.6 °F (37 °C) (12/02/22 1942)  Pulse: 77 (12/02/22 1942)  Resp: 18 (12/02/22 1942)  BP: (!) 150/63 (12/02/22 1942)  SpO2: (!) 93 % (12/02/22 1942)    Vital Signs Range (Last 24H):  Temp:  [97.5 °F (36.4 °C)-98.6 °F (37 °C)]   Pulse:  [74-88]   Resp:  [17-20]   BP: (138-159)/(60-70)   SpO2:  [93 %-97 %]     I & O (Last 24H):  Intake/Output Summary (Last 24 hours) at 12/2/2022 2055  Last data filed at 12/2/2022 2031  Gross per 24 hour   Intake 1260 ml   Output  1700 ml   Net -440 ml     Physical Exam  Constitutional:       General: She is not in acute distress.     Appearance: She is obese. She is not ill-appearing, toxic-appearing or diaphoretic.   HENT:      Head: Normocephalic and atraumatic.      Right Ear: External ear normal.      Left Ear: External ear normal.   Eyes:      General: No scleral icterus.        Right eye: No discharge.   Cardiovascular:      Rate and Rhythm: Normal rate and regular rhythm.      Pulses: Normal pulses.      Heart sounds: Normal heart sounds. No murmur heard.    No gallop.   Pulmonary:      Effort: Pulmonary effort is normal. No respiratory distress.      Breath sounds: Normal breath sounds. No stridor. No wheezing or rhonchi.   Abdominal:      General: There is no distension.      Tenderness: There is no abdominal tenderness. There is no guarding or rebound.   Musculoskeletal:      Cervical back: Neck supple.   Skin:     Coloration: Skin is not jaundiced.      Findings: No bruising, erythema or rash.   Neurological:      General: No focal deficit present.      Mental Status: She is alert.           Laboratory:  I have reviewed all pertinent lab results within the past 24 hours.  CBC:   Recent Labs   Lab 12/02/22  0438   WBC 9.92   RBC 3.70*   HGB 10.7*   HCT 34.7*      MCV 94   MCH 28.9   MCHC 30.8*     BMP:   Recent Labs   Lab 12/02/22  0438   *   *   K 3.4*      CO2 21*   BUN 51*   CREATININE 2.2*   CALCIUM 8.3*   MG 2.1     CMP:   Recent Labs   Lab 12/01/22  0844 12/02/22  0438   * 182*   CALCIUM 8.1* 8.3*   ALBUMIN 3.1*  --    PROT 7.2  --    * 134*   K 3.9 3.4*   CO2 18* 21*    102   BUN 37* 51*   CREATININE 2.0* 2.2*   ALKPHOS 111  --    ALT 17  --    AST 26  --    BILITOT 1.4*  --        Diagnostic Results:  Labs: Reviewed    ASSESSMENT/PLAN:     * Shortness of breath     Likely due to pneumonia and COPD exacerbation possible cardiac etiology   Continue with ceftriaxone and  doxycycline  Will follow-up results of blood culture sputum culture  Continue with Solu-Medrol IV 60 mg q.6 hours breathing treatments scheduled and p.r.n.  Follow-up cardiology recommendations input appreciated        CAD (coronary artery disease)     Continue with home medications including aspirin and statin we will give 325 mg of aspirin now     Generalized anxiety disorder     Continue with home management     Benign hypertension     Continue with home management     CKD (chronic kidney disease) stage 3, GFR 30-59 ml/min     Continue with home management          Department Hospital Medicine  [facility]  Bashir Bermudez MD  Date of service: 12/02/2022

## 2022-12-03 NOTE — PROGRESS NOTES
[facility] Medicine  Progress Note    Patient name: Betty Bacon  MRN: [id number]  Admit Date: 12/1/2022   LOS: 2 days     SUBJECTIVE:     Principal problem: Shortness of breath    Interval History:  Patient states she is feeling better today.    Scheduled Meds:   albuterol-ipratropium  3 mL Nebulization Q6H WAKE    arformoteroL  15 mcg Nebulization BID    aspirin  81 mg Oral Daily    atorvastatin  40 mg Oral Daily    budesonide  1 mg Nebulization Q12H    cefTRIAXone (ROCEPHIN) IVPB  1 g Intravenous Q24H    clopidogreL  75 mg Oral Daily    doxycycline (VIBRAMYCIN) IVPB  100 mg Intravenous Q12H    furosemide (LASIX) injection  20 mg Intravenous Daily    isosorbide mononitrate  60 mg Oral Daily    methylPREDNISolone sodium succinate injection  60 mg Intravenous Q6H    metoprolol succinate  50 mg Oral Daily    miconazole   Topical (Top) BID    pantoprazole  40 mg Oral Daily    paroxetine  10 mg Oral QAM    QUEtiapine  100 mg Oral QHS     Continuous Infusions:  PRN Meds:albuterol sulfate, ALPRAZolam, dextrose 10%, dextrose 10%, glucagon (human recombinant), glucose, glucose, insulin aspart U-100, polyethylene glycol, sodium chloride 0.9%    Review of patient's allergies indicates:   Allergen Reactions    Propoxyphene n-acetaminophen Other (See Comments)     Other reaction(s): Unknown    Codeine Anxiety       Review of Systems: As per interval history    OBJECTIVE:     Vital Signs (Most Recent)  Temp: 97.8 °F (36.6 °C) (12/03/22 1524)  Pulse: 78 (12/03/22 1524)  Resp: 18 (12/03/22 1524)  BP: (!) 150/62 (12/03/22 1524)  SpO2: 95 % (12/03/22 1524)    Vital Signs Range (Last 24H):  Temp:  [97.7 °F (36.5 °C)-99.1 °F (37.3 °C)]   Pulse:  [69-85]   Resp:  [18-20]   BP: (143-176)/(58-75)   SpO2:  [93 %-95 %]     I & O (Last 24H):  Intake/Output Summary (Last 24 hours) at 12/3/2022 1728  Last data filed at 12/3/2022 1131  Gross per 24 hour   Intake 1040 ml   Output 1600 ml   Net -560 ml         Physical Exam  Constitutional:       General: She is not in acute distress.     Appearance: She is obese. She is not ill-appearing, toxic-appearing or diaphoretic.   HENT:      Head: Normocephalic and atraumatic.      Right Ear: External ear normal.      Left Ear: External ear normal.   Eyes:      General: No scleral icterus.        Right eye: No discharge.   Cardiovascular:      Rate and Rhythm: Normal rate and regular rhythm.      Pulses: Normal pulses.      Heart sounds: Normal heart sounds. No murmur heard.    No gallop.   Pulmonary:      Effort: Pulmonary effort is normal. No respiratory distress.      Breath sounds: Normal breath sounds. No stridor. No wheezing or rhonchi.   Abdominal:      General: There is no distension.      Tenderness: There is no abdominal tenderness. There is no guarding or rebound.   Musculoskeletal:      Cervical back: Neck supple.   Skin:     Coloration: Skin is not jaundiced.      Findings: No bruising, erythema or rash.   Neurological:      General: No focal deficit present.      Mental Status: She is alert.        Laboratory:  I have reviewed all pertinent lab results within the past 24 hours.  CBC:   Recent Labs   Lab 12/02/22  0438   WBC 9.92   RBC 3.70*   HGB 10.7*   HCT 34.7*      MCV 94   MCH 28.9   MCHC 30.8*     BMP:   Recent Labs   Lab 12/03/22  0358   *   *   K 3.9      CO2 24   BUN 62*   CREATININE 1.9*   CALCIUM 8.3*   MG 1.8     CMP:   Recent Labs   Lab 12/01/22  0844 12/02/22  0438 12/03/22  0358   *   < > 179*   CALCIUM 8.1*   < > 8.3*   ALBUMIN 3.1*  --   --    PROT 7.2  --   --    *   < > 135*   K 3.9   < > 3.9   CO2 18*   < > 24      < > 102   BUN 37*   < > 62*   CREATININE 2.0*   < > 1.9*   ALKPHOS 111  --   --    ALT 17  --   --    AST 26  --   --    BILITOT 1.4*  --   --     < > = values in this interval not displayed.       Diagnostic Results:  Labs: Reviewed  ECG: Reviewed  X-Ray: Reviewed    ASSESSMENT/PLAN:      * Shortness of breath     Likely due to pneumonia and COPD exacerbation possible cardiac etiology   Continue with ceftriaxone and doxycycline  Will follow-up results of blood culture sputum culture  Continue with Solu-Medrol IV 60 mg q.6 hours breathing treatments scheduled and p.r.n.  Follow-up cardiology recommendations input appreciated        CAD (coronary artery disease)     Continue with home medications including aspirin and statin we will give 325 mg of aspirin now     Generalized anxiety disorder     Continue with home management     Benign hypertension     Continue with home management     CKD (chronic kidney disease) stage 3, GFR 30-59 ml/min     Continue with home management              Department Hospital Medicine  [facility]  Bashir Bermudez MD  Date of service: 12/03/2022

## 2022-12-03 NOTE — CARE UPDATE
12/03/22 0934   Patient Assessment/Suction   Level of Consciousness (AVPU) alert   Respiratory Effort Normal;Unlabored   Expansion/Accessory Muscles/Retractions no use of accessory muscles   All Lung Fields Breath Sounds diminished   Cough Frequency infrequent   Cough Type congested   PRE-TX-O2   O2 Device (Oxygen Therapy) nasal cannula   $ Is the patient on Low Flow Oxygen? Yes   Flow (L/min) 4   SpO2 95 %   Pulse Oximetry Type Intermittent   $ Pulse Oximetry - Multiple Charge Pulse Oximetry - Multiple   Pulse 73   Resp 20   Aerosol Therapy   $ Aerosol Therapy Charges Aerosol Treatment   Daily Review of Necessity (SVN) completed   Respiratory Treatment Status (SVN) given   Treatment Route (SVN) mask;oxygen   Patient Position (SVN) HOB elevated   Post Treatment Assessment (SVN) breath sounds unchanged;vital signs unchanged   Signs of Intolerance (SVN) none   Education   $ Education Bronchodilator;30 min   Respiratory Evaluation   $ Care Plan Tech Time 30 min   $ Eval/Re-eval Charges Re-evaluation

## 2022-12-04 VITALS
SYSTOLIC BLOOD PRESSURE: 176 MMHG | BODY MASS INDEX: 37.98 KG/M2 | RESPIRATION RATE: 20 BRPM | DIASTOLIC BLOOD PRESSURE: 77 MMHG | WEIGHT: 206.38 LBS | HEIGHT: 62 IN | HEART RATE: 68 BPM | TEMPERATURE: 98 F | OXYGEN SATURATION: 97 %

## 2022-12-04 LAB
ANION GAP SERPL CALC-SCNC: 10 MMOL/L (ref 8–16)
BACTERIA UR CULT: NO GROWTH
BUN SERPL-MCNC: 66 MG/DL (ref 8–23)
CALCIUM SERPL-MCNC: 8.4 MG/DL (ref 8.7–10.5)
CHLORIDE SERPL-SCNC: 99 MMOL/L (ref 95–110)
CO2 SERPL-SCNC: 25 MMOL/L (ref 23–29)
CREAT SERPL-MCNC: 1.6 MG/DL (ref 0.5–1.4)
EST. GFR  (NO RACE VARIABLE): 33.6 ML/MIN/1.73 M^2
GLUCOSE SERPL-MCNC: 172 MG/DL (ref 70–110)
GLUCOSE SERPL-MCNC: 214 MG/DL (ref 70–110)
GLUCOSE SERPL-MCNC: 221 MG/DL (ref 70–110)
MAGNESIUM SERPL-MCNC: 1.8 MG/DL (ref 1.6–2.6)
POTASSIUM SERPL-SCNC: 4.1 MMOL/L (ref 3.5–5.1)
SODIUM SERPL-SCNC: 134 MMOL/L (ref 136–145)

## 2022-12-04 PROCEDURE — 27000221 HC OXYGEN, UP TO 24 HOURS

## 2022-12-04 PROCEDURE — 99900035 HC TECH TIME PER 15 MIN (STAT)

## 2022-12-04 PROCEDURE — 99900031 HC PATIENT EDUCATION (STAT)

## 2022-12-04 PROCEDURE — 94640 AIRWAY INHALATION TREATMENT: CPT

## 2022-12-04 PROCEDURE — 94761 N-INVAS EAR/PLS OXIMETRY MLT: CPT

## 2022-12-04 PROCEDURE — 25000003 PHARM REV CODE 250: Performed by: INTERNAL MEDICINE

## 2022-12-04 PROCEDURE — 80048 BASIC METABOLIC PNL TOTAL CA: CPT | Performed by: INTERNAL MEDICINE

## 2022-12-04 PROCEDURE — 63600175 PHARM REV CODE 636 W HCPCS: Performed by: INTERNAL MEDICINE

## 2022-12-04 PROCEDURE — 83735 ASSAY OF MAGNESIUM: CPT | Performed by: INTERNAL MEDICINE

## 2022-12-04 PROCEDURE — 94799 UNLISTED PULMONARY SVC/PX: CPT

## 2022-12-04 PROCEDURE — 25000242 PHARM REV CODE 250 ALT 637 W/ HCPCS: Performed by: INTERNAL MEDICINE

## 2022-12-04 RX ORDER — DOXYCYCLINE 100 MG/1
100 CAPSULE ORAL EVERY 12 HOURS
Qty: 6 CAPSULE | Refills: 0 | Status: SHIPPED | OUTPATIENT
Start: 2022-12-04 | End: 2022-12-08

## 2022-12-04 RX ORDER — MAGNESIUM SULFATE HEPTAHYDRATE 40 MG/ML
2 INJECTION, SOLUTION INTRAVENOUS ONCE
Status: COMPLETED | OUTPATIENT
Start: 2022-12-04 | End: 2022-12-04

## 2022-12-04 RX ORDER — CEFDINIR 300 MG/1
300 CAPSULE ORAL 2 TIMES DAILY
Qty: 6 CAPSULE | Refills: 0 | Status: SHIPPED | OUTPATIENT
Start: 2022-12-04 | End: 2022-12-08

## 2022-12-04 RX ORDER — PREDNISONE 50 MG/1
50 TABLET ORAL DAILY
Qty: 5 TABLET | Refills: 0 | Status: SHIPPED | OUTPATIENT
Start: 2022-12-04 | End: 2022-12-09

## 2022-12-04 RX ADMIN — BUDESONIDE 1 MG: 0.5 INHALANT RESPIRATORY (INHALATION) at 09:12

## 2022-12-04 RX ADMIN — ISOSORBIDE MONONITRATE 60 MG: 60 TABLET, EXTENDED RELEASE ORAL at 11:12

## 2022-12-04 RX ADMIN — ASPIRIN 81 MG CHEWABLE TABLET 81 MG: 81 TABLET CHEWABLE at 11:12

## 2022-12-04 RX ADMIN — CLOPIDOGREL BISULFATE 75 MG: 75 TABLET, FILM COATED ORAL at 11:12

## 2022-12-04 RX ADMIN — PAROXETINE 10 MG: 10 TABLET, FILM COATED ORAL at 11:12

## 2022-12-04 RX ADMIN — MAGNESIUM SULFATE HEPTAHYDRATE 2 G: 40 INJECTION, SOLUTION INTRAVENOUS at 12:12

## 2022-12-04 RX ADMIN — FUROSEMIDE 20 MG: 10 INJECTION, SOLUTION INTRAMUSCULAR; INTRAVENOUS at 08:12

## 2022-12-04 RX ADMIN — METHYLPREDNISOLONE SODIUM SUCCINATE 60 MG: 40 INJECTION, POWDER, FOR SOLUTION INTRAMUSCULAR; INTRAVENOUS at 11:12

## 2022-12-04 RX ADMIN — METHYLPREDNISOLONE SODIUM SUCCINATE 60 MG: 40 INJECTION, POWDER, FOR SOLUTION INTRAMUSCULAR; INTRAVENOUS at 05:12

## 2022-12-04 RX ADMIN — IPRATROPIUM BROMIDE AND ALBUTEROL SULFATE 3 ML: .5; 3 SOLUTION RESPIRATORY (INHALATION) at 09:12

## 2022-12-04 RX ADMIN — ATORVASTATIN CALCIUM 40 MG: 40 TABLET, FILM COATED ORAL at 11:12

## 2022-12-04 RX ADMIN — CEFTRIAXONE SODIUM 1 G: 1 INJECTION, POWDER, FOR SOLUTION INTRAMUSCULAR; INTRAVENOUS at 11:12

## 2022-12-04 RX ADMIN — PANTOPRAZOLE SODIUM 40 MG: 40 TABLET, DELAYED RELEASE ORAL at 11:12

## 2022-12-04 RX ADMIN — ARFORMOTEROL TARTRATE 15 MCG: 15 SOLUTION RESPIRATORY (INHALATION) at 09:12

## 2022-12-04 RX ADMIN — MICONAZOLE NITRATE: 20 CREAM TOPICAL at 08:12

## 2022-12-04 RX ADMIN — METOPROLOL SUCCINATE 50 MG: 50 TABLET, FILM COATED, EXTENDED RELEASE ORAL at 11:12

## 2022-12-04 NOTE — CARE UPDATE
12/04/22 0911   Patient Assessment/Suction   Level of Consciousness (AVPU) alert   Respiratory Effort Unlabored;Normal   Expansion/Accessory Muscles/Retractions no use of accessory muscles   All Lung Fields Breath Sounds diminished   Rhythm/Pattern, Respiratory unlabored   PRE-TX-O2   O2 Device (Oxygen Therapy) nasal cannula   $ Is the patient on Low Flow Oxygen? Yes   Flow (L/min) 4   SpO2 (!) 94 %   Pulse Oximetry Type Intermittent   $ Pulse Oximetry - Multiple Charge Pulse Oximetry - Multiple   Pulse 65   Resp 17   Aerosol Therapy   $ Aerosol Therapy Charges Aerosol Treatment   Daily Review of Necessity (SVN) completed   Respiratory Treatment Status (SVN) given   Treatment Route (SVN) mask;oxygen   Patient Position (SVN) HOB elevated   Post Treatment Assessment (SVN) increased aeration;vital signs unchanged   Signs of Intolerance (SVN) none   Education   $ Education Bronchodilator;30 min   Respiratory Evaluation   $ Care Plan Tech Time 30 min   $ Eval/Re-eval Charges Re-evaluation

## 2022-12-04 NOTE — NURSING
IV and tele removed. D/C education provided verbally as well as written. Patient to D/C home with son.All belongings gathered and sent with patient. Transported to vehicle via wheelchair by staff.

## 2022-12-04 NOTE — PLAN OF CARE
Patient ambulated to the restroom with stand by assist. Refuses bed alarm but agrees to call nurse when she needs to get out of bed. Had a shower today, cream applied to abd folds. Family at bedside and involved in care. Denies needs. 4L O2, same as home.      Problem: Adult Inpatient Plan of Care  Goal: Plan of Care Review  Outcome: Ongoing, Progressing  Flowsheets (Taken 12/3/2022 1802)  Plan of Care Reviewed With:   patient   daughter  Goal: Optimal Comfort and Wellbeing  Outcome: Ongoing, Progressing  Intervention: Provide Person-Centered Care  Flowsheets (Taken 12/3/2022 1802)  Trust Relationship/Rapport:   care explained   thoughts/feelings acknowledged   choices provided   questions answered  Goal: Readiness for Transition of Care  Outcome: Ongoing, Progressing

## 2022-12-04 NOTE — PLAN OF CARE
Plan of care discussed with pt. Pt AOx4 ambulatory. Overnight, pt had 6 beat run Vtach, Dr. Awad notified. Electrolytes WNL. VS stable overnight on 4LNC, afebrile. Pt denies any CP or SOB. Pt educated on fall precautions, verbalized understanding. Call light in reach. Pt free of injuries this shift.  All questions addressed. Pt voices no concerns at this time. Safety and comfort measures maintained during hourly rounding.

## 2022-12-04 NOTE — PLAN OF CARE
12/04/22 1300   Final Note   Assessment Type Final Discharge Note   Anticipated Discharge Disposition Home   What phone number can be called within the next 1-3 days to see how you are doing after discharge? [phone]   Post-Acute Status   Coverage Medicare   Discharge Delays None known at this time         Discharge orders and chart reviewed with no further post-acute discharge needs identified at this time.  At this time, patient is cleared for discharge from Case Management standpoint.

## 2022-12-05 ENCOUNTER — PATIENT MESSAGE (OUTPATIENT)
Dept: FAMILY MEDICINE | Facility: CLINIC | Age: 72
End: 2022-12-05
Payer: MEDICARE

## 2022-12-05 ENCOUNTER — TELEPHONE (OUTPATIENT)
Dept: NEPHROLOGY | Facility: CLINIC | Age: 72
End: 2022-12-05
Payer: MEDICARE

## 2022-12-05 ENCOUNTER — PATIENT MESSAGE (OUTPATIENT)
Dept: CARDIOLOGY | Facility: CLINIC | Age: 72
End: 2022-12-05
Payer: MEDICARE

## 2022-12-05 DIAGNOSIS — N18.32 STAGE 3B CHRONIC KIDNEY DISEASE: Primary | ICD-10-CM

## 2022-12-05 NOTE — TELEPHONE ENCOUNTER
----- Message from Aruna Gan sent at 12/5/2022  9:08 AM CST -----  Contact: Pt's daughter Loree @ [phone]  Type:  Needs Medical Advice    Who Called: Pt's daughterLoree  Symptoms (please be specific): Congestive heart failure, kidney failure, dehydration, pneumonia   How long has patient had these symptoms:  11/28/2022  Would the patient rather a call back or a response via [facility]? Call  Best Call Back Number: Pt's daughter @ [phone]  Additional Information: Pt was admitted to [facility] on 12/01/2022, and discharged on 12/04/2022. Please call daughter to advise.

## 2022-12-05 NOTE — TELEPHONE ENCOUNTER
Patient's daughter was called back. She reports her being discharged from [facility] from multiple things including renal functions decreased. Please review labs and I will contact daughter with updates.  She is in a recall for May appt and labs.

## 2022-12-05 NOTE — TELEPHONE ENCOUNTER
Kidney function was already improving by day of discharge yesterday. I would like to check kidney function in about a week, renal panel order in. Ty

## 2022-12-05 NOTE — TELEPHONE ENCOUNTER
Left message for Loree to return call for updates.   Alert-The patient is alert, awake and responds to voice. The patient is oriented to time, place, and person. The triage nurse is able to obtain subjective information.

## 2022-12-06 LAB
BACTERIA BLD CULT: NORMAL
BACTERIA BLD CULT: NORMAL

## 2022-12-08 ENCOUNTER — PATIENT MESSAGE (OUTPATIENT)
Dept: NEPHROLOGY | Facility: CLINIC | Age: 72
End: 2022-12-08
Payer: MEDICARE

## 2022-12-08 ENCOUNTER — OFFICE VISIT (OUTPATIENT)
Dept: CARDIOLOGY | Facility: CLINIC | Age: 72
End: 2022-12-08
Payer: MEDICARE

## 2022-12-08 VITALS
BODY MASS INDEX: 37.48 KG/M2 | SYSTOLIC BLOOD PRESSURE: 183 MMHG | DIASTOLIC BLOOD PRESSURE: 75 MMHG | WEIGHT: 203.69 LBS | HEART RATE: 75 BPM | HEIGHT: 62 IN

## 2022-12-08 DIAGNOSIS — I50.32 CHRONIC DIASTOLIC HEART FAILURE: ICD-10-CM

## 2022-12-08 DIAGNOSIS — I73.9 PAD (PERIPHERAL ARTERY DISEASE): ICD-10-CM

## 2022-12-08 DIAGNOSIS — I35.0 AORTIC VALVE STENOSIS, ETIOLOGY OF CARDIAC VALVE DISEASE UNSPECIFIED: ICD-10-CM

## 2022-12-08 DIAGNOSIS — I25.10 CORONARY ARTERY DISEASE INVOLVING NATIVE CORONARY ARTERY OF NATIVE HEART WITHOUT ANGINA PECTORIS: Primary | ICD-10-CM

## 2022-12-08 PROCEDURE — 99214 OFFICE O/P EST MOD 30 MIN: CPT | Mod: S$PBB,,, | Performed by: INTERNAL MEDICINE

## 2022-12-08 PROCEDURE — 99999 PR PBB SHADOW E&M-EST. PATIENT-LVL II: CPT | Mod: PBBFAC,,, | Performed by: INTERNAL MEDICINE

## 2022-12-08 PROCEDURE — 99999 PR PBB SHADOW E&M-EST. PATIENT-LVL II: ICD-10-PCS | Mod: PBBFAC,,, | Performed by: INTERNAL MEDICINE

## 2022-12-08 PROCEDURE — 99214 PR OFFICE/OUTPT VISIT, EST, LEVL IV, 30-39 MIN: ICD-10-PCS | Mod: S$PBB,,, | Performed by: INTERNAL MEDICINE

## 2022-12-08 PROCEDURE — 99212 OFFICE O/P EST SF 10 MIN: CPT | Mod: PBBFAC,PO | Performed by: INTERNAL MEDICINE

## 2022-12-08 NOTE — PROGRESS NOTES
Subjective:    Patient ID:  Betty Bacon is a 72 y.o. female who presents for follow-up of aortic valve stenosis      HPI  She was admitted to [facility] last week with sepsis, pneumonia and acute kidney injury.    An echocardiogram was performed at that time that showed a normal LV systolic function and no worsening of the aortic valve area or gradient  She is slowly getting better, although she describes occasional episodes of chest pain, with and without activity.  No syncope  She is been on Lasix spironolactone and triamterene-HCT      Review of Systems   Constitutional: Negative for decreased appetite, malaise/fatigue, weight gain and weight loss.   Cardiovascular:  Negative for chest pain, dyspnea on exertion, leg swelling, palpitations and syncope.   Respiratory:  Negative for cough and shortness of breath.    Gastrointestinal: Negative.    Neurological:  Negative for weakness.   All other systems reviewed and are negative.     Objective:      Physical Exam  Vitals and nursing note reviewed.   Constitutional:       Appearance: Normal appearance. She is well-developed.   HENT:      Head: Normocephalic.   Eyes:      Pupils: Pupils are equal, round, and reactive to light.   Neck:      Thyroid: No thyromegaly.      Vascular: No carotid bruit or JVD.   Cardiovascular:      Rate and Rhythm: Normal rate and regular rhythm.      Chest Wall: PMI is not displaced.      Pulses: Normal pulses and intact distal pulses.      Heart sounds: Murmur heard.   Harsh midsystolic murmur is present with a grade of 3/6 at the upper right sternal border radiating to the neck.     No gallop.   Pulmonary:      Effort: Pulmonary effort is normal.      Breath sounds: Normal breath sounds.   Abdominal:      Palpations: Abdomen is soft. There is no mass.      Tenderness: There is no abdominal tenderness.   Musculoskeletal:         General: Normal range of motion.      Cervical back: Normal range of motion and neck  supple.   Skin:     General: Skin is warm.   Neurological:      Mental Status: She is alert and oriented to person, place, and time.      Sensory: No sensory deficit.      Deep Tendon Reflexes: Reflexes are normal and symmetric.         Assessment:       1. Coronary artery disease involving native coronary artery of native heart without angina pectoris    2. Chronic diastolic heart failure    3. Aortic valve stenosis, etiology of cardiac valve disease unspecified    4. PAD (peripheral artery disease)         Plan:   Stop triamterene-HCTZ  Continue all cardiac medications  Regular exercise program  Weight loss  Six week follow-up

## 2022-12-15 ENCOUNTER — LAB VISIT (OUTPATIENT)
Dept: LAB | Facility: HOSPITAL | Age: 72
End: 2022-12-15
Attending: INTERNAL MEDICINE
Payer: MEDICARE

## 2022-12-15 DIAGNOSIS — N18.32 STAGE 3B CHRONIC KIDNEY DISEASE: ICD-10-CM

## 2022-12-15 PROCEDURE — 36415 COLL VENOUS BLD VENIPUNCTURE: CPT | Mod: PO | Performed by: INTERNAL MEDICINE

## 2022-12-15 PROCEDURE — 80069 RENAL FUNCTION PANEL: CPT | Performed by: INTERNAL MEDICINE

## 2022-12-16 ENCOUNTER — TELEPHONE (OUTPATIENT)
Dept: NEPHROLOGY | Facility: CLINIC | Age: 72
End: 2022-12-16
Payer: MEDICARE

## 2022-12-16 LAB
ALBUMIN SERPL BCP-MCNC: 2.5 G/DL (ref 3.5–5.2)
ANION GAP SERPL CALC-SCNC: 13 MMOL/L (ref 8–16)
BUN SERPL-MCNC: 18 MG/DL (ref 8–23)
CALCIUM SERPL-MCNC: 8.7 MG/DL (ref 8.7–10.5)
CHLORIDE SERPL-SCNC: 103 MMOL/L (ref 95–110)
CO2 SERPL-SCNC: 20 MMOL/L (ref 23–29)
CREAT SERPL-MCNC: 1.3 MG/DL (ref 0.5–1.4)
EST. GFR  (NO RACE VARIABLE): 43.1 ML/MIN/1.73 M^2
GLUCOSE SERPL-MCNC: 126 MG/DL (ref 70–110)
PHOSPHATE SERPL-MCNC: 2.4 MG/DL (ref 2.7–4.5)
POTASSIUM SERPL-SCNC: 4.3 MMOL/L (ref 3.5–5.1)
SODIUM SERPL-SCNC: 136 MMOL/L (ref 136–145)

## 2022-12-16 NOTE — TELEPHONE ENCOUNTER
Please inform pt that her kidney function looks much better and has returned to her usual levels since discharge form hospital. Ty

## 2022-12-19 ENCOUNTER — PATIENT MESSAGE (OUTPATIENT)
Dept: FAMILY MEDICINE | Facility: CLINIC | Age: 72
End: 2022-12-19
Payer: MEDICARE

## 2022-12-19 ENCOUNTER — PATIENT MESSAGE (OUTPATIENT)
Dept: NEPHROLOGY | Facility: CLINIC | Age: 72
End: 2022-12-19
Payer: MEDICARE

## 2022-12-19 DIAGNOSIS — M10.9 ACUTE GOUT, UNSPECIFIED CAUSE, UNSPECIFIED SITE: ICD-10-CM

## 2022-12-19 RX ORDER — COLCHICINE 0.6 MG/1
TABLET ORAL
Qty: 20 TABLET | Refills: 0 | Status: ON HOLD | OUTPATIENT
Start: 2022-12-19 | End: 2023-06-14 | Stop reason: HOSPADM

## 2022-12-19 NOTE — TELEPHONE ENCOUNTER
Care Due:                  Date            Visit Type   Department     Provider  --------------------------------------------------------------------------------                                EP -                              PRIMARY      [facility]  Last Visit: 07-      CARE ([facility])   JONI Callejas                              EP -                              PRIMARY      [facility]  Next Visit: 01-      CARE ([facility])   MEDICINE       Johanne Callejas                                                            Last  Test          Frequency    Reason                     Performed    Due Date  --------------------------------------------------------------------------------    Uric Acid...  12 months..  colchicine...............  09- 08-    [facility] Embedded Care Gaps. Reference number: [id number]. 12/19/2022   4:12:29 PM CST

## 2022-12-20 ENCOUNTER — HOSPITAL ENCOUNTER (INPATIENT)
Facility: HOSPITAL | Age: 72
LOS: 2 days | Discharge: HOME | DRG: 291 | End: 2022-12-23
Attending: EMERGENCY MEDICINE | Admitting: INTERNAL MEDICINE
Payer: MEDICARE

## 2022-12-20 ENCOUNTER — TELEPHONE (OUTPATIENT)
Dept: FAMILY MEDICINE | Facility: CLINIC | Age: 72
End: 2022-12-20
Payer: MEDICARE

## 2022-12-20 ENCOUNTER — HOSPITAL ENCOUNTER (EMERGENCY)
Facility: HOSPITAL | Age: 72
Discharge: ANOTHER HOSPITAL | End: 2022-12-20
Attending: EMERGENCY MEDICINE
Payer: MEDICARE

## 2022-12-20 VITALS
HEIGHT: 62 IN | BODY MASS INDEX: 38.83 KG/M2 | SYSTOLIC BLOOD PRESSURE: 111 MMHG | HEART RATE: 77 BPM | WEIGHT: 211 LBS | OXYGEN SATURATION: 97 % | DIASTOLIC BLOOD PRESSURE: 63 MMHG | TEMPERATURE: 98 F | RESPIRATION RATE: 18 BRPM

## 2022-12-20 DIAGNOSIS — R06.02 SHORTNESS OF BREATH: ICD-10-CM

## 2022-12-20 DIAGNOSIS — M79.89 SWELLING OF LOWER EXTREMITY: ICD-10-CM

## 2022-12-20 DIAGNOSIS — I35.0 NONRHEUMATIC AORTIC VALVE STENOSIS: ICD-10-CM

## 2022-12-20 DIAGNOSIS — R07.9 CHEST PAIN: ICD-10-CM

## 2022-12-20 DIAGNOSIS — R60.0 PERIPHERAL EDEMA: ICD-10-CM

## 2022-12-20 DIAGNOSIS — I50.9 ACUTE ON CHRONIC CONGESTIVE HEART FAILURE, UNSPECIFIED HEART FAILURE TYPE: Primary | ICD-10-CM

## 2022-12-20 DIAGNOSIS — I50.9 CONGESTIVE HEART FAILURE, UNSPECIFIED HF CHRONICITY, UNSPECIFIED HEART FAILURE TYPE: Primary | ICD-10-CM

## 2022-12-20 DIAGNOSIS — R79.89 ELEVATED TROPONIN I LEVEL: ICD-10-CM

## 2022-12-20 DIAGNOSIS — J81.0 PULMONARY EDEMA, ACUTE: ICD-10-CM

## 2022-12-20 LAB
ALBUMIN SERPL BCP-MCNC: 2.3 G/DL (ref 3.5–5.2)
ALP SERPL-CCNC: 92 U/L (ref 55–135)
ALT SERPL W/O P-5'-P-CCNC: 12 U/L (ref 10–44)
ANION GAP SERPL CALC-SCNC: 12 MMOL/L (ref 8–16)
AST SERPL-CCNC: 11 U/L (ref 10–40)
BASOPHILS # BLD AUTO: 0.04 K/UL (ref 0–0.2)
BASOPHILS NFR BLD: 0.4 % (ref 0–1.9)
BILIRUB SERPL-MCNC: 0.6 MG/DL (ref 0.1–1)
BILIRUB UR QL STRIP: NEGATIVE
BNP SERPL-MCNC: 236 PG/ML (ref 0–99)
BUN SERPL-MCNC: 18 MG/DL (ref 8–23)
CALCIUM SERPL-MCNC: 8.4 MG/DL (ref 8.7–10.5)
CHLORIDE SERPL-SCNC: 106 MMOL/L (ref 95–110)
CLARITY UR: CLEAR
CO2 SERPL-SCNC: 23 MMOL/L (ref 23–29)
COLOR UR: YELLOW
CREAT SERPL-MCNC: 1.4 MG/DL (ref 0.5–1.4)
DIFFERENTIAL METHOD: ABNORMAL
EOSINOPHIL # BLD AUTO: 0.2 K/UL (ref 0–0.5)
EOSINOPHIL NFR BLD: 1.7 % (ref 0–8)
ERYTHROCYTE [DISTWIDTH] IN BLOOD BY AUTOMATED COUNT: 15.1 % (ref 11.5–14.5)
EST. GFR  (NO RACE VARIABLE): 39.5 ML/MIN/1.73 M^2
GLUCOSE SERPL-MCNC: 113 MG/DL (ref 70–110)
GLUCOSE UR QL STRIP: NEGATIVE
HCT VFR BLD AUTO: 35.1 % (ref 37–48.5)
HGB BLD-MCNC: 10.6 G/DL (ref 12–16)
HGB UR QL STRIP: NEGATIVE
IMM GRANULOCYTES # BLD AUTO: 0.06 K/UL (ref 0–0.04)
IMM GRANULOCYTES NFR BLD AUTO: 0.7 % (ref 0–0.5)
KETONES UR QL STRIP: NEGATIVE
LEUKOCYTE ESTERASE UR QL STRIP: NEGATIVE
LYMPHOCYTES # BLD AUTO: 0.6 K/UL (ref 1–4.8)
LYMPHOCYTES NFR BLD: 6.6 % (ref 18–48)
MCH RBC QN AUTO: 28.3 PG (ref 27–31)
MCHC RBC AUTO-ENTMCNC: 30.2 G/DL (ref 32–36)
MCV RBC AUTO: 94 FL (ref 82–98)
MONOCYTES # BLD AUTO: 0.5 K/UL (ref 0.3–1)
MONOCYTES NFR BLD: 5.3 % (ref 4–15)
NEUTROPHILS # BLD AUTO: 7.8 K/UL (ref 1.8–7.7)
NEUTROPHILS NFR BLD: 85.3 % (ref 38–73)
NITRITE UR QL STRIP: NEGATIVE
NRBC BLD-RTO: 0 /100 WBC
PH UR STRIP: 5 [PH] (ref 5–8)
PLATELET # BLD AUTO: 258 K/UL (ref 150–450)
PMV BLD AUTO: 10.7 FL (ref 9.2–12.9)
POTASSIUM SERPL-SCNC: 4.5 MMOL/L (ref 3.5–5.1)
PROT SERPL-MCNC: 7.2 G/DL (ref 6–8.4)
PROT UR QL STRIP: NEGATIVE
RBC # BLD AUTO: 3.75 M/UL (ref 4–5.4)
SODIUM SERPL-SCNC: 141 MMOL/L (ref 136–145)
SP GR UR STRIP: 1.01 (ref 1–1.03)
TROPONIN I SERPL DL<=0.01 NG/ML-MCNC: 0.04 NG/ML (ref 0–0.03)
TROPONIN I SERPL DL<=0.01 NG/ML-MCNC: 0.05 NG/ML (ref 0–0.03)
URATE SERPL-MCNC: 9.4 MG/DL (ref 2.4–5.7)
URN SPEC COLLECT METH UR: NORMAL
UROBILINOGEN UR STRIP-ACNC: NEGATIVE EU/DL
WBC # BLD AUTO: 9.2 K/UL (ref 3.9–12.7)

## 2022-12-20 PROCEDURE — 84484 ASSAY OF TROPONIN QUANT: CPT | Mod: 91 | Performed by: NURSE PRACTITIONER

## 2022-12-20 PROCEDURE — 81003 URINALYSIS AUTO W/O SCOPE: CPT | Performed by: NURSE PRACTITIONER

## 2022-12-20 PROCEDURE — 93010 ELECTROCARDIOGRAM REPORT: CPT | Mod: ,,, | Performed by: INTERNAL MEDICINE

## 2022-12-20 PROCEDURE — 84550 ASSAY OF BLOOD/URIC ACID: CPT | Performed by: NURSE PRACTITIONER

## 2022-12-20 PROCEDURE — 96374 THER/PROPH/DIAG INJ IV PUSH: CPT

## 2022-12-20 PROCEDURE — 99285 EMERGENCY DEPT VISIT HI MDM: CPT | Mod: 25

## 2022-12-20 PROCEDURE — 71045 X-RAY EXAM CHEST 1 VIEW: CPT | Mod: TC

## 2022-12-20 PROCEDURE — 25000242 PHARM REV CODE 250 ALT 637 W/ HCPCS: Performed by: NURSE PRACTITIONER

## 2022-12-20 PROCEDURE — 83880 ASSAY OF NATRIURETIC PEPTIDE: CPT | Performed by: NURSE PRACTITIONER

## 2022-12-20 PROCEDURE — 63600175 PHARM REV CODE 636 W HCPCS: Performed by: NURSE PRACTITIONER

## 2022-12-20 PROCEDURE — 71045 XR CHEST AP PORTABLE: ICD-10-PCS | Mod: 26,,, | Performed by: RADIOLOGY

## 2022-12-20 PROCEDURE — 94640 AIRWAY INHALATION TREATMENT: CPT

## 2022-12-20 PROCEDURE — 80053 COMPREHEN METABOLIC PANEL: CPT | Performed by: NURSE PRACTITIONER

## 2022-12-20 PROCEDURE — 93010 ELECTROCARDIOGRAM REPORT: CPT | Mod: ,,, | Performed by: SPECIALIST

## 2022-12-20 PROCEDURE — 85025 COMPLETE CBC W/AUTO DIFF WBC: CPT | Performed by: NURSE PRACTITIONER

## 2022-12-20 PROCEDURE — 93005 ELECTROCARDIOGRAM TRACING: CPT

## 2022-12-20 PROCEDURE — 36415 COLL VENOUS BLD VENIPUNCTURE: CPT | Performed by: NURSE PRACTITIONER

## 2022-12-20 PROCEDURE — 71045 X-RAY EXAM CHEST 1 VIEW: CPT | Mod: 26,,, | Performed by: RADIOLOGY

## 2022-12-20 PROCEDURE — 93010 EKG 12-LEAD: ICD-10-PCS | Mod: ,,, | Performed by: INTERNAL MEDICINE

## 2022-12-20 PROCEDURE — 83605 ASSAY OF LACTIC ACID: CPT | Performed by: EMERGENCY MEDICINE

## 2022-12-20 PROCEDURE — 93005 ELECTROCARDIOGRAM TRACING: CPT | Performed by: SPECIALIST

## 2022-12-20 PROCEDURE — 93010 EKG 12-LEAD: ICD-10-PCS | Mod: ,,, | Performed by: SPECIALIST

## 2022-12-20 PROCEDURE — 27000221 HC OXYGEN, UP TO 24 HOURS

## 2022-12-20 PROCEDURE — 87040 BLOOD CULTURE FOR BACTERIA: CPT | Mod: 59 | Performed by: EMERGENCY MEDICINE

## 2022-12-20 PROCEDURE — 25000003 PHARM REV CODE 250: Performed by: EMERGENCY MEDICINE

## 2022-12-20 RX ORDER — FUROSEMIDE 10 MG/ML
60 INJECTION INTRAMUSCULAR; INTRAVENOUS
Status: COMPLETED | OUTPATIENT
Start: 2022-12-20 | End: 2022-12-20

## 2022-12-20 RX ORDER — IPRATROPIUM BROMIDE AND ALBUTEROL SULFATE 2.5; .5 MG/3ML; MG/3ML
3 SOLUTION RESPIRATORY (INHALATION)
Status: COMPLETED | OUTPATIENT
Start: 2022-12-20 | End: 2022-12-20

## 2022-12-20 RX ORDER — ACETAMINOPHEN 500 MG
1000 TABLET ORAL
Status: COMPLETED | OUTPATIENT
Start: 2022-12-20 | End: 2022-12-20

## 2022-12-20 RX ADMIN — ACETAMINOPHEN 1000 MG: 500 TABLET ORAL at 10:12

## 2022-12-20 RX ADMIN — FUROSEMIDE 60 MG: 10 INJECTION, SOLUTION INTRAMUSCULAR; INTRAVENOUS at 03:12

## 2022-12-20 RX ADMIN — IPRATROPIUM BROMIDE AND ALBUTEROL SULFATE 3 ML: 2.5; .5 SOLUTION RESPIRATORY (INHALATION) at 05:12

## 2022-12-20 NOTE — ED NOTES
Pt states she feels better after being put on the nasal cannula. Provided with warm blankets. Denies any other needs at this time

## 2022-12-20 NOTE — TELEPHONE ENCOUNTER
----- Message from Yoanna Alcantar sent at 12/20/2022  8:58 AM CST -----  Contact: Loree at [phone]  Type:  Same Day Appointment Request    Caller is requesting a same day appointment.  Caller declined first available appointment listed below.      Name of Caller:  pt's daughter  When is the first available appointment?  N/A  Symptoms:  gout in left leg  Best Call Back Number:  [phone]  Additional Information:   Please call back and advise.

## 2022-12-20 NOTE — ED PROVIDER NOTES
Encounter Date: 12/20/2022       History     Chief Complaint   Patient presents with    Leg Swelling     Bilateral leg swelling with associated sob and generalized weakness. Pt reports recent chf/pneumonia admission in [place].      Patient is a 72-year-old female presents emergency room with bilateral lower extremity edema.  Patient states she was admitted in the hospital several weeks ago for congestive heart failure and acute renal failure.  Patient recently seen her cardiologist and was advised to stop 1 of the 3 diuretics as she was on.  Patient states or extremity swelling has been getting worse over the past couple days twice a day.  She is chronically short of breath.  Noted O2 saturation 83% on room air.  Patient was placed on 2 L O2 by nasal cannula prior to my assessment.  Daughter is at the bedside for further information as well as recent history.  Patient denies having any chest pain, nausea, vomiting, diarrhea, abdominal pain, dysuria.    Review of patient's allergies indicates:   Allergen Reactions    Propoxyphene n-acetaminophen Other (See Comments)     Other reaction(s): Unknown    Codeine Anxiety     Past Medical History:   Diagnosis Date    Acute coronary artery obstruction without MI 10/2012    Benign hypertension     COPD (chronic obstructive pulmonary disease)     Coronary artery disease     Disorder of kidney and ureter     Dr. Morrow    Hepatitis B core antibody positive 03/03/2021    Negative sAg, suggests previous exposure but no chronic/active Hep B. At risk for reactivation with any immunosuppression medication, steroids, chemo, etc.      History of electroconvulsive therapy     Hyperlipidemia LDL goal < 70     Left ankle sprain     Major depressive disorder, recurrent episode, severe     s/p ECT    Positive AKIRA (antinuclear antibody) 03/03/2021    PVD (peripheral vascular disease)      Past Surgical History:   Procedure Laterality Date    CHOLECYSTECTOMY      CORONARY ANGIOPLASTY       CORONARY ANGIOPLASTY WITH STENT PLACEMENT  10/2012    2 stents RCA (100% stenosis)    EYE SURGERY      cataract surgery    HYSTERECTOMY      LINA, ovaries intact. uterine prolapse    ILIAC ARTERY STENT      TONSILLECTOMY      TOTAL VAGINAL HYSTERECTOMY       Family History   Problem Relation Age of Onset    Diabetes Mother     Heart disease Mother     Stroke Father     Heart disease Father     Heart disease Brother     Stroke Brother     Hypertension Daughter     Diabetes Maternal Aunt     Heart disease Maternal Aunt     Heart disease Maternal Uncle     Heart disease Paternal Aunt     Heart disease Paternal Uncle     Heart disease Maternal Grandfather     Diabetes Sister     Heart disease Sister     Cancer Sister         lung    Kidney disease Sister         mass, benign    Breast cancer Sister     Stroke Sister     Dementia Sister     Liver disease Sister     Melanoma Neg Hx     Psoriasis Neg Hx     Lupus Neg Hx     Eczema Neg Hx      Social History     Tobacco Use    Smoking status: Former     Packs/day: 2.00     Years: 40.00     Pack years: 80.00     Types: Cigarettes     Quit date: 2009     Years since quittin.0    Smokeless tobacco: Never   Substance Use Topics    Alcohol use: Yes     Comment: social    Drug use: No     Review of Systems   Constitutional: Negative.    HENT: Negative.     Eyes: Negative.    Respiratory:  Positive for cough and shortness of breath. Negative for chest tightness, wheezing and stridor.    Cardiovascular:  Positive for leg swelling. Negative for chest pain and palpitations.   Gastrointestinal: Negative.    Endocrine: Negative.    Genitourinary: Negative.    Musculoskeletal: Negative.    Skin: Negative.    Allergic/Immunologic: Negative for food allergies.   Neurological: Negative.    Hematological: Negative.    Psychiatric/Behavioral: Negative.     All other systems reviewed and are negative.    Physical Exam     Initial Vitals [22 1459]   BP Pulse Resp Temp SpO2   (!)  164/66 93 (!) 28 98.3 °F (36.8 °C) (!) 83 %      MAP       --         Physical Exam    Nursing note and vitals reviewed.  Constitutional: She appears well-developed and well-nourished. She is not diaphoretic. No distress.   HENT:   Head: Normocephalic and atraumatic.   Mouth/Throat: Oropharynx is clear and moist.   Eyes: Conjunctivae and EOM are normal. Pupils are equal, round, and reactive to light. No scleral icterus.   Neck: No thyromegaly present.   Normal range of motion.  Cardiovascular:  Normal rate, regular rhythm, normal heart sounds and intact distal pulses.           No murmur heard.  Pulmonary/Chest: No respiratory distress. She has no rhonchi. She has rales (bilateral). She exhibits no tenderness.   Abdominal: Abdomen is soft. Bowel sounds are normal. She exhibits no distension. There is no abdominal tenderness.   Musculoskeletal:         General: Edema (1 to 2+ pitting lower extremity edema from mid lower leg to feet) present. Normal range of motion.      Cervical back: Normal range of motion.     Lymphadenopathy:     She has no cervical adenopathy.   Neurological: She is alert and oriented to person, place, and time. She has normal strength. No sensory deficit. GCS score is 15. GCS eye subscore is 4. GCS verbal subscore is 5. GCS motor subscore is 6.   Skin: Skin is dry. Capillary refill takes 2 to 3 seconds.   Psychiatric: She has a normal mood and affect.       ED Course   Procedures  Labs Reviewed   CBC W/ AUTO DIFFERENTIAL - Abnormal; Notable for the following components:       Result Value    RBC 3.75 (*)     Hemoglobin 10.6 (*)     Hematocrit 35.1 (*)     MCHC 30.2 (*)     RDW 15.1 (*)     Immature Granulocytes 0.7 (*)     Gran # (ANC) 7.8 (*)     Immature Grans (Abs) 0.06 (*)     Lymph # 0.6 (*)     Gran % 85.3 (*)     Lymph % 6.6 (*)     All other components within normal limits   COMPREHENSIVE METABOLIC PANEL - Abnormal; Notable for the following components:    Glucose 113 (*)     Calcium 8.4  (*)     Albumin 2.3 (*)     eGFR 39.5 (*)     All other components within normal limits   TROPONIN I - Abnormal; Notable for the following components:    Troponin I 0.044 (*)     All other components within normal limits   B-TYPE NATRIURETIC PEPTIDE - Abnormal; Notable for the following components:     (*)     All other components within normal limits   TROPONIN I - Abnormal; Notable for the following components:    Troponin I 0.052 (*)     All other components within normal limits   URIC ACID - Abnormal; Notable for the following components:    Uric Acid 9.4 (*)     All other components within normal limits   URINALYSIS, REFLEX TO URINE CULTURE    Narrative:     Preferred Collection Type->Urine, Clean Catch  Specimen Source->Urine     EKG Readings: (Independently Interpreted)   Initial Reading: No STEMI.   EKG was reviewed by me, normal sinus rhythm noted, ventricular rate 89, VA interval 168 milliseconds, QRS duration 84 milliseconds, no ST elevation or depression, moderate voltage changes 10 Reed left ventricular hypertrophy.  No STEMI identified.     Imaging Results              X-Ray Chest AP Portable (Final result)  Result time 12/20/22 16:22:56      Final result by Ludmila Novak MD (12/20/22 16:22:56)                   Impression:      Resolution of right upper lobe pneumonia.  Chronic cardiomegaly and interstitial lung disease.  Possible right upper lobe lung nodule, see comments above.      Electronically signed by: Ludmila Novak  Date:    12/20/2022  Time:    16:22               Narrative:    EXAMINATION:  XR CHEST AP PORTABLE    CLINICAL HISTORY:  CHF;    TECHNIQUE:  Single frontal view of the chest was performed.    COMPARISON:  12/01/2022    FINDINGS:  The heart is enlarged and unchanged.  The area of consolidation in the lateral right upper lobe has resolved.  There is diffuse mild interstitial prominence which appears similar to the patient's prior chest x-rays and likely reflects  an element of underlying chronic interstitial lung disease, as demonstrated on prior CT of 01/31/2022..  There is no pleural effusion.  There is a vague area of nodularity in the right upper lobe is level of the anterior 2nd rib with no correlate on prior chest CT.  This may be an external artifact.  The patient will be due for an annual CT lung screening next month, and this can be evaluated further at that time.                                    X-Rays:   Independently Interpreted Readings:   Other Readings:  Patient has a chest x-ray this consistent with chronic COPD, appears patient may have some small pleural effusions as by angles are blunted,, which is persistent with patient having rales on exam    Chest x-ray as read by radiologist    COMPARISON:  12/01/2022     FINDINGS:  The heart is enlarged and unchanged.  The area of consolidation in the lateral right upper lobe has resolved.  There is diffuse mild interstitial prominence which appears similar to the patient's prior chest x-rays and likely reflects an element of underlying chronic interstitial lung disease, as demonstrated on prior CT of 01/31/2022..  There is no pleural effusion.  There is a vague area of nodularity in the right upper lobe is level of the anterior 2nd rib with no correlate on prior chest CT.  This may be an external artifact.  The patient will be due for an annual CT lung screening next month, and this can be evaluated further at that time.     Impression:     Resolution of right upper lobe pneumonia.  Chronic cardiomegaly and interstitial lung disease.  Possible right upper lobe lung nodule, see comments above.        Electronically signed by: Ludmila Novak  Date:                                            12/20/2022  Medications   furosemide injection 60 mg (60 mg Intravenous Given 12/20/22 1530)   albuterol-ipratropium 2.5 mg-0.5 mg/3 mL nebulizer solution 3 mL (3 mLs Nebulization Given 12/20/22 1717)     Medical Decision  Making:   Initial Assessment:   Patient seen examined emergency room.  Assessment as noted above.  Patient appears to be comfortable at this time to the septum by nasal cannula.  Differential Diagnosis:   CHF, COPD, pneumonia, MI, gout  Clinical Tests:   Lab Tests: Ordered and Reviewed  The following lab test(s) were unremarkable: CBC, CMP and Urinalysis       <> Summary of Lab: BNP noted to be greater than 200    Troponin elevated at 0.044    Repeat troponin 0.052  Radiological Study: Ordered and Reviewed  Medical Tests: Ordered and Reviewed  ED Management:  Patient was seen examined emergency room, labs, chest x-ray, EKG were ordered.  Patient was given 60 mg IV Lasix.    1715:  Re-evaluation patient was breathing easier, there has been a significant decrease after Lasix with rales, still has some crackles in the left lower lung base.  Patient is now wheezing on auscultation.  He does have a history of COPD, will order DuoNeb treatment.  Repeat troponin as well as uric acid per family request.    1915: Spoke with patient as well as daughter about findings, possible transfer to Cardiology secondary to elevated troponins.    2000:  Spoke with  about transfer to [facility], secondary to patient being admitted there with CHF exacerbation 2 weeks ago.  He agrees to accept the patient in transfer, working out details now with transfer center.      2155:  Patient is to be transferred to [facility] for admission per Dr. Hilario.  Patient is stable prior to transfer.                        Clinical Impression:   Final diagnoses:  [R06.02] Shortness of breath  [I50.9] Acute on chronic congestive heart failure, unspecified heart failure type (Primary)  [R60.9] Peripheral edema  [R77.8] Elevated troponin I level        ED Disposition Condition    Transfer to Another Facility Stable                Nba Alexander NP  12/20/22 2100

## 2022-12-21 ENCOUNTER — CLINICAL SUPPORT (OUTPATIENT)
Dept: CARDIOLOGY | Facility: HOSPITAL | Age: 72
DRG: 291 | End: 2022-12-21
Payer: MEDICARE

## 2022-12-21 PROBLEM — M10.272 ACUTE DRUG-INDUCED GOUT OF LEFT FOOT: Status: ACTIVE | Noted: 2022-12-21

## 2022-12-21 PROBLEM — J81.0 ACUTE PULMONARY EDEMA: Status: ACTIVE | Noted: 2022-12-21

## 2022-12-21 LAB
ANION GAP SERPL CALC-SCNC: 9 MMOL/L (ref 8–16)
AORTIC ROOT ANNULUS: 3.44 CM
AORTIC VALVE CUSP SEPERATION: 1.21 CM
AV INDEX (PROSTH): 0.3
AV MEAN GRADIENT: 26 MMHG
AV PEAK GRADIENT: 43 MMHG
AV REGURGITATION PRESSURE HALF TIME: 331.5 MS
AV VALVE AREA: 0.94 CM2
AV VELOCITY RATIO: 0.27
BASOPHILS # BLD AUTO: 0.03 K/UL (ref 0–0.2)
BASOPHILS NFR BLD: 0.5 % (ref 0–1.9)
BSA FOR ECHO PROCEDURE: 2.02 M2
BUN SERPL-MCNC: 25 MG/DL (ref 8–23)
CALCIUM SERPL-MCNC: 7.5 MG/DL (ref 8.7–10.5)
CHLORIDE SERPL-SCNC: 105 MMOL/L (ref 95–110)
CO2 SERPL-SCNC: 24 MMOL/L (ref 23–29)
CREAT SERPL-MCNC: 1.6 MG/DL (ref 0.5–1.4)
CV ECHO LV RWT: 0.69 CM
DIFFERENTIAL METHOD: ABNORMAL
DOP CALC AO PEAK VEL: 3.29 M/S
DOP CALC AO VTI: 79.1 CM
DOP CALC LVOT AREA: 3.1 CM2
DOP CALC LVOT DIAMETER: 2 CM
DOP CALC LVOT PEAK VEL: 0.88 M/S
DOP CALC LVOT STROKE VOLUME: 74.42 CM3
DOP CALCLVOT PEAK VEL VTI: 23.7 CM
E WAVE DECELERATION TIME: 218.16 MSEC
E/A RATIO: 0.97
E/E' RATIO: 16.73 M/S
ECHO LV POSTERIOR WALL: 1.71 CM (ref 0.6–1.1)
EJECTION FRACTION: 65 %
EOSINOPHIL # BLD AUTO: 0.2 K/UL (ref 0–0.5)
EOSINOPHIL NFR BLD: 3.3 % (ref 0–8)
ERYTHROCYTE [DISTWIDTH] IN BLOOD BY AUTOMATED COUNT: 15 % (ref 11.5–14.5)
EST. GFR  (NO RACE VARIABLE): 33.6 ML/MIN/1.73 M^2
FRACTIONAL SHORTENING: 34 % (ref 28–44)
GLUCOSE SERPL-MCNC: 102 MG/DL (ref 70–110)
HCT VFR BLD AUTO: 30.9 % (ref 37–48.5)
HGB BLD-MCNC: 9.3 G/DL (ref 12–16)
IMM GRANULOCYTES # BLD AUTO: 0.03 K/UL (ref 0–0.04)
IMM GRANULOCYTES NFR BLD AUTO: 0.5 % (ref 0–0.5)
INTERVENTRICULAR SEPTUM: 1.82 CM (ref 0.6–1.1)
LACTATE SERPL-SCNC: 0.9 MMOL/L (ref 0.5–1.9)
LEFT INTERNAL DIMENSION IN SYSTOLE: 3.3 CM (ref 2.1–4)
LEFT VENTRICLE DIASTOLIC VOLUME INDEX: 60.76 ML/M2
LEFT VENTRICLE DIASTOLIC VOLUME: 117.26 ML
LEFT VENTRICLE MASS INDEX: 213 G/M2
LEFT VENTRICLE SYSTOLIC VOLUME INDEX: 17.1 ML/M2
LEFT VENTRICLE SYSTOLIC VOLUME: 32.99 ML
LEFT VENTRICULAR INTERNAL DIMENSION IN DIASTOLE: 4.98 CM (ref 3.5–6)
LEFT VENTRICULAR MASS: 410.6 G
LV LATERAL E/E' RATIO: 13.14 M/S
LV SEPTAL E/E' RATIO: 23 M/S
LVOT MG: 1.45 MMHG
LVOT MV: 0.55 CM/S
LYMPHOCYTES # BLD AUTO: 1.1 K/UL (ref 1–4.8)
LYMPHOCYTES NFR BLD: 16.8 % (ref 18–48)
MCH RBC QN AUTO: 28.4 PG (ref 27–31)
MCHC RBC AUTO-ENTMCNC: 30.1 G/DL (ref 32–36)
MCV RBC AUTO: 95 FL (ref 82–98)
MONOCYTES # BLD AUTO: 0.4 K/UL (ref 0.3–1)
MONOCYTES NFR BLD: 7 % (ref 4–15)
MV PEAK A VEL: 0.95 M/S
MV PEAK E VEL: 0.92 M/S
MV STENOSIS PRESSURE HALF TIME: 63.27 MS
MV VALVE AREA P 1/2 METHOD: 3.48 CM2
NEUTROPHILS # BLD AUTO: 4.5 K/UL (ref 1.8–7.7)
NEUTROPHILS NFR BLD: 71.9 % (ref 38–73)
NRBC BLD-RTO: 0 /100 WBC
PISA MRMAX VEL: 2.77 M/S
PISA TR MAX VEL: 3.21 M/S
PLATELET # BLD AUTO: 230 K/UL (ref 150–450)
PMV BLD AUTO: 10.5 FL (ref 9.2–12.9)
POTASSIUM SERPL-SCNC: 3.5 MMOL/L (ref 3.5–5.1)
PV MV: 0.97 M/S
PV PEAK VELOCITY: 1.21 CM/S
RA PRESSURE: 3 MMHG
RBC # BLD AUTO: 3.27 M/UL (ref 4–5.4)
SINUS: 3.03 CM
SODIUM SERPL-SCNC: 138 MMOL/L (ref 136–145)
STJ: 3.54 CM
TDI LATERAL: 0.07 M/S
TDI SEPTAL: 0.04 M/S
TDI: 0.06 M/S
TR MAX PG: 41 MMHG
TRICUSPID ANNULAR PLANE SYSTOLIC EXCURSION: 1.44 CM
TV REST PULMONARY ARTERY PRESSURE: 44 MMHG
URATE SERPL-MCNC: 9.4 MG/DL (ref 2.4–5.7)
WBC # BLD AUTO: 6.3 K/UL (ref 3.9–12.7)

## 2022-12-21 PROCEDURE — 84550 ASSAY OF BLOOD/URIC ACID: CPT | Performed by: INTERNAL MEDICINE

## 2022-12-21 PROCEDURE — 93306 TTE W/DOPPLER COMPLETE: CPT | Mod: 26,,, | Performed by: INTERNAL MEDICINE

## 2022-12-21 PROCEDURE — 25000003 PHARM REV CODE 250: Performed by: INTERNAL MEDICINE

## 2022-12-21 PROCEDURE — 99900035 HC TECH TIME PER 15 MIN (STAT)

## 2022-12-21 PROCEDURE — 85025 COMPLETE CBC W/AUTO DIFF WBC: CPT | Performed by: INTERNAL MEDICINE

## 2022-12-21 PROCEDURE — 36415 COLL VENOUS BLD VENIPUNCTURE: CPT | Performed by: INTERNAL MEDICINE

## 2022-12-21 PROCEDURE — 93306 TTE W/DOPPLER COMPLETE: CPT

## 2022-12-21 PROCEDURE — 27000221 HC OXYGEN, UP TO 24 HOURS

## 2022-12-21 PROCEDURE — 21400001 HC TELEMETRY ROOM

## 2022-12-21 PROCEDURE — 93306 ECHO (CUPID ONLY): ICD-10-PCS | Mod: 26,,, | Performed by: INTERNAL MEDICINE

## 2022-12-21 PROCEDURE — 99223 PR INITIAL HOSPITAL CARE,LEVL III: ICD-10-PCS | Mod: ,,, | Performed by: INTERNAL MEDICINE

## 2022-12-21 PROCEDURE — 99223 1ST HOSP IP/OBS HIGH 75: CPT | Mod: ,,, | Performed by: INTERNAL MEDICINE

## 2022-12-21 PROCEDURE — 94761 N-INVAS EAR/PLS OXIMETRY MLT: CPT

## 2022-12-21 PROCEDURE — 63600175 PHARM REV CODE 636 W HCPCS: Performed by: INTERNAL MEDICINE

## 2022-12-21 PROCEDURE — 80048 BASIC METABOLIC PNL TOTAL CA: CPT | Performed by: INTERNAL MEDICINE

## 2022-12-21 RX ORDER — CLOPIDOGREL BISULFATE 75 MG/1
75 TABLET ORAL DAILY
Status: DISCONTINUED | OUTPATIENT
Start: 2022-12-21 | End: 2022-12-23 | Stop reason: HOSPADM

## 2022-12-21 RX ORDER — METOPROLOL SUCCINATE 50 MG/1
50 TABLET, EXTENDED RELEASE ORAL DAILY
Status: DISCONTINUED | OUTPATIENT
Start: 2022-12-21 | End: 2022-12-23 | Stop reason: HOSPADM

## 2022-12-21 RX ORDER — COLCHICINE 0.6 MG/1
0.6 TABLET, FILM COATED ORAL 2 TIMES DAILY
Status: DISCONTINUED | OUTPATIENT
Start: 2022-12-21 | End: 2022-12-22

## 2022-12-21 RX ORDER — FUROSEMIDE 10 MG/ML
40 INJECTION INTRAMUSCULAR; INTRAVENOUS
Status: DISCONTINUED | OUTPATIENT
Start: 2022-12-21 | End: 2022-12-21

## 2022-12-21 RX ORDER — FUROSEMIDE 10 MG/ML
40 INJECTION INTRAMUSCULAR; INTRAVENOUS
Status: DISCONTINUED | OUTPATIENT
Start: 2022-12-21 | End: 2022-12-23

## 2022-12-21 RX ORDER — QUETIAPINE FUMARATE 100 MG/1
100 TABLET, FILM COATED ORAL DAILY
Status: DISCONTINUED | OUTPATIENT
Start: 2022-12-21 | End: 2022-12-23 | Stop reason: HOSPADM

## 2022-12-21 RX ORDER — CLONAZEPAM 1 MG/1
1 TABLET ORAL 2 TIMES DAILY PRN
Status: DISCONTINUED | OUTPATIENT
Start: 2022-12-21 | End: 2022-12-23 | Stop reason: HOSPADM

## 2022-12-21 RX ORDER — TALC
6 POWDER (GRAM) TOPICAL NIGHTLY PRN
Status: DISCONTINUED | OUTPATIENT
Start: 2022-12-21 | End: 2022-12-23 | Stop reason: HOSPADM

## 2022-12-21 RX ORDER — ATORVASTATIN CALCIUM 40 MG/1
40 TABLET, FILM COATED ORAL DAILY
Status: DISCONTINUED | OUTPATIENT
Start: 2022-12-21 | End: 2022-12-23 | Stop reason: HOSPADM

## 2022-12-21 RX ORDER — ONDANSETRON 2 MG/ML
4 INJECTION INTRAMUSCULAR; INTRAVENOUS EVERY 8 HOURS PRN
Status: DISCONTINUED | OUTPATIENT
Start: 2022-12-21 | End: 2022-12-23 | Stop reason: HOSPADM

## 2022-12-21 RX ORDER — LANOLIN ALCOHOL/MO/W.PET/CERES
800 CREAM (GRAM) TOPICAL
Status: DISCONTINUED | OUTPATIENT
Start: 2022-12-21 | End: 2022-12-23 | Stop reason: HOSPADM

## 2022-12-21 RX ORDER — ACETAMINOPHEN 325 MG/1
650 TABLET ORAL EVERY 8 HOURS PRN
Status: DISCONTINUED | OUTPATIENT
Start: 2022-12-21 | End: 2022-12-23 | Stop reason: HOSPADM

## 2022-12-21 RX ORDER — PANTOPRAZOLE SODIUM 40 MG/1
40 TABLET, DELAYED RELEASE ORAL DAILY
Status: DISCONTINUED | OUTPATIENT
Start: 2022-12-21 | End: 2022-12-23 | Stop reason: HOSPADM

## 2022-12-21 RX ORDER — ENOXAPARIN SODIUM 100 MG/ML
40 INJECTION SUBCUTANEOUS EVERY 24 HOURS
Status: DISCONTINUED | OUTPATIENT
Start: 2022-12-21 | End: 2022-12-23 | Stop reason: HOSPADM

## 2022-12-21 RX ORDER — SODIUM,POTASSIUM PHOSPHATES 280-250MG
2 POWDER IN PACKET (EA) ORAL
Status: DISCONTINUED | OUTPATIENT
Start: 2022-12-21 | End: 2022-12-23 | Stop reason: HOSPADM

## 2022-12-21 RX ORDER — ALBUTEROL SULFATE 90 UG/1
2 AEROSOL, METERED RESPIRATORY (INHALATION) EVERY 6 HOURS PRN
Status: DISCONTINUED | OUTPATIENT
Start: 2022-12-21 | End: 2022-12-21

## 2022-12-21 RX ORDER — PAROXETINE HYDROCHLORIDE 20 MG/1
40 TABLET, FILM COATED ORAL DAILY
Status: DISCONTINUED | OUTPATIENT
Start: 2022-12-21 | End: 2022-12-23 | Stop reason: HOSPADM

## 2022-12-21 RX ORDER — HYDRALAZINE HYDROCHLORIDE 20 MG/ML
10 INJECTION INTRAMUSCULAR; INTRAVENOUS EVERY 8 HOURS PRN
Status: DISCONTINUED | OUTPATIENT
Start: 2022-12-21 | End: 2022-12-23 | Stop reason: HOSPADM

## 2022-12-21 RX ORDER — ASPIRIN 81 MG/1
81 TABLET ORAL DAILY
Status: DISCONTINUED | OUTPATIENT
Start: 2022-12-21 | End: 2022-12-23 | Stop reason: HOSPADM

## 2022-12-21 RX ORDER — ISOSORBIDE MONONITRATE 60 MG/1
60 TABLET, EXTENDED RELEASE ORAL DAILY
Status: DISCONTINUED | OUTPATIENT
Start: 2022-12-21 | End: 2022-12-23 | Stop reason: HOSPADM

## 2022-12-21 RX ADMIN — ISOSORBIDE MONONITRATE 60 MG: 60 TABLET, EXTENDED RELEASE ORAL at 08:12

## 2022-12-21 RX ADMIN — FUROSEMIDE 40 MG: 10 INJECTION, SOLUTION INTRAVENOUS at 05:12

## 2022-12-21 RX ADMIN — ENOXAPARIN SODIUM 40 MG: 100 INJECTION SUBCUTANEOUS at 05:12

## 2022-12-21 RX ADMIN — METOPROLOL SUCCINATE 50 MG: 50 TABLET, FILM COATED, EXTENDED RELEASE ORAL at 08:12

## 2022-12-21 RX ADMIN — ASPIRIN 81 MG: 81 TABLET, DELAYED RELEASE ORAL at 08:12

## 2022-12-21 RX ADMIN — PANTOPRAZOLE SODIUM 40 MG: 40 TABLET, DELAYED RELEASE ORAL at 05:12

## 2022-12-21 RX ADMIN — COLCHICINE 0.6 MG: 0.6 TABLET, FILM COATED ORAL at 09:12

## 2022-12-21 RX ADMIN — ATORVASTATIN CALCIUM 40 MG: 40 TABLET, FILM COATED ORAL at 09:12

## 2022-12-21 RX ADMIN — CLONAZEPAM 1 MG: 1 TABLET ORAL at 09:12

## 2022-12-21 RX ADMIN — CLOPIDOGREL BISULFATE 75 MG: 75 TABLET, FILM COATED ORAL at 08:12

## 2022-12-21 RX ADMIN — PAROXETINE HYDROCHLORIDE 40 MG: 20 TABLET, FILM COATED ORAL at 08:12

## 2022-12-21 RX ADMIN — POTASSIUM BICARBONATE 50 MEQ: 977.5 TABLET, EFFERVESCENT ORAL at 11:12

## 2022-12-21 RX ADMIN — COLCHICINE 0.6 MG: 0.6 TABLET, FILM COATED ORAL at 08:12

## 2022-12-21 RX ADMIN — ACETAMINOPHEN 650 MG: 325 TABLET ORAL at 07:12

## 2022-12-21 RX ADMIN — QUETIAPINE 100 MG: 100 TABLET ORAL at 08:12

## 2022-12-21 NOTE — CONSULTS
[facility]  Adult Nutrition   Consult Note (Nutrition Education)     SUMMARY       Dietitian Rounds Brief  Pt laying in bed with no family present. Introduced self and attempted to provide cardiac diet education in which pt refused. Handouts provided at bedside with RD contact information.     Diet order:   Current Diet Order: cardiac     Reason for Assessment  Reason For Assessment: consult (cardiac diet education)    Nutrition Risk Screen  Nutrition Risk Screen: no indicators present     MST Score: 0  Have you recently lost weight without trying?: No  Weight loss score: 0  Have you been eating poorly because of a decreased appetite?: No  Appetite score: 0       Nutrition Follow-Up  RD Follow-up?: Yes      Stefanie Maier RD 12/21/2022 4:33 PM

## 2022-12-21 NOTE — ASSESSMENT & PLAN NOTE
Inpatient admission for Acute Pulmonary Edema; Cardiology opinion; less than 2 gm/day sodium diet; continue home regimen for chronic maladie except changing colchicine from prn to bid while in hospital; mild analgesia; furosemide iv bid

## 2022-12-21 NOTE — ED NOTES
Received pt from MARJAN Aguillon. Pt was admitted at [facility] for CHF exacerbation and Pneumonia 2 weeks ago.   Pt reports bilaterally legs swelling x1  month with the new onset of painful ambulation today. Pt denies swelling is worse than before.   Pt also reports she use 4L O2 at home all the time. Currently denies cp or any pain.   Vital signs stable. Will continue to monitor.

## 2022-12-21 NOTE — H&P
"[facility]  Department of Cardiology  Consult Note      PATIENT NAME: Betty Bacon  MRN: [id number]  TODAY'S DATE: 12/21/2022  ADMIT DATE: 12/20/2022                          CONSULT REQUESTED BY: Amandeep Ramirez, *    SUBJECTIVE     PRINCIPAL PROBLEM: Acute pulmonary edema      REASON FOR CONSULT:  Pulmonary Edema and aortic stenonsis       HPI:  Patient is a 72 y.o. female with HFpEF 55% (12/2/2022), moderate to severe aortic stenosis, pulmonary HTN, CAD, COPD, Gout, HTN, CKD, and mood disorder who presented to [facility] with SOB, orthopnea, PND, pedal edema, swollen and painful left ankle.  She has not been following a low salt diet.  She was recently discharged formerly [facility] 1 week ago for the same diagnosis. She was treated with diuretics last admission and responded well. US of BLE is negative for DVT. EKG showed sinus rhythm without acute ST-T segments. Troponins are elevated at 389.5 likely secondary to pulmonary edema and fluid overload. . Patient denies CP, palpitations, dizziness, lightheadedness, jaw/neck/arm pain, focal deficits and bleeding.  L ankle tenderness is likely secondary to gout flare. Urate level is elevated.      Upon examination, she is resting quietly in bed, in no acute distress, on 4L NC, with 1+ nonpitting edema. L ankle and foot is tender to palpation.       HPI: 72 year old female with history of  HFpEF (55% 2022), AS (mod-severe), Pulm HTN (55 mm Hg), CAD, COPD, Gout, HTN, CKI (3), Mood Disorder was transferred from [facility] to [facility] ED at the behest of Cardiology for acute pulmonary edema. The patient was discharged from [facility] approximately 1 week ago after being admitted for the same diagnosis. ECHO then revealed the above findings. The patient responded well to diuresis and was discharged home. However, she did not follow low salt diet, in fact:  "I like my salt". No anginal like chest discomfort. She has had SOB(E), orthopnea, PND, and pedal " edema. She has a history of gout and has developed a swollen, painful 9/10 left ankle since her discharge and use of iv furosemide.      In ED: labs reviewed and noted below: no leukocytosis with mild normocytic anemia; Urate is elevated, electrolytes are normal with stage 3b renal dysfunction; BNP and troponin are minimally elevated (delta of HS trop at this institution is only 0.08 ng/mL). UA: clean. CXR reviewed:  NAPD; no infiltrate. US BLE: No DVT. EKG reviewed: sinus without acute segments.     Discussed salt usage, volume overload, use of diuretics and flare of gout, and how all of that could possibly be mitigated by cutting out her salt usage with the patient and her children at bedside. All express understanding.     Discussed with ED MD: Inpatient admission for Acute Pulmonary Edema; Cardiology opinion; less than 2 gm/day sodium diet; continue home regimen for chronic maladie except changing colchicine from prn to bid while in hospital; mild analgesia; furosemide iv bid        Review of patient's allergies indicates:   Allergen Reactions    Propoxyphene n-acetaminophen Other (See Comments)     Other reaction(s): Unknown    Codeine Anxiety       Past Medical History:   Diagnosis Date    Acute coronary artery obstruction without MI 10/2012    Benign hypertension     COPD (chronic obstructive pulmonary disease)     Coronary artery disease     Disorder of kidney and ureter     Dr. Morrow    Hepatitis B core antibody positive 03/03/2021    Negative sAg, suggests previous exposure but no chronic/active Hep B. At risk for reactivation with any immunosuppression medication, steroids, chemo, etc.      History of electroconvulsive therapy     Hyperlipidemia LDL goal < 70     Left ankle sprain     Major depressive disorder, recurrent episode, severe     s/p ECT    Positive AKIRA (antinuclear antibody) 03/03/2021    PVD (peripheral vascular disease)      Past Surgical History:   Procedure Laterality Date     CHOLECYSTECTOMY      CORONARY ANGIOPLASTY      CORONARY ANGIOPLASTY WITH STENT PLACEMENT  10/2012    2 stents RCA (100% stenosis)    EYE SURGERY      cataract surgery    HYSTERECTOMY      LINA, ovaries intact. uterine prolapse    ILIAC ARTERY STENT      TONSILLECTOMY      TOTAL VAGINAL HYSTERECTOMY       Social History     Tobacco Use    Smoking status: Former     Packs/day: 2.00     Years: 40.00     Pack years: 80.00     Types: Cigarettes     Quit date: 2009     Years since quittin.0    Smokeless tobacco: Never   Substance Use Topics    Alcohol use: Yes     Comment: social    Drug use: No        REVIEW OF SYSTEMS  CONSTITUTIONAL: +fatigue Negative for chills, and fever.   EYES: No double vision, No blurred vision  NEURO: No headaches, No dizziness  RESPIRATORY: + shortness of breath; Negative for cough, and wheezing.    CARDIOVASCULAR: + pedal edema Negative for chest pain. Negative for palpitations.   GI: Negative for abdominal pain, No melena, diarrhea, nausea and vomiting.   : Negative for dysuria and frequency, Negative for hematuria  SKIN: Negative for bruising, Negative for edema or discoloration noted.   ENDOCRINE: Negative for polyphagia, Negative for heat intolerance, Negative for cold intolerance  PSYCHIATRIC: Negative for depression, Negative for anxiety, Negative for memory loss  MUSCULOSKELETAL: + L ankle edema, erythema, and pain; Negative for neck pain, Negative for muscle weakness, Negative for back pain     OBJECTIVE     VITAL SIGNS (Most Recent)  Temp: 98.8 °F (37.1 °C) (22 1113)  Pulse: 78 (22 1500)  Resp: 18 (22 1500)  BP: 121/67 (22 1500)  SpO2: 96 % (22 1500)    VENTILATION STATUS  Resp: 18 (22 1500)  SpO2: 96 % (22 1500)       I & O (Last 24H):  Intake/Output Summary (Last 24 hours) at 2022 1719  Last data filed at 2022 1307  Gross per 24 hour   Intake 600 ml   Output 600 ml   Net 0 ml       WEIGHTS  Wt Readings from Last 3  Encounters:   12/21/22 0213 93.2 kg (205 lb 7.5 oz)   12/20/22 2252 95.7 kg (211 lb)   12/20/22 1459 95.7 kg (211 lb)   12/08/22 1616 92.4 kg (203 lb 11.3 oz)       PHYSICAL EXAM  GENERAL: obese, well built, well nourished, well-developed in no apparent distress alert and oriented.   HEENT: Normocephalic. Pupils normal and conjunctivae normal.  Mucous membranes normal, no cyanosis or icterus, trachea central,no pallor or icterus is noted..   NECK: No JVD. Conductive murmur noted bilaterally.   THYROID: Thyroid not examined  CARDIAC: Regular rate and rhythm. S1 is normal.S2 is normal.No gallops, or clicks noted at this time. 3/6 systolic murmurs  CHEST ANATOMY: normal.   LUNGS: Clear to auscultation. No wheezing or rhonchi..   ABDOMEN: Obese, Soft no masses or organomegaly.  No abdomen pulsations or bruits.  Normal bowel sounds. No pulsations and no masses felt, No guarding or rebound.   URINARY: No yoder catheter   EXTREMITIES: No cyanosis, or clubbing noted at this time., no calf tenderness bilaterally. 1+ bilateral edema in lower legs. L foot and ankle is tender to palpation.   PERIPHERAL VASCULAR SYSTEM: Good palpable distal pulses.   CENTRAL NERVOUS SYSTEM: No focal motor or sensory deficits noted.   SKIN: Skin without lesions, moist, well perfused.   MUSCLE STRENGTH & TONE: No noteable weakness, atrophy or abnormal movement.     HOME MEDICATIONS:  No current facility-administered medications on file prior to encounter.     Current Outpatient Medications on File Prior to Encounter   Medication Sig Dispense Refill    albuterol sulfate (PROAIR RESPICLICK) 90 mcg/actuation AePB Inhale 2 puffs into the lungs every 4 to 6 hours as needed. 1 each 3    ALPRAZolam (XANAX) 1 MG tablet Take 1 tablet (1 mg total) by mouth 2 (two) times daily as needed for Anxiety. 60 tablet 3    aspirin 81 mg Tab Take 81 mg by mouth once daily. Every day      atorvastatin (LIPITOR) 40 MG tablet TAKE 1 TABLET BY MOUTH EVERY DAY (Patient  taking differently: Take 40 mg by mouth once daily.) 90 tablet 3    clonazePAM (KLONOPIN) 1 MG disintegrating tablet Take 1 tablet (1 mg total) by mouth 2 (two) times daily as needed (anxiety). 60 tablet 0    clopidogreL (PLAVIX) 75 mg tablet TAKE 1 TABLET BY MOUTH EVERY DAY (Patient taking differently: Take 75 mg by mouth once daily.) 90 tablet 3    colchicine (COLCRYS) 0.6 mg tablet Take 2 po at gout flare onset, may repeat 1 in an hour prn, then 1 po bid until pain resolves ,or n/V/D starts (Patient taking differently: Take 0.6 mg by mouth as needed. Take 2 po at gout flare onset, may repeat 1 in an hour prn, then 1 po bid until pain resolves ,or n/V/D starts) 20 tablet 0    esomeprazole (NEXIUM) 40 MG capsule TAKE 1 CAPSULE BY MOUTH BEFORE BREAKFAST. (Patient taking differently: Take 40 mg by mouth before breakfast.) 90 capsule 3    furosemide (LASIX) 20 MG tablet Take 20 mg by mouth once daily.  0    isosorbide mononitrate (IMDUR) 60 MG 24 hr tablet TAKE 1 TABLET BY MOUTH ONCE DAILY (Patient taking differently: Take 60 mg by mouth once daily.) 90 tablet 3    metoprolol succinate (TOPROL-XL) 50 MG 24 hr tablet TAKE 1 TABLET BY MOUTH EVERY DAY (Patient taking differently: Take 50 mg by mouth once daily. DO NOT CRUSH OR CHEW; SWALLOW WHOLE.) 90 tablet 3    paroxetine (PAXIL) 40 MG tablet TAKE 1 TABLET BY MOUTH EVERY DAY (Patient taking differently: Take 40 mg by mouth once daily. Total 40 mg) 90 tablet 3    QUEtiapine (SEROQUEL) 100 MG Tab Take 100 mg by mouth once daily.      spironolactone (ALDACTONE) 25 MG tablet Take 25 mg by mouth once daily.  0    TRELEGY ELLIPTA 200-62.5-25 mcg inhaler INHALE 1 PUFF INTO THE LUNGS ONCE DAILY. 180 each 2    triamterene-hydrochlorothiazide 37.5-25 mg (DYAZIDE) 37.5-25 mg per capsule TAKE 1 CAPSULE BY MOUTH ONCE DAILY 90 capsule 2    colestipoL (COLESTID) 1 gram Tab Take 1 tablet (1 g total) by mouth 2 (two) times daily as needed (diarrhea). 180 tablet 3    ergocalciferol  (ERGOCALCIFEROL) 50,000 unit Cap Take 1 capsule (50,000 Units total) by mouth every 7 days. (Patient taking differently: Take 50,000 Units by mouth every 7 days. FRIDAYS) 12 capsule 3    [DISCONTINUED] clotrimazole-betamethasone 1-0.05% (LOTRISONE) cream APPLY TO AFFECTED AREA TWICE A DAY (Patient taking differently: Apply topically 2 (two) times daily.) 15 g 0    [DISCONTINUED] diclofenac sodium (VOLTAREN) 1 % Gel Apply 2 g topically 3 (three) times daily as needed. 100 g 5    [DISCONTINUED] Hydrocortisone 1%-Econazole 1% Topical Cream Apply 1 application topically 2 (two) times a day.      [DISCONTINUED] pimecrolimus (ELIDEL) 1 % cream Apply topically 2 (two) times daily. (Patient taking differently: Apply 1 application topically 2 (two) times daily.) 60 g 0    [DISCONTINUED] tacrolimus (PROTOPIC) 0.1 % ointment Apply topically 2 (two) times daily. 30 g 1    [DISCONTINUED] vitamin D (VITAMIN D3) 1000 units Tab Take 1,000 Units by mouth once daily.         SCHEDULED MEDS:   aspirin  81 mg Oral Daily    atorvastatin  40 mg Oral Daily    clopidogreL  75 mg Oral Daily    colchicine  0.6 mg Oral BID    enoxaparin  40 mg Subcutaneous Daily    furosemide (LASIX) injection  40 mg Intravenous Q12H    isosorbide mononitrate  60 mg Oral Daily    metoprolol succinate  50 mg Oral Daily    pantoprazole  40 mg Oral Daily    paroxetine  40 mg Oral Daily    QUEtiapine  100 mg Oral Daily       CONTINUOUS INFUSIONS:    PRN MEDS:acetaminophen, clonazePAM, hydrALAZINE, magnesium oxide, magnesium oxide, melatonin, ondansetron, potassium bicarbonate, potassium bicarbonate, potassium bicarbonate, potassium, sodium phosphates, potassium, sodium phosphates, potassium, sodium phosphates    LABS AND DIAGNOSTICS     CBC LAST 3 DAYS  Recent Labs   Lab 12/20/22  1537 12/21/22  0429   WBC 9.20 6.30   RBC 3.75* 3.27*   HGB 10.6* 9.3*   HCT 35.1* 30.9*   MCV 94 95   MCH 28.3 28.4   MCHC 30.2* 30.1*   RDW 15.1* 15.0*    230   MPV 10.7 10.5    GRAN 85.3*  7.8* 71.9  4.5   LYMPH 6.6*  0.6* 16.8*  1.1   MONO 5.3  0.5 7.0  0.4   BASO 0.04 0.03   NRBC 0 0       COAGULATION LAST 3 DAYS  No results for input(s): LABPT, INR, APTT in the last 168 hours.    CHEMISTRY LAST 3 DAYS  Recent Labs   Lab 12/15/22  1145 12/20/22  1537 12/21/22  0429    141 138   K 4.3 4.5 3.5    106 105   CO2 20* 23 24   ANIONGAP 13 12 9   BUN 18 18 25*   CREATININE 1.3 1.4 1.6*   * 113* 102   CALCIUM 8.7 8.4* 7.5*   ALBUMIN 2.5* 2.3*  --    PROT  --  7.2  --    ALKPHOS  --  92  --    ALT  --  12  --    AST  --  11  --    BILITOT  --  0.6  --        CARDIAC PROFILE LAST 3 DAYS  Recent Labs   Lab 12/20/22  1537 12/20/22  1759   *  --    TROPONINI 0.044* 0.052*       ENDOCRINE LAST 3 DAYS  No results for input(s): TSH, PROCAL in the last 168 hours.    LAST ARTERIAL BLOOD GAS  ABG  No results for input(s): PH, PO2, PCO2, HCO3, BE in the last 168 hours.    LAST 7 DAYS MICROBIOLOGY   Microbiology Results (last 7 days)       Procedure Component Value Units Date/Time    Blood culture #1 **CANNOT BE ORDERED STAT** [[id number]] Collected: 12/20/22 2337    Order Status: Completed Specimen: Blood from Peripheral, Antecubital, Right Updated: 12/21/22 0717     Blood Culture, Routine No Growth to date    Blood culture #2 **CANNOT BE ORDERED STAT** [[id number]] Collected: 12/20/22 2337    Order Status: Completed Specimen: Blood from Peripheral, Antecubital, Right Updated: 12/21/22 0717     Blood Culture, Routine No Growth to date            MOST RECENT IMAGING  Echo  · The left ventricle is normal in size with severe concentric hypertrophy   and normal systolic function.  · Grade II left ventricular diastolic dysfunction.  · The estimated ejection fraction is 65%.  · Normal right ventricular size with normal right ventricular systolic   function.  · Moderate aortic regurgitation.  · There is severe aortic valve stenosis.  · Aortic valve area is 0.94 cm2; peak velocity is  3.29 m/s; mean gradient   is 26 mmHg.  · Mild pulmonic regurgitation.  · Mild tricuspid regurgitation.  · There is mild pulmonary hypertension.  · Normal central venous pressure (3 mmHg).  · The estimated PA systolic pressure is 44 mmHg.         ECHOCARDIOGRAM RESULTS (last 5)  Results for orders placed during the hospital encounter of 12/01/22    Echo    Interpretation Summary  · The left ventricle is normal in size with moderate concentric hypertrophy and normal systolic function.  · The estimated ejection fraction is 55%.  · Grade I left ventricular diastolic dysfunction.  · Normal right ventricular size with normal right ventricular systolic function.  · Severe left atrial enlargement.  · Moderate right atrial enlargement.  · Mild aortic regurgitation.  · There is moderate-to-severe aortic valve stenosis.  · Aortic valve area is 1.16 cm2; peak velocity is 3.59 m/s; mean gradient is 31 mmHg.  · Moderate tricuspid regurgitation.  · Normal central venous pressure (3 mmHg).  · The estimated PA systolic pressure is 53 mmHg.  · There is pulmonary hypertension.      Results for orders placed in visit on 06/02/22    Echo    Interpretation Summary  · The left ventricle is normal in size with concentric hypertrophy and normal systolic function.  · The estimated ejection fraction is 55%.  · Grade II left ventricular diastolic dysfunction.  · Normal right ventricular size with normal right ventricular systolic function.  · Severe left atrial enlargement.  · Mild right atrial enlargement.  · The aortic valve is trileaflet but malformed.  · Moderate aortic regurgitation.  · There is moderate-to-severe aortic valve stenosis.  · Aortic valve area is 1.00 cm2; peak velocity is 3.38 m/s; mean gradient is 29 mmHg.  · Mild mitral regurgitation.  · Mild to moderate tricuspid regurgitation.  · Normal central venous pressure (3 mmHg).  · The estimated PA systolic pressure is 51 mmHg.  · There is pulmonary hypertension.      Results  for orders placed in visit on 06/23/20    Echo Color Flow Doppler? Yes    Interpretation Summary  · The left ventricle is normal in size with mild concentric hypertrophy and normal systolic function.  · The estimated ejection fraction is 55%.  · Grade II left ventricular diastolic dysfunction.  · Normal right ventricular size with normal right ventricular systolic function.  · Moderate left atrial enlargement.  · There is moderate aortic valve stenosis.  · Aortic valve area is 1.17 cm2; peak velocity is 2.98 m/s; mean gradient is 21 mmHg.  · Mild aortic regurgitation.  · Mild mitral regurgitation.  · Normal central venous pressure (3 mmHg).  · The estimated PA systolic pressure is 41 mmHg.      Results for orders placed in visit on 06/22/20    Echo Color Flow Doppler? Yes    Interpretation Summary  · Normal left ventricular systolic function. The estimated ejection fraction is 55%.  · Mild eccentric left ventricular hypertrophy.  · Grade II (moderate) left ventricular diastolic dysfunction consistent with pseudonormalization.  · Normal right ventricular systolic function.  · Mild aortic regurgitation.  · Moderate aortic valve stenosis.  · Aortic valve area is 1.09 cm2; peak velocity is 2.99 m/s; mean gradient is 22 mmHg.  · Normal central venous pressure (3 mmHg).  · The estimated PA systolic pressure is 35 mmHg.      Results for orders placed in visit on 03/26/19    Transthoracic echo (TTE) complete (Cupid Only)    Interpretation Summary  · Normal left ventricular systolic function. The estimated ejection fraction is 55%  · Eccentric left ventricular hypertrophy.  · Normal right ventricular systolic function.  · Severe left atrial enlargement.  · Mild right atrial enlargement.  · Moderate aortic valve stenosis.  · Aortic valve area is 1.08 cm2; peak velocity is 3.02 m/s; mean gradient is 20.79 mmHg.  · Mild-to-moderate aortic regurgitation.  · Mild mitral regurgitation.  · The estimated PA systolic pressure is 36  mm Hg      CURRENT/PREVIOUS VISIT EKG  Results for orders placed or performed during the hospital encounter of 12/20/22   Repeat EKG 12-lead    Collection Time: 12/20/22 11:33 PM    Narrative    Test Reason : R07.9,    Vent. Rate : 074 BPM     Atrial Rate : 074 BPM     P-R Int : 168 ms          QRS Dur : 096 ms      QT Int : 414 ms       P-R-T Axes : 029 023 047 degrees     QTc Int : 459 ms    Normal sinus rhythm  Normal ECG  When compared with ECG of 20-DEC-2022 15:11,  No significant change was found    Referred By: NATALY FLOREZ           Confirmed By:            ASSESSMENT/PLAN:     Active Hospital Problems    Diagnosis    *Acute pulmonary edema    Acute drug-induced gout of left foot    CAD (coronary artery disease)     NEFTALY RCA 7/2004  Stents x 2 RCA 10/2012      Aortic stenosis     moderate      COPD (chronic obstructive pulmonary disease)       ASSESSMENT & PLAN:   Pulmonary edema and aortic stenosis   Acute Drug Induced Gout of Left Foot  CAD  Aortic Stenosis, valve area of 0.94 centimeters  COPD      RECOMMENDATIONS:  Patient is noncompliant with low sodium cardiac diet.    Repeat echo echocardiogram shows significant aortic valve stenosis with a valve area of 0.9 cm squared   Strongly encourage patient to follow a less than 2g low sodium diet. Limit oral fluid intake.   Continue lasix 40mg IV BID for diuresis.  Monitor renal function closely. Strict I's and O's. Limit IVF.   Continue to check and replace potassium and magnesium. Goal for potassium is 4.0, and goal for magnesium is 2.0.  Monitor renal function closely.   Continue colchicine 0.6mg BID for gout flare.   Patient has CAD and abnormal stress test in 2014 with history of stent placement x2 to RCA in the past. Continue aspirin 81 mg daily. Continue atorvastatin 40mg daily. Continue Plavix 75mg once daily. Continue Imdur 60mg once daily. Continue metoprolol 50mg once daily.    Thank you for the consultation. Will continue to follow.   When patient  is more stable she may benefit from evaluation for possible TAVR at a later date     Marleny Church NP  [facility]  Department of Cardiology  Date of Service: 12/21/2022        I have personally interviewed and examined the patient, I have reviewed the Nurse Practitioner's history and physical, assessment, and plan. I agree with the findings and plan.      Robert Roca M.D.  [facility]  Department of Cardiology  Date of Service: 12/21/2022

## 2022-12-21 NOTE — ED PROVIDER NOTES
Encounter Date: 12/20/2022       History     Chief Complaint   Patient presents with    Shortness of Breath     Patient transferred to [facility] for further evaluation treatment of congestive heart failure patient reportedly recently admitted for diuresis here at [facility] states over time the fluid has slowly build back up she does complain of inability to ambulate secondary to pain swelling on arrival at [facility] left lower extremities noted to be erythematous and warm to the touch review of patient's labs from [facility] showed that her uric acid level is elevated she has a normal white blood cell count no ultrasound was performed      Review of patient's allergies indicates:   Allergen Reactions    Propoxyphene n-acetaminophen Other (See Comments)     Other reaction(s): Unknown    Codeine Anxiety     Past Medical History:   Diagnosis Date    Acute coronary artery obstruction without MI 10/2012    Benign hypertension     COPD (chronic obstructive pulmonary disease)     Coronary artery disease     Disorder of kidney and ureter     Dr. Morrow    Hepatitis B core antibody positive 03/03/2021    Negative sAg, suggests previous exposure but no chronic/active Hep B. At risk for reactivation with any immunosuppression medication, steroids, chemo, etc.      History of electroconvulsive therapy     Hyperlipidemia LDL goal < 70     Left ankle sprain     Major depressive disorder, recurrent episode, severe     s/p ECT    Positive AKIRA (antinuclear antibody) 03/03/2021    PVD (peripheral vascular disease)      Past Surgical History:   Procedure Laterality Date    CHOLECYSTECTOMY      CORONARY ANGIOPLASTY      CORONARY ANGIOPLASTY WITH STENT PLACEMENT  10/2012    2 stents RCA (100% stenosis)    EYE SURGERY      cataract surgery    HYSTERECTOMY      LINA, ovaries intact. uterine prolapse    ILIAC ARTERY STENT      TONSILLECTOMY      TOTAL VAGINAL HYSTERECTOMY       Family History   Problem Relation Age of  Onset    Diabetes Mother     Heart disease Mother     Stroke Father     Heart disease Father     Heart disease Brother     Stroke Brother     Hypertension Daughter     Diabetes Maternal Aunt     Heart disease Maternal Aunt     Heart disease Maternal Uncle     Heart disease Paternal Aunt     Heart disease Paternal Uncle     Heart disease Maternal Grandfather     Diabetes Sister     Heart disease Sister     Cancer Sister         lung    Kidney disease Sister         mass, benign    Breast cancer Sister     Stroke Sister     Dementia Sister     Liver disease Sister     Melanoma Neg Hx     Psoriasis Neg Hx     Lupus Neg Hx     Eczema Neg Hx      Social History     Tobacco Use    Smoking status: Former     Packs/day: 2.00     Years: 40.00     Pack years: 80.00     Types: Cigarettes     Quit date: 2009     Years since quittin.0    Smokeless tobacco: Never   Substance Use Topics    Alcohol use: Yes     Comment: social    Drug use: No     Review of Systems   Constitutional:  Negative for chills, fatigue and fever.   HENT:  Negative for congestion.    Respiratory:  Positive for cough and shortness of breath.    Cardiovascular:  Positive for chest pain and leg swelling. Negative for palpitations.   Gastrointestinal:  Negative for abdominal pain, blood in stool, diarrhea, nausea and vomiting.   Genitourinary:  Negative for dysuria and hematuria.   All other systems reviewed and are negative.    Physical Exam     Initial Vitals [22 2252]   BP Pulse Resp Temp SpO2   (!) 140/62 80 20 97.9 °F (36.6 °C) 97 %      MAP       --         Physical Exam    Constitutional: She appears well-developed and well-nourished. No distress.   HENT:   Head: Normocephalic and atraumatic.   Right Ear: External ear normal.   Left Ear: External ear normal.   Mouth/Throat: Oropharynx is clear and moist.   Eyes: Conjunctivae and EOM are normal. Pupils are equal, round, and reactive to light.   Neck: Neck supple.   Normal range of  motion.  Cardiovascular:  Normal rate, regular rhythm, normal heart sounds and intact distal pulses.           Pulmonary/Chest: No respiratory distress. She has rales.   Abdominal: Abdomen is soft. Bowel sounds are normal. There is no abdominal tenderness.   Musculoskeletal:         General: Edema present. Normal range of motion.      Cervical back: Normal range of motion and neck supple.     Neurological: She is alert and oriented to person, place, and time. She has normal strength. No cranial nerve deficit. GCS score is 15. GCS eye subscore is 4. GCS verbal subscore is 5. GCS motor subscore is 6.   Skin: Skin is warm and dry. Capillary refill takes less than 2 seconds. No rash noted. There is erythema.   Left lower extremity with erythema and edema increased warmth   Psychiatric: She has a normal mood and affect. Her behavior is normal.       ED Course   Procedures  Labs Reviewed   CULTURE, BLOOD   CULTURE, BLOOD   LACTIC ACID, PLASMA          Imaging Results              US Lower Extremity Veins Bilateral (Final result)  Result time 12/20/22 23:53:46      Final result by Landen Krueger MD (12/20/22 23:53:46)                   Narrative:    EXAM DESCRIPTION:  US LOWER EXTREMITY VEINS BILATERAL  RadLex: US LOWER EXTREMITY VEINS BILATERAL    CLINICAL HISTORY:  72 years  Female;    TECHNIQUE:  Multiple grayscale sonographic images of both legs were obtained utilizing a high-frequency linear array transducer supplemented with color Doppler, compression and augmentation  techniques.      COMPARISON: None.    FINDINGS:    Right leg veins:  Common femoral: normal  Greater saphenous: normal  upper Superficial femoral: normal  mid Superficial femoral: normal  lower Superficial femoral: normal  Popliteal: normal  Posterior Tibial: normal    Left leg veins:  Common femoral: normal  Greater saphenous: normal  upper Superficial femoral: normal  mid Superficial femoral: normal  lower Superficial femoral: normal  Popliteal:  normal  Posterior Tibial: normal    IMPRESSION:    1.  No sonographic evidence for lower extremity deep venous thrombosis in either leg.    Electronically signed by:  Landen Krueger MD  12/20/2022 11:53 PM [facility] Workstation: [id number]                                     Medications   acetaminophen tablet 1,000 mg (1,000 mg Oral Given 12/20/22 9870)     Medical Decision Making:   ED Management:  Ultrasound of the lower extremity showed no evidence of deep vein thrombosis I reviewed patient's laboratory findings from [facility] patient's uric acid is elevated suspect the redness may be secondary to gout discussed patient's findings with Dr. Levy who admit patient for further diuresis                        Clinical Impression:   Final diagnoses:  [M79.89] Swelling of lower extremity  [R07.9] Chest pain  [I50.9] Congestive heart failure, unspecified HF chronicity, unspecified heart failure type (Primary)        ED Disposition Condition    Admit Stable                Cyrus Perdue MD  12/21/22 0046       Cyrus Perdue MD  12/21/22 0052

## 2022-12-21 NOTE — DISCHARGE INSTRUCTIONS
Patient has been transferred to [facility] to Cardiology Dr. Hilario

## 2022-12-21 NOTE — PLAN OF CARE
[facility]  Initial Discharge Assessment       Primary Care Provider: Johanne Callejas MD    Admission Diagnosis: Pulmonary edema, acute [J81.0]    Admission Date: 12/20/2022  Expected Discharge Date:      Discharge assessment complete. Patient may benefit from home health on discharge. IP consult to dietician placed for non-compliance with salt.     Payor: MEDICARE / Plan: MEDICARE PART A & B / Product Type: Government /     Extended Emergency Contact Information  Primary Emergency Contact: Loree Sanchez  Address: [address] [place] [address] [facility] [place]  Home Phone: [phone]  Mobile Phone: [phone]  Relation: Daughter  Preferred language: English   needed? No  Secondary Emergency Contact: Barbara Bacon  Address: [address] [address] [facility]  Home Phone: [phone]  Mobile Phone: [phone]  Relation: Relative  Preferred language: English   needed? No    Discharge Plan A: Home with family  Discharge Plan B: Home Health      [id number] - [place], [place] - [address] AT [facility]  [address]  [place] [address]  Phone: [phone] Fax: [phone]    [facility]  [address]  [facility] [place] [address]  Phone: [phone] Fax: [phone]    [facility] - [place], [place] - [address]  [address]  [place] [address]  Phone: [phone] Fax: [phone]      Initial Assessment (most recent)       Adult Discharge Assessment - 12/21/22 1037          Discharge Assessment    Assessment Type Discharge Planning Assessment     Confirmed/corrected address, phone number and insurance Yes     Confirmed Demographics Correct on Facesheet     Source of Information patient     Reason For Admission SOB     People in Home alone     Facility Arrived From: home     Do you expect to return to your current living situation? Yes     Do  you have help at home or someone to help you manage your care at home? No     Prior to hospitilization cognitive status: Alert/Oriented     Current cognitive status: Alert/Oriented     Walking or Climbing Stairs ambulation difficulty, requires equipment     Equipment Currently Used at Home walker, standard;oxygen;shower chair     Readmission within 30 days? Yes     Patient currently being followed by outpatient case management? No     Do you currently have service(s) that help you manage your care at home? No     Do you take prescription medications? Yes     Do you have prescription coverage? Yes     Coverage medicare/[employer]     Do you have any problems affording any of your prescribed medications? No     Is the patient taking medications as prescribed? yes     Who is going to help you get home at discharge? family     How do you get to doctors appointments? family or friend will provide     Are you on dialysis? No     Do you take coumadin? No     Discharge Plan A Home with family     Discharge Plan B Home Health                     Readmission Assessment (most recent)       Readmission Assessment - 12/21/22 1022          Readmission    Why were you hospitalized in the last 30 days? SOB     Why were you readmitted? Alarmed about signs/symptoms     When you left the hospital how did you feel? good     When you left the hospital where did you go? Home with Family     Did patient/caregiver refused recommended DC plan? No     Did you try to manage your symptoms your self? No     Did you call anyone? No     Did you try to see or did see a doctor or nurse before you came? No     Did you have  a follow-up appointment on discharge? Yes     Was this a planned readmission? No

## 2022-12-21 NOTE — CARE UPDATE
12/21/22 0753   PRE-TX-O2   Device (Oxygen Therapy) nasal cannula   $ Is the patient on Low Flow Oxygen? Yes   Flow (L/min) 3   SpO2 96 %   Pulse Oximetry Type Intermittent   $ Pulse Oximetry - Multiple Charge Pulse Oximetry - Multiple   Pulse 74   Resp 15   Respiratory Evaluation   $ Care Plan Tech Time 15 min

## 2022-12-21 NOTE — H&P
"[facility] - Emergency Dept  Hospital Medicine  History & Physical    Patient Name: Betty Bacon  MRN: [id number]  Patient Class: IP- Inpatient  Admission Date: 12/20/2022  Attending Physician: Beny Levy MD  Primary Care Provider: Johanne Callejas MD         Patient information was obtained from patient, relative(s), past medical records, ER records and ED MD.     Subjective:     Principal Problem:Acute pulmonary edema    Chief Complaint:   Chief Complaint   Patient presents with    Shortness of Breath        HPI: 72 year old female with history of  HFpEF (55% 2022), AS (mod-severe), Pulm HTN (55 mm Hg), CAD, COPD, Gout, HTN, CKI (3), Mood Disorder was transferred from [place] to [facility] ED at the behest of Cardiology for acute pulmonary edema. The patient was discharged from [facility] approximately 1 week ago after being admitted for the same diagnosis. ECHO then revealed the above findings. The patient responded well to diuresis and was discharged home. However, she did not follow low salt diet, in fact:  "I like my salt". No anginal like chest discomfort. She has had SOB(E), orthopnea, PND, and pedal edema. She has a history of gout and has developed a swollen, painful 9/10 left ankle since her discharge and use of iv furosemide.     In ED: labs reviewed and noted below: no leukocytosis with mild normocytic anemia; Urate is elevated, electrolytes are normal with stage 3b renal dysfunction; BNP and troponin are minimally elevated (delta of HS trop at this institution is only 0.08 ng/mL). UA: clean. CXR reviewed:  NAPD; no infiltrate. US BLE: No DVT. EKG reviewed: sinus without acute segments.    Discussed salt usage, volume overload, use of diuretics and flare of gout, and how all of that could possibly be mitigated by cutting out her salt usage with the patient and her children at bedside. All express understanding.    Discussed with ED MD: Inpatient admission for Acute Pulmonary Edema; " Cardiology opinion; less than 2 gm/day sodium diet; continue home regimen for chronic maladie except changing colchicine from prn to bid while in hospital; mild analgesia; furosemide iv bid      Past Medical History:   Diagnosis Date    Acute coronary artery obstruction without MI 10/2012    Benign hypertension     COPD (chronic obstructive pulmonary disease)     Coronary artery disease     Disorder of kidney and ureter     Dr. Morrow    Hepatitis B core antibody positive 03/03/2021    Negative sAg, suggests previous exposure but no chronic/active Hep B. At risk for reactivation with any immunosuppression medication, steroids, chemo, etc.      History of electroconvulsive therapy     Hyperlipidemia LDL goal < 70     Left ankle sprain     Major depressive disorder, recurrent episode, severe     s/p ECT    Positive AKIRA (antinuclear antibody) 03/03/2021    PVD (peripheral vascular disease)        Past Surgical History:   Procedure Laterality Date    CHOLECYSTECTOMY      CORONARY ANGIOPLASTY      CORONARY ANGIOPLASTY WITH STENT PLACEMENT  10/2012    2 stents RCA (100% stenosis)    EYE SURGERY      cataract surgery    HYSTERECTOMY      LINA, ovaries intact. uterine prolapse    ILIAC ARTERY STENT      TONSILLECTOMY      TOTAL VAGINAL HYSTERECTOMY         Review of patient's allergies indicates:   Allergen Reactions    Propoxyphene n-acetaminophen Other (See Comments)     Other reaction(s): Unknown    Codeine Anxiety       Current Facility-Administered Medications on File Prior to Encounter   Medication    [COMPLETED] albuterol-ipratropium 2.5 mg-0.5 mg/3 mL nebulizer solution 3 mL    [COMPLETED] furosemide injection 60 mg     Current Outpatient Medications on File Prior to Encounter   Medication Sig    albuterol sulfate (PROAIR RESPICLICK) 90 mcg/actuation AePB Inhale 2 puffs into the lungs every 4 to 6 hours as needed.    ALPRAZolam (XANAX) 1 MG tablet Take 1 tablet (1 mg total) by mouth 2  (two) times daily as needed for Anxiety.    aspirin 81 mg Tab Take 81 mg by mouth once daily. Every day    atorvastatin (LIPITOR) 40 MG tablet TAKE 1 TABLET BY MOUTH EVERY DAY (Patient taking differently: Take 40 mg by mouth once daily.)    clonazePAM (KLONOPIN) 1 MG disintegrating tablet Take 1 tablet (1 mg total) by mouth 2 (two) times daily as needed (anxiety).    clopidogreL (PLAVIX) 75 mg tablet TAKE 1 TABLET BY MOUTH EVERY DAY (Patient taking differently: Take 75 mg by mouth once daily.)    colchicine (COLCRYS) 0.6 mg tablet Take 2 po at gout flare onset, may repeat 1 in an hour prn, then 1 po bid until pain resolves ,or n/V/D starts (Patient taking differently: Take 0.6 mg by mouth as needed. Take 2 po at gout flare onset, may repeat 1 in an hour prn, then 1 po bid until pain resolves ,or n/V/D starts)    esomeprazole (NEXIUM) 40 MG capsule TAKE 1 CAPSULE BY MOUTH BEFORE BREAKFAST. (Patient taking differently: Take 40 mg by mouth before breakfast.)    furosemide (LASIX) 20 MG tablet Take 20 mg by mouth once daily.    isosorbide mononitrate (IMDUR) 60 MG 24 hr tablet TAKE 1 TABLET BY MOUTH ONCE DAILY (Patient taking differently: Take 60 mg by mouth once daily.)    metoprolol succinate (TOPROL-XL) 50 MG 24 hr tablet TAKE 1 TABLET BY MOUTH EVERY DAY (Patient taking differently: Take 50 mg by mouth once daily. DO NOT CRUSH OR CHEW; SWALLOW WHOLE.)    paroxetine (PAXIL) 40 MG tablet TAKE 1 TABLET BY MOUTH EVERY DAY (Patient taking differently: Take 40 mg by mouth once daily. Total 40 mg)    QUEtiapine (SEROQUEL) 100 MG Tab Take 100 mg by mouth once daily.    spironolactone (ALDACTONE) 25 MG tablet Take 25 mg by mouth once daily.    TRELEGY ELLIPTA 200-62.5-25 mcg inhaler INHALE 1 PUFF INTO THE LUNGS ONCE DAILY.    triamterene-hydrochlorothiazide 37.5-25 mg (DYAZIDE) 37.5-25 mg per capsule TAKE 1 CAPSULE BY MOUTH ONCE DAILY    colestipoL (COLESTID) 1 gram Tab Take 1 tablet (1 g total) by mouth 2  (two) times daily as needed (diarrhea).    ergocalciferol (ERGOCALCIFEROL) 50,000 unit Cap Take 1 capsule (50,000 Units total) by mouth every 7 days. (Patient taking differently: Take 50,000 Units by mouth every 7 days. )    [DISCONTINUED] clotrimazole-betamethasone 1-0.05% (LOTRISONE) cream APPLY TO AFFECTED AREA TWICE A DAY (Patient taking differently: Apply topically 2 (two) times daily.)    [DISCONTINUED] diclofenac sodium (VOLTAREN) 1 % Gel Apply 2 g topically 3 (three) times daily as needed.    [DISCONTINUED] Hydrocortisone 1%-Econazole 1% Topical Cream Apply 1 application topically 2 (two) times a day.    [DISCONTINUED] pimecrolimus (ELIDEL) 1 % cream Apply topically 2 (two) times daily. (Patient taking differently: Apply 1 application topically 2 (two) times daily.)    [DISCONTINUED] tacrolimus (PROTOPIC) 0.1 % ointment Apply topically 2 (two) times daily.    [DISCONTINUED] vitamin D (VITAMIN D3) 1000 units Tab Take 1,000 Units by mouth once daily.     Family History       Problem Relation (Age of Onset)    Breast cancer Sister    Cancer Sister    Dementia Sister    Diabetes Mother, Maternal Aunt, Sister    Heart disease Mother, Father, Brother, Maternal Aunt, Maternal Uncle, Paternal Aunt, Paternal Uncle, Maternal Grandfather, Sister    Hypertension Daughter    Kidney disease Sister    Liver disease Sister    Stroke Father, Brother, Sister          Tobacco Use    Smoking status: Former     Packs/day: 2.00     Years: 40.00     Pack years: 80.00     Types: Cigarettes     Quit date: 2009     Years since quittin.0    Smokeless tobacco: Never   Substance and Sexual Activity    Alcohol use: Yes     Comment: social    Drug use: No    Sexual activity: Never     Birth control/protection: Abstinence     Review of Systems   Constitutional:  Positive for fatigue.   HENT: Negative.     Eyes: Negative.    Respiratory:  Positive for shortness of breath.    Cardiovascular:  Positive for leg  swelling.   Gastrointestinal: Negative.    Endocrine: Negative.    Genitourinary: Negative.    Musculoskeletal: Negative.    Skin: Negative.    Allergic/Immunologic: Negative.    Neurological: Negative.    Hematological: Negative.    All other systems reviewed and are negative.  Objective:     Vital Signs (Most Recent):  Temp: 97.9 °F (36.6 °C) (12/20/22 2252)  Pulse: 76 (12/20/22 2330)  Resp: 20 (12/20/22 2330)  BP: (!) 185/79 (12/20/22 2330)  SpO2: 99 % (12/20/22 2330)   Vital Signs (24h Range):  Temp:  [97.9 °F (36.6 °C)-98.3 °F (36.8 °C)] 97.9 °F (36.6 °C)  Pulse:  [] 76  Resp:  [15-28] 20  SpO2:  [83 %-100 %] 99 %  BP: (111-185)/(56-79) 185/79     Weight: 95.7 kg (211 lb)  Body mass index is 38.59 kg/m².    Physical Exam  Vitals and nursing note reviewed.   Constitutional:       Appearance: She is well-developed. She is obese.   HENT:      Head: Normocephalic and atraumatic.      Right Ear: External ear normal.      Left Ear: External ear normal.      Nose: Nose normal.   Eyes:      Conjunctiva/sclera: Conjunctivae normal.      Pupils: Pupils are equal, round, and reactive to light.   Cardiovascular:      Rate and Rhythm: Normal rate and regular rhythm.      Heart sounds: Normal heart sounds.   Pulmonary:      Effort: Pulmonary effort is normal.      Breath sounds: Normal breath sounds.      Comments: Decreased entry with opening crepitations; no large airway sounds nor wheezing currently  Abdominal:      General: Bowel sounds are normal.      Palpations: Abdomen is soft.   Musculoskeletal:      Cervical back: Normal range of motion and neck supple.      Comments: Left ankle erythematous and edematous, exquisitely painful   Skin:     General: Skin is warm and dry.      Capillary Refill: Capillary refill takes less than 2 seconds.   Neurological:      Mental Status: She is alert and oriented to person, place, and time.   Psychiatric:         Behavior: Behavior normal.         Thought Content: Thought  content normal.         Judgment: Judgment normal.         CRANIAL NERVES     CN III, IV, VI   Pupils are equal, round, and reactive to light.     Significant Labs: All pertinent labs within the past 24 hours have been reviewed.  CBC:   Recent Labs   Lab 12/20/22  1537   WBC 9.20   HGB 10.6*   HCT 35.1*        CMP:   Recent Labs   Lab 12/20/22  1537      K 4.5      CO2 23   *   BUN 18   CREATININE 1.4   CALCIUM 8.4*   PROT 7.2   ALBUMIN 2.3*   BILITOT 0.6   ALKPHOS 92   AST 11   ALT 12   ANIONGAP 12     Cardiac Markers:   Recent Labs   Lab 12/20/22  1537   *     Troponin:   Recent Labs   Lab 12/20/22  1537 12/20/22  1759   TROPONINI 0.044* 0.052*     Urine Studies:   Recent Labs   Lab 12/20/22  1933   COLORU Yellow   APPEARANCEUA Clear   PHUR 5.0   SPECGRAV 1.015   PROTEINUA Negative   GLUCUA Negative   KETONESU Negative   BILIRUBINUA Negative   OCCULTUA Negative   NITRITE Negative   UROBILINOGEN Negative   LEUKOCYTESUR Negative       Significant Imaging: I have reviewed all pertinent imaging results/findings within the past 24 hours.    Assessment/Plan:     * Acute pulmonary edema  Inpatient admission for Acute Pulmonary Edema; Cardiology opinion; less than 2 gm/day sodium diet; continue home regimen for chronic maladie except changing colchicine from prn to bid while in hospital; mild analgesia; furosemide iv bid    Acute drug-induced gout of left foot  Inpatient admission for Acute Pulmonary Edema; Cardiology opinion; less than 2 gm/day sodium diet; continue home regimen for chronic maladie except changing colchicine from prn to bid while in hospital; mild analgesia; furosemide iv bid      CAD (coronary artery disease)  Inpatient admission for Acute Pulmonary Edema; Cardiology opinion; less than 2 gm/day sodium diet; continue home regimen for chronic maladie except changing colchicine from prn to bid while in hospital; mild analgesia; furosemide iv bid      Aortic  stenosis  Inpatient admission for Acute Pulmonary Edema; Cardiology opinion; less than 2 gm/day sodium diet; continue home regimen for chronic maladie except changing colchicine from prn to bid while in hospital; mild analgesia; furosemide iv bid      COPD (chronic obstructive pulmonary disease)  Inpatient admission for Acute Pulmonary Edema; Cardiology opinion; less than 2 gm/day sodium diet; continue home regimen for chronic maladie except changing colchicine from prn to bid while in hospital; mild analgesia; furosemide iv bid        VTE Risk Mitigation (From admission, onward)    None             Beny Levy MD  Department of Hospital Medicine   [facility] - Emergency Dept

## 2022-12-21 NOTE — ED NOTES
Report is called to [facility] ER, receiving nurse, Marcial RN  [phone]

## 2022-12-21 NOTE — SUBJECTIVE & OBJECTIVE
Past Medical History:   Diagnosis Date    Acute coronary artery obstruction without MI 10/2012    Benign hypertension     COPD (chronic obstructive pulmonary disease)     Coronary artery disease     Disorder of kidney and ureter     Dr. Morrow    Hepatitis B core antibody positive 03/03/2021    Negative sAg, suggests previous exposure but no chronic/active Hep B. At risk for reactivation with any immunosuppression medication, steroids, chemo, etc.      History of electroconvulsive therapy     Hyperlipidemia LDL goal < 70     Left ankle sprain     Major depressive disorder, recurrent episode, severe     s/p ECT    Positive AKIRA (antinuclear antibody) 03/03/2021    PVD (peripheral vascular disease)        Past Surgical History:   Procedure Laterality Date    CHOLECYSTECTOMY      CORONARY ANGIOPLASTY      CORONARY ANGIOPLASTY WITH STENT PLACEMENT  10/2012    2 stents RCA (100% stenosis)    EYE SURGERY      cataract surgery    HYSTERECTOMY      LINA, ovaries intact. uterine prolapse    ILIAC ARTERY STENT      TONSILLECTOMY      TOTAL VAGINAL HYSTERECTOMY         Review of patient's allergies indicates:   Allergen Reactions    Propoxyphene n-acetaminophen Other (See Comments)     Other reaction(s): Unknown    Codeine Anxiety       Current Facility-Administered Medications on File Prior to Encounter   Medication    [COMPLETED] albuterol-ipratropium 2.5 mg-0.5 mg/3 mL nebulizer solution 3 mL    [COMPLETED] furosemide injection 60 mg     Current Outpatient Medications on File Prior to Encounter   Medication Sig    albuterol sulfate (PROAIR RESPICLICK) 90 mcg/actuation AePB Inhale 2 puffs into the lungs every 4 to 6 hours as needed.    ALPRAZolam (XANAX) 1 MG tablet Take 1 tablet (1 mg total) by mouth 2 (two) times daily as needed for Anxiety.    aspirin 81 mg Tab Take 81 mg by mouth once daily. Every day    atorvastatin (LIPITOR) 40 MG tablet TAKE 1 TABLET BY MOUTH EVERY DAY (Patient taking differently: Take 40 mg by mouth  once daily.)    clonazePAM (KLONOPIN) 1 MG disintegrating tablet Take 1 tablet (1 mg total) by mouth 2 (two) times daily as needed (anxiety).    clopidogreL (PLAVIX) 75 mg tablet TAKE 1 TABLET BY MOUTH EVERY DAY (Patient taking differently: Take 75 mg by mouth once daily.)    colchicine (COLCRYS) 0.6 mg tablet Take 2 po at gout flare onset, may repeat 1 in an hour prn, then 1 po bid until pain resolves ,or n/V/D starts (Patient taking differently: Take 0.6 mg by mouth as needed. Take 2 po at gout flare onset, may repeat 1 in an hour prn, then 1 po bid until pain resolves ,or n/V/D starts)    esomeprazole (NEXIUM) 40 MG capsule TAKE 1 CAPSULE BY MOUTH BEFORE BREAKFAST. (Patient taking differently: Take 40 mg by mouth before breakfast.)    furosemide (LASIX) 20 MG tablet Take 20 mg by mouth once daily.    isosorbide mononitrate (IMDUR) 60 MG 24 hr tablet TAKE 1 TABLET BY MOUTH ONCE DAILY (Patient taking differently: Take 60 mg by mouth once daily.)    metoprolol succinate (TOPROL-XL) 50 MG 24 hr tablet TAKE 1 TABLET BY MOUTH EVERY DAY (Patient taking differently: Take 50 mg by mouth once daily. DO NOT CRUSH OR CHEW; SWALLOW WHOLE.)    paroxetine (PAXIL) 40 MG tablet TAKE 1 TABLET BY MOUTH EVERY DAY (Patient taking differently: Take 40 mg by mouth once daily. Total 40 mg)    QUEtiapine (SEROQUEL) 100 MG Tab Take 100 mg by mouth once daily.    spironolactone (ALDACTONE) 25 MG tablet Take 25 mg by mouth once daily.    TRELEGY ELLIPTA 200-62.5-25 mcg inhaler INHALE 1 PUFF INTO THE LUNGS ONCE DAILY.    triamterene-hydrochlorothiazide 37.5-25 mg (DYAZIDE) 37.5-25 mg per capsule TAKE 1 CAPSULE BY MOUTH ONCE DAILY    colestipoL (COLESTID) 1 gram Tab Take 1 tablet (1 g total) by mouth 2 (two) times daily as needed (diarrhea).    ergocalciferol (ERGOCALCIFEROL) 50,000 unit Cap Take 1 capsule (50,000 Units total) by mouth every 7 days. (Patient taking differently: Take 50,000 Units by mouth every 7 days. FRIDAYS)     [DISCONTINUED] clotrimazole-betamethasone 1-0.05% (LOTRISONE) cream APPLY TO AFFECTED AREA TWICE A DAY (Patient taking differently: Apply topically 2 (two) times daily.)    [DISCONTINUED] diclofenac sodium (VOLTAREN) 1 % Gel Apply 2 g topically 3 (three) times daily as needed.    [DISCONTINUED] Hydrocortisone 1%-Econazole 1% Topical Cream Apply 1 application topically 2 (two) times a day.    [DISCONTINUED] pimecrolimus (ELIDEL) 1 % cream Apply topically 2 (two) times daily. (Patient taking differently: Apply 1 application topically 2 (two) times daily.)    [DISCONTINUED] tacrolimus (PROTOPIC) 0.1 % ointment Apply topically 2 (two) times daily.    [DISCONTINUED] vitamin D (VITAMIN D3) 1000 units Tab Take 1,000 Units by mouth once daily.     Family History       Problem Relation (Age of Onset)    Breast cancer Sister    Cancer Sister    Dementia Sister    Diabetes Mother, Maternal Aunt, Sister    Heart disease Mother, Father, Brother, Maternal Aunt, Maternal Uncle, Paternal Aunt, Paternal Uncle, Maternal Grandfather, Sister    Hypertension Daughter    Kidney disease Sister    Liver disease Sister    Stroke Father, Brother, Sister          Tobacco Use    Smoking status: Former     Packs/day: 2.00     Years: 40.00     Pack years: 80.00     Types: Cigarettes     Quit date: 2009     Years since quittin.0    Smokeless tobacco: Never   Substance and Sexual Activity    Alcohol use: Yes     Comment: social    Drug use: No    Sexual activity: Never     Birth control/protection: Abstinence     Review of Systems   Constitutional:  Positive for fatigue.   HENT: Negative.     Eyes: Negative.    Respiratory:  Positive for shortness of breath.    Cardiovascular:  Positive for leg swelling.   Gastrointestinal: Negative.    Endocrine: Negative.    Genitourinary: Negative.    Musculoskeletal: Negative.    Skin: Negative.    Allergic/Immunologic: Negative.    Neurological: Negative.    Hematological: Negative.    All other  systems reviewed and are negative.  Objective:     Vital Signs (Most Recent):  Temp: 97.9 °F (36.6 °C) (12/20/22 2252)  Pulse: 76 (12/20/22 2330)  Resp: 20 (12/20/22 2330)  BP: (!) 185/79 (12/20/22 2330)  SpO2: 99 % (12/20/22 2330)   Vital Signs (24h Range):  Temp:  [97.9 °F (36.6 °C)-98.3 °F (36.8 °C)] 97.9 °F (36.6 °C)  Pulse:  [] 76  Resp:  [15-28] 20  SpO2:  [83 %-100 %] 99 %  BP: (111-185)/(56-79) 185/79     Weight: 95.7 kg (211 lb)  Body mass index is 38.59 kg/m².    Physical Exam  Vitals and nursing note reviewed.   Constitutional:       Appearance: She is well-developed. She is obese.   HENT:      Head: Normocephalic and atraumatic.      Right Ear: External ear normal.      Left Ear: External ear normal.      Nose: Nose normal.   Eyes:      Conjunctiva/sclera: Conjunctivae normal.      Pupils: Pupils are equal, round, and reactive to light.   Cardiovascular:      Rate and Rhythm: Normal rate and regular rhythm.      Heart sounds: Normal heart sounds.   Pulmonary:      Effort: Pulmonary effort is normal.      Breath sounds: Normal breath sounds.      Comments: Decreased entry with opening crepitations; no large airway sounds nor wheezing currently  Abdominal:      General: Bowel sounds are normal.      Palpations: Abdomen is soft.   Musculoskeletal:      Cervical back: Normal range of motion and neck supple.      Comments: Left ankle erythematous and edematous, exquisitely painful   Skin:     General: Skin is warm and dry.      Capillary Refill: Capillary refill takes less than 2 seconds.   Neurological:      Mental Status: She is alert and oriented to person, place, and time.   Psychiatric:         Behavior: Behavior normal.         Thought Content: Thought content normal.         Judgment: Judgment normal.         CRANIAL NERVES     CN III, IV, VI   Pupils are equal, round, and reactive to light.     Significant Labs: All pertinent labs within the past 24 hours have been reviewed.  CBC:   Recent Labs    Lab 12/20/22  1537   WBC 9.20   HGB 10.6*   HCT 35.1*        CMP:   Recent Labs   Lab 12/20/22  1537      K 4.5      CO2 23   *   BUN 18   CREATININE 1.4   CALCIUM 8.4*   PROT 7.2   ALBUMIN 2.3*   BILITOT 0.6   ALKPHOS 92   AST 11   ALT 12   ANIONGAP 12     Cardiac Markers:   Recent Labs   Lab 12/20/22  1537   *     Troponin:   Recent Labs   Lab 12/20/22  1537 12/20/22  1759   TROPONINI 0.044* 0.052*     Urine Studies:   Recent Labs   Lab 12/20/22  1933   COLORU Yellow   APPEARANCEUA Clear   PHUR 5.0   SPECGRAV 1.015   PROTEINUA Negative   GLUCUA Negative   KETONESU Negative   BILIRUBINUA Negative   OCCULTUA Negative   NITRITE Negative   UROBILINOGEN Negative   LEUKOCYTESUR Negative       Significant Imaging: I have reviewed all pertinent imaging results/findings within the past 24 hours.

## 2022-12-21 NOTE — HPI
"72 year old female with history of  HFpEF (55% 2022), AS (mod-severe), Pulm HTN (55 mm Hg), CAD, COPD, Gout, HTN, CKI (3), Mood Disorder was transferred from [facility] to [facility] ED at the behest of Cardiology for acute pulmonary edema. The patient was discharged from [facility] approximately 1 week ago after being admitted for the same diagnosis. ECHO then revealed the above findings. The patient responded well to diuresis and was discharged home. However, she did not follow low salt diet, in fact:  "I like my salt". No anginal like chest discomfort. She has had SOB(E), orthopnea, PND, and pedal edema. She has a history of gout and has developed a swollen, painful 9/10 left ankle since her discharge and use of iv furosemide.     In ED: labs reviewed and noted below: no leukocytosis with mild normocytic anemia; Urate is elevated, electrolytes are normal with stage 3b renal dysfunction; BNP and troponin are minimally elevated (delta of HS trop at this institution is only 0.08 ng/mL). UA: clean. CXR reviewed:  NAPD; no infiltrate. US BLE: No DVT. EKG reviewed: sinus without acute segments.    Discussed salt usage, volume overload, use of diuretics and flare of gout, and how all of that could possibly be mitigated by cutting out her salt usage with the patient and her children at bedside. All express understanding.    Discussed with ED MD: Inpatient admission for Acute Pulmonary Edema; Cardiology opinion; less than 2 gm/day sodium diet; continue home regimen for chronic maladie except changing colchicine from prn to bid while in hospital; mild analgesia; furosemide iv bid  "

## 2022-12-21 NOTE — ED NOTES
Pt was assisted to bedside commode with one person assistance.   However, pt couldn't bear weight when we tried to get her back to the stretcher.  Will use pure-wick instead.

## 2022-12-22 ENCOUNTER — PATIENT MESSAGE (OUTPATIENT)
Dept: CARDIOLOGY | Facility: CLINIC | Age: 72
End: 2022-12-22
Payer: MEDICARE

## 2022-12-22 LAB
ANION GAP SERPL CALC-SCNC: 8 MMOL/L (ref 8–16)
BASOPHILS # BLD AUTO: 0.03 K/UL (ref 0–0.2)
BASOPHILS NFR BLD: 0.6 % (ref 0–1.9)
BUN SERPL-MCNC: 25 MG/DL (ref 8–23)
CALCIUM SERPL-MCNC: 7.7 MG/DL (ref 8.7–10.5)
CHLORIDE SERPL-SCNC: 102 MMOL/L (ref 95–110)
CO2 SERPL-SCNC: 28 MMOL/L (ref 23–29)
CREAT SERPL-MCNC: 1.4 MG/DL (ref 0.5–1.4)
DIFFERENTIAL METHOD: ABNORMAL
EOSINOPHIL # BLD AUTO: 0.3 K/UL (ref 0–0.5)
EOSINOPHIL NFR BLD: 5.5 % (ref 0–8)
ERYTHROCYTE [DISTWIDTH] IN BLOOD BY AUTOMATED COUNT: 14.9 % (ref 11.5–14.5)
EST. GFR  (NO RACE VARIABLE): 39.5 ML/MIN/1.73 M^2
GLUCOSE SERPL-MCNC: 99 MG/DL (ref 70–110)
HCT VFR BLD AUTO: 32.8 % (ref 37–48.5)
HGB BLD-MCNC: 9.8 G/DL (ref 12–16)
IMM GRANULOCYTES # BLD AUTO: 0.01 K/UL (ref 0–0.04)
IMM GRANULOCYTES NFR BLD AUTO: 0.2 % (ref 0–0.5)
LYMPHOCYTES # BLD AUTO: 0.9 K/UL (ref 1–4.8)
LYMPHOCYTES NFR BLD: 17.3 % (ref 18–48)
MCH RBC QN AUTO: 27.9 PG (ref 27–31)
MCHC RBC AUTO-ENTMCNC: 29.9 G/DL (ref 32–36)
MCV RBC AUTO: 93 FL (ref 82–98)
MONOCYTES # BLD AUTO: 0.4 K/UL (ref 0.3–1)
MONOCYTES NFR BLD: 7.1 % (ref 4–15)
NEUTROPHILS # BLD AUTO: 3.5 K/UL (ref 1.8–7.7)
NEUTROPHILS NFR BLD: 69.3 % (ref 38–73)
NRBC BLD-RTO: 0 /100 WBC
PLATELET # BLD AUTO: 245 K/UL (ref 150–450)
PMV BLD AUTO: 10.5 FL (ref 9.2–12.9)
POTASSIUM SERPL-SCNC: 3.7 MMOL/L (ref 3.5–5.1)
RBC # BLD AUTO: 3.51 M/UL (ref 4–5.4)
SODIUM SERPL-SCNC: 138 MMOL/L (ref 136–145)
URATE SERPL-MCNC: 10.3 MG/DL (ref 2.4–5.7)
WBC # BLD AUTO: 5.09 K/UL (ref 3.9–12.7)

## 2022-12-22 PROCEDURE — 99233 SBSQ HOSP IP/OBS HIGH 50: CPT | Mod: ,,, | Performed by: GENERAL PRACTICE

## 2022-12-22 PROCEDURE — 25000003 PHARM REV CODE 250: Performed by: STUDENT IN AN ORGANIZED HEALTH CARE EDUCATION/TRAINING PROGRAM

## 2022-12-22 PROCEDURE — 27000221 HC OXYGEN, UP TO 24 HOURS

## 2022-12-22 PROCEDURE — 99900035 HC TECH TIME PER 15 MIN (STAT)

## 2022-12-22 PROCEDURE — 36415 COLL VENOUS BLD VENIPUNCTURE: CPT | Performed by: INTERNAL MEDICINE

## 2022-12-22 PROCEDURE — 94761 N-INVAS EAR/PLS OXIMETRY MLT: CPT

## 2022-12-22 PROCEDURE — 99233 PR SUBSEQUENT HOSPITAL CARE,LEVL III: ICD-10-PCS | Mod: ,,, | Performed by: GENERAL PRACTICE

## 2022-12-22 PROCEDURE — 84550 ASSAY OF BLOOD/URIC ACID: CPT | Performed by: INTERNAL MEDICINE

## 2022-12-22 PROCEDURE — 21400001 HC TELEMETRY ROOM

## 2022-12-22 PROCEDURE — 85025 COMPLETE CBC W/AUTO DIFF WBC: CPT | Performed by: INTERNAL MEDICINE

## 2022-12-22 PROCEDURE — 25000003 PHARM REV CODE 250: Performed by: INTERNAL MEDICINE

## 2022-12-22 PROCEDURE — 80048 BASIC METABOLIC PNL TOTAL CA: CPT | Performed by: INTERNAL MEDICINE

## 2022-12-22 PROCEDURE — 63600175 PHARM REV CODE 636 W HCPCS: Performed by: INTERNAL MEDICINE

## 2022-12-22 RX ORDER — COLCHICINE 0.6 MG/1
0.6 TABLET, FILM COATED ORAL 2 TIMES DAILY
Status: DISCONTINUED | OUTPATIENT
Start: 2022-12-23 | End: 2022-12-23 | Stop reason: HOSPADM

## 2022-12-22 RX ORDER — COLCHICINE 0.6 MG/1
1.2 TABLET, FILM COATED ORAL ONCE
Status: COMPLETED | OUTPATIENT
Start: 2022-12-22 | End: 2022-12-22

## 2022-12-22 RX ADMIN — COLCHICINE 0.6 MG: 0.6 TABLET, FILM COATED ORAL at 08:12

## 2022-12-22 RX ADMIN — CLOPIDOGREL BISULFATE 75 MG: 75 TABLET, FILM COATED ORAL at 08:12

## 2022-12-22 RX ADMIN — PAROXETINE HYDROCHLORIDE 40 MG: 20 TABLET, FILM COATED ORAL at 08:12

## 2022-12-22 RX ADMIN — ASPIRIN 81 MG: 81 TABLET, DELAYED RELEASE ORAL at 08:12

## 2022-12-22 RX ADMIN — ATORVASTATIN CALCIUM 40 MG: 40 TABLET, FILM COATED ORAL at 09:12

## 2022-12-22 RX ADMIN — FUROSEMIDE 40 MG: 10 INJECTION, SOLUTION INTRAVENOUS at 05:12

## 2022-12-22 RX ADMIN — PANTOPRAZOLE SODIUM 40 MG: 40 TABLET, DELAYED RELEASE ORAL at 05:12

## 2022-12-22 RX ADMIN — METOPROLOL SUCCINATE 50 MG: 50 TABLET, FILM COATED, EXTENDED RELEASE ORAL at 08:12

## 2022-12-22 RX ADMIN — COLCHICINE 1.2 MG: 0.6 TABLET, FILM COATED ORAL at 04:12

## 2022-12-22 RX ADMIN — QUETIAPINE 100 MG: 100 TABLET ORAL at 08:12

## 2022-12-22 RX ADMIN — ENOXAPARIN SODIUM 40 MG: 100 INJECTION SUBCUTANEOUS at 04:12

## 2022-12-22 RX ADMIN — ISOSORBIDE MONONITRATE 60 MG: 60 TABLET, EXTENDED RELEASE ORAL at 08:12

## 2022-12-22 NOTE — PROGRESS NOTES
Formerly [facility], [facility]  Department of Cardiology  Consult Note      PATIENT NAME: Betty Bacon  MRN: [id number]  TODAY'S DATE: 12/22/2022  ADMIT DATE: 12/20/2022                          CONSULT REQUESTED BY: Amandeep Ramirez, *    SUBJECTIVE     PRINCIPAL PROBLEM: Acute pulmonary edema      REASON FOR CONSULT:  Pulmonary Edema and aortic stenonsis     INTERVAL HISTORY:     12/22/22    Patient remains on 4L NC. She reports improved breathing and improved edema in BLE.  She denies any acute symptoms overnight. She has only been OOB to use the commode. Daughter is at bedside.           HPI:  Patient is a 72 y.o. female with HFpEF 55% (12/2/2022), moderate to severe aortic stenosis, pulmonary HTN, CAD, COPD, Gout, HTN, CKD, and mood disorder who presented to [place] ED with SOB, orthopnea, PND, pedal edema, swollen and painful left ankle.  She has not been following a low salt diet.  She was recently discharged [facility] 1 week ago for the same diagnosis. She was treated with diuretics last admission and responded well. US of BLE is negative for DVT. EKG showed sinus rhythm without acute ST-T segments. Troponins are elevated at 389.5 likely secondary to pulmonary edema and fluid overload. . Patient denies CP, palpitations, dizziness, lightheadedness, jaw/neck/arm pain, focal deficits and bleeding.  L ankle tenderness is likely secondary to gout flare. Urate level is elevated.      Upon examination, she is resting quietly in bed, in no acute distress, on 4L NC, with 1+ nonpitting edema. L ankle and foot is tender to palpation.       FROM H&P:  HPI: 72 year old female with history of  HFpEF (55% 2022), AS (mod-severe), Pulm HTN (55 mm Hg), CAD, COPD, Gout, HTN, CKI (3), Mood Disorder was transferred from [place] to [facility] ED at the behest of Cardiology for acute pulmonary edema. The patient was discharged from [facility] approximately 1 week ago after being admitted for the same diagnosis. ECHO then  "revealed the above findings. The patient responded well to diuresis and was discharged home. However, she did not follow low salt diet, in fact:  "I like my salt". No anginal like chest discomfort. She has had SOB(E), orthopnea, PND, and pedal edema. She has a history of gout and has developed a swollen, painful 9/10 left ankle since her discharge and use of iv furosemide.      In ED: labs reviewed and noted below: no leukocytosis with mild normocytic anemia; Urate is elevated, electrolytes are normal with stage 3b renal dysfunction; BNP and troponin are minimally elevated (delta of HS trop at this institution is only 0.08 ng/mL). UA: clean. CXR reviewed:  NAPD; no infiltrate. US BLE: No DVT. EKG reviewed: sinus without acute segments.     Discussed salt usage, volume overload, use of diuretics and flare of gout, and how all of that could possibly be mitigated by cutting out her salt usage with the patient and her children at bedside. All express understanding.     Discussed with ED MD: Inpatient admission for Acute Pulmonary Edema; Cardiology opinion; less than 2 gm/day sodium diet; continue home regimen for chronic maladie except changing colchicine from prn to bid while in hospital; mild analgesia; furosemide iv bid        Review of patient's allergies indicates:   Allergen Reactions    Propoxyphene n-acetaminophen Other (See Comments)     Other reaction(s): Unknown    Codeine Anxiety       Past Medical History:   Diagnosis Date    Acute coronary artery obstruction without MI 10/2012    Benign hypertension     COPD (chronic obstructive pulmonary disease)     Coronary artery disease     Disorder of kidney and ureter     Dr. Morrow    Hepatitis B core antibody positive 03/03/2021    Negative sAg, suggests previous exposure but no chronic/active Hep B. At risk for reactivation with any immunosuppression medication, steroids, chemo, etc.      History of electroconvulsive therapy     Hyperlipidemia LDL goal < 70     " Left ankle sprain     Major depressive disorder, recurrent episode, severe     s/p ECT    Positive AKIRA (antinuclear antibody) 2021    PVD (peripheral vascular disease)      Past Surgical History:   Procedure Laterality Date    CHOLECYSTECTOMY      CORONARY ANGIOPLASTY      CORONARY ANGIOPLASTY WITH STENT PLACEMENT  10/2012    2 stents RCA (100% stenosis)    EYE SURGERY      cataract surgery    HYSTERECTOMY      LINA, ovaries intact. uterine prolapse    ILIAC ARTERY STENT      TONSILLECTOMY      TOTAL VAGINAL HYSTERECTOMY       Social History     Tobacco Use    Smoking status: Former     Packs/day: 2.00     Years: 40.00     Pack years: 80.00     Types: Cigarettes     Quit date: 2009     Years since quittin.0    Smokeless tobacco: Never   Substance Use Topics    Alcohol use: Yes     Comment: social    Drug use: No        REVIEW OF SYSTEMS  CONSTITUTIONAL: +fatigue Negative for chills, and fever.   EYES: No double vision, No blurred vision  NEURO: No headaches, No dizziness  RESPIRATORY: + shortness of breath; Negative for cough, and wheezing.    CARDIOVASCULAR: + pedal edema Negative for chest pain. Negative for palpitations.   GI: Negative for abdominal pain, No melena, diarrhea, nausea and vomiting.   : Negative for dysuria and frequency, Negative for hematuria  SKIN: Negative for bruising, Negative for edema or discoloration noted.   ENDOCRINE: Negative for polyphagia, Negative for heat intolerance, Negative for cold intolerance  PSYCHIATRIC: Negative for depression, Negative for anxiety, Negative for memory loss  MUSCULOSKELETAL: + L ankle edema, erythema, and pain; Negative for neck pain, Negative for muscle weakness, Negative for back pain     OBJECTIVE     VITAL SIGNS (Most Recent)  Temp: 98 °F (36.7 °C) (22 0710)  Pulse: 87 (22 0751)  Resp: 15 (22 075)  BP: (!) 147/69 (22 0710)  SpO2: 96 % (22 0751)    VENTILATION STATUS  Resp: 15 (22)  SpO2: 96 %  (12/22/22 0751)       I & O (Last 24H):  Intake/Output Summary (Last 24 hours) at 12/22/2022 0917  Last data filed at 12/22/2022 0449  Gross per 24 hour   Intake 1080 ml   Output 1400 ml   Net -320 ml       WEIGHTS  Wt Readings from Last 3 Encounters:   12/21/22 0213 93.2 kg (205 lb 7.5 oz)   12/20/22 2252 95.7 kg (211 lb)   12/20/22 1459 95.7 kg (211 lb)   12/08/22 1616 92.4 kg (203 lb 11.3 oz)       PHYSICAL EXAM  GENERAL: obese, well built, well nourished, well-developed in no apparent distress alert and oriented.   HEENT: Normocephalic. Pupils normal and conjunctivae normal.  Mucous membranes normal, no cyanosis or icterus, trachea central,no pallor or icterus is noted..   NECK: No JVD. Conductive murmur noted bilaterally.   THYROID: Thyroid not examined  CARDIAC: Regular rate and rhythm. S1 is normal.S2 is normal.No gallops, or clicks noted at this time. 3/6 systolic murmurs  CHEST ANATOMY: normal.   LUNGS: Diminished in lower lobes.  No wheezing or rhonchi..   ABDOMEN: Obese, Soft no masses or organomegaly.  No abdomen pulsations or bruits.  Normal bowel sounds. No pulsations and no masses felt, No guarding or rebound.   URINARY: No yoder catheter   EXTREMITIES: No cyanosis, or clubbing noted at this time., no calf tenderness bilaterally. 1+ bilateral edema in lower legs. L foot and ankle is tender to palpation.   PERIPHERAL VASCULAR SYSTEM: Good palpable distal pulses.   CENTRAL NERVOUS SYSTEM: No focal motor or sensory deficits noted.   SKIN: Skin without lesions, moist, well perfused.   MUSCLE STRENGTH & TONE: No noteable weakness, atrophy or abnormal movement.     HOME MEDICATIONS:  No current facility-administered medications on file prior to encounter.     Current Outpatient Medications on File Prior to Encounter   Medication Sig Dispense Refill    albuterol sulfate (PROAIR RESPICLICK) 90 mcg/actuation AePB Inhale 2 puffs into the lungs every 4 to 6 hours as needed. 1 each 3    ALPRAZolam (XANAX) 1 MG  tablet Take 1 tablet (1 mg total) by mouth 2 (two) times daily as needed for Anxiety. 60 tablet 3    aspirin 81 mg Tab Take 81 mg by mouth once daily. Every day      atorvastatin (LIPITOR) 40 MG tablet TAKE 1 TABLET BY MOUTH EVERY DAY (Patient taking differently: Take 40 mg by mouth once daily.) 90 tablet 3    clonazePAM (KLONOPIN) 1 MG disintegrating tablet Take 1 tablet (1 mg total) by mouth 2 (two) times daily as needed (anxiety). 60 tablet 0    clopidogreL (PLAVIX) 75 mg tablet TAKE 1 TABLET BY MOUTH EVERY DAY (Patient taking differently: Take 75 mg by mouth once daily.) 90 tablet 3    colchicine (COLCRYS) 0.6 mg tablet Take 2 po at gout flare onset, may repeat 1 in an hour prn, then 1 po bid until pain resolves ,or n/V/D starts (Patient taking differently: Take 0.6 mg by mouth as needed. Take 2 po at gout flare onset, may repeat 1 in an hour prn, then 1 po bid until pain resolves ,or n/V/D starts) 20 tablet 0    esomeprazole (NEXIUM) 40 MG capsule TAKE 1 CAPSULE BY MOUTH BEFORE BREAKFAST. (Patient taking differently: Take 40 mg by mouth before breakfast.) 90 capsule 3    furosemide (LASIX) 20 MG tablet Take 20 mg by mouth once daily.  0    isosorbide mononitrate (IMDUR) 60 MG 24 hr tablet TAKE 1 TABLET BY MOUTH ONCE DAILY (Patient taking differently: Take 60 mg by mouth once daily.) 90 tablet 3    metoprolol succinate (TOPROL-XL) 50 MG 24 hr tablet TAKE 1 TABLET BY MOUTH EVERY DAY (Patient taking differently: Take 50 mg by mouth once daily. DO NOT CRUSH OR CHEW; SWALLOW WHOLE.) 90 tablet 3    paroxetine (PAXIL) 40 MG tablet TAKE 1 TABLET BY MOUTH EVERY DAY (Patient taking differently: Take 40 mg by mouth once daily. Total 40 mg) 90 tablet 3    QUEtiapine (SEROQUEL) 100 MG Tab Take 100 mg by mouth once daily.      spironolactone (ALDACTONE) 25 MG tablet Take 25 mg by mouth once daily.  0    TRELEGY ELLIPTA 200-62.5-25 mcg inhaler INHALE 1 PUFF INTO THE LUNGS ONCE DAILY. 180 each 2     triamterene-hydrochlorothiazide 37.5-25 mg (DYAZIDE) 37.5-25 mg per capsule TAKE 1 CAPSULE BY MOUTH ONCE DAILY 90 capsule 2    colestipoL (COLESTID) 1 gram Tab Take 1 tablet (1 g total) by mouth 2 (two) times daily as needed (diarrhea). 180 tablet 3    ergocalciferol (ERGOCALCIFEROL) 50,000 unit Cap Take 1 capsule (50,000 Units total) by mouth every 7 days. (Patient taking differently: Take 50,000 Units by mouth every 7 days. FRIDAYS) 12 capsule 3       SCHEDULED MEDS:   aspirin  81 mg Oral Daily    atorvastatin  40 mg Oral Daily    clopidogreL  75 mg Oral Daily    colchicine  0.6 mg Oral BID    enoxaparin  40 mg Subcutaneous Daily    furosemide (LASIX) injection  40 mg Intravenous Q12H    isosorbide mononitrate  60 mg Oral Daily    metoprolol succinate  50 mg Oral Daily    pantoprazole  40 mg Oral Daily    paroxetine  40 mg Oral Daily    QUEtiapine  100 mg Oral Daily       CONTINUOUS INFUSIONS:    PRN MEDS:acetaminophen, clonazePAM, hydrALAZINE, magnesium oxide, magnesium oxide, melatonin, ondansetron, potassium bicarbonate, potassium bicarbonate, potassium bicarbonate, potassium, sodium phosphates, potassium, sodium phosphates, potassium, sodium phosphates    LABS AND DIAGNOSTICS     CBC LAST 3 DAYS  Recent Labs   Lab 12/20/22  1537 12/21/22  0429 12/22/22  0455   WBC 9.20 6.30 5.09   RBC 3.75* 3.27* 3.51*   HGB 10.6* 9.3* 9.8*   HCT 35.1* 30.9* 32.8*   MCV 94 95 93   MCH 28.3 28.4 27.9   MCHC 30.2* 30.1* 29.9*   RDW 15.1* 15.0* 14.9*    230 245   MPV 10.7 10.5 10.5   GRAN 85.3*  7.8* 71.9  4.5 69.3  3.5   LYMPH 6.6*  0.6* 16.8*  1.1 17.3*  0.9*   MONO 5.3  0.5 7.0  0.4 7.1  0.4   BASO 0.04 0.03 0.03   NRBC 0 0 0       COAGULATION LAST 3 DAYS  No results for input(s): LABPT, INR, APTT in the last 168 hours.    CHEMISTRY LAST 3 DAYS  Recent Labs   Lab 12/15/22  1145 12/20/22  1537 12/21/22  0429 12/22/22  0455    141 138 138   K 4.3 4.5 3.5 3.7    106 105 102   CO2 20* 23 24 28    ANIONGAP 13 12 9 8   BUN 18 18 25* 25*   CREATININE 1.3 1.4 1.6* 1.4   * 113* 102 99   CALCIUM 8.7 8.4* 7.5* 7.7*   ALBUMIN 2.5* 2.3*  --   --    PROT  --  7.2  --   --    ALKPHOS  --  92  --   --    ALT  --  12  --   --    AST  --  11  --   --    BILITOT  --  0.6  --   --        CARDIAC PROFILE LAST 3 DAYS  Recent Labs   Lab 12/20/22  1537 12/20/22  1759   *  --    TROPONINI 0.044* 0.052*       ENDOCRINE LAST 3 DAYS  No results for input(s): TSH, PROCAL in the last 168 hours.    LAST ARTERIAL BLOOD GAS  ABG  No results for input(s): PH, PO2, PCO2, HCO3, BE in the last 168 hours.    LAST 7 DAYS MICROBIOLOGY   Microbiology Results (last 7 days)       Procedure Component Value Units Date/Time    Blood culture #1 **CANNOT BE ORDERED STAT** [[id number]] Collected: 12/20/22 2337    Order Status: Completed Specimen: Blood from Peripheral, Antecubital, Right Updated: 12/22/22 0232     Blood Culture, Routine No Growth to date      No Growth to date    Blood culture #2 **CANNOT BE ORDERED STAT** [[id number]] Collected: 12/20/22 2337    Order Status: Completed Specimen: Blood from Peripheral, Antecubital, Right Updated: 12/22/22 0232     Blood Culture, Routine No Growth to date      No Growth to date            MOST RECENT IMAGING  Echo  · The left ventricle is normal in size with severe concentric hypertrophy   and normal systolic function.  · Grade II left ventricular diastolic dysfunction.  · The estimated ejection fraction is 65%.  · Normal right ventricular size with normal right ventricular systolic   function.  · Moderate aortic regurgitation.  · There is severe aortic valve stenosis.  · Aortic valve area is 0.94 cm2; peak velocity is 3.29 m/s; mean gradient   is 26 mmHg.  · Mild pulmonic regurgitation.  · Mild tricuspid regurgitation.  · There is mild pulmonary hypertension.  · Normal central venous pressure (3 mmHg).  · The estimated PA systolic pressure is 44 mmHg.         ECHOCARDIOGRAM RESULTS (last  5)  Results for orders placed during the hospital encounter of 12/01/22    Echo    Interpretation Summary  · The left ventricle is normal in size with moderate concentric hypertrophy and normal systolic function.  · The estimated ejection fraction is 55%.  · Grade I left ventricular diastolic dysfunction.  · Normal right ventricular size with normal right ventricular systolic function.  · Severe left atrial enlargement.  · Moderate right atrial enlargement.  · Mild aortic regurgitation.  · There is moderate-to-severe aortic valve stenosis.  · Aortic valve area is 1.16 cm2; peak velocity is 3.59 m/s; mean gradient is 31 mmHg.  · Moderate tricuspid regurgitation.  · Normal central venous pressure (3 mmHg).  · The estimated PA systolic pressure is 53 mmHg.  · There is pulmonary hypertension.      Results for orders placed in visit on 06/02/22    Echo    Interpretation Summary  · The left ventricle is normal in size with concentric hypertrophy and normal systolic function.  · The estimated ejection fraction is 55%.  · Grade II left ventricular diastolic dysfunction.  · Normal right ventricular size with normal right ventricular systolic function.  · Severe left atrial enlargement.  · Mild right atrial enlargement.  · The aortic valve is trileaflet but malformed.  · Moderate aortic regurgitation.  · There is moderate-to-severe aortic valve stenosis.  · Aortic valve area is 1.00 cm2; peak velocity is 3.38 m/s; mean gradient is 29 mmHg.  · Mild mitral regurgitation.  · Mild to moderate tricuspid regurgitation.  · Normal central venous pressure (3 mmHg).  · The estimated PA systolic pressure is 51 mmHg.  · There is pulmonary hypertension.      Results for orders placed in visit on 06/23/20    Echo Color Flow Doppler? Yes    Interpretation Summary  · The left ventricle is normal in size with mild concentric hypertrophy and normal systolic function.  · The estimated ejection fraction is 55%.  · Grade II left ventricular  diastolic dysfunction.  · Normal right ventricular size with normal right ventricular systolic function.  · Moderate left atrial enlargement.  · There is moderate aortic valve stenosis.  · Aortic valve area is 1.17 cm2; peak velocity is 2.98 m/s; mean gradient is 21 mmHg.  · Mild aortic regurgitation.  · Mild mitral regurgitation.  · Normal central venous pressure (3 mmHg).  · The estimated PA systolic pressure is 41 mmHg.      Results for orders placed in visit on 06/22/20    Echo Color Flow Doppler? Yes    Interpretation Summary  · Normal left ventricular systolic function. The estimated ejection fraction is 55%.  · Mild eccentric left ventricular hypertrophy.  · Grade II (moderate) left ventricular diastolic dysfunction consistent with pseudonormalization.  · Normal right ventricular systolic function.  · Mild aortic regurgitation.  · Moderate aortic valve stenosis.  · Aortic valve area is 1.09 cm2; peak velocity is 2.99 m/s; mean gradient is 22 mmHg.  · Normal central venous pressure (3 mmHg).  · The estimated PA systolic pressure is 35 mmHg.      Results for orders placed in visit on 03/26/19    Transthoracic echo (TTE) complete (Cupid Only)    Interpretation Summary  · Normal left ventricular systolic function. The estimated ejection fraction is 55%  · Eccentric left ventricular hypertrophy.  · Normal right ventricular systolic function.  · Severe left atrial enlargement.  · Mild right atrial enlargement.  · Moderate aortic valve stenosis.  · Aortic valve area is 1.08 cm2; peak velocity is 3.02 m/s; mean gradient is 20.79 mmHg.  · Mild-to-moderate aortic regurgitation.  · Mild mitral regurgitation.  · The estimated PA systolic pressure is 36 mm Hg      CURRENT/PREVIOUS VISIT EKG  Results for orders placed or performed during the hospital encounter of 12/20/22   Repeat EKG 12-lead    Collection Time: 12/20/22 11:33 PM    Narrative    Test Reason : R07.9,    Vent. Rate : 074 BPM     Atrial Rate : 074 BPM     P-R  Int : 168 ms          QRS Dur : 096 ms      QT Int : 414 ms       P-R-T Axes : 029 023 047 degrees     QTc Int : 459 ms    Normal sinus rhythm  Normal ECG  When compared with ECG of 20-DEC-2022 15:11,  No significant change was found    Referred By: NATALY FLOREZ           Confirmed By:            ASSESSMENT/PLAN:     Active Hospital Problems    Diagnosis    *Acute pulmonary edema    Acute drug-induced gout of left foot    CAD (coronary artery disease)     NEFTALY RCA 7/2004  Stents x 2 RCA 10/2012      Aortic stenosis     moderate      COPD (chronic obstructive pulmonary disease)       ASSESSMENT & PLAN:   Pulmonary edema and aortic stenosis   Acute Drug Induced Gout of Left Foot  CAD  Aortic Stenosis, valve area of 0.94 centimeters  COPD      RECOMMENDATIONS:  Patient is noncompliant with low sodium cardiac diet.  Encouraged patient and daughter to reduce sodium intake. Limit fluid intake.   Repeat echo echocardiogram shows significant aortic valve stenosis with a valve area of 0.9 cm squared.  Continue lasix 40mg IV BID for diuresis.  Monitor renal function closely. Strict I's and O's. Limit IVF.   Continue colchicine 0.6mg BID for gout flare.   Patient has CAD and abnormal stress test in 2014 with history of stent placement x2 to RCA in the past. Continue aspirin 81 mg daily. Continue atorvastatin 40mg daily. Continue Plavix 75mg once daily. Continue Imdur 60mg once daily. Continue metoprolol 50mg once daily.   Patient can restart spironolactone 25mg once daily outpatient. Restart lasix 20mg outpatient.  When patient is more stable she may benefit from evaluation for possible TAVR at a later date. Patient to follow up with cardiologist Dr. Borjas outpatient in the next few weeks.  Patient needs physical therapy and OOB to assess ambulation and stability prior to discharge planning.  If patient is stable, she may be okay for discharge.        Marleny Church NP  [facility]  Department of  Cardiology  Date of Service: 12/22/2022        I have personally interviewed and examined the patient, I have reviewed the Nurse Practitioner's history and physical, assessment, and plan. I agree with the findings and plan.      Ruddy Vicente M.D.  [facility]  Department of Cardiology  Date of Service: 12/22/2022

## 2022-12-22 NOTE — PLAN OF CARE
12/22/22 1009   Discharge Reassessment   Assessment Type Discharge Planning Reassessment   Did the patient's condition or plan change since previous assessment? No   Discharge Plan A Home with family   Discharge Plan B Home   Discharge Barriers Identified Does not adhere to care plan     Case Management reviewing charts and noted patient refused nutrition consult at bedside. Case Management to meet with patient today to stress compliance and discuss discharge plan

## 2022-12-22 NOTE — PLAN OF CARE
Problem: Adult Inpatient Plan of Care  Goal: Plan of Care Review  Outcome: Ongoing, Progressing  Goal: Optimal Comfort and Wellbeing  Outcome: Ongoing, Progressing   POC reviewed with pt.  Pt denies needs.  Call light within reach.

## 2022-12-22 NOTE — CARE UPDATE
12/22/22 0751   PRE-TX-O2   Device (Oxygen Therapy) nasal cannula   $ Is the patient on Low Flow Oxygen? Yes   Flow (L/min) 3   SpO2 96 %   Pulse Oximetry Type Intermittent   $ Pulse Oximetry - Multiple Charge Pulse Oximetry - Multiple   Pulse 87   Resp 15   Respiratory Evaluation   $ Care Plan Tech Time 15 min

## 2022-12-23 VITALS
OXYGEN SATURATION: 96 % | BODY MASS INDEX: 37.89 KG/M2 | DIASTOLIC BLOOD PRESSURE: 69 MMHG | WEIGHT: 205.88 LBS | SYSTOLIC BLOOD PRESSURE: 127 MMHG | HEART RATE: 87 BPM | TEMPERATURE: 97 F | HEIGHT: 62 IN | RESPIRATION RATE: 18 BRPM

## 2022-12-23 LAB
ANION GAP SERPL CALC-SCNC: 13 MMOL/L (ref 8–16)
BASOPHILS # BLD AUTO: 0.04 K/UL (ref 0–0.2)
BASOPHILS NFR BLD: 0.7 % (ref 0–1.9)
BUN SERPL-MCNC: 30 MG/DL (ref 8–23)
CALCIUM SERPL-MCNC: 7.6 MG/DL (ref 8.7–10.5)
CHLORIDE SERPL-SCNC: 96 MMOL/L (ref 95–110)
CO2 SERPL-SCNC: 25 MMOL/L (ref 23–29)
CREAT SERPL-MCNC: 1.5 MG/DL (ref 0.5–1.4)
DIFFERENTIAL METHOD: ABNORMAL
EOSINOPHIL # BLD AUTO: 0.3 K/UL (ref 0–0.5)
EOSINOPHIL NFR BLD: 5.6 % (ref 0–8)
ERYTHROCYTE [DISTWIDTH] IN BLOOD BY AUTOMATED COUNT: 14.7 % (ref 11.5–14.5)
EST. GFR  (NO RACE VARIABLE): 36.3 ML/MIN/1.73 M^2
GLUCOSE SERPL-MCNC: 105 MG/DL (ref 70–110)
HCT VFR BLD AUTO: 35.2 % (ref 37–48.5)
HGB BLD-MCNC: 10.8 G/DL (ref 12–16)
IMM GRANULOCYTES # BLD AUTO: 0.03 K/UL (ref 0–0.04)
IMM GRANULOCYTES NFR BLD AUTO: 0.5 % (ref 0–0.5)
LYMPHOCYTES # BLD AUTO: 1 K/UL (ref 1–4.8)
LYMPHOCYTES NFR BLD: 18.7 % (ref 18–48)
MCH RBC QN AUTO: 28.3 PG (ref 27–31)
MCHC RBC AUTO-ENTMCNC: 30.7 G/DL (ref 32–36)
MCV RBC AUTO: 92 FL (ref 82–98)
MONOCYTES # BLD AUTO: 0.5 K/UL (ref 0.3–1)
MONOCYTES NFR BLD: 8.5 % (ref 4–15)
NEUTROPHILS # BLD AUTO: 3.7 K/UL (ref 1.8–7.7)
NEUTROPHILS NFR BLD: 66 % (ref 38–73)
NRBC BLD-RTO: 0 /100 WBC
PLATELET # BLD AUTO: 262 K/UL (ref 150–450)
PMV BLD AUTO: 10.2 FL (ref 9.2–12.9)
POTASSIUM SERPL-SCNC: 4.4 MMOL/L (ref 3.5–5.1)
RBC # BLD AUTO: 3.82 M/UL (ref 4–5.4)
SODIUM SERPL-SCNC: 134 MMOL/L (ref 136–145)
URATE SERPL-MCNC: 11.6 MG/DL (ref 2.4–5.7)
WBC # BLD AUTO: 5.56 K/UL (ref 3.9–12.7)

## 2022-12-23 PROCEDURE — 97165 OT EVAL LOW COMPLEX 30 MIN: CPT

## 2022-12-23 PROCEDURE — 99233 PR SUBSEQUENT HOSPITAL CARE,LEVL III: ICD-10-PCS | Mod: ,,, | Performed by: GENERAL PRACTICE

## 2022-12-23 PROCEDURE — 84550 ASSAY OF BLOOD/URIC ACID: CPT | Performed by: INTERNAL MEDICINE

## 2022-12-23 PROCEDURE — 36415 COLL VENOUS BLD VENIPUNCTURE: CPT | Performed by: INTERNAL MEDICINE

## 2022-12-23 PROCEDURE — 97161 PT EVAL LOW COMPLEX 20 MIN: CPT

## 2022-12-23 PROCEDURE — 99233 SBSQ HOSP IP/OBS HIGH 50: CPT | Mod: ,,, | Performed by: GENERAL PRACTICE

## 2022-12-23 PROCEDURE — 85025 COMPLETE CBC W/AUTO DIFF WBC: CPT | Performed by: INTERNAL MEDICINE

## 2022-12-23 PROCEDURE — 80048 BASIC METABOLIC PNL TOTAL CA: CPT | Performed by: INTERNAL MEDICINE

## 2022-12-23 PROCEDURE — 25000003 PHARM REV CODE 250: Performed by: STUDENT IN AN ORGANIZED HEALTH CARE EDUCATION/TRAINING PROGRAM

## 2022-12-23 PROCEDURE — 63600175 PHARM REV CODE 636 W HCPCS: Performed by: STUDENT IN AN ORGANIZED HEALTH CARE EDUCATION/TRAINING PROGRAM

## 2022-12-23 PROCEDURE — 97535 SELF CARE MNGMENT TRAINING: CPT

## 2022-12-23 PROCEDURE — 63600175 PHARM REV CODE 636 W HCPCS: Performed by: INTERNAL MEDICINE

## 2022-12-23 PROCEDURE — 97116 GAIT TRAINING THERAPY: CPT

## 2022-12-23 PROCEDURE — 25000003 PHARM REV CODE 250: Performed by: INTERNAL MEDICINE

## 2022-12-23 RX ORDER — PREDNISONE 20 MG/1
40 TABLET ORAL DAILY
Status: DISCONTINUED | OUTPATIENT
Start: 2022-12-23 | End: 2022-12-23 | Stop reason: HOSPADM

## 2022-12-23 RX ORDER — PREDNISONE 20 MG/1
40 TABLET ORAL DAILY
Qty: 10 TABLET | Refills: 0 | Status: SHIPPED | OUTPATIENT
Start: 2022-12-24 | End: 2022-12-29

## 2022-12-23 RX ADMIN — ENOXAPARIN SODIUM 40 MG: 100 INJECTION SUBCUTANEOUS at 04:12

## 2022-12-23 RX ADMIN — ISOSORBIDE MONONITRATE 60 MG: 60 TABLET, EXTENDED RELEASE ORAL at 09:12

## 2022-12-23 RX ADMIN — FUROSEMIDE 40 MG: 10 INJECTION, SOLUTION INTRAVENOUS at 06:12

## 2022-12-23 RX ADMIN — QUETIAPINE 100 MG: 100 TABLET ORAL at 09:12

## 2022-12-23 RX ADMIN — METOPROLOL SUCCINATE 50 MG: 50 TABLET, FILM COATED, EXTENDED RELEASE ORAL at 09:12

## 2022-12-23 RX ADMIN — COLCHICINE 0.6 MG: 0.6 TABLET, FILM COATED ORAL at 09:12

## 2022-12-23 RX ADMIN — ASPIRIN 81 MG: 81 TABLET, DELAYED RELEASE ORAL at 09:12

## 2022-12-23 RX ADMIN — PANTOPRAZOLE SODIUM 40 MG: 40 TABLET, DELAYED RELEASE ORAL at 06:12

## 2022-12-23 RX ADMIN — PREDNISONE 40 MG: 20 TABLET ORAL at 11:12

## 2022-12-23 RX ADMIN — CLOPIDOGREL BISULFATE 75 MG: 75 TABLET, FILM COATED ORAL at 09:12

## 2022-12-23 RX ADMIN — PAROXETINE HYDROCHLORIDE 40 MG: 20 TABLET, FILM COATED ORAL at 09:12

## 2022-12-23 NOTE — PLAN OF CARE
12/23/22 1421   Post-Acute Status   Post-Acute Authorization Home Health   Home Health Status Patient declined/refused     Case Management met with patient to discuss home health on discharge. CM explained home health services to patient - patient v/u and kindly denied home health services at this time. Patient explained she is discharging to her daughter's house for additional support and if she would decide on home health, patient will speak to PCP regarding their services at hospital follow up appointment.

## 2022-12-23 NOTE — PLAN OF CARE
Patient cleared for discharge from case management standpoint.    Chart and discharge orders reviewed.  Patient discharged home with no further case management needs.     12/23/22 1718   Final Note   Assessment Type Final Discharge Note   Anticipated Discharge Disposition Home   Post-Acute Status   Post-Acute Authorization E   [facility] Status Set-up Complete/Auth obtained   Discharge Delays None known at this time

## 2022-12-23 NOTE — PLAN OF CARE
12/23/22 1431   Post-Acute Status   Post-Acute Authorization HME   HME Status Pending post-acute provider review/more information requested     Case Management sent rolling walker order to [facility] for review.     1500 - approved by Carina George and delivered to patient's bedside. Signed delivery ticket returned to [facility].

## 2022-12-23 NOTE — PT/OT/SLP EVAL
Physical Therapy Evaluation    Patient Name:  Betty Bacon   MRN:  [id number]    Recommendations:     Discharge Recommendations: home health PT   Discharge Equipment Recommendations:RW as walker that pt has at home  is broken  Barriers to discharge: None    Assessment:     Betty Bacon is a 72 y.o. female admitted with a medical diagnosis of Acute pulmonary edema.  She presents with the following impairments/functional limitations: weakness, impaired endurance, impaired self care skills, gait instability, pain, decreased lower extremity function, impaired cardiopulmonary response to activity .    Nurse reported that pt slid out of bed  earlier in AM attempting to t/f bed to [facility] Un[facility]  .Nurse did say that  pt was good to be seen by PT. Pt t/f'ed supine to sit and sit to stand with Min A and ambulated in the room 40 ft RW and Min A with complaint of L foot pain. Pt had 4 L's of 02 in place, which is what she uses at home PRN. Per pt's son, pt has required  more 02 that usual lately.     Rehab Prognosis: Good; patient would benefit from acute skilled PT services to address these deficits and reach maximum level of function.    Recent Surgery: * No surgery found *      Plan:     During this hospitalization, patient to be seen 6 x/week to address the identified rehab impairments via gait training, therapeutic activities, therapeutic exercises and progress toward the following goals:    Plan of Care Expires:  01/23/23    Subjective     Chief Complaint: L foot pain  Patient/Family Comments/goals: Return home alone with family support  Pain/Comfort:  Pain Rating 1: 7/10  Location - Side 1: Left  Location 1: foot    Patients cultural, spiritual, Church conflicts given the current situation:      Living Environment:  Pt lives at home alone in a trailer with 4 step entrance and B railing.  Prior to admission, patients level of function was independent and  was very socially active. However just  prior to admit pt was found in her recliner with inability to  t/f out of chair to go to the Bathroom.  Pt's son assisted pt to bathroom noting  shortness of breath and weakness.Equipment used at home: walker, rolling, oxygen, shower chair.  DME owned (not currently used): none.  Upon discharge, patient will have assistance from her family.    Objective:     Communicated with nurse prior to session.  Patient found supine with telemetry, oxygen  upon PT entry to room.    General Precautions: Standard, fall  Orthopedic Precautions:    Braces:    Respiratory Status: Nasal cannula, flow 4 L/min    Exams:  Cognitive Exam:  Patient is oriented to Person, Place, Time, and Situation  RLE ROM: WFL  RLE Strength: WFL  LLE ROM: WFL  LLE Strength: WFL    Functional Mobility:  Bed Mobility:     Supine to Sit: minimum assistance  Sit to Supine: minimum assistance  Transfers:     Sit to Stand:  minimum assistance with rolling walker  Gait: 30 ft RW and Min A with antalgic gait pattern d/t L foot pain.      AM-PAC 6 CLICK MOBILITY  Total Score:17       Treatment & Education:  Pt was educated on the role of PT  for assessment, treatment with emphasis on safety and increased mobility and D/C  recommendations.     Patient left supine with all lines intact, call button in reach, bed alarm on, and her son present.    GOALS:   Multidisciplinary Problems       Physical Therapy Goals          Problem: Physical Therapy    Goal Priority Disciplines Outcome Goal Variances Interventions   Physical Therapy Goal     PT, PT/OT      Description: Goals to be met by: 2022     Patient will increase functional independence with mobility by performin. Supine to sit with Stand-by Assistance  2. Sit to stand transfer with Stand-by Assistance  3. Gait  x 100  feet with Stand-by Assistance using Rolling Walker.                          History:     Past Medical History:   Diagnosis Date    Acute coronary artery obstruction without MI 10/2012     Benign hypertension     COPD (chronic obstructive pulmonary disease)     Coronary artery disease     Disorder of kidney and ureter     Dr. Morrow    Hepatitis B core antibody positive 03/03/2021    Negative sAg, suggests previous exposure but no chronic/active Hep B. At risk for reactivation with any immunosuppression medication, steroids, chemo, etc.      History of electroconvulsive therapy     Hyperlipidemia LDL goal < 70     Left ankle sprain     Major depressive disorder, recurrent episode, severe     s/p ECT    Positive AKIRA (antinuclear antibody) 03/03/2021    PVD (peripheral vascular disease)        Past Surgical History:   Procedure Laterality Date    CHOLECYSTECTOMY      CORONARY ANGIOPLASTY      CORONARY ANGIOPLASTY WITH STENT PLACEMENT  10/2012    2 stents RCA (100% stenosis)    EYE SURGERY      cataract surgery    HYSTERECTOMY      LINA, ovaries intact. uterine prolapse    ILIAC ARTERY STENT      TONSILLECTOMY      TOTAL VAGINAL HYSTERECTOMY         Time Tracking:     PT Received On: 12/23/22  PT Start Time: 1010     PT Stop Time: 1030  PT Total Time (min): 20 min     Billable Minutes: Evaluation 12 minutes  and Gait Training 8  Minutes      12/23/2022

## 2022-12-23 NOTE — PROGRESS NOTES
[facility] Medicine  Progress Note    Patient name: Betty Bacon  MRN: [id number]  Admit Date: 12/20/2022   LOS: 1 day     SUBJECTIVE:     Principal problem: Acute pulmonary edema    Interval History:      12/22- improving with diuresis, home O2 is 4LNC. She is on 2L currently and sat is 94-95%.  Spoke with daughter on the phone, suggested a pulse ox for home and patient can likely decrease O2 to 2L.  TTE with severe AS which I also discussed with daughter.  Her cardiologists is following AS closely.  Pt will go home w dauter at dc, prob in the morning.    Scheduled Meds:   aspirin  81 mg Oral Daily    atorvastatin  40 mg Oral Daily    clopidogreL  75 mg Oral Daily    [START ON 12/23/2022] colchicine  0.6 mg Oral BID    enoxaparin  40 mg Subcutaneous Daily    furosemide (LASIX) injection  40 mg Intravenous Q12H    isosorbide mononitrate  60 mg Oral Daily    metoprolol succinate  50 mg Oral Daily    pantoprazole  40 mg Oral Daily    paroxetine  40 mg Oral Daily    QUEtiapine  100 mg Oral Daily     Continuous Infusions:  PRN Meds:acetaminophen, clonazePAM, hydrALAZINE, magnesium oxide, magnesium oxide, melatonin, ondansetron, potassium bicarbonate, potassium bicarbonate, potassium bicarbonate, potassium, sodium phosphates, potassium, sodium phosphates, potassium, sodium phosphates    Review of patient's allergies indicates:   Allergen Reactions    Propoxyphene n-acetaminophen Other (See Comments)     Other reaction(s): Unknown    Codeine Anxiety       Review of Systems: As per interval history    OBJECTIVE:     Vital Signs (Most Recent)  Temp: 97.8 °F (36.6 °C) (12/22/22 2023)  Pulse: 83 (12/22/22 2023)  Resp: 18 (12/22/22 2023)  BP: 132/66 (12/22/22 2023)  SpO2: 96 % (12/22/22 2023)    Vital Signs Range (Last 24H):  Temp:  [97.8 °F (36.6 °C)-98.2 °F (36.8 °C)]   Pulse:  [83-95]   Resp:  [15-18]   BP: (126-167)/(54-78)   SpO2:  [93 %-98 %]     I & O (Last 24H):  Intake/Output Summary  (Last 24 hours) at 12/22/2022 2031  Last data filed at 12/22/2022 1354  Gross per 24 hour   Intake 840 ml   Output 1100 ml   Net -260 ml       Physical Exam:  General: Patient resting comfortably in no acute distress. Appears as stated age. Calm  Eyes: No conjunctival injection. No scleral icterus.  ENT: Hearing grossly intact. No discharge from ears. No nasal discharge.   Neck: Supple, trachea midline. No JVD  CVS: RRR. No LE edema BL  Lungs:  No tachypnea or accessory muscle use.  Min crackles bilaterally  Abdomen:  Soft, nontender and nondistended.  No organomegaly  Neuro: AOx3. Moves all extremities. Follows commands. Responds appropriately   Skin:  No rash or erythema noted    Laboratory:  I have reviewed all pertinent lab results within the past 24 hours.    Diagnostic Results:  Labs: Reviewed  ECG: Reviewed  X-Ray: Reviewed  Echo: Reviewed    ASSESSMENT/PLAN:         Active Hospital Problems    Diagnosis  POA    *Acute pulmonary edema [J81.0]  Yes    Acute drug-induced gout of left foot [M10.272]  Yes    CAD (coronary artery disease) [I25.10]  Yes     NEFTALY RCA 7/2004  Stents x 2 RCA 10/2012      Aortic stenosis [I35.0]  Yes     moderate      COPD (chronic obstructive pulmonary disease) [J44.9]  Yes      Resolved Hospital Problems   No resolved problems to display.         Plan:     Acute pulmonary edema  -Inpatient admission for Acute Pulmonary Edema  -Cardiology consulted, appreciate recs  -less than 2 gm/day sodium diet, she is non-compliant and sent the dietician away today  - furosemide iv bid     Acute drug-induced gout of left foot  -colchicine 1.2 mg x1 and start bid while in hospital for early gout flare symptoms        CAD (coronary artery disease)   -continue statin     Aortic stenosis  Fu TTE  Followed closely outpt        COPD (chronic obstructive pulmonary disease)  Seems controlled currently  Duonebs q6 and prn       VTE Risk Mitigation (From admission, onward)           Ordered     enoxaparin  injection 40 mg  Daily         12/21/22 0220     IP VTE HIGH RISK PATIENT  Once         12/21/22 0220     Place sequential compression device  Until discontinued         12/21/22 0220                        Department [facility]  [facility]  Amandeep Ramirez MD  Date of service: 12/22/2022

## 2022-12-23 NOTE — PT/OT/SLP EVAL
Occupational Therapy   Evaluation    Name: Betty Bacon  MRN: [id number]  Admitting Diagnosis: Acute pulmonary edema  Recent Surgery: * No surgery found *      Recommendations:     Discharge Recommendations: home health OT  Discharge Equipment Recommendations:  none  Barriers to discharge:  None    Assessment:     Betty Bacon is a 72 y.o. female with a medical diagnosis of Acute pulmonary edema.  She presents with pain to left foot secondary to gout flare up. Performance deficits affecting function: weakness, impaired endurance, impaired self care skills, impaired functional mobility, gait instability, impaired balance.      Rehab Prognosis: Fair; patient would benefit from acute skilled OT services to address these deficits and reach maximum level of function.       Plan:     Patient to be seen 5 x/week to address the above listed problems via self-care/home management, therapeutic activities, therapeutic exercises  Plan of Care Expires: 01/23/23  Plan of Care Reviewed with: patient    Subjective     Chief Complaint: left foot pain due to gout flare up.  Patient/Family Comments/goals: reduced pain, improved functional mobility and ADL independence.    Occupational Profile:  Living Environment: lives alone in a mobile home with 3 steps, single rail to enter.  Previous level of function: independent with Adls, IADls and driving.  Roles and Routines: primary homemaker  Equipment Used at Home: oxygen, walker, rolling, shower chair  Assistance upon Discharge: Limited    Pain/Comfort:  Pain Rating 1: 7/10  Location - Side 1: Left  Location 1: foot  Pain Addressed 1: Reposition, Distraction  Pain Rating Post-Intervention 1: 7/10    Patients cultural, spiritual, Worship conflicts given the current situation: no    Objective:     Communicated with: nurse prior to session.  Patient found HOB elevated with telemetry, oxygen upon OT entry to room.    General Precautions: Standard, fall  Orthopedic  Precautions: N/A  Braces: N/A  Respiratory Status: Nasal cannula, flow 4 L/min    Occupational Performance:    Bed Mobility:    Patient completed Scooting/Bridging with contact guard assistance  Patient completed Supine to Sit with contact guard assistance  Patient completed Sit to Supine with contact guard assistance  Performed unsupported sitting EOB with contact guard assistance.    Functional Mobility/Transfers:  Patient completed Bedside Commode Toilet Transfer Step Transfer technique with contact guard assistance with  rolling walker    Activities of Daily Living:  Lower Body Dressing: contact guard assistance to don/doff socks sitting EOB.  Toileting: contact guard assistance to perform toilet hygiene and clothing management from bedside commode.    Cognitive/Visual Perceptual:  Cognitive/Psychosocial Skills:     -       Oriented to: Person, Place, Time, and Situation   -       Follows Commands/attention:Follows multistep  commands  -       Communication: clear/fluent  -       Memory: No Deficits noted  -       Safety awareness/insight to disability: intact   -       Mood/Affect/Coping skills/emotional control: Cooperative and Pleasant  Visual/Perceptual:      -Intact Acuity    Physical Exam:  Balance:    -       Sitting/Standing: Contact Guard  Upper Extremity Range of Motion:     -       Right Upper Extremity: WFL  -       Left Upper Extremity: WFL  Upper Extremity Strength:    -       Right Upper Extremity: WFL  -       Left Upper Extremity: WFL   Strength:    -       Right Upper Extremity: WFL  -       Left Upper Extremity: WFL  Fine Motor Coordination:    -       Intact    AMPAC 6 Click ADL:  AMPAC Total Score: 19    Treatment & Education:  Patient educated on the purpose of Occupational Therapy and the importance of getting OOB.    Patient left HOB elevated with all lines intact, call button in reach, and bed alarm on    GOALS:   Multidisciplinary Problems       Occupational Therapy Goals           Problem: Occupational Therapy    Goal Priority Disciplines Outcome Interventions   Occupational Therapy Goal     OT, PT/OT     Description: Goals to be met by: 1/23/2023     Patient will increase functional independence with ADLs by performing:    UE Dressing with Supervision.  LE Dressing with Supervision.  Grooming while standing at sink with Supervision.  Toileting from toilet with Supervision for hygiene and clothing management.   Toilet transfer to toilet with Supervision.                         History:     Past Medical History:   Diagnosis Date    Acute coronary artery obstruction without MI 10/2012    Benign hypertension     COPD (chronic obstructive pulmonary disease)     Coronary artery disease     Disorder of kidney and ureter     Dr. Morrow    Hepatitis B core antibody positive 03/03/2021    Negative sAg, suggests previous exposure but no chronic/active Hep B. At risk for reactivation with any immunosuppression medication, steroids, chemo, etc.      History of electroconvulsive therapy     Hyperlipidemia LDL goal < 70     Left ankle sprain     Major depressive disorder, recurrent episode, severe     s/p ECT    Positive AKIRA (antinuclear antibody) 03/03/2021    PVD (peripheral vascular disease)          Past Surgical History:   Procedure Laterality Date    CHOLECYSTECTOMY      CORONARY ANGIOPLASTY      CORONARY ANGIOPLASTY WITH STENT PLACEMENT  10/2012    2 stents RCA (100% stenosis)    EYE SURGERY      cataract surgery    HYSTERECTOMY      LINA, ovaries intact. uterine prolapse    ILIAC ARTERY STENT      TONSILLECTOMY      TOTAL VAGINAL HYSTERECTOMY         Time Tracking:     OT Date of Treatment: 12/23/22  OT Start Time: 0947  OT Stop Time: 1002  OT Total Time (min): 15 min    Billable Minutes:Evaluation 3  Self Care/Home Management 12    12/23/2022

## 2022-12-23 NOTE — PROGRESS NOTES
[facility]  Department of Cardiology  Progress Note      PATIENT NAME: Betty Bacon  MRN: [id number]  TODAY'S DATE: 12/23/2022  ADMIT DATE: 12/20/2022                          CONSULT REQUESTED BY: Amandeep Ramirez, *    SUBJECTIVE     PRINCIPAL PROBLEM: Acute pulmonary edema      REASON FOR CONSULT:  Pulmonary Edema and aortic stenonsis     INTERVAL HISTORY:     12/23/22:  Patient seen resting in bed with son at bedside. She apparently had another fall this morning while hurrying to the bathroom. Her son states she will be going to her daughter's home post dc.     12/22/22    Patient remains on 4L NC. She reports improved breathing and improved edema in BLE.  She denies any acute symptoms overnight. She has only been OOB to use the commode. Daughter is at bedside.           HPI:  Patient is a 72 y.o. female with HFpEF 55% (12/2/2022), moderate to severe aortic stenosis, pulmonary HTN, CAD, COPD, Gout, HTN, CKD, and mood disorder who presented to [place] ED with SOB, orthopnea, PND, pedal edema, swollen and painful left ankle.  She has not been following a low salt diet.  She was recently discharged [facility] 1 week ago for the same diagnosis. She was treated with diuretics last admission and responded well. US of BLE is negative for DVT. EKG showed sinus rhythm without acute ST-T segments. Troponins are elevated at 389.5 likely secondary to pulmonary edema and fluid overload. . Patient denies CP, palpitations, dizziness, lightheadedness, jaw/neck/arm pain, focal deficits and bleeding.  L ankle tenderness is likely secondary to gout flare. Urate level is elevated.      Upon examination, she is resting quietly in bed, in no acute distress, on 4L NC, with 1+ nonpitting edema. L ankle and foot is tender to palpation.       FROM H&P:  HPI: 72 year old female with history of  HFpEF (55% 2022), AS (mod-severe), Pulm HTN (55 mm Hg), CAD, COPD, Gout, HTN, CKI (3), Mood Disorder was  "transferred from [place] to [facility] ED at the behest of Cardiology for acute pulmonary edema. The patient was discharged from [facility] approximately 1 week ago after being admitted for the same diagnosis. ECHO then revealed the above findings. The patient responded well to diuresis and was discharged home. However, she did not follow low salt diet, in fact:  "I like my salt". No anginal like chest discomfort. She has had SOB(E), orthopnea, PND, and pedal edema. She has a history of gout and has developed a swollen, painful 9/10 left ankle since her discharge and use of iv furosemide.      In ED: labs reviewed and noted below: no leukocytosis with mild normocytic anemia; Urate is elevated, electrolytes are normal with stage 3b renal dysfunction; BNP and troponin are minimally elevated (delta of HS trop at this institution is only 0.08 ng/mL). UA: clean. CXR reviewed:  NAPD; no infiltrate. US BLE: No DVT. EKG reviewed: sinus without acute segments.     Discussed salt usage, volume overload, use of diuretics and flare of gout, and how all of that could possibly be mitigated by cutting out her salt usage with the patient and her children at bedside. All express understanding.     Discussed with ED MD: Inpatient admission for Acute Pulmonary Edema; Cardiology opinion; less than 2 gm/day sodium diet; continue home regimen for chronic maladie except changing colchicine from prn to bid while in hospital; mild analgesia; furosemide iv bid        Review of patient's allergies indicates:   Allergen Reactions    Propoxyphene n-acetaminophen Other (See Comments)     Other reaction(s): Unknown    Codeine Anxiety       Past Medical History:   Diagnosis Date    Acute coronary artery obstruction without MI 10/2012    Benign hypertension     COPD (chronic obstructive pulmonary disease)     Coronary artery disease     Disorder of kidney and ureter     Dr. Morrow    Hepatitis B core antibody positive 03/03/2021    Negative sAg, suggests " previous exposure but no chronic/active Hep B. At risk for reactivation with any immunosuppression medication, steroids, chemo, etc.      History of electroconvulsive therapy     Hyperlipidemia LDL goal < 70     Left ankle sprain     Major depressive disorder, recurrent episode, severe     s/p ECT    Positive AKIRA (antinuclear antibody) 2021    PVD (peripheral vascular disease)      Past Surgical History:   Procedure Laterality Date    CHOLECYSTECTOMY      CORONARY ANGIOPLASTY      CORONARY ANGIOPLASTY WITH STENT PLACEMENT  10/2012    2 stents RCA (100% stenosis)    EYE SURGERY      cataract surgery    HYSTERECTOMY      LINA, ovaries intact. uterine prolapse    ILIAC ARTERY STENT      TONSILLECTOMY      TOTAL VAGINAL HYSTERECTOMY       Social History     Tobacco Use    Smoking status: Former     Packs/day: 2.00     Years: 40.00     Pack years: 80.00     Types: Cigarettes     Quit date: 2009     Years since quittin.0    Smokeless tobacco: Never   Substance Use Topics    Alcohol use: Yes     Comment: social    Drug use: No        REVIEW OF SYSTEMS  CONSTITUTIONAL: +fatigue Negative for chills, and fever.   EYES: No double vision, No blurred vision  NEURO: No headaches, No dizziness  RESPIRATORY: + shortness of breath; Negative for cough, and wheezing.    CARDIOVASCULAR: + pedal edema Negative for chest pain. Negative for palpitations.   GI: Negative for abdominal pain, No melena, diarrhea, nausea and vomiting.   : Negative for dysuria and frequency, Negative for hematuria  SKIN: Negative for bruising, Negative for edema or discoloration noted.   ENDOCRINE: Negative for polyphagia, Negative for heat intolerance, Negative for cold intolerance  PSYCHIATRIC: Negative for depression, Negative for anxiety, Negative for memory loss  MUSCULOSKELETAL: + L ankle edema, erythema, and pain; Negative for neck pain, Negative for muscle weakness, Negative for back pain     OBJECTIVE     VITAL SIGNS (Most  Recent)  Temp: 97.5 °F (36.4 °C) (12/23/22 0744)  Pulse: 85 (12/23/22 0744)  Resp: 18 (12/23/22 0744)  BP: (!) 156/73 (12/23/22 0744)  SpO2: 96 % (12/23/22 0744)    VENTILATION STATUS  Resp: 18 (12/23/22 0744)  SpO2: 96 % (12/23/22 0744)  Oxygen Concentration (%):  [2] 2    I & O (Last 24H):  Intake/Output Summary (Last 24 hours) at 12/23/2022 0919  Last data filed at 12/23/2022 0423  Gross per 24 hour   Intake 560 ml   Output 700 ml   Net -140 ml         WEIGHTS  Wt Readings from Last 3 Encounters:   12/23/22 0300 93.4 kg (205 lb 14.4 oz)   12/21/22 0213 93.2 kg (205 lb 7.5 oz)   12/20/22 2252 95.7 kg (211 lb)   12/20/22 1459 95.7 kg (211 lb)   12/08/22 1616 92.4 kg (203 lb 11.3 oz)       PHYSICAL EXAM  GENERAL: obese, well built, well nourished, well-developed in no apparent distress alert and oriented.   HEENT: Normocephalic. Pupils normal and conjunctivae normal.  Mucous membranes normal, no cyanosis or icterus, trachea central,no pallor or icterus is noted..   NECK: No JVD. Conductive murmur noted bilaterally.   THYROID: Thyroid not examined  CARDIAC: Regular rate and rhythm. S1 is normal.S2 is normal.No gallops, or clicks noted at this time. 3/6 systolic murmurs  CHEST ANATOMY: normal.   LUNGS: Diminished in lower lobes.  No wheezing or rhonchi..   ABDOMEN: Obese, Soft no masses or organomegaly.  No abdomen pulsations or bruits.  Normal bowel sounds. No pulsations and no masses felt, No guarding or rebound.   URINARY: No yoder catheter   EXTREMITIES: No cyanosis, or clubbing noted at this time., no calf tenderness bilaterally. 1+ bilateral edema in lower legs. L foot and ankle is tender to palpation.   PERIPHERAL VASCULAR SYSTEM: Good palpable distal pulses.   CENTRAL NERVOUS SYSTEM: No focal motor or sensory deficits noted.   SKIN: Skin without lesions, moist, well perfused.   MUSCLE STRENGTH & TONE: No noteable weakness, atrophy or abnormal movement.     HOME MEDICATIONS:  No current facility-administered  medications on file prior to encounter.     Current Outpatient Medications on File Prior to Encounter   Medication Sig Dispense Refill    albuterol sulfate (PROAIR RESPICLICK) 90 mcg/actuation AePB Inhale 2 puffs into the lungs every 4 to 6 hours as needed. 1 each 3    ALPRAZolam (XANAX) 1 MG tablet Take 1 tablet (1 mg total) by mouth 2 (two) times daily as needed for Anxiety. 60 tablet 3    aspirin 81 mg Tab Take 81 mg by mouth once daily. Every day      atorvastatin (LIPITOR) 40 MG tablet TAKE 1 TABLET BY MOUTH EVERY DAY (Patient taking differently: Take 40 mg by mouth once daily.) 90 tablet 3    clonazePAM (KLONOPIN) 1 MG disintegrating tablet Take 1 tablet (1 mg total) by mouth 2 (two) times daily as needed (anxiety). 60 tablet 0    clopidogreL (PLAVIX) 75 mg tablet TAKE 1 TABLET BY MOUTH EVERY DAY (Patient taking differently: Take 75 mg by mouth once daily.) 90 tablet 3    colchicine (COLCRYS) 0.6 mg tablet Take 2 po at gout flare onset, may repeat 1 in an hour prn, then 1 po bid until pain resolves ,or n/V/D starts (Patient taking differently: Take 0.6 mg by mouth as needed. Take 2 po at gout flare onset, may repeat 1 in an hour prn, then 1 po bid until pain resolves ,or n/V/D starts) 20 tablet 0    esomeprazole (NEXIUM) 40 MG capsule TAKE 1 CAPSULE BY MOUTH BEFORE BREAKFAST. (Patient taking differently: Take 40 mg by mouth before breakfast.) 90 capsule 3    furosemide (LASIX) 20 MG tablet Take 20 mg by mouth once daily.  0    isosorbide mononitrate (IMDUR) 60 MG 24 hr tablet TAKE 1 TABLET BY MOUTH ONCE DAILY (Patient taking differently: Take 60 mg by mouth once daily.) 90 tablet 3    metoprolol succinate (TOPROL-XL) 50 MG 24 hr tablet TAKE 1 TABLET BY MOUTH EVERY DAY (Patient taking differently: Take 50 mg by mouth once daily. DO NOT CRUSH OR CHEW; SWALLOW WHOLE.) 90 tablet 3    paroxetine (PAXIL) 40 MG tablet TAKE 1 TABLET BY MOUTH EVERY DAY (Patient taking differently: Take 40 mg by mouth once daily. Total  40 mg) 90 tablet 3    QUEtiapine (SEROQUEL) 100 MG Tab Take 100 mg by mouth once daily.      spironolactone (ALDACTONE) 25 MG tablet Take 25 mg by mouth once daily.  0    TRELEGY ELLIPTA 200-62.5-25 mcg inhaler INHALE 1 PUFF INTO THE LUNGS ONCE DAILY. 180 each 2    triamterene-hydrochlorothiazide 37.5-25 mg (DYAZIDE) 37.5-25 mg per capsule TAKE 1 CAPSULE BY MOUTH ONCE DAILY 90 capsule 2    colestipoL (COLESTID) 1 gram Tab Take 1 tablet (1 g total) by mouth 2 (two) times daily as needed (diarrhea). 180 tablet 3    ergocalciferol (ERGOCALCIFEROL) 50,000 unit Cap Take 1 capsule (50,000 Units total) by mouth every 7 days. (Patient taking differently: Take 50,000 Units by mouth every 7 days. FRIDAYS) 12 capsule 3       SCHEDULED MEDS:   aspirin  81 mg Oral Daily    atorvastatin  40 mg Oral Daily    clopidogreL  75 mg Oral Daily    colchicine  0.6 mg Oral BID    enoxaparin  40 mg Subcutaneous Daily    furosemide (LASIX) injection  40 mg Intravenous Q12H    isosorbide mononitrate  60 mg Oral Daily    metoprolol succinate  50 mg Oral Daily    pantoprazole  40 mg Oral Daily    paroxetine  40 mg Oral Daily    QUEtiapine  100 mg Oral Daily       CONTINUOUS INFUSIONS:    PRN MEDS:acetaminophen, clonazePAM, hydrALAZINE, magnesium oxide, magnesium oxide, melatonin, ondansetron, potassium bicarbonate, potassium bicarbonate, potassium bicarbonate, potassium, sodium phosphates, potassium, sodium phosphates, potassium, sodium phosphates    LABS AND DIAGNOSTICS     CBC LAST 3 DAYS  Recent Labs   Lab 12/21/22  0429 12/22/22  0455 12/23/22  0518   WBC 6.30 5.09 5.56   RBC 3.27* 3.51* 3.82*   HGB 9.3* 9.8* 10.8*   HCT 30.9* 32.8* 35.2*   MCV 95 93 92   MCH 28.4 27.9 28.3   MCHC 30.1* 29.9* 30.7*   RDW 15.0* 14.9* 14.7*    245 262   MPV 10.5 10.5 10.2   GRAN 71.9  4.5 69.3  3.5 66.0  3.7   LYMPH 16.8*  1.1 17.3*  0.9* 18.7  1.0   MONO 7.0  0.4 7.1  0.4 8.5  0.5   BASO 0.03 0.03 0.04   NRBC 0 0 0         COAGULATION LAST 3  DAYS  No results for input(s): LABPT, INR, APTT in the last 168 hours.    CHEMISTRY LAST 3 DAYS  Recent Labs   Lab 12/20/22  1537 12/21/22  0429 12/22/22  0455 12/23/22  0440    138 138 134*   K 4.5 3.5 3.7 4.4    105 102 96   CO2 23 24 28 25   ANIONGAP 12 9 8 13   BUN 18 25* 25* 30*   CREATININE 1.4 1.6* 1.4 1.5*   * 102 99 105   CALCIUM 8.4* 7.5* 7.7* 7.6*   ALBUMIN 2.3*  --   --   --    PROT 7.2  --   --   --    ALKPHOS 92  --   --   --    ALT 12  --   --   --    AST 11  --   --   --    BILITOT 0.6  --   --   --          CARDIAC PROFILE LAST 3 DAYS  Recent Labs   Lab 12/20/22  1537 12/20/22  1759   *  --    TROPONINI 0.044* 0.052*         ENDOCRINE LAST 3 DAYS  No results for input(s): TSH, PROCAL in the last 168 hours.    LAST ARTERIAL BLOOD GAS  ABG  No results for input(s): PH, PO2, PCO2, HCO3, BE in the last 168 hours.    LAST 7 DAYS MICROBIOLOGY   Microbiology Results (last 7 days)       Procedure Component Value Units Date/Time    Blood culture #1 **CANNOT BE ORDERED STAT** [[id number]] Collected: 12/20/22 2337    Order Status: Completed Specimen: Blood from Peripheral, Antecubital, Right Updated: 12/23/22 0232     Blood Culture, Routine No Growth to date      No Growth to date      No Growth to date    Blood culture #2 **CANNOT BE ORDERED STAT** [[id number]] Collected: 12/20/22 2337    Order Status: Completed Specimen: Blood from Peripheral, Antecubital, Right Updated: 12/23/22 0232     Blood Culture, Routine No Growth to date      No Growth to date      No Growth to date            MOST RECENT IMAGING  X-Ray Chest 1 View  Portable chest x-ray at 4:10 PM is compared to prior study dated 12/1/2022    Clinical history shortness of breath    The heart is enlarged. There are no confluent infiltrates or pleural effusions. There are no acute osseous abnormalities.    IMPRESSION: Mild cardiomegaly with no acute pulmonary process    Electronically signed by:  Penny Ribera MD  12/22/2022  4:25 PM [facility] Workstation: [id number]      ECHOCARDIOGRAM RESULTS (last 5)  Results for orders placed during the hospital encounter of 12/01/22    Echo    Interpretation Summary  · The left ventricle is normal in size with moderate concentric hypertrophy and normal systolic function.  · The estimated ejection fraction is 55%.  · Grade I left ventricular diastolic dysfunction.  · Normal right ventricular size with normal right ventricular systolic function.  · Severe left atrial enlargement.  · Moderate right atrial enlargement.  · Mild aortic regurgitation.  · There is moderate-to-severe aortic valve stenosis.  · Aortic valve area is 1.16 cm2; peak velocity is 3.59 m/s; mean gradient is 31 mmHg.  · Moderate tricuspid regurgitation.  · Normal central venous pressure (3 mmHg).  · The estimated PA systolic pressure is 53 mmHg.  · There is pulmonary hypertension.      Results for orders placed in visit on 06/02/22    Echo    Interpretation Summary  · The left ventricle is normal in size with concentric hypertrophy and normal systolic function.  · The estimated ejection fraction is 55%.  · Grade II left ventricular diastolic dysfunction.  · Normal right ventricular size with normal right ventricular systolic function.  · Severe left atrial enlargement.  · Mild right atrial enlargement.  · The aortic valve is trileaflet but malformed.  · Moderate aortic regurgitation.  · There is moderate-to-severe aortic valve stenosis.  · Aortic valve area is 1.00 cm2; peak velocity is 3.38 m/s; mean gradient is 29 mmHg.  · Mild mitral regurgitation.  · Mild to moderate tricuspid regurgitation.  · Normal central venous pressure (3 mmHg).  · The estimated PA systolic pressure is 51 mmHg.  · There is pulmonary hypertension.      Results for orders placed in visit on 06/23/20    Echo Color Flow Doppler? Yes    Interpretation Summary  · The left ventricle is normal in size with mild concentric hypertrophy and normal systolic function.  · The  estimated ejection fraction is 55%.  · Grade II left ventricular diastolic dysfunction.  · Normal right ventricular size with normal right ventricular systolic function.  · Moderate left atrial enlargement.  · There is moderate aortic valve stenosis.  · Aortic valve area is 1.17 cm2; peak velocity is 2.98 m/s; mean gradient is 21 mmHg.  · Mild aortic regurgitation.  · Mild mitral regurgitation.  · Normal central venous pressure (3 mmHg).  · The estimated PA systolic pressure is 41 mmHg.      Results for orders placed in visit on 06/22/20    Echo Color Flow Doppler? Yes    Interpretation Summary  · Normal left ventricular systolic function. The estimated ejection fraction is 55%.  · Mild eccentric left ventricular hypertrophy.  · Grade II (moderate) left ventricular diastolic dysfunction consistent with pseudonormalization.  · Normal right ventricular systolic function.  · Mild aortic regurgitation.  · Moderate aortic valve stenosis.  · Aortic valve area is 1.09 cm2; peak velocity is 2.99 m/s; mean gradient is 22 mmHg.  · Normal central venous pressure (3 mmHg).  · The estimated PA systolic pressure is 35 mmHg.      Results for orders placed in visit on 03/26/19    Transthoracic echo (TTE) complete (Cupid Only)    Interpretation Summary  · Normal left ventricular systolic function. The estimated ejection fraction is 55%  · Eccentric left ventricular hypertrophy.  · Normal right ventricular systolic function.  · Severe left atrial enlargement.  · Mild right atrial enlargement.  · Moderate aortic valve stenosis.  · Aortic valve area is 1.08 cm2; peak velocity is 3.02 m/s; mean gradient is 20.79 mmHg.  · Mild-to-moderate aortic regurgitation.  · Mild mitral regurgitation.  · The estimated PA systolic pressure is 36 mm Hg      CURRENT/PREVIOUS VISIT EKG  Results for orders placed or performed during the hospital encounter of 12/20/22   Repeat EKG 12-lead    Collection Time: 12/20/22 11:33 PM    Narrative    Test Reason :  R07.9,    Vent. Rate : 074 BPM     Atrial Rate : 074 BPM     P-R Int : 168 ms          QRS Dur : 096 ms      QT Int : 414 ms       P-R-T Axes : 029 023 047 degrees     QTc Int : 459 ms    Normal sinus rhythm  Normal ECG Voltage criteria for left ventricular hypertrophy    When compared with ECG of 20-DEC-2022 15:11,  No significant change was found  Confirmed by Thai UPTON, Aleksey ZELAYA ([id number]) on 12/23/2022 7:59:53 AM    Referred By: NATALY FLOREZ           Confirmed By:Aleksey Andre MD           ASSESSMENT/PLAN:     Active Hospital Problems    Diagnosis    *Acute pulmonary edema    Acute drug-induced gout of left foot    CAD (coronary artery disease)     NEFTALY RCA 7/2004  Stents x 2 RCA 10/2012      Aortic stenosis     moderate      COPD (chronic obstructive pulmonary disease)       ASSESSMENT & PLAN:   Pulmonary edema and aortic stenosis   Acute Drug Induced Gout of Left Foot  CAD  Aortic Stenosis, valve area of 0.94 centimeters  COPD      RECOMMENDATIONS:  Patient appears stable and near baseline.  Recommend holding off on diuretics for 2-3 days due to increased BUN and creatinine on labs.  Resume home diuresis on Monday.  Patient is on Plavix and aspirin and certainly needs to take extra precautions to avoid falls and head injuries.  Recommend home physical therapy if she is agreeable.  No objection to discharge home today from a cardiac standpoint.    Renee Levi NP  [facility]  Department of Cardiology  Date of Service: 12/23/2022      Patient fell this morning which she could not get to the bathroom quick enough.  I discontinue the IV Lasix.  She is on 4 L nasal cannula her lungs are clear she has no edema.  Her main complaint is gout in the left foot where there is no redness she is been started on colchicine.    Would hold the Lasix for few days okay to discharge from the cardiac point of view with fall precautions.      I have personally interviewed and examined the patient, I have reviewed the  Nurse Practitioner's history and physical, assessment, and plan. I agree with the findings and plan.      Ruddy Vicente M.D.  [facility]  Department of Cardiology  Date of Service: 12/23/2022

## 2022-12-23 NOTE — NURSING
Called to patients room by staff notified that patient had a fall no injuries noted. Patient stated that she pressed the call light for assistance to the bedside commode. She could not wait for help to come so she attempted to ambulate to the bedside commode alone, but when she stood up her legs became weak and buckled she then fell to the floor onto her bottom hitting her elbows on the way down. Patient was found sitting on her bottom on the floor next to the bed in front of the bedside commode. Patient denies hitting her head. Bed alarm was activated and sounding when help arrived. Patient was wearing yellow slip proof socks at the time of the fall. Yellow fall risk band is on patients risk bed was low and locked call light in reach and was also activated. Patient denies any pain or discomfort related to the fall. Doctor James, the charge nurse and the house supervisor were notified of the incident. Vital signs were obtained and WNL patient is AA&OX4, RR even and not labored skin intact no complaints of pain no injuries noted. Patient was assisted up off the floor and on the bedside commode times two staff members.

## 2022-12-24 NOTE — PT/OT/SLP DISCHARGE
Occupational Therapy Discharge Summary    Betty Bacon  MRN: [id number]   Principal Problem: Acute pulmonary edema      Patient Discharged from acute Occupational Therapy on 12/23/2022 .  Please refer to prior OT note dated 12/23/2022 for functional status.    Assessment:      Patient has not met goals.    Objective:     GOALS:   Multidisciplinary Problems       Occupational Therapy Goals       Not on file              Multidisciplinary Problems (Resolved)          Problem: Occupational Therapy    Goal Priority Disciplines Outcome Interventions   Occupational Therapy Goal   (Resolved)     OT, PT/OT Met    Description: Goals to be met by: 1/23/2023     Patient will increase functional independence with ADLs by performing:    UE Dressing with Supervision.  LE Dressing with Supervision.  Grooming while standing at sink with Supervision.  Toileting from toilet with Supervision for hygiene and clothing management.   Toilet transfer to toilet with Supervision.                         Reasons for Discontinuation of Therapy Services  Patient d/c home.       Plan:     Patient Discharged to: Home with Home Health Service    12/24/2022

## 2022-12-26 LAB
BACTERIA BLD CULT: NORMAL
BACTERIA BLD CULT: NORMAL

## 2022-12-28 ENCOUNTER — OUTPATIENT CASE MANAGEMENT (OUTPATIENT)
Dept: ADMINISTRATIVE | Facility: OTHER | Age: 72
End: 2022-12-28
Payer: MEDICARE

## 2022-12-28 NOTE — PROGRESS NOTES
12/28/22 Attempt assessment with patient/caregiver. Pt and caregiver decline need for OPCM at this time. Has all needs met. Gave RN info. RN OPCM  case closure

## 2022-12-30 DIAGNOSIS — F41.9 ANXIETY: ICD-10-CM

## 2022-12-30 NOTE — TELEPHONE ENCOUNTER
----- Message from Irene Padilla sent at 12/30/2022 11:54 AM CST -----  Regarding: SAME DAY REFILL REQUEST  Contact: Patient  Type: Patient Call Back         Who called: Patient         What is the request in detail: requesting a refill of clonazePAM (KLONOPIN) 1 MG disintegrating tablet; states she has 2 left; please advise           Best call back number: [phone]         Additional Information:   [facility] #[id number] - [place] - [address] AT [facility]  [address]  [place] [address]  Phone: [phone] Fax: [phone]               Thank You

## 2022-12-30 NOTE — TELEPHONE ENCOUNTER
No new care gaps identified.  [facility] Embedded Care Gaps. Reference number: [id number]. 12/30/2022   12:06:10 PM CST

## 2023-01-01 ENCOUNTER — HOSPITAL ENCOUNTER (INPATIENT)
Facility: HOSPITAL | Age: 73
LOS: 3 days | Discharge: HOME HEALTH | DRG: 177 | End: 2023-01-04
Attending: EMERGENCY MEDICINE | Admitting: INTERNAL MEDICINE
Payer: MEDICARE

## 2023-01-01 DIAGNOSIS — E87.6 HYPOKALEMIA: ICD-10-CM

## 2023-01-01 DIAGNOSIS — E83.51 HYPOCALCEMIA: ICD-10-CM

## 2023-01-01 DIAGNOSIS — I73.9 PAD (PERIPHERAL ARTERY DISEASE): ICD-10-CM

## 2023-01-01 DIAGNOSIS — U07.1 COVID-19: Primary | ICD-10-CM

## 2023-01-01 DIAGNOSIS — U07.1 SARS-COV-2 POSITIVE: ICD-10-CM

## 2023-01-01 DIAGNOSIS — I50.30 HEART FAILURE WITH PRESERVED EJECTION FRACTION, UNSPECIFIED HF CHRONICITY: ICD-10-CM

## 2023-01-01 DIAGNOSIS — R06.02 SOB (SHORTNESS OF BREATH): ICD-10-CM

## 2023-01-01 DIAGNOSIS — N17.9 AKI (ACUTE KIDNEY INJURY): ICD-10-CM

## 2023-01-01 PROBLEM — F41.9 ANXIETY: Status: ACTIVE | Noted: 2023-01-01

## 2023-01-01 LAB
ALBUMIN SERPL BCP-MCNC: 2.9 G/DL (ref 3.5–5.2)
ALP SERPL-CCNC: 77 U/L (ref 55–135)
ALT SERPL W/O P-5'-P-CCNC: 17 U/L (ref 10–44)
ANION GAP SERPL CALC-SCNC: 10 MMOL/L (ref 8–16)
APTT BLDCRRT: 27.1 SEC (ref 21–32)
AST SERPL-CCNC: 20 U/L (ref 10–40)
BASOPHILS # BLD AUTO: 0.04 K/UL (ref 0–0.2)
BASOPHILS NFR BLD: 0.4 % (ref 0–1.9)
BILIRUB SERPL-MCNC: 0.7 MG/DL (ref 0.1–1)
BNP SERPL-MCNC: 92 PG/ML (ref 0–99)
BUN SERPL-MCNC: 25 MG/DL (ref 8–23)
CALCIUM SERPL-MCNC: 6.8 MG/DL (ref 8.7–10.5)
CHLORIDE SERPL-SCNC: 101 MMOL/L (ref 95–110)
CK SERPL-CCNC: 75 U/L (ref 20–180)
CO2 SERPL-SCNC: 24 MMOL/L (ref 23–29)
CREAT SERPL-MCNC: 1.8 MG/DL (ref 0.5–1.4)
CRP SERPL-MCNC: 9.49 MG/DL
DIFFERENTIAL METHOD: ABNORMAL
EOSINOPHIL # BLD AUTO: 0.2 K/UL (ref 0–0.5)
EOSINOPHIL NFR BLD: 2.1 % (ref 0–8)
ERYTHROCYTE [DISTWIDTH] IN BLOOD BY AUTOMATED COUNT: 15.9 % (ref 11.5–14.5)
EST. GFR  (NO RACE VARIABLE): 29.2 ML/MIN/1.73 M^2
FERRITIN SERPL-MCNC: 459 NG/ML (ref 20–300)
GLUCOSE SERPL-MCNC: 119 MG/DL (ref 70–110)
GLUCOSE SERPL-MCNC: 147 MG/DL (ref 70–110)
HCT VFR BLD AUTO: 37.1 % (ref 37–48.5)
HGB BLD-MCNC: 11.3 G/DL (ref 12–16)
IMM GRANULOCYTES # BLD AUTO: 0.21 K/UL (ref 0–0.04)
IMM GRANULOCYTES NFR BLD AUTO: 2.4 % (ref 0–0.5)
INFLUENZA A, MOLECULAR: NEGATIVE
INFLUENZA B, MOLECULAR: NEGATIVE
INR PPP: 1.1 (ref 0.8–1.2)
LACTATE SERPL-SCNC: 0.8 MMOL/L (ref 0.5–1.9)
LDH SERPL L TO P-CCNC: 204 U/L (ref 110–260)
LYMPHOCYTES # BLD AUTO: 1.3 K/UL (ref 1–4.8)
LYMPHOCYTES NFR BLD: 14.1 % (ref 18–48)
MAGNESIUM SERPL-MCNC: 0.7 MG/DL (ref 1.6–2.6)
MCH RBC QN AUTO: 28.3 PG (ref 27–31)
MCHC RBC AUTO-ENTMCNC: 30.5 G/DL (ref 32–36)
MCV RBC AUTO: 93 FL (ref 82–98)
MONOCYTES # BLD AUTO: 1.1 K/UL (ref 0.3–1)
MONOCYTES NFR BLD: 12 % (ref 4–15)
NEUTROPHILS # BLD AUTO: 6.1 K/UL (ref 1.8–7.7)
NEUTROPHILS NFR BLD: 69 % (ref 38–73)
NRBC BLD-RTO: 0 /100 WBC
PLATELET # BLD AUTO: 226 K/UL (ref 150–450)
PMV BLD AUTO: 10.7 FL (ref 9.2–12.9)
POTASSIUM SERPL-SCNC: 3.2 MMOL/L (ref 3.5–5.1)
PROCALCITONIN SERPL IA-MCNC: 0.17 NG/ML (ref 0–0.5)
PROT SERPL-MCNC: 6.8 G/DL (ref 6–8.4)
PROTHROMBIN TIME: 11.4 SEC (ref 9–12.5)
RBC # BLD AUTO: 3.99 M/UL (ref 4–5.4)
SARS-COV-2 RDRP RESP QL NAA+PROBE: POSITIVE
SODIUM SERPL-SCNC: 135 MMOL/L (ref 136–145)
SPECIMEN SOURCE: NORMAL
TROPONIN I SERPL HS-MCNC: 21.9 PG/ML (ref 0–14.9)
TROPONIN I SERPL HS-MCNC: 25.8 PG/ML (ref 0–14.9)
TROPONIN I SERPL HS-MCNC: 25.9 PG/ML (ref 0–14.9)
WBC # BLD AUTO: 8.89 K/UL (ref 3.9–12.7)

## 2023-01-01 PROCEDURE — 99285 EMERGENCY DEPT VISIT HI MDM: CPT | Mod: 25

## 2023-01-01 PROCEDURE — 84145 PROCALCITONIN (PCT): CPT | Performed by: NURSE PRACTITIONER

## 2023-01-01 PROCEDURE — 25000003 PHARM REV CODE 250: Performed by: EMERGENCY MEDICINE

## 2023-01-01 PROCEDURE — 96365 THER/PROPH/DIAG IV INF INIT: CPT

## 2023-01-01 PROCEDURE — 93010 EKG 12-LEAD: ICD-10-PCS | Mod: ,,, | Performed by: INTERNAL MEDICINE

## 2023-01-01 PROCEDURE — 93010 ELECTROCARDIOGRAM REPORT: CPT | Mod: ,,, | Performed by: INTERNAL MEDICINE

## 2023-01-01 PROCEDURE — 25000242 PHARM REV CODE 250 ALT 637 W/ HCPCS: Performed by: NURSE PRACTITIONER

## 2023-01-01 PROCEDURE — 83615 LACTATE (LD) (LDH) ENZYME: CPT | Performed by: NURSE PRACTITIONER

## 2023-01-01 PROCEDURE — 82550 ASSAY OF CK (CPK): CPT | Performed by: NURSE PRACTITIONER

## 2023-01-01 PROCEDURE — 63600175 PHARM REV CODE 636 W HCPCS: Performed by: NURSE PRACTITIONER

## 2023-01-01 PROCEDURE — 83880 ASSAY OF NATRIURETIC PEPTIDE: CPT | Performed by: EMERGENCY MEDICINE

## 2023-01-01 PROCEDURE — U0002 COVID-19 LAB TEST NON-CDC: HCPCS | Performed by: EMERGENCY MEDICINE

## 2023-01-01 PROCEDURE — 85025 COMPLETE CBC W/AUTO DIFF WBC: CPT | Performed by: EMERGENCY MEDICINE

## 2023-01-01 PROCEDURE — 86769 SARS-COV-2 COVID-19 ANTIBODY: CPT | Performed by: INTERNAL MEDICINE

## 2023-01-01 PROCEDURE — 86140 C-REACTIVE PROTEIN: CPT | Performed by: NURSE PRACTITIONER

## 2023-01-01 PROCEDURE — 93005 ELECTROCARDIOGRAM TRACING: CPT | Performed by: INTERNAL MEDICINE

## 2023-01-01 PROCEDURE — 84484 ASSAY OF TROPONIN QUANT: CPT | Mod: 91 | Performed by: NURSE PRACTITIONER

## 2023-01-01 PROCEDURE — 36415 COLL VENOUS BLD VENIPUNCTURE: CPT | Performed by: INTERNAL MEDICINE

## 2023-01-01 PROCEDURE — 12000002 HC ACUTE/MED SURGE SEMI-PRIVATE ROOM

## 2023-01-01 PROCEDURE — 83735 ASSAY OF MAGNESIUM: CPT | Performed by: NURSE PRACTITIONER

## 2023-01-01 PROCEDURE — 85610 PROTHROMBIN TIME: CPT | Performed by: EMERGENCY MEDICINE

## 2023-01-01 PROCEDURE — 85730 THROMBOPLASTIN TIME PARTIAL: CPT | Performed by: NURSE PRACTITIONER

## 2023-01-01 PROCEDURE — 25000003 PHARM REV CODE 250: Performed by: INTERNAL MEDICINE

## 2023-01-01 PROCEDURE — 87502 INFLUENZA DNA AMP PROBE: CPT | Performed by: EMERGENCY MEDICINE

## 2023-01-01 PROCEDURE — 82728 ASSAY OF FERRITIN: CPT | Performed by: NURSE PRACTITIONER

## 2023-01-01 PROCEDURE — 25000003 PHARM REV CODE 250: Performed by: NURSE PRACTITIONER

## 2023-01-01 PROCEDURE — 83605 ASSAY OF LACTIC ACID: CPT | Performed by: NURSE PRACTITIONER

## 2023-01-01 PROCEDURE — 82962 GLUCOSE BLOOD TEST: CPT

## 2023-01-01 PROCEDURE — 80053 COMPREHEN METABOLIC PANEL: CPT | Performed by: EMERGENCY MEDICINE

## 2023-01-01 PROCEDURE — 84484 ASSAY OF TROPONIN QUANT: CPT | Performed by: EMERGENCY MEDICINE

## 2023-01-01 RX ORDER — IPRATROPIUM BROMIDE 0.5 MG/2.5ML
0.5 SOLUTION RESPIRATORY (INHALATION) EVERY 6 HOURS PRN
Status: DISCONTINUED | OUTPATIENT
Start: 2023-01-01 | End: 2023-01-01

## 2023-01-01 RX ORDER — PANTOPRAZOLE SODIUM 40 MG/1
40 TABLET, DELAYED RELEASE ORAL DAILY
Status: DISCONTINUED | OUTPATIENT
Start: 2023-01-02 | End: 2023-01-04 | Stop reason: HOSPADM

## 2023-01-01 RX ORDER — CALCIUM GLUCONATE 20 MG/ML
2 INJECTION, SOLUTION INTRAVENOUS
Status: COMPLETED | OUTPATIENT
Start: 2023-01-01 | End: 2023-01-01

## 2023-01-01 RX ORDER — ASPIRIN 81 MG/1
81 TABLET ORAL DAILY
Status: DISCONTINUED | OUTPATIENT
Start: 2023-01-02 | End: 2023-01-04 | Stop reason: HOSPADM

## 2023-01-01 RX ORDER — BUDESONIDE 0.5 MG/2ML
0.5 INHALANT ORAL EVERY 12 HOURS
Status: DISCONTINUED | OUTPATIENT
Start: 2023-01-01 | End: 2023-01-01

## 2023-01-01 RX ORDER — IPRATROPIUM BROMIDE AND ALBUTEROL SULFATE 2.5; .5 MG/3ML; MG/3ML
3 SOLUTION RESPIRATORY (INHALATION)
Status: DISCONTINUED | OUTPATIENT
Start: 2023-01-02 | End: 2023-01-04 | Stop reason: HOSPADM

## 2023-01-01 RX ORDER — GLUCAGON 1 MG
1 KIT INJECTION
Status: DISCONTINUED | OUTPATIENT
Start: 2023-01-01 | End: 2023-01-04 | Stop reason: HOSPADM

## 2023-01-01 RX ORDER — METOPROLOL SUCCINATE 50 MG/1
50 TABLET, EXTENDED RELEASE ORAL DAILY
Status: DISCONTINUED | OUTPATIENT
Start: 2023-01-02 | End: 2023-01-04 | Stop reason: HOSPADM

## 2023-01-01 RX ORDER — IPRATROPIUM BROMIDE 0.5 MG/2.5ML
0.5 SOLUTION RESPIRATORY (INHALATION) EVERY 4 HOURS
Status: DISCONTINUED | OUTPATIENT
Start: 2023-01-01 | End: 2023-01-01

## 2023-01-01 RX ORDER — ACETAMINOPHEN 325 MG/1
650 TABLET ORAL EVERY 4 HOURS PRN
Status: DISCONTINUED | OUTPATIENT
Start: 2023-01-01 | End: 2023-01-04 | Stop reason: HOSPADM

## 2023-01-01 RX ORDER — ALBUTEROL SULFATE 0.83 MG/ML
2.5 SOLUTION RESPIRATORY (INHALATION)
Status: DISCONTINUED | OUTPATIENT
Start: 2023-01-02 | End: 2023-01-01

## 2023-01-01 RX ORDER — CLONAZEPAM 1 MG/1
1 TABLET ORAL 2 TIMES DAILY PRN
Status: DISCONTINUED | OUTPATIENT
Start: 2023-01-02 | End: 2023-01-04 | Stop reason: HOSPADM

## 2023-01-01 RX ORDER — SODIUM,POTASSIUM PHOSPHATES 280-250MG
2 POWDER IN PACKET (EA) ORAL
Status: DISCONTINUED | OUTPATIENT
Start: 2023-01-01 | End: 2023-01-04 | Stop reason: HOSPADM

## 2023-01-01 RX ORDER — CLOPIDOGREL BISULFATE 75 MG/1
75 TABLET ORAL DAILY
Status: DISCONTINUED | OUTPATIENT
Start: 2023-01-02 | End: 2023-01-04 | Stop reason: HOSPADM

## 2023-01-01 RX ORDER — ALBUTEROL SULFATE 0.83 MG/ML
2.5 SOLUTION RESPIRATORY (INHALATION) EVERY 4 HOURS
Status: DISCONTINUED | OUTPATIENT
Start: 2023-01-01 | End: 2023-01-01

## 2023-01-01 RX ORDER — LEVOFLOXACIN 5 MG/ML
750 INJECTION, SOLUTION INTRAVENOUS
Status: COMPLETED | OUTPATIENT
Start: 2023-01-01 | End: 2023-01-01

## 2023-01-01 RX ORDER — HYDROCODONE BITARTRATE AND ACETAMINOPHEN 5; 325 MG/1; MG/1
1 TABLET ORAL EVERY 6 HOURS PRN
Status: DISCONTINUED | OUTPATIENT
Start: 2023-01-01 | End: 2023-01-04 | Stop reason: HOSPADM

## 2023-01-01 RX ORDER — FUROSEMIDE 20 MG/1
20 TABLET ORAL DAILY
Status: DISCONTINUED | OUTPATIENT
Start: 2023-01-02 | End: 2023-01-04 | Stop reason: HOSPADM

## 2023-01-01 RX ORDER — POTASSIUM CHLORIDE 20 MEQ/1
40 TABLET, EXTENDED RELEASE ORAL ONCE
Status: COMPLETED | OUTPATIENT
Start: 2023-01-01 | End: 2023-01-01

## 2023-01-01 RX ORDER — ASCORBIC ACID 500 MG
500 TABLET ORAL 2 TIMES DAILY
Status: DISCONTINUED | OUTPATIENT
Start: 2023-01-01 | End: 2023-01-04 | Stop reason: HOSPADM

## 2023-01-01 RX ORDER — HYDROCODONE BITARTRATE AND ACETAMINOPHEN 10; 325 MG/1; MG/1
1 TABLET ORAL EVERY 6 HOURS PRN
Status: DISCONTINUED | OUTPATIENT
Start: 2023-01-01 | End: 2023-01-04 | Stop reason: HOSPADM

## 2023-01-01 RX ORDER — IPRATROPIUM BROMIDE AND ALBUTEROL SULFATE 2.5; .5 MG/3ML; MG/3ML
3 SOLUTION RESPIRATORY (INHALATION) EVERY 4 HOURS
Status: DISCONTINUED | OUTPATIENT
Start: 2023-01-01 | End: 2023-01-01

## 2023-01-01 RX ORDER — IBUPROFEN 200 MG
24 TABLET ORAL
Status: DISCONTINUED | OUTPATIENT
Start: 2023-01-01 | End: 2023-01-04 | Stop reason: HOSPADM

## 2023-01-01 RX ORDER — INSULIN ASPART 100 [IU]/ML
0-5 INJECTION, SOLUTION INTRAVENOUS; SUBCUTANEOUS
Status: DISCONTINUED | OUTPATIENT
Start: 2023-01-01 | End: 2023-01-04 | Stop reason: HOSPADM

## 2023-01-01 RX ORDER — ENOXAPARIN SODIUM 100 MG/ML
1 INJECTION SUBCUTANEOUS DAILY
Status: DISCONTINUED | OUTPATIENT
Start: 2023-01-01 | End: 2023-01-02

## 2023-01-01 RX ORDER — ISOSORBIDE MONONITRATE 30 MG/1
60 TABLET, EXTENDED RELEASE ORAL DAILY
Status: DISCONTINUED | OUTPATIENT
Start: 2023-01-02 | End: 2023-01-04 | Stop reason: HOSPADM

## 2023-01-01 RX ORDER — PAROXETINE 10 MG/1
10 TABLET, FILM COATED ORAL EVERY MORNING
COMMUNITY

## 2023-01-01 RX ORDER — IBUPROFEN 200 MG
16 TABLET ORAL
Status: DISCONTINUED | OUTPATIENT
Start: 2023-01-01 | End: 2023-01-04 | Stop reason: HOSPADM

## 2023-01-01 RX ORDER — PAROXETINE HYDROCHLORIDE 20 MG/1
40 TABLET, FILM COATED ORAL DAILY
Status: DISCONTINUED | OUTPATIENT
Start: 2023-01-02 | End: 2023-01-02

## 2023-01-01 RX ORDER — LANOLIN ALCOHOL/MO/W.PET/CERES
800 CREAM (GRAM) TOPICAL
Status: DISCONTINUED | OUTPATIENT
Start: 2023-01-01 | End: 2023-01-04 | Stop reason: HOSPADM

## 2023-01-01 RX ORDER — ONDANSETRON 2 MG/ML
4 INJECTION INTRAMUSCULAR; INTRAVENOUS EVERY 8 HOURS PRN
Status: DISCONTINUED | OUTPATIENT
Start: 2023-01-01 | End: 2023-01-04 | Stop reason: HOSPADM

## 2023-01-01 RX ORDER — ALPRAZOLAM 0.25 MG/1
0.25 TABLET ORAL ONCE
Status: COMPLETED | OUTPATIENT
Start: 2023-01-01 | End: 2023-01-01

## 2023-01-01 RX ORDER — BUDESONIDE 0.5 MG/2ML
0.5 INHALANT ORAL 2 TIMES DAILY
Status: DISCONTINUED | OUTPATIENT
Start: 2023-01-01 | End: 2023-01-04 | Stop reason: HOSPADM

## 2023-01-01 RX ORDER — QUETIAPINE FUMARATE 100 MG/1
100 TABLET, FILM COATED ORAL DAILY
Status: DISCONTINUED | OUTPATIENT
Start: 2023-01-02 | End: 2023-01-04 | Stop reason: HOSPADM

## 2023-01-01 RX ORDER — SODIUM CHLORIDE 0.9 % (FLUSH) 0.9 %
10 SYRINGE (ML) INJECTION
Status: DISCONTINUED | OUTPATIENT
Start: 2023-01-01 | End: 2023-01-04 | Stop reason: HOSPADM

## 2023-01-01 RX ORDER — ATORVASTATIN CALCIUM 40 MG/1
40 TABLET, FILM COATED ORAL DAILY
Status: DISCONTINUED | OUTPATIENT
Start: 2023-01-02 | End: 2023-01-04 | Stop reason: HOSPADM

## 2023-01-01 RX ORDER — ALBUTEROL SULFATE 0.83 MG/ML
2.5 SOLUTION RESPIRATORY (INHALATION) EVERY 6 HOURS PRN
Status: DISCONTINUED | OUTPATIENT
Start: 2023-01-01 | End: 2023-01-01

## 2023-01-01 RX ORDER — CLONAZEPAM 1 MG/1
1 TABLET, ORALLY DISINTEGRATING ORAL 2 TIMES DAILY PRN
Qty: 60 TABLET | Refills: 0 | Status: ON HOLD | OUTPATIENT
Start: 2023-01-01 | End: 2023-01-31 | Stop reason: HOSPADM

## 2023-01-01 RX ORDER — LOPERAMIDE HYDROCHLORIDE 2 MG/1
2 CAPSULE ORAL EVERY 6 HOURS PRN
Status: DISCONTINUED | OUTPATIENT
Start: 2023-01-01 | End: 2023-01-04 | Stop reason: HOSPADM

## 2023-01-01 RX ADMIN — CALCIUM GLUCONATE 2 G: 20 INJECTION, SOLUTION INTRAVENOUS at 04:01

## 2023-01-01 RX ADMIN — LEVOFLOXACIN 750 MG: 5 INJECTION, SOLUTION INTRAVENOUS at 08:01

## 2023-01-01 RX ADMIN — DEXAMETHASONE 6 MG: 2 TABLET ORAL at 07:01

## 2023-01-01 RX ADMIN — OXYCODONE HYDROCHLORIDE AND ACETAMINOPHEN 500 MG: 500 TABLET ORAL at 10:01

## 2023-01-01 RX ADMIN — Medication 800 MG: at 10:01

## 2023-01-01 RX ADMIN — POTASSIUM CHLORIDE 40 MEQ: 1500 TABLET, EXTENDED RELEASE ORAL at 07:01

## 2023-01-01 RX ADMIN — ENOXAPARIN SODIUM 90 MG: 100 INJECTION SUBCUTANEOUS at 10:01

## 2023-01-01 RX ADMIN — ALPRAZOLAM 0.25 MG: 0.25 TABLET ORAL at 07:01

## 2023-01-01 NOTE — ED PROVIDER NOTES
Encounter Date: 1/1/2023       History     Chief Complaint   Patient presents with    Shortness of Breath    Weakness     Pt brought in by EMS for SOB and weakness. Pt has a hx of COPD and last night tested + for covid.     Patient presents with history of chronic kidney disease, COPD, heart failure, coronary artery disease complaining of weakness, malaise, not feeling well for the last couple of days.  Patient had positive home COVID test.  The worst symptoms are mild-to-moderate.  Nothing makes it better will worse.    Review of patient's allergies indicates:   Allergen Reactions    Propoxyphene n-acetaminophen Other (See Comments)     Other reaction(s): Unknown    Codeine Anxiety     Past Medical History:   Diagnosis Date    Acute coronary artery obstruction without MI 10/2012    Benign hypertension     COPD (chronic obstructive pulmonary disease)     Coronary artery disease     Disorder of kidney and ureter     Dr. Morrow    Hepatitis B core antibody positive 03/03/2021    Negative sAg, suggests previous exposure but no chronic/active Hep B. At risk for reactivation with any immunosuppression medication, steroids, chemo, etc.      History of electroconvulsive therapy     Hyperlipidemia LDL goal < 70     Left ankle sprain     Major depressive disorder, recurrent episode, severe     s/p ECT    Positive AKIRA (antinuclear antibody) 03/03/2021    PVD (peripheral vascular disease)      Past Surgical History:   Procedure Laterality Date    CHOLECYSTECTOMY      CORONARY ANGIOPLASTY      CORONARY ANGIOPLASTY WITH STENT PLACEMENT  10/2012    2 stents RCA (100% stenosis)    EYE SURGERY      cataract surgery    HYSTERECTOMY      LINA, ovaries intact. uterine prolapse    ILIAC ARTERY STENT      TONSILLECTOMY      TOTAL VAGINAL HYSTERECTOMY       Family History   Problem Relation Age of Onset    Diabetes Mother     Heart disease Mother     Stroke Father     Heart disease Father     Heart disease Brother     Stroke Brother      Hypertension Daughter     Diabetes Maternal Aunt     Heart disease Maternal Aunt     Heart disease Maternal Uncle     Heart disease Paternal Aunt     Heart disease Paternal Uncle     Heart disease Maternal Grandfather     Diabetes Sister     Heart disease Sister     Cancer Sister         lung    Kidney disease Sister         mass, benign    Breast cancer Sister     Stroke Sister     Dementia Sister     Liver disease Sister     Melanoma Neg Hx     Psoriasis Neg Hx     Lupus Neg Hx     Eczema Neg Hx      Social History     Tobacco Use    Smoking status: Former     Packs/day: 2.00     Years: 40.00     Pack years: 80.00     Types: Cigarettes     Quit date: 2009     Years since quittin.0    Smokeless tobacco: Never   Substance Use Topics    Alcohol use: Yes     Comment: social    Drug use: No     Review of Systems   All other systems reviewed and are negative.    Physical Exam     Initial Vitals   BP Pulse Resp Temp SpO2   23 1451 23 1456 23 1456 23 1456 23 1456   (!) 167/70 87 18 98.7 °F (37.1 °C) 98 %      MAP       --                Physical Exam    Nursing note and vitals reviewed.  Constitutional:   Ill-appearing, no distress   HENT:   Head: Normocephalic and atraumatic.   Eyes: EOM are normal.   Neck: Neck supple.   Normal range of motion.  Cardiovascular:  Normal rate, regular rhythm, normal heart sounds and intact distal pulses.           Pulmonary/Chest: Breath sounds normal. No respiratory distress.   Abdominal: Abdomen is soft.   Musculoskeletal:      Cervical back: Normal range of motion and neck supple.     Neurological: She is alert and oriented to person, place, and time.   Skin: Skin is warm and dry. Capillary refill takes less than 2 seconds.   Psychiatric: She has a normal mood and affect. Her behavior is normal. Judgment and thought content normal.       ED Course   Procedures  Labs Reviewed   SARS-COV-2 RNA AMPLIFICATION, QUAL - Abnormal; Notable for the  following components:       Result Value    SARS-CoV-2 RNA, Amplification, Qual Positive (*)     All other components within normal limits   CBC W/ AUTO DIFFERENTIAL - Abnormal; Notable for the following components:    RBC 3.99 (*)     Hemoglobin 11.3 (*)     MCHC 30.5 (*)     RDW 15.9 (*)     Immature Granulocytes 2.4 (*)     Immature Grans (Abs) 0.21 (*)     Mono # 1.1 (*)     Lymph % 14.1 (*)     All other components within normal limits   COMPREHENSIVE METABOLIC PANEL - Abnormal; Notable for the following components:    Sodium 135 (*)     Potassium 3.2 (*)     Glucose 119 (*)     BUN 25 (*)     Creatinine 1.8 (*)     Calcium 6.8 (*)     Albumin 2.9 (*)     eGFR 29.2 (*)     All other components within normal limits    Narrative:     Calcium critical result(s) repeated. Called and verbal readback   obtained from [facility], RN by SLT1 01/01/2023 16:05   TROPONIN I HIGH SENSITIVITY - Abnormal; Notable for the following components:    Troponin I High Sensitivity 25.9 (*)     All other components within normal limits   INFLUENZA A AND B ANTIGEN    Narrative:     Specimen Source->Nasopharyngeal Swab   B-TYPE NATRIURETIC PEPTIDE   PROTIME-INR   TROPONIN I HIGH SENSITIVITY          Imaging Results              X-Ray Chest AP Portable (Final result)  Result time 01/01/23 15:25:39      Final result by Penny Ribera MD (01/01/23 15:25:39)                   Narrative:    Portable chest x-ray at 3:09 PM is compared to prior study dated 12/22/2022    Clinical history shortness of breath    The heart is enlarged. There are no confluent infiltrates or pleural effusions. There are no acute osseous abnormalities.    IMPRESSION: Cardiomegaly with no acute pulmonary process    Electronically signed by:  Penny Ribera MD  1/1/2023 3:25 PM CST Workstation: [id number]                                     Medications   calcium gluconate 1 g in NS IVPB (premixed) (2 g Intravenous New Bag 1/1/23 6115)     Medical  Decision Making:   Initial Assessment:   Ill-appearing no distress  Differential Diagnosis:   COPD, chronic kidney disease, acute kidney injury, dehydration, electrolyte abnormalities, COVID illness, pneumonia  Independently Interpreted Test(s):   I have ordered and independently interpreted EKG Reading(s) - see summary below       <> Summary of EKG Reading(s): EKG is sinus tachycardia, no ST elevation  Clinical Tests:   Lab Tests: Reviewed and Ordered  Radiological Study: Ordered and Reviewed  Medical Tests: Reviewed and Ordered  ED Management:  [facility]    Patient presents for emergent evaluation of acute weakness that poses a threat to life and/or bodily function.    In the ED patient found to have acute hypocalcemia, COVID, CARMELITA.    I ordered labs and personally reviewed them.  Labs significant for sodium 135, potassium 3.2, creatinine 1.8.    I ordered X-rays and personally reviewed them and reviewed the radiologist interpretation.  Xray significant for no evidence of pneumonia.    I ordered EKG and personally reviewed it.  EKG significant for sinus tachycardia.        Admission [facility]  I discussed the patient presentation labs, ekg, X-rays, findings with the consultant for hospitalist (speciality).    Patient was managed in the ED with IV calcium.    The response to treatment was no change.    Patient required emergent consultation to hospitalist (admitting physician) for admission.  Patient albumin is low calcium corrected for this should be 7.6 but is 6.8.  Patient was given calcium in the emergency department.  Patient has acute kidney injury requires IV fluids as well also has COVID illness with multiple comorbidities.  Patient requires admission secondary to electrolyte abnormalities and acute kidney injury.    Attending Critical Care:   Critical Care Times:   Direct Patient Care (initial evaluation, reassessments, and time considering the case)................................................................10  minutes.   Additional History from reviewing old medical records or taking additional history from the family, EMS, PCP, etc.......................10 minutes.   Ordering, Reviewing, and Interpreting Diagnostic Studies...............................................................................................................10 minutes.   Documentation..................................................................................................................................................................................5 minutes.   ==============================================================  · Total Critical Care Time - exclusive of procedural time: 35 minutes.  ==============================================================  Critical care was necessary to treat or prevent imminent or life-threatening deterioration of the following conditions:  Hypocalcemia.   Critical care was time spent personally by me on the following activities: obtaining history from patient or relative, examination of patient, review of x-rays / CT sent with the patient, ordering lab, x-rays, and/or EKG, development of treatment plan with patient or relative, ordering and performing treatments and interventions, interpretation of cardiac measurements and re-evaluation of patient's condition.   Critical Care Condition: potentially life-threatening              ED Course as of 01/01/23 1653   Sun Jan 01, 2023   1640 Sodium(!): 135 [AP]   1641 Potassium(!): 3.2 [AP]   1641 BUN(!): 25 [AP]   1641 Creatinine(!): 1.8 [AP]   1641 Calcium(!!): 6.8 [AP]   1641 Albumin(!): 2.9 [AP]   1641 eGFR(!): 29.2 [AP]   1641 WBC: 8.89 [AP]   1641 Hemoglobin(!): 11.3 [AP]   1641 Hematocrit: 37.1 [AP]   1641 Platelets: 226 [AP]   1641 SARS-CoV-2 RNA, Amplification, Qual(!!): Positive [AP]      ED Course User Index  [AP] Lalo Quinones MD                 Clinical Impression:   Final diagnoses:  [R06.02] SOB (shortness of breath)  [U07.1] COVID-19  (Primary)  [E83.51] Hypocalcemia  [E87.6] Hypokalemia  [N17.9] CARMELITA (acute kidney injury)        ED Disposition Condition    Admit Stable                Lalo Quinones MD  01/01/23 5263

## 2023-01-02 LAB
ALBUMIN SERPL BCP-MCNC: 2.6 G/DL (ref 3.5–5.2)
ALP SERPL-CCNC: 65 U/L (ref 55–135)
ALT SERPL W/O P-5'-P-CCNC: 15 U/L (ref 10–44)
ANION GAP SERPL CALC-SCNC: 9 MMOL/L (ref 8–16)
AST SERPL-CCNC: 15 U/L (ref 10–40)
BILIRUB SERPL-MCNC: 0.6 MG/DL (ref 0.1–1)
BUN SERPL-MCNC: 25 MG/DL (ref 8–23)
CALCIUM SERPL-MCNC: 7.6 MG/DL (ref 8.7–10.5)
CHLORIDE SERPL-SCNC: 103 MMOL/L (ref 95–110)
CO2 SERPL-SCNC: 21 MMOL/L (ref 23–29)
CREAT SERPL-MCNC: 1.3 MG/DL (ref 0.5–1.4)
ERYTHROCYTE [SEDIMENTATION RATE] IN BLOOD BY WESTERGREN METHOD: 69 MM/HR (ref 0–20)
EST. GFR  (NO RACE VARIABLE): 43.1 ML/MIN/1.73 M^2
GLUCOSE SERPL-MCNC: 151 MG/DL (ref 70–110)
GLUCOSE SERPL-MCNC: 188 MG/DL (ref 70–110)
GLUCOSE SERPL-MCNC: 206 MG/DL (ref 70–110)
MAGNESIUM SERPL-MCNC: 0.8 MG/DL (ref 1.6–2.6)
MAGNESIUM SERPL-MCNC: 0.9 MG/DL (ref 1.6–2.6)
PHOSPHATE SERPL-MCNC: 2.7 MG/DL (ref 2.7–4.5)
POTASSIUM SERPL-SCNC: 4.5 MMOL/L (ref 3.5–5.1)
PROT SERPL-MCNC: 6.4 G/DL (ref 6–8.4)
SARS-COV-2 IGG SERPL IA-ACNC: 889.6 AU/ML
SARS-COV-2 IGG SERPL QL IA: POSITIVE
SODIUM SERPL-SCNC: 133 MMOL/L (ref 136–145)

## 2023-01-02 PROCEDURE — 84100 ASSAY OF PHOSPHORUS: CPT | Performed by: NURSE PRACTITIONER

## 2023-01-02 PROCEDURE — 63600175 PHARM REV CODE 636 W HCPCS: Performed by: NURSE PRACTITIONER

## 2023-01-02 PROCEDURE — 25000242 PHARM REV CODE 250 ALT 637 W/ HCPCS: Performed by: INTERNAL MEDICINE

## 2023-01-02 PROCEDURE — 83735 ASSAY OF MAGNESIUM: CPT | Mod: 91 | Performed by: NURSE PRACTITIONER

## 2023-01-02 PROCEDURE — 36415 COLL VENOUS BLD VENIPUNCTURE: CPT | Performed by: NURSE PRACTITIONER

## 2023-01-02 PROCEDURE — 25000003 PHARM REV CODE 250: Performed by: INTERNAL MEDICINE

## 2023-01-02 PROCEDURE — 99900031 HC PATIENT EDUCATION (STAT)

## 2023-01-02 PROCEDURE — 94640 AIRWAY INHALATION TREATMENT: CPT

## 2023-01-02 PROCEDURE — 25000003 PHARM REV CODE 250: Performed by: NURSE PRACTITIONER

## 2023-01-02 PROCEDURE — 12000002 HC ACUTE/MED SURGE SEMI-PRIVATE ROOM

## 2023-01-02 PROCEDURE — 94799 UNLISTED PULMONARY SVC/PX: CPT

## 2023-01-02 PROCEDURE — 99900035 HC TECH TIME PER 15 MIN (STAT)

## 2023-01-02 PROCEDURE — 80053 COMPREHEN METABOLIC PANEL: CPT | Performed by: NURSE PRACTITIONER

## 2023-01-02 PROCEDURE — 85651 RBC SED RATE NONAUTOMATED: CPT | Performed by: NURSE PRACTITIONER

## 2023-01-02 PROCEDURE — 25000242 PHARM REV CODE 250 ALT 637 W/ HCPCS: Performed by: NURSE PRACTITIONER

## 2023-01-02 PROCEDURE — 83735 ASSAY OF MAGNESIUM: CPT | Performed by: INTERNAL MEDICINE

## 2023-01-02 PROCEDURE — 36415 COLL VENOUS BLD VENIPUNCTURE: CPT | Performed by: INTERNAL MEDICINE

## 2023-01-02 PROCEDURE — 94761 N-INVAS EAR/PLS OXIMETRY MLT: CPT

## 2023-01-02 PROCEDURE — 27000221 HC OXYGEN, UP TO 24 HOURS

## 2023-01-02 PROCEDURE — 63600175 PHARM REV CODE 636 W HCPCS: Performed by: INTERNAL MEDICINE

## 2023-01-02 PROCEDURE — 82962 GLUCOSE BLOOD TEST: CPT

## 2023-01-02 RX ORDER — ENOXAPARIN SODIUM 100 MG/ML
90 INJECTION SUBCUTANEOUS
Status: DISCONTINUED | OUTPATIENT
Start: 2023-01-02 | End: 2023-01-04 | Stop reason: HOSPADM

## 2023-01-02 RX ORDER — MAGNESIUM SULFATE HEPTAHYDRATE 40 MG/ML
2 INJECTION, SOLUTION INTRAVENOUS ONCE
Status: COMPLETED | OUTPATIENT
Start: 2023-01-02 | End: 2023-01-02

## 2023-01-02 RX ORDER — PAROXETINE 10 MG/1
10 TABLET, FILM COATED ORAL ONCE
Status: COMPLETED | OUTPATIENT
Start: 2023-01-02 | End: 2023-01-02

## 2023-01-02 RX ADMIN — DEXAMETHASONE 6 MG: 2 TABLET ORAL at 08:01

## 2023-01-02 RX ADMIN — Medication 800 MG: at 01:01

## 2023-01-02 RX ADMIN — PAROXETINE 10 MG: 10 TABLET, FILM COATED ORAL at 10:01

## 2023-01-02 RX ADMIN — ASPIRIN 81 MG: 81 TABLET, COATED ORAL at 08:01

## 2023-01-02 RX ADMIN — QUETIAPINE 100 MG: 100 TABLET ORAL at 08:01

## 2023-01-02 RX ADMIN — Medication 800 MG: at 08:01

## 2023-01-02 RX ADMIN — CLONAZEPAM 1 MG: 1 TABLET ORAL at 10:01

## 2023-01-02 RX ADMIN — ISOSORBIDE MONONITRATE 60 MG: 30 TABLET, EXTENDED RELEASE ORAL at 08:01

## 2023-01-02 RX ADMIN — IPRATROPIUM BROMIDE AND ALBUTEROL SULFATE 3 ML: .5; 3 SOLUTION RESPIRATORY (INHALATION) at 07:01

## 2023-01-02 RX ADMIN — MAGNESIUM SULFATE HEPTAHYDRATE 2 G: 40 INJECTION, SOLUTION INTRAVENOUS at 05:01

## 2023-01-02 RX ADMIN — CLOPIDOGREL BISULFATE 75 MG: 75 TABLET, FILM COATED ORAL at 08:01

## 2023-01-02 RX ADMIN — Medication 800 MG: at 06:01

## 2023-01-02 RX ADMIN — METOPROLOL SUCCINATE 50 MG: 50 TABLET, FILM COATED, EXTENDED RELEASE ORAL at 08:01

## 2023-01-02 RX ADMIN — ATORVASTATIN CALCIUM 40 MG: 40 TABLET, FILM COATED ORAL at 10:01

## 2023-01-02 RX ADMIN — OXYCODONE HYDROCHLORIDE AND ACETAMINOPHEN 500 MG: 500 TABLET ORAL at 08:01

## 2023-01-02 RX ADMIN — IPRATROPIUM BROMIDE AND ALBUTEROL SULFATE 3 ML: .5; 3 SOLUTION RESPIRATORY (INHALATION) at 04:01

## 2023-01-02 RX ADMIN — Medication 800 MG: at 05:01

## 2023-01-02 RX ADMIN — PANTOPRAZOLE SODIUM 40 MG: 40 TABLET, DELAYED RELEASE ORAL at 06:01

## 2023-01-02 RX ADMIN — OXYCODONE HYDROCHLORIDE AND ACETAMINOPHEN 500 MG: 500 TABLET ORAL at 10:01

## 2023-01-02 RX ADMIN — Medication 800 MG: at 02:01

## 2023-01-02 RX ADMIN — THERA TABS 1 TABLET: TAB at 08:01

## 2023-01-02 RX ADMIN — PAROXETINE 40 MG: 20 TABLET, FILM COATED ORAL at 08:01

## 2023-01-02 RX ADMIN — FUROSEMIDE 20 MG: 20 TABLET ORAL at 08:01

## 2023-01-02 RX ADMIN — BUDESONIDE 0.5 MG: 0.5 INHALANT RESPIRATORY (INHALATION) at 09:01

## 2023-01-02 RX ADMIN — BUDESONIDE 0.5 MG: 0.5 INHALANT RESPIRATORY (INHALATION) at 07:01

## 2023-01-02 RX ADMIN — ENOXAPARIN SODIUM 90 MG: 100 INJECTION SUBCUTANEOUS at 10:01

## 2023-01-02 RX ADMIN — CLONAZEPAM 1 MG: 1 TABLET ORAL at 08:01

## 2023-01-02 RX ADMIN — IPRATROPIUM BROMIDE AND ALBUTEROL SULFATE 3 ML: .5; 3 SOLUTION RESPIRATORY (INHALATION) at 09:01

## 2023-01-02 RX ADMIN — IPRATROPIUM BROMIDE AND ALBUTEROL SULFATE 3 ML: .5; 3 SOLUTION RESPIRATORY (INHALATION) at 12:01

## 2023-01-02 NOTE — ASSESSMENT & PLAN NOTE
Tested positive for COVID, COVID IgG pending, start dexamethasone 6 mg daily, duo nebs, Mucinex, others afford of meds and supportive care, oxygen as needed, airborne and contact isolation, start levofloxacin 750 mg IV every 48 hours, obtain procalcitonin, CRP, vitamin-D, ferritin, LDH

## 2023-01-02 NOTE — ASSESSMENT & PLAN NOTE
Blood pressure mildly elevated, hold spironolactone, triamterene hydrochlorothiazide and repeat renal function panel in a.m.

## 2023-01-02 NOTE — ASSESSMENT & PLAN NOTE
Creatinine 1.8 today, baseline is 1.3-1.6, slightly above baseline, hold diuretics and reassess renal function in a.m.

## 2023-01-02 NOTE — PLAN OF CARE
[facility]  Initial Discharge Assessment       Primary Care Provider: Johanne Callejas MD    Admission Diagnosis: COVID-19 [U07.1]    Admission Date: 1/1/2023  Expected Discharge Date:     Patient on isolation.  Unable to reach patient charlene Ralph cm was able to reach daughter in law Barbara via phone and Barbara confirmed the information on the face sheet.  No dialysis, but she was not sure about patient taking coumadin or having a nebulizer.  Barbara reported that the patient is very functional and does everything for herself.  Someone from the family will transport the patient home upon discharge.  No needs identified at this time.      Discharge Barriers Identified: None    Payor: MEDICARE / Plan: MEDICARE PART A & B / Product Type: Government /     Extended Emergency Contact Information  Primary Emergency Contact: LauraLoree CRISTA  Address: [address] [place], [place] [address] [address] [facility]  Home Phone: [phone]  Mobile Phone: [phone]  Relation: Daughter  Preferred language: English   needed? No  Secondary Emergency Contact: Barbara Bacon  Address: [address] [place] [address] [facility]  Home Phone: [phone]  Mobile Phone: [phone]  Relation: Relative  Preferred language: English   needed? No    Discharge Plan A: Home with family  Discharge Plan B: Home with family      [id number] - [place], [place] - [address] AT [facility]  [address]  [place] [address]  Phone: [phone] Fax: [phone]    [facility]  [address] [facility] [address]  [facility] [address]  Phone: [phone] Fax: [phone]    [facility] - [place] - [address]  [address] [place]  [place] [address]  Phone: [phone] Fax: [phone]      Initial Assessment (most recent)       Adult Discharge Assessment - 01/02/23 1111          Discharge Assessment    Assessment Type  Discharge Planning Assessment     Confirmed/corrected address, phone number and insurance Yes     Confirmed Demographics Correct on Facesheet     Source of Information family     Does patient/caregiver understand observation status --   n/a    Communicated BOB with patient/caregiver Date not available/Unable to determine     Reason For Admission No active principal problem     People in Home alone     Facility Arrived From: home     Do you expect to return to your current living situation? Yes     Do you have help at home or someone to help you manage your care at home? No     Prior to hospitilization cognitive status: Unable to Assess     Current cognitive status: Unable to Assess     Walking or Climbing Stairs --   no issues    Dressing/Bathing --   no issues    Home Layout Able to live on 1st floor     Equipment Currently Used at Home none     Patient currently being followed by outpatient case management? No     Do you currently have service(s) that help you manage your care at home? No     Do you take prescription medications? Yes     Do you have prescription coverage? Yes     Coverage Payor:  MEDICARE - MEDICARE PART A & B     Do you have any problems affording any of your prescribed medications? No     Is the patient taking medications as prescribed? yes     Who is going to help you get home at discharge? family     How do you get to doctors appointments? car, drives self;family or friend will provide     Are you on dialysis? No     Do you take coumadin? --   daughter in law not sure    Discharge Plan A Home with family     Discharge Plan B Home with family     DME Needed Upon Discharge  none     Discharge Plan discussed with: --   daughter in law Barbara    Discharge Barriers Identified None

## 2023-01-02 NOTE — ASSESSMENT & PLAN NOTE
Echocardiogram from 12/21 with aortic stenosis, mitral regurgitation, grade 2 diastolic dysfunction, severe ventricular hypertrophy and elevated pulmonary pressure with 65% ejection fraction, she does not appear to be in acute heart failure and BNP is 92.

## 2023-01-02 NOTE — HPI
Betty Bacon 72-year-old female with medical problems including COPD, CAD, hypertension, CKD stage 3, hyperlipidemia and HFpEF who presents to the emergency room complaining of shortness of breath and cough for the last couple of days.  Patient states several days ago she started feeling really bad with worsening shortness of breath.  Associated symptoms are weakness, fatigue, headache and increased anxiety.  Symptoms got worse over time, she is had to use oxygen more than usual; she is on 4 L nasal cannula as needed.  She is been using her inhalers.  She has a mild sore throat and lost her sense of taste.  She had severe diarrhea yesterday that has improved today.  She had 1 episode of nausea with dry heaves last night.  Symptoms have gotten worse over time, no relieving factors.  Shortness of breath aggravated with exertion.  She was just released from the hospital around the 20th and has spent the last week with 1 of her children and just 1 home to her house few days ago.  She said she had heart failure exacerbation and gout when she was in the hospital.  She denies chest pain, abdominal pain, fevers or chills, dysuria or swelling.    In the ED she was found to be SARS-COVID-2 positive, influenza was negative, chest x-ray with no pneumonia with cardiomegaly.  Twelve lead EKG with sinus tachycardia, ventricular rate 111, , lab work significant for potassium low at 3.2, calcium low at 6.8, corrects to 7.7 with an albumin of 2.9, glucose 119, WBC 8.89, H&H 11.3/37.1.  Vital signs with temperature 98.7°, heart rate 87, blood pressure elevated at 167/70, respiratory rate 18 and O2 saturation 98% on 4 L nasal cannula.  She will be admitted to the hospitalist service for further evaluation and treatment.

## 2023-01-02 NOTE — H&P
[facility] - Emergency Dept  Hospital Medicine  History & Physical    Patient Name: Betty Bacon  MRN: [id number]  Patient Class: IP- Inpatient  Admission Date: 1/1/2023  Attending Physician: Ayaka Abebe MD   Primary Care Provider: Johanne Callejas MD         Patient information was obtained from patient, relative(s), past medical records and ER records.     Subjective:     Principal Problem:<principal problem not specified>    Chief Complaint:   Chief Complaint   Patient presents with    Shortness of Breath    Weakness     Pt brought in by EMS for SOB and weakness. Pt has a hx of COPD and last night tested + for covid.        HPI: Betty Bacon 72-year-old female with medical problems including COPD, CAD, hypertension, CKD stage 3, hyperlipidemia and HFpEF who presents to the emergency room complaining of shortness of breath and cough for the last couple of days.  Patient states several days ago she started feeling really bad with worsening shortness of breath.  Associated symptoms are weakness, fatigue, headache and increased anxiety.  Symptoms got worse over time, she is had to use oxygen more than usual; she is on 4 L nasal cannula as needed.  She is been using her inhalers.  She has a mild sore throat and lost her sense of taste.  She had severe diarrhea yesterday that has improved today.  She had 1 episode of nausea with dry heaves last night.  Symptoms have gotten worse over time, no relieving factors.  Shortness of breath aggravated with exertion.  She was just released from the hospital around the 20th and has spent the last week with 1 of her children and just 1 home to her house few days ago.  She said she had heart failure exacerbation and gout when she was in the hospital.  She denies chest pain, abdominal pain, fevers or chills, dysuria or swelling.    In the ED she was found to be SARS-COVID-2 positive, influenza was negative, chest x-ray with no pneumonia with  cardiomegaly.  Twelve lead EKG with sinus tachycardia, ventricular rate 111, , lab work significant for potassium low at 3.2, calcium low at 6.8, corrects to 7.7 with an albumin of 2.9, glucose 119, WBC 8.89, H&H 11.3/37.1.  Vital signs with temperature 98.7°, heart rate 87, blood pressure elevated at 167/70, respiratory rate 18 and O2 saturation 98% on 4 L nasal cannula.  She will be admitted to the hospitalist service for further evaluation and treatment.      Past Medical History:   Diagnosis Date    Acute coronary artery obstruction without MI 10/2012    Benign hypertension     COPD (chronic obstructive pulmonary disease)     Coronary artery disease     Disorder of kidney and ureter     Dr. Morrow    Hepatitis B core antibody positive 03/03/2021    Negative sAg, suggests previous exposure but no chronic/active Hep B. At risk for reactivation with any immunosuppression medication, steroids, chemo, etc.      History of electroconvulsive therapy     Hyperlipidemia LDL goal < 70     Left ankle sprain     Major depressive disorder, recurrent episode, severe     s/p ECT    Positive AKIRA (antinuclear antibody) 03/03/2021    PVD (peripheral vascular disease)        Past Surgical History:   Procedure Laterality Date    CHOLECYSTECTOMY      CORONARY ANGIOPLASTY      CORONARY ANGIOPLASTY WITH STENT PLACEMENT  10/2012    2 stents RCA (100% stenosis)    EYE SURGERY      cataract surgery    HYSTERECTOMY      LINA, ovaries intact. uterine prolapse    ILIAC ARTERY STENT      TONSILLECTOMY      TOTAL VAGINAL HYSTERECTOMY         Review of patient's allergies indicates:   Allergen Reactions    Propoxyphene n-acetaminophen Other (See Comments)     Other reaction(s): Unknown    Codeine Anxiety       No current facility-administered medications on file prior to encounter.     Current Outpatient Medications on File Prior to Encounter   Medication Sig    albuterol sulfate (PROAIR RESPICLICK) 90  mcg/actuation AePB Inhale 2 puffs into the lungs every 4 to 6 hours as needed.    ALPRAZolam (XANAX) 1 MG tablet Take 1 tablet (1 mg total) by mouth 2 (two) times daily as needed for Anxiety.    aspirin 81 mg Tab Take 81 mg by mouth once daily. Every day    atorvastatin (LIPITOR) 40 MG tablet TAKE 1 TABLET BY MOUTH EVERY DAY (Patient taking differently: Take 40 mg by mouth once daily.)    clonazePAM (KLONOPIN) 1 MG disintegrating tablet Take 1 tablet (1 mg total) by mouth 2 (two) times daily as needed (anxiety).    clopidogreL (PLAVIX) 75 mg tablet TAKE 1 TABLET BY MOUTH EVERY DAY (Patient taking differently: Take 75 mg by mouth once daily.)    colchicine (COLCRYS) 0.6 mg tablet Take 2 po at gout flare onset, may repeat 1 in an hour prn, then 1 po bid until pain resolves ,or n/V/D starts (Patient taking differently: Take 0.6 mg by mouth as needed. Take 2 po at gout flare onset, may repeat 1 in an hour prn, then 1 po bid until pain resolves ,or n/V/D starts)    colestipoL (COLESTID) 1 gram Tab Take 1 tablet (1 g total) by mouth 2 (two) times daily as needed (diarrhea).    ergocalciferol (ERGOCALCIFEROL) 50,000 unit Cap Take 1 capsule (50,000 Units total) by mouth every 7 days. (Patient taking differently: Take 50,000 Units by mouth every 7 days. FRIDAYS)    esomeprazole (NEXIUM) 40 MG capsule TAKE 1 CAPSULE BY MOUTH BEFORE BREAKFAST. (Patient taking differently: Take 40 mg by mouth before breakfast.)    furosemide (LASIX) 20 MG tablet Take 20 mg by mouth once daily.    isosorbide mononitrate (IMDUR) 60 MG 24 hr tablet TAKE 1 TABLET BY MOUTH ONCE DAILY (Patient taking differently: Take 60 mg by mouth once daily.)    metoprolol succinate (TOPROL-XL) 50 MG 24 hr tablet TAKE 1 TABLET BY MOUTH EVERY DAY (Patient taking differently: Take 50 mg by mouth once daily. DO NOT CRUSH OR CHEW; SWALLOW WHOLE.)    paroxetine (PAXIL) 40 MG tablet TAKE 1 TABLET BY MOUTH EVERY DAY (Patient taking differently: Take 40 mg by  mouth once daily. Total 40 mg)    QUEtiapine (SEROQUEL) 100 MG Tab Take 100 mg by mouth once daily.    spironolactone (ALDACTONE) 25 MG tablet Take 25 mg by mouth once daily.    TRELEGY ELLIPTA 200-62.5-25 mcg inhaler INHALE 1 PUFF INTO THE LUNGS ONCE DAILY.    triamterene-hydrochlorothiazide 37.5-25 mg (DYAZIDE) 37.5-25 mg per capsule TAKE 1 CAPSULE BY MOUTH ONCE DAILY     Family History       Problem Relation (Age of Onset)    Breast cancer Sister    Cancer Sister    Dementia Sister    Diabetes Mother, Maternal Aunt, Sister    Heart disease Mother, Father, Brother, Maternal Aunt, Maternal Uncle, Paternal Aunt, Paternal Uncle, Maternal Grandfather, Sister    Hypertension Daughter    Kidney disease Sister    Liver disease Sister    Stroke Father, Brother, Sister          Tobacco Use    Smoking status: Former     Packs/day: 2.00     Years: 40.00     Pack years: 80.00     Types: Cigarettes     Quit date: 2009     Years since quittin.0    Smokeless tobacco: Never   Substance and Sexual Activity    Alcohol use: Yes     Comment: social    Drug use: No    Sexual activity: Never     Birth control/protection: Abstinence     Review of Systems   All other systems reviewed and are negative.  Twelve point review of systems obtained and negative except as stated above in HPI     Objective:     Vital Signs (Most Recent):  Temp: 98.7 °F (37.1 °C) (23 1456)  Pulse: 87 (23 1456)  Resp: 18 (23 1456)  BP: (!) 167/70 (23 1451)  SpO2: 98 % (23 1456)   Vital Signs (24h Range):  Temp:  [98.7 °F (37.1 °C)] 98.7 °F (37.1 °C)  Pulse:  [87] 87  Resp:  [18] 18  SpO2:  [98 %] 98 %  BP: (167)/(70) 167/70        There is no height or weight on file to calculate BMI.    Physical Exam  Vitals and nursing note reviewed.   Constitutional:       General: She is not in acute distress.     Appearance: She is obese. She is not ill-appearing.   HENT:      Head: Normocephalic.      Right Ear: External ear  normal.      Left Ear: External ear normal.      Nose: Nose normal.      Mouth/Throat:      Mouth: Mucous membranes are moist.      Pharynx: Oropharynx is clear.   Eyes:      Conjunctiva/sclera: Conjunctivae normal.   Cardiovascular:      Rate and Rhythm: Tachycardia present.      Pulses: Normal pulses.      Heart sounds: Murmur heard.   Pulmonary:      Effort: Pulmonary effort is normal.      Comments: No tachypnea or increased work of breathing, she has inspiratory expiratory wheezes anterior and posterior all lung fields.  Abdominal:      General: Abdomen is protuberant. Bowel sounds are normal.      Palpations: Abdomen is soft.      Tenderness: There is no abdominal tenderness.   Musculoskeletal:         General: Normal range of motion.      Cervical back: Normal range of motion.      Comments: Minimal lower extremity edema.   Skin:     General: Skin is warm and dry.      Capillary Refill: Capillary refill takes less than 2 seconds.   Neurological:      Mental Status: She is alert and oriented to person, place, and time.      Comments: She has mild tremors to both hands and arms and says it is from anxiety.   Psychiatric:         Attention and Perception: Attention normal.         Mood and Affect: Mood is anxious.         Speech: Speech normal.         Behavior: Behavior is cooperative.         Thought Content: Thought content normal.         Cognition and Memory: Cognition and memory normal.         Judgment: Judgment normal.           Significant Labs: All pertinent labs within the past 24 hours have been reviewed.    CBC:   Recent Labs   Lab 01/01/23  1519   WBC 8.89   HGB 11.3*   HCT 37.1        CMP:   Recent Labs   Lab 01/01/23  1519   *   K 3.2*      CO2 24   *   BUN 25*   CREATININE 1.8*   CALCIUM 6.8*   PROT 6.8   ALBUMIN 2.9*   BILITOT 0.7   ALKPHOS 77   AST 20   ALT 17   ANIONGAP 10     Cardiac Markers:   Recent Labs   Lab 01/01/23  1519   BNP 92     Coagulation:   Recent Labs    Lab 01/01/23  1519   INR 1.1       Significant Imaging: I have reviewed all pertinent imaging results/findings within the past 24 hours.    X-Ray Chest AP Portable 01/01/2023    Clinical history shortness of breath     The heart is enlarged. There are no confluent infiltrates or pleural effusions. There are no acute osseous abnormalities.     IMPRESSION: Cardiomegaly with no acute pulmonary process       Assessment/Plan:     SARS-CoV-2 positive  Tested positive for COVID, COVID IgG pending, start dexamethasone 6 mg daily, duo nebs, Mucinex, others afford of meds and supportive care, oxygen as needed, airborne and contact isolation, start levofloxacin 750 mg IV every 48 hours, obtain procalcitonin, CRP, vitamin-D, ferritin, LDH    COPD exacerbation  Patient with COPD exacerbation, see plan above for duo nebs, dexamethasone and budesonide nebs    Hypokalemia  Potassium 3.2, we will give 40 mEq now and repeat BNP in a.m. magnesium is pending    Hypocalcemia  Calcium 6.8, corrected is 7.7, given 1 g calcium gluconate in a.m., repeat BMP in a.m.    CKD (chronic kidney disease) stage 3, GFR 30-59 ml/min  Creatinine 1.8 today, baseline is 1.3-1.6, slightly above baseline, hold diuretics and reassess renal function in a.m.      (HFpEF) heart failure with preserved ejection fraction  Echocardiogram from 12/21 with aortic stenosis, mitral regurgitation, grade 2 diastolic dysfunction, severe ventricular hypertrophy and elevated pulmonary pressure with 65% ejection fraction, she does not appear to be in acute heart failure and BNP is 92.    CAD (coronary artery disease)  Patient with no chest pain, 12 lead EKG with sinus tachycardia, no ST elevation, she has no chest pain, troponin 25.9, repeat troponin is pending    Benign hypertension  Blood pressure mildly elevated, hold spironolactone, triamterene hydrochlorothiazide and repeat renal function panel in a.m.    Generalized anxiety disorder  Resume antidepressants and  antianxiety meds,    Severe obesity with body mass index (BMI) of 35.0 to 39.9 with serious comorbidity  There is no height or weight on file to calculate BMI. Morbid obesity complicates all aspects of disease management from diagnostic modalities to treatment. Weight loss encouraged and health benefits explained to patient.       VTE prophylaxis: SCD's, Full dose lovenox    Patient was examined at 1750    Code discussion with patient and/or caregiver regarding intubation, CPR, medications and emergency life support.  Patient elected to be: FULL CODE      Emilie Graham NP  Department of Hospital Medicine   [facility] - Emergency Dept

## 2023-01-02 NOTE — CARE UPDATE
Daughter in law ross provided name and phone number for patient son Gibson Bacon#[phone], cm added contact info to epic.

## 2023-01-02 NOTE — ASSESSMENT & PLAN NOTE
Patient with no chest pain, 12 lead EKG with sinus tachycardia, no ST elevation, she has no chest pain, troponin 25.9, repeat troponin is pending

## 2023-01-02 NOTE — SUBJECTIVE & OBJECTIVE
Past Medical History:   Diagnosis Date    Acute coronary artery obstruction without MI 10/2012    Benign hypertension     COPD (chronic obstructive pulmonary disease)     Coronary artery disease     Disorder of kidney and ureter     Dr. Morrow    Hepatitis B core antibody positive 03/03/2021    Negative sAg, suggests previous exposure but no chronic/active Hep B. At risk for reactivation with any immunosuppression medication, steroids, chemo, etc.      History of electroconvulsive therapy     Hyperlipidemia LDL goal < 70     Left ankle sprain     Major depressive disorder, recurrent episode, severe     s/p ECT    Positive AKIRA (antinuclear antibody) 03/03/2021    PVD (peripheral vascular disease)        Past Surgical History:   Procedure Laterality Date    CHOLECYSTECTOMY      CORONARY ANGIOPLASTY      CORONARY ANGIOPLASTY WITH STENT PLACEMENT  10/2012    2 stents RCA (100% stenosis)    EYE SURGERY      cataract surgery    HYSTERECTOMY      LINA, ovaries intact. uterine prolapse    ILIAC ARTERY STENT      TONSILLECTOMY      TOTAL VAGINAL HYSTERECTOMY         Review of patient's allergies indicates:   Allergen Reactions    Propoxyphene n-acetaminophen Other (See Comments)     Other reaction(s): Unknown    Codeine Anxiety       No current facility-administered medications on file prior to encounter.     Current Outpatient Medications on File Prior to Encounter   Medication Sig    albuterol sulfate (PROAIR RESPICLICK) 90 mcg/actuation AePB Inhale 2 puffs into the lungs every 4 to 6 hours as needed.    ALPRAZolam (XANAX) 1 MG tablet Take 1 tablet (1 mg total) by mouth 2 (two) times daily as needed for Anxiety.    aspirin 81 mg Tab Take 81 mg by mouth once daily. Every day    atorvastatin (LIPITOR) 40 MG tablet TAKE 1 TABLET BY MOUTH EVERY DAY (Patient taking differently: Take 40 mg by mouth once daily.)    clonazePAM (KLONOPIN) 1 MG disintegrating tablet Take 1 tablet (1 mg total) by mouth 2 (two) times daily as needed  (anxiety).    clopidogreL (PLAVIX) 75 mg tablet TAKE 1 TABLET BY MOUTH EVERY DAY (Patient taking differently: Take 75 mg by mouth once daily.)    colchicine (COLCRYS) 0.6 mg tablet Take 2 po at gout flare onset, may repeat 1 in an hour prn, then 1 po bid until pain resolves ,or n/V/D starts (Patient taking differently: Take 0.6 mg by mouth as needed. Take 2 po at gout flare onset, may repeat 1 in an hour prn, then 1 po bid until pain resolves ,or n/V/D starts)    colestipoL (COLESTID) 1 gram Tab Take 1 tablet (1 g total) by mouth 2 (two) times daily as needed (diarrhea).    ergocalciferol (ERGOCALCIFEROL) 50,000 unit Cap Take 1 capsule (50,000 Units total) by mouth every 7 days. (Patient taking differently: Take 50,000 Units by mouth every 7 days. FRIDAYS)    esomeprazole (NEXIUM) 40 MG capsule TAKE 1 CAPSULE BY MOUTH BEFORE BREAKFAST. (Patient taking differently: Take 40 mg by mouth before breakfast.)    furosemide (LASIX) 20 MG tablet Take 20 mg by mouth once daily.    isosorbide mononitrate (IMDUR) 60 MG 24 hr tablet TAKE 1 TABLET BY MOUTH ONCE DAILY (Patient taking differently: Take 60 mg by mouth once daily.)    metoprolol succinate (TOPROL-XL) 50 MG 24 hr tablet TAKE 1 TABLET BY MOUTH EVERY DAY (Patient taking differently: Take 50 mg by mouth once daily. DO NOT CRUSH OR CHEW; SWALLOW WHOLE.)    paroxetine (PAXIL) 40 MG tablet TAKE 1 TABLET BY MOUTH EVERY DAY (Patient taking differently: Take 40 mg by mouth once daily. Total 40 mg)    QUEtiapine (SEROQUEL) 100 MG Tab Take 100 mg by mouth once daily.    spironolactone (ALDACTONE) 25 MG tablet Take 25 mg by mouth once daily.    TRELEGY ELLIPTA 200-62.5-25 mcg inhaler INHALE 1 PUFF INTO THE LUNGS ONCE DAILY.    triamterene-hydrochlorothiazide 37.5-25 mg (DYAZIDE) 37.5-25 mg per capsule TAKE 1 CAPSULE BY MOUTH ONCE DAILY     Family History       Problem Relation (Age of Onset)    Breast cancer Sister    Cancer Sister    Dementia Sister    Diabetes Mother, Maternal  Aunt, Sister    Heart disease Mother, Father, Brother, Maternal Aunt, Maternal Uncle, Paternal Aunt, Paternal Uncle, Maternal Grandfather, Sister    Hypertension Daughter    Kidney disease Sister    Liver disease Sister    Stroke Father, Brother, Sister          Tobacco Use    Smoking status: Former     Packs/day: 2.00     Years: 40.00     Pack years: 80.00     Types: Cigarettes     Quit date: 2009     Years since quittin.0    Smokeless tobacco: Never   Substance and Sexual Activity    Alcohol use: Yes     Comment: social    Drug use: No    Sexual activity: Never     Birth control/protection: Abstinence     Review of Systems   All other systems reviewed and are negative.  Twelve point review of systems obtained and negative except as stated above in HPI     Objective:     Vital Signs (Most Recent):  Temp: 98.7 °F (37.1 °C) (23 1456)  Pulse: 87 (23 1456)  Resp: 18 (23 1456)  BP: (!) 167/70 (23 1451)  SpO2: 98 % (23 1456)   Vital Signs (24h Range):  Temp:  [98.7 °F (37.1 °C)] 98.7 °F (37.1 °C)  Pulse:  [87] 87  Resp:  [18] 18  SpO2:  [98 %] 98 %  BP: (167)/(70) 167/70        There is no height or weight on file to calculate BMI.    Physical Exam  Vitals and nursing note reviewed.   Constitutional:       General: She is not in acute distress.     Appearance: She is obese. She is not ill-appearing.   HENT:      Head: Normocephalic.      Right Ear: External ear normal.      Left Ear: External ear normal.      Nose: Nose normal.      Mouth/Throat:      Mouth: Mucous membranes are moist.      Pharynx: Oropharynx is clear.   Eyes:      Conjunctiva/sclera: Conjunctivae normal.   Cardiovascular:      Rate and Rhythm: Tachycardia present.      Pulses: Normal pulses.      Heart sounds: Murmur heard.   Pulmonary:      Effort: Pulmonary effort is normal.      Comments: No tachypnea or increased work of breathing, she has inspiratory expiratory wheezes anterior and posterior all lung  fields.  Abdominal:      General: Abdomen is protuberant. Bowel sounds are normal.      Palpations: Abdomen is soft.      Tenderness: There is no abdominal tenderness.   Musculoskeletal:         General: Normal range of motion.      Cervical back: Normal range of motion.      Comments: Minimal lower extremity edema.   Skin:     General: Skin is warm and dry.      Capillary Refill: Capillary refill takes less than 2 seconds.   Neurological:      Mental Status: She is alert and oriented to person, place, and time.      Comments: She has mild tremors to both hands and arms and says it is from anxiety.   Psychiatric:         Attention and Perception: Attention normal.         Mood and Affect: Mood is anxious.         Speech: Speech normal.         Behavior: Behavior is cooperative.         Thought Content: Thought content normal.         Cognition and Memory: Cognition and memory normal.         Judgment: Judgment normal.           Significant Labs: All pertinent labs within the past 24 hours have been reviewed.    CBC:   Recent Labs   Lab 01/01/23  1519   WBC 8.89   HGB 11.3*   HCT 37.1        CMP:   Recent Labs   Lab 01/01/23  1519   *   K 3.2*      CO2 24   *   BUN 25*   CREATININE 1.8*   CALCIUM 6.8*   PROT 6.8   ALBUMIN 2.9*   BILITOT 0.7   ALKPHOS 77   AST 20   ALT 17   ANIONGAP 10     Cardiac Markers:   Recent Labs   Lab 01/01/23  1519   BNP 92     Coagulation:   Recent Labs   Lab 01/01/23  1519   INR 1.1       Significant Imaging: I have reviewed all pertinent imaging results/findings within the past 24 hours.

## 2023-01-02 NOTE — PROGRESS NOTES
[facility] Medicine  Progress Note    Patient name: Betty Bacon  MRN: [id number]  Admit Date: 1/1/2023   LOS: 1 day     SUBJECTIVE:     Principal problem: cough, weakness    Interval History:  72 year old female with COPD, Chronic respiratory failure on 4L NC, Tobacco use, CAD, HFpEF admitted with Covid 19, mild jovanna, electrolyte abnormalities. She reports cough, aches, worsening of baseline tremors and anxiety.  She feels weak.  Electrolytes replaced and K and Ca are improving.  Hypomg has persisted and further replacement is in progress.  She has wheezing at baseline but does feel current wheezing is worse than usual.  On Dexamethasone.      Hospital Course:    Scheduled Meds:   albuterol-ipratropium  3 mL Nebulization Q4H WAKE    ascorbic acid (vitamin C)  500 mg Oral BID    aspirin  81 mg Oral Daily    atorvastatin  40 mg Oral Daily    budesonide  0.5 mg Nebulization BID    clopidogreL  75 mg Oral Daily    dexAMETHasone  6 mg Oral Daily    enoxaparin  1 mg/kg Subcutaneous Daily    furosemide  20 mg Oral Daily    isosorbide mononitrate  60 mg Oral Daily    magnesium sulfate IVPB  2 g Intravenous Once    metoprolol succinate  50 mg Oral Daily    multivitamin  1 tablet Oral Daily    pantoprazole  40 mg Oral Daily    [START ON 1/3/2023] paroxetine  50 mg Oral Daily    QUEtiapine  100 mg Oral Daily     Continuous Infusions:  PRN Meds:acetaminophen, clonazePAM, dextrose 10%, dextrose 10%, glucagon (human recombinant), glucose, glucose, HYDROcodone-acetaminophen, HYDROcodone-acetaminophen, insulin aspart U-100, loperamide, magnesium oxide, magnesium oxide, ondansetron, potassium bicarbonate, potassium bicarbonate, potassium bicarbonate, potassium, sodium phosphates, potassium, sodium phosphates, potassium, sodium phosphates, sodium chloride 0.9%    Review of patient's allergies indicates:   Allergen Reactions    Propoxyphene n-acetaminophen Other (See Comments)     Other reaction(s):  Unknown    Codeine Anxiety       Review of Systems: As per interval history    OBJECTIVE:     Vital Signs (Most Recent)  Temp: 97.4 °F (36.3 °C) (01/02/23 1704)  Pulse: 73 (01/02/23 1704)  Resp: 18 (01/02/23 1704)  BP: 117/80 (01/02/23 1704)  SpO2: (!) 94 % (01/02/23 1704)    Vital Signs Range (Last 24H):  Temp:  [97.4 °F (36.3 °C)-98.7 °F (37.1 °C)]   Pulse:  [64-81]   Resp:  [17-20]   BP: (117-176)/(52-84)   SpO2:  [93 %-96 %]     I & O (Last 24H):No intake or output data in the 24 hours ending 01/02/23 1749    Physical Exam:    Vitals and nursing note reviewed.     Constitutional:       General: Not in acute distress.  Appears tired but not toxic     Appearance: Well-developed.   HENT:      Head: Normocephalic and atraumatic.   Eyes:      Pupils: Pupils are equal, round, and reactive to light.   Cardiovascular:      Rate and Rhythm: Regular rhythm.   Pulmonary:      Effort: Pulmonary effort is normal.      Breath sounds: Normal breath sounds. Bilateral expiratory wheezing  02 per NC  Abdominal:      General: There is no distension.      Palpations: Abdomen is soft.      Tenderness: There is no abdominal tenderness. There is no guarding or rebound.   Musculoskeletal:         General: Normal range of motion.      Cervical back: Normal range of motion.   Skin:     Findings: No rash.   Neurological:      Mental Status: Alert and oriented to person, place, and time.      Cranial Nerves: No cranial nerve deficit.      Sensory: No sensory deficit.     Laboratory:  I have reviewed all pertinent lab results within the past 24 hours.  CBC:   Recent Labs   Lab 01/01/23  1519   WBC 8.89   RBC 3.99*   HGB 11.3*   HCT 37.1      MCV 93   MCH 28.3   MCHC 30.5*     CMP:   Recent Labs   Lab 01/02/23  0649   *   CALCIUM 7.6*   ALBUMIN 2.6*   PROT 6.4   *   K 4.5   CO2 21*      BUN 25*   CREATININE 1.3   ALKPHOS 65   ALT 15   AST 15   BILITOT 0.6     Microbiology Results (last 7 days)       Procedure  Component Value Units Date/Time    Culture, Respiratory with Gram Stain [[id number]]     Order Status: No result Specimen: Sputum, Expectorated             Diagnostic Results:      ASSESSMENT/PLAN:         Active Hospital Problems    Diagnosis  POA    SARS-CoV-2 positive [U07.1]  Unknown    (HFpEF) heart failure with preserved ejection fraction [I50.30]  Unknown    Hypocalcemia [E83.51]  Unknown    Hypokalemia [E87.6]  Unknown    Severe obesity with body mass index (BMI) of 35.0 to 39.9 with serious comorbidity [E66.01]  Yes    CAD (coronary artery disease) [I25.10]  Yes     NEFTALY RCA 7/2004  Stents x 2 RCA 10/2012      Generalized anxiety disorder [F41.1]  Yes    COPD exacerbation [J44.1]  Unknown    Benign hypertension [I10]  Yes    CKD (chronic kidney disease) stage 3, GFR 30-59 ml/min [N18.30]  Yes      Resolved Hospital Problems   No resolved problems to display.         Plan:     -Given respiratory status had declined from her baseline and she reported increasing her O2 at home, Dexamethasone was started.   -Remdesivir was not started given CARMELITA at the time.  -Nebs as the hospital does not have inhalers  -Presently on 3L NC and doing well. Still having some wheeze.  -I've encouraged her to get out of bed to help with opening up her lungs and will order PT/OT.  -CARMELITA is better . Monitoring renal function.  -Continuing her home medication of paxil and prn clonazepam for anxiety.  I suspect the worsening of the baseline tremors is acute illness related and could be exacerbated by the steroids.  -Replacing electrolytes  -DVT Px with lovenox  -Possible home tomorrow if doing well, electrolytes are adequate, no new issues.      VTE Risk Mitigation (From admission, onward)           Ordered     enoxaparin injection 90 mg  Daily         01/01/23 1461                      Department Hospital Medicine  [facility]  Ayaka Abebe MD  Date of service: 01/02/2023

## 2023-01-02 NOTE — NURSING
2122: Blood sugar 147    2218: Patient medicated, per MAR    0016: Patient awake in bed.    0043: New IV placed.    0248: Patient medicated, per MAR    0457: Patient resting in bed, chest rising and falling.    EOSS: No acute changes occurred during the shift. Patient has no complaints at this time.

## 2023-01-03 LAB
ALBUMIN SERPL BCP-MCNC: 2.7 G/DL (ref 3.5–5.2)
ALP SERPL-CCNC: 70 U/L (ref 55–135)
ALT SERPL W/O P-5'-P-CCNC: 14 U/L (ref 10–44)
ANION GAP SERPL CALC-SCNC: 9 MMOL/L (ref 8–16)
AST SERPL-CCNC: 14 U/L (ref 10–40)
BILIRUB SERPL-MCNC: 0.6 MG/DL (ref 0.1–1)
BUN SERPL-MCNC: 34 MG/DL (ref 8–23)
CALCIUM SERPL-MCNC: 7.9 MG/DL (ref 8.7–10.5)
CHLORIDE SERPL-SCNC: 103 MMOL/L (ref 95–110)
CO2 SERPL-SCNC: 25 MMOL/L (ref 23–29)
CREAT SERPL-MCNC: 1.7 MG/DL (ref 0.5–1.4)
D DIMER PPP IA.FEU-MCNC: 2 MG/L FEU
EST. GFR  (NO RACE VARIABLE): 31.3 ML/MIN/1.73 M^2
FERRITIN SERPL-MCNC: 457 NG/ML (ref 20–300)
GLUCOSE SERPL-MCNC: 128 MG/DL (ref 70–110)
GLUCOSE SERPL-MCNC: 143 MG/DL (ref 70–110)
GLUCOSE SERPL-MCNC: 147 MG/DL (ref 70–110)
GLUCOSE SERPL-MCNC: 149 MG/DL (ref 70–110)
GLUCOSE SERPL-MCNC: 186 MG/DL (ref 70–110)
LDH SERPL L TO P-CCNC: 187 U/L (ref 110–260)
MAGNESIUM SERPL-MCNC: 1.7 MG/DL (ref 1.6–2.6)
PHOSPHATE SERPL-MCNC: 2.5 MG/DL (ref 2.7–4.5)
POTASSIUM SERPL-SCNC: 3.8 MMOL/L (ref 3.5–5.1)
PROT SERPL-MCNC: 6.2 G/DL (ref 6–8.4)
SODIUM SERPL-SCNC: 137 MMOL/L (ref 136–145)

## 2023-01-03 PROCEDURE — 63600175 PHARM REV CODE 636 W HCPCS: Performed by: NURSE PRACTITIONER

## 2023-01-03 PROCEDURE — 83735 ASSAY OF MAGNESIUM: CPT | Performed by: NURSE PRACTITIONER

## 2023-01-03 PROCEDURE — 84100 ASSAY OF PHOSPHORUS: CPT | Performed by: NURSE PRACTITIONER

## 2023-01-03 PROCEDURE — 25000003 PHARM REV CODE 250: Performed by: INTERNAL MEDICINE

## 2023-01-03 PROCEDURE — 82962 GLUCOSE BLOOD TEST: CPT

## 2023-01-03 PROCEDURE — 94640 AIRWAY INHALATION TREATMENT: CPT

## 2023-01-03 PROCEDURE — 94761 N-INVAS EAR/PLS OXIMETRY MLT: CPT

## 2023-01-03 PROCEDURE — 25000242 PHARM REV CODE 250 ALT 637 W/ HCPCS: Performed by: NURSE PRACTITIONER

## 2023-01-03 PROCEDURE — 97166 OT EVAL MOD COMPLEX 45 MIN: CPT

## 2023-01-03 PROCEDURE — 83615 LACTATE (LD) (LDH) ENZYME: CPT | Performed by: NURSE PRACTITIONER

## 2023-01-03 PROCEDURE — 25000242 PHARM REV CODE 250 ALT 637 W/ HCPCS: Performed by: INTERNAL MEDICINE

## 2023-01-03 PROCEDURE — 25000003 PHARM REV CODE 250: Performed by: NURSE PRACTITIONER

## 2023-01-03 PROCEDURE — 97161 PT EVAL LOW COMPLEX 20 MIN: CPT

## 2023-01-03 PROCEDURE — 97116 GAIT TRAINING THERAPY: CPT

## 2023-01-03 PROCEDURE — 82728 ASSAY OF FERRITIN: CPT | Performed by: NURSE PRACTITIONER

## 2023-01-03 PROCEDURE — 80053 COMPREHEN METABOLIC PANEL: CPT | Performed by: NURSE PRACTITIONER

## 2023-01-03 PROCEDURE — 85379 FIBRIN DEGRADATION QUANT: CPT | Performed by: NURSE PRACTITIONER

## 2023-01-03 PROCEDURE — 12000002 HC ACUTE/MED SURGE SEMI-PRIVATE ROOM

## 2023-01-03 PROCEDURE — 36415 COLL VENOUS BLD VENIPUNCTURE: CPT | Performed by: NURSE PRACTITIONER

## 2023-01-03 PROCEDURE — 63600175 PHARM REV CODE 636 W HCPCS: Performed by: INTERNAL MEDICINE

## 2023-01-03 RX ADMIN — BUDESONIDE 0.5 MG: 0.5 INHALANT RESPIRATORY (INHALATION) at 08:01

## 2023-01-03 RX ADMIN — IPRATROPIUM BROMIDE AND ALBUTEROL SULFATE 3 ML: .5; 3 SOLUTION RESPIRATORY (INHALATION) at 08:01

## 2023-01-03 RX ADMIN — OXYCODONE HYDROCHLORIDE AND ACETAMINOPHEN 500 MG: 500 TABLET ORAL at 09:01

## 2023-01-03 RX ADMIN — FUROSEMIDE 20 MG: 20 TABLET ORAL at 09:01

## 2023-01-03 RX ADMIN — PANTOPRAZOLE SODIUM 40 MG: 40 TABLET, DELAYED RELEASE ORAL at 05:01

## 2023-01-03 RX ADMIN — CLOPIDOGREL BISULFATE 75 MG: 75 TABLET, FILM COATED ORAL at 09:01

## 2023-01-03 RX ADMIN — ASPIRIN 81 MG: 81 TABLET, COATED ORAL at 09:01

## 2023-01-03 RX ADMIN — METOPROLOL SUCCINATE 50 MG: 50 TABLET, FILM COATED, EXTENDED RELEASE ORAL at 09:01

## 2023-01-03 RX ADMIN — Medication 800 MG: at 10:01

## 2023-01-03 RX ADMIN — THERA TABS 1 TABLET: TAB at 09:01

## 2023-01-03 RX ADMIN — ENOXAPARIN SODIUM 90 MG: 100 INJECTION SUBCUTANEOUS at 08:01

## 2023-01-03 RX ADMIN — IPRATROPIUM BROMIDE AND ALBUTEROL SULFATE 3 ML: .5; 3 SOLUTION RESPIRATORY (INHALATION) at 05:01

## 2023-01-03 RX ADMIN — DEXAMETHASONE 6 MG: 2 TABLET ORAL at 09:01

## 2023-01-03 RX ADMIN — CLONAZEPAM 1 MG: 1 TABLET ORAL at 10:01

## 2023-01-03 RX ADMIN — OXYCODONE HYDROCHLORIDE AND ACETAMINOPHEN 500 MG: 500 TABLET ORAL at 08:01

## 2023-01-03 RX ADMIN — ENOXAPARIN SODIUM 90 MG: 100 INJECTION SUBCUTANEOUS at 09:01

## 2023-01-03 RX ADMIN — QUETIAPINE 100 MG: 100 TABLET ORAL at 09:01

## 2023-01-03 RX ADMIN — PAROXETINE HYDROCHLORIDE 50 MG: 20 TABLET, FILM COATED ORAL at 09:01

## 2023-01-03 RX ADMIN — BUDESONIDE 0.5 MG: 0.5 INHALANT RESPIRATORY (INHALATION) at 07:01

## 2023-01-03 RX ADMIN — IPRATROPIUM BROMIDE AND ALBUTEROL SULFATE 3 ML: .5; 3 SOLUTION RESPIRATORY (INHALATION) at 07:01

## 2023-01-03 RX ADMIN — ISOSORBIDE MONONITRATE 60 MG: 30 TABLET, EXTENDED RELEASE ORAL at 09:01

## 2023-01-03 RX ADMIN — IPRATROPIUM BROMIDE AND ALBUTEROL SULFATE 3 ML: .5; 3 SOLUTION RESPIRATORY (INHALATION) at 12:01

## 2023-01-03 RX ADMIN — ATORVASTATIN CALCIUM 40 MG: 40 TABLET, FILM COATED ORAL at 08:01

## 2023-01-03 NOTE — PLAN OF CARE
Goals to be met by: 2/3/23     Patient will increase functional independence with mobility by performin. Sit to stand transfer with MI  2. Bed to chair transfer with MI using Rolling Walker  3. Gait  x 150 feet with MI using Rolling Walker.

## 2023-01-03 NOTE — NURSING
BOSS: Patient sitting in chair watching television. Personal items and call light within reach.    2219: Patient medicated, per MAR    2230: PRN klonopin given    2318: Patient awake in chair.    0134: Patient in bed, receiving breathing treatment.    0221: Patient resting in bed, chest rising and falling.    0510: Patient resting in bed, chest rising and falling.    0548: Patient medicated, per MAR    EOSS: No acute changes occurred during shift. Patient has no complaints or concerns at this time.

## 2023-01-03 NOTE — PT/OT/SLP EVAL
Occupational Therapy   Evaluation    Name: Betty Bacon  MRN: [id number]  Admitting Diagnosis: <principal problem not specified>  Recent Surgery: * No surgery found *      Recommendations:     Discharge Recommendations: home health OT  Discharge Equipment Recommendations:  none  Barriers to discharge:  None    Assessment:     Betty Bacon is a 72 y.o. female with a medical diagnosis of <principal problem not specified>.  Performance deficits affecting function: weakness, impaired endurance, impaired self care skills, impaired functional mobility, impaired cardiopulmonary response to activity.      Rehab Prognosis: Good; patient would benefit from acute skilled OT services to address these deficits and reach maximum level of function.       Plan:     Patient to be seen 5 x/week to address the above listed problems via self-care/home management, therapeutic activities, therapeutic exercises  Plan of Care Expires: 02/03/23  Plan of Care Reviewed with: patient    Subjective     Chief Complaint: no complaints except not getting silverware with meals today  Patient/Family Comments/goals: return home    Occupational Profile:  Living Environment: lives alone in single story home  Previous level of function: performed self care without difficulty; driving   Equipment Used at Home: oxygen  Assistance upon Discharge: family is available for assistance    Pain/Comfort:  Pain Rating 1: 0/10  Pain Rating Post-Intervention 1: 0/10    Patients cultural, spiritual, Mandaeism conflicts given the current situation: no    Objective:     Communicated with: nurse prior to session.  Patient found up in chair with oxygen, peripheral IV upon OT entry to room.    General Precautions: Standard, airborne, contact, droplet, fall  Orthopedic Precautions: N/A  Braces: N/A  Respiratory Status: Nasal cannula, flow 4 L/min    Occupational Performance:    Activities of Daily Living:  Lower Body Dressing: minimum assistance for  donning socks whiles seated in chair    Cognitive/Visual Perceptual:  Cognitive/Psychosocial Skills:     -       Oriented to: Person, Place, Time, and Situation   -       Follows Commands/attention:Follows multistep  commands  -       Communication: clear/fluent  -       Memory: No Deficits noted  -       Safety awareness/insight to disability: intact   -       Mood/Affect/Coping skills/emotional control: Appropriate to situation and Pleasant    Physical Exam:  Upper Extremity Range of Motion:     -       Right Upper Extremity: WNL  -       Left Upper Extremity: WNL  Upper Extremity Strength:    -       Right Upper Extremity: WNL  -       Left Upper Extremity: WNL   Strength:    -       Right Upper Extremity: WNL  -       Left Upper Extremity: WNL  Fine Motor Coordination:    -       Intact    AMPAC 6 Click ADL:  AMPAC Total Score: 19    Treatment & Education:  Patient was educated on role of OT/POC, importance of activity and getting out of bed each day, discharge planning and equipment needs, reviewed us of call bell for assistance.     Patient left up in chair with all lines intact and call button in reach    GOALS:   Multidisciplinary Problems       Occupational Therapy Goals          Problem: Occupational Therapy    Goal Priority Disciplines Outcome Interventions   Occupational Therapy Goal     OT, PT/OT     Description: Goals to be met by: 2/3/23     Patient will increase functional independence with ADLs by performing:    UE Dressing with Supervision.  LE Dressing with Supervision.  Grooming while standing at sink with Supervision.  Toileting from toilet with Supervision for hygiene and clothing management.   Toilet transfer to toilet with Supervision.                         History:     Past Medical History:   Diagnosis Date    Acute coronary artery obstruction without MI 10/2012    Benign hypertension     COPD (chronic obstructive pulmonary disease)     Coronary artery disease     Disorder of kidney  and ureter     Dr. Morrow    Hepatitis B core antibody positive 03/03/2021    Negative sAg, suggests previous exposure but no chronic/active Hep B. At risk for reactivation with any immunosuppression medication, steroids, chemo, etc.      History of electroconvulsive therapy     Hyperlipidemia LDL goal < 70     Left ankle sprain     Major depressive disorder, recurrent episode, severe     s/p ECT    Positive AKIRA (antinuclear antibody) 03/03/2021    PVD (peripheral vascular disease)          Past Surgical History:   Procedure Laterality Date    CHOLECYSTECTOMY      CORONARY ANGIOPLASTY      CORONARY ANGIOPLASTY WITH STENT PLACEMENT  10/2012    2 stents RCA (100% stenosis)    EYE SURGERY      cataract surgery    HYSTERECTOMY      LINA, ovaries intact. uterine prolapse    ILIAC ARTERY STENT      TONSILLECTOMY      TOTAL VAGINAL HYSTERECTOMY         Time Tracking:     OT Date of Treatment: 01/03/23  OT Start Time: 1038  OT Stop Time: 1055  OT Total Time (min): 17 min    Billable Minutes:Evaluation 17    1/3/2023

## 2023-01-03 NOTE — CARE UPDATE
Received notification via [facility] that [facility] has accepted the patient for service and are awaiting d/c date to establish start of care date. Expected d/c on 1/4/23, relayed this info to [facility] and they state start of care will be the next day after discharge.    Called to nida Gibson Arleen [phone] to inform of [facility] set up for patient and expected d/c. Gibson expressed understanding and agreement with all of above.

## 2023-01-03 NOTE — CARE UPDATE
01/03/23 0751   Patient Assessment/Suction   Level of Consciousness (AVPU) alert   Respiratory Effort Normal   Expansion/Accessory Muscles/Retractions no use of accessory muscles   All Lung Fields Breath Sounds diminished   Rhythm/Pattern, Respiratory unlabored   Cough Frequency infrequent   Cough Type nonproductive   PRE-TX-O2   Device (Oxygen Therapy) nasal cannula   Flow (L/min) 3   SpO2 99 %   Pulse Oximetry Type Intermittent   $ Pulse Oximetry - Multiple Charge Pulse Oximetry - Multiple   Pulse 89   Resp 20   Aerosol Therapy   $ Aerosol Therapy Charges Aerosol Treatment   Daily Review of Necessity (SVN) completed   Respiratory Treatment Status (SVN) given   Treatment Route (SVN) mask;oxygen   Patient Position (SVN) semi-Lieberman's   Post Treatment Assessment (SVN) increased aeration   Signs of Intolerance (SVN) none

## 2023-01-03 NOTE — RESPIRATORY THERAPY
01/02/23 2106   Patient Assessment/Suction   Level of Consciousness (AVPU) alert   Respiratory Effort Normal;Unlabored   Expansion/Accessory Muscles/Retractions no use of accessory muscles   All Lung Fields Breath Sounds equal bilaterally;diminished   Rhythm/Pattern, Respiratory unlabored   Cough Frequency infrequent   PRE-TX-O2   Device (Oxygen Therapy) nasal cannula   $ Is the patient on Low Flow Oxygen? Yes   Flow (L/min) 3   SpO2 (!) 94 %   Pulse Oximetry Type Intermittent   $ Pulse Oximetry - Multiple Charge Pulse Oximetry - Multiple   Pulse 71   Resp 18   Aerosol Therapy   $ Aerosol Therapy Charges Aerosol Treatment   Daily Review of Necessity (SVN) completed   Respiratory Treatment Status (SVN) given   Treatment Route (SVN) mask   Patient Position (SVN) sitting in chair   Post Treatment Assessment (SVN) breath sounds unchanged   Signs of Intolerance (SVN) none   Breath Sounds Post-Respiratory Treatment   Post-treatment Heart Rate (beats/min) 81   Post-treatment Resp Rate (breaths/min) 18   Education   $ Education Bronchodilator;15 min   Respiratory Evaluation   $ Care Plan Tech Time 15 min   $ Eval/Re-eval Charges Evaluation   Evaluation For New Orders

## 2023-01-03 NOTE — PT/OT/SLP EVAL
"Physical Therapy Evaluation    Patient Name:  Betty Bacon   MRN:  [id number]    Recommendations:     Discharge Recommendations: home health PT   Discharge Equipment Recommendations: none   Barriers to discharge: None    Assessment:     Betty Bacon is a 72 y.o. female admitted with a medical diagnosis of <principal problem not specified>.  She presents with the following impairments/functional limitations: impaired endurance, impaired functional mobility, gait instability, impaired balance, pain, impaired cardiopulmonary response to activity.    Pt found HOB elevated with RN present giving medication and pt agreeable to PT evaluation. Pt A & O x  4 and has the following co-morbidities: HTN, COPD on 4Lpm O2 at home, CAD, HF, Covid 19.  Pt tolerated session fairly well with mild gait instability without an AD and required CGa > SBA for safe mobilization during session today. Pt would benefit from acute PT during hospitalization to increase strength, endurance and safety with mobility and would benefit from HH PT and use of RW at home upon discharge.       Rehab Prognosis: Fair; patient would benefit from acute skilled PT services to address these deficits and reach maximum level of function.    Recent Surgery: * No surgery found *      Plan:     During this hospitalization, patient to be seen 5 x/week to address the identified rehab impairments via gait training, therapeutic activities, therapeutic exercises and progress toward the following goals:    Plan of Care Expires:  02/03/23    Subjective     Chief Complaint: pt reported she was generally not feeling well after being home from her daughter's house 1-2 weeks. Pt did report having anxiety at times and "I have kidney disease."  Patient/Family Comments/goals: home with HH PT  Pain/Comfort:  Pain Rating 1: 7/10  Location - Orientation 1: lower  Location 1: back  Pain Addressed 1: Reposition, Distraction, Nurse notified  Pain Rating " Post-Intervention 1: 0/10    Patients cultural, spiritual, Rastafarian conflicts given the current situation:      Living Environment:  Pt lives alone in a single story house with no steps to enter.   Prior to admission, patients level of function was independent with mobility.  Equipment used at home: oxygen, walker, rolling.  DME owned (not currently used): rolling walker.  Upon discharge, patient will have assistance from her daughter as needed.    Objective:     Communicated with MARJAN Goodwin prior to session.  Patient found HOB elevated with oxygen, peripheral IV, telemetry  upon PT entry to room.    General Precautions: Standard, airborne, contact, droplet, fall  Orthopedic Precautions:N/A   Braces: N/A  Respiratory Status: Nasal cannula, flow 3 L/min    Exams:  Cognitive Exam:  Patient is oriented to Person, Place, Time, and Situation  RLE ROM: WFL  RLE Strength: grossly 4/5  LLE ROM: WFL  LLE Strength: grossly 4/5    Functional Mobility:  Bed Mobility:     Scooting: stand by assistance  Supine to Sit: stand by assistance  Transfers:     Sit to Stand:  stand by assistance with no AD  Gait: x 40 feet with no AD and CG > close SBa for safety due to mild instability. PT recommended pt would benefit from use of RW at home until recovered from Covid 19 and feeling stronger.       AM-PAC 6 CLICK MOBILITY  Total Score:23       Treatment & Education:  Pt was educated on the following: call light use, importance of OOB activity and functional mobility to negate the negative effects of prolonged bed rest during this hospitalization, safe transfers/ambulation and discharge planning recommendations/options.      Patient left up in chair with all lines intact, call button in reach, and RN present.    GOALS:   Multidisciplinary Problems       Physical Therapy Goals          Problem: Physical Therapy    Goal Priority Disciplines Outcome Goal Variances Interventions   Physical Therapy Goal     PT, PT/OT      Description: Goals to  be met by: 2/3/23     Patient will increase functional independence with mobility by performin. Sit to stand transfer with MI  2. Bed to chair transfer with MI using Rolling Walker  3. Gait  x 150 feet with MI using Rolling Walker.                              History:     Past Medical History:   Diagnosis Date    Acute coronary artery obstruction without MI 10/2012    Benign hypertension     COPD (chronic obstructive pulmonary disease)     Coronary artery disease     Disorder of kidney and ureter     Dr. Morrow    Hepatitis B core antibody positive 2021    Negative sAg, suggests previous exposure but no chronic/active Hep B. At risk for reactivation with any immunosuppression medication, steroids, chemo, etc.      History of electroconvulsive therapy     Hyperlipidemia LDL goal < 70     Left ankle sprain     Major depressive disorder, recurrent episode, severe     s/p ECT    Positive AKIRA (antinuclear antibody) 2021    PVD (peripheral vascular disease)        Past Surgical History:   Procedure Laterality Date    CHOLECYSTECTOMY      CORONARY ANGIOPLASTY      CORONARY ANGIOPLASTY WITH STENT PLACEMENT  10/2012    2 stents RCA (100% stenosis)    EYE SURGERY      cataract surgery    HYSTERECTOMY      LINA, ovaries intact. uterine prolapse    ILIAC ARTERY STENT      TONSILLECTOMY      TOTAL VAGINAL HYSTERECTOMY         Time Tracking:     PT Received On: 23  PT Start Time: 1009     PT Stop Time: 1031  PT Total Time (min): 22 min     Billable Minutes: Evaluation 10 and Gait Training 12      2023

## 2023-01-03 NOTE — PROGRESS NOTES
Count includes the [facility] Medicine  Progress Note    Patient name: Betty Bacon  MRN: [id number]  Admit Date: 1/1/2023   LOS: 2 days     SUBJECTIVE:     Principal problem: cough, weakness    Interval History:  72 year old female with COPD, Chronic respiratory failure on 4L NC, Tobacco use, CAD, HFpEF admitted with Covid 19, mild jovanna, electrolyte abnormalities. She reports cough, aches, worsening of baseline tremors and anxiety.  She feels weak.  Electrolytes replaced and K and Ca are improving.  Hypomg has persisted and further replacement is in progress.  She has wheezing at baseline but does feel current wheezing is worse than usual.  On Dexamethasone.     1/3- Feels she is improving, still requiring above baseline oxygen.  Diffuse wheezing    Hospital Course:    Scheduled Meds:   albuterol-ipratropium  3 mL Nebulization Q4H WAKE    ascorbic acid (vitamin C)  500 mg Oral BID    aspirin  81 mg Oral Daily    atorvastatin  40 mg Oral Daily    budesonide  0.5 mg Nebulization BID    clopidogreL  75 mg Oral Daily    dexAMETHasone  6 mg Oral Daily    enoxparin  90 mg Subcutaneous Q12H    furosemide  20 mg Oral Daily    isosorbide mononitrate  60 mg Oral Daily    metoprolol succinate  50 mg Oral Daily    multivitamin  1 tablet Oral Daily    pantoprazole  40 mg Oral Daily    paroxetine  50 mg Oral Daily    QUEtiapine  100 mg Oral Daily     Continuous Infusions:  PRN Meds:acetaminophen, clonazePAM, dextrose 10%, dextrose 10%, glucagon (human recombinant), glucose, glucose, HYDROcodone-acetaminophen, HYDROcodone-acetaminophen, insulin aspart U-100, loperamide, magnesium oxide, magnesium oxide, ondansetron, potassium bicarbonate, potassium bicarbonate, potassium bicarbonate, potassium, sodium phosphates, potassium, sodium phosphates, potassium, sodium phosphates, sodium chloride 0.9%    Review of patient's allergies indicates:   Allergen Reactions    Propoxyphene n-acetaminophen Other (See Comments)      Other reaction(s): Unknown    Codeine Anxiety       Review of Systems: As per interval history    OBJECTIVE:     Vital Signs (Most Recent)  Temp: 97.2 °F (36.2 °C) (01/03/23 1215)  Pulse: 79 (01/03/23 1215)  Resp: 18 (01/03/23 1215)  BP: (!) 125/58 (01/03/23 1215)  SpO2: 99 % (01/03/23 1316)    Vital Signs Range (Last 24H):  Temp:  [97.1 °F (36.2 °C)-98.7 °F (37.1 °C)]   Pulse:  [68-89]   Resp:  [16-20]   BP: (117-157)/(50-80)   SpO2:  [91 %-100 %]     I & O (Last 24H):  Intake/Output Summary (Last 24 hours) at 1/3/2023 1529  Last data filed at 1/3/2023 1215  Gross per 24 hour   Intake 480 ml   Output --   Net 480 ml       Physical Exam:    Vitals and nursing note reviewed.     Constitutional:       General: Not in acute distress.  Appears tired but not toxic     Appearance: Well-developed.   HENT:      Head: Normocephalic and atraumatic.   Eyes:      Pupils: Pupils are equal, round, and reactive to light.   Cardiovascular:      Rate and Rhythm: Regular rhythm.   Pulmonary:      Effort: Pulmonary effort is normal.      Breath sounds: Bilateral expiratory wheezing  02 per NC  Abdominal:      General: There is no distension.      Palpations: Abdomen is soft.      Tenderness: There is no abdominal tenderness. There is no guarding or rebound.   Musculoskeletal:         General: Normal range of motion.      Cervical back: Normal range of motion.   Skin:     Findings: No rash.   Neurological:      Mental Status: Alert and oriented to person, place, and time.      Cranial Nerves: No cranial nerve deficit.      Sensory: No sensory deficit.     Laboratory:  I have reviewed all pertinent lab results within the past 24 hours.  CBC:   Recent Labs   Lab 01/01/23  1519   WBC 8.89   RBC 3.99*   HGB 11.3*   HCT 37.1      MCV 93   MCH 28.3   MCHC 30.5*       CMP:   Recent Labs   Lab 01/03/23  0609   *   CALCIUM 7.9*   ALBUMIN 2.7*   PROT 6.2      K 3.8   CO2 25      BUN 34*   CREATININE 1.7*   ALKPHOS 70    ALT 14   AST 14   BILITOT 0.6       Microbiology Results (last 7 days)       Procedure Component Value Units Date/Time    Culture, Respiratory with Gram Stain [[id number]]     Order Status: No result Specimen: Sputum, Expectorated             Diagnostic Results:      ASSESSMENT/PLAN:         Active Hospital Problems    Diagnosis  POA    SARS-CoV-2 positive [U07.1]  Unknown    (HFpEF) heart failure with preserved ejection fraction [I50.30]  Unknown    Hypocalcemia [E83.51]  Unknown    Hypokalemia [E87.6]  Unknown    Severe obesity with body mass index (BMI) of 35.0 to 39.9 with serious comorbidity [E66.01]  Yes    CAD (coronary artery disease) [I25.10]  Yes     NEFTALY RCA 7/2004  Stents x 2 RCA 10/2012      Generalized anxiety disorder [F41.1]  Yes    COPD exacerbation [J44.1]  Unknown    Benign hypertension [I10]  Yes    CKD (chronic kidney disease) stage 3, GFR 30-59 ml/min [N18.30]  Yes      Resolved Hospital Problems   No resolved problems to display.         Plan:     -Given respiratory status had declined from her baseline and she reported increasing her O2 at home, Dexamethasone was started.   -Remdesivir was not started given CARMELITA at the time.  -Nebs as the hospital does not have inhalers  -Presently on 3L NC and doing well. Still having some wheeze.  -I've encouraged her to get out of bed to help with opening up her lungs and will order PT/OT.  -CARMELITA is better . Monitoring renal function.  -Continuing her home medication of paxil and prn clonazepam for anxiety.  I suspect the worsening of the baseline tremors is acute illness related and could be exacerbated by the steroids.  -Replacing electrolytes  -DVT Px with lovenox  -Possible home tomorrow if doing well, electrolytes are adequate, no new issues.      VTE Risk Mitigation (From admission, onward)           Ordered     enoxaparin injection 90 mg  Every 12 hours (non-standard times)         01/02/23 1820                      [facility]  Medicine  [facility]  Amandeep Ramirez MD  Date of service: 01/03/2023

## 2023-01-03 NOTE — PROGRESS NOTES
Pharmacist Renal Dose Adjustment Note    Betty Bacon is a 72 y.o. female being treated with the medication Enoxaparin.    Patient Data:    Vital Signs (Most Recent):  Temp: 97.4 °F (36.3 °C) (01/02/23 1704)  Pulse: 73 (01/02/23 1704)  Resp: 18 (01/02/23 1704)  BP: 117/80 (01/02/23 1704)  SpO2: (!) 94 % (01/02/23 1704)   Vital Signs (72h Range):  Temp:  [97.4 °F (36.3 °C)-98.7 °F (37.1 °C)]   Pulse:  [64-87]   Resp:  [17-20]   BP: (117-176)/(52-84)   SpO2:  [93 %-98 %]      Recent Labs   Lab 01/01/23  1519 01/02/23  0649   CREATININE 1.8* 1.3     Serum creatinine: 1.3 mg/dL 01/02/23 0649  Estimated creatinine clearance: 39.5 mL/min    Enoxaparin 90 mg subq every 24 hours will be changed to Enoxaparin 90 mg subq every 12 hours due to CrCl > 30 ml/min.    Pharmacist's Name: Lashawn Owens  Pharmacist's Extension: [phone]

## 2023-01-03 NOTE — PLAN OF CARE
Noted recommendation from therapies for home health PT and OT. Unable to speak directly with patient so called son Gibson Bacon [phone] to discuss change in disposition to home heatlh. Gibson states understanding of home health recommendation and expresses agreement with it. Gibson states patient has not had home health previously and has no preference of agencies. Referrals sent via [facility] to [facility] in [place]. Called to [facility] [phone], spoke with Chelsea and informed her of referral coming to them.        01/03/23 1459   Post-Acute Status   Post-Acute Authorization Home Health   Home Health Status Referrals Sent   Coverage Medicare A and B   Patient choice form signed by patient/caregiver List with quality metrics by geographic area provided;List from CMS Compare;List from System Post-Acute Care   Discharge Delays None known at this time   Discharge Plan   Discharge Plan A Home Health   Discharge Plan B Home Health

## 2023-01-03 NOTE — DISCHARGE SUMMARY
[facility]  Discharge Summary  Patient Name: Betty Bacon MRN: [id number]   Patient Class: IP- Inpatient  Length of Stay: 2   Admission Date: 1/1/2023  2:48 PM Attending Physician: Amandeep Ramirez MD   Primary Care Provider: Johanne Callejas MD Face-to-Face encounter date: 1/4/22   Chief Complaint: Shortness of Breath and Weakness (Pt brought in by EMS for SOB and weakness. Pt has a hx of COPD and last night tested + for covid.)    Date of Discharge: 1/4/22  Discharge Disposition: home w/family  Condition: Stable       Reason for Hospitalization     Active Hospital Problems    Diagnosis    SARS-CoV-2 positive    (HFpEF) heart failure with preserved ejection fraction    Hypocalcemia    Hypokalemia    Severe obesity with body mass index (BMI) of 35.0 to 39.9 with serious comorbidity    CAD (coronary artery disease)     NEFTALY RCA 7/2004  Stents x 2 RCA 10/2012      Generalized anxiety disorder    COPD exacerbation    Benign hypertension    CKD (chronic kidney disease) stage 3, GFR 30-59 ml/min         Brief History of Present Illness    Betty Bacon is a 72 y.o.  female who  has a past medical history of Acute coronary artery obstruction without MI (10/2012), Benign hypertension, COPD (chronic obstructive pulmonary disease), Coronary artery disease, Disorder of kidney and ureter, Hepatitis B core antibody positive (03/03/2021), History of electroconvulsive therapy, Hyperlipidemia LDL goal < 70, Left ankle sprain, Major depressive disorder, recurrent episode, severe, Positive AKIRA (antinuclear antibody) (03/03/2021), and PVD (peripheral vascular disease).. The patient presented to [facility] on 1/1/2023 with a primary complaint of Shortness of Breath and Weakness (Pt brought in by EMS for SOB and weakness. Pt has a hx of COPD and last night tested + for covid.)    For the full HPI please refer to the History & Physical from this admission.    Hospital Course By Problem  with Pertinent Findings     COVID-19 Pneumonia  -Given respiratory status had declined from her baseline and she reported increasing her O2 at home, Dexamethasone was started.   -Remdesivir was not started given CARMELITA at the time.  -Nebs as the hospital does not have inhalers  -back to baseline O2 at discharge  -CARMELITA improved  -Continuing her home medication of paxil and prn clonazepam for anxiety.  suspect the worsening of the baseline tremors is acute illness related and could be exacerbated by the steroids and duonebs  -Replace electrolytes  -DVT Px with lovenox  -discharge with prednisone PO to complete 10 day course        Patient was seen and examined on the date of discharge and determined to be suitable for discharge.    Physical Exam  BP (!) 127/50 (BP Location: Right arm, Patient Position: Lying)   Pulse 76   Temp 97.1 °F (36.2 °C) (Oral)   Resp 16   Wt 89.7 kg (197 lb 12 oz)   SpO2 95%   BMI 36.17 kg/m²   Vitals reviewed.    Constitutional: No distress.   HENT: Atraumatic.   Cardiovascular: Normal rate, regular rhythm.  S1 S2.    Pulmonary/Chest: Effort normal. Bilateral wheezes, improved overall  Abdominal: Soft. Bowel sounds are normal. Exhibits no distension and no mass. No tenderness  Neurological: Alert.   Skin: Skin is warm and dry.     Following labs were Reviewed   Recent Labs   Lab 01/03/23  0609   CALCIUM 7.9*   ALBUMIN 2.7*   PROT 6.2      K 3.8   CO2 25      BUN 34*   CREATININE 1.7*   ALKPHOS 70   ALT 14   AST 14   BILITOT 0.6     No results found for: POCTGLUCOSE         Microbiology Results (last 7 days)       Procedure Component Value Units Date/Time    Culture, Respiratory with Gram Stain [[id number]]     Order Status: No result Specimen: Sputum, Expectorated           X-Ray Chest AP Portable   Final Result          No results found for this or any previous visit.      Consultants and Procedures   Consultants:  Consults (From admission, onward)          Status Ordering  Provider     Inpatient consult to Hospitalist  Once        Provider:  Ayaka Abebe MD    Acknowledged CHAZ MARTINEZ            Procedures:   * No surgery found *     Discharge Information:   Diet:  Resume Cardiac diet/Diabetic Diet    Physical Activity:  Activity as tolerated    Instructions:  1. Take all medications as prescribed  2. Keep all follow-up appointments  3. Return to the hospital or call your primary care physicians if any worsening symptoms such as chest pain, shortness of breath, bleeding,  intractable pain, fever >101 occur.      Follow-Up Appointments:  Please call your primary care physician to schedule an appointment in 1 week time.        Discharge Medications:     Medication List        ASK your doctor about these medications      albuterol sulfate 90 mcg/actuation inhaler  Commonly known as: PROAIR RESPICLICK  Inhale 2 puffs into the lungs every 4 to 6 hours as needed.     ALPRAZolam 1 MG tablet  Commonly known as: XANAX  Take 1 tablet (1 mg total) by mouth 2 (two) times daily as needed for Anxiety.     aspirin 81 mg Tab     atorvastatin 40 MG tablet  Commonly known as: LIPITOR  TAKE 1 TABLET BY MOUTH EVERY DAY     clonazePAM 1 MG disintegrating tablet  Commonly known as: KlonoPIN  Take 1 tablet (1 mg total) by mouth 2 (two) times daily as needed (anxiety).  Ask about: Which instructions should I use?     clopidogreL 75 mg tablet  Commonly known as: PLAVIX  TAKE 1 TABLET BY MOUTH EVERY DAY     colchicine 0.6 mg tablet  Commonly known as: COLCRYS  Take 2 po at gout flare onset, may repeat 1 in an hour prn, then 1 po bid until pain resolves ,or n/V/D starts     colestipoL 1 gram Tab  Commonly known as: COLESTID  Take 1 tablet (1 g total) by mouth 2 (two) times daily as needed (diarrhea).     ergocalciferol 50,000 unit Cap  Commonly known as: ERGOCALCIFEROL  Take 1 capsule (50,000 Units total) by mouth every 7 days.     esomeprazole 40 MG capsule  Commonly known as: NEXIUM  TAKE 1 CAPSULE  BY MOUTH BEFORE BREAKFAST.     furosemide 20 MG tablet  Commonly known as: LASIX     isosorbide mononitrate 60 MG 24 hr tablet  Commonly known as: IMDUR  TAKE 1 TABLET BY MOUTH ONCE DAILY     metoprolol succinate 50 MG 24 hr tablet  Commonly known as: TOPROL-XL  TAKE 1 TABLET BY MOUTH EVERY DAY     * paroxetine 40 MG tablet  Commonly known as: PAXIL  TAKE 1 TABLET BY MOUTH EVERY DAY     * paroxetine 10 MG tablet  Commonly known as: PAXIL     QUEtiapine 100 MG Tab  Commonly known as: SEROQUEL     TRELEGY ELLIPTA 200-62.5-25 mcg inhaler  Generic drug: fluticasone-umeclidin-vilanter  INHALE 1 PUFF INTO THE LUNGS ONCE DAILY.     triamterene-hydrochlorothiazide 37.5-25 mg 37.5-25 mg per capsule  Commonly known as: DYAZIDE  TAKE 1 CAPSULE BY MOUTH ONCE DAILY           * This list has 2 medication(s) that are the same as other medications prescribed for you. Read the directions carefully, and ask your doctor or other care provider to review them with you.                   Where to Get Your Medications        These medications were sent to [facility]/pharmacy #[id number] - [place], [place] - [address] AT [facility]  [address], [place] [address]      Phone: [phone]   clonazePAM 1 MG disintegrating tablet           I spent  35 minutes preparing the discharge for this patient including reviewing records from previous encounters, preparation of discharge summary, assessing and final examination of the patient, discharge medicine reconciliation, discussing plan of care, follow up and education and prescriptions.       Amandeep Ramirez  [facility] Hospitalist

## 2023-01-04 VITALS
RESPIRATION RATE: 18 BRPM | WEIGHT: 197.75 LBS | OXYGEN SATURATION: 96 % | DIASTOLIC BLOOD PRESSURE: 64 MMHG | SYSTOLIC BLOOD PRESSURE: 124 MMHG | BODY MASS INDEX: 35.04 KG/M2 | HEIGHT: 63 IN | TEMPERATURE: 99 F | HEART RATE: 80 BPM

## 2023-01-04 DIAGNOSIS — U07.1 COVID-19 VIRUS DETECTED: ICD-10-CM

## 2023-01-04 LAB
ALBUMIN SERPL BCP-MCNC: 2.5 G/DL (ref 3.5–5.2)
ALP SERPL-CCNC: 60 U/L (ref 55–135)
ALT SERPL W/O P-5'-P-CCNC: 13 U/L (ref 10–44)
ANION GAP SERPL CALC-SCNC: 7 MMOL/L (ref 8–16)
AST SERPL-CCNC: 14 U/L (ref 10–40)
BILIRUB SERPL-MCNC: 0.5 MG/DL (ref 0.1–1)
BUN SERPL-MCNC: 39 MG/DL (ref 8–23)
CALCIUM SERPL-MCNC: 7.9 MG/DL (ref 8.7–10.5)
CHLORIDE SERPL-SCNC: 103 MMOL/L (ref 95–110)
CO2 SERPL-SCNC: 25 MMOL/L (ref 23–29)
CREAT SERPL-MCNC: 1.5 MG/DL (ref 0.5–1.4)
EST. GFR  (NO RACE VARIABLE): 36.3 ML/MIN/1.73 M^2
GLUCOSE SERPL-MCNC: 125 MG/DL (ref 70–110)
MAGNESIUM SERPL-MCNC: 1.7 MG/DL (ref 1.6–2.6)
PHOSPHATE SERPL-MCNC: 2.2 MG/DL (ref 2.7–4.5)
POTASSIUM SERPL-SCNC: 4.6 MMOL/L (ref 3.5–5.1)
PROT SERPL-MCNC: 5.7 G/DL (ref 6–8.4)
SODIUM SERPL-SCNC: 135 MMOL/L (ref 136–145)

## 2023-01-04 PROCEDURE — 25000003 PHARM REV CODE 250: Performed by: INTERNAL MEDICINE

## 2023-01-04 PROCEDURE — 27000221 HC OXYGEN, UP TO 24 HOURS

## 2023-01-04 PROCEDURE — 80053 COMPREHEN METABOLIC PANEL: CPT | Performed by: NURSE PRACTITIONER

## 2023-01-04 PROCEDURE — 25000003 PHARM REV CODE 250: Performed by: NURSE PRACTITIONER

## 2023-01-04 PROCEDURE — 99900031 HC PATIENT EDUCATION (STAT)

## 2023-01-04 PROCEDURE — 97535 SELF CARE MNGMENT TRAINING: CPT

## 2023-01-04 PROCEDURE — 99900035 HC TECH TIME PER 15 MIN (STAT)

## 2023-01-04 PROCEDURE — 25000242 PHARM REV CODE 250 ALT 637 W/ HCPCS: Performed by: NURSE PRACTITIONER

## 2023-01-04 PROCEDURE — 63600175 PHARM REV CODE 636 W HCPCS: Performed by: INTERNAL MEDICINE

## 2023-01-04 PROCEDURE — 94761 N-INVAS EAR/PLS OXIMETRY MLT: CPT

## 2023-01-04 PROCEDURE — 84100 ASSAY OF PHOSPHORUS: CPT | Performed by: NURSE PRACTITIONER

## 2023-01-04 PROCEDURE — 36415 COLL VENOUS BLD VENIPUNCTURE: CPT | Performed by: NURSE PRACTITIONER

## 2023-01-04 PROCEDURE — 63600175 PHARM REV CODE 636 W HCPCS: Performed by: NURSE PRACTITIONER

## 2023-01-04 PROCEDURE — 83735 ASSAY OF MAGNESIUM: CPT | Performed by: NURSE PRACTITIONER

## 2023-01-04 PROCEDURE — 25000242 PHARM REV CODE 250 ALT 637 W/ HCPCS: Performed by: INTERNAL MEDICINE

## 2023-01-04 PROCEDURE — 94640 AIRWAY INHALATION TREATMENT: CPT

## 2023-01-04 PROCEDURE — 94799 UNLISTED PULMONARY SVC/PX: CPT

## 2023-01-04 RX ORDER — DEXAMETHASONE 6 MG/1
6 TABLET ORAL DAILY
Qty: 10 TABLET | Refills: 0 | Status: SHIPPED | OUTPATIENT
Start: 2023-01-05 | End: 2023-01-15

## 2023-01-04 RX ADMIN — ASPIRIN 81 MG: 81 TABLET, COATED ORAL at 08:01

## 2023-01-04 RX ADMIN — IPRATROPIUM BROMIDE AND ALBUTEROL SULFATE 3 ML: .5; 3 SOLUTION RESPIRATORY (INHALATION) at 07:01

## 2023-01-04 RX ADMIN — CLONAZEPAM 1 MG: 1 TABLET ORAL at 09:01

## 2023-01-04 RX ADMIN — THERA TABS 1 TABLET: TAB at 08:01

## 2023-01-04 RX ADMIN — ENOXAPARIN SODIUM 90 MG: 100 INJECTION SUBCUTANEOUS at 08:01

## 2023-01-04 RX ADMIN — FUROSEMIDE 20 MG: 20 TABLET ORAL at 08:01

## 2023-01-04 RX ADMIN — PANTOPRAZOLE SODIUM 40 MG: 40 TABLET, DELAYED RELEASE ORAL at 06:01

## 2023-01-04 RX ADMIN — DEXAMETHASONE 6 MG: 2 TABLET ORAL at 08:01

## 2023-01-04 RX ADMIN — PAROXETINE HYDROCHLORIDE 50 MG: 20 TABLET, FILM COATED ORAL at 08:01

## 2023-01-04 RX ADMIN — ISOSORBIDE MONONITRATE 60 MG: 30 TABLET, EXTENDED RELEASE ORAL at 08:01

## 2023-01-04 RX ADMIN — OXYCODONE HYDROCHLORIDE AND ACETAMINOPHEN 500 MG: 500 TABLET ORAL at 08:01

## 2023-01-04 RX ADMIN — QUETIAPINE 100 MG: 100 TABLET ORAL at 08:01

## 2023-01-04 RX ADMIN — CLOPIDOGREL BISULFATE 75 MG: 75 TABLET, FILM COATED ORAL at 08:01

## 2023-01-04 RX ADMIN — METOPROLOL SUCCINATE 50 MG: 50 TABLET, FILM COATED, EXTENDED RELEASE ORAL at 08:01

## 2023-01-04 NOTE — PLAN OF CARE
Patient cleared to discharge by case management.  Patient discharging home with home health with [facility].       01/04/23 1000   Final Note   Assessment Type Final Discharge Note   Anticipated Discharge Disposition Home-Health   Hospital Resources/Appts/Education Provided   (Patient reported that she already has a pcp appointment scheduled.)   Post-Acute Status   Post-Acute Authorization Home Health   Home Health Status Set-up Complete/Auth obtained   Discharge Delays None known at this time

## 2023-01-04 NOTE — CARE UPDATE
Pt was SOB sitting in chair due to just walking from restroom. Pt was then place back on Nasal cannula and shortly given a neb treament. Pt's SPO2 was 96

## 2023-01-04 NOTE — CARE UPDATE
01/04/23 0732   Patient Assessment/Suction   Level of Consciousness (AVPU) alert   Respiratory Effort Unlabored   Expansion/Accessory Muscles/Retractions no retractions   Rhythm/Pattern, Respiratory pattern regular;depth regular   Cough Frequency frequent   Cough Type loose   PRE-TX-O2   Device (Oxygen Therapy) nasal cannula   $ Is the patient on Low Flow Oxygen? Yes   Flow (L/min) 3   SpO2 96 %   Pulse Oximetry Type Intermittent   $ Pulse Oximetry - Multiple Charge Pulse Oximetry - Multiple   Pulse 70   Resp 18   Aerosol Therapy   $ Aerosol Therapy Charges Aerosol Treatment   Daily Review of Necessity (SVN) completed   Respiratory Treatment Status (SVN) given   Treatment Route (SVN) mask;oxygen   Patient Position (SVN) HOB elevated   Post Treatment Assessment (SVN) increased aeration   Signs of Intolerance (SVN) none   Education   $ Education 15 min;Bronchodilator   Respiratory Evaluation   $ Care Plan Tech Time 15 min   $ Eval/Re-eval Charges Evaluation   Evaluation For   (care plan)

## 2023-01-04 NOTE — CARE UPDATE
NATHAN spoke with Ms. June at [facility] and informed her that the patient is discharging today, per Ms. June they will resume services on tomorrow 1/5.

## 2023-01-04 NOTE — PT/OT/SLP PROGRESS
Physical Therapy      Patient Name:  Betty Bacon   MRN:  [id number]    Patient not seen today secondary to Other (Comment) (Pt prepping for discharge.).

## 2023-01-04 NOTE — PT/OT/SLP PROGRESS
Occupational Therapy   Treatment    Name: Betty Bacon  MRN: [id number]  Admitting Diagnosis:  SARS-CoV-2 positive       Recommendations:     Discharge Recommendations: home health OT  Discharge Equipment Recommendations:  none  Barriers to discharge:  None    Assessment:     Betty Bacon is a 72 y.o. female with a medical diagnosis of SARS-CoV-2 positive. Performance deficits affecting function are weakness, impaired self care skills, impaired functional mobility, gait instability, impaired cardiopulmonary response to activity. Patient states she will probably go home today with assistance from family and home health.    Rehab Prognosis:  Fair; patient would benefit from acute skilled OT services to address these deficits and reach maximum level of function.       Plan:     Patient to be seen 5 x/week to address the above listed problems via self-care/home management, therapeutic activities, therapeutic exercises  Plan of Care Expires: 02/03/23  Plan of Care Reviewed with: patient    Subjective     Pain/Comfort:  Pain Rating 1: 0/10  Pain Rating Post-Intervention 1: 0/10    Objective:     Communicated with: nurse prior to session.  Patient found HOB elevated with oxygen, peripheral IV, telemetry upon OT entry to room.    General Precautions: Standard, airborne, contact, droplet, fall    Orthopedic Precautions:N/A  Braces: N/A  Respiratory Status: Nasal cannula, flow 3 L/min     Occupational Performance:     Bed Mobility:    Patient completed Rolling/Turning to Right with contact guard assistance  Patient completed Scooting/Bridging with contact guard assistance  Patient completed Supine to Sit with contact guard assistance     Functional Mobility/Transfers:  Patient completed Bed <> Chair Transfer using Stand Pivot technique with contact guard assistance with rolling walker    Activities of Daily Living:  Feeding:  stand by assistance for eating breakfast while seated in chair      [facility] 6 Click  ADL: 21    Treatment & Education:  Patient was educated on energy conservation and safety techniques in the home, importance of stating active during recovery, demonstrated use of call bell for assistance.     Patient left up in chair with all lines intact and call button in reach    GOALS:   Multidisciplinary Problems       Occupational Therapy Goals       Not on file              Multidisciplinary Problems (Resolved)          Problem: Occupational Therapy    Goal Priority Disciplines Outcome Interventions   Occupational Therapy Goal   (Resolved)     OT, PT/OT Met    Description: Goals to be met by: 2/3/23     Patient will increase functional independence with ADLs by performing:    UE Dressing with Supervision.  LE Dressing with Supervision.  Grooming while standing at sink with Supervision.  Toileting from toilet with Supervision for hygiene and clothing management.   Toilet transfer to toilet with Supervision.                         Time Tracking:     OT Date of Treatment: 01/04/23  OT Start Time: 0950  OT Stop Time: 1000  OT Total Time (min): 10 min    Billable Minutes:Self Care/Home Management 10               1/4/2023

## 2023-01-04 NOTE — CARE UPDATE
Pt just got back to chair. Pt went to restroom and came back SOB. Pt was given neb treatment shortly after and SPO2 was 96

## 2023-01-10 ENCOUNTER — LAB VISIT (OUTPATIENT)
Dept: LAB | Facility: HOSPITAL | Age: 73
End: 2023-01-10
Attending: FAMILY MEDICINE
Payer: MEDICARE

## 2023-01-10 ENCOUNTER — OFFICE VISIT (OUTPATIENT)
Dept: FAMILY MEDICINE | Facility: CLINIC | Age: 73
End: 2023-01-10
Payer: MEDICARE

## 2023-01-10 VITALS
RESPIRATION RATE: 18 BRPM | SYSTOLIC BLOOD PRESSURE: 178 MMHG | OXYGEN SATURATION: 95 % | DIASTOLIC BLOOD PRESSURE: 60 MMHG | TEMPERATURE: 98 F | HEIGHT: 63 IN | HEART RATE: 77 BPM | BODY MASS INDEX: 34.96 KG/M2 | WEIGHT: 197.31 LBS

## 2023-01-10 DIAGNOSIS — E83.39 HYPOPHOSPHATEMIA: ICD-10-CM

## 2023-01-10 DIAGNOSIS — U07.1 COVID-19: ICD-10-CM

## 2023-01-10 DIAGNOSIS — I10 ESSENTIAL HYPERTENSION: ICD-10-CM

## 2023-01-10 DIAGNOSIS — R73.01 IMPAIRED FASTING BLOOD SUGAR: ICD-10-CM

## 2023-01-10 DIAGNOSIS — R42 VERTIGO: ICD-10-CM

## 2023-01-10 DIAGNOSIS — I50.32 CHRONIC DIASTOLIC HEART FAILURE: ICD-10-CM

## 2023-01-10 DIAGNOSIS — E21.3 HYPERPARATHYROIDISM: ICD-10-CM

## 2023-01-10 DIAGNOSIS — E83.42 HYPOMAGNESEMIA: ICD-10-CM

## 2023-01-10 DIAGNOSIS — E78.5 HYPERLIPIDEMIA WITH TARGET LOW DENSITY LIPOPROTEIN (LDL) CHOLESTEROL LESS THAN 70 MG/DL: ICD-10-CM

## 2023-01-10 DIAGNOSIS — J44.9 CHRONIC OBSTRUCTIVE PULMONARY DISEASE, UNSPECIFIED COPD TYPE: ICD-10-CM

## 2023-01-10 DIAGNOSIS — R26.81 GAIT INSTABILITY: ICD-10-CM

## 2023-01-10 DIAGNOSIS — N18.32 STAGE 3B CHRONIC KIDNEY DISEASE: ICD-10-CM

## 2023-01-10 DIAGNOSIS — J30.1 SEASONAL ALLERGIC RHINITIS DUE TO POLLEN: ICD-10-CM

## 2023-01-10 DIAGNOSIS — E55.9 VITAMIN D DEFICIENCY: ICD-10-CM

## 2023-01-10 DIAGNOSIS — M10.9 ACUTE GOUT, UNSPECIFIED CAUSE, UNSPECIFIED SITE: Primary | ICD-10-CM

## 2023-01-10 DIAGNOSIS — H65.01 NON-RECURRENT ACUTE SEROUS OTITIS MEDIA OF RIGHT EAR: ICD-10-CM

## 2023-01-10 DIAGNOSIS — M10.9 ACUTE GOUT, UNSPECIFIED CAUSE, UNSPECIFIED SITE: ICD-10-CM

## 2023-01-10 DIAGNOSIS — I10 BENIGN HYPERTENSION: ICD-10-CM

## 2023-01-10 LAB
25(OH)D3+25(OH)D2 SERPL-MCNC: 22 NG/ML (ref 30–96)
ALBUMIN SERPL BCP-MCNC: 2.9 G/DL (ref 3.5–5.2)
ALP SERPL-CCNC: 82 U/L (ref 55–135)
ALT SERPL W/O P-5'-P-CCNC: 15 U/L (ref 10–44)
ANION GAP SERPL CALC-SCNC: 12 MMOL/L (ref 8–16)
AST SERPL-CCNC: 10 U/L (ref 10–40)
BILIRUB SERPL-MCNC: 0.9 MG/DL (ref 0.1–1)
BUN SERPL-MCNC: 28 MG/DL (ref 8–23)
CALCIUM SERPL-MCNC: 9 MG/DL (ref 8.7–10.5)
CHLORIDE SERPL-SCNC: 100 MMOL/L (ref 95–110)
CHOLEST SERPL-MCNC: 145 MG/DL (ref 120–199)
CHOLEST/HDLC SERPL: 2.8 {RATIO} (ref 2–5)
CO2 SERPL-SCNC: 25 MMOL/L (ref 23–29)
CREAT SERPL-MCNC: 0.9 MG/DL (ref 0.5–1.4)
EST. GFR  (NO RACE VARIABLE): >60 ML/MIN/1.73 M^2
ESTIMATED AVG GLUCOSE: 126 MG/DL (ref 68–131)
GLUCOSE SERPL-MCNC: 75 MG/DL (ref 70–110)
HBA1C MFR BLD: 6 % (ref 4–5.6)
HDLC SERPL-MCNC: 52 MG/DL (ref 40–75)
HDLC SERPL: 35.9 % (ref 20–50)
LDLC SERPL CALC-MCNC: 75.2 MG/DL (ref 63–159)
MAGNESIUM SERPL-MCNC: 1.2 MG/DL (ref 1.6–2.6)
NONHDLC SERPL-MCNC: 93 MG/DL
PHOSPHATE SERPL-MCNC: 3.1 MG/DL (ref 2.7–4.5)
POTASSIUM SERPL-SCNC: 4.3 MMOL/L (ref 3.5–5.1)
PROT SERPL-MCNC: 5.8 G/DL (ref 6–8.4)
SODIUM SERPL-SCNC: 137 MMOL/L (ref 136–145)
TRIGL SERPL-MCNC: 89 MG/DL (ref 30–150)
URATE SERPL-MCNC: 8.3 MG/DL (ref 2.4–5.7)

## 2023-01-10 PROCEDURE — 99214 PR OFFICE/OUTPT VISIT, EST, LEVL IV, 30-39 MIN: ICD-10-PCS | Mod: S$PBB,CR,, | Performed by: FAMILY MEDICINE

## 2023-01-10 PROCEDURE — 36415 COLL VENOUS BLD VENIPUNCTURE: CPT | Mod: PO | Performed by: FAMILY MEDICINE

## 2023-01-10 PROCEDURE — 99999 PR PBB SHADOW E&M-EST. PATIENT-LVL III: ICD-10-PCS | Mod: PBBFAC,CR,, | Performed by: FAMILY MEDICINE

## 2023-01-10 PROCEDURE — 80053 COMPREHEN METABOLIC PANEL: CPT | Performed by: FAMILY MEDICINE

## 2023-01-10 PROCEDURE — 83735 ASSAY OF MAGNESIUM: CPT | Performed by: FAMILY MEDICINE

## 2023-01-10 PROCEDURE — 99213 OFFICE O/P EST LOW 20 MIN: CPT | Mod: PBBFAC,PO | Performed by: FAMILY MEDICINE

## 2023-01-10 PROCEDURE — 84550 ASSAY OF BLOOD/URIC ACID: CPT | Performed by: FAMILY MEDICINE

## 2023-01-10 PROCEDURE — 83036 HEMOGLOBIN GLYCOSYLATED A1C: CPT | Performed by: FAMILY MEDICINE

## 2023-01-10 PROCEDURE — 84100 ASSAY OF PHOSPHORUS: CPT | Performed by: FAMILY MEDICINE

## 2023-01-10 PROCEDURE — 83970 ASSAY OF PARATHORMONE: CPT | Performed by: FAMILY MEDICINE

## 2023-01-10 PROCEDURE — 82306 VITAMIN D 25 HYDROXY: CPT | Performed by: FAMILY MEDICINE

## 2023-01-10 PROCEDURE — 99214 OFFICE O/P EST MOD 30 MIN: CPT | Mod: S$PBB,CR,, | Performed by: FAMILY MEDICINE

## 2023-01-10 PROCEDURE — 80061 LIPID PANEL: CPT | Performed by: FAMILY MEDICINE

## 2023-01-10 PROCEDURE — 99999 PR PBB SHADOW E&M-EST. PATIENT-LVL III: CPT | Mod: PBBFAC,CR,, | Performed by: FAMILY MEDICINE

## 2023-01-10 RX ORDER — METOPROLOL SUCCINATE 100 MG/1
100 TABLET, EXTENDED RELEASE ORAL DAILY
Qty: 90 TABLET | Refills: 3 | Status: ON HOLD | OUTPATIENT
Start: 2023-01-10 | End: 2023-01-31 | Stop reason: SDUPTHER

## 2023-01-10 RX ORDER — FLUTICASONE PROPIONATE 50 MCG
1 SPRAY, SUSPENSION (ML) NASAL DAILY
Qty: 16 G | Status: SHIPPED | OUTPATIENT
Start: 2023-01-10

## 2023-01-10 RX ORDER — MECLIZINE HYDROCHLORIDE 25 MG/1
25 TABLET ORAL 3 TIMES DAILY PRN
Qty: 30 TABLET | Status: SHIPPED | OUTPATIENT
Start: 2023-01-10 | End: 2023-04-18

## 2023-01-10 NOTE — PROGRESS NOTES
Subjective:       Patient ID: Betty Bacon is a 72 y.o. female.    Chief Complaint: Transitional Care    HPI  Review of Systems   Constitutional:  Negative for chills, fatigue, fever and unexpected weight change.   HENT:  Positive for congestion, postnasal drip, rhinorrhea, sneezing and sore throat. Negative for ear discharge, ear pain and sinus pressure.    Respiratory:  Positive for cough. Negative for chest tightness, shortness of breath and wheezing.    Cardiovascular:  Negative for chest pain, palpitations and leg swelling.   Gastrointestinal:  Negative for abdominal pain.   Musculoskeletal:  Negative for arthralgias.   Neurological:  Positive for light-headedness. Negative for dizziness, syncope and headaches.     Patient Active Problem List   Diagnosis    CKD (chronic kidney disease) stage 3, GFR 30-59 ml/min    Hyperlipidemia LDL goal < 70    COPD exacerbation    Benign hypertension    LVH (left ventricular hypertrophy) due to hypertensive disease    Depression    Unstable angina    PAD (peripheral artery disease)    Generalized anxiety disorder    Aortic stenosis    CAD (coronary artery disease)    Abnormal stress test    GERD (gastroesophageal reflux disease)    Interstitial lung disease    Osteopenia    History of electroconvulsive therapy    Former smoker    Impaired fasting blood sugar    Severe obesity with body mass index (BMI) of 35.0 to 39.9 with serious comorbidity    Secondary hyperparathyroidism of renal origin    Chronic left-sided thoracic back pain    Chronic diastolic heart failure    Elevated alkaline phosphatase level    Positive AKIRA (antinuclear antibody)    Hepatitis B core antibody positive    Acute gout of left knee    Pain and swelling of left lower leg    Aortic atherosclerosis    Recurrent major depressive disorder, in partial remission    Shortness of breath    Acute drug-induced gout of left foot    Acute pulmonary edema    SARS-CoV-2 positive    (HFpEF) heart failure  with preserved ejection fraction    Hypocalcemia    Hypokalemia     Patient is here for a chronic conditions follow up.    Has [facility] after hospital d/c. Has been woozy headed/off balance.  BP has been running high. Still has supplemental oxygen prn and nighttime Dr. Medeiros for COPD . Has nocturnal hypoxia chronically but no CPAP    12/1/22 [facility] for SOB. Diagnosed with CHF , CARMELITA and pneumonia. She was started on systemic steroids ceftriaxone doxycycline for pneumonia with COPD cardiology was consulted and evaluated the patient and cleared patient for discharge    Admitted [facility] 1/1/2023 for SOB , +COVID , weakness. -Given respiratory status had declined from her baseline and she reported increasing her O2 at home, Dexamethasone was started.   -Remdesivir was not started given CARMELITA at the time.  -Nebs as the hospital does not have inhalers  -back to baseline O2 at discharge  -CARMELITA improved  -Continuing her home medication of paxil and prn clonazepam for anxiety.  suspect the worsening of the baseline tremors is acute illness related and could be exacerbated by the steroids and duonebs  -Replace electrolytes  -DVT Px with lovenox  -discharge with prednisone PO to complete 10 day course      Transitional Care Note    Family and/or Caretaker present at visit?  No.  Diagnostic tests reviewed/disposition: No diagnosic tests pending after this hospitalization.  Disease/illness education: yes  Home health/community services discussion/referrals: Patient has home health established at [facility] .   Establishment or re-establishment of referral orders for community resources: No other necessary community resources.   Discussion with other health care providers: No discussion with other health care providers necessary.     Patient has a recent hospitalization which is summarized above.  Communication (direct contact by telephone or electronic mail) with the patient and/or caregiver was documented within 2 business days of  discharge.    Medical decision making was based on a face-to-face visit scheduled within the indicated time frame.  Medication reconciliation and management was completed at this visit.      Reviewed labs 7/2022-vit d 18,         Had COVID 19 7/2020 requiring hospitalization     Pulm  Dr. 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 Placed on protonix 40mg daily. Had been on allopurinol for gout flare . Gi Dr. Trevizo did colonoscopy at [facility] 10/20/2021- 2 polyps removed and int hemorrhoids non bleeding seen. Black stools have cleared. Has stopped allopurinol and has had no further gout flares. EGD done 9/22/2021                          Found to have erythematous AVM in the stomach- ablated                          with gold probe                          - Normal esophagus.                          - 2 cm hiatal hernia.                          - A single non-bleeding angioectasia in the stomach.                          Treated with bipolar cautery.                          - Normal examined duodenum.                          - No specimens collected.             CT lung screening 1/2021 showed 1/2021-neg for lung cancer. Probable interstitial fibrosis (known and under care of Pulm).  A small calcified pleural plaque is seen posteriorly at the left midlung field.  Atherosclerosis including the coronary arteries.     Referred to hepatology NP Scroggs 2/2021 for elevated alk phos (liver source), enlarged liver on U/s . Serological workup was negative for Ranjit's, alpha-1 antitrypsin deficiency, hemochromatosis, autoimmune etiology, and viral hepatitis.         dexa and mammo 12/20-osteopenia, negative respectively. On fosomax > 5 years . Stopped 2020.     IGT/prediabetes- A1c 5.9. lipids at goal. On asa 81mg, lipitor      Here to f/u mood with h/o severe depression. has been consistently better  since increasing seroquel.  Has been more social, less down, mood is less labile and anxiety has been manageable.  Had some counseling LCSW 12/19. On paxil 50mg a day and seroquel 50mg 3 qhs         History:   Card Dr. Sahu CAD s/p stents, PVD on plavix and imdur     Psych hospitalized for depression in past 2003-04. Had ECT therapy.  Lives alone. Drives, does all ADLs by herself, cooks, cleans.  Xanax prn .  Lost  of 50 years.  Has good support from children and friends     Nephrology Dr. Morrow CKd stage 3, uric acid , PTH elevation, mild anemia     GI Dr. Trevizo -gerd, dysphagia, IBS with diarrhea. meds helping     Podiatry Dr. Malave- gout attack.  Uric acid 9.8 . Has been counseled on low purine diet  Objective:      Physical Exam  Vitals and nursing note reviewed.   Constitutional:       Appearance: She is well-developed.   HENT:      Right Ear: Tympanic membrane and ear canal normal.      Left Ear: Tympanic membrane and ear canal normal.      Nose: Mucosal edema and rhinorrhea present.      Right Sinus: No maxillary sinus tenderness or frontal sinus tenderness.      Left Sinus: No maxillary sinus tenderness or frontal sinus tenderness.      Mouth/Throat:      Pharynx: Posterior oropharyngeal erythema present. No oropharyngeal exudate.      Tonsils: No tonsillar abscesses.   Cardiovascular:      Rate and Rhythm: Normal rate and regular rhythm.      Heart sounds: Normal heart sounds.   Pulmonary:      Effort: Pulmonary effort is normal.      Breath sounds: Normal breath sounds.   Skin:     General: Skin is warm and dry.   Neurological:      Mental Status: She is alert and oriented to person, place, and time.       Assessment:       1. Acute gout, unspecified cause, unspecified site    2. Stage 3b chronic kidney disease    3. Chronic obstructive pulmonary disease, unspecified COPD type    4. Chronic diastolic heart failure    5. Vitamin D deficiency    6. Hyperlipidemia with target low density  lipoprotein (LDL) cholesterol less than 70 mg/dL    7. Benign hypertension    8. COVID-19    9. Hypophosphatemia    10. Hypomagnesemia    11. Impaired fasting blood sugar    12. Hyperparathyroidism    13. Essential hypertension    14. Non-recurrent acute serous otitis media of right ear    15. Seasonal allergic rhinitis due to pollen    16. Vertigo        Plan:       1. Acute gout, unspecified cause, unspecified site  Screen and treat as indicated:    - Uric Acid; Future    2. Stage 3b chronic kidney disease  Stable and chronic.  Will continue to monitor q3-6 months and control chronic conditions as optimally as possible to preserve function.      3. Chronic obstructive pulmonary disease, unspecified COPD type  Cont current regimen    4. Chronic diastolic heart failure  Cont current mgmt and card consult    5. Vitamin D deficiency  Screen and treat as indicated:    - Vitamin D; Future    6. Hyperlipidemia with target low density lipoprotein (LDL) cholesterol less than 70 mg/dL  Stable condition.  Continue current medications.  Will adjust based on lab findings or if condition changes.    - CBC Auto Differential; Future  - Comprehensive Metabolic Panel; Future  - Lipid Panel; Future    7. Benign hypertension  Increase to toprol XL 100mg a day    8. COVID-19  Resolving. Add PT  - SUBSEQUENT HOME HEALTH ORDERS    9. Hypophosphatemia  Screen and treat as indicated:    - PHOSPHORUS; Future    10. Hypomagnesemia  Screen and treat as indicated:    - Magnesium; Future    11. Impaired fasting blood sugar   Your blood sugar is borderline high.  This means you are at risk for developing type 2 diabetes mellitus.  To lessen your risk you should exercise regularly, avoid excess carbohydrates and work toward a body mass index of less than 25.      - Hemoglobin A1C; Future    12. Hyperparathyroidism  Screen and treat as indicated:    - PTH, intact; Future    13. Essential hypertension  increase  - metoprolol succinate (TOPROL-XL)  100 MG 24 hr tablet; Take 1 tablet (100 mg total) by mouth once daily.  Dispense: 90 tablet; Refill: 3    14. Non-recurrent acute serous otitis media of right ear  Recommend otc non-sedating antihistamine such as Loratadine and/or steroid nasal spray such as Flonase as directed and as needed.  Please return to clinic if symptoms persist after these interventions.    - fluticasone propionate (FLONASE) 50 mcg/actuation nasal spray; 1 spray (50 mcg total) by Each Nostril route once daily.  Dispense: 16 g; Refill: PRN    15. Seasonal allergic rhinitis due to pollen  Recommend otc non-sedating antihistamine such as Loratadine and/or steroid nasal spray such as Flonase as directed and as needed.  Please return to clinic if symptoms persist after these interventions.    - fluticasone propionate (FLONASE) 50 mcg/actuation nasal spray; 1 spray (50 mcg total) by Each Nostril route once daily.  Dispense: 16 g; Refill: PRN    16. Vertigo  Cont prn  - meclizine (ANTIVERT) 25 mg tablet; Take 1 tablet (25 mg total) by mouth 3 (three) times daily as needed for Dizziness.  Dispense: 30 tablet; Refill: PRN  - SUBSEQUENT HOME HEALTH ORDERS    17. Gait instability  Fall precautions  - SUBSEQUENT HOME HEALTH ORDERS        Time spent with patient: 20 minutes    Patient with be reevaluated in 3 months or sooner prn    Greater than 50% of this visit was spent counseling as described in above documentation:Yes

## 2023-01-11 ENCOUNTER — TELEPHONE (OUTPATIENT)
Dept: FAMILY MEDICINE | Facility: CLINIC | Age: 73
End: 2023-01-11
Payer: MEDICARE

## 2023-01-11 DIAGNOSIS — E83.42 HYPOMAGNESEMIA: Primary | ICD-10-CM

## 2023-01-11 LAB — PTH-INTACT SERPL-MCNC: 146.1 PG/ML (ref 9–77)

## 2023-01-11 NOTE — TELEPHONE ENCOUNTER
----- Message from Johanne Callejas MD sent at 1/10/2023  8:06 PM CST -----  Give verbal order to [facility] to start PT for gait instability

## 2023-01-11 NOTE — PT/OT/SLP DISCHARGE
Occupational Therapy Discharge Summary    Betty Bacon  MRN: [id number]   Principal Problem: SARS-CoV-2 positive      Patient Discharged from acute Occupational Therapy on 1/4/23.  Please refer to prior OT note dated 1/4/23 for functional status.    Assessment:      Goals partially met.    Objective:     GOALS:   Multidisciplinary Problems       Occupational Therapy Goals       Not on file              Multidisciplinary Problems (Resolved)          Problem: Occupational Therapy    Goal Priority Disciplines Outcome Interventions   Occupational Therapy Goal   (Resolved)     OT, PT/OT Met    Description: Goals to be met by: 2/3/23     Patient will increase functional independence with ADLs by performing:    UE Dressing with Supervision.  LE Dressing with Supervision.  Grooming while standing at sink with Supervision.  Toileting from toilet with Supervision for hygiene and clothing management.   Toilet transfer to toilet with Supervision.                         Reasons for Discontinuation of Therapy Services  Transfer to alternate level of care.      Plan:     Patient Discharged to: Home with Home Health Service    1/11/2023

## 2023-01-13 ENCOUNTER — TELEPHONE (OUTPATIENT)
Dept: FAMILY MEDICINE | Facility: CLINIC | Age: 73
End: 2023-01-13
Payer: MEDICARE

## 2023-01-13 NOTE — TELEPHONE ENCOUNTER
----- Message from Ruben Fountain sent at 1/13/2023 11:13 AM CST -----  Type:  Patient Returning Call    Who Called:  Patient  Who Left Message for Patient:  Inez  Does the patient know what this is regarding?:  Results  Best Call Back Number:  [phone]  Additional Information:

## 2023-01-17 NOTE — DISCHARGE SUMMARY
[facility]  Discharge Summary  Patient Name: Betty Bacon MRN: [id number]   Patient Class: IP- Inpatient  Length of Stay: 2   Admission Date: 12/20/2022 10:50 PM Attending Physician: Amandeep Ramirez MD   Primary Care Provider: Johanne Callejas MD Face-to-Face encounter date: 12/23/22   Chief Complaint: Shortness of Breath    Date of Discharge: 12/23/22  Discharge Disposition:Home or Self Care   Condition: Stable       Reason for Hospitalization     Active Hospital Problems    Diagnosis    *Acute pulmonary edema    Acute drug-induced gout of left foot    CAD (coronary artery disease)     NEFTALY RCA 7/2004  Stents x 2 RCA 10/2012      Aortic stenosis     moderate      COPD exacerbation         Brief History of Present Illness    Betty Bacon is a 72 y.o.  female who  has a past medical history of Acute coronary artery obstruction without MI (10/2012), Benign hypertension, COPD (chronic obstructive pulmonary disease), Coronary artery disease, Disorder of kidney and ureter, Hepatitis B core antibody positive (03/03/2021), History of electroconvulsive therapy, Hyperlipidemia LDL goal < 70, Left ankle sprain, Major depressive disorder, recurrent episode, severe, Positive AKIRA (antinuclear antibody) (03/03/2021), and PVD (peripheral vascular disease).. The patient presented to [facility] on 12/20/2022 with a primary complaint of Shortness of Breath  .     For the full HPI please refer to the History & Physical from this admission.    Hospital Course By Problem with Pertinent Findings     Acute pulmonary edema  -Inpatient admission for Acute Pulmonary Edema  -Cardiology consulted, appreciate recs  -less than 2 gm/day sodium diet, she is non-compliant and sent the dietician away today  - furosemide iv bid  -ok to dc from cardiology standpoint     Acute drug-induced gout of left foot  -colchicine 1.2 mg x1 and start bid while in hospital for early gout flare symptoms        CAD  "(coronary artery disease)   -continue statin     Aortic stenosis  Fu TTE  Followed closely outpt        COPD (chronic obstructive pulmonary disease)  Seems controlled currently  Duonebs q6 and prn         Patient was seen and examined on the date of discharge and determined to be suitable for discharge.    Physical Exam  /69 (BP Location: Right arm, Patient Position: Lying)   Pulse 87   Temp 97.4 °F (36.3 °C) (Oral)   Resp 18   Ht 5' 2" (1.575 m)   Wt 93.4 kg (205 lb 14.4 oz)   SpO2 96%   BMI 37.66 kg/m²   Vitals reviewed.    Constitutional: No distress.   HENT: Atraumatic.   Cardiovascular: Normal rate, regular rhythm.  +murmur. S1 S2.    Pulmonary/Chest: Effort normal. Clear to auscultation bilaterally. No wheezes.   Abdominal: Soft. Bowel sounds are normal. Exhibits no distension and no mass. No tenderness  Neurological: Alert.   Skin: Skin is warm and dry.     Following labs were Reviewed   No results for input(s): WBC, HGB, HCT, PLT, GLUCOSE, CALCIUM, ALBUMIN, PROT, NA, K, CO2, CL, BUN, CREATININE, ALKPHOS, ALT, AST, BILITOT in the last 24 hours.  No results found for: POCTGLUCOSE     All labs within the past 24 hours have been reviewed    Microbiology Results (last 7 days)       ** No results found for the last 168 hours. **          X-Ray Elbow Complete Left   Final Result      X-Ray Ankle Complete Left   Final Result      X-Ray Chest 1 View   Final Result      US Lower Extremity Veins Bilateral   Final Result          No results found for this or any previous visit.      Consultants and Procedures   Consultants:  Consults (From admission, onward)          Status Ordering Provider     IP consult to case management  Once        Provider:  (Not yet assigned)    Completed GENNA GUERRA     Inpatient consult to Registered Dietitian/Nutritionist  Once        Provider:  (Not yet assigned)    Completed GENNA GUERRA            Procedures:   * No surgery found *     Discharge " Information:   Diet:  Resume Cardiac diet/Diabetic Diet    Physical Activity:  Activity as tolerated    Instructions:  1. Take all medications as prescribed  2. Keep all follow-up appointments  3. Return to the hospital or call your primary care physicians if any worsening symptoms such as chest pain, shortness of breath, bleeding,  intractable pain, fever >101 occur.      Follow-Up Appointments:  Please call your primary care physician to schedule an appointment in 1 week time.     Follow-up Information       Johanne Callejas MD. Go on 1/5/2023.    Specialty: Family Medicine  Why: hospital follow up appointment scheduled for Jan. 5th at 3 PM  Contact information:  [address] [facility] [address]  [phone]               Maldonado Borjas MD. Go on 1/4/2023.    Specialty: Cardiology  Why: hospital follow up appointment scheduled for Jan. 4th at 1PM  Contact information:  [address] [place] [address]  [phone]               Pablo Medeiros MD Follow up.    Specialty: Pulmonary Disease  Why: Dr. Medeiros's office notified of hospital admission. Office to contact you with appointment follow up. If you do not hear from Dr. Medeiros's office by next Wed (28th) please call office for appointment date/time.  Contact information:  [address] [facility] [address]  [phone]                                 Discharge Medications:     Medication List        CHANGE how you take these medications      atorvastatin 40 MG tablet  Commonly known as: LIPITOR  TAKE 1 TABLET BY MOUTH EVERY DAY  What changed: when to take this     clopidogreL 75 mg tablet  Commonly known as: PLAVIX  TAKE 1 TABLET BY MOUTH EVERY DAY  What changed: when to take this     colchicine 0.6 mg tablet  Commonly known as: COLCRYS  Take 2 po at gout flare onset, may repeat 1 in an hour prn, then 1 po bid until pain resolves ,or n/V/D starts  What changed:   how much to take  how to take this  when to take this  reasons to take this      isosorbide mononitrate 60 MG 24 hr tablet  Commonly known as: IMDUR  TAKE 1 TABLET BY MOUTH ONCE DAILY  What changed: when to take this     paroxetine 40 MG tablet  Commonly known as: PAXIL  TAKE 1 TABLET BY MOUTH EVERY DAY  What changed:   when to take this  additional instructions            CONTINUE taking these medications      albuterol sulfate 90 mcg/actuation inhaler  Commonly known as: PROAIR RESPICLICK  Inhale 2 puffs into the lungs every 4 to 6 hours as needed.     ALPRAZolam 1 MG tablet  Commonly known as: XANAX  Take 1 tablet (1 mg total) by mouth 2 (two) times daily as needed for Anxiety.     aspirin 81 mg Tab     colestipoL 1 gram Tab  Commonly known as: COLESTID  Take 1 tablet (1 g total) by mouth 2 (two) times daily as needed (diarrhea).     esomeprazole 40 MG capsule  Commonly known as: NEXIUM  TAKE 1 CAPSULE BY MOUTH BEFORE BREAKFAST.     furosemide 20 MG tablet  Commonly known as: LASIX     QUEtiapine 100 MG Tab  Commonly known as: SEROQUEL     TRELEGY ELLIPTA 200-62.5-25 mcg inhaler  Generic drug: fluticasone-umeclidin-vilanter  INHALE 1 PUFF INTO THE LUNGS ONCE DAILY.     triamterene-hydrochlorothiazide 37.5-25 mg 37.5-25 mg per capsule  Commonly known as: DYAZIDE  TAKE 1 CAPSULE BY MOUTH ONCE DAILY            ASK your doctor about these medications      ergocalciferol 50,000 unit Cap  Commonly known as: ERGOCALCIFEROL  Take 1 capsule (50,000 Units total) by mouth every 7 days.     predniSONE 20 MG tablet  Commonly known as: DELTASONE  Take 2 tablets (40 mg total) by mouth once daily. for 5 days  Ask about: Should I take this medication?               Where to Get Your Medications        These medications were sent to [facility]/pharmacy #[id number] - [place], [place] - [address] AT [facility]  [address], [place] [address]      Phone: [phone]   predniSONE 20 MG tablet           I spent 31 minutes preparing the discharge for this patient including reviewing records from  previous encounters, preparation of discharge summary, assessing and final examination of the patient, discharge medicine reconciliation, discussing plan of care, follow up and education and prescriptions.       Amandeep Ramirez  [facility]ist

## 2023-01-18 ENCOUNTER — TELEPHONE (OUTPATIENT)
Dept: FAMILY MEDICINE | Facility: CLINIC | Age: 73
End: 2023-01-18
Payer: MEDICARE

## 2023-01-18 NOTE — TELEPHONE ENCOUNTER
----- Message from Bria Sanabria sent at 1/18/2023  2:08 PM CST -----  Type:  Patient Returning Call    Who Called:  pt  Who Left Message for Patient:  unknown  Does the patient know what this is regarding?:  yes  Best Call Back Number:  [phone] (home)     Additional Information:  please call and advise--thank you

## 2023-01-19 DIAGNOSIS — R06.02 SHORTNESS OF BREATH: Primary | ICD-10-CM

## 2023-01-19 NOTE — PHYSICIAN QUERY
"PT Name: Betty Bacon  MR #: [id number]    DOCUMENTATION CLARIFICATION      CDS/: Mellisa Jauregui               Contact information: [phone]    This form is a permanent document in the medical record.    Query Date: January 19, 2023    By submitting this query, we are merely seeking further clarification of documentation. Please utilize your independent clinical judgment when addressing the question(s) below.    The medical record contains the following:   Indicators   Supporting Clinical Findings Location in Medical Record    "Pulmonary Edema" Acute Pulmonary Edema H&P, consult, PN, DS    Wheezing, Productive cough, SOB, VILLA, Use of accessory muscles Cough, SOB, Orthopnea ER Phys Note    Radiology Findings Mild cardiomegaly with no acute pulmonary process Chest X-ray - 12/22    CHF documented History of  HFpEF (55% 2022),  H&P,  PN, DS    Respiratory condition COPD H&P, Consult, PN, DS    RR    O2 sat WNL Vitals    Treatment: IV Furosemide 40mg  MAR 12/21 - 12/23    Supplemental O2: 4L NC H&P, Resp therapy notes    Oxygen dependence home O2 is 4LNC; patient can likely decrease O2 to 2L IM PN 12/22       Provider, please specify diagnosis or diagnoses associated with above clinical findings.    [  x  ] Acute pulmonary edema, due to Acute on Chronic Diastolic (HFpEF) CHF        [    ] Acute pulmonary edema, non-cardiac cause (please specify causative condition):                                                                                                "

## 2023-01-20 ENCOUNTER — OFFICE VISIT (OUTPATIENT)
Dept: CARDIOLOGY | Facility: CLINIC | Age: 73
End: 2023-01-20
Payer: MEDICARE

## 2023-01-20 VITALS
SYSTOLIC BLOOD PRESSURE: 141 MMHG | BODY MASS INDEX: 36.76 KG/M2 | DIASTOLIC BLOOD PRESSURE: 53 MMHG | HEIGHT: 62 IN | WEIGHT: 199.75 LBS | HEART RATE: 89 BPM

## 2023-01-20 DIAGNOSIS — I73.9 PAD (PERIPHERAL ARTERY DISEASE): ICD-10-CM

## 2023-01-20 DIAGNOSIS — I50.32 CHRONIC DIASTOLIC HEART FAILURE: ICD-10-CM

## 2023-01-20 DIAGNOSIS — I25.10 CORONARY ARTERY DISEASE INVOLVING NATIVE CORONARY ARTERY OF NATIVE HEART WITHOUT ANGINA PECTORIS: ICD-10-CM

## 2023-01-20 DIAGNOSIS — I11.9 HYPERTENSIVE LEFT VENTRICULAR HYPERTROPHY, WITHOUT HEART FAILURE: Primary | ICD-10-CM

## 2023-01-20 DIAGNOSIS — E78.5 HYPERLIPIDEMIA WITH TARGET LOW DENSITY LIPOPROTEIN (LDL) CHOLESTEROL LESS THAN 70 MG/DL: ICD-10-CM

## 2023-01-20 DIAGNOSIS — I35.0 AORTIC VALVE STENOSIS, ETIOLOGY OF CARDIAC VALVE DISEASE UNSPECIFIED: ICD-10-CM

## 2023-01-20 PROCEDURE — 99214 PR OFFICE/OUTPT VISIT, EST, LEVL IV, 30-39 MIN: ICD-10-PCS | Mod: S$PBB,,, | Performed by: INTERNAL MEDICINE

## 2023-01-20 PROCEDURE — 99213 OFFICE O/P EST LOW 20 MIN: CPT | Mod: PBBFAC,PO | Performed by: INTERNAL MEDICINE

## 2023-01-20 PROCEDURE — 99999 PR PBB SHADOW E&M-EST. PATIENT-LVL III: ICD-10-PCS | Mod: PBBFAC,,, | Performed by: INTERNAL MEDICINE

## 2023-01-20 PROCEDURE — 99999 PR PBB SHADOW E&M-EST. PATIENT-LVL III: CPT | Mod: PBBFAC,,, | Performed by: INTERNAL MEDICINE

## 2023-01-20 PROCEDURE — 99214 OFFICE O/P EST MOD 30 MIN: CPT | Mod: S$PBB,,, | Performed by: INTERNAL MEDICINE

## 2023-01-20 NOTE — PROGRESS NOTES
Subjective:    Patient ID:  Betty Bacon is a 72 y.o. female who presents for follow-up of Coronary Artery Disease (6 wk f/u )      HPI  She comes with no complaints, no chest pain, no worsening shortness of breath  Main complaint is nervousness and shaking  No syncope  Blood pressure overall okay    Review of Systems   Constitutional: Negative for decreased appetite, malaise/fatigue, weight gain and weight loss.   Cardiovascular:  Negative for chest pain, dyspnea on exertion, leg swelling, palpitations and syncope.   Respiratory:  Negative for cough and shortness of breath.    Gastrointestinal: Negative.    Neurological:  Negative for weakness.   All other systems reviewed and are negative.     Objective:      Physical Exam  Vitals and nursing note reviewed.   Constitutional:       Appearance: Normal appearance. She is well-developed.   HENT:      Head: Normocephalic.   Eyes:      Pupils: Pupils are equal, round, and reactive to light.   Neck:      Thyroid: No thyromegaly.      Vascular: No carotid bruit or JVD.   Cardiovascular:      Rate and Rhythm: Normal rate and regular rhythm.      Chest Wall: PMI is not displaced.      Pulses: Normal pulses and intact distal pulses.      Heart sounds: Murmur heard.   Harsh midsystolic murmur is present with a grade of 3/6 at the upper right sternal border radiating to the neck.     No gallop.   Pulmonary:      Effort: Pulmonary effort is normal.      Breath sounds: Normal breath sounds.   Abdominal:      Palpations: Abdomen is soft. There is no mass.      Tenderness: There is no abdominal tenderness.   Musculoskeletal:         General: Normal range of motion.      Cervical back: Normal range of motion and neck supple.   Skin:     General: Skin is warm.   Neurological:      Mental Status: She is alert and oriented to person, place, and time.      Sensory: No sensory deficit.      Deep Tendon Reflexes: Reflexes are normal and symmetric.         Assessment:       1.  Hypertensive left ventricular hypertrophy, without heart failure    2. Hyperlipidemia LDL goal < 70    3. Chronic diastolic heart failure    4. Coronary artery disease involving native coronary artery of native heart without angina pectoris    5. PAD (peripheral artery disease)    6. Aortic valve stenosis, etiology of cardiac valve disease unspecified         Plan:     Continue all cardiac medications  Regular exercise program  Weight loss  Two month follow-up with echocardiogram

## 2023-01-25 ENCOUNTER — NURSE TRIAGE (OUTPATIENT)
Dept: ADMINISTRATIVE | Facility: CLINIC | Age: 73
End: 2023-01-25
Payer: MEDICARE

## 2023-01-25 ENCOUNTER — HOSPITAL ENCOUNTER (INPATIENT)
Facility: HOSPITAL | Age: 73
LOS: 6 days | Discharge: HOME HEALTH | DRG: 193 | End: 2023-01-31
Attending: EMERGENCY MEDICINE | Admitting: FAMILY MEDICINE
Payer: MEDICARE

## 2023-01-25 DIAGNOSIS — R07.9 CHEST PAIN: ICD-10-CM

## 2023-01-25 DIAGNOSIS — F41.9 ANXIETY: ICD-10-CM

## 2023-01-25 DIAGNOSIS — J18.9 ATYPICAL PNEUMONIA: ICD-10-CM

## 2023-01-25 DIAGNOSIS — R06.00 DYSPNEA, UNSPECIFIED TYPE: Primary | ICD-10-CM

## 2023-01-25 DIAGNOSIS — R06.00 DYSPNEA: ICD-10-CM

## 2023-01-25 DIAGNOSIS — I50.9 CONGESTIVE HEART FAILURE, UNSPECIFIED HF CHRONICITY, UNSPECIFIED HEART FAILURE TYPE: ICD-10-CM

## 2023-01-25 DIAGNOSIS — J18.9 COMMUNITY ACQUIRED PNEUMONIA, UNSPECIFIED LATERALITY: ICD-10-CM

## 2023-01-25 DIAGNOSIS — J96.11 CHRONIC RESPIRATORY FAILURE WITH HYPOXIA: Chronic | ICD-10-CM

## 2023-01-25 DIAGNOSIS — J18.9 CAP (COMMUNITY ACQUIRED PNEUMONIA): ICD-10-CM

## 2023-01-25 DIAGNOSIS — I10 ESSENTIAL HYPERTENSION: ICD-10-CM

## 2023-01-25 LAB
ABO + RH BLD: ABNORMAL
ALBUMIN SERPL BCP-MCNC: 2.4 G/DL (ref 3.5–5.2)
ALP SERPL-CCNC: 68 U/L (ref 55–135)
ALT SERPL W/O P-5'-P-CCNC: 11 U/L (ref 10–44)
ANION GAP SERPL CALC-SCNC: 13 MMOL/L (ref 8–16)
APTT BLDCRRT: 26.8 SEC (ref 21–32)
AST SERPL-CCNC: 12 U/L (ref 10–40)
BASOPHILS # BLD AUTO: 0.02 K/UL (ref 0–0.2)
BASOPHILS NFR BLD: 0.3 % (ref 0–1.9)
BILIRUB SERPL-MCNC: 0.8 MG/DL (ref 0.1–1)
BLD GP AB SCN CELLS X3 SERPL QL: ABNORMAL
BLOOD GROUP ANTIBODIES SERPL: NORMAL
BNP SERPL-MCNC: 482 PG/ML (ref 0–99)
BUN SERPL-MCNC: 20 MG/DL (ref 8–23)
CALCIUM SERPL-MCNC: 7.9 MG/DL (ref 8.7–10.5)
CHLORIDE SERPL-SCNC: 103 MMOL/L (ref 95–110)
CO2 SERPL-SCNC: 20 MMOL/L (ref 23–29)
CREAT SERPL-MCNC: 1.2 MG/DL (ref 0.5–1.4)
DIFFERENTIAL METHOD: ABNORMAL
EOSINOPHIL # BLD AUTO: 0.1 K/UL (ref 0–0.5)
EOSINOPHIL NFR BLD: 1.3 % (ref 0–8)
ERYTHROCYTE [DISTWIDTH] IN BLOOD BY AUTOMATED COUNT: 18.1 % (ref 11.5–14.5)
EST. GFR  (NO RACE VARIABLE): 47.5 ML/MIN/1.73 M^2
GLUCOSE SERPL-MCNC: 202 MG/DL (ref 70–110)
HCT VFR BLD AUTO: 28.7 % (ref 37–48.5)
HGB BLD-MCNC: 8.7 G/DL (ref 12–16)
IMM GRANULOCYTES # BLD AUTO: 0.03 K/UL (ref 0–0.04)
IMM GRANULOCYTES NFR BLD AUTO: 0.5 % (ref 0–0.5)
INFLUENZA A, MOLECULAR: NEGATIVE
INFLUENZA B, MOLECULAR: NEGATIVE
INR PPP: 1.1 (ref 0.8–1.2)
LACTATE SERPL-SCNC: 0.7 MMOL/L (ref 0.5–1.9)
LYMPHOCYTES # BLD AUTO: 0.2 K/UL (ref 1–4.8)
LYMPHOCYTES NFR BLD: 3.4 % (ref 18–48)
MCH RBC QN AUTO: 28.6 PG (ref 27–31)
MCHC RBC AUTO-ENTMCNC: 30.3 G/DL (ref 32–36)
MCV RBC AUTO: 94 FL (ref 82–98)
MONOCYTES # BLD AUTO: 0.2 K/UL (ref 0.3–1)
MONOCYTES NFR BLD: 2.8 % (ref 4–15)
NEUTROPHILS # BLD AUTO: 5.6 K/UL (ref 1.8–7.7)
NEUTROPHILS NFR BLD: 91.7 % (ref 38–73)
NRBC BLD-RTO: 0 /100 WBC
OB PNL STL: NEGATIVE
PLATELET # BLD AUTO: 189 K/UL (ref 150–450)
PMV BLD AUTO: 10.3 FL (ref 9.2–12.9)
POTASSIUM SERPL-SCNC: 4.3 MMOL/L (ref 3.5–5.1)
PROCALCITONIN SERPL IA-MCNC: 0.36 NG/ML (ref 0–0.5)
PROT SERPL-MCNC: 6 G/DL (ref 6–8.4)
PROTHROMBIN TIME: 11.5 SEC (ref 9–12.5)
RBC # BLD AUTO: 3.04 M/UL (ref 4–5.4)
SODIUM SERPL-SCNC: 136 MMOL/L (ref 136–145)
SPECIMEN SOURCE: NORMAL
TROPONIN I SERPL HS-MCNC: 46.3 PG/ML (ref 0–14.9)
TROPONIN I SERPL HS-MCNC: 59.4 PG/ML (ref 0–14.9)
WBC # BLD AUTO: 6.09 K/UL (ref 3.9–12.7)

## 2023-01-25 PROCEDURE — 85730 THROMBOPLASTIN TIME PARTIAL: CPT | Performed by: EMERGENCY MEDICINE

## 2023-01-25 PROCEDURE — 63600175 PHARM REV CODE 636 W HCPCS: Mod: TB,JG | Performed by: EMERGENCY MEDICINE

## 2023-01-25 PROCEDURE — 25500020 PHARM REV CODE 255: Performed by: EMERGENCY MEDICINE

## 2023-01-25 PROCEDURE — 84484 ASSAY OF TROPONIN QUANT: CPT | Mod: 91 | Performed by: FAMILY MEDICINE

## 2023-01-25 PROCEDURE — 94799 UNLISTED PULMONARY SVC/PX: CPT

## 2023-01-25 PROCEDURE — 96365 THER/PROPH/DIAG IV INF INIT: CPT

## 2023-01-25 PROCEDURE — 27000221 HC OXYGEN, UP TO 24 HOURS

## 2023-01-25 PROCEDURE — 96366 THER/PROPH/DIAG IV INF ADDON: CPT

## 2023-01-25 PROCEDURE — 99900035 HC TECH TIME PER 15 MIN (STAT)

## 2023-01-25 PROCEDURE — 87633 RESP VIRUS 12-25 TARGETS: CPT | Performed by: FAMILY MEDICINE

## 2023-01-25 PROCEDURE — 12000002 HC ACUTE/MED SURGE SEMI-PRIVATE ROOM

## 2023-01-25 PROCEDURE — 99285 EMERGENCY DEPT VISIT HI MDM: CPT | Mod: 25

## 2023-01-25 PROCEDURE — 25000003 PHARM REV CODE 250: Performed by: FAMILY MEDICINE

## 2023-01-25 PROCEDURE — 25000003 PHARM REV CODE 250: Performed by: EMERGENCY MEDICINE

## 2023-01-25 PROCEDURE — 86900 BLOOD TYPING SEROLOGIC ABO: CPT | Performed by: EMERGENCY MEDICINE

## 2023-01-25 PROCEDURE — 87798 DETECT AGENT NOS DNA AMP: CPT | Mod: 59 | Performed by: FAMILY MEDICINE

## 2023-01-25 PROCEDURE — 87502 INFLUENZA DNA AMP PROBE: CPT | Performed by: EMERGENCY MEDICINE

## 2023-01-25 PROCEDURE — 63600175 PHARM REV CODE 636 W HCPCS: Performed by: FAMILY MEDICINE

## 2023-01-25 PROCEDURE — 85610 PROTHROMBIN TIME: CPT | Performed by: EMERGENCY MEDICINE

## 2023-01-25 PROCEDURE — 96375 TX/PRO/DX INJ NEW DRUG ADDON: CPT

## 2023-01-25 PROCEDURE — 36415 COLL VENOUS BLD VENIPUNCTURE: CPT | Performed by: FAMILY MEDICINE

## 2023-01-25 PROCEDURE — 87040 BLOOD CULTURE FOR BACTERIA: CPT | Mod: 59 | Performed by: EMERGENCY MEDICINE

## 2023-01-25 PROCEDURE — 93010 EKG 12-LEAD: ICD-10-PCS | Mod: ,,, | Performed by: INTERNAL MEDICINE

## 2023-01-25 PROCEDURE — 84145 PROCALCITONIN (PCT): CPT | Performed by: EMERGENCY MEDICINE

## 2023-01-25 PROCEDURE — 93010 ELECTROCARDIOGRAM REPORT: CPT | Mod: ,,, | Performed by: INTERNAL MEDICINE

## 2023-01-25 PROCEDURE — 85025 COMPLETE CBC W/AUTO DIFF WBC: CPT | Performed by: EMERGENCY MEDICINE

## 2023-01-25 PROCEDURE — 25000242 PHARM REV CODE 250 ALT 637 W/ HCPCS: Performed by: EMERGENCY MEDICINE

## 2023-01-25 PROCEDURE — 86922 COMPATIBILITY TEST ANTIGLOB: CPT | Performed by: STUDENT IN AN ORGANIZED HEALTH CARE EDUCATION/TRAINING PROGRAM

## 2023-01-25 PROCEDURE — 82272 OCCULT BLD FECES 1-3 TESTS: CPT | Performed by: EMERGENCY MEDICINE

## 2023-01-25 PROCEDURE — 94640 AIRWAY INHALATION TREATMENT: CPT

## 2023-01-25 PROCEDURE — 83880 ASSAY OF NATRIURETIC PEPTIDE: CPT | Performed by: EMERGENCY MEDICINE

## 2023-01-25 PROCEDURE — 80053 COMPREHEN METABOLIC PANEL: CPT | Performed by: EMERGENCY MEDICINE

## 2023-01-25 PROCEDURE — 25000242 PHARM REV CODE 250 ALT 637 W/ HCPCS: Performed by: FAMILY MEDICINE

## 2023-01-25 PROCEDURE — 85025 COMPLETE CBC W/AUTO DIFF WBC: CPT | Mod: 91 | Performed by: FAMILY MEDICINE

## 2023-01-25 PROCEDURE — 83605 ASSAY OF LACTIC ACID: CPT | Performed by: EMERGENCY MEDICINE

## 2023-01-25 PROCEDURE — 83036 HEMOGLOBIN GLYCOSYLATED A1C: CPT | Performed by: FAMILY MEDICINE

## 2023-01-25 PROCEDURE — 84484 ASSAY OF TROPONIN QUANT: CPT | Performed by: EMERGENCY MEDICINE

## 2023-01-25 PROCEDURE — 86870 RBC ANTIBODY IDENTIFICATION: CPT | Performed by: EMERGENCY MEDICINE

## 2023-01-25 RX ORDER — ACETAMINOPHEN 500 MG
1000 TABLET ORAL
Status: COMPLETED | OUTPATIENT
Start: 2023-01-25 | End: 2023-01-25

## 2023-01-25 RX ORDER — GLUCAGON 1 MG
1 KIT INJECTION
Status: DISCONTINUED | OUTPATIENT
Start: 2023-01-25 | End: 2023-01-31 | Stop reason: HOSPADM

## 2023-01-25 RX ORDER — PAROXETINE HYDROCHLORIDE 20 MG/1
40 TABLET, FILM COATED ORAL DAILY
Status: DISCONTINUED | OUTPATIENT
Start: 2023-01-26 | End: 2023-01-26

## 2023-01-25 RX ORDER — ALPRAZOLAM 0.25 MG/1
0.25 TABLET ORAL 2 TIMES DAILY PRN
Status: DISCONTINUED | OUTPATIENT
Start: 2023-01-25 | End: 2023-01-31 | Stop reason: HOSPADM

## 2023-01-25 RX ORDER — VANCOMYCIN HCL IN 5 % DEXTROSE 1G/250ML
1000 PLASTIC BAG, INJECTION (ML) INTRAVENOUS ONCE
Status: COMPLETED | OUTPATIENT
Start: 2023-01-25 | End: 2023-01-25

## 2023-01-25 RX ORDER — ACETAMINOPHEN 325 MG/1
325 TABLET ORAL EVERY 6 HOURS PRN
COMMUNITY

## 2023-01-25 RX ORDER — TALC
6 POWDER (GRAM) TOPICAL NIGHTLY PRN
Status: DISCONTINUED | OUTPATIENT
Start: 2023-01-25 | End: 2023-01-31 | Stop reason: HOSPADM

## 2023-01-25 RX ORDER — ARFORMOTEROL TARTRATE 15 UG/2ML
15 SOLUTION RESPIRATORY (INHALATION) 2 TIMES DAILY
Status: DISCONTINUED | OUTPATIENT
Start: 2023-01-26 | End: 2023-01-31 | Stop reason: HOSPADM

## 2023-01-25 RX ORDER — HYDROCODONE BITARTRATE AND ACETAMINOPHEN 5; 325 MG/1; MG/1
1 TABLET ORAL EVERY 4 HOURS PRN
Status: DISCONTINUED | OUTPATIENT
Start: 2023-01-25 | End: 2023-01-31 | Stop reason: HOSPADM

## 2023-01-25 RX ORDER — IBUPROFEN 200 MG
16 TABLET ORAL
Status: DISCONTINUED | OUTPATIENT
Start: 2023-01-25 | End: 2023-01-31 | Stop reason: HOSPADM

## 2023-01-25 RX ORDER — SODIUM CHLORIDE 0.9 % (FLUSH) 0.9 %
10 SYRINGE (ML) INJECTION
Status: DISCONTINUED | OUTPATIENT
Start: 2023-01-25 | End: 2023-01-31 | Stop reason: HOSPADM

## 2023-01-25 RX ORDER — ATORVASTATIN CALCIUM 40 MG/1
40 TABLET, FILM COATED ORAL DAILY
Status: DISCONTINUED | OUTPATIENT
Start: 2023-01-25 | End: 2023-01-31 | Stop reason: HOSPADM

## 2023-01-25 RX ORDER — IPRATROPIUM BROMIDE AND ALBUTEROL SULFATE 2.5; .5 MG/3ML; MG/3ML
3 SOLUTION RESPIRATORY (INHALATION) EVERY 4 HOURS PRN
Status: DISCONTINUED | OUTPATIENT
Start: 2023-01-25 | End: 2023-01-31 | Stop reason: HOSPADM

## 2023-01-25 RX ORDER — ONDANSETRON 2 MG/ML
4 INJECTION INTRAMUSCULAR; INTRAVENOUS EVERY 6 HOURS PRN
Status: DISCONTINUED | OUTPATIENT
Start: 2023-01-25 | End: 2023-01-31 | Stop reason: HOSPADM

## 2023-01-25 RX ORDER — IPRATROPIUM BROMIDE 0.5 MG/2.5ML
0.5 SOLUTION RESPIRATORY (INHALATION) EVERY 6 HOURS
Status: DISCONTINUED | OUTPATIENT
Start: 2023-01-26 | End: 2023-01-25

## 2023-01-25 RX ORDER — METOPROLOL SUCCINATE 50 MG/1
50 TABLET, EXTENDED RELEASE ORAL DAILY
Status: DISCONTINUED | OUTPATIENT
Start: 2023-01-25 | End: 2023-01-31 | Stop reason: HOSPADM

## 2023-01-25 RX ORDER — ACETAMINOPHEN 325 MG/1
650 TABLET ORAL EVERY 8 HOURS PRN
Status: DISCONTINUED | OUTPATIENT
Start: 2023-01-25 | End: 2023-01-31 | Stop reason: HOSPADM

## 2023-01-25 RX ORDER — IPRATROPIUM BROMIDE AND ALBUTEROL SULFATE 2.5; .5 MG/3ML; MG/3ML
3 SOLUTION RESPIRATORY (INHALATION) EVERY 4 HOURS
Status: DISCONTINUED | OUTPATIENT
Start: 2023-01-25 | End: 2023-01-31 | Stop reason: HOSPADM

## 2023-01-25 RX ORDER — BUDESONIDE 0.5 MG/2ML
1 INHALANT ORAL EVERY 12 HOURS
Status: DISCONTINUED | OUTPATIENT
Start: 2023-01-26 | End: 2023-01-31 | Stop reason: HOSPADM

## 2023-01-25 RX ORDER — HYDRALAZINE HYDROCHLORIDE 20 MG/ML
5 INJECTION INTRAMUSCULAR; INTRAVENOUS EVERY 4 HOURS PRN
Status: DISCONTINUED | OUTPATIENT
Start: 2023-01-25 | End: 2023-01-31 | Stop reason: HOSPADM

## 2023-01-25 RX ORDER — FLUTICASONE PROPIONATE 50 MCG
1 SPRAY, SUSPENSION (ML) NASAL DAILY
Status: DISCONTINUED | OUTPATIENT
Start: 2023-01-26 | End: 2023-01-31 | Stop reason: HOSPADM

## 2023-01-25 RX ORDER — QUETIAPINE FUMARATE 100 MG/1
100 TABLET, FILM COATED ORAL NIGHTLY
Status: DISCONTINUED | OUTPATIENT
Start: 2023-01-25 | End: 2023-01-31 | Stop reason: HOSPADM

## 2023-01-25 RX ORDER — HYDRALAZINE HYDROCHLORIDE 20 MG/ML
10 INJECTION INTRAMUSCULAR; INTRAVENOUS EVERY 4 HOURS PRN
Status: DISCONTINUED | OUTPATIENT
Start: 2023-01-25 | End: 2023-01-31 | Stop reason: HOSPADM

## 2023-01-25 RX ORDER — POLYETHYLENE GLYCOL 3350 17 G/17G
17 POWDER, FOR SOLUTION ORAL 2 TIMES DAILY PRN
Status: DISCONTINUED | OUTPATIENT
Start: 2023-01-25 | End: 2023-01-31 | Stop reason: HOSPADM

## 2023-01-25 RX ORDER — FUROSEMIDE 20 MG/1
20 TABLET ORAL DAILY
Status: DISCONTINUED | OUTPATIENT
Start: 2023-01-26 | End: 2023-01-26

## 2023-01-25 RX ORDER — CLOPIDOGREL BISULFATE 75 MG/1
75 TABLET ORAL DAILY
Status: DISCONTINUED | OUTPATIENT
Start: 2023-01-26 | End: 2023-01-26

## 2023-01-25 RX ORDER — AMOXICILLIN 250 MG
1 CAPSULE ORAL 2 TIMES DAILY PRN
Status: DISCONTINUED | OUTPATIENT
Start: 2023-01-25 | End: 2023-01-31 | Stop reason: HOSPADM

## 2023-01-25 RX ORDER — PANTOPRAZOLE SODIUM 40 MG/1
40 TABLET, DELAYED RELEASE ORAL
Status: DISCONTINUED | OUTPATIENT
Start: 2023-01-26 | End: 2023-01-31 | Stop reason: HOSPADM

## 2023-01-25 RX ORDER — ASPIRIN 81 MG/1
81 TABLET ORAL DAILY
Status: DISCONTINUED | OUTPATIENT
Start: 2023-01-26 | End: 2023-01-31 | Stop reason: HOSPADM

## 2023-01-25 RX ORDER — MECLIZINE HCL 12.5 MG 12.5 MG/1
25 TABLET ORAL 3 TIMES DAILY PRN
Status: DISCONTINUED | OUTPATIENT
Start: 2023-01-25 | End: 2023-01-31 | Stop reason: HOSPADM

## 2023-01-25 RX ORDER — IPRATROPIUM BROMIDE AND ALBUTEROL SULFATE 2.5; .5 MG/3ML; MG/3ML
3 SOLUTION RESPIRATORY (INHALATION)
Status: COMPLETED | OUTPATIENT
Start: 2023-01-25 | End: 2023-01-25

## 2023-01-25 RX ORDER — SIMETHICONE 80 MG
1 TABLET,CHEWABLE ORAL 4 TIMES DAILY PRN
Status: DISCONTINUED | OUTPATIENT
Start: 2023-01-25 | End: 2023-01-31 | Stop reason: HOSPADM

## 2023-01-25 RX ORDER — MORPHINE SULFATE 4 MG/ML
4 INJECTION, SOLUTION INTRAMUSCULAR; INTRAVENOUS EVERY 4 HOURS PRN
Status: DISCONTINUED | OUTPATIENT
Start: 2023-01-25 | End: 2023-01-26

## 2023-01-25 RX ORDER — NALOXONE HCL 0.4 MG/ML
0.02 VIAL (ML) INJECTION
Status: DISCONTINUED | OUTPATIENT
Start: 2023-01-25 | End: 2023-01-31 | Stop reason: HOSPADM

## 2023-01-25 RX ORDER — IBUPROFEN 200 MG
24 TABLET ORAL
Status: DISCONTINUED | OUTPATIENT
Start: 2023-01-25 | End: 2023-01-31 | Stop reason: HOSPADM

## 2023-01-25 RX ADMIN — ATORVASTATIN CALCIUM 40 MG: 40 TABLET, FILM COATED ORAL at 09:01

## 2023-01-25 RX ADMIN — IOHEXOL 100 ML: 350 INJECTION, SOLUTION INTRAVENOUS at 03:01

## 2023-01-25 RX ADMIN — PIPERACILLIN SODIUM AND TAZOBACTAM SODIUM 3.38 G: 3; .375 INJECTION, POWDER, LYOPHILIZED, FOR SOLUTION INTRAVENOUS at 11:01

## 2023-01-25 RX ADMIN — SODIUM CHLORIDE, SODIUM LACTATE, POTASSIUM CHLORIDE, AND CALCIUM CHLORIDE 500 ML: .6; .31; .03; .02 INJECTION, SOLUTION INTRAVENOUS at 04:01

## 2023-01-25 RX ADMIN — METHYLPREDNISOLONE SODIUM SUCCINATE 80 MG: 40 INJECTION, POWDER, FOR SOLUTION INTRAMUSCULAR; INTRAVENOUS at 09:01

## 2023-01-25 RX ADMIN — PIPERACILLIN SODIUM AND TAZOBACTAM SODIUM 4.5 G: 4; .5 INJECTION, POWDER, LYOPHILIZED, FOR SOLUTION INTRAVENOUS at 03:01

## 2023-01-25 RX ADMIN — QUETIAPINE 100 MG: 100 TABLET ORAL at 09:01

## 2023-01-25 RX ADMIN — IPRATROPIUM BROMIDE AND ALBUTEROL SULFATE 3 ML: 2.5; .5 SOLUTION RESPIRATORY (INHALATION) at 02:01

## 2023-01-25 RX ADMIN — VANCOMYCIN HYDROCHLORIDE 500 MG: 500 INJECTION, POWDER, LYOPHILIZED, FOR SOLUTION INTRAVENOUS at 04:01

## 2023-01-25 RX ADMIN — HYDRALAZINE HYDROCHLORIDE 10 MG: 20 INJECTION INTRAMUSCULAR; INTRAVENOUS at 06:01

## 2023-01-25 RX ADMIN — ALPRAZOLAM 0.25 MG: 0.25 TABLET ORAL at 09:01

## 2023-01-25 RX ADMIN — ACETAMINOPHEN 1000 MG: 500 TABLET, FILM COATED ORAL at 03:01

## 2023-01-25 RX ADMIN — IPRATROPIUM BROMIDE AND ALBUTEROL SULFATE 3 ML: 2.5; .5 SOLUTION RESPIRATORY (INHALATION) at 06:01

## 2023-01-25 RX ADMIN — VANCOMYCIN HYDROCHLORIDE 1000 MG: 1 INJECTION, POWDER, LYOPHILIZED, FOR SOLUTION INTRAVENOUS at 04:01

## 2023-01-25 NOTE — TELEPHONE ENCOUNTER
"Per , LoreeBetty' daughter on the line states Michel has , Pulse Ox 89%. SOB. Loree calling from [place] states Hernández has appt today but condition is worsening now. COPD pt. SOB. Daughters states pulse ox is in the 30's Triager asks caller if she is sure it says 30's and she states "yes." Daughter states Michel would not let her call 911 as she wanted to hear it from the nurse first. Advised per triage protocol to call  now. V/u.   Reason for Disposition   SEVERE difficulty breathing (e.g., struggling for each breath, speaks in single words, pulse > 120)    Protocols used: Breathing Difficulty-A-OH    "

## 2023-01-25 NOTE — ED PROVIDER NOTES
Encounter Date: 1/25/2023       History     Chief Complaint   Patient presents with    Shortness of Breath     Since today - hx of COPD      72-year-old female with history of COPD on home oxygen, CAD, CHF, HTN, and recent hospitalization for COVID earlier this month, presenting for evaluation of general malaise for the past 3 days, worse this morning as she is feeling more short of breath than usual, unable to walk from room to room in her home without becoming short of breath.  She does report a dry cough, no sputum production and a chest tightness only when coughing.  She did not have any documented fever but has had chills.  No history of VTE and she is not on anticoagulation therapy. She also reports an episode of diarrhea but denies any nausea or vomiting.  She has been eating and drinking normally.  No known sick contacts, she lives alone.  She is not certain if she received antibiotics in the hospital earlier this month.  No dysuria or increased frequency.    The history is provided by the patient.   Review of patient's allergies indicates:   Allergen Reactions    Propoxyphene n-acetaminophen Other (See Comments)     Other reaction(s): Unknown    Codeine Anxiety     Past Medical History:   Diagnosis Date    Acute coronary artery obstruction without MI 10/2012    Benign hypertension     COPD (chronic obstructive pulmonary disease)     Coronary artery disease     Disorder of kidney and ureter     Dr. Morrow    Hepatitis B core antibody positive 03/03/2021    Negative sAg, suggests previous exposure but no chronic/active Hep B. At risk for reactivation with any immunosuppression medication, steroids, chemo, etc.      History of electroconvulsive therapy     Hyperlipidemia LDL goal < 70     Left ankle sprain     Major depressive disorder, recurrent episode, severe     s/p ECT    Positive AKIRA (antinuclear antibody) 03/03/2021    PVD (peripheral vascular disease)      Past Surgical History:   Procedure  Laterality Date    CHOLECYSTECTOMY      CORONARY ANGIOPLASTY      CORONARY ANGIOPLASTY WITH STENT PLACEMENT  10/2012    2 stents RCA (100% stenosis)    EYE SURGERY      cataract surgery    HYSTERECTOMY      LINA, ovaries intact. uterine prolapse    ILIAC ARTERY STENT      TONSILLECTOMY      TOTAL VAGINAL HYSTERECTOMY       Family History   Problem Relation Age of Onset    Diabetes Mother     Heart disease Mother     Stroke Father     Heart disease Father     Heart disease Brother     Stroke Brother     Hypertension Daughter     Diabetes Maternal Aunt     Heart disease Maternal Aunt     Heart disease Maternal Uncle     Heart disease Paternal Aunt     Heart disease Paternal Uncle     Heart disease Maternal Grandfather     Diabetes Sister     Heart disease Sister     Cancer Sister         lung    Kidney disease Sister         mass, benign    Breast cancer Sister     Stroke Sister     Dementia Sister     Liver disease Sister     Melanoma Neg Hx     Psoriasis Neg Hx     Lupus Neg Hx     Eczema Neg Hx      Social History     Tobacco Use    Smoking status: Former     Packs/day: 2.00     Years: 40.00     Pack years: 80.00     Types: Cigarettes     Quit date: 2009     Years since quittin.1    Smokeless tobacco: Never   Substance Use Topics    Alcohol use: Yes     Comment: social    Drug use: No     Review of Systems   Constitutional:  Positive for chills and fatigue. Negative for fever and unexpected weight change.   HENT:  Positive for rhinorrhea. Negative for sinus pressure, sinus pain and sore throat.    Respiratory:  Positive for cough and shortness of breath.    Cardiovascular:  Positive for chest pain. Negative for leg swelling.   Gastrointestinal:  Positive for diarrhea. Negative for abdominal pain, constipation, nausea and vomiting.   Genitourinary:  Negative for dysuria, flank pain and frequency.   Musculoskeletal:  Negative for myalgias.   Skin:  Negative for rash.   Neurological:  Negative for  light-headedness and headaches.     Physical Exam     Initial Vitals   BP Pulse Resp Temp SpO2   01/25/23 1335 01/25/23 1335 01/25/23 1335 01/25/23 1414 01/25/23 1335   (!) 171/71 (!) 111 20 (!) 101.1 °F (38.4 °C) (!) 91 %      MAP       --                Physical Exam    Nursing note and vitals reviewed.  Constitutional: She appears well-developed and well-nourished. No distress.   Ill-appearing, becomes dyspneic with minimal exertion in the ED bed. Oxygen saturation drops to 88% on NC after rolling in the bed to check temperature   HENT:   Head: Normocephalic and atraumatic.   Dry lips   Eyes: EOM are normal.   Conjunctival pallor   Cardiovascular:  Regular rhythm and intact distal pulses.           tachycardia   Pulmonary/Chest: She is in respiratory distress (tacypnea, using accessory muscles). She has wheezes (faint expiratory, scattered). She has rales.   Abdominal: Abdomen is soft. She exhibits no distension. There is no abdominal tenderness.   Genitourinary:    Genitourinary Comments: Stool is brown     Musculoskeletal:         General: Edema (1+ BLE that is symmetric) present. Normal range of motion.     Neurological: She is alert and oriented to person, place, and time. She has normal strength.   Skin: Skin is warm and dry. Capillary refill takes less than 2 seconds. No rash noted.   Febrile     Psychiatric: She has a normal mood and affect.       ED Course   Procedures  Labs Reviewed   CBC W/ AUTO DIFFERENTIAL - Abnormal; Notable for the following components:       Result Value    RBC 3.04 (*)     Hemoglobin 8.7 (*)     Hematocrit 28.7 (*)     MCHC 30.3 (*)     RDW 18.1 (*)     Lymph # 0.2 (*)     Mono # 0.2 (*)     Gran % 91.7 (*)     Lymph % 3.4 (*)     Mono % 2.8 (*)     All other components within normal limits   COMPREHENSIVE METABOLIC PANEL - Abnormal; Notable for the following components:    CO2 20 (*)     Glucose 202 (*)     Calcium 7.9 (*)     Albumin 2.4 (*)     eGFR 47.5 (*)     All other  components within normal limits   B-TYPE NATRIURETIC PEPTIDE - Abnormal; Notable for the following components:     (*)     All other components within normal limits   TROPONIN I HIGH SENSITIVITY - Abnormal; Notable for the following components:    Troponin I High Sensitivity 46.3 (*)     All other components within normal limits   TYPE & SCREEN - Abnormal; Notable for the following components:    Indirect Aníbal POS (*)     All other components within normal limits   RESPIRATORY INFECTION PANEL (PCR), NASOPHARYNGEAL    Narrative:     Specimen Source->Nasopharyngeal Swab   CULTURE, RESPIRATORY   MRSA SCREEN BY PCR   LACTIC ACID, PLASMA   INFLUENZA A AND B ANTIGEN    Narrative:     Specimen Source->Nasopharyngeal Swab   APTT   PROTIME-INR   PROCALCITONIN   OCCULT BLOOD X 1, STOOL   URINALYSIS, REFLEX TO URINE CULTURE   ANTIBODY IDENTIFICATION     EKG Readings: (Independently Interpreted)   Initial Reading: No STEMI. Rhythm: Sinus Tachycardia. Heart Rate: 110. Ectopy: No Ectopy. Conduction: Normal. ST Segments: Normal ST Segments. T Waves: Normal.   1331     Imaging Results              CTA Chest Non-Coronary (PE Studies) (Final result)  Result time 01/25/23 16:26:40      Final result by Vitaly Luna Jr., MD (01/25/23 16:26:40)                   Impression:      1.  No CT evidence of acute pulmonary thromboembolism.    2.  Diffuse inter lobar and interlobular septal thickening with evidence of moderate peribronchial cuffing on a chronic basis due to underlying interstitial fibrotic process.      Electronically signed by: Vitaly Luna MD  Date:    01/25/2023  Time:    16:26               Narrative:      CMS MANDATED QUALITY DATA - CT RADIATION - 436    All CT scans at this facility utilize dose modulation, iterative reconstruction, and/or weight based dosing when appropriate to reduce radiation dose to as low as reasonably achievable    EXAMINATION:  CTA CHEST NON CORONARY (PE STUDIES)    CLINICAL  HISTORY:  Pulmonary embolism (PE) suspected, unknown D-dimer;    TECHNIQUE:  Routine CT angiogram of the chest protocol performed after the IV administration of 100 mL Omnipaque 350. 3D reconstructions/reformats/MIPs obtained. All CT scans at this facility are performed  using dose modulation techniques as appropriate to performed exam including the following:  automated exposure control; adjustment of mA and/or kV according to the patients size (this includes techniques or standardized protocols for targeted exams where dose is matched to indication/reason for exam: i.e. extremities or head);  iterative reconstruction technique.    COMPARISON:  Comparison made with the patient's previous conventional radiographs of the chest of 1/25/2023 3 grade    FINDINGS:  Low quality examination for pulmonary embolism as there is limited opacification pulmonary arterial tree due to timing the contrast bolus and hemodynamics the patient's cardiac output, however no evidence of pulmonary embolism is established of the pulmonary arterial system.  The main pulmonary trunk is patent without evidence of a saddle embolus at the branch point of the main pulmonary artery.  Extensive atheromatous calcifications of the major coronary vessels, normal contour the cardiac chambers, with mild concentric thickening lower left ventricular myocardium.    Parenchymal lung window settings demonstrate evidence of localized areas of prominent intralobar interlobular septal thickening, regional areas of subpleural fibrosis in the basilar segments, and localized scarring or consolidation the right middle lobe medial segment with pericardial contact.  There is no evidence of pneumothorax.  Diffuse bronchial wall thickening is noted on a chronic basis.  There is no evidence of pneumothorax.  No significant pleural effusion.    The upper abdominal structures demonstrate no evidence of significant abnormality.  The liver is fatty replaced.  Extensive  atheromatous calcifications of the upper abdominal vessels.                                       X-Ray Chest AP Portable (Final result)  Result time 01/25/23 15:05:46      Final result by Caden France MD (01/25/23 15:05:46)                   Narrative:    Reason: Sepsis sob    FINDINGS:  Portable chest at 1443 compared with 1/1/2023 shows normal cardiomediastinal silhouette. Patient is rotated.    Diffuse pulmonary interstitial opacities and patchy lower lung zone alveolar opacities have slightly increased in the interval. No pleural effusion or pneumothorax. Central pulmonary vasculature is unchanged. No acute osseous abnormality.    IMPRESSION:  Slight worsening of diffuse pulmonary interstitial and patchy lower lung zone alveolar opacities. Potential etiologies include pulmonary edema, pneumonia, or alveolar hemorrhage.    Electronically signed by:  Caden France MD  1/25/2023 3:05 PM CST Workstation: [id number]                                  X-Rays:   Independently Interpreted Readings:   Chest X-Ray: Bilateral infiltrates noted   Medications   vancomycin in dextrose 5 % 1 gram/250 mL IVPB 1,000 mg (0 mg Intravenous Stopped 1/25/23 1728)     And   vancomycin 500 mg in dextrose 5 % 100 mL IVPB (ready to mix system) (0 mg Intravenous Stopped 1/25/23 1726)   hydrALAZINE injection 10 mg (10 mg Intravenous Given 1/25/23 1818)   hydrALAZINE injection 5 mg (has no administration in time range)   piperacillin-tazobactam 3.375 g in dextrose 5 % 50 mL IVPB (ready to mix system) (has no administration in time range)   albuterol-ipratropium 2.5 mg-0.5 mg/3 mL nebulizer solution 3 mL (3 mLs Nebulization Given 1/25/23 1831)   QUEtiapine tablet 100 mg (100 mg Oral Given 1/25/23 2122)   paroxetine tablet 40 mg (has no administration in time range)   meclizine tablet 25 mg (has no administration in time range)   furosemide tablet 20 mg (has no administration in time range)   fluticasone propionate 50 mcg/actuation nasal  spray 50 mcg (has no administration in time range)   pantoprazole EC tablet 40 mg (has no administration in time range)   clopidogreL tablet 75 mg (has no administration in time range)   atorvastatin tablet 40 mg (40 mg Oral Given 1/25/23 2120)   aspirin EC tablet 81 mg (has no administration in time range)   ALPRAZolam tablet 0.25 mg (0.25 mg Oral Given 1/25/23 2138)   metoprolol succinate (TOPROL-XL) 24 hr tablet 50 mg (has no administration in time range)   sodium chloride 0.9% flush 10 mL (has no administration in time range)   albuterol-ipratropium 2.5 mg-0.5 mg/3 mL nebulizer solution 3 mL (has no administration in time range)   melatonin tablet 6 mg (has no administration in time range)   ondansetron injection 4 mg (has no administration in time range)   polyethylene glycol packet 17 g (has no administration in time range)   senna-docusate 8.6-50 mg per tablet 1 tablet (has no administration in time range)   acetaminophen tablet 650 mg (has no administration in time range)   simethicone chewable tablet 80 mg (has no administration in time range)   HYDROcodone-acetaminophen 5-325 mg per tablet 1 tablet (has no administration in time range)   morphine injection 4 mg (has no administration in time range)   naloxone 0.4 mg/mL injection 0.02 mg (has no administration in time range)   glucose chewable tablet 16 g (has no administration in time range)   glucose chewable tablet 24 g (has no administration in time range)   glucagon (human recombinant) injection 1 mg (has no administration in time range)   dextrose 10% bolus 125 mL 125 mL (has no administration in time range)   dextrose 10% bolus 250 mL 250 mL (has no administration in time range)   budesonide nebulizer solution 1 mg (has no administration in time range)     And   arformoteroL nebulizer solution 15 mcg (has no administration in time range)   methylPREDNISolone sodium succinate injection 80 mg (80 mg Intravenous Given 1/25/23 2123)   lactated ringers  bolus 500 mL (0 mLs Intravenous Stopped 1/25/23 1754)   acetaminophen tablet 1,000 mg (1,000 mg Oral Given 1/25/23 1537)   albuterol-ipratropium 2.5 mg-0.5 mg/3 mL nebulizer solution 3 mL (3 mLs Nebulization Given 1/25/23 1439)   piperacillin-tazobactam 4.5 g in dextrose 5 % 100 mL IVPB (ready to mix system) (0 g Intravenous Stopped 1/25/23 1612)   iohexoL (OMNIPAQUE 350) injection 100 mL (100 mLs Intravenous Given 1/25/23 1544)     Medical Decision Making:   History:   Old Records Summarized: records from previous admission(s).  Initial Assessment:   72-year-old female with multiple comorbidities including COPD, oxygen dependent, CHF, here for evaluation of worsening dyspnea on exertion and general malaise.  She is febrile here, tachycardic, hypertensive.  She becomes very dyspneic and hypoxic despite home oxygen with minimal exertion in the ED. screening cardiac workup with blood cultures and lactic acid will be obtained.  Considering recent hospitalization with pulmonary symptoms, low threshold to treat for pneumonia.  Gentle IV hydration will be initiated.  Notably she states she is compliant with her Lasix and has not been gaining weight.  No chest pain.  Reassessed  Independently Interpreted Test(s):   I have ordered and independently interpreted X-rays - see prior notes.  I have ordered and independently interpreted EKG Reading(s) - see prior notes  Clinical Tests:   Lab Tests: Ordered and Reviewed  Radiological Study: Ordered and Reviewed  Medical Tests: Ordered and Reviewed  ED Management:  Amount and/or Complexity of Data Reviewed  none  External Data Reviewed: notes from previous admissions, labs and CXR from previous admission (see above for summary)  Risk and benefits of testing: discussed   Labs: ordered and reviewed  Radiology: ordered and independent interpretation performed (see above or ED course)  ECG/medicine tests: ordered and independent interpretation performed (see above or ED  course)  Discussion of management or test interpretation with external provider(s): discussed with hospitalist physician    Risk  Decision regarding hospitalization    Critical Care      Other:   I have discussed this case with another health care provider.           ED Course as of 01/25/23 2307 Wed Jan 25, 2023   1534 Feeling improved with fever control and after a DuoNeb.  She is receiving Zosyn at this time.  Labs remarkable for a normal white blood cell count, normal lactic acid, hemoglobin is 8.7, down from 11 earlier this month.  She denies any black or bloody stool.  She does have a history of upper GI bleed last year but again has not had any issues since.  Type and screen ordered in the case transfusion is warranted along with stool for occult blood.  Chest x-ray significant for bilateral interstitial infiltrates consistent with pulmonary edema versus atypical infection considering fever.  Awaiting cardiac studies and CTA of the chest.  Patient did not report any weight gain, however the daughter who is now at bedside tells me her mother has gained approximately 10 lb over the past month.  Patient states she is compliant with Lasix and does make adequate urine output.  IV fluids are being given gently. Lasix considered, will discuss with hospital medicine.  [RB]   1722 CT negative for PE, suggestive of chronic interstitial changes, however clinically I am still concerned for pneumonia with congestive symptoms. Hospital medicine consulted for antibiotics until cultures result and gentle fluid balance. IVF stopped, Lasix administered.  [RB]      ED Course User Index  [RB] Chelita Krishnan MD                 Clinical Impression:   Final diagnoses:  [R06.00] Dyspnea  [R06.00] Dyspnea, unspecified type (Primary)  [J18.9] Atypical pneumonia  [I50.9] Congestive heart failure, unspecified HF chronicity, unspecified heart failure type  [J18.9] CAP (community acquired pneumonia)        ED Disposition Condition     Admit                 Chelita Krishnan MD  01/25/23 1035

## 2023-01-25 NOTE — Clinical Note
Diagnosis: CAP (community acquired pneumonia) [671365]   Future Attending Provider: JACQUELINE GUTIERREZ [[id number]]   Admitting Provider:: JACQUELINE GUTIERREZ [[id number]]

## 2023-01-26 PROBLEM — D64.9 ANEMIA: Chronic | Status: ACTIVE | Noted: 2023-01-26

## 2023-01-26 PROBLEM — J96.11 CHRONIC RESPIRATORY FAILURE WITH HYPOXIA: Chronic | Status: ACTIVE | Noted: 2023-01-26

## 2023-01-26 PROBLEM — E83.42 HYPOMAGNESEMIA: Status: ACTIVE | Noted: 2023-01-26

## 2023-01-26 LAB
ADENOVIRUS: NOT DETECTED
ANION GAP SERPL CALC-SCNC: 7 MMOL/L (ref 8–16)
BACTERIA #/AREA URNS HPF: NEGATIVE /HPF
BASOPHILS # BLD AUTO: 0 K/UL (ref 0–0.2)
BASOPHILS # BLD AUTO: 0.01 K/UL (ref 0–0.2)
BASOPHILS NFR BLD: 0 % (ref 0–1.9)
BASOPHILS NFR BLD: 0.1 % (ref 0–1.9)
BILIRUB UR QL STRIP: NEGATIVE
BORDETELLA PARAPERTUSSIS (IS1001): NOT DETECTED
BORDETELLA PERTUSSIS (PTXP): NOT DETECTED
BUN SERPL-MCNC: 23 MG/DL (ref 8–23)
CALCIUM SERPL-MCNC: 8 MG/DL (ref 8.7–10.5)
CHLAMYDIA PNEUMONIAE: NOT DETECTED
CHLORIDE SERPL-SCNC: 108 MMOL/L (ref 95–110)
CLARITY UR: CLEAR
CO2 SERPL-SCNC: 24 MMOL/L (ref 23–29)
COLOR UR: YELLOW
CORONAVIRUS 229E, COMMON COLD VIRUS: NOT DETECTED
CORONAVIRUS HKU1, COMMON COLD VIRUS: NOT DETECTED
CORONAVIRUS NL63, COMMON COLD VIRUS: NOT DETECTED
CORONAVIRUS OC43, COMMON COLD VIRUS: NOT DETECTED
CREAT SERPL-MCNC: 1.3 MG/DL (ref 0.5–1.4)
DIFFERENTIAL METHOD: ABNORMAL
EOSINOPHIL # BLD AUTO: 0 K/UL (ref 0–0.5)
EOSINOPHIL NFR BLD: 0 % (ref 0–8)
ERYTHROCYTE [DISTWIDTH] IN BLOOD BY AUTOMATED COUNT: 17.9 % (ref 11.5–14.5)
ERYTHROCYTE [DISTWIDTH] IN BLOOD BY AUTOMATED COUNT: 17.9 % (ref 11.5–14.5)
ERYTHROCYTE [DISTWIDTH] IN BLOOD BY AUTOMATED COUNT: 18.1 % (ref 11.5–14.5)
ERYTHROCYTE [DISTWIDTH] IN BLOOD BY AUTOMATED COUNT: 18.2 % (ref 11.5–14.5)
EST. GFR  (NO RACE VARIABLE): 43.1 ML/MIN/1.73 M^2
ESTIMATED AVG GLUCOSE: 128 MG/DL (ref 68–131)
FERRITIN SERPL-MCNC: 977 NG/ML (ref 20–300)
FLUBV RNA NPH QL NAA+NON-PROBE: NOT DETECTED
FOLATE SERPL-MCNC: 14.1 NG/ML (ref 4–24)
GLUCOSE SERPL-MCNC: 209 MG/DL (ref 70–110)
GLUCOSE UR QL STRIP: ABNORMAL
HBA1C MFR BLD: 6.1 % (ref 4.5–6.2)
HCT VFR BLD AUTO: 27.4 % (ref 37–48.5)
HCT VFR BLD AUTO: 27.8 % (ref 37–48.5)
HCT VFR BLD AUTO: 27.9 % (ref 37–48.5)
HCT VFR BLD AUTO: 27.9 % (ref 37–48.5)
HGB BLD-MCNC: 8.2 G/DL (ref 12–16)
HGB BLD-MCNC: 8.3 G/DL (ref 12–16)
HGB BLD-MCNC: 8.4 G/DL (ref 12–16)
HGB BLD-MCNC: 8.6 G/DL (ref 12–16)
HGB UR QL STRIP: NEGATIVE
HPIV1 RNA NPH QL NAA+NON-PROBE: NOT DETECTED
HPIV2 RNA NPH QL NAA+NON-PROBE: NOT DETECTED
HPIV3 RNA NPH QL NAA+NON-PROBE: NOT DETECTED
HPIV4 RNA NPH QL NAA+NON-PROBE: NOT DETECTED
HUMAN METAPNEUMOVIRUS: NOT DETECTED
HYALINE CASTS #/AREA URNS LPF: 14 /LPF
IMM GRANULOCYTES # BLD AUTO: 0.02 K/UL (ref 0–0.04)
IMM GRANULOCYTES # BLD AUTO: 0.03 K/UL (ref 0–0.04)
IMM GRANULOCYTES # BLD AUTO: 0.03 K/UL (ref 0–0.04)
IMM GRANULOCYTES # BLD AUTO: 0.06 K/UL (ref 0–0.04)
IMM GRANULOCYTES NFR BLD AUTO: 0.5 % (ref 0–0.5)
IMM GRANULOCYTES NFR BLD AUTO: 0.5 % (ref 0–0.5)
IMM GRANULOCYTES NFR BLD AUTO: 0.7 % (ref 0–0.5)
IMM GRANULOCYTES NFR BLD AUTO: 0.7 % (ref 0–0.5)
INFLUENZA A (SUBTYPES H1,H1-2009,H3): NOT DETECTED
IRON SERPL-MCNC: 28 UG/DL (ref 30–160)
KETONES UR QL STRIP: NEGATIVE
LEUKOCYTE ESTERASE UR QL STRIP: NEGATIVE
LYMPHOCYTES # BLD AUTO: 0.3 K/UL (ref 1–4.8)
LYMPHOCYTES # BLD AUTO: 0.4 K/UL (ref 1–4.8)
LYMPHOCYTES # BLD AUTO: 0.4 K/UL (ref 1–4.8)
LYMPHOCYTES # BLD AUTO: 0.5 K/UL (ref 1–4.8)
LYMPHOCYTES NFR BLD: 11.9 % (ref 18–48)
LYMPHOCYTES NFR BLD: 4.6 % (ref 18–48)
LYMPHOCYTES NFR BLD: 6.8 % (ref 18–48)
LYMPHOCYTES NFR BLD: 7.3 % (ref 18–48)
MAGNESIUM SERPL-MCNC: 1.2 MG/DL (ref 1.6–2.6)
MCH RBC QN AUTO: 28.3 PG (ref 27–31)
MCH RBC QN AUTO: 28.9 PG (ref 27–31)
MCHC RBC AUTO-ENTMCNC: 29.9 G/DL (ref 32–36)
MCHC RBC AUTO-ENTMCNC: 29.9 G/DL (ref 32–36)
MCHC RBC AUTO-ENTMCNC: 30.1 G/DL (ref 32–36)
MCHC RBC AUTO-ENTMCNC: 30.8 G/DL (ref 32–36)
MCV RBC AUTO: 94 FL (ref 82–98)
MCV RBC AUTO: 94 FL (ref 82–98)
MCV RBC AUTO: 95 FL (ref 82–98)
MCV RBC AUTO: 95 FL (ref 82–98)
MICROSCOPIC COMMENT: ABNORMAL
MONOCYTES # BLD AUTO: 0 K/UL (ref 0.3–1)
MONOCYTES # BLD AUTO: 0.1 K/UL (ref 0.3–1)
MONOCYTES # BLD AUTO: 0.2 K/UL (ref 0.3–1)
MONOCYTES # BLD AUTO: 0.3 K/UL (ref 0.3–1)
MONOCYTES NFR BLD: 1 % (ref 4–15)
MONOCYTES NFR BLD: 2.7 % (ref 4–15)
MONOCYTES NFR BLD: 3 % (ref 4–15)
MONOCYTES NFR BLD: 3.1 % (ref 4–15)
MRSA SCREEN BY PCR: NEGATIVE
MYCOPLASMA PNEUMONIAE: NOT DETECTED
NEUTROPHILS # BLD AUTO: 3.4 K/UL (ref 1.8–7.7)
NEUTROPHILS # BLD AUTO: 3.6 K/UL (ref 1.8–7.7)
NEUTROPHILS # BLD AUTO: 5.5 K/UL (ref 1.8–7.7)
NEUTROPHILS # BLD AUTO: 7.6 K/UL (ref 1.8–7.7)
NEUTROPHILS NFR BLD: 84.7 % (ref 38–73)
NEUTROPHILS NFR BLD: 89.6 % (ref 38–73)
NEUTROPHILS NFR BLD: 91.2 % (ref 38–73)
NEUTROPHILS NFR BLD: 91.6 % (ref 38–73)
NITRITE UR QL STRIP: NEGATIVE
NRBC BLD-RTO: 0 /100 WBC
PH UR STRIP: 6 [PH] (ref 5–8)
PLATELET # BLD AUTO: 180 K/UL (ref 150–450)
PLATELET # BLD AUTO: 186 K/UL (ref 150–450)
PLATELET # BLD AUTO: 198 K/UL (ref 150–450)
PLATELET # BLD AUTO: 218 K/UL (ref 150–450)
PMV BLD AUTO: 10.2 FL (ref 9.2–12.9)
PMV BLD AUTO: 10.4 FL (ref 9.2–12.9)
POTASSIUM SERPL-SCNC: 3.5 MMOL/L (ref 3.5–5.1)
PROT UR QL STRIP: ABNORMAL
RBC # BLD AUTO: 2.9 M/UL (ref 4–5.4)
RBC # BLD AUTO: 2.93 M/UL (ref 4–5.4)
RBC # BLD AUTO: 2.97 M/UL (ref 4–5.4)
RBC # BLD AUTO: 2.98 M/UL (ref 4–5.4)
RBC #/AREA URNS HPF: 2 /HPF (ref 0–4)
RESPIRATORY INFECTION PANEL SOURCE: NORMAL
RSV RNA NPH QL NAA+NON-PROBE: NOT DETECTED
RV+EV RNA NPH QL NAA+NON-PROBE: NOT DETECTED
SARS-COV-2 RNA RESP QL NAA+PROBE: NOT DETECTED
SATURATED IRON: 14 % (ref 20–50)
SODIUM SERPL-SCNC: 139 MMOL/L (ref 136–145)
SP GR UR STRIP: >1.03 (ref 1–1.03)
SQUAMOUS #/AREA URNS HPF: 7 /HPF
TOTAL IRON BINDING CAPACITY: 197 UG/DL (ref 250–450)
TRANSFERRIN SERPL-MCNC: 141 MG/DL (ref 200–375)
TROPONIN I SERPL HS-MCNC: 24.6 PG/ML (ref 0–14.9)
URN SPEC COLLECT METH UR: ABNORMAL
UROBILINOGEN UR STRIP-ACNC: NEGATIVE EU/DL
VIT B12 SERPL-MCNC: 273 PG/ML (ref 210–950)
WBC # BLD AUTO: 3.99 K/UL (ref 3.9–12.7)
WBC # BLD AUTO: 4.05 K/UL (ref 3.9–12.7)
WBC # BLD AUTO: 6.15 K/UL (ref 3.9–12.7)
WBC # BLD AUTO: 8.28 K/UL (ref 3.9–12.7)
WBC #/AREA URNS HPF: 1 /HPF (ref 0–5)

## 2023-01-26 PROCEDURE — 94761 N-INVAS EAR/PLS OXIMETRY MLT: CPT

## 2023-01-26 PROCEDURE — 84484 ASSAY OF TROPONIN QUANT: CPT | Performed by: NURSE PRACTITIONER

## 2023-01-26 PROCEDURE — 94640 AIRWAY INHALATION TREATMENT: CPT

## 2023-01-26 PROCEDURE — 12000002 HC ACUTE/MED SURGE SEMI-PRIVATE ROOM

## 2023-01-26 PROCEDURE — 27000221 HC OXYGEN, UP TO 24 HOURS

## 2023-01-26 PROCEDURE — 87641 MR-STAPH DNA AMP PROBE: CPT | Performed by: FAMILY MEDICINE

## 2023-01-26 PROCEDURE — 99900031 HC PATIENT EDUCATION (STAT)

## 2023-01-26 PROCEDURE — 25000242 PHARM REV CODE 250 ALT 637 W/ HCPCS: Performed by: FAMILY MEDICINE

## 2023-01-26 PROCEDURE — 63600175 PHARM REV CODE 636 W HCPCS: Performed by: INTERNAL MEDICINE

## 2023-01-26 PROCEDURE — 99900035 HC TECH TIME PER 15 MIN (STAT)

## 2023-01-26 PROCEDURE — 83735 ASSAY OF MAGNESIUM: CPT | Performed by: FAMILY MEDICINE

## 2023-01-26 PROCEDURE — 25000003 PHARM REV CODE 250: Performed by: INTERNAL MEDICINE

## 2023-01-26 PROCEDURE — 85025 COMPLETE CBC W/AUTO DIFF WBC: CPT | Mod: 91 | Performed by: FAMILY MEDICINE

## 2023-01-26 PROCEDURE — 99223 1ST HOSP IP/OBS HIGH 75: CPT | Mod: ,,, | Performed by: INTERNAL MEDICINE

## 2023-01-26 PROCEDURE — 94799 UNLISTED PULMONARY SVC/PX: CPT

## 2023-01-26 PROCEDURE — 82746 ASSAY OF FOLIC ACID SERUM: CPT | Performed by: FAMILY MEDICINE

## 2023-01-26 PROCEDURE — 25000003 PHARM REV CODE 250: Performed by: FAMILY MEDICINE

## 2023-01-26 PROCEDURE — 63600175 PHARM REV CODE 636 W HCPCS: Performed by: FAMILY MEDICINE

## 2023-01-26 PROCEDURE — 80048 BASIC METABOLIC PNL TOTAL CA: CPT | Performed by: FAMILY MEDICINE

## 2023-01-26 PROCEDURE — 99223 PR INITIAL HOSPITAL CARE,LEVL III: ICD-10-PCS | Mod: ,,, | Performed by: INTERNAL MEDICINE

## 2023-01-26 PROCEDURE — 36415 COLL VENOUS BLD VENIPUNCTURE: CPT | Performed by: FAMILY MEDICINE

## 2023-01-26 PROCEDURE — 82607 VITAMIN B-12: CPT | Performed by: FAMILY MEDICINE

## 2023-01-26 PROCEDURE — 84466 ASSAY OF TRANSFERRIN: CPT | Performed by: FAMILY MEDICINE

## 2023-01-26 PROCEDURE — 36415 COLL VENOUS BLD VENIPUNCTURE: CPT | Performed by: NURSE PRACTITIONER

## 2023-01-26 PROCEDURE — 25000003 PHARM REV CODE 250: Performed by: NURSE PRACTITIONER

## 2023-01-26 PROCEDURE — 82728 ASSAY OF FERRITIN: CPT | Performed by: FAMILY MEDICINE

## 2023-01-26 PROCEDURE — 81001 URINALYSIS AUTO W/SCOPE: CPT | Performed by: FAMILY MEDICINE

## 2023-01-26 RX ORDER — MAGNESIUM SULFATE HEPTAHYDRATE 40 MG/ML
2 INJECTION, SOLUTION INTRAVENOUS ONCE
Status: DISCONTINUED | OUTPATIENT
Start: 2023-01-26 | End: 2023-01-26

## 2023-01-26 RX ORDER — CYANOCOBALAMIN 1000 UG/ML
1000 INJECTION, SOLUTION INTRAMUSCULAR; SUBCUTANEOUS DAILY
Status: COMPLETED | OUTPATIENT
Start: 2023-01-26 | End: 2023-01-28

## 2023-01-26 RX ORDER — MAGNESIUM SULFATE HEPTAHYDRATE 40 MG/ML
2 INJECTION, SOLUTION INTRAVENOUS ONCE
Status: COMPLETED | OUTPATIENT
Start: 2023-01-26 | End: 2023-01-26

## 2023-01-26 RX ORDER — LANOLIN ALCOHOL/MO/W.PET/CERES
800 CREAM (GRAM) TOPICAL
Status: DISCONTINUED | OUTPATIENT
Start: 2023-01-26 | End: 2023-01-31 | Stop reason: HOSPADM

## 2023-01-26 RX ORDER — FUROSEMIDE 10 MG/ML
40 INJECTION INTRAMUSCULAR; INTRAVENOUS DAILY
Status: DISCONTINUED | OUTPATIENT
Start: 2023-01-26 | End: 2023-01-27

## 2023-01-26 RX ORDER — SODIUM,POTASSIUM PHOSPHATES 280-250MG
2 POWDER IN PACKET (EA) ORAL
Status: DISCONTINUED | OUTPATIENT
Start: 2023-01-26 | End: 2023-01-31 | Stop reason: HOSPADM

## 2023-01-26 RX ORDER — PREDNISONE 20 MG/1
60 TABLET ORAL DAILY
Status: DISCONTINUED | OUTPATIENT
Start: 2023-01-26 | End: 2023-01-29

## 2023-01-26 RX ORDER — LANOLIN ALCOHOL/MO/W.PET/CERES
400 CREAM (GRAM) TOPICAL DAILY
Status: DISCONTINUED | OUTPATIENT
Start: 2023-01-26 | End: 2023-01-29

## 2023-01-26 RX ORDER — LOSARTAN POTASSIUM 25 MG/1
25 TABLET ORAL DAILY
Status: DISCONTINUED | OUTPATIENT
Start: 2023-01-26 | End: 2023-01-27

## 2023-01-26 RX ORDER — PAROXETINE 10 MG/1
10 TABLET, FILM COATED ORAL ONCE
Status: COMPLETED | OUTPATIENT
Start: 2023-01-26 | End: 2023-01-26

## 2023-01-26 RX ADMIN — IPRATROPIUM BROMIDE AND ALBUTEROL SULFATE 3 ML: 2.5; .5 SOLUTION RESPIRATORY (INHALATION) at 12:01

## 2023-01-26 RX ADMIN — BUDESONIDE 1 MG: 0.5 INHALANT RESPIRATORY (INHALATION) at 09:01

## 2023-01-26 RX ADMIN — METOPROLOL SUCCINATE 50 MG: 50 TABLET, FILM COATED, EXTENDED RELEASE ORAL at 08:01

## 2023-01-26 RX ADMIN — ARFORMOTEROL TARTRATE 15 MCG: 15 SOLUTION RESPIRATORY (INHALATION) at 08:01

## 2023-01-26 RX ADMIN — PANTOPRAZOLE SODIUM 40 MG: 40 TABLET, DELAYED RELEASE ORAL at 05:01

## 2023-01-26 RX ADMIN — CLOPIDOGREL BISULFATE 75 MG: 75 TABLET, FILM COATED ORAL at 08:01

## 2023-01-26 RX ADMIN — IPRATROPIUM BROMIDE AND ALBUTEROL SULFATE 3 ML: 2.5; .5 SOLUTION RESPIRATORY (INHALATION) at 04:01

## 2023-01-26 RX ADMIN — LOSARTAN POTASSIUM 25 MG: 25 TABLET, FILM COATED ORAL at 11:01

## 2023-01-26 RX ADMIN — ATORVASTATIN CALCIUM 40 MG: 40 TABLET, FILM COATED ORAL at 08:01

## 2023-01-26 RX ADMIN — PREDNISONE 60 MG: 20 TABLET ORAL at 01:01

## 2023-01-26 RX ADMIN — FUROSEMIDE 20 MG: 20 TABLET ORAL at 08:01

## 2023-01-26 RX ADMIN — FLUTICASONE PROPIONATE 50 MCG: 50 SPRAY, METERED NASAL at 11:01

## 2023-01-26 RX ADMIN — IPRATROPIUM BROMIDE AND ALBUTEROL SULFATE 3 ML: 2.5; .5 SOLUTION RESPIRATORY (INHALATION) at 09:01

## 2023-01-26 RX ADMIN — QUETIAPINE 100 MG: 100 TABLET ORAL at 08:01

## 2023-01-26 RX ADMIN — PAROXETINE 10 MG: 10 TABLET, FILM COATED ORAL at 01:01

## 2023-01-26 RX ADMIN — CYANOCOBALAMIN 1000 MCG: 1000 INJECTION, SOLUTION INTRAMUSCULAR; SUBCUTANEOUS at 11:01

## 2023-01-26 RX ADMIN — BUDESONIDE 1 MG: 0.5 INHALANT RESPIRATORY (INHALATION) at 08:01

## 2023-01-26 RX ADMIN — ARFORMOTEROL TARTRATE 15 MCG: 15 SOLUTION RESPIRATORY (INHALATION) at 09:01

## 2023-01-26 RX ADMIN — MAGNESIUM OXIDE 400 MG: 400 TABLET ORAL at 11:01

## 2023-01-26 RX ADMIN — PIPERACILLIN SODIUM AND TAZOBACTAM SODIUM 3.38 G: 3; .375 INJECTION, POWDER, LYOPHILIZED, FOR SOLUTION INTRAVENOUS at 11:01

## 2023-01-26 RX ADMIN — VANCOMYCIN HYDROCHLORIDE 500 MG: 500 INJECTION, POWDER, LYOPHILIZED, FOR SOLUTION INTRAVENOUS at 04:01

## 2023-01-26 RX ADMIN — PIPERACILLIN SODIUM AND TAZOBACTAM SODIUM 3.38 G: 3; .375 INJECTION, POWDER, LYOPHILIZED, FOR SOLUTION INTRAVENOUS at 08:01

## 2023-01-26 RX ADMIN — IPRATROPIUM BROMIDE AND ALBUTEROL SULFATE 3 ML: 2.5; .5 SOLUTION RESPIRATORY (INHALATION) at 08:01

## 2023-01-26 RX ADMIN — FUROSEMIDE 40 MG: 10 INJECTION, SOLUTION INTRAMUSCULAR; INTRAVENOUS at 11:01

## 2023-01-26 RX ADMIN — PAROXETINE HYDROCHLORIDE 40 MG: 20 TABLET, FILM COATED ORAL at 08:01

## 2023-01-26 RX ADMIN — METHYLPREDNISOLONE SODIUM SUCCINATE 80 MG: 40 INJECTION, POWDER, FOR SOLUTION INTRAMUSCULAR; INTRAVENOUS at 05:01

## 2023-01-26 RX ADMIN — ASPIRIN 81 MG: 81 TABLET, COATED ORAL at 08:01

## 2023-01-26 RX ADMIN — IPRATROPIUM BROMIDE AND ALBUTEROL SULFATE 3 ML: 2.5; .5 SOLUTION RESPIRATORY (INHALATION) at 01:01

## 2023-01-26 RX ADMIN — MAGNESIUM SULFATE HEPTAHYDRATE 2 G: 40 INJECTION, SOLUTION INTRAVENOUS at 08:01

## 2023-01-26 NOTE — PROGRESS NOTES
[facility] Medicine  Progress Note    Patient Name: Betty Bacon  MRN: [id number]  Patient Class: IP- Inpatient   Admission Date: 1/25/2023  Length of Stay: 1 days  Attending Physician: Prachi Wilson MD  Primary Care Provider: Johanne Callejas MD        Subjective:     Principal Problem:CAP (community acquired pneumonia)        HPI:  Betty Bacon is a 72 y.o. White female   With PMH of chronic respiratory failure on 4LNC,  COPD, ILD, recent COVID pneumonia,  CAD, HFpEF,  who presents with SOB.     Onset today  Progressively worsening  Worse with exertion  Alleviated with rest  +coughing, dry  No CP  No palpitations  No n/v  Diarrhea one week ago but not since  No dysuria  +subjective fever  +chills  +LE edema, pt not sure if worsening/acute      Overview/Hospital Course:  Patient with known history of chronic respiratory failure on 4 L home oxygen, interstitial lung disease, followed by Pulmonary Dr Medeiros, CAD with remote history of PCI, on aspirin and Plavix, states followed by cardiologist in [place], heart failure with preserved ejection fraction with valvular heart disease with previous echo EF of 65% with severe aortic stenosis, moderate aortic regurgitation, CKD stage 3, recent history of COVID-19 infection.  Presenting with 1 day history of worsening shortness of breath associated with cough and fever.  Febrile to 101 on presentation.  Anemia appears to be progressive, down to 8.2, FOBT negative, , procalcitonin 0.36, lactic acid 0.7, radiological imaging with concern for diffuse pulmonary/alveolar opacity lower lung, possibly edema, pneumonia versus hemorrhage.  She was admitted, started on broad-spectrum antibiotics with pulmonary consultation.  On 01/26, does report interval improvement, IV diuresis and monitoring.      Interval History:  See hospital course.  Patient reports interval improvement in symptoms today.  States yesterday morning became  more short of breath, cough with fever.  States cough is mostly nonproductive.  On 4 L chronic home oxygen, followed by Pulmonary .  States she is on aspirin and Plavix, denies any clinical symptoms of bleeding, followed by cardiology in [place], [facility] last PCI was many years ago.  Febrile to 101.1 on admission but temperature curve is better.  Labs with hemoglobin 8.2, BUN/creatinine 23/1.3, , procalcitonin 0.36, magnesium 1.2.  Imaging with concern for lower zone pulmonary edema versus pneumonia versus hemorrhage.  Discussed with patient.  No visitors at bedside.    Review of Systems   Constitutional:  Positive for fever.   Respiratory:  Positive for cough and shortness of breath.    Cardiovascular:  Negative for chest pain.   Psychiatric/Behavioral:  Negative for confusion.    Objective:     Vital Signs (Most Recent):  Temp: 97.1 °F (36.2 °C) (01/26/23 0708)  Pulse: 85 (01/26/23 0934)  Resp: 18 (01/26/23 0934)  BP: (!) 156/87 (notifed nurse) (01/26/23 0708)  SpO2: 96 % (01/26/23 0934)   Vital Signs (24h Range):  Temp:  [97.1 °F (36.2 °C)-101.1 °F (38.4 °C)] 97.1 °F (36.2 °C)  Pulse:  [] 85  Resp:  [16-20] 18  SpO2:  [91 %-98 %] 96 %  BP: (136-198)/(56-87) 156/87     Weight: 88.3 kg (194 lb 10.7 oz)  Body mass index is 35.6 kg/m².    Intake/Output Summary (Last 24 hours) at 1/26/2023 0955  Last data filed at 1/26/2023 0609  Gross per 24 hour   Intake 1383.39 ml   Output 600 ml   Net 783.39 ml      Physical Exam  Vitals and nursing note reviewed.   Constitutional:       General: She is not in acute distress.     Appearance: She is not ill-appearing.   HENT:      Head: Normocephalic and atraumatic.      Mouth/Throat:      Mouth: Mucous membranes are moist.   Cardiovascular:      Rate and Rhythm: Normal rate and regular rhythm.      Heart sounds: Murmur heard.      Comments: Trace lower extremity edema  Pulmonary:      Comments: On nasal cannula, bilateral inspiratory at bases, no wheeze  heard  Abdominal:      General: There is no distension.      Palpations: Abdomen is soft.      Tenderness: There is no abdominal tenderness. There is no guarding.   Skin:     General: Skin is warm.   Neurological:      Mental Status: She is alert and oriented to person, place, and time.      Comments: No aphasia, moving all 4 extremities   Psychiatric:         Mood and Affect: Mood normal.       Significant Labs: BMP:   Recent Labs   Lab 01/26/23  0529   *      K 3.5      CO2 24   BUN 23   CREATININE 1.3   CALCIUM 8.0*   MG 1.2*     CBC:   Recent Labs   Lab 01/25/23  1436 01/25/23  2350 01/26/23  0529   WBC 6.09 3.99 4.05   HGB 8.7* 8.3* 8.2*   HCT 28.7* 27.8* 27.4*    180 186     CMP:   Recent Labs   Lab 01/25/23  1436 01/26/23  0529    139   K 4.3 3.5    108   CO2 20* 24   * 209*   BUN 20 23   CREATININE 1.2 1.3   CALCIUM 7.9* 8.0*   PROT 6.0  --    ALBUMIN 2.4*  --    BILITOT 0.8  --    ALKPHOS 68  --    AST 12  --    ALT 11  --    ANIONGAP 13 7*     Cardiac Markers:   Recent Labs   Lab 01/25/23  1436   *     Magnesium:   Recent Labs   Lab 01/26/23  0529   MG 1.2*     POCT Glucose: No results for input(s): POCTGLUCOSE in the last 48 hours.  TSH: No results for input(s): TSH in the last 4320 hours.  Urine Culture: No results for input(s): LABURIN in the last 48 hours.  Urine Studies:   Recent Labs   Lab 01/26/23  0320   COLORU Yellow   APPEARANCEUA Clear   PHUR 6.0   SPECGRAV >1.030*   PROTEINUA 1+*   GLUCUA Trace*   KETONESU Negative   BILIRUBINUA Negative   OCCULTUA Negative   NITRITE Negative   UROBILINOGEN Negative   LEUKOCYTESUR Negative   RBCUA 2   WBCUA 1   BACTERIA Negative   SQUAMEPITHEL 7   HYALINECASTS 14*       Significant Imaging: I have reviewed all pertinent imaging results/findings within the past 24 hours.      Assessment/Plan:      Active Hospital Problems    Diagnosis    *Possible pneumonia versus bronchitis versus other    Acute on chronic  heart failure with preserved ejection fraction    Anemia, acute on chronic, progressive    Hypomagnesemia    Chronic respiratory failure with hypoxia, on 4 L oxygen    Severe obesity with body mass index (BMI) of 35.0 to 39.9 with serious comorbidity    GERD (gastroesophageal reflux disease)    Interstitial lung disease    CAD (coronary artery disease)     NEFTALY RCA 7/2004  Stents x 2 RCA 10/2012      Valvular heart disease, severe aortic stenosis, moderate aortic regurgitation     moderate      Generalized anxiety disorder    PAD (peripheral artery disease)     Bilateral IRINA stents 2004      Essential hypertension    CKD (chronic kidney disease) stage 3, GFR 30-59 ml/min     Plan:  Continue care on medical floor with remote telemetry monitoring   Continue scheduled breathing treatment  Decreasing IV steroid, 40 Q 8 and tapering   IV diuresis with Lasix 40 mg daily and following volume status   2 g IV magnesium supplementation and start 400 p.o. standing daily supplementation, trending level   Previous echo with EF of 65% with severe aortic stenosis, moderate aortic regurgitation  Progression of anemia down to 8.2 with no clinical evidence of bleeding, previous around 10, on dual antiplatelet therapy  Check anemia labs including B12, folic acid, iron studies   Continue empiric Zosyn for now, deescalate as able   Renally dosing all medications and avoiding nephrotoxin drugs  Electrolytes sliding scale repletion  A.m. labs ordered   PT/OT evaluation, had home health prior to admission   Pulmonary consulted on admission   Consult Cardiology, need for ongoing dual antiplatelet therapy with remote PCI and now anemia   Further plan as per hospital course    VTE Risk Mitigation (From admission, onward)         Ordered     IP VTE HIGH RISK PATIENT  Once         01/25/23 1822     Place sequential compression device  Until discontinued         01/25/23 1822                Discharge Planning   BOB:      Code Status:  Full Code   Is the patient medically ready for discharge?:     Reason for patient still in hospital (select all that apply): Patient trending condition  Discharge Plan A: Home Health                  Prachi Wilson MD  Department of Hospital Medicine   Formerly [facility], [facility]

## 2023-01-26 NOTE — HOSPITAL COURSE
Patient with known history of chronic respiratory failure on 4 L home oxygen, interstitial lung disease, followed by Pulmonary Dr Medeiros, CAD with remote history of PCI, on aspirin and Plavix, states followed by cardiologist in [place], heart failure with preserved ejection fraction with valvular heart disease with previous echo EF of 65% with severe aortic stenosis, moderate aortic regurgitation, CKD stage 3, recent history of COVID-19 infection.  Presenting with 1 day history of worsening shortness of breath associated with cough and fever.  Febrile to 101 on presentation.  Anemia appears to be progressive, down to 8.2, FOBT negative, , procalcitonin 0.36, lactic acid 0.7, radiological imaging with concern for diffuse pulmonary/alveolar opacity lower lung, possibly edema, pneumonia versus hemorrhage.  She was admitted, started on broad-spectrum antibiotics with pulmonary consultation.  On 01/26, does report interval improvement, IV diuresis and monitoring.

## 2023-01-26 NOTE — HPI
Betty Bacon is a 72 y.o. White female   With PMH of chronic respiratory failure on 4LNC,  COPD, ILD, recent COVID pneumonia,  CAD, HFpEF,  who presents with SOB.     Onset today  Progressively worsening  Worse with exertion  Alleviated with rest  +coughing, dry  No CP  No palpitations  No n/v  Diarrhea one week ago but not since  No dysuria  +subjective fever  +chills  +LE edema, pt not sure if worsening/acute

## 2023-01-26 NOTE — PROGRESS NOTES
Automatic Inhaler to Nebulizer Interchange    fluticasone/umeclidinium/vilanterol (Trelegy) 200 mcg/ 62.5 mcg/ 25 mcg changed to budesonide 1 mg twice daily AND ipratropium 0.5 mg every 6 hour scheduled AND arformoterol 15 mcg twice daily per [facility] Automatic Therapeutic Substitutions Protocol.    Please contact pharmacy at extension [phone] with any questions.     Thank you,   Diana Cleveland

## 2023-01-26 NOTE — PROGRESS NOTES
Pulmonology contacted r/t Consult for COPD/PNA and recent covid; safety intact with assessment ongoing

## 2023-01-26 NOTE — SUBJECTIVE & OBJECTIVE
Interval History:  See hospital course.  Patient reports interval improvement in symptoms today.  States yesterday morning became more short of breath, cough with fever.  States cough is mostly nonproductive.  On 4 L chronic home oxygen, followed by Pulmonary .  States she is on aspirin and Plavix, denies any clinical symptoms of bleeding, followed by cardiology in [facility] last PCI was many years ago.  Febrile to 101.1 on admission but temperature curve is better.  Labs with hemoglobin 8.2, BUN/creatinine 23/1.3, , procalcitonin 0.36, magnesium 1.2.  Imaging with concern for lower zone pulmonary edema versus pneumonia versus hemorrhage.  Discussed with patient.  No visitors at bedside.    Review of Systems   Constitutional:  Positive for fever.   Respiratory:  Positive for cough and shortness of breath.    Cardiovascular:  Negative for chest pain.   Psychiatric/Behavioral:  Negative for confusion.    Objective:     Vital Signs (Most Recent):  Temp: 97.1 °F (36.2 °C) (01/26/23 0708)  Pulse: 85 (01/26/23 0934)  Resp: 18 (01/26/23 0934)  BP: (!) 156/87 (notifed nurse) (01/26/23 0708)  SpO2: 96 % (01/26/23 0934)   Vital Signs (24h Range):  Temp:  [97.1 °F (36.2 °C)-101.1 °F (38.4 °C)] 97.1 °F (36.2 °C)  Pulse:  [] 85  Resp:  [16-20] 18  SpO2:  [91 %-98 %] 96 %  BP: (136-198)/(56-87) 156/87     Weight: 88.3 kg (194 lb 10.7 oz)  Body mass index is 35.6 kg/m².    Intake/Output Summary (Last 24 hours) at 1/26/2023 0955  Last data filed at 1/26/2023 0609  Gross per 24 hour   Intake 1383.39 ml   Output 600 ml   Net 783.39 ml      Physical Exam  Vitals and nursing note reviewed.   Constitutional:       General: She is not in acute distress.     Appearance: She is not ill-appearing.   HENT:      Head: Normocephalic and atraumatic.      Mouth/Throat:      Mouth: Mucous membranes are moist.   Cardiovascular:      Rate and Rhythm: Normal rate and regular rhythm.      Heart sounds: Murmur heard.       Comments: Trace lower extremity edema  Pulmonary:      Comments: On nasal cannula, bilateral inspiratory at bases, no wheeze heard  Abdominal:      General: There is no distension.      Palpations: Abdomen is soft.      Tenderness: There is no abdominal tenderness. There is no guarding.   Skin:     General: Skin is warm.   Neurological:      Mental Status: She is alert and oriented to person, place, and time.      Comments: No aphasia, moving all 4 extremities   Psychiatric:         Mood and Affect: Mood normal.       Significant Labs: BMP:   Recent Labs   Lab 01/26/23  0529   *      K 3.5      CO2 24   BUN 23   CREATININE 1.3   CALCIUM 8.0*   MG 1.2*     CBC:   Recent Labs   Lab 01/25/23  1436 01/25/23  2350 01/26/23  0529   WBC 6.09 3.99 4.05   HGB 8.7* 8.3* 8.2*   HCT 28.7* 27.8* 27.4*    180 186     CMP:   Recent Labs   Lab 01/25/23  1436 01/26/23  0529    139   K 4.3 3.5    108   CO2 20* 24   * 209*   BUN 20 23   CREATININE 1.2 1.3   CALCIUM 7.9* 8.0*   PROT 6.0  --    ALBUMIN 2.4*  --    BILITOT 0.8  --    ALKPHOS 68  --    AST 12  --    ALT 11  --    ANIONGAP 13 7*     Cardiac Markers:   Recent Labs   Lab 01/25/23  1436   *     Magnesium:   Recent Labs   Lab 01/26/23  0529   MG 1.2*     POCT Glucose: No results for input(s): POCTGLUCOSE in the last 48 hours.  TSH: No results for input(s): TSH in the last 4320 hours.  Urine Culture: No results for input(s): LABURIN in the last 48 hours.  Urine Studies:   Recent Labs   Lab 01/26/23  0320   COLORU Yellow   APPEARANCEUA Clear   PHUR 6.0   SPECGRAV >1.030*   PROTEINUA 1+*   GLUCUA Trace*   KETONESU Negative   BILIRUBINUA Negative   OCCULTUA Negative   NITRITE Negative   UROBILINOGEN Negative   LEUKOCYTESUR Negative   RBCUA 2   WBCUA 1   BACTERIA Negative   SQUAMEPITHEL 7   HYALINECASTS 14*       Significant Imaging: I have reviewed all pertinent imaging results/findings within the past 24 hours.

## 2023-01-26 NOTE — PLAN OF CARE
Met with patient at bedside to complete initial assessment. Patient states she DOES have a living will but no one is medical POA. Patient states she receives home health services with [facility]. Verified with Tuyet at [facility] [place] [phone] that patient is active with them and receives skilled nursing and PT services. Patient states she has oxygen already in the home but does not know the name of the company that supplies it.     [facility]  Initial Discharge Assessment       Primary Care Provider: Johanne Callejas MD    Admission Diagnosis: CAP (community acquired pneumonia) [J18.9]    Admission Date: 1/25/2023  Expected Discharge Date:     Discharge Barriers Identified: (P) None    Payor: MEDICARE / Plan: MEDICARE PART A & B / Product Type: Government /     Extended Emergency Contact Information  Primary Emergency Contact: Loree Sanchez  Address: [address] [place] [address] [place] [address] [place] [facility]  Mobile Phone: [phone]  Relation: Daughter  Preferred language: English   needed? No  Secondary Emergency Contact: Gibson Bacon  Mobile Phone: [phone]  Relation: Son  Preferred language: English   needed? No    Discharge Plan A: (P) Home Health  Discharge Plan B: (P) Home Health      [id number] - [place], [place] - [address] AT [facility]  [address]  [place] [address]  Phone: [phone] Fax: [phone]    [facility]  [address]  [facility] [address]  Phone: [phone] Fax: [phone]    [facility] - [place] - [address] [place]  [address] [place]  [place] [address]  Phone: [phone] Fax: [phone]      Initial Assessment (most recent)       Adult Discharge Assessment - 01/26/23 0917          Discharge Assessment    Assessment Type Discharge Planning Assessment (P)      Confirmed/corrected address, phone number and  insurance Yes (P)      Confirmed Demographics Correct on Facesheet (P)      Source of Information patient (P)      Communicated BOB with patient/caregiver No (P)      Reason For Admission community acquired pneumonia (P)      People in Home alone (P)      Facility Arrived From: home (P)      Do you expect to return to your current living situation? Yes (P)      Do you have help at home or someone to help you manage your care at home? Yes (P)      Who are your caregiver(s) and their phone number(s)? Loree Sanchez (Daughter)   [phone] (Mobile) (P)      Current cognitive status: Alert/Oriented (P)      Walking or Climbing Stairs ambulation difficulty, requires equipment (P)      Dressing/Bathing bathing difficulty, requires equipment (P)      Equipment Currently Used at Home oxygen;shower chair;walker, rolling (P)      Readmission within 30 days? No (P)      Patient currently being followed by outpatient case management? No (P)      Do you currently have service(s) that help you manage your care at home? Yes (P)      Name [facility] Contact [facility] of [facility] (P)      Is the pt/caregiver preference to resume services with current agency Yes (P)      Do you take prescription medications? Yes (P)      Do you have prescription coverage? Yes (P)      Coverage Medicare  A and B, [employer] supplement (P)      Do you have any problems affording any of your prescribed medications? No (P)      Who is going to help you get home at discharge? Loree Sanchez (Daughter)   [phone] (Mobile) (P)      How do you get to doctors appointments? family or friend will provide (P)      Are you on dialysis? No (P)      Do you take coumadin? No (P)      Discharge Plan A Home Health (P)      Discharge Plan B Home Health (P)      DME Needed Upon Discharge  none (P)      Discharge Plan discussed with: Patient (P)      Discharge Barriers Identified None (P)

## 2023-01-26 NOTE — CARE UPDATE
01/26/23 0050   Patient Assessment/Suction   Level of Consciousness (AVPU) alert   Respiratory Effort Normal;Unlabored   Expansion/Accessory Muscles/Retractions no use of accessory muscles   All Lung Fields Breath Sounds Anterior:;diminished   Rhythm/Pattern, Respiratory unlabored   Cough Frequency infrequent   PRE-TX-O2   Device (Oxygen Therapy) nasal cannula with humidification   $ Is the patient on Low Flow Oxygen? Yes   Flow (L/min) 4   SpO2 (!) 91 %   Pulse 77   Resp 18   Aerosol Therapy   $ Aerosol Therapy Charges Aerosol Treatment   Daily Review of Necessity (SVN) completed   Respiratory Treatment Status (SVN) given   Treatment Route (SVN) mask;oxygen   Patient Position (SVN) HOB elevated   Post Treatment Assessment (SVN) increased aeration   Signs of Intolerance (SVN) none   Breath Sounds Post-Respiratory Treatment   Post-treatment Heart Rate (beats/min) 0   Post-treatment Resp Rate (breaths/min) 20   Education   $ Education Bronchodilator;15 min   Respiratory Evaluation   $ Care Plan Tech Time 15 min

## 2023-01-26 NOTE — H&P
[facility] Medicine   History & Physical     Patient Name: Betty Bacon  MRN: [id number]  Admission Date: 1/25/2023  1:29 PM  Attending Physician: Lynette Shea MD  Face-to-Face encounter date: 01/25/2023 6:22 PM    Patient information was obtained from patient, past medical records, ER physician, and ER records.     HISTORY OF PRESENT ILLNESS:     Betty Bacon is a 72 y.o. White female   With PMH of chronic respiratory failure on 4LNC,  COPD, ILD, recent COVID pneumonia,  CAD, HFpEF,  who presents with SOB.    Onset today  Progressively worsening  Worse with exertion  Alleviated with rest  +coughing, dry  No CP  No palpitations  No n/v  Diarrhea one week ago but not since  No dysuria  +subjective fever  +chills  +LE edema, pt not sure if worsening/acute    REVIEW OF SYSTEMS:     All systems reviewed and are negative except as noted per above.    PAST MEDICAL HISTORY:     Past Medical History:   Diagnosis Date    Acute coronary artery obstruction without MI 10/2012    Benign hypertension     COPD (chronic obstructive pulmonary disease)     Coronary artery disease     Disorder of kidney and ureter     Dr. Morrow    Hepatitis B core antibody positive 03/03/2021    Negative sAg, suggests previous exposure but no chronic/active Hep B. At risk for reactivation with any immunosuppression medication, steroids, chemo, etc.      History of electroconvulsive therapy     Hyperlipidemia LDL goal < 70     Left ankle sprain     Major depressive disorder, recurrent episode, severe     s/p ECT    Positive AKIRA (antinuclear antibody) 03/03/2021    PVD (peripheral vascular disease)        PAST SURGICAL HISTORY:     Past Surgical History:   Procedure Laterality Date    CHOLECYSTECTOMY      CORONARY ANGIOPLASTY      CORONARY ANGIOPLASTY WITH STENT PLACEMENT  10/2012    2 stents RCA (100% stenosis)    EYE SURGERY      cataract surgery    HYSTERECTOMY      LINA, ovaries intact. uterine  prolapse    ILIAC ARTERY STENT      TONSILLECTOMY      TOTAL VAGINAL HYSTERECTOMY         ALLERGIES:   Propoxyphene n-acetaminophen and Codeine    FAMILY HISTORY:     Family History   Problem Relation Age of Onset    Diabetes Mother     Heart disease Mother     Stroke Father     Heart disease Father     Heart disease Brother     Stroke Brother     Hypertension Daughter     Diabetes Maternal Aunt     Heart disease Maternal Aunt     Heart disease Maternal Uncle     Heart disease Paternal Aunt     Heart disease Paternal Uncle     Heart disease Maternal Grandfather     Diabetes Sister     Heart disease Sister     Cancer Sister         lung    Kidney disease Sister         mass, benign    Breast cancer Sister     Stroke Sister     Dementia Sister     Liver disease Sister     Melanoma Neg Hx     Psoriasis Neg Hx     Lupus Neg Hx     Eczema Neg Hx        SOCIAL HISTORY:     Social History     Tobacco Use    Smoking status: Former     Packs/day: 2.00     Years: 40.00     Pack years: 80.00     Types: Cigarettes     Quit date: 2009     Years since quittin.1    Smokeless tobacco: Never   Substance Use Topics    Alcohol use: Yes     Comment: social        Social History     Substance and Sexual Activity   Sexual Activity Never    Birth control/protection: Abstinence        HOME MEDICATIONS:     Prior to Admission medications    Medication Sig Start Date End Date Taking? Authorizing Provider   acetaminophen (TYLENOL) 325 MG tablet Take 325 mg by mouth every 6 (six) hours as needed for Pain.   Yes Historical Provider   albuterol sulfate (PROAIR RESPICLICK) 90 mcg/actuation AePB Inhale 2 puffs into the lungs every 4 to 6 hours as needed. 18  Yes JEFFREY Sidhu   ALPRAZolam (XANAX) 1 MG tablet Take 1 tablet (1 mg total) by mouth 2 (two) times daily as needed for Anxiety. 19 Yes Celeste Barker MD   aspirin 81 mg Tab Take 81 mg by mouth once daily. Every day 11  Yes Maldonado Borjas MD    atorvastatin (LIPITOR) 40 MG tablet TAKE 1 TABLET BY MOUTH EVERY DAY  Patient taking differently: Take 40 mg by mouth once daily. 3/14/22  Yes Johanne Callejas MD   clonazePAM (KLONOPIN) 1 MG disintegrating tablet Take 1 tablet (1 mg total) by mouth 2 (two) times daily as needed (anxiety). 1/1/23 1/1/24 Yes Johanne Callejas MD   clopidogreL (PLAVIX) 75 mg tablet TAKE 1 TABLET BY MOUTH EVERY DAY  Patient taking differently: Take 75 mg by mouth once daily. 3/25/22  Yes Maldonado Borjas MD   colchicine (COLCRYS) 0.6 mg tablet Take 2 po at gout flare onset, may repeat 1 in an hour prn, then 1 po bid until pain resolves ,or n/V/D starts  Patient taking differently: Take 0.6 mg by mouth as needed. Take 2 po at gout flare onset, may repeat 1 in an hour prn, then 1 po bid until pain resolves ,or n/V/D starts 12/19/22  Yes Johanne Callejas MD   esomeprazole (NEXIUM) 40 MG capsule TAKE 1 CAPSULE BY MOUTH BEFORE BREAKFAST.  Patient taking differently: Take 40 mg by mouth before breakfast. 11/25/22  Yes Johanne Callejas MD   fluticasone propionate (FLONASE) 50 mcg/actuation nasal spray 1 spray (50 mcg total) by Each Nostril route once daily. 1/10/23  Yes Johanne Callejas MD   furosemide (LASIX) 20 MG tablet Take 20 mg by mouth once daily. 10/23/19  Yes Historical Provider   isosorbide mononitrate (IMDUR) 60 MG 24 hr tablet TAKE 1 TABLET BY MOUTH ONCE DAILY  Patient taking differently: Take 60 mg by mouth once daily. 3/25/22  Yes Maldonado Borjas MD   metoprolol succinate (TOPROL-XL) 100 MG 24 hr tablet Take 1 tablet (100 mg total) by mouth once daily. 1/10/23  Yes Johanne Callejas MD   paroxetine (PAXIL) 10 MG tablet Take 10 mg by mouth every morning. Total 50 mg   Yes Historical Provider   paroxetine (PAXIL) 40 MG tablet TAKE 1 TABLET BY MOUTH EVERY DAY  Patient taking differently: Take 50 mg by mouth once daily. Total 50 mg 3/14/22  Yes Johanne Callejas MD   QUEtiapine (SEROQUEL) 100 MG Tab Take 100 mg by mouth once  "daily. 3/25/21  Yes Historical Provider   TRELEGY ELLIPTA 200-62.5-25 mcg inhaler INHALE 1 PUFF INTO THE LUNGS ONCE DAILY. 6/30/22  Yes Jhoanne Callejas MD   triamterene-hydrochlorothiazide 37.5-25 mg (DYAZIDE) 37.5-25 mg per capsule TAKE 1 CAPSULE BY MOUTH ONCE DAILY 10/2/22  Yes Maldonado Borjas MD   colestipoL (COLESTID) 1 gram Tab Take 1 tablet (1 g total) by mouth 2 (two) times daily as needed (diarrhea). 9/14/21 1/20/23  Johanne Callejas MD   ergocalciferol (ERGOCALCIFEROL) 50,000 unit Cap Take 1 capsule (50,000 Units total) by mouth every 7 days.  Patient taking differently: Take 50,000 Units by mouth every 7 days. FRIDAYS 11/1/22   Ramirez Morrow MD   meclizine (ANTIVERT) 25 mg tablet Take 1 tablet (25 mg total) by mouth 3 (three) times daily as needed for Dizziness. 1/10/23   Johanne Callejas MD       PHYSICAL EXAM:     BP (!) 198/84   Pulse 94   Temp (!) 101.1 °F (38.4 °C)   Resp 19   Ht 5' 2" (1.575 m)   Wt 90.3 kg (199 lb)   SpO2 95%   BMI 36.40 kg/m²     Gen: alert, responsive  HEENT:  Eyes - no pallor  External ears with no lesions  Nares patent  Mouth/Throat:  trachea midline  CV: RRR, +murmur  Lungs: +RHONCHI, wheezing  Abd: +BS, soft, NT, ND  Ext: no atrophy; +BLE edema  Skin: warm, dry  Neuro: grossly intact  Psych: pleasant     LABS AND IMAGING:     Labs Reviewed   CBC W/ AUTO DIFFERENTIAL - Abnormal; Notable for the following components:       Result Value    RBC 3.04 (*)     Hemoglobin 8.7 (*)     Hematocrit 28.7 (*)     MCHC 30.3 (*)     RDW 18.1 (*)     Lymph # 0.2 (*)     Mono # 0.2 (*)     Gran % 91.7 (*)     Lymph % 3.4 (*)     Mono % 2.8 (*)     All other components within normal limits   COMPREHENSIVE METABOLIC PANEL - Abnormal; Notable for the following components:    CO2 20 (*)     Glucose 202 (*)     Calcium 7.9 (*)     Albumin 2.4 (*)     eGFR 47.5 (*)     All other components within normal limits   B-TYPE NATRIURETIC PEPTIDE - Abnormal; Notable for the following " components:     (*)     All other components within normal limits   TROPONIN I HIGH SENSITIVITY - Abnormal; Notable for the following components:    Troponin I High Sensitivity 46.3 (*)     All other components within normal limits   TYPE & SCREEN - Abnormal; Notable for the following components:    Indirect Aníbal POS (*)     All other components within normal limits   CULTURE, BLOOD   CULTURE, BLOOD   CULTURE, RESPIRATORY   RESPIRATORY VIRAL PANEL PCR, PEDS UNDER 7 MTHS   MRSA SCREEN BY PCR   LACTIC ACID, PLASMA   INFLUENZA A AND B ANTIGEN    Narrative:     Specimen Source->Nasopharyngeal Swab   APTT   PROTIME-INR   PROCALCITONIN   URINALYSIS, REFLEX TO URINE CULTURE   OCCULT BLOOD X 1, STOOL   TROPONIN I HIGH SENSITIVITY   HEMOGLOBIN A1C   ANTIBODY IDENTIFICATION       Imaging Results              CTA Chest Non-Coronary (PE Studies) (Final result)  Result time 01/25/23 16:26:40      Final result by Vitaly Luna Jr., MD (01/25/23 16:26:40)                   Impression:      1.  No CT evidence of acute pulmonary thromboembolism.    2.  Diffuse inter lobar and interlobular septal thickening with evidence of moderate peribronchial cuffing on a chronic basis due to underlying interstitial fibrotic process.      Electronically signed by: Vitaly Luna MD  Date:    01/25/2023  Time:    16:26               Narrative:      CMS MANDATED QUALITY DATA - CT RADIATION - 436    All CT scans at this facility utilize dose modulation, iterative reconstruction, and/or weight based dosing when appropriate to reduce radiation dose to as low as reasonably achievable    EXAMINATION:  CTA CHEST NON CORONARY (PE STUDIES)    CLINICAL HISTORY:  Pulmonary embolism (PE) suspected, unknown D-dimer;    TECHNIQUE:  Routine CT angiogram of the chest protocol performed after the IV administration of 100 mL Omnipaque 350. 3D reconstructions/reformats/MIPs obtained. All CT scans at this facility are performed  using dose modulation  techniques as appropriate to performed exam including the following:  automated exposure control; adjustment of mA and/or kV according to the patients size (this includes techniques or standardized protocols for targeted exams where dose is matched to indication/reason for exam: i.e. extremities or head);  iterative reconstruction technique.    COMPARISON:  Comparison made with the patient's previous conventional radiographs of the chest of 1/25/2023 3 grade    FINDINGS:  Low quality examination for pulmonary embolism as there is limited opacification pulmonary arterial tree due to timing the contrast bolus and hemodynamics the patient's cardiac output, however no evidence of pulmonary embolism is established of the pulmonary arterial system.  The main pulmonary trunk is patent without evidence of a saddle embolus at the branch point of the main pulmonary artery.  Extensive atheromatous calcifications of the major coronary vessels, normal contour the cardiac chambers, with mild concentric thickening lower left ventricular myocardium.    Parenchymal lung window settings demonstrate evidence of localized areas of prominent intralobar interlobular septal thickening, regional areas of subpleural fibrosis in the basilar segments, and localized scarring or consolidation the right middle lobe medial segment with pericardial contact.  There is no evidence of pneumothorax.  Diffuse bronchial wall thickening is noted on a chronic basis.  There is no evidence of pneumothorax.  No significant pleural effusion.    The upper abdominal structures demonstrate no evidence of significant abnormality.  The liver is fatty replaced.  Extensive atheromatous calcifications of the upper abdominal vessels.                                       X-Ray Chest AP Portable (Final result)  Result time 01/25/23 15:05:46      Final result by Caden France MD (01/25/23 15:05:46)                   Narrative:    Reason: Sepsis  "sob    FINDINGS:  Portable chest at 1443 compared with 1/1/2023 shows normal cardiomediastinal silhouette. Patient is rotated.    Diffuse pulmonary interstitial opacities and patchy lower lung zone alveolar opacities have slightly increased in the interval. No pleural effusion or pneumothorax. Central pulmonary vasculature is unchanged. No acute osseous abnormality.    IMPRESSION:  Slight worsening of diffuse pulmonary interstitial and patchy lower lung zone alveolar opacities. Potential etiologies include pulmonary edema, pneumonia, or alveolar hemorrhage.    Electronically signed by:  Caden France MD  1/25/2023 3:05 PM CST Workstation: [id number]                                    ASSESSMENT & PLAN:   Betty Bacon is a 72 y.o. female admitted for    Active Hospital Problems    Diagnosis  POA    *CAP (community acquired pneumonia) [J18.9]  Yes    Chronic diastolic heart failure [I50.32]  Yes    GERD (gastroesophageal reflux disease) [K21.9]  Yes    Interstitial lung disease [J84.9]  Yes    CAD (coronary artery disease) [I25.10]  Yes     NEFTALY RCA 7/2004  Stents x 2 RCA 10/2012      Aortic stenosis [I35.0]  Yes     moderate      Generalized anxiety disorder [F41.1]  Yes    PAD (peripheral artery disease) [I73.9]  Yes     Bilateral IRINA stents 2004      COPD exacerbation [J44.1]  Yes    Benign hypertension [I10]  Yes    CKD (chronic kidney disease) stage 3, GFR 30-59 ml/min [N18.30]  Yes      Resolved Hospital Problems   No resolved problems to display.        Plan    Acute on chronic hypoxemic respiratory failure  COPD exacerbation  CAP  ILD  Recent COVID pneumonia  - supplemental oxygen, wean as tolerated  - home oxygen requirement:  4L NC  - solumedrol, nebs  - empiric vanc, zosyn started in ER, continue for now  - Bcx, RespCx  - pulmonology consulted, thank you    CAD, CHF  - continue asa, plavix, statin  - continue lasix  - CXR reports "pulmonary edema, pneumonia, or alveolar hemorrhage." Hemorrhage " unlikely clinically, but will trend H&H and hold ppx AC for now    HTN  - pt hasn't taken her medication since yesterday  - restart BB, adding back medication prn  - hydralazine prn    Chronic conditions as noted above/below; home medications reviewed personally by me and restarted as appropriate  Electrolyte derangement:  Trending BMP; Mg; replacement prn  DVT ppx:  SCDs  FULL CODE      - The above conditions include an acute and/or chronic illness that poses a threat to life (or bodily function).  - Previous notes/encounters/external records reviewed personally by me.  - Pt's case personally discussed with another physician:  Dr Eulogio Shea MD  [facility] Hospitalist  01/25/2023

## 2023-01-26 NOTE — CONSULTS
[facility]  Department of Cardiology  Consult Note      PATIENT NAME: Betty Bacon  MRN: [id number]  TODAY'S DATE: 01/26/2023  ADMIT DATE: 1/25/2023                          CONSULT REQUESTED BY: Prachi Wilson MD    SUBJECTIVE     PRINCIPAL PROBLEM: CAP (community acquired pneumonia)      REASON FOR CONSULT:  CAD on DAPT  Anemia      HPI:        72 year old with recent COVID, COPD, ILD CAD, HFpEF admitted with SOB diagnosed with pneumonia vs bronchitis and mild CHF. She follows with a cardiologist in [facility], Dr. Webb. Last ECHO 12/2022 shows Grade 2 Diastolic Dysfunction, Severe Aortic Stenosis mean gradient 26 PA pressure 44. Dr. Borjas just saw patient 6 days ago and wants to repeat ECHO in 2 months. She is being followed by this. Last stress test 2014 showed small abnormality this was done by Dr. Sandhu. CTA done this admit shows extensive calcification of the major coronary arteries. Patient has been having progressive anemia. Hemoglobin 8.2 . OB negative. This however is a drop from 11.3 on 1/1 this year.     History of Stents are as follows    2012- . Successful PCI Prox RCA  2014- Successful right renal PTAS.    Labs show Magnesium 1.2 today replace this    FROM [facility] AND P     Betty Bacon is a 72 y.o. White female   With PMH of chronic respiratory failure on 4LNC,  COPD, ILD, recent COVID pneumonia,  CAD, HFpEF,  who presents with SOB.     Onset today  Progressively worsening  Worse with exertion  Alleviated with rest  +coughing, dry  No CP  No palpitations  No n/v  Diarrhea one week ago but not since  No dysuria  +subjective fever  +chills  +LE edema, pt not sure if worsening/acute    Review of patient's allergies indicates:   Allergen Reactions    Propoxyphene n-acetaminophen Other (See Comments)     Other reaction(s): Unknown    Codeine Anxiety       Past Medical History:   Diagnosis Date    Acute coronary artery obstruction without MI 10/2012    Benign hypertension      COPD (chronic obstructive pulmonary disease)     Coronary artery disease     Disorder of kidney and ureter     Dr. Morrow    Hepatitis B core antibody positive 2021    Negative sAg, suggests previous exposure but no chronic/active Hep B. At risk for reactivation with any immunosuppression medication, steroids, chemo, etc.      History of electroconvulsive therapy     Hyperlipidemia LDL goal < 70     Left ankle sprain     Major depressive disorder, recurrent episode, severe     s/p ECT    Positive AKIRA (antinuclear antibody) 2021    PVD (peripheral vascular disease)      Past Surgical History:   Procedure Laterality Date    CHOLECYSTECTOMY      CORONARY ANGIOPLASTY      CORONARY ANGIOPLASTY WITH STENT PLACEMENT  10/2012    2 stents RCA (100% stenosis)    EYE SURGERY      cataract surgery    HYSTERECTOMY      LINA, ovaries intact. uterine prolapse    ILIAC ARTERY STENT      TONSILLECTOMY      TOTAL VAGINAL HYSTERECTOMY       Social History     Tobacco Use    Smoking status: Former     Packs/day: 2.00     Years: 40.00     Pack years: 80.00     Types: Cigarettes     Quit date: 2009     Years since quittin.1    Smokeless tobacco: Never   Substance Use Topics    Alcohol use: Yes     Comment: social    Drug use: No        REVIEW OF SYSTEMS    +SOB  OBJECTIVE     VITAL SIGNS (Most Recent)  Temp: 97.1 °F (36.2 °C) (23)  Pulse: 85 (23)  Resp: 18 (23)  BP: (!) 156/87 (notifed nurse) (23)  SpO2: 96 % (23)    VENTILATION STATUS  Resp: 18 (23)  SpO2: 96 % (23)       I & O (Last 24H):  Intake/Output Summary (Last 24 hours) at 2023 1017  Last data filed at 2023 0609  Gross per 24 hour   Intake 1383.39 ml   Output 600 ml   Net 783.39 ml       WEIGHTS  Wt Readings from Last 3 Encounters:   23 0400 88.3 kg (194 lb 10.7 oz)   23 195 87.9 kg (193 lb 12.6 oz)   23 1339 90.3 kg (199 lb)   23 1040 90.6 kg  (199 lb 11.8 oz)   01/10/23 0950 89.5 kg (197 lb 5 oz)       PHYSICAL EXAM FROM [facility] AND P      Gen: alert, responsive  HEENT:  Eyes - no pallor  External ears with no lesions  Nares patent  Mouth/Throat:  trachea midline  CV: RRR, +murmur  Lungs: +RHONCHI, wheezing  Abd: +BS, soft, NT, ND  Ext: no atrophy; +BLE edema  Skin: warm, dry  Neuro: grossly intact  Psych: pleasant          HOME MEDICATIONS:  No current facility-administered medications on file prior to encounter.     Current Outpatient Medications on File Prior to Encounter   Medication Sig Dispense Refill    acetaminophen (TYLENOL) 325 MG tablet Take 325 mg by mouth every 6 (six) hours as needed for Pain.      albuterol sulfate (PROAIR RESPICLICK) 90 mcg/actuation AePB Inhale 2 puffs into the lungs every 4 to 6 hours as needed. 1 each 3    ALPRAZolam (XANAX) 1 MG tablet Take 1 tablet (1 mg total) by mouth 2 (two) times daily as needed for Anxiety. 60 tablet 3    aspirin 81 mg Tab Take 81 mg by mouth once daily. Every day      atorvastatin (LIPITOR) 40 MG tablet TAKE 1 TABLET BY MOUTH EVERY DAY (Patient taking differently: Take 40 mg by mouth once daily.) 90 tablet 3    clonazePAM (KLONOPIN) 1 MG disintegrating tablet Take 1 tablet (1 mg total) by mouth 2 (two) times daily as needed (anxiety). 60 tablet 0    clopidogreL (PLAVIX) 75 mg tablet TAKE 1 TABLET BY MOUTH EVERY DAY (Patient taking differently: Take 75 mg by mouth once daily.) 90 tablet 3    colchicine (COLCRYS) 0.6 mg tablet Take 2 po at gout flare onset, may repeat 1 in an hour prn, then 1 po bid until pain resolves ,or n/V/D starts (Patient taking differently: Take 0.6 mg by mouth as needed. Take 2 po at gout flare onset, may repeat 1 in an hour prn, then 1 po bid until pain resolves ,or n/V/D starts) 20 tablet 0    esomeprazole (NEXIUM) 40 MG capsule TAKE 1 CAPSULE BY MOUTH BEFORE BREAKFAST. (Patient taking differently: Take 40 mg by mouth before breakfast.) 90 capsule 3    fluticasone propionate  (FLONASE) 50 mcg/actuation nasal spray 1 spray (50 mcg total) by Each Nostril route once daily. 16 g PRN    furosemide (LASIX) 20 MG tablet Take 20 mg by mouth once daily.  0    isosorbide mononitrate (IMDUR) 60 MG 24 hr tablet TAKE 1 TABLET BY MOUTH ONCE DAILY (Patient taking differently: Take 60 mg by mouth once daily.) 90 tablet 3    metoprolol succinate (TOPROL-XL) 100 MG 24 hr tablet Take 1 tablet (100 mg total) by mouth once daily. 90 tablet 3    paroxetine (PAXIL) 10 MG tablet Take 10 mg by mouth every morning. Total 50 mg      paroxetine (PAXIL) 40 MG tablet TAKE 1 TABLET BY MOUTH EVERY DAY (Patient taking differently: Take 50 mg by mouth once daily. Total 50 mg) 90 tablet 3    QUEtiapine (SEROQUEL) 100 MG Tab Take 100 mg by mouth once daily.      TRELEGY ELLIPTA 200-62.5-25 mcg inhaler INHALE 1 PUFF INTO THE LUNGS ONCE DAILY. 180 each 2    triamterene-hydrochlorothiazide 37.5-25 mg (DYAZIDE) 37.5-25 mg per capsule TAKE 1 CAPSULE BY MOUTH ONCE DAILY 90 capsule 2    colestipoL (COLESTID) 1 gram Tab Take 1 tablet (1 g total) by mouth 2 (two) times daily as needed (diarrhea). 180 tablet 3    ergocalciferol (ERGOCALCIFEROL) 50,000 unit Cap Take 1 capsule (50,000 Units total) by mouth every 7 days. (Patient taking differently: Take 50,000 Units by mouth every 7 days. FRIDAYS) 12 capsule 3    meclizine (ANTIVERT) 25 mg tablet Take 1 tablet (25 mg total) by mouth 3 (three) times daily as needed for Dizziness. 30 tablet PRN       SCHEDULED MEDS:   albuterol-ipratropium  3 mL Nebulization Q4H    budesonide  1 mg Nebulization Q12H    And    arformoteroL  15 mcg Nebulization BID    aspirin  81 mg Oral Daily    atorvastatin  40 mg Oral Daily    clopidogreL  75 mg Oral Daily    fluticasone propionate  1 spray Each Nostril Daily    furosemide (LASIX) injection  40 mg Intravenous Daily    magnesium oxide  400 mg Oral Daily    methylPREDNISolone sodium succinate injection  40 mg Intravenous Q8H    metoprolol succinate  50  mg Oral Daily    pantoprazole  40 mg Oral Before breakfast    paroxetine  40 mg Oral Daily    piperacillin-tazobactam (ZOSYN) IVPB  3.375 g Intravenous Q8H    QUEtiapine  100 mg Oral QHS       CONTINUOUS INFUSIONS:    PRN MEDS:acetaminophen, albuterol-ipratropium, ALPRAZolam, dextrose 10%, dextrose 10%, glucagon (human recombinant), glucose, glucose, hydrALAZINE, hydrALAZINE, HYDROcodone-acetaminophen, magnesium oxide, magnesium oxide, meclizine, melatonin, naloxone, ondansetron, polyethylene glycol, potassium bicarbonate, potassium bicarbonate, potassium bicarbonate, potassium, sodium phosphates, potassium, sodium phosphates, potassium, sodium phosphates, senna-docusate 8.6-50 mg, simethicone, sodium chloride 0.9%    LABS AND DIAGNOSTICS     CBC LAST 3 DAYS  Recent Labs   Lab 01/25/23  1436 01/25/23  2350 01/26/23  0529   WBC 6.09 3.99 4.05   RBC 3.04* 2.93* 2.90*   HGB 8.7* 8.3* 8.2*   HCT 28.7* 27.8* 27.4*   MCV 94 95 95   MCH 28.6 28.3 28.3   MCHC 30.3* 29.9* 29.9*   RDW 18.1* 18.2* 17.9*    180 186   MPV 10.3 10.4 10.2   GRAN 91.7*  5.6 91.2*  3.6 84.7*  3.4   LYMPH 3.4*  0.2* 7.3*  0.3* 11.9*  0.5*   MONO 2.8*  0.2* 1.0*  0.0* 2.7*  0.1*   BASO 0.02 0.00 0.00   NRBC 0 0 0       COAGULATION LAST 3 DAYS  Recent Labs   Lab 01/25/23  1436   INR 1.1   APTT 26.8       CHEMISTRY LAST 3 DAYS  Recent Labs   Lab 01/25/23  1436 01/26/23  0529    139   K 4.3 3.5    108   CO2 20* 24   ANIONGAP 13 7*   BUN 20 23   CREATININE 1.2 1.3   * 209*   CALCIUM 7.9* 8.0*   MG  --  1.2*   ALBUMIN 2.4*  --    PROT 6.0  --    ALKPHOS 68  --    ALT 11  --    AST 12  --    BILITOT 0.8  --        CARDIAC PROFILE LAST 3 DAYS  Recent Labs   Lab 01/25/23  1436 01/25/23  2025   *  --    TROPONINIHS 46.3* 59.4*       ENDOCRINE LAST 3 DAYS  Recent Labs   Lab 01/25/23  1503   PROCAL 0.36       LAST ARTERIAL BLOOD GAS  ABG  No results for input(s): PH, PO2, PCO2, HCO3, BE in the last 168 hours.    LAST 7  DAYS MICROBIOLOGY   Microbiology Results (last 7 days)       Procedure Component Value Units Date/Time    Respiratory Infection Panel (PCR), Nasopharyngeal [[id number]] Collected: 01/25/23 1755    Order Status: Completed Updated: 01/26/23 0036     Respiratory Infection Panel Source NP swab     Adenovirus Not Detected     Coronavirus 229E, Common Cold Virus Not Detected     Coronavirus HKU1, Common Cold Virus Not Detected     Coronavirus NL63, Common Cold Virus Not Detected     Coronavirus OC43, Common Cold Virus Not Detected     Comment: The Coronavirus strains detected in this test cause the common cold.  These strains are not the COVID-19 (novel Coronavirus)strain   associated with the respiratory disease outbreak.          SARS-CoV2 (COVID-19) Qualitative PCR Not Detected     Human Metapneumovirus Not Detected     Human Rhinovirus/Enterovirus Not Detected     Influenza A (subtypes H1, H1-2009,H3) Not Detected     Influenza B Not Detected     Parainfluenza Virus 1 Not Detected     Parainfluenza Virus 2 Not Detected     Parainfluenza Virus 3 Not Detected     Parainfluenza Virus 4 Not Detected     Respiratory Syncytial Virus Not Detected     Bordetella Parapertussis (KO8582) Not Detected     Bordetella pertussis (ptxP) Not Detected     Chlamydia pneumoniae Not Detected     Mycoplasma pneumoniae Not Detected     Comment: Respiratory Infection Panel testing performed by Multiplex PCR.       Narrative:      Specimen Source->Nasopharyngeal Swab    Blood culture x two cultures. Draw prior to antibiotics. [[id number]] Collected: 01/25/23 1504    Order Status: Completed Specimen: Blood from Peripheral, Antecubital, Right Updated: 01/25/23 2158     Blood Culture, Routine No Growth to date    Narrative:      Aerobic and anaerobic    Blood culture x two cultures. Draw prior to antibiotics. [[id number]] Collected: 01/25/23 1504    Order Status: Completed Specimen: Blood from Peripheral, Antecubital, Left Updated: 01/25/23 2158      Blood Culture, Routine No Growth to date    Narrative:      Aerobic and anaerobic    MRSA Screen by PCR [[id number]]     Order Status: No result Specimen: Nasopharyngeal Swab from Nasal     Resp Viral Panel PCR, Peds Under 7 Months Nasopharyngeal Swab [[id number]] Collected: 01/25/23 3585    Order Status: Canceled     Culture, Respiratory with Gram Stain [[id number]]     Order Status: No result Specimen: Respiratory             MOST RECENT IMAGING  CTA Chest Non-Coronary (PE Studies)  Narrative: CMS MANDATED QUALITY DATA - CT RADIATION - 436    All CT scans at this facility utilize dose modulation, iterative reconstruction, and/or weight based dosing when appropriate to reduce radiation dose to as low as reasonably achievable    EXAMINATION:  CTA CHEST NON CORONARY (PE STUDIES)    CLINICAL HISTORY:  Pulmonary embolism (PE) suspected, unknown D-dimer;    TECHNIQUE:  Routine CT angiogram of the chest protocol performed after the IV administration of 100 mL Omnipaque 350. 3D reconstructions/reformats/MIPs obtained. All CT scans at this facility are performed  using dose modulation techniques as appropriate to performed exam including the following:  automated exposure control; adjustment of mA and/or kV according to the patients size (this includes techniques or standardized protocols for targeted exams where dose is matched to indication/reason for exam: i.e. extremities or head);  iterative reconstruction technique.    COMPARISON:  Comparison made with the patient's previous conventional radiographs of the chest of 1/25/2023 3 grade    FINDINGS:  Low quality examination for pulmonary embolism as there is limited opacification pulmonary arterial tree due to timing the contrast bolus and hemodynamics the patient's cardiac output, however no evidence of pulmonary embolism is established of the pulmonary arterial system.  The main pulmonary trunk is patent without evidence of a saddle embolus at the branch point of the  main pulmonary artery.  Extensive atheromatous calcifications of the major coronary vessels, normal contour the cardiac chambers, with mild concentric thickening lower left ventricular myocardium.    Parenchymal lung window settings demonstrate evidence of localized areas of prominent intralobar interlobular septal thickening, regional areas of subpleural fibrosis in the basilar segments, and localized scarring or consolidation the right middle lobe medial segment with pericardial contact.  There is no evidence of pneumothorax.  Diffuse bronchial wall thickening is noted on a chronic basis.  There is no evidence of pneumothorax.  No significant pleural effusion.    The upper abdominal structures demonstrate no evidence of significant abnormality.  The liver is fatty replaced.  Extensive atheromatous calcifications of the upper abdominal vessels.  Impression: 1.  No CT evidence of acute pulmonary thromboembolism.    2.  Diffuse inter lobar and interlobular septal thickening with evidence of moderate peribronchial cuffing on a chronic basis due to underlying interstitial fibrotic process.    Electronically signed by: Vitaly Luna MD  Date:    01/25/2023  Time:    16:26  X-Ray Chest AP Portable  Reason: Sepsis sob    FINDINGS:  Portable chest at 1443 compared with 1/1/2023 shows normal cardiomediastinal silhouette. Patient is rotated.    Diffuse pulmonary interstitial opacities and patchy lower lung zone alveolar opacities have slightly increased in the interval. No pleural effusion or pneumothorax. Central pulmonary vasculature is unchanged. No acute osseous abnormality.    IMPRESSION:  Slight worsening of diffuse pulmonary interstitial and patchy lower lung zone alveolar opacities. Potential etiologies include pulmonary edema, pneumonia, or alveolar hemorrhage.    Electronically signed by:  Caden France MD  1/25/2023 3:05 PM CST Workstation: [id number]      ECHOCARDIOGRAM RESULTS (last 5)  Results for orders  placed during the hospital encounter of 12/20/22    Echo    Interpretation Summary  · The left ventricle is normal in size with severe concentric hypertrophy and normal systolic function.  · Grade II left ventricular diastolic dysfunction.  · The estimated ejection fraction is 65%.  · Normal right ventricular size with normal right ventricular systolic function.  · Moderate aortic regurgitation.  · There is severe aortic valve stenosis.  · Aortic valve area is 0.94 cm2; peak velocity is 3.29 m/s; mean gradient is 26 mmHg.  · Mild pulmonic regurgitation.  · Mild tricuspid regurgitation.  · There is mild pulmonary hypertension.  · Normal central venous pressure (3 mmHg).  · The estimated PA systolic pressure is 44 mmHg.      Results for orders placed during the hospital encounter of 12/01/22    Echo    Interpretation Summary  · The left ventricle is normal in size with moderate concentric hypertrophy and normal systolic function.  · The estimated ejection fraction is 55%.  · Grade I left ventricular diastolic dysfunction.  · Normal right ventricular size with normal right ventricular systolic function.  · Severe left atrial enlargement.  · Moderate right atrial enlargement.  · Mild aortic regurgitation.  · There is moderate-to-severe aortic valve stenosis.  · Aortic valve area is 1.16 cm2; peak velocity is 3.59 m/s; mean gradient is 31 mmHg.  · Moderate tricuspid regurgitation.  · Normal central venous pressure (3 mmHg).  · The estimated PA systolic pressure is 53 mmHg.  · There is pulmonary hypertension.      Results for orders placed in visit on 06/02/22    Echo    Interpretation Summary  · The left ventricle is normal in size with concentric hypertrophy and normal systolic function.  · The estimated ejection fraction is 55%.  · Grade II left ventricular diastolic dysfunction.  · Normal right ventricular size with normal right ventricular systolic function.  · Severe left atrial enlargement.  · Mild right atrial  enlargement.  · The aortic valve is trileaflet but malformed.  · Moderate aortic regurgitation.  · There is moderate-to-severe aortic valve stenosis.  · Aortic valve area is 1.00 cm2; peak velocity is 3.38 m/s; mean gradient is 29 mmHg.  · Mild mitral regurgitation.  · Mild to moderate tricuspid regurgitation.  · Normal central venous pressure (3 mmHg).  · The estimated PA systolic pressure is 51 mmHg.  · There is pulmonary hypertension.      Results for orders placed in visit on 06/23/20    Echo Color Flow Doppler? Yes    Interpretation Summary  · The left ventricle is normal in size with mild concentric hypertrophy and normal systolic function.  · The estimated ejection fraction is 55%.  · Grade II left ventricular diastolic dysfunction.  · Normal right ventricular size with normal right ventricular systolic function.  · Moderate left atrial enlargement.  · There is moderate aortic valve stenosis.  · Aortic valve area is 1.17 cm2; peak velocity is 2.98 m/s; mean gradient is 21 mmHg.  · Mild aortic regurgitation.  · Mild mitral regurgitation.  · Normal central venous pressure (3 mmHg).  · The estimated PA systolic pressure is 41 mmHg.      Results for orders placed in visit on 06/22/20    Echo Color Flow Doppler? Yes    Interpretation Summary  · Normal left ventricular systolic function. The estimated ejection fraction is 55%.  · Mild eccentric left ventricular hypertrophy.  · Grade II (moderate) left ventricular diastolic dysfunction consistent with pseudonormalization.  · Normal right ventricular systolic function.  · Mild aortic regurgitation.  · Moderate aortic valve stenosis.  · Aortic valve area is 1.09 cm2; peak velocity is 2.99 m/s; mean gradient is 22 mmHg.  · Normal central venous pressure (3 mmHg).  · The estimated PA systolic pressure is 35 mmHg.      CURRENT/PREVIOUS VISIT EKG  Results for orders placed or performed during the hospital encounter of 01/01/23   EKG 12-lead    Collection Time: 01/25/23   1:31 PM    Narrative    Test Reason : R06.02,    Vent. Rate : 110 BPM     Atrial Rate : 110 BPM     P-R Int : 144 ms          QRS Dur : 084 ms      QT Int : 330 ms       P-R-T Axes : 053 002 016 degrees     QTc Int : 446 ms    Sinus tachycardia  Inferior infarct (cited on or before 01-JAN-2023)  Abnormal ECG  When compared with ECG of 01-JAN-2023 14:42,  No significant change was found    Referred By: [email or web address]   SELF           Confirmed By:            ASSESSMENT/PLAN:     Active Hospital Problems    Diagnosis    *Possible pneumonia versus bronchitis versus other    Anemia, acute on chronic, progressive    Hypomagnesemia    Chronic respiratory failure with hypoxia, on 4 L oxygen    Acute on chronic heart failure with preserved ejection fraction    Severe obesity with body mass index (BMI) of 35.0 to 39.9 with serious comorbidity    GERD (gastroesophageal reflux disease)    Interstitial lung disease    CAD (coronary artery disease)     NEFTALY RCA 7/2004  Stents x 2 RCA 10/2012      Valvular heart disease, severe aortic stenosis, moderate aortic regurgitation     moderate      Generalized anxiety disorder    PAD (peripheral artery disease)     Bilateral IRINA stents 2004      Essential hypertension    CKD (chronic kidney disease) stage 3, GFR 30-59 ml/min       ASSESSMENT & PLAN:       SOB- Multifactorial including bronchitis/pneumonia, recent COVID Pneumonia, AS, ILD,Diastolic Dysfunction and Mild CHF, CAD history and Obesity  2.  Anemia Acute on Chronic on Dual Antiplatelet therapy last coronary stent 2012 last renal stent 2014- Anemia also comes with Severe Aortic Stenosis (Heyde Syndrome) SFOB- Negative  3.  Severe Aortic Stenosis- mean gradient 26 LVEF 65%  4.  Mild Acute CHF exacerbation CHFpEF   5.   Recent Covid Pneumonia  6.   ILD  7. Hypomagnesemia     RECOMMENDATIONS:    Recommend to continue to diurese   Recommend to hold plavix for now  Continue ASA however  Replace Magnesium  Control BP, Add afterload  reduction with Losartan 25 mg daily  Pneumonia/Bronchitis/ILD per hospitalist  Once she is cleared up from a respiratory infection she will need further cardiac work up with her Cardiologist as an OP.  Thank you for the consultation    Stefanie Marroquin NP  [facility] hospital  Department of Cardiology  Date of Service: 01/26/2023      I have personally interviewed and examined the patient, I have reviewed the Nurse Practitioner's history and physical, assessment, and plan. I agree with the findings and plan.   1. Patient has shortness of breath with recent pneumonia and significant bronchitis.  Shortness of breath and difficulty in breathing appears to be multifactorial including exacerbation of diastolic congestive heart failure .    2. Will also do a CT of the chest to evaluate for lingering pneumonia.  3. Consult Pulmonary for the same.      Justin Roca M.D.  Critical access hospital  Department of Cardiology  Date of Service: 01/26/2023  10:17 AM

## 2023-01-26 NOTE — CONSULTS
Pulmonary/Critical Care Consult      PATIENT NAME: Betty Bacon  MRN: [id number]  TODAY'S DATE: 2023  10:20 AM  ADMIT DATE: 2023  AGE: 72 y.o. : 1950    CONSULT REQUESTED BY: Prachi Wilson MD    REASON FOR CONSULT:   Acute on chronic respiratory failure  ILD  Aortic stenosis    HISTORY OF PRESENT ILLNESS   Betty Bacon is a 72 y.o. female with a PMH of COPD, ILD, recent COVID pneumonia, CAD, HFpEF, severe aortic stenosis on 4 L NC at baseline on whom we have been consulted for acute on chronic .    She presented with fevers and shortness of breath. She endorses increased leg swelling and orthopnea prior to presentation.    The patient states she has a diagnosis of asbestosis, as well las COPD. She takes only Trelegy and albuterol for her lung conditions.    SMOKING HISTORY  Quit 25 years ago.  Smoked 2 PPD x 30 years.    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    REVIEW OF SYSTEMS  GENERAL: Feeling better. Night sweats and fevers.  EYES: Vision is good.  ENT: No sinusitis or pharyngitis.   HEART: No chest pain or palpitations. Orthopnea.  LUNGS: Non-productive cough.  GI: No abdominal pain, nausea, vomiting, or diarrhea.  : No dysuria, urgency, or frequency.  SKIN: No lesions or rashes.  MUSCULOSKELETAL: No joint pain or myalgias.  NEURO: No headaches or neuropathy.  LYMPH: Leg swelling.  PSYCH: No anxiety or depression.  ENDO: No unexpected weight change.    ALLERGIES  Review of patient's allergies indicates:   Allergen Reactions    Propoxyphene n-acetaminophen Other (See Comments)     Other reaction(s): Unknown    Codeine Anxiety       INPATIENT SCHEDULED MEDICATIONS   albuterol-ipratropium  3 mL Nebulization Q4H    budesonide  1 mg Nebulization Q12H    And    arformoteroL  15 mcg Nebulization BID    aspirin  81 mg Oral Daily    atorvastatin  40 mg Oral Daily    clopidogreL  75 mg Oral Daily     fluticasone propionate  1 spray Each Nostril Daily    furosemide (LASIX) injection  40 mg Intravenous Daily    magnesium oxide  400 mg Oral Daily    methylPREDNISolone sodium succinate injection  40 mg Intravenous Q8H    metoprolol succinate  50 mg Oral Daily    pantoprazole  40 mg Oral Before breakfast    paroxetine  40 mg Oral Daily    piperacillin-tazobactam (ZOSYN) IVPB  3.375 g Intravenous Q8H    QUEtiapine  100 mg Oral QHS         MEDICAL AND SURGICAL HISTORY  Past Medical History:   Diagnosis Date    Acute coronary artery obstruction without MI 10/2012    Benign hypertension     COPD (chronic obstructive pulmonary disease)     Coronary artery disease     Disorder of kidney and ureter     Dr. Morrow    Hepatitis B core antibody positive 2021    Negative sAg, suggests previous exposure but no chronic/active Hep B. At risk for reactivation with any immunosuppression medication, steroids, chemo, etc.      History of electroconvulsive therapy     Hyperlipidemia LDL goal < 70     Left ankle sprain     Major depressive disorder, recurrent episode, severe     s/p ECT    Positive AKIRA (antinuclear antibody) 2021    PVD (peripheral vascular disease)      Past Surgical History:   Procedure Laterality Date    CHOLECYSTECTOMY      CORONARY ANGIOPLASTY      CORONARY ANGIOPLASTY WITH STENT PLACEMENT  10/2012    2 stents RCA (100% stenosis)    EYE SURGERY      cataract surgery    HYSTERECTOMY      LINA, ovaries intact. uterine prolapse    ILIAC ARTERY STENT      TONSILLECTOMY      TOTAL VAGINAL HYSTERECTOMY         ALCOHOL, TOBACCO AND DRUG USE  Social History     Tobacco Use   Smoking Status Former    Packs/day: 2.00    Years: 40.00    Pack years: 80.00    Types: Cigarettes    Quit date: 2009    Years since quittin.1   Smokeless Tobacco Never     Social History     Substance and Sexual Activity   Alcohol Use Yes    Comment: social     Social History     Substance and Sexual Activity   Drug Use No        FAMILY HISTORY  Family History   Problem Relation Age of Onset    Diabetes Mother     Heart disease Mother     Stroke Father     Heart disease Father     Heart disease Brother     Stroke Brother     Hypertension Daughter     Diabetes Maternal Aunt     Heart disease Maternal Aunt     Heart disease Maternal Uncle     Heart disease Paternal Aunt     Heart disease Paternal Uncle     Heart disease Maternal Grandfather     Diabetes Sister     Heart disease Sister     Cancer Sister         lung    Kidney disease Sister         mass, benign    Breast cancer Sister     Stroke Sister     Dementia Sister     Liver disease Sister     Melanoma Neg Hx     Psoriasis Neg Hx     Lupus Neg Hx     Eczema Neg Hx        VITAL SIGNS (MOST RECENT)  Temp: 97.1 °F (36.2 °C) (01/26/23 0708)  Pulse: 85 (01/26/23 0934)  Resp: 18 (01/26/23 0934)  BP: (!) 156/87 (notifed nurse) (01/26/23 0708)  SpO2: 96 % (01/26/23 0934)    INTAKE AND OUTPUT (LAST 24 HOURS):  Intake/Output Summary (Last 24 hours) at 1/26/2023 1020  Last data filed at 1/26/2023 0609  Gross per 24 hour   Intake 1383.39 ml   Output 600 ml   Net 783.39 ml       WEIGHT  Wt Readings from Last 1 Encounters:   01/26/23 88.3 kg (194 lb 10.7 oz)       PHYSICAL EXAM  GENERAL: A&Ox3. NAD.  HEENT: Extraocular movements intact. Pharynx moist.  NECK: JVD and hepatojugular reflux present.  HEART: Regular rate and normal rhythm. No murmur or gallop auscultated.  LUNGS: Diffuse b/l crackles more prominent at bases.  ABDOMEN: Soft, non-tender, non-distended, no masses palpated.  EXTREMITIES: Normal muscle tone and joint movement, no cyanosis or clubbing.   LYMPHATICS: 1+ b/l pretibial pitting edema  SKIN: Dry, intact, no lesions.   NEURO: No gross deficit.  PSYCH: Appropriate affect    ACUTE PHASE REACTANT (LAST 24 HOURS)  Recent Labs   Lab 01/26/23  0529   FERRITIN 977*       CBC LAST (LAST 24 HOURS)  Recent Labs   Lab 01/26/23  0529   WBC 4.05   RBC 2.90*   HGB 8.2*   HCT 27.4*   MCV 95    MCH 28.3   MCHC 29.9*   RDW 17.9*      MPV 10.2   GRAN 84.7*  3.4   LYMPH 11.9*  0.5*   MONO 2.7*  0.1*   BASO 0.00   NRBC 0       CHEMISTRY LAST (LAST 24 HOURS)  Recent Labs   Lab 01/25/23  1436 01/26/23  0529    139   K 4.3 3.5    108   CO2 20* 24   ANIONGAP 13 7*   BUN 20 23   CREATININE 1.2 1.3   * 209*   CALCIUM 7.9* 8.0*   MG  --  1.2*   ALBUMIN 2.4*  --    PROT 6.0  --    ALKPHOS 68  --    ALT 11  --    AST 12  --    BILITOT 0.8  --        COAGULATION LAST (LAST 24 HOURS)  Recent Labs   Lab 01/25/23  1436   INR 1.1   APTT 26.8       CARDIAC PROFILE (LAST 24 HOURS)  Recent Labs   Lab 01/25/23  1436   *       LAST 7 DAYS MICROBIOLOGY   Microbiology Results (last 7 days)       Procedure Component Value Units Date/Time    Respiratory Infection Panel (PCR), Nasopharyngeal [[id number]] Collected: 01/25/23 1755    Order Status: Completed Updated: 01/26/23 0036     Respiratory Infection Panel Source NP swab     Adenovirus Not Detected     Coronavirus 229E, Common Cold Virus Not Detected     Coronavirus HKU1, Common Cold Virus Not Detected     Coronavirus NL63, Common Cold Virus Not Detected     Coronavirus OC43, Common Cold Virus Not Detected     Comment: The Coronavirus strains detected in this test cause the common cold.  These strains are not the COVID-19 (novel Coronavirus)strain   associated with the respiratory disease outbreak.          SARS-CoV2 (COVID-19) Qualitative PCR Not Detected     Human Metapneumovirus Not Detected     Human Rhinovirus/Enterovirus Not Detected     Influenza A (subtypes H1, H1-2009,H3) Not Detected     Influenza B Not Detected     Parainfluenza Virus 1 Not Detected     Parainfluenza Virus 2 Not Detected     Parainfluenza Virus 3 Not Detected     Parainfluenza Virus 4 Not Detected     Respiratory Syncytial Virus Not Detected     Bordetella Parapertussis (KD3848) Not Detected     Bordetella pertussis (ptxP) Not Detected     Chlamydia pneumoniae Not  Detected     Mycoplasma pneumoniae Not Detected     Comment: Respiratory Infection Panel testing performed by Multiplex PCR.       Narrative:      Specimen Source->Nasopharyngeal Swab    Blood culture x two cultures. Draw prior to antibiotics. [[id number]] Collected: 01/25/23 1504    Order Status: Completed Specimen: Blood from Peripheral, Antecubital, Right Updated: 01/25/23 2158     Blood Culture, Routine No Growth to date    Narrative:      Aerobic and anaerobic    Blood culture x two cultures. Draw prior to antibiotics. [[id number]] Collected: 01/25/23 1504    Order Status: Completed Specimen: Blood from Peripheral, Antecubital, Left Updated: 01/25/23 2158     Blood Culture, Routine No Growth to date    Narrative:      Aerobic and anaerobic    MRSA Screen by PCR [[id number]]     Order Status: No result Specimen: Nasopharyngeal Swab from Nasal     Resp Viral Panel PCR, Peds Under 7 Months Nasopharyngeal Swab [[id number]] Collected: 01/25/23 1755    Order Status: Canceled     Culture, Respiratory with Gram Stain [[id number]]     Order Status: No result Specimen: Respiratory             MOST RECENT IMAGING  CTA Chest Non-Coronary (PE Studies)  Narrative: CMS MANDATED QUALITY DATA - CT RADIATION - 436    All CT scans at this facility utilize dose modulation, iterative reconstruction, and/or weight based dosing when appropriate to reduce radiation dose to as low as reasonably achievable    EXAMINATION:  CTA CHEST NON CORONARY (PE STUDIES)    CLINICAL HISTORY:  Pulmonary embolism (PE) suspected, unknown D-dimer;    TECHNIQUE:  Routine CT angiogram of the chest protocol performed after the IV administration of 100 mL Omnipaque 350. 3D reconstructions/reformats/MIPs obtained. All CT scans at this facility are performed  using dose modulation techniques as appropriate to performed exam including the following:  automated exposure control; adjustment of mA and/or kV according to the patients size (this includes techniques or  standardized protocols for targeted exams where dose is matched to indication/reason for exam: i.e. extremities or head);  iterative reconstruction technique.    COMPARISON:  Comparison made with the patient's previous conventional radiographs of the chest of 1/25/2023 3 grade    FINDINGS:  Low quality examination for pulmonary embolism as there is limited opacification pulmonary arterial tree due to timing the contrast bolus and hemodynamics the patient's cardiac output, however no evidence of pulmonary embolism is established of the pulmonary arterial system.  The main pulmonary trunk is patent without evidence of a saddle embolus at the branch point of the main pulmonary artery.  Extensive atheromatous calcifications of the major coronary vessels, normal contour the cardiac chambers, with mild concentric thickening lower left ventricular myocardium.    Parenchymal lung window settings demonstrate evidence of localized areas of prominent intralobar interlobular septal thickening, regional areas of subpleural fibrosis in the basilar segments, and localized scarring or consolidation the right middle lobe medial segment with pericardial contact.  There is no evidence of pneumothorax.  Diffuse bronchial wall thickening is noted on a chronic basis.  There is no evidence of pneumothorax.  No significant pleural effusion.    The upper abdominal structures demonstrate no evidence of significant abnormality.  The liver is fatty replaced.  Extensive atheromatous calcifications of the upper abdominal vessels.  Impression: 1.  No CT evidence of acute pulmonary thromboembolism.    2.  Diffuse inter lobar and interlobular septal thickening with evidence of moderate peribronchial cuffing on a chronic basis due to underlying interstitial fibrotic process.    Electronically signed by: Vitaly Luna MD  Date:    01/25/2023  Time:    16:26  X-Ray Chest AP Portable  Reason: Sepsis sob    FINDINGS:  Portable chest at 1443 compared  with 1/1/2023 shows normal cardiomediastinal silhouette. Patient is rotated.    Diffuse pulmonary interstitial opacities and patchy lower lung zone alveolar opacities have slightly increased in the interval. No pleural effusion or pneumothorax. Central pulmonary vasculature is unchanged. No acute osseous abnormality.    IMPRESSION:  Slight worsening of diffuse pulmonary interstitial and patchy lower lung zone alveolar opacities. Potential etiologies include pulmonary edema, pneumonia, or alveolar hemorrhage.    Electronically signed by:  Caden France MD  1/25/2023 3:05 PM CST Workstation: [id number]      CURRENT VISIT EKG  No results found for this visit on 01/25/23.    ECHOCARDIOGRAM RESULTS  Results for orders placed during the hospital encounter of 12/20/22    Echo    Interpretation Summary  · The left ventricle is normal in size with severe concentric hypertrophy and normal systolic function.  · Grade II left ventricular diastolic dysfunction.  · The estimated ejection fraction is 65%.  · Normal right ventricular size with normal right ventricular systolic function.  · Moderate aortic regurgitation.  · There is severe aortic valve stenosis.  · Aortic valve area is 0.94 cm2; peak velocity is 3.29 m/s; mean gradient is 26 mmHg.  · Mild pulmonic regurgitation.  · Mild tricuspid regurgitation.  · There is mild pulmonary hypertension.  · Normal central venous pressure (3 mmHg).  · The estimated PA systolic pressure is 44 mmHg.        RESPIRATORY SUPPORT          PFTs (6/18/13)      LAST ARTERIAL BLOOD GAS  ABG  No results for input(s): PH, PO2, PCO2, HCO3, BE in the last 168 hours.    IMPRESSION AND PLAN  Betty Bacon is a 72 y.o. female with a PMH of COPD, ILD, recent COVID pneumonia, CAD, HFpEF, severe aortic stenosis on 4 L NC at baseline on whom we have been consulted for acute on chronic .    #Asbestosis  Pattern on CT of 1/2022 consistent with asbestosis, but could also reflect UIP with an atypical  upper lobe predominance. Current CTA chest shows this in addition to mosaicism/GGOs not present on prior scan, likely due to pulmonary edema and possibly pneumonia as well.  #Possible bacterial pneumonia  #Possible sepsis  #COPD with acute exacerbation  #Dyspnea  #Severe aortic stenosis  #Moderate aortic regurgitation  #HFpEF  BNP elevated at 482 (has been up to 500s in past, but usually normal).  RVP (-). Procal 0.36.  - agree with cardiology consult and diuresis per them  - strict I&Os  - obtain respiratory culture  - f/u MRSA nares  - continue empiric Abx for now  - DuoNebs  - steroids weaned to prednisone 60 mg daily      Matt Toro MD  [facility] / [facility]  Department of Pulmonology  Date of Service: 01/26/2023  10:20 AM

## 2023-01-27 PROBLEM — E83.42 HYPOMAGNESEMIA: Status: RESOLVED | Noted: 2023-01-26 | Resolved: 2023-01-27

## 2023-01-27 PROBLEM — N17.9 AKI (ACUTE KIDNEY INJURY): Status: ACTIVE | Noted: 2023-01-27

## 2023-01-27 LAB
ANION GAP SERPL CALC-SCNC: 8 MMOL/L (ref 8–16)
BASOPHILS # BLD AUTO: 0.01 K/UL (ref 0–0.2)
BASOPHILS NFR BLD: 0.1 % (ref 0–1.9)
BUN SERPL-MCNC: 29 MG/DL (ref 8–23)
CALCIUM SERPL-MCNC: 8 MG/DL (ref 8.7–10.5)
CHLORIDE SERPL-SCNC: 106 MMOL/L (ref 95–110)
CO2 SERPL-SCNC: 26 MMOL/L (ref 23–29)
CREAT SERPL-MCNC: 1.5 MG/DL (ref 0.5–1.4)
DIFFERENTIAL METHOD: ABNORMAL
EOSINOPHIL # BLD AUTO: 0 K/UL (ref 0–0.5)
EOSINOPHIL NFR BLD: 0 % (ref 0–8)
ERYTHROCYTE [DISTWIDTH] IN BLOOD BY AUTOMATED COUNT: 18.1 % (ref 11.5–14.5)
ERYTHROCYTE [DISTWIDTH] IN BLOOD BY AUTOMATED COUNT: 18.2 % (ref 11.5–14.5)
ERYTHROCYTE [DISTWIDTH] IN BLOOD BY AUTOMATED COUNT: 18.3 % (ref 11.5–14.5)
ERYTHROCYTE [DISTWIDTH] IN BLOOD BY AUTOMATED COUNT: 18.3 % (ref 11.5–14.5)
EST. GFR  (NO RACE VARIABLE): 36.3 ML/MIN/1.73 M^2
GLUCOSE SERPL-MCNC: 164 MG/DL (ref 70–110)
HCT VFR BLD AUTO: 27.2 % (ref 37–48.5)
HCT VFR BLD AUTO: 28.9 % (ref 37–48.5)
HCT VFR BLD AUTO: 29 % (ref 37–48.5)
HCT VFR BLD AUTO: 30.3 % (ref 37–48.5)
HGB BLD-MCNC: 8.1 G/DL (ref 12–16)
HGB BLD-MCNC: 8.7 G/DL (ref 12–16)
HGB BLD-MCNC: 8.8 G/DL (ref 12–16)
HGB BLD-MCNC: 8.9 G/DL (ref 12–16)
IMM GRANULOCYTES # BLD AUTO: 0.06 K/UL (ref 0–0.04)
IMM GRANULOCYTES # BLD AUTO: 0.07 K/UL (ref 0–0.04)
IMM GRANULOCYTES # BLD AUTO: 0.09 K/UL (ref 0–0.04)
IMM GRANULOCYTES # BLD AUTO: 0.22 K/UL (ref 0–0.04)
IMM GRANULOCYTES NFR BLD AUTO: 0.7 % (ref 0–0.5)
IMM GRANULOCYTES NFR BLD AUTO: 0.7 % (ref 0–0.5)
IMM GRANULOCYTES NFR BLD AUTO: 0.9 % (ref 0–0.5)
IMM GRANULOCYTES NFR BLD AUTO: 2 % (ref 0–0.5)
LYMPHOCYTES # BLD AUTO: 0.3 K/UL (ref 1–4.8)
LYMPHOCYTES # BLD AUTO: 0.4 K/UL (ref 1–4.8)
LYMPHOCYTES # BLD AUTO: 0.5 K/UL (ref 1–4.8)
LYMPHOCYTES # BLD AUTO: 0.6 K/UL (ref 1–4.8)
LYMPHOCYTES NFR BLD: 2.8 % (ref 18–48)
LYMPHOCYTES NFR BLD: 4.5 % (ref 18–48)
LYMPHOCYTES NFR BLD: 4.8 % (ref 18–48)
LYMPHOCYTES NFR BLD: 5.2 % (ref 18–48)
MAGNESIUM SERPL-MCNC: 1.8 MG/DL (ref 1.6–2.6)
MCH RBC QN AUTO: 28.5 PG (ref 27–31)
MCH RBC QN AUTO: 28.6 PG (ref 27–31)
MCH RBC QN AUTO: 28.7 PG (ref 27–31)
MCH RBC QN AUTO: 29 PG (ref 27–31)
MCHC RBC AUTO-ENTMCNC: 29.4 G/DL (ref 32–36)
MCHC RBC AUTO-ENTMCNC: 29.8 G/DL (ref 32–36)
MCHC RBC AUTO-ENTMCNC: 30.1 G/DL (ref 32–36)
MCHC RBC AUTO-ENTMCNC: 30.3 G/DL (ref 32–36)
MCV RBC AUTO: 95 FL (ref 82–98)
MCV RBC AUTO: 96 FL (ref 82–98)
MCV RBC AUTO: 96 FL (ref 82–98)
MCV RBC AUTO: 97 FL (ref 82–98)
MONOCYTES # BLD AUTO: 0.2 K/UL (ref 0.3–1)
MONOCYTES # BLD AUTO: 0.3 K/UL (ref 0.3–1)
MONOCYTES # BLD AUTO: 0.4 K/UL (ref 0.3–1)
MONOCYTES # BLD AUTO: 0.5 K/UL (ref 0.3–1)
MONOCYTES NFR BLD: 2 % (ref 4–15)
MONOCYTES NFR BLD: 3.4 % (ref 4–15)
MONOCYTES NFR BLD: 3.8 % (ref 4–15)
MONOCYTES NFR BLD: 4.7 % (ref 4–15)
NEUTROPHILS # BLD AUTO: 10 K/UL (ref 1.8–7.7)
NEUTROPHILS # BLD AUTO: 8.2 K/UL (ref 1.8–7.7)
NEUTROPHILS # BLD AUTO: 9.5 K/UL (ref 1.8–7.7)
NEUTROPHILS # BLD AUTO: 9.6 K/UL (ref 1.8–7.7)
NEUTROPHILS NFR BLD: 89.3 % (ref 38–73)
NEUTROPHILS NFR BLD: 90 % (ref 38–73)
NEUTROPHILS NFR BLD: 90.6 % (ref 38–73)
NEUTROPHILS NFR BLD: 94.2 % (ref 38–73)
NRBC BLD-RTO: 0 /100 WBC
PLATELET # BLD AUTO: 217 K/UL (ref 150–450)
PLATELET # BLD AUTO: 248 K/UL (ref 150–450)
PLATELET # BLD AUTO: 248 K/UL (ref 150–450)
PLATELET # BLD AUTO: 262 K/UL (ref 150–450)
PMV BLD AUTO: 10.1 FL (ref 9.2–12.9)
PMV BLD AUTO: 10.3 FL (ref 9.2–12.9)
PMV BLD AUTO: 10.4 FL (ref 9.2–12.9)
PMV BLD AUTO: 10.4 FL (ref 9.2–12.9)
POTASSIUM SERPL-SCNC: 3.6 MMOL/L (ref 3.5–5.1)
RBC # BLD AUTO: 2.83 M/UL (ref 4–5.4)
RBC # BLD AUTO: 3.03 M/UL (ref 4–5.4)
RBC # BLD AUTO: 3.03 M/UL (ref 4–5.4)
RBC # BLD AUTO: 3.12 M/UL (ref 4–5.4)
SODIUM SERPL-SCNC: 140 MMOL/L (ref 136–145)
WBC # BLD AUTO: 10.22 K/UL (ref 3.9–12.7)
WBC # BLD AUTO: 10.68 K/UL (ref 3.9–12.7)
WBC # BLD AUTO: 11.06 K/UL (ref 3.9–12.7)
WBC # BLD AUTO: 9.04 K/UL (ref 3.9–12.7)

## 2023-01-27 PROCEDURE — 27000221 HC OXYGEN, UP TO 24 HOURS

## 2023-01-27 PROCEDURE — 99232 PR SUBSEQUENT HOSPITAL CARE,LEVL II: ICD-10-PCS | Mod: ,,, | Performed by: INTERNAL MEDICINE

## 2023-01-27 PROCEDURE — 63600175 PHARM REV CODE 636 W HCPCS: Performed by: INTERNAL MEDICINE

## 2023-01-27 PROCEDURE — 97165 OT EVAL LOW COMPLEX 30 MIN: CPT

## 2023-01-27 PROCEDURE — 99900031 HC PATIENT EDUCATION (STAT)

## 2023-01-27 PROCEDURE — 25000242 PHARM REV CODE 250 ALT 637 W/ HCPCS: Performed by: INTERNAL MEDICINE

## 2023-01-27 PROCEDURE — 94761 N-INVAS EAR/PLS OXIMETRY MLT: CPT

## 2023-01-27 PROCEDURE — 83735 ASSAY OF MAGNESIUM: CPT | Performed by: FAMILY MEDICINE

## 2023-01-27 PROCEDURE — 25000003 PHARM REV CODE 250: Performed by: FAMILY MEDICINE

## 2023-01-27 PROCEDURE — 80048 BASIC METABOLIC PNL TOTAL CA: CPT | Performed by: FAMILY MEDICINE

## 2023-01-27 PROCEDURE — 12000002 HC ACUTE/MED SURGE SEMI-PRIVATE ROOM

## 2023-01-27 PROCEDURE — 99232 SBSQ HOSP IP/OBS MODERATE 35: CPT | Mod: ,,, | Performed by: INTERNAL MEDICINE

## 2023-01-27 PROCEDURE — 25000242 PHARM REV CODE 250 ALT 637 W/ HCPCS: Performed by: FAMILY MEDICINE

## 2023-01-27 PROCEDURE — 25000003 PHARM REV CODE 250: Performed by: INTERNAL MEDICINE

## 2023-01-27 PROCEDURE — 97161 PT EVAL LOW COMPLEX 20 MIN: CPT

## 2023-01-27 PROCEDURE — 85025 COMPLETE CBC W/AUTO DIFF WBC: CPT | Mod: 91 | Performed by: FAMILY MEDICINE

## 2023-01-27 PROCEDURE — 94640 AIRWAY INHALATION TREATMENT: CPT

## 2023-01-27 PROCEDURE — 63600175 PHARM REV CODE 636 W HCPCS: Performed by: FAMILY MEDICINE

## 2023-01-27 PROCEDURE — 97535 SELF CARE MNGMENT TRAINING: CPT

## 2023-01-27 PROCEDURE — 36415 COLL VENOUS BLD VENIPUNCTURE: CPT | Performed by: FAMILY MEDICINE

## 2023-01-27 RX ORDER — SODIUM CHLORIDE FOR INHALATION 3 %
4 VIAL, NEBULIZER (ML) INHALATION ONCE
Status: COMPLETED | OUTPATIENT
Start: 2023-01-27 | End: 2023-01-27

## 2023-01-27 RX ADMIN — ATORVASTATIN CALCIUM 40 MG: 40 TABLET, FILM COATED ORAL at 09:01

## 2023-01-27 RX ADMIN — FLUTICASONE PROPIONATE 50 MCG: 50 SPRAY, METERED NASAL at 10:01

## 2023-01-27 RX ADMIN — ARFORMOTEROL TARTRATE 15 MCG: 15 SOLUTION RESPIRATORY (INHALATION) at 08:01

## 2023-01-27 RX ADMIN — IPRATROPIUM BROMIDE AND ALBUTEROL SULFATE 3 ML: 2.5; .5 SOLUTION RESPIRATORY (INHALATION) at 04:01

## 2023-01-27 RX ADMIN — PIPERACILLIN SODIUM AND TAZOBACTAM SODIUM 3.38 G: 3; .375 INJECTION, POWDER, LYOPHILIZED, FOR SOLUTION INTRAVENOUS at 11:01

## 2023-01-27 RX ADMIN — SODIUM CHLORIDE 30 MG/ML INHALATION SOLUTION 4 ML: 30 SOLUTION INHALANT at 01:01

## 2023-01-27 RX ADMIN — ALPRAZOLAM 0.25 MG: 0.25 TABLET ORAL at 12:01

## 2023-01-27 RX ADMIN — PIPERACILLIN SODIUM AND TAZOBACTAM SODIUM 3.38 G: 3; .375 INJECTION, POWDER, LYOPHILIZED, FOR SOLUTION INTRAVENOUS at 02:01

## 2023-01-27 RX ADMIN — PANTOPRAZOLE SODIUM 40 MG: 40 TABLET, DELAYED RELEASE ORAL at 05:01

## 2023-01-27 RX ADMIN — PIPERACILLIN SODIUM AND TAZOBACTAM SODIUM 3.38 G: 3; .375 INJECTION, POWDER, LYOPHILIZED, FOR SOLUTION INTRAVENOUS at 09:01

## 2023-01-27 RX ADMIN — METOPROLOL SUCCINATE 50 MG: 50 TABLET, FILM COATED, EXTENDED RELEASE ORAL at 10:01

## 2023-01-27 RX ADMIN — MAGNESIUM OXIDE 400 MG: 400 TABLET ORAL at 10:01

## 2023-01-27 RX ADMIN — PREDNISONE 60 MG: 20 TABLET ORAL at 10:01

## 2023-01-27 RX ADMIN — BUDESONIDE 1 MG: 0.5 INHALANT RESPIRATORY (INHALATION) at 08:01

## 2023-01-27 RX ADMIN — PAROXETINE HYDROCHLORIDE 50 MG: 20 TABLET, FILM COATED ORAL at 10:01

## 2023-01-27 RX ADMIN — ALPRAZOLAM 0.25 MG: 0.25 TABLET ORAL at 09:01

## 2023-01-27 RX ADMIN — QUETIAPINE 100 MG: 100 TABLET ORAL at 09:01

## 2023-01-27 RX ADMIN — IPRATROPIUM BROMIDE AND ALBUTEROL SULFATE 3 ML: 2.5; .5 SOLUTION RESPIRATORY (INHALATION) at 08:01

## 2023-01-27 RX ADMIN — IPRATROPIUM BROMIDE AND ALBUTEROL SULFATE 3 ML: 2.5; .5 SOLUTION RESPIRATORY (INHALATION) at 12:01

## 2023-01-27 RX ADMIN — ASPIRIN 81 MG: 81 TABLET, COATED ORAL at 10:01

## 2023-01-27 RX ADMIN — IPRATROPIUM BROMIDE AND ALBUTEROL SULFATE 3 ML: 2.5; .5 SOLUTION RESPIRATORY (INHALATION) at 03:01

## 2023-01-27 RX ADMIN — CYANOCOBALAMIN 1000 MCG: 1000 INJECTION, SOLUTION INTRAMUSCULAR; SUBCUTANEOUS at 10:01

## 2023-01-27 NOTE — SUBJECTIVE & OBJECTIVE
Interval History:  No acute overnight events.  Breathing continues to improve, she sitting out in chair on 4 L supplemental oxygen (home requirement).  Continues with intermittent cough but remains nonproductive.  Continues to deny any evidence of bleeding, states last transfusion was many years ago.  Afebrile with T-max 98.4°.  Labs with hemoglobin 8.1, BUN/creatinine 29/1.5, magnesium 1.8.  MRSA nasal screen negative.  Discussed with patient.  Nursing at bedside.    Review of Systems   Constitutional:  Negative for fever.   Respiratory:  Positive for cough.    Gastrointestinal:  Negative for blood in stool.   Psychiatric/Behavioral:  Negative for confusion.    Objective:     Vital Signs (Most Recent):  Temp: 97.6 °F (36.4 °C) (01/27/23 0730)  Pulse: 107 (01/27/23 0851)  Resp: 18 (01/27/23 0851)  BP: (!) 147/62 (notifed nurse) (01/27/23 0730)  SpO2: 100 % (01/27/23 0851)   Vital Signs (24h Range):  Temp:  [97.2 °F (36.2 °C)-98.4 °F (36.9 °C)] 97.6 °F (36.4 °C)  Pulse:  [] 107  Resp:  [16-20] 18  SpO2:  [80 %-100 %] 100 %  BP: (122-176)/(57-68) 147/62     Weight: 88.3 kg (194 lb 10.7 oz)  Body mass index is 35.6 kg/m².  No intake or output data in the 24 hours ending 01/27/23 1017   Physical Exam  Vitals and nursing note reviewed.   Constitutional:       General: She is not in acute distress.     Appearance: She is not ill-appearing.      Comments: Elderly female patient, sitting out in chair   HENT:      Head: Normocephalic and atraumatic.      Mouth/Throat:      Mouth: Mucous membranes are moist.   Cardiovascular:      Rate and Rhythm: Regular rhythm. Tachycardia present.      Heart sounds: Murmur heard.      Comments: No significant lower extremity edema  Pulmonary:      Comments: On nasal cannula, bilateral inspiratory at bases, no wheeze heard  Abdominal:      General: There is no distension.      Palpations: Abdomen is soft.      Tenderness: There is no abdominal tenderness. There is no guarding.    Skin:     General: Skin is warm.   Neurological:      Mental Status: She is alert and oriented to person, place, and time.      Comments: Moving all 4 extremities, mild tremor hands bilaterally   Psychiatric:         Mood and Affect: Mood normal.       Significant Labs: BMP:   Recent Labs   Lab 01/27/23  0606   *      K 3.6      CO2 26   BUN 29*   CREATININE 1.5*   CALCIUM 8.0*   MG 1.8     CBC:   Recent Labs   Lab 01/26/23  1829 01/27/23  0017 01/27/23  0606   WBC 8.28 11.06 9.04   HGB 8.4* 8.7* 8.1*   HCT 27.9* 28.9* 27.2*    248 217     CMP:   Recent Labs   Lab 01/25/23  1436 01/26/23  0529 01/27/23  0606    139 140   K 4.3 3.5 3.6    108 106   CO2 20* 24 26   * 209* 164*   BUN 20 23 29*   CREATININE 1.2 1.3 1.5*   CALCIUM 7.9* 8.0* 8.0*   PROT 6.0  --   --    ALBUMIN 2.4*  --   --    BILITOT 0.8  --   --    ALKPHOS 68  --   --    AST 12  --   --    ALT 11  --   --    ANIONGAP 13 7* 8     Magnesium:   Recent Labs   Lab 01/26/23  0529 01/27/23  0606   MG 1.2* 1.8     Respiratory Culture: No results for input(s): GSRESP, RESPIRATORYC in the last 48 hours.    Significant Imaging: I have reviewed all pertinent imaging results/findings within the past 24 hours.

## 2023-01-27 NOTE — PT/OT/SLP EVAL
Occupational Therapy   Evaluation    Name: Betty Bacon  MRN: [id number]  Admitting Diagnosis: CAP (community acquired pneumonia)  Recent Surgery: * No surgery found *      Recommendations:     Discharge Recommendations: home health OT  Discharge Equipment Recommendations:  none  Barriers to discharge:  None    Assessment:     Betty Bacon is a 72 y.o. female with a medical diagnosis of CAP (community acquired pneumonia).  Pt agreeable to OT evaluation this AM. Performance deficits affecting function: weakness, impaired endurance, impaired self care skills, impaired functional mobility, gait instability, impaired balance, impaired cardiopulmonary response to activity.      Rehab Prognosis: Good; patient would benefit from acute skilled OT services to address these deficits and reach maximum level of function.       Plan:     Patient to be seen 3 x/week to address the above listed problems via self-care/home management, therapeutic activities, therapeutic exercises  Plan of Care Expires: 02/27/23  Plan of Care Reviewed with: patient    Subjective     Chief Complaint: none stated  Patient/Family Comments/goals: none stated    Occupational Profile:  Living Environment: Pt lives alone in a mobile home with 3 JASON with a railing. Pt has a walk-in shower with a shower chair  Previous level of function: Independent-Mod I with ADLs, IADLs, and functional mobility.   Roles and Routines: mother; primary homemaker  Equipment Used at Home: shower chair, walker, rolling, oxygen  Assistance upon Discharge: yes, from family     Pain/Comfort:  Pain Rating 1: 0/10    Patients cultural, spiritual, Sabianist conflicts given the current situation:      Objective:     Communicated with: nursing prior to session.  Patient found HOB elevated with telemetry, oxygen upon OT entry to room.    General Precautions: Standard, fall  Orthopedic Precautions: N/A  Braces: N/A  Respiratory Status: Nasal cannula, flow 4L L/min; pt  sats dropped to 85% with activity on 4L NC, but quickly recovered to >90% with rest/PLB technique.     Occupational Performance:    Bed Mobility:    Patient completed Supine to Sit with stand by assistance    Functional Mobility/Transfers:  Patient completed Sit <> Stand Transfer with stand by assistance  with  no assistive device   Patient completed Bed <> Chair Transfer using Step Transfer technique with contact guard assistance with no assistive device  Patient completed Toilet Transfer Step Transfer technique with contact guard assistance with  no AD  Functional Mobility: pt amb in room ~15 ft with no AD, no LOB, mild SOB    Activities of Daily Living:  Feeding:  independence    Grooming: contact guard assistance standing at sink to wash hands  Toileting: stand by assistance standing for hygiene and clothing management, with intermittent UE support on grab bar    Cognitive/Visual Perceptual:  Cognitive/Psychosocial Skills:     -       Oriented to: Person, Place, Time, and Situation   -       Follows Commands/attention:Follows two-step commands  -       Communication: clear/fluent  -       Memory: No Deficits noted  -       Safety awareness/insight to disability: intact   -       Mood/Affect/Coping skills/emotional control: Appropriate to situation, Cooperative, and Pleasant    Physical Exam:  Balance:    -       Sup seated balance; SBA/CGA standing balance  Upper Extremity Range of Motion:     -       Right Upper Extremity: WFL  -       Left Upper Extremity: WFL  Upper Extremity Strength:    -       Right Upper Extremity: WFL  -       Left Upper Extremity: WFL   Strength:    -       Right Upper Extremity: WFL  -       Left Upper Extremity: WFL  Fine Motor Coordination:    -       Intact  Gross motor coordination:   WFL    AMPAC 6 Click ADL:  AMPAC Total Score: 21    Treatment & Education:  Pt educated on role of OT/POC, importance of OOB/EOB activity, use of call bell, and safety during ADLs, transfers, and  functional mobility.    Patient left up in chair with all lines intact, call button in reach, and physical therapist present    GOALS:   Multidisciplinary Problems       Occupational Therapy Goals          Problem: Occupational Therapy    Goal Priority Disciplines Outcome Interventions   Occupational Therapy Goal     OT, PT/OT     Description: Goals to be met by: 2/27/23     Patient will increase functional independence with ADLs by performing:    UE Dressing with Supervision.  LE Dressing with Supervision.  Grooming while standing at sink with Supervision.  Toileting from toilet with Supervision for hygiene and clothing management.   Toilet transfer to toilet with Supervision.                         History:     Past Medical History:   Diagnosis Date    Acute coronary artery obstruction without MI 10/2012    Benign hypertension     COPD (chronic obstructive pulmonary disease)     Coronary artery disease     Disorder of kidney and ureter     Dr. Morrow    Hepatitis B core antibody positive 03/03/2021    Negative sAg, suggests previous exposure but no chronic/active Hep B. At risk for reactivation with any immunosuppression medication, steroids, chemo, etc.      History of electroconvulsive therapy     Hyperlipidemia LDL goal < 70     Left ankle sprain     Major depressive disorder, recurrent episode, severe     s/p ECT    Positive AKIRA (antinuclear antibody) 03/03/2021    PVD (peripheral vascular disease)          Past Surgical History:   Procedure Laterality Date    CHOLECYSTECTOMY      CORONARY ANGIOPLASTY      CORONARY ANGIOPLASTY WITH STENT PLACEMENT  10/2012    2 stents RCA (100% stenosis)    EYE SURGERY      cataract surgery    HYSTERECTOMY      LINA, ovaries intact. uterine prolapse    ILIAC ARTERY STENT      TONSILLECTOMY      TOTAL VAGINAL HYSTERECTOMY         Time Tracking:     OT Date of Treatment: 01/27/23  OT Start Time: 0911  OT Stop Time: 0925  OT Total Time (min): 14 min    Billable  Minutes:Evaluation 6  Self Care/Home Management 8    1/27/2023

## 2023-01-27 NOTE — CARE UPDATE
01/27/23 1557   Patient Assessment/Suction   Level of Consciousness (AVPU) alert   Respiratory Effort Unlabored   Expansion/Accessory Muscles/Retractions no use of accessory muscles   All Lung Fields Breath Sounds crackles, fine   JOAQUÍN Breath Sounds crackles, fine   LLL Breath Sounds crackles, fine   RUL Breath Sounds crackles, fine   RML Breath Sounds crackles, fine   RLL Breath Sounds crackles, fine   Rhythm/Pattern, Respiratory shortness of breath   Cough Frequency infrequent   PRE-TX-O2   Device (Oxygen Therapy) nasal cannula   $ Is the patient on Low Flow Oxygen? Yes   Flow (L/min) 4   SpO2 99 %   Pulse Oximetry Type Intermittent   $ Pulse Oximetry - Multiple Charge Pulse Oximetry - Multiple   Pulse 97   Resp 20   Aerosol Therapy   $ Aerosol Therapy Charges Aerosol Treatment   Respiratory Treatment Status (SVN) given   Treatment Route (SVN) mask;oxygen   Patient Position (SVN) sitting in chair   Post Treatment Assessment (SVN) increased aeration   Signs of Intolerance (SVN) none   Breath Sounds Post-Respiratory Treatment   Throughout All Fields Post-Treatment All Fields   Throughout All Fields Post-Treatment no change   Post-treatment Heart Rate (beats/min) 95   Post-treatment Resp Rate (breaths/min) 19

## 2023-01-27 NOTE — PROGRESS NOTES
[facility] Medicine  Progress Note    Patient Name: Betty Bacon  MRN: [id number]  Patient Class: IP- Inpatient   Admission Date: 1/25/2023  Length of Stay: 2 days  Attending Physician: Prachi Wilson MD  Primary Care Provider: Johanne Callejas MD        Subjective:     Principal Problem:CAP (community acquired pneumonia)        HPI:  Betty Bacon is a 72 y.o. White female   With PMH of chronic respiratory failure on 4LNC,  COPD, ILD, recent COVID pneumonia,  CAD, HFpEF,  who presents with SOB.     Onset today  Progressively worsening  Worse with exertion  Alleviated with rest  +coughing, dry  No CP  No palpitations  No n/v  Diarrhea one week ago but not since  No dysuria  +subjective fever  +chills  +LE edema, pt not sure if worsening/acute      Overview/Hospital Course:  Patient with known history of chronic respiratory failure on 4 L home oxygen, interstitial lung disease, followed by Pulmonary Dr Medeiros, CAD with remote history of PCI, on aspirin and Plavix, states followed by cardiologist in [place], heart failure with preserved ejection fraction with valvular heart disease with previous echo EF of 65% with severe aortic stenosis, moderate aortic regurgitation, CKD stage 3, recent history of COVID-19 infection.  Presenting with 1 day history of worsening shortness of breath associated with cough and fever.  Febrile to 101 on presentation.  Anemia appears to be progressive, down to 8.2, FOBT negative, , procalcitonin 0.36, lactic acid 0.7, radiological imaging with concern for diffuse pulmonary/alveolar opacity lower lung, possibly edema, pneumonia versus hemorrhage.  She was admitted, started on broad-spectrum antibiotics with pulmonary consultation.  On 01/26, does report interval improvement, IV diuresis and monitoring.      Interval History:  No acute overnight events.  Breathing continues to improve, she sitting out in chair on 4 L supplemental oxygen  (home requirement).  Continues with intermittent cough but remains nonproductive.  Continues to deny any evidence of bleeding, states last transfusion was many years ago.  Afebrile with T-max 98.4°.  Labs with hemoglobin 8.1, BUN/creatinine 29/1.5, magnesium 1.8.  MRSA nasal screen negative.  Discussed with patient.  Nursing at bedside.    Review of Systems   Constitutional:  Negative for fever.   Respiratory:  Positive for cough.    Gastrointestinal:  Negative for blood in stool.   Psychiatric/Behavioral:  Negative for confusion.    Objective:     Vital Signs (Most Recent):  Temp: 97.6 °F (36.4 °C) (01/27/23 0730)  Pulse: 107 (01/27/23 0851)  Resp: 18 (01/27/23 0851)  BP: (!) 147/62 (notifed nurse) (01/27/23 0730)  SpO2: 100 % (01/27/23 0851)   Vital Signs (24h Range):  Temp:  [97.2 °F (36.2 °C)-98.4 °F (36.9 °C)] 97.6 °F (36.4 °C)  Pulse:  [] 107  Resp:  [16-20] 18  SpO2:  [80 %-100 %] 100 %  BP: (122-176)/(57-68) 147/62     Weight: 88.3 kg (194 lb 10.7 oz)  Body mass index is 35.6 kg/m².  No intake or output data in the 24 hours ending 01/27/23 1017   Physical Exam  Vitals and nursing note reviewed.   Constitutional:       General: She is not in acute distress.     Appearance: She is not ill-appearing.      Comments: Elderly female patient, sitting out in chair   HENT:      Head: Normocephalic and atraumatic.      Mouth/Throat:      Mouth: Mucous membranes are moist.   Cardiovascular:      Rate and Rhythm: Regular rhythm. Tachycardia present.      Heart sounds: Murmur heard.      Comments: No significant lower extremity edema  Pulmonary:      Comments: On nasal cannula, bilateral inspiratory at bases, no wheeze heard  Abdominal:      General: There is no distension.      Palpations: Abdomen is soft.      Tenderness: There is no abdominal tenderness. There is no guarding.   Skin:     General: Skin is warm.   Neurological:      Mental Status: She is alert and oriented to person, place, and time.       Comments: Moving all 4 extremities, mild tremor hands bilaterally   Psychiatric:         Mood and Affect: Mood normal.       Significant Labs: BMP:   Recent Labs   Lab 01/27/23  0606   *      K 3.6      CO2 26   BUN 29*   CREATININE 1.5*   CALCIUM 8.0*   MG 1.8     CBC:   Recent Labs   Lab 01/26/23  1829 01/27/23  0017 01/27/23  0606   WBC 8.28 11.06 9.04   HGB 8.4* 8.7* 8.1*   HCT 27.9* 28.9* 27.2*    248 217     CMP:   Recent Labs   Lab 01/25/23  1436 01/26/23  0529 01/27/23  0606    139 140   K 4.3 3.5 3.6    108 106   CO2 20* 24 26   * 209* 164*   BUN 20 23 29*   CREATININE 1.2 1.3 1.5*   CALCIUM 7.9* 8.0* 8.0*   PROT 6.0  --   --    ALBUMIN 2.4*  --   --    BILITOT 0.8  --   --    ALKPHOS 68  --   --    AST 12  --   --    ALT 11  --   --    ANIONGAP 13 7* 8     Magnesium:   Recent Labs   Lab 01/26/23  0529 01/27/23  0606   MG 1.2* 1.8     Respiratory Culture: No results for input(s): GSRESP, RESPIRATORYC in the last 48 hours.    Significant Imaging: I have reviewed all pertinent imaging results/findings within the past 24 hours.      Assessment/Plan:      Active Hospital Problems    Diagnosis    *Lower lung pneumonia    Acute on chronic heart failure with preserved ejection fraction    CARMELITA (acute kidney injury)    Anemia, acute on chronic, progressive    Chronic respiratory failure with hypoxia, on 4 L oxygen    Severe obesity with body mass index (BMI) of 35.0 to 39.9 with serious comorbidity    GERD (gastroesophageal reflux disease)    Interstitial lung disease    CAD (coronary artery disease)     NEFTALY RCA 7/2004  Stents x 2 RCA 10/2012      Valvular heart disease, severe aortic stenosis, moderate aortic regurgitation     moderate      Generalized anxiety disorder    PAD (peripheral artery disease)     Bilateral IRINA stents 2004      Essential hypertension    CKD (chronic kidney disease) stage 3, GFR 30-59 ml/min     Plan:  Continue care on medical  floor with remote telemetry monitoring   Continue scheduled breathing treatment  IV steroid transition to prednisone 60 mg daily  Hold further IV diuresis with acute kidney injury and monitoring  Continue empiric IV Zosyn, deescalate as able  Previous echo with EF of 65% with severe aortic stenosis, moderate aortic regurgitation  Progressive anemia with no evidence of overt bleeding, Plavix held after discussion with Cardiology, trending will transfuse for hemoglobin < 8 with cardiac comorbidities   Continue empiric Zosyn for now, deescalate as able   Renally dosing all medications and avoiding nephrotoxin drugs  Electrolytes sliding scale repletion  A.m. labs ordered   PT/OT evaluation, had home health prior to admission   Appreciate all consultant's input  Further plan as per hospital course    VTE Risk Mitigation (From admission, onward)         Ordered     IP VTE HIGH RISK PATIENT  Once         01/25/23 1822     Place sequential compression device  Until discontinued         01/25/23 1822                Discharge Planning   BOB: 1/28/2025     Code Status: Full Code   Is the patient medically ready for discharge?:     Reason for patient still in hospital (select all that apply): Patient trending condition  Discharge Plan A: Home Health                  Prachi Wilson MD  Department of Hospital Medicine   [facility]

## 2023-01-27 NOTE — PROGRESS NOTES
Pulmonary/Critical Care Progress Note      PATIENT NAME: Betty Bacon  MRN: [id number]  TODAY'S DATE: 2023  10:20 AM  ADMIT DATE: 2023  AGE: 72 y.o. : 1950    CONSULT REQUESTED BY: Prachi Wilson MD    REASON FOR CONSULT:   Acute on chronic respiratory failure  ILD  Aortic stenosis    HISTORY OF PRESENT ILLNESS   Betty Bacon is a 72 y.o. female with a PMH of COPD, \asbestosis, recent COVID pneumonia, CAD, HFpEF, severe aortic stenosis on 4 L NC at baseline on whom we have been consulted for acute on chronic .    She presented with fevers and shortness of breath. She endorses increased leg swelling and orthopnea prior to presentation.    The patient states she has a diagnosis of asbestosis, as well as COPD. She takes only Trelegy and albuterol for her lung conditions.    23: Feeling overall better today.    SMOKING HISTORY  Quit 25 years ago.  Smoked 2 PPD x 30 years.    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    REVIEW OF SYSTEMS  GENERAL: Feeling better. Night sweats and fevers.  EYES: Vision is good.  ENT: No sinusitis or pharyngitis.   HEART: No chest pain or palpitations. Orthopnea.  LUNGS: Non-productive cough.  GI: No abdominal pain, nausea, vomiting, or diarrhea.  : No dysuria, urgency, or frequency.  SKIN: No lesions or rashes.  MUSCULOSKELETAL: No joint pain or myalgias.  NEURO: No headaches or neuropathy.  LYMPH: Leg swelling.  PSYCH: No anxiety or depression.  ENDO: No unexpected weight change.    ALLERGIES  Review of patient's allergies indicates:   Allergen Reactions    Propoxyphene n-acetaminophen Other (See Comments)     Other reaction(s): Unknown    Codeine Anxiety       INPATIENT SCHEDULED MEDICATIONS   albuterol-ipratropium  3 mL Nebulization Q4H    budesonide  1 mg Nebulization Q12H    And    arformoteroL  15 mcg Nebulization BID    aspirin  81 mg Oral Daily    atorvastatin   40 mg Oral Daily    cyanocobalamin  1,000 mcg Subcutaneous Daily    fluticasone propionate  1 spray Each Nostril Daily    magnesium oxide  400 mg Oral Daily    metoprolol succinate  50 mg Oral Daily    pantoprazole  40 mg Oral Before breakfast    paroxetine  50 mg Oral Daily    piperacillin-tazobactam (ZOSYN) IVPB  3.375 g Intravenous Q8H    predniSONE  60 mg Oral Daily    QUEtiapine  100 mg Oral QHS         MEDICAL AND SURGICAL HISTORY  Past Medical History:   Diagnosis Date    Acute coronary artery obstruction without MI 10/2012    Benign hypertension     COPD (chronic obstructive pulmonary disease)     Coronary artery disease     Disorder of kidney and ureter     Dr. Morrow    Hepatitis B core antibody positive 2021    Negative sAg, suggests previous exposure but no chronic/active Hep B. At risk for reactivation with any immunosuppression medication, steroids, chemo, etc.      History of electroconvulsive therapy     Hyperlipidemia LDL goal < 70     Left ankle sprain     Major depressive disorder, recurrent episode, severe     s/p ECT    Positive AKIRA (antinuclear antibody) 2021    PVD (peripheral vascular disease)      Past Surgical History:   Procedure Laterality Date    CHOLECYSTECTOMY      CORONARY ANGIOPLASTY      CORONARY ANGIOPLASTY WITH STENT PLACEMENT  10/2012    2 stents RCA (100% stenosis)    EYE SURGERY      cataract surgery    HYSTERECTOMY      LINA, ovaries intact. uterine prolapse    ILIAC ARTERY STENT      TONSILLECTOMY      TOTAL VAGINAL HYSTERECTOMY         ALCOHOL, TOBACCO AND DRUG USE  Social History     Tobacco Use   Smoking Status Former    Packs/day: 2.00    Years: 40.00    Pack years: 80.00    Types: Cigarettes    Quit date: 2009    Years since quittin.1   Smokeless Tobacco Never     Social History     Substance and Sexual Activity   Alcohol Use Yes    Comment: social     Social History     Substance and Sexual Activity   Drug Use No       FAMILY HISTORY  Family  History   Problem Relation Age of Onset    Diabetes Mother     Heart disease Mother     Stroke Father     Heart disease Father     Heart disease Brother     Stroke Brother     Hypertension Daughter     Diabetes Maternal Aunt     Heart disease Maternal Aunt     Heart disease Maternal Uncle     Heart disease Paternal Aunt     Heart disease Paternal Uncle     Heart disease Maternal Grandfather     Diabetes Sister     Heart disease Sister     Cancer Sister         lung    Kidney disease Sister         mass, benign    Breast cancer Sister     Stroke Sister     Dementia Sister     Liver disease Sister     Melanoma Neg Hx     Psoriasis Neg Hx     Lupus Neg Hx     Eczema Neg Hx        VITAL SIGNS (MOST RECENT)  Temp: 97.6 °F (36.4 °C) (01/27/23 0730)  Pulse: 107 (01/27/23 0851)  Resp: 18 (01/27/23 0851)  BP: (!) 147/62 (notifed nurse) (01/27/23 0730)  SpO2: 100 % (01/27/23 0851)    INTAKE AND OUTPUT (LAST 24 HOURS):No intake or output data in the 24 hours ending 01/27/23 1217      WEIGHT  Wt Readings from Last 1 Encounters:   01/26/23 88.3 kg (194 lb 10.7 oz)       PHYSICAL EXAM  GENERAL: A&Ox3. NAD.  HEENT: Extraocular movements intact. Pharynx moist.  NECK: JVD and hepatojugular reflux present.  HEART: Regular rate and normal rhythm. 2/6 systolic murmur at RUSB radiating to carotids.  LUNGS: Diffuse b/l crackles more prominent at bases.  ABDOMEN: Soft, non-tender, non-distended, no masses palpated.  EXTREMITIES: Normal muscle tone and joint movement, no cyanosis or clubbing.   LYMPHATICS: 1+ b/l pretibial pitting edema  SKIN: Dry, intact, no lesions.   NEURO: No gross deficit.  PSYCH: Appropriate affect    ACUTE PHASE REACTANT (LAST 24 HOURS)  No results for input(s): FERRITIN, CRP, LDH, DDIMER in the last 24 hours.      CBC LAST (LAST 24 HOURS)  Recent Labs   Lab 01/27/23  0606   WBC 9.04   RBC 2.83*   HGB 8.1*   HCT 27.2*   MCV 96   MCH 28.6   MCHC 29.8*   RDW 18.1*      MPV 10.1   GRAN 90.6*  8.2*   LYMPH 4.8*   0.4*   MONO 3.8*  0.3   BASO 0.01   NRBC 0       CHEMISTRY LAST (LAST 24 HOURS)  Recent Labs   Lab 01/27/23  0606      K 3.6      CO2 26   ANIONGAP 8   BUN 29*   CREATININE 1.5*   *   CALCIUM 8.0*   MG 1.8       COAGULATION LAST (LAST 24 HOURS)  No results for input(s): LABPT, INR, APTT in the last 24 hours.      CARDIAC PROFILE (LAST 24 HOURS)  Recent Labs   Lab 01/25/23  1436   *       LAST 7 DAYS MICROBIOLOGY   Microbiology Results (last 7 days)       Procedure Component Value Units Date/Time    Blood culture x two cultures. Draw prior to antibiotics. [[id number]] Collected: 01/25/23 1504    Order Status: Completed Specimen: Blood from Peripheral, Antecubital, Left Updated: 01/26/23 1632     Blood Culture, Routine No Growth to date      No Growth to date    Narrative:      Aerobic and anaerobic    Blood culture x two cultures. Draw prior to antibiotics. [[id number]] Collected: 01/25/23 1504    Order Status: Completed Specimen: Blood from Peripheral, Antecubital, Right Updated: 01/26/23 1632     Blood Culture, Routine No Growth to date      No Growth to date    Narrative:      Aerobic and anaerobic    MRSA Screen by PCR [[id number]] Collected: 01/26/23 1120    Order Status: Completed Specimen: Nasopharyngeal Swab from Nasal Updated: 01/26/23 1340     MRSA SCREEN BY PCR Negative    Respiratory Infection Panel (PCR), Nasopharyngeal [[id number]] Collected: 01/25/23 1755    Order Status: Completed Updated: 01/26/23 0036     Respiratory Infection Panel Source NP swab     Adenovirus Not Detected     Coronavirus 229E, Common Cold Virus Not Detected     Coronavirus HKU1, Common Cold Virus Not Detected     Coronavirus NL63, Common Cold Virus Not Detected     Coronavirus OC43, Common Cold Virus Not Detected     Comment: The Coronavirus strains detected in this test cause the common cold.  These strains are not the COVID-19 (novel Coronavirus)strain   associated with the respiratory disease  outbreak.          SARS-CoV2 (COVID-19) Qualitative PCR Not Detected     Human Metapneumovirus Not Detected     Human Rhinovirus/Enterovirus Not Detected     Influenza A (subtypes H1, H1-2009,H3) Not Detected     Influenza B Not Detected     Parainfluenza Virus 1 Not Detected     Parainfluenza Virus 2 Not Detected     Parainfluenza Virus 3 Not Detected     Parainfluenza Virus 4 Not Detected     Respiratory Syncytial Virus Not Detected     Bordetella Parapertussis (DJ0476) Not Detected     Bordetella pertussis (ptxP) Not Detected     Chlamydia pneumoniae Not Detected     Mycoplasma pneumoniae Not Detected     Comment: Respiratory Infection Panel testing performed by Multiplex PCR.       Narrative:      Specimen Source->Nasopharyngeal Swab    Resp Viral Panel PCR, Peds Under 7 Months Nasopharyngeal Swab [[id number]] Collected: 01/25/23 2245    Order Status: Canceled     Culture, Respiratory with Gram Stain [[id number]]     Order Status: No result Specimen: Respiratory             MOST RECENT IMAGING  CTA Chest Non-Coronary (PE Studies)  Narrative: CMS MANDATED QUALITY DATA - CT RADIATION - 436    All CT scans at this facility utilize dose modulation, iterative reconstruction, and/or weight based dosing when appropriate to reduce radiation dose to as low as reasonably achievable    EXAMINATION:  CTA CHEST NON CORONARY (PE STUDIES)    CLINICAL HISTORY:  Pulmonary embolism (PE) suspected, unknown D-dimer;    TECHNIQUE:  Routine CT angiogram of the chest protocol performed after the IV administration of 100 mL Omnipaque 350. 3D reconstructions/reformats/MIPs obtained. All CT scans at this facility are performed  using dose modulation techniques as appropriate to performed exam including the following:  automated exposure control; adjustment of mA and/or kV according to the patients size (this includes techniques or standardized protocols for targeted exams where dose is matched to indication/reason for exam: i.e.  extremities or head);  iterative reconstruction technique.    COMPARISON:  Comparison made with the patient's previous conventional radiographs of the chest of 1/25/2023 3 grade    FINDINGS:  Low quality examination for pulmonary embolism as there is limited opacification pulmonary arterial tree due to timing the contrast bolus and hemodynamics the patient's cardiac output, however no evidence of pulmonary embolism is established of the pulmonary arterial system.  The main pulmonary trunk is patent without evidence of a saddle embolus at the branch point of the main pulmonary artery.  Extensive atheromatous calcifications of the major coronary vessels, normal contour the cardiac chambers, with mild concentric thickening lower left ventricular myocardium.    Parenchymal lung window settings demonstrate evidence of localized areas of prominent intralobar interlobular septal thickening, regional areas of subpleural fibrosis in the basilar segments, and localized scarring or consolidation the right middle lobe medial segment with pericardial contact.  There is no evidence of pneumothorax.  Diffuse bronchial wall thickening is noted on a chronic basis.  There is no evidence of pneumothorax.  No significant pleural effusion.    The upper abdominal structures demonstrate no evidence of significant abnormality.  The liver is fatty replaced.  Extensive atheromatous calcifications of the upper abdominal vessels.  Impression: 1.  No CT evidence of acute pulmonary thromboembolism.    2.  Diffuse inter lobar and interlobular septal thickening with evidence of moderate peribronchial cuffing on a chronic basis due to underlying interstitial fibrotic process.    Electronically signed by: Vitaly Luna MD  Date:    01/25/2023  Time:    16:26  X-Ray Chest AP Portable  Reason: Sepsis sob    FINDINGS:  Portable chest at 1443 compared with 1/1/2023 shows normal cardiomediastinal silhouette. Patient is rotated.    Diffuse pulmonary  interstitial opacities and patchy lower lung zone alveolar opacities have slightly increased in the interval. No pleural effusion or pneumothorax. Central pulmonary vasculature is unchanged. No acute osseous abnormality.    IMPRESSION:  Slight worsening of diffuse pulmonary interstitial and patchy lower lung zone alveolar opacities. Potential etiologies include pulmonary edema, pneumonia, or alveolar hemorrhage.    Electronically signed by:  Caden France MD  1/25/2023 3:05 PM CST Workstation: [id number]      CURRENT VISIT EKG  No results found for this visit on 01/25/23.    ECHOCARDIOGRAM RESULTS  Results for orders placed during the hospital encounter of 12/20/22    Echo    Interpretation Summary  · The left ventricle is normal in size with severe concentric hypertrophy and normal systolic function.  · Grade II left ventricular diastolic dysfunction.  · The estimated ejection fraction is 65%.  · Normal right ventricular size with normal right ventricular systolic function.  · Moderate aortic regurgitation.  · There is severe aortic valve stenosis.  · Aortic valve area is 0.94 cm2; peak velocity is 3.29 m/s; mean gradient is 26 mmHg.  · Mild pulmonic regurgitation.  · Mild tricuspid regurgitation.  · There is mild pulmonary hypertension.  · Normal central venous pressure (3 mmHg).  · The estimated PA systolic pressure is 44 mmHg.        RESPIRATORY SUPPORT          PFTs (6/18/13)      LAST ARTERIAL BLOOD GAS  ABG  No results for input(s): PH, PO2, PCO2, HCO3, BE in the last 168 hours.    IMPRESSION AND PLAN  Betty Bacon is a 72 y.o. female with a PMH of COPD, asbestosis, recent COVID pneumonia, CAD, HFpEF, severe aortic stenosis on 4 L NC at baseline on whom we have been consulted for acute on chronic .    #Asbestosis  Pattern on CT of 1/2022 consistent with asbestosis, but could also reflect UIP with an atypical upper lobe predominance. Current CTA chest shows this in addition to mosaicism/GGOs not  present on prior scan, likely due to pulmonary edema and possibly pneumonia as well.  #Community-acquired pneumonia with MDR risk factors  Has had pneumonia 3 times in past several months but cultures have not been positive when collected.  #Sepsis, resolved  #COPD with acute exacerbation  #Severe aortic stenosis  #Moderate aortic regurgitation  #HFpEF with acute exacerbation  BNP elevated at 482 (has been up to 500s in past, but usually normal).  RVP (-). Procal 0.36. MRSA nares (-).  - agree with cardiology consult and diuresis per them  - strict I&Os  - continue empiric Zosyn  - f/u sputum culture  - DuoNebs  - continue prednisone 60 mg daily    Dr. Singh will be covering over the weekend. Please call him if assistance is needed.    Matt Toro MD  [facility]  Department of Pulmonology  Date of Service: 01/27/2023  10:20 AM

## 2023-01-27 NOTE — PLAN OF CARE
01/27/23 1259   Patient Assessment/Suction   Level of Consciousness (AVPU) alert   Respiratory Effort Unlabored;Normal   Expansion/Accessory Muscles/Retractions no use of accessory muscles   All Lung Fields Breath Sounds coarse;diminished   Rhythm/Pattern, Respiratory unlabored;pattern regular;depth regular   PRE-TX-O2   Device (Oxygen Therapy) nasal cannula with humidification   $ Is the patient on Low Flow Oxygen? Yes   Flow (L/min) 4   SpO2 98 %   Pulse Oximetry Type Intermittent   $ Pulse Oximetry - Multiple Charge Pulse Oximetry - Multiple   Pulse 78   Resp 20   Aerosol Therapy   $ Aerosol Therapy Charges Aerosol Treatment   Daily Review of Necessity (SVN) completed   Respiratory Treatment Status (SVN) given   Treatment Route (SVN) mask;oxygen   Patient Position (SVN) semi-Lieberman's   Post Treatment Assessment (SVN) breath sounds improved   Signs of Intolerance (SVN) none   Breath Sounds Post-Respiratory Treatment   Throughout All Fields Post-Treatment All Fields   Throughout All Fields Post-Treatment aeration increased   Post-treatment Heart Rate (beats/min) 90   Post-treatment Resp Rate (breaths/min) 16

## 2023-01-27 NOTE — PT/OT/SLP EVAL
Physical Therapy Evaluation    Patient Name:  Betty Bacon   MRN:  [id number]    Recommendations:     Discharge Recommendations: home health PT   Discharge Equipment Recommendations: none   Barriers to discharge:  desat with limited mobility    Assessment:     Betty Bacon is a 72 y.o. female admitted with a medical diagnosis of CAP (community acquired pneumonia).  She presents with the following impairments/functional limitations: weakness, impaired endurance, impaired self care skills, impaired functional mobility, gait instability, impaired balance, decreased safety awareness, decreased lower extremity function, impaired cardiopulmonary response to activity.    Pt found up in chair just complete OT eval. Pt agreeable to visit. Pt ambulated 80 ft with no AD and CGA occ min A due to impaired balance increase with increase ambulation. Post ambulation 4L noted to be 82%. Standing rest break with emphasis on PLB increase to 88% able to return to seated rest break for further rest and PLB to increase to 92%.     Rehab Prognosis: Fair; patient would benefit from acute skilled PT services to address these deficits and reach maximum level of function.    Recent Surgery: * No surgery found *      Plan:     During this hospitalization, patient to be seen 6 x/week to address the identified rehab impairments via gait training, therapeutic activities, therapeutic exercises, neuromuscular re-education and progress toward the following goals:    Plan of Care Expires:  02/27/23    Subjective     Chief Complaint: shortness of breath  Patient/Family Comments/goals: return home  Pain/Comfort:  Pain Rating 1: 0/10    Patients cultural, spiritual, Congregational conflicts given the current situation: no    Living Environment:  Pt lives alone in a single story home with 3 JASON with B HR. 4L O2 via NC.   Prior to admission, patients level of function was Independent.  Equipment used at home: shower chair, walker, rolling,  oxygen.  DME owned (not currently used): none.  Upon discharge, patient will have assistance from TBD.    Objective:     Communicated with RN prior to session.  Patient found up in chair with telemetry, peripheral IV, oxygen  upon PT entry to room.    General Precautions: Standard, fall  Orthopedic Precautions:N/A   Braces: N/A  Respiratory Status: Nasal cannula, flow 4 L/min    Exams:  Cognitive Exam:  Patient is oriented to Person, Place, Time, and Situation  LUE Strength: WFL  RLE ROM: WFL  RLE Strength: WFL  LLE ROM: WFL    Functional Mobility:  Transfers:     Sit to Stand:  contact guard assistance with no AD  Gait: 80 ft with no AD and CGA/min A      AM-PAC 6 CLICK MOBILITY  Total Score:18       Treatment & Education:  Pt educated on POC, discharge recommendation, pacing/energy conservation, pursed lip breathing, importance of time OOB, need for assist with mobility, use of call bell to seek assistance as needed and fall prevention      Patient left up in chair with all lines intact and call button in reach.    GOALS:   Multidisciplinary Problems       Physical Therapy Goals          Problem: Physical Therapy    Goal Priority Disciplines Outcome Goal Variances Interventions   Physical Therapy Goal     PT, PT/OT Ongoing, Progressing     Description: Goals to be met by: 23     Patient will increase functional independence with mobility by performin. Supine to sit with Supervision  2. Sit to stand transfer with Supervision  3. Bed to chair transfer with Supervision using no AD  4. Gait  x 150 feet with Supervision using no AD  5. Asc/Desc 3 steps with B HR and CGA    While maintain optimal oxygenation                             History:     Past Medical History:   Diagnosis Date    Acute coronary artery obstruction without MI 10/2012    Benign hypertension     COPD (chronic obstructive pulmonary disease)     Coronary artery disease     Disorder of kidney and ureter     Dr. Morrow    Hepatitis B  core antibody positive 03/03/2021    Negative sAg, suggests previous exposure but no chronic/active Hep B. At risk for reactivation with any immunosuppression medication, steroids, chemo, etc.      History of electroconvulsive therapy     Hyperlipidemia LDL goal < 70     Left ankle sprain     Major depressive disorder, recurrent episode, severe     s/p ECT    Positive AKIRA (antinuclear antibody) 03/03/2021    PVD (peripheral vascular disease)        Past Surgical History:   Procedure Laterality Date    CHOLECYSTECTOMY      CORONARY ANGIOPLASTY      CORONARY ANGIOPLASTY WITH STENT PLACEMENT  10/2012    2 stents RCA (100% stenosis)    EYE SURGERY      cataract surgery    HYSTERECTOMY      LINA, ovaries intact. uterine prolapse    ILIAC ARTERY STENT      TONSILLECTOMY      TOTAL VAGINAL HYSTERECTOMY         Time Tracking:     PT Received On: 01/27/23  PT Start Time: 0924     PT Stop Time: 0935  PT Total Time (min): 11 min     Billable Minutes: Evaluation 11      01/27/2023

## 2023-01-28 PROBLEM — R06.00 DYSPNEA: Status: ACTIVE | Noted: 2022-12-02

## 2023-01-28 LAB
ANION GAP SERPL CALC-SCNC: 6 MMOL/L (ref 8–16)
BASOPHILS # BLD AUTO: 0.01 K/UL (ref 0–0.2)
BASOPHILS # BLD AUTO: 0.01 K/UL (ref 0–0.2)
BASOPHILS # BLD AUTO: 0.02 K/UL (ref 0–0.2)
BASOPHILS NFR BLD: 0.1 % (ref 0–1.9)
BASOPHILS NFR BLD: 0.1 % (ref 0–1.9)
BASOPHILS NFR BLD: 0.2 % (ref 0–1.9)
BLD PROD TYP BPU: NORMAL
BLOOD UNIT EXPIRATION DATE: NORMAL
BLOOD UNIT TYPE CODE: 5100
BLOOD UNIT TYPE: NORMAL
BUN SERPL-MCNC: 41 MG/DL (ref 8–23)
CALCIUM SERPL-MCNC: 8.5 MG/DL (ref 8.7–10.5)
CHLORIDE SERPL-SCNC: 105 MMOL/L (ref 95–110)
CO2 SERPL-SCNC: 28 MMOL/L (ref 23–29)
CODING SYSTEM: NORMAL
CREAT SERPL-MCNC: 1.5 MG/DL (ref 0.5–1.4)
DIFFERENTIAL METHOD: ABNORMAL
DISPENSE STATUS: NORMAL
EOSINOPHIL # BLD AUTO: 0 K/UL (ref 0–0.5)
EOSINOPHIL NFR BLD: 0 % (ref 0–8)
ERYTHROCYTE [DISTWIDTH] IN BLOOD BY AUTOMATED COUNT: 17.6 % (ref 11.5–14.5)
ERYTHROCYTE [DISTWIDTH] IN BLOOD BY AUTOMATED COUNT: 18.3 % (ref 11.5–14.5)
ERYTHROCYTE [DISTWIDTH] IN BLOOD BY AUTOMATED COUNT: 18.3 % (ref 11.5–14.5)
EST. GFR  (NO RACE VARIABLE): 36.3 ML/MIN/1.73 M^2
GLUCOSE SERPL-MCNC: 164 MG/DL (ref 70–110)
HCT VFR BLD AUTO: 26.7 % (ref 37–48.5)
HCT VFR BLD AUTO: 27.8 % (ref 37–48.5)
HCT VFR BLD AUTO: 31.9 % (ref 37–48.5)
HGB BLD-MCNC: 7.9 G/DL (ref 12–16)
HGB BLD-MCNC: 8.3 G/DL (ref 12–16)
HGB BLD-MCNC: 9.6 G/DL (ref 12–16)
IMM GRANULOCYTES # BLD AUTO: 0.06 K/UL (ref 0–0.04)
IMM GRANULOCYTES # BLD AUTO: 0.09 K/UL (ref 0–0.04)
IMM GRANULOCYTES # BLD AUTO: 0.13 K/UL (ref 0–0.04)
IMM GRANULOCYTES NFR BLD AUTO: 0.7 % (ref 0–0.5)
IMM GRANULOCYTES NFR BLD AUTO: 1.2 % (ref 0–0.5)
IMM GRANULOCYTES NFR BLD AUTO: 1.6 % (ref 0–0.5)
LYMPHOCYTES # BLD AUTO: 0.3 K/UL (ref 1–4.8)
LYMPHOCYTES # BLD AUTO: 0.4 K/UL (ref 1–4.8)
LYMPHOCYTES # BLD AUTO: 0.4 K/UL (ref 1–4.8)
LYMPHOCYTES NFR BLD: 3.5 % (ref 18–48)
LYMPHOCYTES NFR BLD: 4.2 % (ref 18–48)
LYMPHOCYTES NFR BLD: 5.7 % (ref 18–48)
MAGNESIUM SERPL-MCNC: 1.6 MG/DL (ref 1.6–2.6)
MCH RBC QN AUTO: 28.6 PG (ref 27–31)
MCH RBC QN AUTO: 28.8 PG (ref 27–31)
MCH RBC QN AUTO: 28.8 PG (ref 27–31)
MCHC RBC AUTO-ENTMCNC: 29.6 G/DL (ref 32–36)
MCHC RBC AUTO-ENTMCNC: 29.9 G/DL (ref 32–36)
MCHC RBC AUTO-ENTMCNC: 30.1 G/DL (ref 32–36)
MCV RBC AUTO: 96 FL (ref 82–98)
MCV RBC AUTO: 96 FL (ref 82–98)
MCV RBC AUTO: 97 FL (ref 82–98)
MONOCYTES # BLD AUTO: 0.2 K/UL (ref 0.3–1)
MONOCYTES # BLD AUTO: 0.3 K/UL (ref 0.3–1)
MONOCYTES # BLD AUTO: 0.4 K/UL (ref 0.3–1)
MONOCYTES NFR BLD: 2.8 % (ref 4–15)
MONOCYTES NFR BLD: 3.7 % (ref 4–15)
MONOCYTES NFR BLD: 4.6 % (ref 4–15)
NEUTROPHILS # BLD AUTO: 6.9 K/UL (ref 1.8–7.7)
NEUTROPHILS # BLD AUTO: 7.5 K/UL (ref 1.8–7.7)
NEUTROPHILS # BLD AUTO: 7.7 K/UL (ref 1.8–7.7)
NEUTROPHILS NFR BLD: 88.4 % (ref 38–73)
NEUTROPHILS NFR BLD: 91.3 % (ref 38–73)
NEUTROPHILS NFR BLD: 91.9 % (ref 38–73)
NRBC BLD-RTO: 0 /100 WBC
NUM UNITS TRANS PACKED RBC: NORMAL
PLATELET # BLD AUTO: 230 K/UL (ref 150–450)
PLATELET # BLD AUTO: 239 K/UL (ref 150–450)
PLATELET # BLD AUTO: 256 K/UL (ref 150–450)
PMV BLD AUTO: 10.2 FL (ref 9.2–12.9)
PMV BLD AUTO: 10.3 FL (ref 9.2–12.9)
PMV BLD AUTO: 10.4 FL (ref 9.2–12.9)
POTASSIUM SERPL-SCNC: 3.8 MMOL/L (ref 3.5–5.1)
RBC # BLD AUTO: 2.74 M/UL (ref 4–5.4)
RBC # BLD AUTO: 2.9 M/UL (ref 4–5.4)
RBC # BLD AUTO: 3.33 M/UL (ref 4–5.4)
SODIUM SERPL-SCNC: 139 MMOL/L (ref 136–145)
WBC # BLD AUTO: 7.77 K/UL (ref 3.9–12.7)
WBC # BLD AUTO: 8.18 K/UL (ref 3.9–12.7)
WBC # BLD AUTO: 8.43 K/UL (ref 3.9–12.7)

## 2023-01-28 PROCEDURE — 63600175 PHARM REV CODE 636 W HCPCS: Performed by: FAMILY MEDICINE

## 2023-01-28 PROCEDURE — 25000003 PHARM REV CODE 250: Performed by: STUDENT IN AN ORGANIZED HEALTH CARE EDUCATION/TRAINING PROGRAM

## 2023-01-28 PROCEDURE — 63600175 PHARM REV CODE 636 W HCPCS: Performed by: INTERNAL MEDICINE

## 2023-01-28 PROCEDURE — 99900031 HC PATIENT EDUCATION (STAT)

## 2023-01-28 PROCEDURE — 25000242 PHARM REV CODE 250 ALT 637 W/ HCPCS: Performed by: FAMILY MEDICINE

## 2023-01-28 PROCEDURE — 99232 PR SUBSEQUENT HOSPITAL CARE,LEVL II: ICD-10-PCS | Mod: ,,, | Performed by: INTERNAL MEDICINE

## 2023-01-28 PROCEDURE — 99900035 HC TECH TIME PER 15 MIN (STAT)

## 2023-01-28 PROCEDURE — 12000002 HC ACUTE/MED SURGE SEMI-PRIVATE ROOM

## 2023-01-28 PROCEDURE — 94640 AIRWAY INHALATION TREATMENT: CPT

## 2023-01-28 PROCEDURE — 27000221 HC OXYGEN, UP TO 24 HOURS

## 2023-01-28 PROCEDURE — 36430 TRANSFUSION BLD/BLD COMPNT: CPT

## 2023-01-28 PROCEDURE — 80048 BASIC METABOLIC PNL TOTAL CA: CPT | Performed by: FAMILY MEDICINE

## 2023-01-28 PROCEDURE — 83735 ASSAY OF MAGNESIUM: CPT | Performed by: FAMILY MEDICINE

## 2023-01-28 PROCEDURE — 94761 N-INVAS EAR/PLS OXIMETRY MLT: CPT

## 2023-01-28 PROCEDURE — 25000003 PHARM REV CODE 250: Performed by: INTERNAL MEDICINE

## 2023-01-28 PROCEDURE — 36415 COLL VENOUS BLD VENIPUNCTURE: CPT | Performed by: FAMILY MEDICINE

## 2023-01-28 PROCEDURE — 99232 SBSQ HOSP IP/OBS MODERATE 35: CPT | Mod: ,,, | Performed by: INTERNAL MEDICINE

## 2023-01-28 PROCEDURE — 25000003 PHARM REV CODE 250: Performed by: FAMILY MEDICINE

## 2023-01-28 PROCEDURE — P9016 RBC LEUKOCYTES REDUCED: HCPCS | Performed by: STUDENT IN AN ORGANIZED HEALTH CARE EDUCATION/TRAINING PROGRAM

## 2023-01-28 PROCEDURE — 85025 COMPLETE CBC W/AUTO DIFF WBC: CPT | Mod: 91 | Performed by: FAMILY MEDICINE

## 2023-01-28 RX ORDER — FUROSEMIDE 20 MG/1
20 TABLET ORAL DAILY
Status: COMPLETED | OUTPATIENT
Start: 2023-01-28 | End: 2023-01-28

## 2023-01-28 RX ADMIN — FLUTICASONE PROPIONATE 50 MCG: 50 SPRAY, METERED NASAL at 10:01

## 2023-01-28 RX ADMIN — ATORVASTATIN CALCIUM 40 MG: 40 TABLET, FILM COATED ORAL at 08:01

## 2023-01-28 RX ADMIN — QUETIAPINE 100 MG: 100 TABLET ORAL at 08:01

## 2023-01-28 RX ADMIN — ASPIRIN 81 MG: 81 TABLET, COATED ORAL at 10:01

## 2023-01-28 RX ADMIN — MAGNESIUM OXIDE 400 MG: 400 TABLET ORAL at 09:01

## 2023-01-28 RX ADMIN — PREDNISONE 60 MG: 20 TABLET ORAL at 10:01

## 2023-01-28 RX ADMIN — PAROXETINE HYDROCHLORIDE 50 MG: 20 TABLET, FILM COATED ORAL at 10:01

## 2023-01-28 RX ADMIN — BUDESONIDE 1 MG: 0.5 INHALANT RESPIRATORY (INHALATION) at 07:01

## 2023-01-28 RX ADMIN — IPRATROPIUM BROMIDE AND ALBUTEROL SULFATE 3 ML: 2.5; .5 SOLUTION RESPIRATORY (INHALATION) at 11:01

## 2023-01-28 RX ADMIN — ALPRAZOLAM 0.25 MG: 0.25 TABLET ORAL at 09:01

## 2023-01-28 RX ADMIN — PIPERACILLIN SODIUM AND TAZOBACTAM SODIUM 3.38 G: 3; .375 INJECTION, POWDER, LYOPHILIZED, FOR SOLUTION INTRAVENOUS at 11:01

## 2023-01-28 RX ADMIN — PIPERACILLIN SODIUM AND TAZOBACTAM SODIUM 3.38 G: 3; .375 INJECTION, POWDER, LYOPHILIZED, FOR SOLUTION INTRAVENOUS at 03:01

## 2023-01-28 RX ADMIN — IPRATROPIUM BROMIDE AND ALBUTEROL SULFATE 3 ML: 2.5; .5 SOLUTION RESPIRATORY (INHALATION) at 12:01

## 2023-01-28 RX ADMIN — IPRATROPIUM BROMIDE AND ALBUTEROL SULFATE 3 ML: 2.5; .5 SOLUTION RESPIRATORY (INHALATION) at 01:01

## 2023-01-28 RX ADMIN — IPRATROPIUM BROMIDE AND ALBUTEROL SULFATE 3 ML: 2.5; .5 SOLUTION RESPIRATORY (INHALATION) at 07:01

## 2023-01-28 RX ADMIN — PANTOPRAZOLE SODIUM 40 MG: 40 TABLET, DELAYED RELEASE ORAL at 06:01

## 2023-01-28 RX ADMIN — FUROSEMIDE 20 MG: 20 TABLET ORAL at 06:01

## 2023-01-28 RX ADMIN — ARFORMOTEROL TARTRATE 15 MCG: 15 SOLUTION RESPIRATORY (INHALATION) at 07:01

## 2023-01-28 RX ADMIN — CYANOCOBALAMIN 1000 MCG: 1000 INJECTION, SOLUTION INTRAMUSCULAR; SUBCUTANEOUS at 10:01

## 2023-01-28 NOTE — CARE UPDATE
01/28/23 0746   Patient Assessment/Suction   Level of Consciousness (AVPU) alert   Respiratory Effort Normal;Unlabored   Expansion/Accessory Muscles/Retractions no use of accessory muscles;no retractions;expansion symmetric   All Lung Fields Breath Sounds diminished   Rhythm/Pattern, Respiratory unlabored;pattern regular;depth regular;chest wiggle adequate;no shortness of breath reported   Cough Frequency infrequent   Cough Type good   PRE-TX-O2   Device (Oxygen Therapy) nasal cannula   $ Is the patient on Low Flow Oxygen? Yes   Flow (L/min) 4   SpO2 98 %   Pulse Oximetry Type Intermittent   $ Pulse Oximetry - Multiple Charge Pulse Oximetry - Multiple   Pulse 90   Resp 18   Aerosol Therapy   $ Aerosol Therapy Charges Aerosol Treatment   Daily Review of Necessity (SVN) completed   Respiratory Treatment Status (SVN) given   Treatment Route (SVN) oxygen;mask   Patient Position (SVN) HOB elevated   Post Treatment Assessment (SVN) increased aeration   Signs of Intolerance (SVN) none   Breath Sounds Post-Respiratory Treatment   Throughout All Fields Post-Treatment All Fields   Throughout All Fields Post-Treatment aeration increased   Post-treatment Heart Rate (beats/min) 99   Post-treatment Resp Rate (breaths/min) 20   Education   $ Education Bronchodilator;15 min   Respiratory Evaluation   $ Care Plan Tech Time 15 min

## 2023-01-28 NOTE — PROGRESS NOTES
Pulmonary/Critical Care Progress Note      PATIENT NAME: Betty Bacon  MRN: [id number]  TODAY'S DATE: 2023  10:20 AM  ADMIT DATE: 2023  AGE: 72 y.o. : 1950    CONSULT REQUESTED BY: Katey Lange MD    REASON FOR CONSULT:   Acute on chronic respiratory failure  ILD  Aortic stenosis    HISTORY OF PRESENT ILLNESS   Betty Bacon is a 72 y.o. female with a PMH of COPD, \asbestosis, recent COVID pneumonia, CAD, HFpEF, severe aortic stenosis on 4 L NC at baseline on whom we have been consulted for acute on chronic .    She presented with fevers and shortness of breath. She endorses increased leg swelling and orthopnea prior to presentation.    The patient states she has a diagnosis of asbestosis, as well as COPD. She takes only Trelegy and albuterol for her lung conditions.    23: Feeling overall better today.    2023 - STable overnight, still with significant dyspnea with exertion, no new complaints    SMOKING HISTORY  Quit 25 years ago.  Smoked 2 PPD x 30 years.    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    REVIEW OF SYSTEMS  GENERAL: Feeling better. Night sweats and fevers.  EYES: Vision is good.  ENT: No sinusitis or pharyngitis.   HEART: No chest pain or palpitations. Orthopnea.  LUNGS: Non-productive cough.  GI: No abdominal pain, nausea, vomiting, or diarrhea.  : No dysuria, urgency, or frequency.  SKIN: No lesions or rashes.  MUSCULOSKELETAL: No joint pain or myalgias.  NEURO: No headaches or neuropathy.  LYMPH: Leg swelling.  PSYCH: No anxiety or depression.  ENDO: No unexpected weight change.    ALLERGIES  Review of patient's allergies indicates:   Allergen Reactions    Propoxyphene n-acetaminophen Other (See Comments)     Other reaction(s): Unknown    Codeine Anxiety       INPATIENT SCHEDULED MEDICATIONS   albuterol-ipratropium  3 mL Nebulization Q4H    budesonide  1 mg Nebulization  Q12H    And    arformoteroL  15 mcg Nebulization BID    aspirin  81 mg Oral Daily    atorvastatin  40 mg Oral Daily    fluticasone propionate  1 spray Each Nostril Daily    magnesium oxide  400 mg Oral Daily    metoprolol succinate  50 mg Oral Daily    pantoprazole  40 mg Oral Before breakfast    paroxetine  50 mg Oral Daily    piperacillin-tazobactam (ZOSYN) IVPB  3.375 g Intravenous Q8H    predniSONE  60 mg Oral Daily    QUEtiapine  100 mg Oral QHS         MEDICAL AND SURGICAL HISTORY  Past Medical History:   Diagnosis Date    Acute coronary artery obstruction without MI 10/2012    Benign hypertension     COPD (chronic obstructive pulmonary disease)     Coronary artery disease     Disorder of kidney and ureter     Dr. Morrow    Hepatitis B core antibody positive 2021    Negative sAg, suggests previous exposure but no chronic/active Hep B. At risk for reactivation with any immunosuppression medication, steroids, chemo, etc.      History of electroconvulsive therapy     Hyperlipidemia LDL goal < 70     Left ankle sprain     Major depressive disorder, recurrent episode, severe     s/p ECT    Positive AKIRA (antinuclear antibody) 2021    PVD (peripheral vascular disease)      Past Surgical History:   Procedure Laterality Date    CHOLECYSTECTOMY      CORONARY ANGIOPLASTY      CORONARY ANGIOPLASTY WITH STENT PLACEMENT  10/2012    2 stents RCA (100% stenosis)    EYE SURGERY      cataract surgery    HYSTERECTOMY      LINA, ovaries intact. uterine prolapse    ILIAC ARTERY STENT      TONSILLECTOMY      TOTAL VAGINAL HYSTERECTOMY         ALCOHOL, TOBACCO AND DRUG USE  Social History     Tobacco Use   Smoking Status Former    Packs/day: 2.00    Years: 40.00    Pack years: 80.00    Types: Cigarettes    Quit date: 2009    Years since quittin.1   Smokeless Tobacco Never     Social History     Substance and Sexual Activity   Alcohol Use Yes    Comment: social     Social History     Substance and Sexual  Activity   Drug Use No       FAMILY HISTORY  Family History   Problem Relation Age of Onset    Diabetes Mother     Heart disease Mother     Stroke Father     Heart disease Father     Heart disease Brother     Stroke Brother     Hypertension Daughter     Diabetes Maternal Aunt     Heart disease Maternal Aunt     Heart disease Maternal Uncle     Heart disease Paternal Aunt     Heart disease Paternal Uncle     Heart disease Maternal Grandfather     Diabetes Sister     Heart disease Sister     Cancer Sister         lung    Kidney disease Sister         mass, benign    Breast cancer Sister     Stroke Sister     Dementia Sister     Liver disease Sister     Melanoma Neg Hx     Psoriasis Neg Hx     Lupus Neg Hx     Eczema Neg Hx        VITAL SIGNS (MOST RECENT)  Temp: 97.4 °F (36.3 °C) (01/28/23 1115)  Pulse: 80 (Simultaneous filing. User may not have seen previous data.) (01/28/23 1115)  Resp: 18 (Simultaneous filing. User may not have seen previous data.) (01/28/23 1115)  BP: 135/77 (01/28/23 1115)  SpO2: 99 % (01/28/23 1115)    INTAKE AND OUTPUT (LAST 24 HOURS):No intake or output data in the 24 hours ending 01/28/23 1214      WEIGHT  Wt Readings from Last 1 Encounters:   01/26/23 88.3 kg (194 lb 10.7 oz)       PHYSICAL EXAM  GENERAL: A&Ox3. NAD.  HEENT: Extraocular movements intact. Pharynx moist.  NECK: JVD and hepatojugular reflux present.  HEART: Regular rate and normal rhythm. 2/6 systolic murmur at RUSB radiating to carotids.  LUNGS: Diffuse b/l crackles more prominent at bases.  ABDOMEN: Soft, non-tender, non-distended, no masses palpated.  EXTREMITIES: Normal muscle tone and joint movement, no cyanosis or clubbing.   LYMPHATICS: 1+ b/l pretibial pitting edema  SKIN: Dry, intact, no lesions.   NEURO: No gross deficit.  PSYCH: Appropriate affect    ACUTE PHASE REACTANT (LAST 24 HOURS)  No results for input(s): FERRITIN, CRP, LDH, DDIMER in the last 24 hours.      CBC LAST (LAST 24 HOURS)  Recent Labs   Lab  01/28/23  0403   WBC 7.77   RBC 2.74*   HGB 7.9*   HCT 26.7*   MCV 97   MCH 28.8   MCHC 29.6*   RDW 18.3*      MPV 10.4   GRAN 88.4*  6.9   LYMPH 5.7*  0.4*   MONO 4.6  0.4   BASO 0.01   NRBC 0       CHEMISTRY LAST (LAST 24 HOURS)  Recent Labs   Lab 01/28/23  0403      K 3.8      CO2 28   ANIONGAP 6*   BUN 41*   CREATININE 1.5*   *   CALCIUM 8.5*   MG 1.6       COAGULATION LAST (LAST 24 HOURS)  No results for input(s): LABPT, INR, APTT in the last 24 hours.      CARDIAC PROFILE (LAST 24 HOURS)  Recent Labs   Lab 01/25/23  1436   *       LAST 7 DAYS MICROBIOLOGY   Microbiology Results (last 7 days)       Procedure Component Value Units Date/Time    Blood culture x two cultures. Draw prior to antibiotics. [[id number]] Collected: 01/25/23 1504    Order Status: Completed Specimen: Blood from Peripheral, Antecubital, Left Updated: 01/27/23 1632     Blood Culture, Routine No Growth to date      No Growth to date      No Growth to date    Narrative:      Aerobic and anaerobic    Blood culture x two cultures. Draw prior to antibiotics. [[id number]] Collected: 01/25/23 1504    Order Status: Completed Specimen: Blood from Peripheral, Antecubital, Right Updated: 01/27/23 1632     Blood Culture, Routine No Growth to date      No Growth to date      No Growth to date    Narrative:      Aerobic and anaerobic    MRSA Screen by PCR [[id number]] Collected: 01/26/23 1120    Order Status: Completed Specimen: Nasopharyngeal Swab from Nasal Updated: 01/26/23 1340     MRSA SCREEN BY PCR Negative    Respiratory Infection Panel (PCR), Nasopharyngeal [[id number]] Collected: 01/25/23 1755    Order Status: Completed Updated: 01/26/23 0036     Respiratory Infection Panel Source NP swab     Adenovirus Not Detected     Coronavirus 229E, Common Cold Virus Not Detected     Coronavirus HKU1, Common Cold Virus Not Detected     Coronavirus NL63, Common Cold Virus Not Detected     Coronavirus OC43, Common Cold Virus Not  Detected     Comment: The Coronavirus strains detected in this test cause the common cold.  These strains are not the COVID-19 (novel Coronavirus)strain   associated with the respiratory disease outbreak.          SARS-CoV2 (COVID-19) Qualitative PCR Not Detected     Human Metapneumovirus Not Detected     Human Rhinovirus/Enterovirus Not Detected     Influenza A (subtypes H1, H1-2009,H3) Not Detected     Influenza B Not Detected     Parainfluenza Virus 1 Not Detected     Parainfluenza Virus 2 Not Detected     Parainfluenza Virus 3 Not Detected     Parainfluenza Virus 4 Not Detected     Respiratory Syncytial Virus Not Detected     Bordetella Parapertussis (KX5522) Not Detected     Bordetella pertussis (ptxP) Not Detected     Chlamydia pneumoniae Not Detected     Mycoplasma pneumoniae Not Detected     Comment: Respiratory Infection Panel testing performed by Multiplex PCR.       Narrative:      Specimen Source->Nasopharyngeal Swab    Resp Viral Panel PCR, Peds Under 7 Months Nasopharyngeal Swab [[id number]] Collected: 01/25/23 1791    Order Status: Canceled     Culture, Respiratory with Gram Stain [[id number]]     Order Status: No result Specimen: Respiratory             MOST RECENT IMAGING  CTA Chest Non-Coronary (PE Studies)  Narrative: CMS MANDATED QUALITY DATA - CT RADIATION - 436    All CT scans at this facility utilize dose modulation, iterative reconstruction, and/or weight based dosing when appropriate to reduce radiation dose to as low as reasonably achievable    EXAMINATION:  CTA CHEST NON CORONARY (PE STUDIES)    CLINICAL HISTORY:  Pulmonary embolism (PE) suspected, unknown D-dimer;    TECHNIQUE:  Routine CT angiogram of the chest protocol performed after the IV administration of 100 mL Omnipaque 350. 3D reconstructions/reformats/MIPs obtained. All CT scans at this facility are performed  using dose modulation techniques as appropriate to performed exam including the following:  automated exposure control;  adjustment of mA and/or kV according to the patients size (this includes techniques or standardized protocols for targeted exams where dose is matched to indication/reason for exam: i.e. extremities or head);  iterative reconstruction technique.    COMPARISON:  Comparison made with the patient's previous conventional radiographs of the chest of 1/25/2023 3 grade    FINDINGS:  Low quality examination for pulmonary embolism as there is limited opacification pulmonary arterial tree due to timing the contrast bolus and hemodynamics the patient's cardiac output, however no evidence of pulmonary embolism is established of the pulmonary arterial system.  The main pulmonary trunk is patent without evidence of a saddle embolus at the branch point of the main pulmonary artery.  Extensive atheromatous calcifications of the major coronary vessels, normal contour the cardiac chambers, with mild concentric thickening lower left ventricular myocardium.    Parenchymal lung window settings demonstrate evidence of localized areas of prominent intralobar interlobular septal thickening, regional areas of subpleural fibrosis in the basilar segments, and localized scarring or consolidation the right middle lobe medial segment with pericardial contact.  There is no evidence of pneumothorax.  Diffuse bronchial wall thickening is noted on a chronic basis.  There is no evidence of pneumothorax.  No significant pleural effusion.    The upper abdominal structures demonstrate no evidence of significant abnormality.  The liver is fatty replaced.  Extensive atheromatous calcifications of the upper abdominal vessels.  Impression: 1.  No CT evidence of acute pulmonary thromboembolism.    2.  Diffuse inter lobar and interlobular septal thickening with evidence of moderate peribronchial cuffing on a chronic basis due to underlying interstitial fibrotic process.    Electronically signed by: Vitaly Luna  MD  Date:    01/25/2023  Time:    16:26  X-Ray Chest AP Portable  Reason: Sepsis sob    FINDINGS:  Portable chest at 1443 compared with 1/1/2023 shows normal cardiomediastinal silhouette. Patient is rotated.    Diffuse pulmonary interstitial opacities and patchy lower lung zone alveolar opacities have slightly increased in the interval. No pleural effusion or pneumothorax. Central pulmonary vasculature is unchanged. No acute osseous abnormality.    IMPRESSION:  Slight worsening of diffuse pulmonary interstitial and patchy lower lung zone alveolar opacities. Potential etiologies include pulmonary edema, pneumonia, or alveolar hemorrhage.    Electronically signed by:  Caden France MD  1/25/2023 3:05 PM CST Workstation: [id number]      CURRENT VISIT EKG  No results found for this visit on 01/25/23.    ECHOCARDIOGRAM RESULTS  Results for orders placed during the hospital encounter of 12/20/22    Echo    Interpretation Summary  · The left ventricle is normal in size with severe concentric hypertrophy and normal systolic function.  · Grade II left ventricular diastolic dysfunction.  · The estimated ejection fraction is 65%.  · Normal right ventricular size with normal right ventricular systolic function.  · Moderate aortic regurgitation.  · There is severe aortic valve stenosis.  · Aortic valve area is 0.94 cm2; peak velocity is 3.29 m/s; mean gradient is 26 mmHg.  · Mild pulmonic regurgitation.  · Mild tricuspid regurgitation.  · There is mild pulmonary hypertension.  · Normal central venous pressure (3 mmHg).  · The estimated PA systolic pressure is 44 mmHg.        RESPIRATORY SUPPORT          PFTs (6/18/13)      LAST ARTERIAL BLOOD GAS  ABG  No results for input(s): PH, PO2, PCO2, HCO3, BE in the last 168 hours.    IMPRESSION AND PLAN  Betty Bacon is a 72 y.o. female with a PMH of COPD, asbestosis, recent COVID pneumonia, CAD, HFpEF, severe aortic stenosis on 4 L NC at baseline on whom we have been  consulted for acute on chronic .    #Asbestosis  Pattern on CT of 1/2022 consistent with asbestosis, but could also reflect UIP with an atypical upper lobe predominance. Current CTA chest shows this in addition to mosaicism/GGOs not present on prior scan, likely due to pulmonary edema and possibly pneumonia as well.  #Community-acquired pneumonia with MDR risk factors  Has had pneumonia 3 times in past several months but cultures have not been positive when collected.  #Sepsis, resolved  #COPD with acute exacerbation  #Severe aortic stenosis  #Moderate aortic regurgitation  #HFpEF with acute exacerbation  BNP elevated at 482 (has been up to 500s in past, but usually normal).  RVP (-). Procal 0.36. MRSA nares (-).    - agree with cardiology consult and diuresis per them  - strict I&Os  - continue empiric Zosyn but note low procalcitonin  - f/u sputum culture  - DuoNebs  - continue prednisone 60 mg daily      Cristian Singh MD  [facility] Pulmonary/Critical Care  01/28/2023

## 2023-01-28 NOTE — PROGRESS NOTES
[facility]    Progress Note    Patient Name: Betty Bacon  MRN: [id number]  Patient Class: IP- Inpatient   Admission Date: 1/25/2023  1:29 PM  Length of Stay: 3  Attending Physician: Katey Lange MD  Primary Care Provider: Johanne Callejas MD  Face-to-Face encounter date: 01/28/2023  Code status:  Chief Complaint: Shortness of Breath (Since today - hx of COPD )        Subjective:    HPI:Betty Bacon is a 72 y.o. White female   With PMH of chronic respiratory failure on 4LNC,  COPD, ILD, recent COVID pneumonia,  CAD, HFpEF,  who presents with SOB.     Onset today  Progressively worsening  Worse with exertion  Alleviated with rest  +coughing, dry  No CP  No palpitations  No n/v  Diarrhea one week ago but not since  No dysuria  +subjective fever  +chills  +LE edema, pt not sure if worsening/acute    Interval History:   Patient with known history of chronic respiratory failure on 4 L home oxygen, interstitial lung disease, followed by Pulmonary Dr Medeiros, CAD with remote history of PCI, on aspirin and Plavix, states followed by cardiologist in [place], heart failure with preserved ejection fraction with valvular heart disease with previous echo EF of 65% with severe aortic stenosis, moderate aortic regurgitation, CKD stage 3, recent history of COVID-19 infection.  Presenting with 1 day history of worsening shortness of breath associated with cough and fever.  Febrile to 101 on presentation.  Anemia appears to be progressive, down to 8.2, FOBT negative, , procalcitonin 0.36, lactic acid 0.7, radiological imaging with concern for diffuse pulmonary/alveolar opacity lower lung, possibly edema, pneumonia versus hemorrhage.  She was admitted, started on broad-spectrum antibiotics with pulmonary consultation.  On 01/26, does report interval improvement, IV diuresis and monitoring.    1/26:Patient reports interval improvement in symptoms today.  States yesterday  morning became more short of breath, cough with fever.  States cough is mostly nonproductive.  On 4 L chronic home oxygen, followed by Pulmonary .  States she is on aspirin and Plavix, denies any clinical symptoms of bleeding, followed by cardiology in [place], [facility] last PCI was many years ago.  Febrile to 101.1 on admission but temperature curve is better.  Labs with hemoglobin 8.2, BUN/creatinine 23/1.3, , procalcitonin 0.36, magnesium 1.2.  Imaging with concern for lower zone pulmonary edema versus pneumonia versus hemorrhage.  Discussed with patient.  No visitors at bedside.    1/27:No acute overnight events.  Breathing continues to improve, she sitting out in chair on 4 L supplemental oxygen (home requirement).  Continues with intermittent cough but remains nonproductive.  Continues to deny any evidence of bleeding, states last transfusion was many years ago.  Afebrile with T-max 98.4°.  Labs with hemoglobin 8.1, BUN/creatinine 29/1.5, magnesium 1.8.  MRSA nasal screen negative.  Discussed with patient.  Nursing at bedside.    1/28:  Patient doing well, would transfuse 1 unit of blood as hemoglobin 7.9 due to cardiac status, monitor for fluid overload.  No concerns/issues overnight reported by the patient or the nursing staff.    Review of Systems All other Review of Systems were found to be negative expect for that mentioned already in HPI.     Objective:     Vitals:    01/28/23 1100 01/28/23 1105 01/28/23 1115 01/28/23 1322   BP: (!) 145/75 (!) 142/65 135/77    Pulse: 86 82 80  Comment: Simultaneous filing. User may not have seen previous data. 80   Resp: 18 18 18  Comment: Simultaneous filing. User may not have seen previous data. 18   Temp: 97.3 °F (36.3 °C) 97.5 °F (36.4 °C) 97.4 °F (36.3 °C)    TempSrc:       SpO2: 98% 97% 99% 97%   Weight:       Height:            Vitals reviewed.  Constitutional: No distress.   HENT: NC  Head: Atraumatic.   Cardiovascular: Normal rate, regular  rhythm and normal heart sounds.   Pulmonary/Chest: Effort normal. No wheezes.   Abdominal: Soft. Bowel sounds are normal. No distension and no mass. No tenderness  Neurological: Alert.   Skin: Skin is warm and dry.   Psych: Appropriate mood and affect    Following labs were Reviewed   CBC:  Recent Labs   Lab 01/28/23  0403   WBC 7.77   HGB 7.9*   HCT 26.7*        CMP:  Recent Labs   Lab 01/28/23  0403   CALCIUM 8.5*      K 3.8   CO2 28      BUN 41*   CREATININE 1.5*       Micro Results  Microbiology Results (last 7 days)       Procedure Component Value Units Date/Time    Blood culture x two cultures. Draw prior to antibiotics. [[id number]] Collected: 01/25/23 1504    Order Status: Completed Specimen: Blood from Peripheral, Antecubital, Left Updated: 01/27/23 1632     Blood Culture, Routine No Growth to date      No Growth to date      No Growth to date    Narrative:      Aerobic and anaerobic    Blood culture x two cultures. Draw prior to antibiotics. [[id number]] Collected: 01/25/23 1504    Order Status: Completed Specimen: Blood from Peripheral, Antecubital, Right Updated: 01/27/23 1632     Blood Culture, Routine No Growth to date      No Growth to date      No Growth to date    Narrative:      Aerobic and anaerobic    MRSA Screen by PCR [[id number]] Collected: 01/26/23 1120    Order Status: Completed Specimen: Nasopharyngeal Swab from Nasal Updated: 01/26/23 1340     MRSA SCREEN BY PCR Negative    Respiratory Infection Panel (PCR), Nasopharyngeal [[id number]] Collected: 01/25/23 1755    Order Status: Completed Updated: 01/26/23 0036     Respiratory Infection Panel Source NP swab     Adenovirus Not Detected     Coronavirus 229E, Common Cold Virus Not Detected     Coronavirus HKU1, Common Cold Virus Not Detected     Coronavirus NL63, Common Cold Virus Not Detected     Coronavirus OC43, Common Cold Virus Not Detected     Comment: The Coronavirus strains detected in this test cause the common  cold.  These strains are not the COVID-19 (novel Coronavirus)strain   associated with the respiratory disease outbreak.          SARS-CoV2 (COVID-19) Qualitative PCR Not Detected     Human Metapneumovirus Not Detected     Human Rhinovirus/Enterovirus Not Detected     Influenza A (subtypes H1, H1-2009,H3) Not Detected     Influenza B Not Detected     Parainfluenza Virus 1 Not Detected     Parainfluenza Virus 2 Not Detected     Parainfluenza Virus 3 Not Detected     Parainfluenza Virus 4 Not Detected     Respiratory Syncytial Virus Not Detected     Bordetella Parapertussis (HC7693) Not Detected     Bordetella pertussis (ptxP) Not Detected     Chlamydia pneumoniae Not Detected     Mycoplasma pneumoniae Not Detected     Comment: Respiratory Infection Panel testing performed by Multiplex PCR.       Narrative:      Specimen Source->Nasopharyngeal Swab    Resp Viral Panel PCR, Peds Under 7 Months Nasopharyngeal Swab [[id number]] Collected: 01/25/23 1755    Order Status: Canceled     Culture, Respiratory with Gram Stain [[id number]]     Order Status: No result Specimen: Respiratory              Radiology Reports  CTA Chest Non-Coronary (PE Studies)    Result Date: 1/25/2023  CMS MANDATED QUALITY DATA - CT RADIATION - 436 All CT scans at this facility utilize dose modulation, iterative reconstruction, and/or weight based dosing when appropriate to reduce radiation dose to as low as reasonably achievable EXAMINATION: CTA CHEST NON CORONARY (PE STUDIES) CLINICAL HISTORY: Pulmonary embolism (PE) suspected, unknown D-dimer; TECHNIQUE: Routine CT angiogram of the chest protocol performed after the IV administration of 100 mL Omnipaque 350. 3D reconstructions/reformats/MIPs obtained. All CT scans at this facility are performed  using dose modulation techniques as appropriate to performed exam including the following:  automated exposure control; adjustment of mA and/or kV according to the patients size (this includes techniques or  standardized protocols for targeted exams where dose is matched to indication/reason for exam: i.e. extremities or head);  iterative reconstruction technique. COMPARISON: Comparison made with the patient's previous conventional radiographs of the chest of 1/25/2023 3 grade FINDINGS: Low quality examination for pulmonary embolism as there is limited opacification pulmonary arterial tree due to timing the contrast bolus and hemodynamics the patient's cardiac output, however no evidence of pulmonary embolism is established of the pulmonary arterial system.  The main pulmonary trunk is patent without evidence of a saddle embolus at the branch point of the main pulmonary artery.  Extensive atheromatous calcifications of the major coronary vessels, normal contour the cardiac chambers, with mild concentric thickening lower left ventricular myocardium. Parenchymal lung window settings demonstrate evidence of localized areas of prominent intralobar interlobular septal thickening, regional areas of subpleural fibrosis in the basilar segments, and localized scarring or consolidation the right middle lobe medial segment with pericardial contact.  There is no evidence of pneumothorax.  Diffuse bronchial wall thickening is noted on a chronic basis.  There is no evidence of pneumothorax.  No significant pleural effusion. The upper abdominal structures demonstrate no evidence of significant abnormality.  The liver is fatty replaced.  Extensive atheromatous calcifications of the upper abdominal vessels.     1.  No CT evidence of acute pulmonary thromboembolism. 2.  Diffuse inter lobar and interlobular septal thickening with evidence of moderate peribronchial cuffing on a chronic basis due to underlying interstitial fibrotic process. Electronically signed by: Vitaly Luna MD Date:    01/25/2023 Time:    16:26    X-Ray Chest AP Portable    Result Date: 1/25/2023  Reason: Sepsis sob FINDINGS: Portable chest at 1443 compared with  1/1/2023 shows normal cardiomediastinal silhouette. Patient is rotated. Diffuse pulmonary interstitial opacities and patchy lower lung zone alveolar opacities have slightly increased in the interval. No pleural effusion or pneumothorax. Central pulmonary vasculature is unchanged. No acute osseous abnormality. IMPRESSION: Slight worsening of diffuse pulmonary interstitial and patchy lower lung zone alveolar opacities. Potential etiologies include pulmonary edema, pneumonia, or alveolar hemorrhage. Electronically signed by:  Caden France MD  1/25/2023 3:05 PM CST Workstation: [id number]    X-Ray Chest AP Portable    Result Date: 1/1/2023  Portable chest x-ray at 3:09 PM is compared to prior study dated 12/22/2022 Clinical history shortness of breath The heart is enlarged. There are no confluent infiltrates or pleural effusions. There are no acute osseous abnormalities. IMPRESSION: Cardiomegaly with no acute pulmonary process Electronically signed by:  Penny Ribera MD  1/1/2023 3:25 PM CST Workstation: [id number]       Meds  Scheduled Meds:   albuterol-ipratropium  3 mL Nebulization Q4H    budesonide  1 mg Nebulization Q12H    And    arformoteroL  15 mcg Nebulization BID    aspirin  81 mg Oral Daily    atorvastatin  40 mg Oral Daily    fluticasone propionate  1 spray Each Nostril Daily    magnesium oxide  400 mg Oral Daily    metoprolol succinate  50 mg Oral Daily    pantoprazole  40 mg Oral Before breakfast    paroxetine  50 mg Oral Daily    piperacillin-tazobactam (ZOSYN) IVPB  3.375 g Intravenous Q8H    predniSONE  60 mg Oral Daily    QUEtiapine  100 mg Oral QHS     Continuous Infusions:  PRN Meds:.acetaminophen, albuterol-ipratropium, ALPRAZolam, dextrose 10%, dextrose 10%, glucagon (human recombinant), glucose, glucose, hydrALAZINE, hydrALAZINE, HYDROcodone-acetaminophen, magnesium oxide, magnesium oxide, meclizine, melatonin, naloxone, ondansetron, polyethylene glycol, potassium bicarbonate, potassium  bicarbonate, potassium bicarbonate, potassium, sodium phosphates, potassium, sodium phosphates, potassium, sodium phosphates, senna-docusate 8.6-50 mg, simethicone, sodium chloride 0.9%.    Active PT: Yes  Active OT: Yes  Active SLP: No  Assessment & Plan:     Active Hospital Problems    Diagnosis    *Lower lung pneumonia    CARMELITA (acute kidney injury)    Anemia, acute on chronic, progressive    Chronic respiratory failure with hypoxia, on 4 L oxygen    Dyspnea    Acute on chronic heart failure with preserved ejection fraction    Severe obesity with body mass index (BMI) of 35.0 to 39.9 with serious comorbidity    GERD (gastroesophageal reflux disease)    Interstitial lung disease    CAD (coronary artery disease)     NEFTALY RCA 7/2004  Stents x 2 RCA 10/2012      Valvular heart disease, severe aortic stenosis, moderate aortic regurgitation     moderate      Generalized anxiety disorder    PAD (peripheral artery disease)     Bilateral IRINA stents 2004      Essential hypertension    CKD (chronic kidney disease) stage 3, GFR 30-59 ml/min     Plan:  Continue care on medical floor with remote telemetry monitoring   Continue scheduled breathing treatment  IV steroid transition to prednisone 60 mg daily  Hold further IV diuresis with acute kidney injury and monitoring  Continue empiric IV Zosyn, deescalate as able  Previous echo with EF of 65% with severe aortic stenosis, moderate aortic regurgitation  Progressive anemia with no evidence of overt bleeding, Plavix held after discussion with Cardiology, trending will transfuse for hemoglobin < 8 with cardiac comorbidities   Continue empiric Zosyn for now, deescalate as able   Renally dosing all medications and avoiding nephrotoxin drugs  Electrolytes sliding scale repletion  A.m. labs ordered   PT/OT evaluation, had home health prior to admission   Appreciate all consultant's input  Further plan as per hospital course give      Discharge Planning:   Is the patient medically ready  for discharge?: no    Reason for patient still in hospital (select all that apply): Patient trending condition and Treatment    Above encounter included review of the medical records, interviewing and examining the patient face-to-face, discussion with family and other health care providers, ordering and interpreting lab/test results and formulating a plan of care.     Medical Decision Making:      [_] Low Complexity  [_] Moderate Complexity  [x] High Complexity      Katey Lange MD  Department of Hospital Medicine   [facility]

## 2023-01-28 NOTE — PT/OT/SLP PROGRESS
Physical Therapy      Patient Name:  Betty Bacon   MRN:  [id number]    Patient not seen today secondary to Nurse/ MICHELE hold (Pt receiving RBC.). Will follow-up tomorrow.

## 2023-01-29 LAB
ANION GAP SERPL CALC-SCNC: 9 MMOL/L (ref 8–16)
BASOPHILS # BLD AUTO: 0.03 K/UL (ref 0–0.2)
BASOPHILS # BLD AUTO: 0.04 K/UL (ref 0–0.2)
BASOPHILS NFR BLD: 0.3 % (ref 0–1.9)
BASOPHILS NFR BLD: 0.4 % (ref 0–1.9)
BUN SERPL-MCNC: 36 MG/DL (ref 8–23)
CALCIUM SERPL-MCNC: 8.8 MG/DL (ref 8.7–10.5)
CHLORIDE SERPL-SCNC: 105 MMOL/L (ref 95–110)
CO2 SERPL-SCNC: 27 MMOL/L (ref 23–29)
CREAT SERPL-MCNC: 1.2 MG/DL (ref 0.5–1.4)
DIFFERENTIAL METHOD: ABNORMAL
EOSINOPHIL # BLD AUTO: 0 K/UL (ref 0–0.5)
EOSINOPHIL NFR BLD: 0 % (ref 0–8)
EOSINOPHIL NFR BLD: 0.1 % (ref 0–8)
EOSINOPHIL NFR BLD: 0.1 % (ref 0–8)
EOSINOPHIL NFR BLD: 0.4 % (ref 0–8)
ERYTHROCYTE [DISTWIDTH] IN BLOOD BY AUTOMATED COUNT: 17.3 % (ref 11.5–14.5)
ERYTHROCYTE [DISTWIDTH] IN BLOOD BY AUTOMATED COUNT: 17.6 % (ref 11.5–14.5)
ERYTHROCYTE [DISTWIDTH] IN BLOOD BY AUTOMATED COUNT: 17.6 % (ref 11.5–14.5)
ERYTHROCYTE [DISTWIDTH] IN BLOOD BY AUTOMATED COUNT: 17.7 % (ref 11.5–14.5)
EST. GFR  (NO RACE VARIABLE): 47.5 ML/MIN/1.73 M^2
GLUCOSE SERPL-MCNC: 101 MG/DL (ref 70–110)
HCT VFR BLD AUTO: 30.6 % (ref 37–48.5)
HCT VFR BLD AUTO: 31 % (ref 37–48.5)
HCT VFR BLD AUTO: 32.6 % (ref 37–48.5)
HCT VFR BLD AUTO: 33.8 % (ref 37–48.5)
HGB BLD-MCNC: 10.2 G/DL (ref 12–16)
HGB BLD-MCNC: 9.2 G/DL (ref 12–16)
HGB BLD-MCNC: 9.5 G/DL (ref 12–16)
HGB BLD-MCNC: 9.8 G/DL (ref 12–16)
IMM GRANULOCYTES # BLD AUTO: 0.31 K/UL (ref 0–0.04)
IMM GRANULOCYTES # BLD AUTO: 0.4 K/UL (ref 0–0.04)
IMM GRANULOCYTES # BLD AUTO: 0.41 K/UL (ref 0–0.04)
IMM GRANULOCYTES # BLD AUTO: 0.43 K/UL (ref 0–0.04)
IMM GRANULOCYTES NFR BLD AUTO: 4.1 % (ref 0–0.5)
IMM GRANULOCYTES NFR BLD AUTO: 4.3 % (ref 0–0.5)
IMM GRANULOCYTES NFR BLD AUTO: 4.6 % (ref 0–0.5)
IMM GRANULOCYTES NFR BLD AUTO: 4.9 % (ref 0–0.5)
LYMPHOCYTES # BLD AUTO: 0.4 K/UL (ref 1–4.8)
LYMPHOCYTES # BLD AUTO: 0.4 K/UL (ref 1–4.8)
LYMPHOCYTES # BLD AUTO: 0.7 K/UL (ref 1–4.8)
LYMPHOCYTES # BLD AUTO: 0.7 K/UL (ref 1–4.8)
LYMPHOCYTES NFR BLD: 4.6 % (ref 18–48)
LYMPHOCYTES NFR BLD: 5.2 % (ref 18–48)
LYMPHOCYTES NFR BLD: 7.9 % (ref 18–48)
LYMPHOCYTES NFR BLD: 8.9 % (ref 18–48)
MAGNESIUM SERPL-MCNC: 1.5 MG/DL (ref 1.6–2.6)
MCH RBC QN AUTO: 28.7 PG (ref 27–31)
MCH RBC QN AUTO: 28.9 PG (ref 27–31)
MCH RBC QN AUTO: 28.9 PG (ref 27–31)
MCH RBC QN AUTO: 29.6 PG (ref 27–31)
MCHC RBC AUTO-ENTMCNC: 30.1 G/DL (ref 32–36)
MCHC RBC AUTO-ENTMCNC: 30.1 G/DL (ref 32–36)
MCHC RBC AUTO-ENTMCNC: 30.2 G/DL (ref 32–36)
MCHC RBC AUTO-ENTMCNC: 30.6 G/DL (ref 32–36)
MCV RBC AUTO: 95 FL (ref 82–98)
MCV RBC AUTO: 96 FL (ref 82–98)
MCV RBC AUTO: 96 FL (ref 82–98)
MCV RBC AUTO: 97 FL (ref 82–98)
MONOCYTES # BLD AUTO: 0.2 K/UL (ref 0.3–1)
MONOCYTES # BLD AUTO: 0.3 K/UL (ref 0.3–1)
MONOCYTES # BLD AUTO: 0.5 K/UL (ref 0.3–1)
MONOCYTES # BLD AUTO: 0.6 K/UL (ref 0.3–1)
MONOCYTES NFR BLD: 1.8 % (ref 4–15)
MONOCYTES NFR BLD: 3.8 % (ref 4–15)
MONOCYTES NFR BLD: 6 % (ref 4–15)
MONOCYTES NFR BLD: 6.7 % (ref 4–15)
NEUTROPHILS # BLD AUTO: 6 K/UL (ref 1.8–7.7)
NEUTROPHILS # BLD AUTO: 7 K/UL (ref 1.8–7.7)
NEUTROPHILS # BLD AUTO: 7.4 K/UL (ref 1.8–7.7)
NEUTROPHILS # BLD AUTO: 8.6 K/UL (ref 1.8–7.7)
NEUTROPHILS NFR BLD: 80.1 % (ref 38–73)
NEUTROPHILS NFR BLD: 80.5 % (ref 38–73)
NEUTROPHILS NFR BLD: 85.7 % (ref 38–73)
NEUTROPHILS NFR BLD: 88.8 % (ref 38–73)
NRBC BLD-RTO: 0 /100 WBC
PLATELET # BLD AUTO: 243 K/UL (ref 150–450)
PLATELET # BLD AUTO: 265 K/UL (ref 150–450)
PLATELET # BLD AUTO: 266 K/UL (ref 150–450)
PLATELET # BLD AUTO: 290 K/UL (ref 150–450)
PMV BLD AUTO: 10.1 FL (ref 9.2–12.9)
PMV BLD AUTO: 10.4 FL (ref 9.2–12.9)
PMV BLD AUTO: 9.7 FL (ref 9.2–12.9)
PMV BLD AUTO: 9.9 FL (ref 9.2–12.9)
POTASSIUM SERPL-SCNC: 3.7 MMOL/L (ref 3.5–5.1)
RBC # BLD AUTO: 3.18 M/UL (ref 4–5.4)
RBC # BLD AUTO: 3.21 M/UL (ref 4–5.4)
RBC # BLD AUTO: 3.39 M/UL (ref 4–5.4)
RBC # BLD AUTO: 3.55 M/UL (ref 4–5.4)
SODIUM SERPL-SCNC: 141 MMOL/L (ref 136–145)
WBC # BLD AUTO: 7.51 K/UL (ref 3.9–12.7)
WBC # BLD AUTO: 8.11 K/UL (ref 3.9–12.7)
WBC # BLD AUTO: 9.27 K/UL (ref 3.9–12.7)
WBC # BLD AUTO: 9.64 K/UL (ref 3.9–12.7)

## 2023-01-29 PROCEDURE — 99900035 HC TECH TIME PER 15 MIN (STAT)

## 2023-01-29 PROCEDURE — 25000003 PHARM REV CODE 250: Performed by: INTERNAL MEDICINE

## 2023-01-29 PROCEDURE — 83735 ASSAY OF MAGNESIUM: CPT | Performed by: FAMILY MEDICINE

## 2023-01-29 PROCEDURE — 97530 THERAPEUTIC ACTIVITIES: CPT

## 2023-01-29 PROCEDURE — 99232 SBSQ HOSP IP/OBS MODERATE 35: CPT | Mod: ,,, | Performed by: INTERNAL MEDICINE

## 2023-01-29 PROCEDURE — 12000002 HC ACUTE/MED SURGE SEMI-PRIVATE ROOM

## 2023-01-29 PROCEDURE — 80048 BASIC METABOLIC PNL TOTAL CA: CPT | Performed by: FAMILY MEDICINE

## 2023-01-29 PROCEDURE — 27000221 HC OXYGEN, UP TO 24 HOURS

## 2023-01-29 PROCEDURE — 99900031 HC PATIENT EDUCATION (STAT)

## 2023-01-29 PROCEDURE — 25000003 PHARM REV CODE 250: Performed by: FAMILY MEDICINE

## 2023-01-29 PROCEDURE — 99232 PR SUBSEQUENT HOSPITAL CARE,LEVL II: ICD-10-PCS | Mod: ,,, | Performed by: INTERNAL MEDICINE

## 2023-01-29 PROCEDURE — 36415 COLL VENOUS BLD VENIPUNCTURE: CPT | Performed by: FAMILY MEDICINE

## 2023-01-29 PROCEDURE — 94761 N-INVAS EAR/PLS OXIMETRY MLT: CPT

## 2023-01-29 PROCEDURE — 63600175 PHARM REV CODE 636 W HCPCS: Performed by: INTERNAL MEDICINE

## 2023-01-29 PROCEDURE — 94640 AIRWAY INHALATION TREATMENT: CPT

## 2023-01-29 PROCEDURE — 63600175 PHARM REV CODE 636 W HCPCS: Performed by: FAMILY MEDICINE

## 2023-01-29 PROCEDURE — 85025 COMPLETE CBC W/AUTO DIFF WBC: CPT | Performed by: FAMILY MEDICINE

## 2023-01-29 PROCEDURE — 25000242 PHARM REV CODE 250 ALT 637 W/ HCPCS: Performed by: FAMILY MEDICINE

## 2023-01-29 RX ORDER — PREDNISONE 20 MG/1
40 TABLET ORAL DAILY
Status: DISCONTINUED | OUTPATIENT
Start: 2023-01-30 | End: 2023-01-31 | Stop reason: HOSPADM

## 2023-01-29 RX ORDER — AMOXICILLIN AND CLAVULANATE POTASSIUM 875; 125 MG/1; MG/1
1 TABLET, FILM COATED ORAL EVERY 12 HOURS
Status: DISCONTINUED | OUTPATIENT
Start: 2023-01-29 | End: 2023-01-31 | Stop reason: HOSPADM

## 2023-01-29 RX ORDER — LANOLIN ALCOHOL/MO/W.PET/CERES
400 CREAM (GRAM) TOPICAL 2 TIMES DAILY
Status: DISCONTINUED | OUTPATIENT
Start: 2023-01-29 | End: 2023-01-31 | Stop reason: HOSPADM

## 2023-01-29 RX ADMIN — PREDNISONE 60 MG: 20 TABLET ORAL at 09:01

## 2023-01-29 RX ADMIN — PIPERACILLIN SODIUM AND TAZOBACTAM SODIUM 3.38 G: 3; .375 INJECTION, POWDER, LYOPHILIZED, FOR SOLUTION INTRAVENOUS at 09:01

## 2023-01-29 RX ADMIN — AMOXICILLIN AND CLAVULANATE POTASSIUM 1 TABLET: 875; 125 TABLET, FILM COATED ORAL at 09:01

## 2023-01-29 RX ADMIN — ALPRAZOLAM 0.25 MG: 0.25 TABLET ORAL at 09:01

## 2023-01-29 RX ADMIN — ARFORMOTEROL TARTRATE 15 MCG: 15 SOLUTION RESPIRATORY (INHALATION) at 08:01

## 2023-01-29 RX ADMIN — PANTOPRAZOLE SODIUM 40 MG: 40 TABLET, DELAYED RELEASE ORAL at 05:01

## 2023-01-29 RX ADMIN — FLUTICASONE PROPIONATE 50 MCG: 50 SPRAY, METERED NASAL at 09:01

## 2023-01-29 RX ADMIN — BUDESONIDE 1 MG: 0.5 INHALANT RESPIRATORY (INHALATION) at 08:01

## 2023-01-29 RX ADMIN — ATORVASTATIN CALCIUM 40 MG: 40 TABLET, FILM COATED ORAL at 09:01

## 2023-01-29 RX ADMIN — QUETIAPINE 100 MG: 100 TABLET ORAL at 09:01

## 2023-01-29 RX ADMIN — IPRATROPIUM BROMIDE AND ALBUTEROL SULFATE 3 ML: 2.5; .5 SOLUTION RESPIRATORY (INHALATION) at 11:01

## 2023-01-29 RX ADMIN — METOPROLOL SUCCINATE 50 MG: 50 TABLET, FILM COATED, EXTENDED RELEASE ORAL at 09:01

## 2023-01-29 RX ADMIN — PAROXETINE HYDROCHLORIDE 50 MG: 20 TABLET, FILM COATED ORAL at 09:01

## 2023-01-29 RX ADMIN — ARFORMOTEROL TARTRATE 15 MCG: 15 SOLUTION RESPIRATORY (INHALATION) at 07:01

## 2023-01-29 RX ADMIN — BUDESONIDE 1 MG: 0.5 INHALANT RESPIRATORY (INHALATION) at 07:01

## 2023-01-29 RX ADMIN — IPRATROPIUM BROMIDE AND ALBUTEROL SULFATE 3 ML: 2.5; .5 SOLUTION RESPIRATORY (INHALATION) at 07:01

## 2023-01-29 RX ADMIN — ASPIRIN 81 MG: 81 TABLET, COATED ORAL at 09:01

## 2023-01-29 RX ADMIN — IPRATROPIUM BROMIDE AND ALBUTEROL SULFATE 3 ML: 2.5; .5 SOLUTION RESPIRATORY (INHALATION) at 08:01

## 2023-01-29 RX ADMIN — MAGNESIUM OXIDE 400 MG: 400 TABLET ORAL at 09:01

## 2023-01-29 RX ADMIN — Medication 400 MG: at 09:01

## 2023-01-29 RX ADMIN — IPRATROPIUM BROMIDE AND ALBUTEROL SULFATE 3 ML: 2.5; .5 SOLUTION RESPIRATORY (INHALATION) at 03:01

## 2023-01-29 NOTE — PT/OT/SLP PROGRESS
Physical Therapy Treatment    Patient Name:  Betty Bacon   MRN:  [id number]    Recommendations:     Discharge Recommendations: home health PT  Discharge Equipment Recommendations: none  Barriers to discharge: None    Assessment:     Betty Bacon is a 72 y.o. female admitted with a medical diagnosis of CAP (community acquired pneumonia).  She presents with the following impairments/functional limitations: weakness, impaired functional mobility, impaired cardiopulmonary response to activity, impaired endurance, impaired self care skills Pt found awake in bed talking to son. Agreeable to ambulation. Baseline O2 sat sitting on EOB 96% on 4 l. Ambulated in hallway on 4 l x 40 ft, O2 sat down to 86%. Pt bumped to 6 L and amb additional 24 and 35 ft with standing rest periods and maintained O2 sat 87-91%. Pt is very slow with gait, requiring VC's for PLB. No LOB ambulating without AD. She may benefit from rollator to assist with fluidity of gait and improve respirations.    Rehab Prognosis: Good; patient would benefit from acute skilled PT services to address these deficits and reach maximum level of function.    Recent Surgery: * No surgery found *      Plan:     During this hospitalization, patient to be seen 6 x/week to address the identified rehab impairments via gait training, therapeutic activities, therapeutic exercises, neuromuscular re-education and progress toward the following goals:    Plan of Care Expires:  02/27/23    Subjective     Chief Complaint: SOB  Patient/Family Comments/goals: go home  Pain/Comfort:  Pain Rating 1: 0/10  Pain Rating Post-Intervention 1: 0/10      Objective:     Communicated with nurseRon, prior to session.  Patient found HOB elevated with telemetry, peripheral IV, oxygen upon PT entry to room.     General Precautions: Standard, fall  Orthopedic Precautions: N/A  Braces: N/A  Respiratory Status: Nasal cannula, flow 4 L/min dep at home     Functional  Mobility:  Bed Mobility:     Supine to Sit: modified independence  Transfers:     Sit to Stand:  supervision with no AD  Gait: 40ft, 24 ft and 35 ft without AD on 4-6 l NC, very slow, steady, standing rest breaks between trials to increased O2 sat.      AM-PAC 6 CLICK MOBILITY  Turning over in bed (including adjusting bedclothes, sheets and blankets)?: 4  Sitting down on and standing up from a chair with arms (e.g., wheelchair, bedside commode, etc.): 4  Moving from lying on back to sitting on the side of the bed?: 4  Moving to and from a bed to a chair (including a wheelchair)?: 3  Need to walk in hospital room?: 3  Climbing 3-5 steps with a railing?: 3  Basic Mobility Total Score: 21       Treatment & Education:Educated in benefits of mobility, PLB, fall prevention, use of call light    Patient left up in chair with all lines intact, call button in reach, and son present..    GOALS:   Multidisciplinary Problems       Physical Therapy Goals          Problem: Physical Therapy    Goal Priority Disciplines Outcome Goal Variances Interventions   Physical Therapy Goal     PT, PT/OT Ongoing, Progressing     Description: Goals to be met by: 23     Patient will increase functional independence with mobility by performin. Supine to sit with Supervision  2. Sit to stand transfer with Supervision  3. Bed to chair transfer with Supervision using no AD  4. Gait  x 150 feet with Supervision using no AD  5. Asc/Desc 3 steps with B HR and CGA    While maintain optimal oxygenation                             Time Tracking:     PT Received On: 23  PT Start Time: 1059     PT Stop Time: 1115  PT Total Time (min): 16 min     Billable Minutes: Therapeutic Activity 16    Treatment Type: Treatment  PT/PTA: PT     PTA Visit Number: 0     2023

## 2023-01-29 NOTE — CARE UPDATE
Patient still trying to cough up sputum sample, but cough is dry, even after tx       01/29/23 1127   Patient Assessment/Suction   Level of Consciousness (AVPU) alert   Respiratory Effort Unlabored   Expansion/Accessory Muscles/Retractions no use of accessory muscles   All Lung Fields Breath Sounds clear   JOAQUÍN Breath Sounds clear   LLL Breath Sounds diminished   RUL Breath Sounds clear   RML Breath Sounds diminished   RLL Breath Sounds diminished   Rhythm/Pattern, Respiratory no shortness of breath reported   PRE-TX-O2   Device (Oxygen Therapy) nasal cannula   $ Is the patient on Low Flow Oxygen? Yes   Flow (L/min) 4   SpO2 100 %   Pulse Oximetry Type Intermittent   $ Pulse Oximetry - Multiple Charge Pulse Oximetry - Multiple   Pulse 97   Resp 18   Aerosol Therapy   $ Aerosol Therapy Charges Aerosol Treatment   Respiratory Treatment Status (SVN) given   Treatment Route (SVN) mask;oxygen   Patient Position (SVN) sitting in chair   Signs of Intolerance (SVN) none   Breath Sounds Post-Respiratory Treatment   Throughout All Fields Post-Treatment All Fields   Throughout All Fields Post-Treatment aeration increased   Post-treatment Heart Rate (beats/min) 73   Post-treatment Resp Rate (breaths/min) 20

## 2023-01-29 NOTE — CARE UPDATE
01/28/23 1956   Patient Assessment/Suction   Level of Consciousness (AVPU) alert   Respiratory Effort Normal;Unlabored   Expansion/Accessory Muscles/Retractions no use of accessory muscles   All Lung Fields Breath Sounds Anterior:;diminished   Rhythm/Pattern, Respiratory unlabored   Cough Frequency infrequent   PRE-TX-O2   Device (Oxygen Therapy) nasal cannula   $ Is the patient on Low Flow Oxygen? Yes   Flow (L/min) 3   SpO2 97 %   Pulse Oximetry Type Intermittent   $ Pulse Oximetry - Multiple Charge Pulse Oximetry - Multiple   Aerosol Therapy   $ Aerosol Therapy Charges Aerosol Treatment   Daily Review of Necessity (SVN) completed   Respiratory Treatment Status (SVN) given   Treatment Route (SVN) mask;oxygen   Patient Position (SVN) HOB elevated   Post Treatment Assessment (SVN) increased aeration   Signs of Intolerance (SVN) none   Education   $ Education Bronchodilator;15 min   Respiratory Evaluation   $ Care Plan Tech Time 15 min

## 2023-01-29 NOTE — PROGRESS NOTES
[facility] Medicine    Progress Note    Patient Name: Betty Bacon  MRN: [id number]  Patient Class: IP- Inpatient   Admission Date: 1/25/2023  1:29 PM  Length of Stay: 4  Attending Physician: Katey Lange MD  Primary Care Provider: Johanne Callejas MD  Face-to-Face encounter date: 01/29/2023  Code status:  Chief Complaint: Shortness of Breath (Since today - hx of COPD )        Subjective:    HPI:Betty Bacon is a 72 y.o. White female   With PMH of chronic respiratory failure on 4LNC,  COPD, ILD, recent COVID pneumonia,  CAD, HFpEF,  who presents with SOB.     Onset today  Progressively worsening  Worse with exertion  Alleviated with rest  +coughing, dry  No CP  No palpitations  No n/v  Diarrhea one week ago but not since  No dysuria  +subjective fever  +chills  +LE edema, pt not sure if worsening/acute    Interval History:   Patient with known history of chronic respiratory failure on 4 L home oxygen, interstitial lung disease, followed by Pulmonary Dr Medeiros, CAD with remote history of PCI, on aspirin and Plavix, states followed by cardiologist in [place], heart failure with preserved ejection fraction with valvular heart disease with previous echo EF of 65% with severe aortic stenosis, moderate aortic regurgitation, CKD stage 3, recent history of COVID-19 infection.  Presenting with 1 day history of worsening shortness of breath associated with cough and fever.  Febrile to 101 on presentation.  Anemia appears to be progressive, down to 8.2, FOBT negative, , procalcitonin 0.36, lactic acid 0.7, radiological imaging with concern for diffuse pulmonary/alveolar opacity lower lung, possibly edema, pneumonia versus hemorrhage.  She was admitted, started on broad-spectrum antibiotics with pulmonary consultation.  On 01/26, does report interval improvement, IV diuresis and monitoring.    1/26:Patient reports interval improvement in symptoms today.  States yesterday  morning became more short of breath, cough with fever.  States cough is mostly nonproductive.  On 4 L chronic home oxygen, followed by Pulmonary .  States she is on aspirin and Plavix, denies any clinical symptoms of bleeding, followed by cardiology in [place], [facility] [place] last PCI was many years ago.  Febrile to 101.1 on admission but temperature curve is better.  Labs with hemoglobin 8.2, BUN/creatinine 23/1.3, , procalcitonin 0.36, magnesium 1.2.  Imaging with concern for lower zone pulmonary edema versus pneumonia versus hemorrhage.  Discussed with patient.  No visitors at bedside.    1/27:No acute overnight events.  Breathing continues to improve, she sitting out in chair on 4 L supplemental oxygen (home requirement).  Continues with intermittent cough but remains nonproductive.  Continues to deny any evidence of bleeding, states last transfusion was many years ago.  Afebrile with T-max 98.4°.  Labs with hemoglobin 8.1, BUN/creatinine 29/1.5, magnesium 1.8.  MRSA nasal screen negative.  Discussed with patient.  Nursing at bedside.    1/28:  Patient doing well, would transfuse 1 unit of blood as hemoglobin 7.9 due to cardiac status, monitor for fluid overload.  No concerns/issues overnight reported by the patient or the nursing staff.    1/29:  Patient feels significantly better since getting blood transfusion.  Would check chest x-ray to ensure no fluid overload.    Review of Systems All other Review of Systems were found to be negative expect for that mentioned already in HPI.     Objective:     Vitals:    01/29/23 0730 01/29/23 0740 01/29/23 0741 01/29/23 0742   BP: (!) 175/67      Pulse: 86 80 80 67   Resp: 16 18 18 18   Temp: 97.3 °F (36.3 °C)      TempSrc:       SpO2: 95% 98%     Weight:       Height:            Vitals reviewed.  Constitutional: No distress.   HENT: NC  Head: Atraumatic.   Cardiovascular: Normal rate, regular rhythm and normal heart sounds.   Pulmonary/Chest: Effort normal.  No wheezes.   Abdominal: Soft. Bowel sounds are normal. No distension and no mass. No tenderness  Neurological: Alert.   Skin: Skin is warm and dry.   Psych: Appropriate mood and affect    Following labs were Reviewed   CBC:  Recent Labs   Lab 01/29/23  0538   WBC 7.51   HGB 9.2*   HCT 30.6*        CMP:  Recent Labs   Lab 01/29/23  0538   CALCIUM 8.8      K 3.7   CO2 27      BUN 36*   CREATININE 1.2       Micro Results  Microbiology Results (last 7 days)       Procedure Component Value Units Date/Time    Blood culture x two cultures. Draw prior to antibiotics. [[id number]] Collected: 01/25/23 1504    Order Status: Completed Specimen: Blood from Peripheral, Antecubital, Left Updated: 01/28/23 1632     Blood Culture, Routine No Growth to date      No Growth to date      No Growth to date      No Growth to date    Narrative:      Aerobic and anaerobic    Blood culture x two cultures. Draw prior to antibiotics. [[id number]] Collected: 01/25/23 1504    Order Status: Completed Specimen: Blood from Peripheral, Antecubital, Right Updated: 01/28/23 1632     Blood Culture, Routine No Growth to date      No Growth to date      No Growth to date      No Growth to date    Narrative:      Aerobic and anaerobic    MRSA Screen by PCR [[id number]] Collected: 01/26/23 1120    Order Status: Completed Specimen: Nasopharyngeal Swab from Nasal Updated: 01/26/23 1340     MRSA SCREEN BY PCR Negative    Respiratory Infection Panel (PCR), Nasopharyngeal [[id number]] Collected: 01/25/23 1755    Order Status: Completed Updated: 01/26/23 0036     Respiratory Infection Panel Source NP swab     Adenovirus Not Detected     Coronavirus 229E, Common Cold Virus Not Detected     Coronavirus HKU1, Common Cold Virus Not Detected     Coronavirus NL63, Common Cold Virus Not Detected     Coronavirus OC43, Common Cold Virus Not Detected     Comment: The Coronavirus strains detected in this test cause the common cold.  These strains are not  the COVID-19 (novel Coronavirus)strain   associated with the respiratory disease outbreak.          SARS-CoV2 (COVID-19) Qualitative PCR Not Detected     Human Metapneumovirus Not Detected     Human Rhinovirus/Enterovirus Not Detected     Influenza A (subtypes H1, H1-2009,H3) Not Detected     Influenza B Not Detected     Parainfluenza Virus 1 Not Detected     Parainfluenza Virus 2 Not Detected     Parainfluenza Virus 3 Not Detected     Parainfluenza Virus 4 Not Detected     Respiratory Syncytial Virus Not Detected     Bordetella Parapertussis (AW5266) Not Detected     Bordetella pertussis (ptxP) Not Detected     Chlamydia pneumoniae Not Detected     Mycoplasma pneumoniae Not Detected     Comment: Respiratory Infection Panel testing performed by Multiplex PCR.       Narrative:      Specimen Source->Nasopharyngeal Swab    Resp Viral Panel PCR, Peds Under 7 Months Nasopharyngeal Swab [[id number]] Collected: 01/25/23 1755    Order Status: Canceled     Culture, Respiratory with Gram Stain [[id number]]     Order Status: No result Specimen: Respiratory              Radiology Reports  CTA Chest Non-Coronary (PE Studies)    Result Date: 1/25/2023  CMS MANDATED QUALITY DATA - CT RADIATION - 436 All CT scans at this facility utilize dose modulation, iterative reconstruction, and/or weight based dosing when appropriate to reduce radiation dose to as low as reasonably achievable EXAMINATION: CTA CHEST NON CORONARY (PE STUDIES) CLINICAL HISTORY: Pulmonary embolism (PE) suspected, unknown D-dimer; TECHNIQUE: Routine CT angiogram of the chest protocol performed after the IV administration of 100 mL Omnipaque 350. 3D reconstructions/reformats/MIPs obtained. All CT scans at this facility are performed  using dose modulation techniques as appropriate to performed exam including the following:  automated exposure control; adjustment of mA and/or kV according to the patients size (this includes techniques or standardized protocols for  targeted exams where dose is matched to indication/reason for exam: i.e. extremities or head);  iterative reconstruction technique. COMPARISON: Comparison made with the patient's previous conventional radiographs of the chest of 1/25/2023 3 grade FINDINGS: Low quality examination for pulmonary embolism as there is limited opacification pulmonary arterial tree due to timing the contrast bolus and hemodynamics the patient's cardiac output, however no evidence of pulmonary embolism is established of the pulmonary arterial system.  The main pulmonary trunk is patent without evidence of a saddle embolus at the branch point of the main pulmonary artery.  Extensive atheromatous calcifications of the major coronary vessels, normal contour the cardiac chambers, with mild concentric thickening lower left ventricular myocardium. Parenchymal lung window settings demonstrate evidence of localized areas of prominent intralobar interlobular septal thickening, regional areas of subpleural fibrosis in the basilar segments, and localized scarring or consolidation the right middle lobe medial segment with pericardial contact.  There is no evidence of pneumothorax.  Diffuse bronchial wall thickening is noted on a chronic basis.  There is no evidence of pneumothorax.  No significant pleural effusion. The upper abdominal structures demonstrate no evidence of significant abnormality.  The liver is fatty replaced.  Extensive atheromatous calcifications of the upper abdominal vessels.     1.  No CT evidence of acute pulmonary thromboembolism. 2.  Diffuse inter lobar and interlobular septal thickening with evidence of moderate peribronchial cuffing on a chronic basis due to underlying interstitial fibrotic process. Electronically signed by: Vitaly Luna MD Date:    01/25/2023 Time:    16:26    X-Ray Chest AP Portable    Result Date: 1/25/2023  Reason: Sepsis sob FINDINGS: Portable chest at 1443 compared with 1/1/2023 shows normal  cardiomediastinal silhouette. Patient is rotated. Diffuse pulmonary interstitial opacities and patchy lower lung zone alveolar opacities have slightly increased in the interval. No pleural effusion or pneumothorax. Central pulmonary vasculature is unchanged. No acute osseous abnormality. IMPRESSION: Slight worsening of diffuse pulmonary interstitial and patchy lower lung zone alveolar opacities. Potential etiologies include pulmonary edema, pneumonia, or alveolar hemorrhage. Electronically signed by:  Caden France MD  1/25/2023 3:05 PM CST Workstation: [id number]    X-Ray Chest AP Portable    Result Date: 1/1/2023  Portable chest x-ray at 3:09 PM is compared to prior study dated 12/22/2022 Clinical history shortness of breath The heart is enlarged. There are no confluent infiltrates or pleural effusions. There are no acute osseous abnormalities. IMPRESSION: Cardiomegaly with no acute pulmonary process Electronically signed by:  Penny Ribera MD  1/1/2023 3:25 PM CST Workstation: [id number]       Meds  Scheduled Meds:   albuterol-ipratropium  3 mL Nebulization Q4H    amoxicillin-clavulanate 875-125mg  1 tablet Oral Q12H    budesonide  1 mg Nebulization Q12H    And    arformoteroL  15 mcg Nebulization BID    aspirin  81 mg Oral Daily    atorvastatin  40 mg Oral Daily    fluticasone propionate  1 spray Each Nostril Daily    magnesium oxide  400 mg Oral BID    metoprolol succinate  50 mg Oral Daily    pantoprazole  40 mg Oral Before breakfast    paroxetine  50 mg Oral Daily    [START ON 1/30/2023] predniSONE  40 mg Oral Daily    QUEtiapine  100 mg Oral QHS     Continuous Infusions:  PRN Meds:.acetaminophen, albuterol-ipratropium, ALPRAZolam, dextrose 10%, dextrose 10%, glucagon (human recombinant), glucose, glucose, hydrALAZINE, hydrALAZINE, HYDROcodone-acetaminophen, magnesium oxide, magnesium oxide, meclizine, melatonin, naloxone, ondansetron, polyethylene glycol, potassium bicarbonate, potassium bicarbonate,  potassium bicarbonate, potassium, sodium phosphates, potassium, sodium phosphates, potassium, sodium phosphates, senna-docusate 8.6-50 mg, simethicone, sodium chloride 0.9%.    Active PT: Yes  Active OT: Yes  Active SLP: No  Assessment & Plan:     Active Hospital Problems    Diagnosis    *Lower lung pneumonia    CARMELITA (acute kidney injury)    Anemia, acute on chronic, progressive    Chronic respiratory failure with hypoxia, on 4 L oxygen    Dyspnea    Acute on chronic heart failure with preserved ejection fraction    Severe obesity with body mass index (BMI) of 35.0 to 39.9 with serious comorbidity    GERD (gastroesophageal reflux disease)    Interstitial lung disease    CAD (coronary artery disease)     NEFTALY RCA 7/2004  Stents x 2 RCA 10/2012      Valvular heart disease, severe aortic stenosis, moderate aortic regurgitation     moderate      Generalized anxiety disorder    PAD (peripheral artery disease)     Bilateral IRINA stents 2004      Essential hypertension    CKD (chronic kidney disease) stage 3, GFR 30-59 ml/min     Plan:  Continue care on medical floor with remote telemetry monitoring   Continue scheduled breathing treatment  IV steroid transition to prednisone 60 mg daily  Hold further IV diuresis with acute kidney injury and monitoring  Continue empiric IV Zosyn, deescalate as able  Previous echo with EF of 65% with severe aortic stenosis, moderate aortic regurgitation  Progressive anemia with no evidence of overt bleeding, Plavix held after discussion with Cardiology, trending will transfuse for hemoglobin < 8 with cardiac comorbidities   Continue empiric Zosyn for now, deescalate as able   Renally dosing all medications and avoiding nephrotoxin drugs  Electrolytes sliding scale repletion  A.m. labs ordered   PT/OT evaluation, had home health prior to admission   Appreciate all consultant's input  Further plan as per hospital course give      Discharge Planning:   Is the patient medically ready for  discharge?: no    Reason for patient still in hospital (select all that apply): Patient trending condition and Treatment    Above encounter included review of the medical records, interviewing and examining the patient face-to-face, discussion with family and other health care providers, ordering and interpreting lab/test results and formulating a plan of care.     Medical Decision Making:      [_] Low Complexity  [_] Moderate Complexity  [x] High Complexity      Katey Lange MD  Department of Hospital Medicine   [facility]

## 2023-01-29 NOTE — PLAN OF CARE
Problem: Adult Inpatient Plan of Care  Goal: Plan of Care Review  Outcome: Ongoing, Progressing  Goal: Patient-Specific Goal (Individualized)  Outcome: Ongoing, Progressing  Goal: Absence of Hospital-Acquired Illness or Injury  Outcome: Ongoing, Progressing  Goal: Optimal Comfort and Wellbeing  Outcome: Ongoing, Progressing  Goal: Readiness for Transition of Care  Outcome: Ongoing, Progressing     Problem: Fluid Imbalance (Pneumonia)  Goal: Fluid Balance  Outcome: Ongoing, Progressing     Problem: Infection (Pneumonia)  Goal: Resolution of Infection Signs and Symptoms  Outcome: Ongoing, Progressing     Problem: Respiratory Compromise (Pneumonia)  Goal: Effective Oxygenation and Ventilation  Outcome: Ongoing, Progressing     Problem: Skin Injury Risk Increased  Goal: Skin Health and Integrity  Outcome: Ongoing, Progressing     Problem: Fluid and Electrolyte Imbalance (Acute Kidney Injury/Impairment)  Goal: Fluid and Electrolyte Balance  Outcome: Ongoing, Progressing     Problem: Oral Intake Inadequate (Acute Kidney Injury/Impairment)  Goal: Optimal Nutrition Intake  Outcome: Ongoing, Progressing     Problem: Renal Function Impairment (Acute Kidney Injury/Impairment)  Goal: Effective Renal Function  Outcome: Ongoing, Progressing

## 2023-01-29 NOTE — PROGRESS NOTES
Pulmonary/Critical Care Progress Note      PATIENT NAME: Betty Bacon  MRN: [id number]  TODAY'S DATE: 2023  10:20 AM  ADMIT DATE: 2023  AGE: 72 y.o. : 1950    CONSULT REQUESTED BY: Katey Lange MD    REASON FOR CONSULT:   Acute on chronic respiratory failure  ILD  Aortic stenosis    HISTORY OF PRESENT ILLNESS   Betty Bacon is a 72 y.o. female with a PMH of COPD, \asbestosis, recent COVID pneumonia, CAD, HFpEF, severe aortic stenosis on 4 L NC at baseline on whom we have been consulted for acute on chronic .    She presented with fevers and shortness of breath. She endorses increased leg swelling and orthopnea prior to presentation.    The patient states she has a diagnosis of asbestosis, as well as COPD. She takes only Trelegy and albuterol for her lung conditions.    23: Feeling overall better today.    2023 - STable overnight, still with significant dyspnea with exertion, no new complaints    2023 - Feels that dyspnea is better this AM,,  no other new complaints    SMOKING HISTORY  Quit 25 years ago.  Smoked 2 PPD x 30 years.    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    REVIEW OF SYSTEMS  GENERAL: Feeling better. Night sweats and fevers.  EYES: Vision is good.  ENT: No sinusitis or pharyngitis.   HEART: No chest pain or palpitations. Orthopnea.  LUNGS: Non-productive cough.  GI: No abdominal pain, nausea, vomiting, or diarrhea.  : No dysuria, urgency, or frequency.  SKIN: No lesions or rashes.  MUSCULOSKELETAL: No joint pain or myalgias.  NEURO: No headaches or neuropathy.  LYMPH: Leg swelling.  PSYCH: No anxiety or depression.  ENDO: No unexpected weight change.    ALLERGIES  Review of patient's allergies indicates:   Allergen Reactions    Propoxyphene n-acetaminophen Other (See Comments)     Other reaction(s): Unknown    Codeine Anxiety       INPATIENT SCHEDULED MEDICATIONS    albuterol-ipratropium  3 mL Nebulization Q4H    budesonide  1 mg Nebulization Q12H    And    arformoteroL  15 mcg Nebulization BID    aspirin  81 mg Oral Daily    atorvastatin  40 mg Oral Daily    fluticasone propionate  1 spray Each Nostril Daily    magnesium oxide  400 mg Oral Daily    metoprolol succinate  50 mg Oral Daily    pantoprazole  40 mg Oral Before breakfast    paroxetine  50 mg Oral Daily    piperacillin-tazobactam (ZOSYN) IVPB  3.375 g Intravenous Q8H    predniSONE  60 mg Oral Daily    QUEtiapine  100 mg Oral QHS         MEDICAL AND SURGICAL HISTORY  Past Medical History:   Diagnosis Date    Acute coronary artery obstruction without MI 10/2012    Benign hypertension     COPD (chronic obstructive pulmonary disease)     Coronary artery disease     Disorder of kidney and ureter     Dr. Morrow    Hepatitis B core antibody positive 2021    Negative sAg, suggests previous exposure but no chronic/active Hep B. At risk for reactivation with any immunosuppression medication, steroids, chemo, etc.      History of electroconvulsive therapy     Hyperlipidemia LDL goal < 70     Left ankle sprain     Major depressive disorder, recurrent episode, severe     s/p ECT    Positive AKIRA (antinuclear antibody) 2021    PVD (peripheral vascular disease)      Past Surgical History:   Procedure Laterality Date    CHOLECYSTECTOMY      CORONARY ANGIOPLASTY      CORONARY ANGIOPLASTY WITH STENT PLACEMENT  10/2012    2 stents RCA (100% stenosis)    EYE SURGERY      cataract surgery    HYSTERECTOMY      LINA, ovaries intact. uterine prolapse    ILIAC ARTERY STENT      TONSILLECTOMY      TOTAL VAGINAL HYSTERECTOMY         ALCOHOL, TOBACCO AND DRUG USE  Social History     Tobacco Use   Smoking Status Former    Packs/day: 2.00    Years: 40.00    Pack years: 80.00    Types: Cigarettes    Quit date: 2009    Years since quittin.1   Smokeless Tobacco Never     Social History     Substance and Sexual Activity   Alcohol  Use Yes    Comment: social     Social History     Substance and Sexual Activity   Drug Use No       FAMILY HISTORY  Family History   Problem Relation Age of Onset    Diabetes Mother     Heart disease Mother     Stroke Father     Heart disease Father     Heart disease Brother     Stroke Brother     Hypertension Daughter     Diabetes Maternal Aunt     Heart disease Maternal Aunt     Heart disease Maternal Uncle     Heart disease Paternal Aunt     Heart disease Paternal Uncle     Heart disease Maternal Grandfather     Diabetes Sister     Heart disease Sister     Cancer Sister         lung    Kidney disease Sister         mass, benign    Breast cancer Sister     Stroke Sister     Dementia Sister     Liver disease Sister     Melanoma Neg Hx     Psoriasis Neg Hx     Lupus Neg Hx     Eczema Neg Hx        VITAL SIGNS (MOST RECENT)  Temp: 98.4 °F (36.9 °C) (01/28/23 2327)  Pulse: 67 (01/29/23 0742)  Resp: 18 (01/29/23 0742)  BP: (!) 150/65 (01/28/23 2327)  SpO2: 98 % (01/29/23 0740)    INTAKE AND OUTPUT (LAST 24 HOURS):  Intake/Output Summary (Last 24 hours) at 1/29/2023 0944  Last data filed at 1/28/2023 1500  Gross per 24 hour   Intake 386 ml   Output --   Net 386 ml         WEIGHT  Wt Readings from Last 1 Encounters:   01/26/23 88.3 kg (194 lb 10.7 oz)       PHYSICAL EXAM  GENERAL: A&Ox3. NAD.  HEENT: Extraocular movements intact. Pharynx moist.  NECK: JVD and hepatojugular reflux present.  HEART: Regular rate and normal rhythm. 2/6 systolic murmur at RUSB radiating to carotids.  LUNGS: Diffuse b/l crackles more prominent at bases.  ABDOMEN: Soft, non-tender, non-distended, no masses palpated.  EXTREMITIES: Normal muscle tone and joint movement, no cyanosis or clubbing.   LYMPHATICS: 1+ b/l pretibial pitting edema  SKIN: Dry, intact, no lesions.   NEURO: No gross deficit.  PSYCH: Appropriate affect    ACUTE PHASE REACTANT (LAST 24 HOURS)  No results for input(s): FERRITIN, CRP, LDH, DDIMER in the last 24 hours.      CBC  LAST (LAST 24 HOURS)  Recent Labs   Lab 01/29/23  0538   WBC 7.51   RBC 3.18*   HGB 9.2*   HCT 30.6*   MCV 96   MCH 28.9   MCHC 30.1*   RDW 17.6*      MPV 9.9   GRAN 80.5*  6.0   LYMPH 8.9*  0.7*   MONO 6.0  0.5   BASO 0.03   NRBC 0       CHEMISTRY LAST (LAST 24 HOURS)  Recent Labs   Lab 01/29/23  0538      K 3.7      CO2 27   ANIONGAP 9   BUN 36*   CREATININE 1.2      CALCIUM 8.8   MG 1.5*       COAGULATION LAST (LAST 24 HOURS)  No results for input(s): LABPT, INR, APTT in the last 24 hours.      CARDIAC PROFILE (LAST 24 HOURS)  Recent Labs   Lab 01/25/23  1436   *       LAST 7 DAYS MICROBIOLOGY   Microbiology Results (last 7 days)       Procedure Component Value Units Date/Time    Blood culture x two cultures. Draw prior to antibiotics. [[id number]] Collected: 01/25/23 1504    Order Status: Completed Specimen: Blood from Peripheral, Antecubital, Left Updated: 01/28/23 1632     Blood Culture, Routine No Growth to date      No Growth to date      No Growth to date      No Growth to date    Narrative:      Aerobic and anaerobic    Blood culture x two cultures. Draw prior to antibiotics. [[id number]] Collected: 01/25/23 1504    Order Status: Completed Specimen: Blood from Peripheral, Antecubital, Right Updated: 01/28/23 1632     Blood Culture, Routine No Growth to date      No Growth to date      No Growth to date      No Growth to date    Narrative:      Aerobic and anaerobic    MRSA Screen by PCR [[id number]] Collected: 01/26/23 1120    Order Status: Completed Specimen: Nasopharyngeal Swab from Nasal Updated: 01/26/23 1340     MRSA SCREEN BY PCR Negative    Respiratory Infection Panel (PCR), Nasopharyngeal [[id number]] Collected: 01/25/23 1755    Order Status: Completed Updated: 01/26/23 0036     Respiratory Infection Panel Source NP swab     Adenovirus Not Detected     Coronavirus 229E, Common Cold Virus Not Detected     Coronavirus HKU1, Common Cold Virus Not Detected      Coronavirus NL63, Common Cold Virus Not Detected     Coronavirus OC43, Common Cold Virus Not Detected     Comment: The Coronavirus strains detected in this test cause the common cold.  These strains are not the COVID-19 (novel Coronavirus)strain   associated with the respiratory disease outbreak.          SARS-CoV2 (COVID-19) Qualitative PCR Not Detected     Human Metapneumovirus Not Detected     Human Rhinovirus/Enterovirus Not Detected     Influenza A (subtypes H1, H1-2009,H3) Not Detected     Influenza B Not Detected     Parainfluenza Virus 1 Not Detected     Parainfluenza Virus 2 Not Detected     Parainfluenza Virus 3 Not Detected     Parainfluenza Virus 4 Not Detected     Respiratory Syncytial Virus Not Detected     Bordetella Parapertussis ([id number]) Not Detected     Bordetella pertussis (ptxP) Not Detected     Chlamydia pneumoniae Not Detected     Mycoplasma pneumoniae Not Detected     Comment: Respiratory Infection Panel testing performed by Multiplex PCR.       Narrative:      Specimen Source->Nasopharyngeal Swab    Resp Viral Panel PCR, Peds Under 7 Months Nasopharyngeal Swab [[id number]] Collected: 01/25/23 1642    Order Status: Canceled     Culture, Respiratory with Gram Stain [[id number]]     Order Status: No result Specimen: Respiratory             MOST RECENT IMAGING  CTA Chest Non-Coronary (PE Studies)  Narrative: CMS MANDATED QUALITY DATA - CT RADIATION - 436    All CT scans at this facility utilize dose modulation, iterative reconstruction, and/or weight based dosing when appropriate to reduce radiation dose to as low as reasonably achievable    EXAMINATION:  CTA CHEST NON CORONARY (PE STUDIES)    CLINICAL HISTORY:  Pulmonary embolism (PE) suspected, unknown D-dimer;    TECHNIQUE:  Routine CT angiogram of the chest protocol performed after the IV administration of 100 mL Omnipaque 350. 3D reconstructions/reformats/MIPs obtained. All CT scans at this facility are performed  using dose modulation  techniques as appropriate to performed exam including the following:  automated exposure control; adjustment of mA and/or kV according to the patients size (this includes techniques or standardized protocols for targeted exams where dose is matched to indication/reason for exam: i.e. extremities or head);  iterative reconstruction technique.    COMPARISON:  Comparison made with the patient's previous conventional radiographs of the chest of 1/25/2023 3 grade    FINDINGS:  Low quality examination for pulmonary embolism as there is limited opacification pulmonary arterial tree due to timing the contrast bolus and hemodynamics the patient's cardiac output, however no evidence of pulmonary embolism is established of the pulmonary arterial system.  The main pulmonary trunk is patent without evidence of a saddle embolus at the branch point of the main pulmonary artery.  Extensive atheromatous calcifications of the major coronary vessels, normal contour the cardiac chambers, with mild concentric thickening lower left ventricular myocardium.    Parenchymal lung window settings demonstrate evidence of localized areas of prominent intralobar interlobular septal thickening, regional areas of subpleural fibrosis in the basilar segments, and localized scarring or consolidation the right middle lobe medial segment with pericardial contact.  There is no evidence of pneumothorax.  Diffuse bronchial wall thickening is noted on a chronic basis.  There is no evidence of pneumothorax.  No significant pleural effusion.    The upper abdominal structures demonstrate no evidence of significant abnormality.  The liver is fatty replaced.  Extensive atheromatous calcifications of the upper abdominal vessels.  Impression: 1.  No CT evidence of acute pulmonary thromboembolism.    2.  Diffuse inter lobar and interlobular septal thickening with evidence of moderate peribronchial cuffing on a chronic basis due to underlying interstitial fibrotic  process.    Electronically signed by: Vitaly Luna MD  Date:    01/25/2023  Time:    16:26  X-Ray Chest AP Portable  Reason: Sepsis sob    FINDINGS:  Portable chest at 1443 compared with 1/1/2023 shows normal cardiomediastinal silhouette. Patient is rotated.    Diffuse pulmonary interstitial opacities and patchy lower lung zone alveolar opacities have slightly increased in the interval. No pleural effusion or pneumothorax. Central pulmonary vasculature is unchanged. No acute osseous abnormality.    IMPRESSION:  Slight worsening of diffuse pulmonary interstitial and patchy lower lung zone alveolar opacities. Potential etiologies include pulmonary edema, pneumonia, or alveolar hemorrhage.    Electronically signed by:  Caden France MD  1/25/2023 3:05 PM CST Workstation: [id number]      CURRENT VISIT EKG  No results found for this visit on 01/25/23.    ECHOCARDIOGRAM RESULTS  Results for orders placed during the hospital encounter of 12/20/22    Echo    Interpretation Summary  · The left ventricle is normal in size with severe concentric hypertrophy and normal systolic function.  · Grade II left ventricular diastolic dysfunction.  · The estimated ejection fraction is 65%.  · Normal right ventricular size with normal right ventricular systolic function.  · Moderate aortic regurgitation.  · There is severe aortic valve stenosis.  · Aortic valve area is 0.94 cm2; peak velocity is 3.29 m/s; mean gradient is 26 mmHg.  · Mild pulmonic regurgitation.  · Mild tricuspid regurgitation.  · There is mild pulmonary hypertension.  · Normal central venous pressure (3 mmHg).  · The estimated PA systolic pressure is 44 mmHg.        RESPIRATORY SUPPORT          PFTs (6/18/13)      LAST ARTERIAL BLOOD GAS  ABG  No results for input(s): PH, PO2, PCO2, HCO3, BE in the last 168 hours.    IMPRESSION AND PLAN  Betty Bacon is a 72 y.o. female with a PMH of COPD, asbestosis, recent COVID pneumonia, CAD, HFpEF, severe aortic  stenosis on 4 L NC at baseline on whom we have been consulted for acute on chronic .    #Asbestosis  Pattern on CT of 1/2022 consistent with asbestosis, but could also reflect UIP with an atypical upper lobe predominance. Current CTA chest shows this in addition to mosaicism/GGOs not present on prior scan, likely due to pulmonary edema and possibly pneumonia as well.  #Community-acquired pneumonia with MDR risk factors  Has had pneumonia 3 times in past several months but cultures have not been positive when collected.  #Sepsis, resolved  #COPD with acute exacerbation  #Severe aortic stenosis  #Moderate aortic regurgitation  #HFpEF with acute exacerbation  BNP elevated at 482 (has been up to 500s in past, but usually normal).  RVP (-). Procal 0.36. MRSA nares (-).    - agree with cardiology consult and diuresis per them  - strict I&Os  - change to po augmentin  - f/u sputum culture  - DuoNebs  - decrease prednisone 40 mg daily  - increase MG replacement to BID  - ? Home in next day or so      Cristian Singh MD  [facility] Pulmonary/Critical Care  01/29/2023

## 2023-01-29 NOTE — CARE UPDATE
01/29/23 0740   Patient Assessment/Suction   Level of Consciousness (AVPU) alert   Respiratory Effort Unlabored   Expansion/Accessory Muscles/Retractions no use of accessory muscles   All Lung Fields Breath Sounds diminished   JOAQUÍN Breath Sounds diminished   LLL Breath Sounds diminished   RUL Breath Sounds diminished   RML Breath Sounds diminished   RLL Breath Sounds diminished   Rhythm/Pattern, Respiratory no shortness of breath reported   Cough Frequency infrequent   PRE-TX-O2   Device (Oxygen Therapy) nasal cannula   $ Is the patient on Low Flow Oxygen? Yes   Flow (L/min) 4   SpO2 98 %   Pulse Oximetry Type Intermittent   $ Pulse Oximetry - Multiple Charge Pulse Oximetry - Multiple   Pulse 80   Resp 18   Aerosol Therapy   $ Aerosol Therapy Charges Aerosol Treatment   Respiratory Treatment Status (SVN) given   Treatment Route (SVN) mask   Signs of Intolerance (SVN) none   Breath Sounds Post-Respiratory Treatment   Throughout All Fields Post-Treatment All Fields   Throughout All Fields Post-Treatment aeration increased   Post-treatment Heart Rate (beats/min) 83   Post-treatment Resp Rate (breaths/min) 18

## 2023-01-30 DIAGNOSIS — J43.2 CENTRILOBULAR EMPHYSEMA: Primary | ICD-10-CM

## 2023-01-30 LAB
ANION GAP SERPL CALC-SCNC: 7 MMOL/L (ref 8–16)
ANISOCYTOSIS BLD QL SMEAR: SLIGHT
BACTERIA BLD CULT: NORMAL
BACTERIA BLD CULT: NORMAL
BASOPHILS NFR BLD: 0 % (ref 0–1.9)
BASOPHILS NFR BLD: 0 % (ref 0–1.9)
BUN SERPL-MCNC: 29 MG/DL (ref 8–23)
CALCIUM SERPL-MCNC: 8.9 MG/DL (ref 8.7–10.5)
CHLORIDE SERPL-SCNC: 103 MMOL/L (ref 95–110)
CO2 SERPL-SCNC: 30 MMOL/L (ref 23–29)
CREAT SERPL-MCNC: 1 MG/DL (ref 0.5–1.4)
DIFFERENTIAL METHOD: ABNORMAL
DIFFERENTIAL METHOD: ABNORMAL
EOSINOPHIL NFR BLD: 0 % (ref 0–8)
EOSINOPHIL NFR BLD: 0 % (ref 0–8)
ERYTHROCYTE [DISTWIDTH] IN BLOOD BY AUTOMATED COUNT: 17.2 % (ref 11.5–14.5)
ERYTHROCYTE [DISTWIDTH] IN BLOOD BY AUTOMATED COUNT: 17.2 % (ref 11.5–14.5)
EST. GFR  (NO RACE VARIABLE): 59.1 ML/MIN/1.73 M^2
GIANT PLATELETS BLD QL SMEAR: PRESENT
GLUCOSE SERPL-MCNC: 96 MG/DL (ref 70–110)
HCT VFR BLD AUTO: 30.4 % (ref 37–48.5)
HCT VFR BLD AUTO: 31.4 % (ref 37–48.5)
HGB BLD-MCNC: 9.4 G/DL (ref 12–16)
HGB BLD-MCNC: 9.6 G/DL (ref 12–16)
IMM GRANULOCYTES # BLD AUTO: ABNORMAL K/UL (ref 0–0.04)
IMM GRANULOCYTES # BLD AUTO: ABNORMAL K/UL (ref 0–0.04)
IMM GRANULOCYTES NFR BLD AUTO: ABNORMAL % (ref 0–0.5)
IMM GRANULOCYTES NFR BLD AUTO: ABNORMAL % (ref 0–0.5)
LYMPHOCYTES NFR BLD: 11 % (ref 18–48)
LYMPHOCYTES NFR BLD: 9 % (ref 18–48)
MAGNESIUM SERPL-MCNC: 1.6 MG/DL (ref 1.6–2.6)
MCH RBC QN AUTO: 29 PG (ref 27–31)
MCH RBC QN AUTO: 29.3 PG (ref 27–31)
MCHC RBC AUTO-ENTMCNC: 30.6 G/DL (ref 32–36)
MCHC RBC AUTO-ENTMCNC: 30.9 G/DL (ref 32–36)
MCV RBC AUTO: 95 FL (ref 82–98)
MCV RBC AUTO: 95 FL (ref 82–98)
METAMYELOCYTES NFR BLD MANUAL: 2 %
METAMYELOCYTES NFR BLD MANUAL: 2 %
MONOCYTES NFR BLD: 0 % (ref 4–15)
MONOCYTES NFR BLD: 3 % (ref 4–15)
MYELOCYTES NFR BLD MANUAL: 1 %
NEUTROPHILS NFR BLD: 80 % (ref 38–73)
NEUTROPHILS NFR BLD: 80 % (ref 38–73)
NEUTS BAND NFR BLD MANUAL: 5 %
NEUTS BAND NFR BLD MANUAL: 7 %
NRBC BLD-RTO: 0 /100 WBC
NRBC BLD-RTO: 0 /100 WBC
OVALOCYTES BLD QL SMEAR: ABNORMAL
PLATELET # BLD AUTO: 270 K/UL (ref 150–450)
PLATELET # BLD AUTO: 276 K/UL (ref 150–450)
PLATELET BLD QL SMEAR: ABNORMAL
PMV BLD AUTO: 10.3 FL (ref 9.2–12.9)
PMV BLD AUTO: 9.9 FL (ref 9.2–12.9)
POLYCHROMASIA BLD QL SMEAR: ABNORMAL
POTASSIUM SERPL-SCNC: 3.9 MMOL/L (ref 3.5–5.1)
RBC # BLD AUTO: 3.21 M/UL (ref 4–5.4)
RBC # BLD AUTO: 3.31 M/UL (ref 4–5.4)
SODIUM SERPL-SCNC: 140 MMOL/L (ref 136–145)
WBC # BLD AUTO: 8.48 K/UL (ref 3.9–12.7)
WBC # BLD AUTO: 8.68 K/UL (ref 3.9–12.7)

## 2023-01-30 PROCEDURE — 63600175 PHARM REV CODE 636 W HCPCS: Performed by: FAMILY MEDICINE

## 2023-01-30 PROCEDURE — 12000002 HC ACUTE/MED SURGE SEMI-PRIVATE ROOM

## 2023-01-30 PROCEDURE — 25000003 PHARM REV CODE 250: Performed by: INTERNAL MEDICINE

## 2023-01-30 PROCEDURE — 99232 SBSQ HOSP IP/OBS MODERATE 35: CPT | Mod: ,,, | Performed by: INTERNAL MEDICINE

## 2023-01-30 PROCEDURE — 99232 PR SUBSEQUENT HOSPITAL CARE,LEVL II: ICD-10-PCS | Mod: ,,, | Performed by: INTERNAL MEDICINE

## 2023-01-30 PROCEDURE — 36415 COLL VENOUS BLD VENIPUNCTURE: CPT | Performed by: FAMILY MEDICINE

## 2023-01-30 PROCEDURE — 83735 ASSAY OF MAGNESIUM: CPT | Performed by: FAMILY MEDICINE

## 2023-01-30 PROCEDURE — 97116 GAIT TRAINING THERAPY: CPT

## 2023-01-30 PROCEDURE — 97535 SELF CARE MNGMENT TRAINING: CPT

## 2023-01-30 PROCEDURE — 63600175 PHARM REV CODE 636 W HCPCS: Performed by: INTERNAL MEDICINE

## 2023-01-30 PROCEDURE — 80048 BASIC METABOLIC PNL TOTAL CA: CPT | Performed by: FAMILY MEDICINE

## 2023-01-30 PROCEDURE — 85007 BL SMEAR W/DIFF WBC COUNT: CPT | Mod: 91 | Performed by: FAMILY MEDICINE

## 2023-01-30 PROCEDURE — 27000221 HC OXYGEN, UP TO 24 HOURS

## 2023-01-30 PROCEDURE — 99900031 HC PATIENT EDUCATION (STAT)

## 2023-01-30 PROCEDURE — 25000003 PHARM REV CODE 250: Performed by: FAMILY MEDICINE

## 2023-01-30 PROCEDURE — 94761 N-INVAS EAR/PLS OXIMETRY MLT: CPT

## 2023-01-30 PROCEDURE — 85027 COMPLETE CBC AUTOMATED: CPT | Mod: 91 | Performed by: FAMILY MEDICINE

## 2023-01-30 PROCEDURE — 25000242 PHARM REV CODE 250 ALT 637 W/ HCPCS: Performed by: FAMILY MEDICINE

## 2023-01-30 PROCEDURE — 99900035 HC TECH TIME PER 15 MIN (STAT)

## 2023-01-30 PROCEDURE — 94640 AIRWAY INHALATION TREATMENT: CPT

## 2023-01-30 RX ORDER — IPRATROPIUM BROMIDE AND ALBUTEROL SULFATE 2.5; .5 MG/3ML; MG/3ML
3 SOLUTION RESPIRATORY (INHALATION) EVERY 6 HOURS PRN
Qty: 75 ML | Refills: 11 | Status: SHIPPED | OUTPATIENT
Start: 2023-01-30 | End: 2023-02-03 | Stop reason: SDUPTHER

## 2023-01-30 RX ADMIN — BUDESONIDE 1 MG: 0.5 INHALANT RESPIRATORY (INHALATION) at 07:01

## 2023-01-30 RX ADMIN — PANTOPRAZOLE SODIUM 40 MG: 40 TABLET, DELAYED RELEASE ORAL at 05:01

## 2023-01-30 RX ADMIN — ARFORMOTEROL TARTRATE 15 MCG: 15 SOLUTION RESPIRATORY (INHALATION) at 07:01

## 2023-01-30 RX ADMIN — QUETIAPINE 100 MG: 100 TABLET ORAL at 09:01

## 2023-01-30 RX ADMIN — Medication 400 MG: at 09:01

## 2023-01-30 RX ADMIN — PAROXETINE HYDROCHLORIDE 50 MG: 20 TABLET, FILM COATED ORAL at 08:01

## 2023-01-30 RX ADMIN — IPRATROPIUM BROMIDE AND ALBUTEROL SULFATE 3 ML: 2.5; .5 SOLUTION RESPIRATORY (INHALATION) at 10:01

## 2023-01-30 RX ADMIN — AMOXICILLIN AND CLAVULANATE POTASSIUM 1 TABLET: 875; 125 TABLET, FILM COATED ORAL at 08:01

## 2023-01-30 RX ADMIN — BUDESONIDE 1 MG: 0.5 INHALANT RESPIRATORY (INHALATION) at 10:01

## 2023-01-30 RX ADMIN — IPRATROPIUM BROMIDE AND ALBUTEROL SULFATE 3 ML: 2.5; .5 SOLUTION RESPIRATORY (INHALATION) at 04:01

## 2023-01-30 RX ADMIN — FLUTICASONE PROPIONATE 50 MCG: 50 SPRAY, METERED NASAL at 08:01

## 2023-01-30 RX ADMIN — ATORVASTATIN CALCIUM 40 MG: 40 TABLET, FILM COATED ORAL at 09:01

## 2023-01-30 RX ADMIN — ALPRAZOLAM 0.25 MG: 0.25 TABLET ORAL at 11:01

## 2023-01-30 RX ADMIN — ASPIRIN 81 MG: 81 TABLET, COATED ORAL at 08:01

## 2023-01-30 RX ADMIN — AMOXICILLIN AND CLAVULANATE POTASSIUM 1 TABLET: 875; 125 TABLET, FILM COATED ORAL at 09:01

## 2023-01-30 RX ADMIN — ARFORMOTEROL TARTRATE 15 MCG: 15 SOLUTION RESPIRATORY (INHALATION) at 10:01

## 2023-01-30 RX ADMIN — HYDRALAZINE HYDROCHLORIDE 10 MG: 20 INJECTION INTRAMUSCULAR; INTRAVENOUS at 09:01

## 2023-01-30 RX ADMIN — IPRATROPIUM BROMIDE AND ALBUTEROL SULFATE 3 ML: 2.5; .5 SOLUTION RESPIRATORY (INHALATION) at 11:01

## 2023-01-30 RX ADMIN — IPRATROPIUM BROMIDE AND ALBUTEROL SULFATE 3 ML: 2.5; .5 SOLUTION RESPIRATORY (INHALATION) at 07:01

## 2023-01-30 RX ADMIN — HYDRALAZINE HYDROCHLORIDE 10 MG: 20 INJECTION INTRAMUSCULAR; INTRAVENOUS at 05:01

## 2023-01-30 RX ADMIN — PREDNISONE 40 MG: 20 TABLET ORAL at 08:01

## 2023-01-30 NOTE — PT/OT/SLP PROGRESS
Physical Therapy Treatment    Patient Name:  Betty Bacon   MRN:  [id number]    Recommendations:     Discharge Recommendations: home health PT  Discharge Equipment Recommendations: none  Barriers to discharge:  desat with limited mobility    Assessment:     Betty Bacon is a 72 y.o. female admitted with a medical diagnosis of CAP (community acquired pneumonia).  She presents with the following impairments/functional limitations: weakness, impaired endurance, impaired self care skills, impaired functional mobility, gait instability, impaired balance, decreased lower extremity function, decreased safety awareness, impaired cardiopulmonary response to activity.    Pt found up in chair. Pt agreeable to visit. Pt ambulated 70 ft x 3 with no AD standing rest break and CGA occ min A due to impaired balance increase with increase ambulation/fatigue. Gait pattern consistent of decrease foot clearance, decrease stride length and decrease gait speed increasing risk for falls. First bout of ambulation on 4L noted to be 82% increased to 6L for remainder of ambulation. Second post 91% on 6L. Third boat 90%. Tolerated return to 4L at end of session once at rest up in chair. Pt reports decrease shortness of breath verse previous dates when ambulating    Rehab Prognosis: Fair; patient would benefit from acute skilled PT services to address these deficits and reach maximum level of function.    Recent Surgery: * No surgery found *      Plan:     During this hospitalization, patient to be seen 6 x/week to address the identified rehab impairments via gait training, therapeutic activities, therapeutic exercises, neuromuscular re-education and progress toward the following goals:    Plan of Care Expires:  03/02/23    Subjective     Chief Complaint: none  Patient/Family Comments/goals: return home tomorrow  Pain/Comfort:  Pain Rating 1: 0/10      Objective:     Communicated with RN prior to session.  Patient found up in  chair with peripheral IV, telemetry, oxygen upon PT entry to room.     General Precautions: Standard, fall  Orthopedic Precautions: N/A  Braces: N/A  Respiratory Status: Nasal cannula, flow 4 L/min     Functional Mobility:  Transfers:     Sit to Stand:  contact guard assistance with no AD  Gait: 70 ft x 3 with no AD and CGA desat to 82% 4L increase to 6L with SAO2 greater than 90%      AM-PAC 6 CLICK MOBILITY          Treatment & Education:  Pt educated on POC, discharge recommendation, pacing/energy conservation, pursed lip breathing during ambulation, optimal gait pattern, importance of time OOB, need for assist with mobility, use of call bell to seek assistance as needed and fall prevention      Patient left up in chair with all lines intact and call button in reach..    GOALS:   Multidisciplinary Problems       Physical Therapy Goals          Problem: Physical Therapy    Goal Priority Disciplines Outcome Goal Variances Interventions   Physical Therapy Goal     PT, PT/OT Ongoing, Progressing     Description: Goals to be met by: 23     Patient will increase functional independence with mobility by performin. Supine to sit with Supervision  2. Sit to stand transfer with Supervision  3. Bed to chair transfer with Supervision using no AD  4. Gait  x 150 feet with Supervision using no AD  5. Asc/Desc 3 steps with B HR and CGA    While maintain optimal oxygenation                             Time Tracking:     PT Received On: 23  PT Start Time: 1046     PT Stop Time: 1103  PT Total Time (min): 17 min     Billable Minutes: Gait Training 17    Treatment Type: Treatment  PT/PTA: PT     PTA Visit Number: 0     2023

## 2023-01-30 NOTE — PT/OT/SLP PROGRESS
Occupational Therapy   Treatment    Name: Betty Baocn  MRN: [id number]  Admitting Diagnosis:  CAP (community acquired pneumonia)       Recommendations:     Discharge Recommendations: home health OT  Discharge Equipment Recommendations:  none  Barriers to discharge:  None    Assessment:     Betty Bacon is a 72 y.o. female with a medical diagnosis of CAP (community acquired pneumonia).  Pt agreeable to OT therapy session this AM. Performance deficits affecting function are weakness, impaired endurance, impaired self care skills, impaired functional mobility, impaired cardiopulmonary response to activity. Pt's sats dropped to 86% on 4L O2 with activity, but quickly restored to >90% with rest and PLB.    Rehab Prognosis:  Good; patient would benefit from acute skilled OT services to address these deficits and reach maximum level of function.       Plan:     Patient to be seen 3 x/week to address the above listed problems via self-care/home management, therapeutic activities, therapeutic exercises  Plan of Care Expires: 02/27/23  Plan of Care Reviewed with: patient    Subjective     Pain/Comfort:  Pain Rating 1: 0/10    Objective:     Communicated with: nursing prior to session.  Patient found up in chair with telemetry, peripheral IV, oxygen upon OT entry to room.    General Precautions: Standard, fall    Orthopedic Precautions:N/A  Braces: N/A  Respiratory Status: Nasal cannula, flow 4 L/min     Occupational Performance:     Functional Mobility/Transfers:  Patient completed Sit <> Stand Transfer with supervision  with  no assistive device   Functional Mobility: pt amb in room from chair <> sink with SBA with no AD    Activities of Daily Living:  Grooming: stand by assistance standing at sink to wash face  Lower Body Dressing: independence seated in chair to doff dirty socks and to don clean socks    Treatment & Education:  Pt educated on role of OT/POC, importance of OOB/EOB activity, use of call  gomez, energy conservation techniques, PLB technique, and safety during ADLs, transfers, and functional mobility.    Patient left up in chair with all lines intact and call button in reach    GOALS:   Multidisciplinary Problems       Occupational Therapy Goals          Problem: Occupational Therapy    Goal Priority Disciplines Outcome Interventions   Occupational Therapy Goal     OT, PT/OT     Description: Goals to be met by: 2/27/23     Patient will increase functional independence with ADLs by performing:    UE Dressing with Supervision.  LE Dressing with Supervision.  Grooming while standing at sink with Supervision.  Toileting from toilet with Supervision for hygiene and clothing management.   Toilet transfer to toilet with Supervision.                         Time Tracking:     OT Date of Treatment: 01/30/23  OT Start Time: 1027  OT Stop Time: 1042  OT Total Time (min): 15 min    Billable Minutes:Self Care/Home Management 15    OT/ISMAEL: OT          1/30/2023

## 2023-01-30 NOTE — PROGRESS NOTES
On license of [facility] Medicine    Progress Note    Patient Name: Betty Bacon  MRN: [id number]  Patient Class: IP- Inpatient   Admission Date: 1/25/2023  1:29 PM  Length of Stay: 5  Attending Physician: Katey Lange MD  Primary Care Provider: Johanne Callejas MD  Face-to-Face encounter date: 01/30/2023  Code status:  Chief Complaint: Shortness of Breath (Since today - hx of COPD )        Subjective:    HPI:Betty Bacon is a 72 y.o. White female   With PMH of chronic respiratory failure on 4LNC,  COPD, ILD, recent COVID pneumonia,  CAD, HFpEF,  who presents with SOB.     Onset today  Progressively worsening  Worse with exertion  Alleviated with rest  +coughing, dry  No CP  No palpitations  No n/v  Diarrhea one week ago but not since  No dysuria  +subjective fever  +chills  +LE edema, pt not sure if worsening/acute    Interval History:   Patient with known history of chronic respiratory failure on 4 L home oxygen, interstitial lung disease, followed by Pulmonary Dr Medeiros, CAD with remote history of PCI, on aspirin and Plavix, states followed by cardiologist in [place], heart failure with preserved ejection fraction with valvular heart disease with previous echo EF of 65% with severe aortic stenosis, moderate aortic regurgitation, CKD stage 3, recent history of COVID-19 infection.  Presenting with 1 day history of worsening shortness of breath associated with cough and fever.  Febrile to 101 on presentation.  Anemia appears to be progressive, down to 8.2, FOBT negative, , procalcitonin 0.36, lactic acid 0.7, radiological imaging with concern for diffuse pulmonary/alveolar opacity lower lung, possibly edema, pneumonia versus hemorrhage.  She was admitted, started on broad-spectrum antibiotics with pulmonary consultation.  On 01/26, does report interval improvement, IV diuresis and monitoring.    1/26:Patient reports interval improvement in symptoms today.  States yesterday  morning became more short of breath, cough with fever.  States cough is mostly nonproductive.  On 4 L chronic home oxygen, followed by Pulmonary .  States she is on aspirin and Plavix, denies any clinical symptoms of bleeding, followed by cardiology in [place], [facility] last PCI was many years ago.  Febrile to 101.1 on admission but temperature curve is better.  Labs with hemoglobin 8.2, BUN/creatinine 23/1.3, , procalcitonin 0.36, magnesium 1.2.  Imaging with concern for lower zone pulmonary edema versus pneumonia versus hemorrhage.  Discussed with patient.  No visitors at bedside.    1/27:No acute overnight events.  Breathing continues to improve, she sitting out in chair on 4 L supplemental oxygen (home requirement).  Continues with intermittent cough but remains nonproductive.  Continues to deny any evidence of bleeding, states last transfusion was many years ago.  Afebrile with T-max 98.4°.  Labs with hemoglobin 8.1, BUN/creatinine 29/1.5, magnesium 1.8.  MRSA nasal screen negative.  Discussed with patient.  Nursing at bedside.    1/28:  Patient doing well, would transfuse 1 unit of blood as hemoglobin 7.9 due to cardiac status, monitor for fluid overload.  No concerns/issues overnight reported by the patient or the nursing staff.    1/29:  Patient feels significantly better since getting blood transfusion.  Would check chest x-ray to ensure no fluid overload.    1/30:  Improvement seen on chest x-ray, patient doing significantly better.    Review of Systems All other Review of Systems were found to be negative expect for that mentioned already in HPI.     Objective:     Vitals:    01/30/23 0732 01/30/23 0803 01/30/23 1104 01/30/23 1113   BP:  (!) 149/58 (!) 171/68    Pulse: 79 85 81 77   Resp: 20 17 19 20   Temp:  97.8 °F (36.6 °C) 97.7 °F (36.5 °C)    TempSrc:       SpO2: 95% (!) 91% (!) 92% 95%   Weight:       Height:            Vitals reviewed.  Constitutional: No distress.   HENT:  NC  Head: Atraumatic.   Cardiovascular: Normal rate, regular rhythm and normal heart sounds.   Pulmonary/Chest: Effort normal. No wheezes.   Abdominal: Soft. Bowel sounds are normal. No distension and no mass. No tenderness  Neurological: Alert.   Skin: Skin is warm and dry.   Psych: Appropriate mood and affect    Following labs were Reviewed   CBC:  Recent Labs   Lab 01/30/23  0618   WBC 8.48   HGB 9.4*   HCT 30.4*        CMP:  Recent Labs   Lab 01/30/23  0618   CALCIUM 8.9      K 3.9   CO2 30*      BUN 29*   CREATININE 1.0       Micro Results  Microbiology Results (last 7 days)       Procedure Component Value Units Date/Time    Blood culture x two cultures. Draw prior to antibiotics. [[id number]] Collected: 01/25/23 1504    Order Status: Completed Specimen: Blood from Peripheral, Antecubital, Left Updated: 01/29/23 1632     Blood Culture, Routine No Growth to date      No Growth to date      No Growth to date      No Growth to date      No Growth to date    Narrative:      Aerobic and anaerobic    Blood culture x two cultures. Draw prior to antibiotics. [[id number]] Collected: 01/25/23 1504    Order Status: Completed Specimen: Blood from Peripheral, Antecubital, Right Updated: 01/29/23 1632     Blood Culture, Routine No Growth to date      No Growth to date      No Growth to date      No Growth to date      No Growth to date    Narrative:      Aerobic and anaerobic    MRSA Screen by PCR [[id number]] Collected: 01/26/23 1120    Order Status: Completed Specimen: Nasopharyngeal Swab from Nasal Updated: 01/26/23 1340     MRSA SCREEN BY PCR Negative    Respiratory Infection Panel (PCR), Nasopharyngeal [[id number]] Collected: 01/25/23 1755    Order Status: Completed Updated: 01/26/23 0036     Respiratory Infection Panel Source NP swab     Adenovirus Not Detected     Coronavirus 229E, Common Cold Virus Not Detected     Coronavirus HKU1, Common Cold Virus Not Detected     Coronavirus NL63, Common Cold Virus  Not Detected     Coronavirus OC43, Common Cold Virus Not Detected     Comment: The Coronavirus strains detected in this test cause the common cold.  These strains are not the COVID-19 (novel Coronavirus)strain   associated with the respiratory disease outbreak.          SARS-CoV2 (COVID-19) Qualitative PCR Not Detected     Human Metapneumovirus Not Detected     Human Rhinovirus/Enterovirus Not Detected     Influenza A (subtypes H1, H1-2009,H3) Not Detected     Influenza B Not Detected     Parainfluenza Virus 1 Not Detected     Parainfluenza Virus 2 Not Detected     Parainfluenza Virus 3 Not Detected     Parainfluenza Virus 4 Not Detected     Respiratory Syncytial Virus Not Detected     Bordetella Parapertussis (EP5096) Not Detected     Bordetella pertussis (ptxP) Not Detected     Chlamydia pneumoniae Not Detected     Mycoplasma pneumoniae Not Detected     Comment: Respiratory Infection Panel testing performed by Multiplex PCR.       Narrative:      Specimen Source->Nasopharyngeal Swab    Resp Viral Panel PCR, Peds Under 7 Months Nasopharyngeal Swab [[id number]] Collected: 01/25/23 1755    Order Status: Canceled     Culture, Respiratory with Gram Stain [[id number]]     Order Status: No result Specimen: Respiratory              Radiology Reports  CTA Chest Non-Coronary (PE Studies)    Result Date: 1/25/2023  CMS MANDATED QUALITY DATA - CT RADIATION - 436 All CT scans at this facility utilize dose modulation, iterative reconstruction, and/or weight based dosing when appropriate to reduce radiation dose to as low as reasonably achievable EXAMINATION: CTA CHEST NON CORONARY (PE STUDIES) CLINICAL HISTORY: Pulmonary embolism (PE) suspected, unknown D-dimer; TECHNIQUE: Routine CT angiogram of the chest protocol performed after the IV administration of 100 mL Omnipaque 350. 3D reconstructions/reformats/MIPs obtained. All CT scans at this facility are performed  using dose modulation techniques as appropriate to performed exam  including the following:  automated exposure control; adjustment of mA and/or kV according to the patients size (this includes techniques or standardized protocols for targeted exams where dose is matched to indication/reason for exam: i.e. extremities or head);  iterative reconstruction technique. COMPARISON: Comparison made with the patient's previous conventional radiographs of the chest of 1/25/2023 3 grade FINDINGS: Low quality examination for pulmonary embolism as there is limited opacification pulmonary arterial tree due to timing the contrast bolus and hemodynamics the patient's cardiac output, however no evidence of pulmonary embolism is established of the pulmonary arterial system.  The main pulmonary trunk is patent without evidence of a saddle embolus at the branch point of the main pulmonary artery.  Extensive atheromatous calcifications of the major coronary vessels, normal contour the cardiac chambers, with mild concentric thickening lower left ventricular myocardium. Parenchymal lung window settings demonstrate evidence of localized areas of prominent intralobar interlobular septal thickening, regional areas of subpleural fibrosis in the basilar segments, and localized scarring or consolidation the right middle lobe medial segment with pericardial contact.  There is no evidence of pneumothorax.  Diffuse bronchial wall thickening is noted on a chronic basis.  There is no evidence of pneumothorax.  No significant pleural effusion. The upper abdominal structures demonstrate no evidence of significant abnormality.  The liver is fatty replaced.  Extensive atheromatous calcifications of the upper abdominal vessels.     1.  No CT evidence of acute pulmonary thromboembolism. 2.  Diffuse inter lobar and interlobular septal thickening with evidence of moderate peribronchial cuffing on a chronic basis due to underlying interstitial fibrotic process. Electronically signed by: Vitaly Luna MD  Date:    01/25/2023 Time:    16:26    X-Ray Chest AP Portable    Result Date: 1/25/2023  Reason: Sepsis sob FINDINGS: Portable chest at 1443 compared with 1/1/2023 shows normal cardiomediastinal silhouette. Patient is rotated. Diffuse pulmonary interstitial opacities and patchy lower lung zone alveolar opacities have slightly increased in the interval. No pleural effusion or pneumothorax. Central pulmonary vasculature is unchanged. No acute osseous abnormality. IMPRESSION: Slight worsening of diffuse pulmonary interstitial and patchy lower lung zone alveolar opacities. Potential etiologies include pulmonary edema, pneumonia, or alveolar hemorrhage. Electronically signed by:  Caden France MD  1/25/2023 3:05 PM CST Workstation: [id number]    X-Ray Chest AP Portable    Result Date: 1/1/2023  Portable chest x-ray at 3:09 PM is compared to prior study dated 12/22/2022 Clinical history shortness of breath The heart is enlarged. There are no confluent infiltrates or pleural effusions. There are no acute osseous abnormalities. IMPRESSION: Cardiomegaly with no acute pulmonary process Electronically signed by:  Penny Ribera MD  1/1/2023 3:25 PM CST Workstation: [id number]       Meds  Scheduled Meds:   albuterol-ipratropium  3 mL Nebulization Q4H    amoxicillin-clavulanate 875-125mg  1 tablet Oral Q12H    budesonide  1 mg Nebulization Q12H    And    arformoteroL  15 mcg Nebulization BID    aspirin  81 mg Oral Daily    atorvastatin  40 mg Oral Daily    fluticasone propionate  1 spray Each Nostril Daily    magnesium oxide  400 mg Oral BID    metoprolol succinate  50 mg Oral Daily    pantoprazole  40 mg Oral Before breakfast    paroxetine  50 mg Oral Daily    predniSONE  40 mg Oral Daily    QUEtiapine  100 mg Oral QHS     Continuous Infusions:  PRN Meds:.acetaminophen, albuterol-ipratropium, ALPRAZolam, dextrose 10%, dextrose 10%, glucagon (human recombinant), glucose, glucose, hydrALAZINE, hydrALAZINE,  HYDROcodone-acetaminophen, magnesium oxide, magnesium oxide, meclizine, melatonin, naloxone, ondansetron, polyethylene glycol, potassium bicarbonate, potassium bicarbonate, potassium bicarbonate, potassium, sodium phosphates, potassium, sodium phosphates, potassium, sodium phosphates, senna-docusate 8.6-50 mg, simethicone, sodium chloride 0.9%.    Active PT: Yes  Active OT: Yes  Active SLP: No  Assessment & Plan:     Active Hospital Problems    Diagnosis    *Lower lung pneumonia    CARMELITA (acute kidney injury)    Anemia, acute on chronic, progressive    Chronic respiratory failure with hypoxia, on 4 L oxygen    Dyspnea    Acute on chronic heart failure with preserved ejection fraction    Severe obesity with body mass index (BMI) of 35.0 to 39.9 with serious comorbidity    GERD (gastroesophageal reflux disease)    Interstitial lung disease    CAD (coronary artery disease)     NEFTALY RCA 7/2004  Stents x 2 RCA 10/2012      Valvular heart disease, severe aortic stenosis, moderate aortic regurgitation     moderate      Generalized anxiety disorder    PAD (peripheral artery disease)     Bilateral IRINA stents 2004      Essential hypertension    CKD (chronic kidney disease) stage 3, GFR 30-59 ml/min     Plan:  Continue care on medical floor with remote telemetry monitoring   Continue scheduled breathing treatment  Continue to taper down oral steroids  Hold further IV diuresis with acute kidney injury and monitoring  Continue empiric IV Zosyn, deescalate as able  Previous echo with EF of 65% with severe aortic stenosis, moderate aortic regurgitation  Progressive anemia with no evidence of overt bleeding, Plavix held after discussion with Cardiology, trending will transfuse for hemoglobin < 8 with cardiac comorbidities   Continue empiric Zosyn for now, deescalate as able   Renally dosing all medications and avoiding nephrotoxin drugs  Electrolytes sliding scale repletion  A.m. labs ordered   PT/OT evaluation, had home health prior  to admission   Appreciate all consultant's input  Further plan as per hospital course give      Discharge Planning:   Is the patient medically ready for discharge?: no    Reason for patient still in hospital (select all that apply): Patient trending condition and Treatment    Above encounter included review of the medical records, interviewing and examining the patient face-to-face, discussion with family and other health care providers, ordering and interpreting lab/test results and formulating a plan of care.     Medical Decision Making:      [_] Low Complexity  [_] Moderate Complexity  [x] High Complexity      Katey Lange MD  Department of Hospital Medicine   [facility]

## 2023-01-30 NOTE — RESPIRATORY THERAPY
01/29/23 2038   Patient Assessment/Suction   Level of Consciousness (AVPU) alert   Respiratory Effort Unlabored   Expansion/Accessory Muscles/Retractions no use of accessory muscles   All Lung Fields Breath Sounds clear;diminished   PRE-TX-O2   Device (Oxygen Therapy) nasal cannula with humidification   $ Is the patient on Low Flow Oxygen? Yes   Flow (L/min) 4   SpO2 96 %   Pulse Oximetry Type Intermittent   $ Pulse Oximetry - Multiple Charge Pulse Oximetry - Multiple   Pulse 81   Resp 18   Aerosol Therapy   $ Aerosol Therapy Charges Aerosol Treatment   Daily Review of Necessity (SVN) completed   Respiratory Treatment Status (SVN) given   Treatment Route (SVN) mask;oxygen   Patient Position (SVN) sitting in chair   Post Treatment Assessment (SVN) increased aeration   Signs of Intolerance (SVN) none   Breath Sounds Post-Respiratory Treatment   Throughout All Fields Post-Treatment aeration increased   Post-treatment Heart Rate (beats/min) 80   Post-treatment Resp Rate (breaths/min) 18   Education   $ Education Bronchodilator;DME Nebulizer;DME Oxygen;15 min   Respiratory Evaluation   $ Care Plan Tech Time 15 min

## 2023-01-30 NOTE — CARE UPDATE
01/30/23 0732   Patient Assessment/Suction   Level of Consciousness (AVPU) alert   Respiratory Effort Normal;Unlabored   Expansion/Accessory Muscles/Retractions expansion symmetric   All Lung Fields Breath Sounds wheezes, expiratory   Rhythm/Pattern, Respiratory unlabored   Cough Frequency frequent   Cough Type nonproductive   PRE-TX-O2   Device (Oxygen Therapy) nasal cannula   $ Is the patient on Low Flow Oxygen? Yes   Flow (L/min) 4  (home o2)   SpO2 95 %   Pulse Oximetry Type Intermittent   $ Pulse Oximetry - Multiple Charge Pulse Oximetry - Multiple   Pulse 79   Resp 20   Aerosol Therapy   $ Aerosol Therapy Charges Aerosol Treatment   Daily Review of Necessity (SVN) completed   Respiratory Treatment Status (SVN) given   Treatment Route (SVN) mask   Patient Position (SVN) Lieberman's   Post Treatment Assessment (SVN) increased aeration   Signs of Intolerance (SVN) none   Breath Sounds Post-Respiratory Treatment   Throughout All Fields Post-Treatment aeration increased   Post-treatment Heart Rate (beats/min) 80   Post-treatment Resp Rate (breaths/min) 20   Education   $ Education Bronchodilator;DME Oxygen;15 min   Respiratory Evaluation   $ Care Plan Tech Time 15 min

## 2023-01-30 NOTE — PROGRESS NOTES
Pulmonary/Critical Care Progress Note      PATIENT NAME: Betty Bacon  MRN: [id number]  TODAY'S DATE: 2023  10:20 AM  ADMIT DATE: 2023  AGE: 72 y.o. : 1950    CONSULT REQUESTED BY: Katey Lange MD    REASON FOR CONSULT:   Acute on chronic respiratory failure  ILD  Aortic stenosis    HISTORY OF PRESENT ILLNESS   Betty Bacon is a 72 y.o. female with a PMH of COPD, \asbestosis, recent COVID pneumonia, CAD, HFpEF, severe aortic stenosis on 4 L NC at baseline on whom we have been consulted for acute on chronic .    She presented with fevers and shortness of breath. She endorses increased leg swelling and orthopnea prior to presentation.    The patient states she has a diagnosis of asbestosis, as well as COPD. She takes only Trelegy and albuterol for her lung conditions.    23: Feeling overall better today.    2023 - STable overnight, still with significant dyspnea with exertion, no new complaints    2023 - Feels that dyspnea is better this AM,,  no other new complaints    SMOKING HISTORY  Quit 25 years ago.  Smoked 2 PPD x 30 years.    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    REVIEW OF SYSTEMS  GENERAL: Feeling better. Night sweats and fevers.  EYES: Vision is good.  ENT: No sinusitis or pharyngitis.   HEART: No chest pain or palpitations. Orthopnea.  LUNGS: Non-productive cough.  GI: No abdominal pain, nausea, vomiting, or diarrhea.  : No dysuria, urgency, or frequency.  SKIN: No lesions or rashes.  MUSCULOSKELETAL: No joint pain or myalgias.  NEURO: No headaches or neuropathy.  LYMPH: Leg swelling.  PSYCH: No anxiety or depression.  ENDO: No unexpected weight change.    ALLERGIES  Review of patient's allergies indicates:   Allergen Reactions    Propoxyphene n-acetaminophen Other (See Comments)     Other reaction(s): Unknown    Codeine Anxiety       INPATIENT SCHEDULED MEDICATIONS    albuterol-ipratropium  3 mL Nebulization Q4H    amoxicillin-clavulanate 875-125mg  1 tablet Oral Q12H    budesonide  1 mg Nebulization Q12H    And    arformoteroL  15 mcg Nebulization BID    aspirin  81 mg Oral Daily    atorvastatin  40 mg Oral Daily    fluticasone propionate  1 spray Each Nostril Daily    magnesium oxide  400 mg Oral BID    metoprolol succinate  50 mg Oral Daily    pantoprazole  40 mg Oral Before breakfast    paroxetine  50 mg Oral Daily    predniSONE  40 mg Oral Daily    QUEtiapine  100 mg Oral QHS         MEDICAL AND SURGICAL HISTORY  Past Medical History:   Diagnosis Date    Acute coronary artery obstruction without MI 10/2012    Benign hypertension     COPD (chronic obstructive pulmonary disease)     Coronary artery disease     Disorder of kidney and ureter     Dr. Morrow    Hepatitis B core antibody positive 2021    Negative sAg, suggests previous exposure but no chronic/active Hep B. At risk for reactivation with any immunosuppression medication, steroids, chemo, etc.      History of electroconvulsive therapy     Hyperlipidemia LDL goal < 70     Left ankle sprain     Major depressive disorder, recurrent episode, severe     s/p ECT    Positive AKIRA (antinuclear antibody) 2021    PVD (peripheral vascular disease)      Past Surgical History:   Procedure Laterality Date    CHOLECYSTECTOMY      CORONARY ANGIOPLASTY      CORONARY ANGIOPLASTY WITH STENT PLACEMENT  10/2012    2 stents RCA (100% stenosis)    EYE SURGERY      cataract surgery    HYSTERECTOMY      LINA, ovaries intact. uterine prolapse    ILIAC ARTERY STENT      TONSILLECTOMY      TOTAL VAGINAL HYSTERECTOMY         ALCOHOL, TOBACCO AND DRUG USE  Social History     Tobacco Use   Smoking Status Former    Packs/day: 2.00    Years: 40.00    Pack years: 80.00    Types: Cigarettes    Quit date: 2009    Years since quittin.1   Smokeless Tobacco Never     Social History     Substance and Sexual Activity   Alcohol Use Yes     Comment: social     Social History     Substance and Sexual Activity   Drug Use No       FAMILY HISTORY  Family History   Problem Relation Age of Onset    Diabetes Mother     Heart disease Mother     Stroke Father     Heart disease Father     Heart disease Brother     Stroke Brother     Hypertension Daughter     Diabetes Maternal Aunt     Heart disease Maternal Aunt     Heart disease Maternal Uncle     Heart disease Paternal Aunt     Heart disease Paternal Uncle     Heart disease Maternal Grandfather     Diabetes Sister     Heart disease Sister     Cancer Sister         lung    Kidney disease Sister         mass, benign    Breast cancer Sister     Stroke Sister     Dementia Sister     Liver disease Sister     Melanoma Neg Hx     Psoriasis Neg Hx     Lupus Neg Hx     Eczema Neg Hx        VITAL SIGNS (MOST RECENT)  Temp: 98 °F (36.7 °C) (01/30/23 1634)  Pulse: 82 (01/30/23 1634)  Resp: 18 (01/30/23 1634)  BP: (!) 152/58 (01/30/23 1634)  SpO2: 95 % (01/30/23 1634)    INTAKE AND OUTPUT (LAST 24 HOURS):No intake or output data in the 24 hours ending 01/30/23 1712        WEIGHT  Wt Readings from Last 1 Encounters:   01/26/23 88.3 kg (194 lb 10.7 oz)       PHYSICAL EXAM  GENERAL: A&Ox3. NAD.  HEENT: Extraocular movements intact. Pharynx moist.  NECK: JVD and hepatojugular reflux present.  HEART: Regular rate and normal rhythm. 2/6 systolic murmur at RUSB radiating to carotids.  LUNGS: Diffuse b/l crackles more prominent at bases.  ABDOMEN: Soft, non-tender, non-distended, no masses palpated.  EXTREMITIES: Normal muscle tone and joint movement, no cyanosis or clubbing.   LYMPHATICS: 1+ b/l pretibial pitting edema  SKIN: Dry, intact, no lesions.   NEURO: No gross deficit.  PSYCH: Appropriate affect    ACUTE PHASE REACTANT (LAST 24 HOURS)  No results for input(s): FERRITIN, CRP, LDH, DDIMER in the last 24 hours.      CBC LAST (LAST 24 HOURS)  Recent Labs   Lab 01/29/23  1759 01/30/23  0046 01/30/23  0618   WBC 9.64   < >  8.48   RBC 3.55*   < > 3.21*   HGB 10.2*   < > 9.4*   HCT 33.8*   < > 30.4*   MCV 95   < > 95   MCH 28.7   < > 29.3   MCHC 30.2*   < > 30.9*   RDW 17.3*   < > 17.2*      < > 276   MPV 10.1   < > 9.9   GRAN 88.8*  8.6*   < > 80.0*   LYMPH 4.6*  0.4*   < > 9.0*   MONO 1.8*  0.2*   < > 3.0*   BASO 0.04  --   --    NRBC 0   < > 0    < > = values in this interval not displayed.       CHEMISTRY LAST (LAST 24 HOURS)  Recent Labs   Lab 01/30/23  0618      K 3.9      CO2 30*   ANIONGAP 7*   BUN 29*   CREATININE 1.0   GLU 96   CALCIUM 8.9   MG 1.6       COAGULATION LAST (LAST 24 HOURS)  No results for input(s): LABPT, INR, APTT in the last 24 hours.      CARDIAC PROFILE (LAST 24 HOURS)  Recent Labs   Lab 01/25/23  1436   *       LAST 7 DAYS MICROBIOLOGY   Microbiology Results (last 7 days)       Procedure Component Value Units Date/Time    Blood culture x two cultures. Draw prior to antibiotics. [[id number]] Collected: 01/25/23 1504    Order Status: Completed Specimen: Blood from Peripheral, Antecubital, Left Updated: 01/30/23 1632     Blood Culture, Routine No growth after 5 days.    Narrative:      Aerobic and anaerobic    Blood culture x two cultures. Draw prior to antibiotics. [[id number]] Collected: 01/25/23 1504    Order Status: Completed Specimen: Blood from Peripheral, Antecubital, Right Updated: 01/30/23 1632     Blood Culture, Routine No growth after 5 days.    Narrative:      Aerobic and anaerobic    MRSA Screen by PCR [[id number]] Collected: 01/26/23 1120    Order Status: Completed Specimen: Nasopharyngeal Swab from Nasal Updated: 01/26/23 1340     MRSA SCREEN BY PCR Negative    Respiratory Infection Panel (PCR), Nasopharyngeal [[id number]] Collected: 01/25/23 1755    Order Status: Completed Updated: 01/26/23 0036     Respiratory Infection Panel Source NP swab     Adenovirus Not Detected     Coronavirus 229E, Common Cold Virus Not Detected     Coronavirus HKU1, Common Cold Virus Not  Detected     Coronavirus NL63, Common Cold Virus Not Detected     Coronavirus OC43, Common Cold Virus Not Detected     Comment: The Coronavirus strains detected in this test cause the common cold.  These strains are not the COVID-19 (novel Coronavirus)strain   associated with the respiratory disease outbreak.          SARS-CoV2 (COVID-19) Qualitative PCR Not Detected     Human Metapneumovirus Not Detected     Human Rhinovirus/Enterovirus Not Detected     Influenza A (subtypes H1, H1-2009,H3) Not Detected     Influenza B Not Detected     Parainfluenza Virus 1 Not Detected     Parainfluenza Virus 2 Not Detected     Parainfluenza Virus 3 Not Detected     Parainfluenza Virus 4 Not Detected     Respiratory Syncytial Virus Not Detected     Bordetella Parapertussis ([id number]) Not Detected     Bordetella pertussis (ptxP) Not Detected     Chlamydia pneumoniae Not Detected     Mycoplasma pneumoniae Not Detected     Comment: Respiratory Infection Panel testing performed by Multiplex PCR.       Narrative:      Specimen Source->Nasopharyngeal Swab    Resp Viral Panel PCR, Peds Under 7 Months Nasopharyngeal Swab [[id number]] Collected: 01/25/23 9571    Order Status: Canceled     Culture, Respiratory with Gram Stain [[id number]]     Order Status: No result Specimen: Respiratory             MOST RECENT IMAGING  X-Ray Chest AP Portable  Chest single view    CLINICAL DATA: Pulmonary edema    FINDINGS: AP view is compared to January 25. Heart size is normal. The aortic arch is calcified. Diffuse bilateral abnormal interstitial prominence persists, stable. Patchy airspace disease is noted at the lung bases, improved on the right. No significant effusion is identified.    IMPRESSION:  1. Patchy airspace disease at the right lung base appears mildly improved. No other significant changes compared to January 25.    Electronically signed by:  Robert Calderón MD  1/29/2023 10:51 AM [facility] Workstation: [id number]      CURRENT VISIT EKG  No  results found for this visit on 01/25/23.    ECHOCARDIOGRAM RESULTS  Results for orders placed during the hospital encounter of 12/20/22    Echo    Interpretation Summary  · The left ventricle is normal in size with severe concentric hypertrophy and normal systolic function.  · Grade II left ventricular diastolic dysfunction.  · The estimated ejection fraction is 65%.  · Normal right ventricular size with normal right ventricular systolic function.  · Moderate aortic regurgitation.  · There is severe aortic valve stenosis.  · Aortic valve area is 0.94 cm2; peak velocity is 3.29 m/s; mean gradient is 26 mmHg.  · Mild pulmonic regurgitation.  · Mild tricuspid regurgitation.  · There is mild pulmonary hypertension.  · Normal central venous pressure (3 mmHg).  · The estimated PA systolic pressure is 44 mmHg.        RESPIRATORY SUPPORT          PFTs (6/18/13)      LAST ARTERIAL BLOOD GAS  ABG  No results for input(s): PH, PO2, PCO2, HCO3, BE in the last 168 hours.    IMPRESSION AND PLAN  Betty Bacon is a 72 y.o. female with a PMH of COPD, asbestosis, recent COVID pneumonia, CAD, HFpEF, severe aortic stenosis on 4 L NC at baseline on whom we have been consulted for acute on chronic .    #Asbestosis  Pattern on CT of 1/2022 consistent with asbestosis, but could also reflect UIP with an atypical upper lobe predominance. Current CTA chest shows this in addition to mosaicism/GGOs not present on prior scan, likely due to pulmonary edema and possibly pneumonia as well.  #Community-acquired pneumonia with MDR risk factors  Has had pneumonia 3 times in past several months but cultures have not been positive when collected.  #Sepsis, resolved  #COPD with acute exacerbation  #Severe aortic stenosis  #Moderate aortic regurgitation  #HFpEF with acute exacerbation  BNP elevated at 482 (has been up to 500s in past, but usually normal).  RVP (-). Procal 0.36. MRSA nares (-).    - agree with cardiology consult and diuresis  per them  - strict I&Os  - continue po Augmentin for one more day; then stop  - f/u sputum culture  - DuoNebs   - nebulizer ordered for home use  - continue prednisone 40 mg daily for one more day to complete 7 days of corticosteroid therapy; then stop  - follow up with outpatient pulmonologist Dr. Medeiros    Dispo: safe for discharge tomorrow from a pulmonary perspective    Pulmonary & Critical Care Medicine will sign off at this time.   Please call with any further questions or concerns.    Matt Toro MD  Pulmonary and Critical Care Medicine  formerly [facility] / [facility]  Date of Service: 01/30/2023  5:12 PM

## 2023-01-30 NOTE — PROGRESS NOTES
"[facility]  Adult Nutrition   Progress Note (Initial Assessment)     SUMMARY     Recommendations  Recommendation/Intervention:   1. Continue current diet as tolerated.   2.  to obtain daily menu choices.    Goals: 1. Patient to consume >/= 50% of EEN and EPN. 2. Lab values trend to target range.  Nutrition Goal Status: new    Dietitian Rounds Brief  RD assessment for LOS. 72 yr old obese female with good po intake prior to admit and while inpatient per pt. Pt states she eats 75% or better all of her meals here. Patient has had PRBCs and labs are better. Pt last BM  1/23/23? Noted miralax and stool softener ordered. Suggest a suppository. RD to follow.    Diet order:   Current Diet Order: Cardiac diet      Evaluation of Received Nutrient/Fluid Intake  Energy Calories Required: meeting needs  Protein Required: meeting needs  Fluid Required: not meeting needs  Tolerance: tolerating     % Intake of Estimated Energy Needs: 75 - 100 %  % Meal Intake: 75 - 100 %    No intake or output data in the 24 hours ending 01/30/23 1337     Anthropometrics  Temp: 97.7 °F (36.5 °C)  Height Method: Stated  Height: 5' 2" (157.5 cm)  Height (inches): 62 in  Weight Method: Bed Scale  Weight: 88.3 kg (194 lb 10.7 oz)  Weight (lb): 194.67 lb  Ideal Body Weight (IBW), Female: 110 lb  % Ideal Body Weight, Female (lb): 176.17 %  BMI (Calculated): 35.6     Estimated/Assessed Needs  Weight Used For Calorie Calculations: 88.3 kg (194 lb 10.7 oz)  Energy Calorie Requirements (kcal): 9832-4394 ( 20-25 kcal/kg)  Energy Need Method: Kcal/kg  Protein Requirements:  (1.0-1.2gm/kg)  Weight Used For Protein Calculations: 88.3 kg (194 lb 10.7 oz)     Estimated Fluid Requirement Method: RDA Method  RDA Method (mL): 1766     Reason for Assessment  Reason For Assessment: length of stay  Diagnosis: pulmonary disease (CAP)  Interdisciplinary Rounds: did not attend    Nutrition/Diet History  Spiritual, Cultural Beliefs, Buddhism " Practices, Values that Affect Care: no  Food Allergies: NKFA  Factors Affecting Nutritional Intake: None identified at this time    Nutrition Risk Screen  Nutrition Risk Screen: no indicators present     MST Score: 0  Have you recently lost weight without trying?: No  Weight loss score: 0  Have you been eating poorly because of a decreased appetite?: No  Appetite score: 0     Weight History:  Wt Readings from Last 5 Encounters:   01/26/23 88.3 kg (194 lb 10.7 oz)   01/20/23 90.6 kg (199 lb 11.8 oz)   01/10/23 89.5 kg (197 lb 5 oz)   01/04/23 89.7 kg (197 lb 12 oz)   12/23/22 93.4 kg (205 lb 14.4 oz)      Lab/Procedures/Meds: Pertinent Labs/Meds Reviewed    Medications:Pertinent Medications Reviewed  Scheduled Meds:   albuterol-ipratropium  3 mL Nebulization Q4H    amoxicillin-clavulanate 875-125mg  1 tablet Oral Q12H    budesonide  1 mg Nebulization Q12H    And    arformoteroL  15 mcg Nebulization BID    aspirin  81 mg Oral Daily    atorvastatin  40 mg Oral Daily    fluticasone propionate  1 spray Each Nostril Daily    magnesium oxide  400 mg Oral BID    metoprolol succinate  50 mg Oral Daily    pantoprazole  40 mg Oral Before breakfast    paroxetine  50 mg Oral Daily    predniSONE  40 mg Oral Daily    QUEtiapine  100 mg Oral QHS     Continuous Infusions:  PRN Meds:.acetaminophen, albuterol-ipratropium, ALPRAZolam, dextrose 10%, dextrose 10%, glucagon (human recombinant), glucose, glucose, hydrALAZINE, hydrALAZINE, HYDROcodone-acetaminophen, magnesium oxide, magnesium oxide, meclizine, melatonin, naloxone, ondansetron, polyethylene glycol, potassium bicarbonate, potassium bicarbonate, potassium bicarbonate, potassium, sodium phosphates, potassium, sodium phosphates, potassium, sodium phosphates, senna-docusate 8.6-50 mg, simethicone, sodium chloride 0.9%    Labs: Pertinent Labs Reviewed  Clinical Chemistry:  Recent Labs   Lab 01/25/23  1436 01/30/23  0618    140   K 4.3 3.9    103   CO2 20* 30*   GLU  202* 96   BUN 20 29*   CREATININE 1.2 1.0   CALCIUM 7.9* 8.9   PROT 6.0  --    ALBUMIN 2.4*  --    BILITOT 0.8  --    ALKPHOS 68  --    AST 12  --    ALT 11  --    ANIONGAP 13 7*   MG  --  1.6    < > = values in this interval not displayed.     CBC:   Recent Labs   Lab 01/30/23  0618   WBC 8.48   RBC 3.21*   HGB 9.4*   HCT 30.4*      MCV 95   MCH 29.3   MCHC 30.9*     Cardiac Profile:  Recent Labs   Lab 01/25/23  1436   *     IDiabetes:  Recent Labs   Lab 01/25/23 2025   HGBA1C 6.1     Monitor and Evaluation  Food and Nutrient Intake: food and beverage intake, energy intake  Food and Nutrient Adminstration: diet order  Knowledge/Beliefs/Attitudes: food and nutrition knowledge/skill  Physical Activity and Function: nutrition-related ADLs and IADLs  Anthropometric Measurements: weight, weight change, body mass index  Biochemical Data, Medical Tests and Procedures: gastrointestinal profile, glucose/endocrine profile, inflammatory profile, lipid profile, electrolyte and renal panel  Nutrition-Focused Physical Findings: overall appearance     Nutrition Risk  Level of Risk/Frequency of Follow-up: moderate     Nutrition Follow-Up  RD Follow-up?: Yes      Caroyln Antonio RD, LDN 01/30/2023 1:37 PM      Albendazole Counseling:  I discussed with the patient the risks of albendazole including but not limited to cytopenia, kidney damage, nausea/vomiting and severe allergy.  The patient understands that this medication is being used in an off-label manner.

## 2023-01-31 VITALS
RESPIRATION RATE: 17 BRPM | SYSTOLIC BLOOD PRESSURE: 164 MMHG | OXYGEN SATURATION: 92 % | DIASTOLIC BLOOD PRESSURE: 100 MMHG | WEIGHT: 194.69 LBS | HEIGHT: 62 IN | BODY MASS INDEX: 35.83 KG/M2 | HEART RATE: 91 BPM | TEMPERATURE: 98 F

## 2023-01-31 LAB
ANION GAP SERPL CALC-SCNC: 6 MMOL/L (ref 8–16)
BUN SERPL-MCNC: 32 MG/DL (ref 8–23)
CALCIUM SERPL-MCNC: 8.9 MG/DL (ref 8.7–10.5)
CHLORIDE SERPL-SCNC: 104 MMOL/L (ref 95–110)
CO2 SERPL-SCNC: 29 MMOL/L (ref 23–29)
CREAT SERPL-MCNC: 0.9 MG/DL (ref 0.5–1.4)
ERYTHROCYTE [DISTWIDTH] IN BLOOD BY AUTOMATED COUNT: 17.3 % (ref 11.5–14.5)
EST. GFR  (NO RACE VARIABLE): >60 ML/MIN/1.73 M^2
GLUCOSE SERPL-MCNC: 90 MG/DL (ref 70–110)
HCT VFR BLD AUTO: 30.8 % (ref 37–48.5)
HGB BLD-MCNC: 9.4 G/DL (ref 12–16)
MAGNESIUM SERPL-MCNC: 1.4 MG/DL (ref 1.6–2.6)
MCH RBC QN AUTO: 29 PG (ref 27–31)
MCHC RBC AUTO-ENTMCNC: 30.5 G/DL (ref 32–36)
MCV RBC AUTO: 95 FL (ref 82–98)
PLATELET # BLD AUTO: 305 K/UL (ref 150–450)
PMV BLD AUTO: 9.9 FL (ref 9.2–12.9)
POTASSIUM SERPL-SCNC: 4 MMOL/L (ref 3.5–5.1)
RBC # BLD AUTO: 3.24 M/UL (ref 4–5.4)
SODIUM SERPL-SCNC: 139 MMOL/L (ref 136–145)
WBC # BLD AUTO: 10.24 K/UL (ref 3.9–12.7)

## 2023-01-31 PROCEDURE — 27000221 HC OXYGEN, UP TO 24 HOURS

## 2023-01-31 PROCEDURE — 25000003 PHARM REV CODE 250: Performed by: FAMILY MEDICINE

## 2023-01-31 PROCEDURE — 25000242 PHARM REV CODE 250 ALT 637 W/ HCPCS: Performed by: FAMILY MEDICINE

## 2023-01-31 PROCEDURE — 85027 COMPLETE CBC AUTOMATED: CPT | Performed by: STUDENT IN AN ORGANIZED HEALTH CARE EDUCATION/TRAINING PROGRAM

## 2023-01-31 PROCEDURE — 63600175 PHARM REV CODE 636 W HCPCS: Performed by: FAMILY MEDICINE

## 2023-01-31 PROCEDURE — 94761 N-INVAS EAR/PLS OXIMETRY MLT: CPT

## 2023-01-31 PROCEDURE — 25000003 PHARM REV CODE 250: Performed by: INTERNAL MEDICINE

## 2023-01-31 PROCEDURE — 94640 AIRWAY INHALATION TREATMENT: CPT

## 2023-01-31 PROCEDURE — 63600175 PHARM REV CODE 636 W HCPCS: Performed by: INTERNAL MEDICINE

## 2023-01-31 PROCEDURE — 36415 COLL VENOUS BLD VENIPUNCTURE: CPT | Performed by: FAMILY MEDICINE

## 2023-01-31 PROCEDURE — 63600175 PHARM REV CODE 636 W HCPCS: Performed by: STUDENT IN AN ORGANIZED HEALTH CARE EDUCATION/TRAINING PROGRAM

## 2023-01-31 PROCEDURE — 83735 ASSAY OF MAGNESIUM: CPT | Performed by: FAMILY MEDICINE

## 2023-01-31 PROCEDURE — 80048 BASIC METABOLIC PNL TOTAL CA: CPT | Performed by: FAMILY MEDICINE

## 2023-01-31 RX ORDER — LANOLIN ALCOHOL/MO/W.PET/CERES
800 CREAM (GRAM) TOPICAL 2 TIMES DAILY
Qty: 360 TABLET | Refills: 0 | Status: SHIPPED | OUTPATIENT
Start: 2023-01-31 | End: 2023-05-24

## 2023-01-31 RX ORDER — MAGNESIUM SULFATE HEPTAHYDRATE 40 MG/ML
2 INJECTION, SOLUTION INTRAVENOUS ONCE
Status: COMPLETED | OUTPATIENT
Start: 2023-01-31 | End: 2023-01-31

## 2023-01-31 RX ORDER — FUROSEMIDE 20 MG/1
20 TABLET ORAL
Refills: 0 | Status: ON HOLD
Start: 2023-01-31 | End: 2023-02-22 | Stop reason: SDUPTHER

## 2023-01-31 RX ORDER — ALPRAZOLAM 0.25 MG/1
0.25 TABLET ORAL 2 TIMES DAILY PRN
Qty: 30 TABLET | Refills: 0 | Status: SHIPPED | OUTPATIENT
Start: 2023-01-31 | End: 2023-03-03

## 2023-01-31 RX ORDER — METOPROLOL SUCCINATE 50 MG/1
50 TABLET, EXTENDED RELEASE ORAL DAILY
Qty: 90 TABLET | Refills: 0 | Status: ON HOLD | OUTPATIENT
Start: 2023-01-31 | End: 2023-06-14 | Stop reason: HOSPADM

## 2023-01-31 RX ADMIN — ARFORMOTEROL TARTRATE 15 MCG: 15 SOLUTION RESPIRATORY (INHALATION) at 08:01

## 2023-01-31 RX ADMIN — METOPROLOL SUCCINATE 50 MG: 50 TABLET, FILM COATED, EXTENDED RELEASE ORAL at 08:01

## 2023-01-31 RX ADMIN — MAGNESIUM SULFATE HEPTAHYDRATE 2 G: 40 INJECTION, SOLUTION INTRAVENOUS at 08:01

## 2023-01-31 RX ADMIN — HYDRALAZINE HYDROCHLORIDE 5 MG: 20 INJECTION INTRAMUSCULAR; INTRAVENOUS at 08:01

## 2023-01-31 RX ADMIN — IPRATROPIUM BROMIDE AND ALBUTEROL SULFATE 3 ML: 2.5; .5 SOLUTION RESPIRATORY (INHALATION) at 04:01

## 2023-01-31 RX ADMIN — AMOXICILLIN AND CLAVULANATE POTASSIUM 1 TABLET: 875; 125 TABLET, FILM COATED ORAL at 08:01

## 2023-01-31 RX ADMIN — PANTOPRAZOLE SODIUM 40 MG: 40 TABLET, DELAYED RELEASE ORAL at 05:01

## 2023-01-31 RX ADMIN — BUDESONIDE 1 MG: 0.5 INHALANT RESPIRATORY (INHALATION) at 08:01

## 2023-01-31 RX ADMIN — ASPIRIN 81 MG: 81 TABLET, COATED ORAL at 08:01

## 2023-01-31 RX ADMIN — IPRATROPIUM BROMIDE AND ALBUTEROL SULFATE 3 ML: 2.5; .5 SOLUTION RESPIRATORY (INHALATION) at 08:01

## 2023-01-31 RX ADMIN — PREDNISONE 40 MG: 20 TABLET ORAL at 08:01

## 2023-01-31 RX ADMIN — PAROXETINE HYDROCHLORIDE 50 MG: 20 TABLET, FILM COATED ORAL at 08:01

## 2023-01-31 RX ADMIN — FLUTICASONE PROPIONATE 50 MCG: 50 SPRAY, METERED NASAL at 08:01

## 2023-01-31 RX ADMIN — Medication 400 MG: at 08:01

## 2023-01-31 NOTE — PLAN OF CARE
Patient cleared for discharge from case management standpoint.    SW scheduled hospital follow ups and placed on AVS.  Home health will see patient 01/31/2023.       01/31/23 1024   Final Note   Assessment Type Final Discharge Note   Anticipated Discharge Disposition Home-Health   What phone number can be called within the next 1-3 days to see how you are doing after discharge? [phone]   Hospital Resources/Appts/Education Provided Post-Acute resouces added to AVS;Provided education on problems/symptoms using teachback;Appointments scheduled and added to AVS;Provided patient/caregiver with written discharge plan information;Community resources provided

## 2023-01-31 NOTE — PLAN OF CARE
Patient accepted by [facility] via [facility] system.         01/31/23 0955   Post-Acute Status   Post-Acute Authorization Home Health   Home Health Status Set-up Complete/Auth obtained

## 2023-01-31 NOTE — PT/OT/SLP DISCHARGE
Occupational Therapy Discharge Summary    Betty Bacon  MRN: [id number]   Principal Problem: CAP (community acquired pneumonia)      Patient Discharged from acute Occupational Therapy on 1/31/23.  Please refer to prior OT note dated 1/30/23 for functional status.    Assessment:      Patient appropriate for care in another setting.    Objective:     GOALS:   Multidisciplinary Problems       Occupational Therapy Goals          Problem: Occupational Therapy    Goal Priority Disciplines Outcome Interventions   Occupational Therapy Goal     OT, PT/OT     Description: Goals to be met by: 2/27/23     Patient will increase functional independence with ADLs by performing:    UE Dressing with Supervision.  LE Dressing with Supervision.  Grooming while standing at sink with Supervision.  Toileting from toilet with Supervision for hygiene and clothing management.   Toilet transfer to toilet with Supervision.                         Reasons for Discontinuation of Therapy Services  Transfer to alternate level of care.      Plan:     Patient Discharged to: Home with Home Health Service    1/31/2023

## 2023-01-31 NOTE — DISCHARGE INSTRUCTIONS
Your blood pressure medications have been adjusted, please follow-up with your PCP.  It is important to follow-up with your cardiologist as soon as possible for further cardiac workup outpatient.

## 2023-01-31 NOTE — PT/OT/SLP PROGRESS
Occupational Therapy      Patient Name:  Betty Bacon   MRN:  [id number]    Patient not seen today secondary to Patient discharged prior to tx attempt.     1/31/2023

## 2023-01-31 NOTE — DISCHARGE SUMMARY
[facility] Medicine  Discharge Summary      Patient Name: Betty Bacon  MRN: [id number]  [facility]: [id number]  Patient Class: IP- Inpatient  Admission Date: 1/25/2023  Hospital Length of Stay: 6 days  Discharge Date and Time:  01/31/2023 11:04 AM  Attending Physician: Katey Lange MD   Discharging Provider: Katey Lange MD  Primary Care Provider: Johanne Callejas MD    Primary Care Team: Networked reference to record PCT     HPI:   Betty Bacon is a 72 y.o. White female   With PMH of chronic respiratory failure on 4LNC,  COPD, ILD, recent COVID pneumonia,  CAD, HFpEF,  who presents with SOB.     Onset today  Progressively worsening  Worse with exertion  Alleviated with rest  +coughing, dry  No CP  No palpitations  No n/v  Diarrhea one week ago but not since  No dysuria  +subjective fever  +chills  +LE edema, pt not sure if worsening/acute      * No surgery found *      Hospital Course:    Patient with known history of chronic respiratory failure on 4 L home oxygen, interstitial lung disease, followed by Pulmonary Dr Medeiros, CAD with remote history of PCI, on aspirin and Plavix, states followed by cardiologist in [place], heart failure with preserved ejection fraction with valvular heart disease with previous echo EF of 65% with severe aortic stenosis, moderate aortic regurgitation, CKD stage 3, recent history of COVID-19 infection.  Presenting with 1 day history of worsening shortness of breath associated with cough and fever.  Febrile to 101 on presentation.  Anemia appears to be progressive, down to 8.2, FOBT negative, , procalcitonin 0.36, lactic acid 0.7, radiological imaging with concern for diffuse pulmonary/alveolar opacity lower lung, possibly edema, pneumonia versus hemorrhage.  She was admitted, started on broad-spectrum antibiotics with pulmonary consultation.  On 01/26, does report interval improvement, IV diuresis and monitoring.      1/26:Patient reports interval improvement in symptoms today.  States yesterday morning became more short of breath, cough with fever.  States cough is mostly nonproductive.  On 4 L chronic home oxygen, followed by Pulmonary .  States she is on aspirin and Plavix, denies any clinical symptoms of bleeding, followed by cardiology in [facility] last PCI was many years ago.  Febrile to 101.1 on admission but temperature curve is better.  Labs with hemoglobin 8.2, BUN/creatinine 23/1.3, , procalcitonin 0.36, magnesium 1.2.  Imaging with concern for lower zone pulmonary edema versus pneumonia versus hemorrhage.  Discussed with patient.  No visitors at bedside.     1/27:No acute overnight events.  Breathing continues to improve, she sitting out in chair on 4 L supplemental oxygen (home requirement).  Continues with intermittent cough but remains nonproductive.  Continues to deny any evidence of bleeding, states last transfusion was many years ago.  Afebrile with T-max 98.4°.  Labs with hemoglobin 8.1, BUN/creatinine 29/1.5, magnesium 1.8.  MRSA nasal screen negative.  Discussed with patient.  Nursing at bedside.     1/28:  Patient doing well, would transfuse 1 unit of blood as hemoglobin 7.9 due to cardiac status, monitor for fluid overload.  No concerns/issues overnight reported by the patient or the nursing staff.     1/29:  Patient feels significantly better since getting blood transfusion.  Would check chest x-ray to ensure no fluid overload.     1/30:  Improvement seen on chest x-ray, patient doing significantly better.    1/31:  Patient doing significantly better and was subsequently cleared by pulmonology for discharge.    Physical examination on discharge:  Constitutional: No distress.   HENT: NC  Head: Atraumatic.   Cardiovascular: Normal rate, regular rhythm and normal heart sounds.   Pulmonary/Chest: Effort normal. No wheezes.   Abdominal: Soft. Bowel sounds are normal. No distension  and no mass. No tenderness  Neurological: Alert.   Skin: Skin is warm and dry.   Psych: Appropriate mood and affect    I have seen the patient on the day of discharge and reviewed the discharge instructions as outlined.         Goals of Care Treatment Preferences:  Code Status: Full Code      Consults:   Consults (From admission, onward)          Status Ordering Provider     Inpatient consult to Cardiology  Once        Provider:  Ruddy Vicente MD    Acknowledged LILLI VALERIO     Inpatient consult to Pulmonology  Once        Provider:  Matt Toro MD    Completed LYNETTE SHEA     Inpatient consult to Hospitalist  Once        Provider:  Lynette Shea MD    Acknowledged LYNETTE SHEA            No new Assessment & Plan notes have been filed under this hospital service since the last note was generated.  Service: Hospital Medicine    Final Active Diagnoses:    Diagnosis Date Noted POA    PRINCIPAL PROBLEM:  Lower lung pneumonia [J18.9] 01/25/2023 Yes    CARMELITA (acute kidney injury) [N17.9] 01/27/2023 No    Anemia, acute on chronic, progressive [D64.9] 01/26/2023 Yes     Chronic    Chronic respiratory failure with hypoxia, on 4 L oxygen [J96.11] 01/26/2023 Yes     Chronic    Dyspnea [R06.00] 12/02/2022 Yes    Acute on chronic heart failure with preserved ejection fraction [I50.32] 10/29/2019 Yes    Severe obesity with body mass index (BMI) of 35.0 to 39.9 with serious comorbidity [E66.01] 06/07/2018 Yes    GERD (gastroesophageal reflux disease) [K21.9] 05/28/2014 Yes    Interstitial lung disease [J84.9] 05/28/2014 Yes    CAD (coronary artery disease) [I25.10] 10/21/2013 Yes    Valvular heart disease, severe aortic stenosis, moderate aortic regurgitation [I35.0] 05/16/2013 Yes     Chronic    Generalized anxiety disorder [F41.1] 11/23/2012 Yes    PAD (peripheral artery disease) [I73.9] 10/17/2012 Yes    Essential hypertension [I10]  Yes     Chronic    CKD (chronic kidney disease) stage 3, GFR 30-59  ml/min [N18.30] 09/06/2012 Yes      Problems Resolved During this Admission:    Diagnosis Date Noted Date Resolved POA    Hypomagnesemia [E83.42] 01/26/2023 01/27/2023 Yes       Discharged Condition: good    Disposition: Home-Health Care Sv    Follow Up:   Follow-up Information       Johanne Callejas MD Follow up in 1 week(s).    Specialty: Family Medicine  Contact information:  [address]  [place] [address]  [phone]               Maldonado Borjas MD Follow up in 1 week(s).    Specialty: Cardiology  Contact information:  [address]  [place] [address]  [phone]                           Patient Instructions:      Ambulatory referral/consult to Home Health   Standing Status: Future   Referral Priority: Routine Referral Type: Home Health   Referral Reason: Specialty Services Required   Requested Specialty: Home Health Services   Number of Visits Requested: 1     Activity as tolerated       Significant Diagnostic Studies: Labs: BMP:   Recent Labs   Lab 01/30/23  0618 01/31/23  0615   GLU 96 90    139   K 3.9 4.0    104   CO2 30* 29   BUN 29* 32*   CREATININE 1.0 0.9   CALCIUM 8.9 8.9   MG 1.6 1.4*   , CMP   Recent Labs   Lab 01/30/23  0618 01/31/23  0615    139   K 3.9 4.0    104   CO2 30* 29   GLU 96 90   BUN 29* 32*   CREATININE 1.0 0.9   CALCIUM 8.9 8.9   ANIONGAP 7* 6*   , CBC   Recent Labs   Lab 01/30/23  0046 01/30/23  0618 01/31/23  0615   WBC 8.68 8.48 10.24   HGB 9.6* 9.4* 9.4*   HCT 31.4* 30.4* 30.8*    276 305   , and All labs within the past 24 hours have been reviewed  Microbiology: Blood Culture   Lab Results   Component Value Date    LABBLOO No growth after 5 days. 01/25/2023    LABBLOO No growth after 5 days. 01/25/2023     Radiology: X-Ray: CXR: X-Ray Chest 1 View (CXR): No results found for this visit on 01/25/23. and X-Ray Chest PA and Lateral (CXR): No results found for this visit on 01/25/23.  Cardiac Graphics: Echocardiogram: 2D echo with  color flow doppler:   Results for orders placed or performed in visit on 10/14/15   2D echo with color flow doppler    Narrative    This study is in progress....    and Transthoracic echo (TTE) complete (Cupid Only):   Results for orders placed or performed during the hospital encounter of 12/20/22   Echo   Result Value Ref Range    BSA 2.02 m2    TDI SEPTAL 0.04 m/s    LV LATERAL E/E' RATIO 13.14 m/s    LV SEPTAL E/E' RATIO 23.00 m/s    Left Ventricular Outflow Tract Mean Velocity 0.55 cm/s    Left Ventricular Outflow Tract Mean Gradient 1.45 mmHg    Pulmonary Valve Mean Velocity 0.97 m/s    AORTIC VALVE CUSP SEPERATION 1.21 cm    TDI LATERAL 0.07 m/s    PV PEAK VELOCITY 1.21 cm/s    LVIDd 4.98 3.5 - 6.0 cm    IVS 1.82 (A) 0.6 - 1.1 cm    Posterior Wall 1.71 (A) 0.6 - 1.1 cm    Ao root annulus 3.44 cm    LVIDs 3.30 2.1 - 4.0 cm    FS 34 28 - 44 %    Sinus 3.03 cm    STJ 3.54 cm    LV mass 410.60 g    TAPSE 1.44 cm    Left Ventricle Relative Wall Thickness 0.69 cm    AV regurgitation pressure 1/2 time [id number] ms    AV mean gradient 26 mmHg    AV valve area 0.94 cm2    AV Velocity Ratio 0.27     AV index (prosthetic) 0.30     MV valve area p 1/2 method 3.48 cm2    E/A ratio 0.97     Mean e' 0.06 m/s    E wave deceleration time 218.16 msec    LVOT diameter 2.00 cm    LVOT area 3.1 cm2    LVOT peak mikey 0.88 m/s    LVOT peak VTI 23.70 cm    Ao peak mikey 3.29 m/s    Ao VTI 79.1 cm    Mr max mikey 2.77 m/s    LVOT stroke volume 74.42 cm3    AV peak gradient 43 mmHg    E/E' ratio 16.73 m/s    MV Peak E Mikey 0.92 m/s    TR Max Mikey 3.21 m/s    MV stenosis pressure 1/2 time 63.27 ms    MV Peak A Mikey 0.95 m/s    LV Systolic Volume 32.99 mL    LV Systolic Volume Index 17.1 mL/m2    LV Diastolic Volume 117.26 mL    LV Diastolic Volume Index 60.76 mL/m2    LV Mass Index 213 g/m2    Triscuspid Valve Regurgitation Peak Gradient 41 mmHg    Right Atrial Pressure (from IVC) 3 mmHg    EF 65 %    TV rest pulmonary artery  pressure 44 mmHg    Narrative    · The left ventricle is normal in size with severe concentric hypertrophy   and normal systolic function.  · Grade II left ventricular diastolic dysfunction.  · The estimated ejection fraction is 65%.  · Normal right ventricular size with normal right ventricular systolic   function.  · Moderate aortic regurgitation.  · There is severe aortic valve stenosis.  · Aortic valve area is 0.94 cm2; peak velocity is 3.29 m/s; mean gradient   is 26 mmHg.  · Mild pulmonic regurgitation.  · Mild tricuspid regurgitation.  · There is mild pulmonary hypertension.  · Normal central venous pressure (3 mmHg).  · The estimated PA systolic pressure is 44 mmHg.          Pending Diagnostic Studies:       None           Medications:  Reconciled Home Medications:      Medication List        START taking these medications      albuterol-ipratropium 2.5 mg-0.5 mg/3 mL nebulizer solution  Commonly known as: DUO-NEB  Take 3 mLs by nebulization every 6 (six) hours as needed for Wheezing. Rescue     magnesium oxide 400 mg (241.3 mg magnesium) tablet  Commonly known as: MAG-OX  Take 2 tablets (800 mg total) by mouth 2 (two) times daily.            CHANGE how you take these medications      ALPRAZolam 0.25 MG tablet  Commonly known as: XANAX  Take 1 tablet (0.25 mg total) by mouth 2 (two) times daily as needed for Anxiety.  What changed:   medication strength  how much to take     atorvastatin 40 MG tablet  Commonly known as: LIPITOR  TAKE 1 TABLET BY MOUTH EVERY DAY  What changed: when to take this     clopidogreL 75 mg tablet  Commonly known as: PLAVIX  TAKE 1 TABLET BY MOUTH EVERY DAY  What changed: when to take this     colchicine 0.6 mg tablet  Commonly known as: COLCRYS  Take 2 po at gout flare onset, may repeat 1 in an hour prn, then 1 po bid until pain resolves ,or n/V/D starts  What changed:   how much to take  how to take this  when to take this  reasons to take this     furosemide 20 MG tablet  Commonly  known as: LASIX  Take 1 tablet (20 mg total) by mouth every 48 hours.  What changed: when to take this     metoprolol succinate 50 MG 24 hr tablet  Commonly known as: TOPROL-XL  Take 1 tablet (50 mg total) by mouth once daily.  What changed:   medication strength  how much to take     * paroxetine 40 MG tablet  Commonly known as: PAXIL  TAKE 1 TABLET BY MOUTH EVERY DAY  What changed:   how much to take  when to take this  additional instructions     * paroxetine 10 MG tablet  Commonly known as: PAXIL  Take 10 mg by mouth every morning. Total 50 mg  What changed: Another medication with the same name was changed. Make sure you understand how and when to take each.           * This list has 2 medication(s) that are the same as other medications prescribed for you. Read the directions carefully, and ask your doctor or other care provider to review them with you.                CONTINUE taking these medications      acetaminophen 325 MG tablet  Commonly known as: TYLENOL  Take 325 mg by mouth every 6 (six) hours as needed for Pain.     albuterol sulfate 90 mcg/actuation inhaler  Commonly known as: PROAIR RESPICLICK  Inhale 2 puffs into the lungs every 4 to 6 hours as needed.     aspirin 81 mg Tab  Take 81 mg by mouth once daily. Every day     colestipoL 1 gram Tab  Commonly known as: COLESTID  Take 1 tablet (1 g total) by mouth 2 (two) times daily as needed (diarrhea).     esomeprazole 40 MG capsule  Commonly known as: NEXIUM  TAKE 1 CAPSULE BY MOUTH BEFORE BREAKFAST.     fluticasone propionate 50 mcg/actuation nasal spray  Commonly known as: FLONASE  1 spray (50 mcg total) by Each Nostril route once daily.     meclizine 25 mg tablet  Commonly known as: ANTIVERT  Take 1 tablet (25 mg total) by mouth 3 (three) times daily as needed for Dizziness.     QUEtiapine 100 MG Tab  Commonly known as: SEROQUEL  Take 100 mg by mouth once daily.     TRELEGY ELLIPTA 200-62.5-25 mcg inhaler  Generic drug:  fluticasone-umeclidin-vilanter  INHALE 1 PUFF INTO THE LUNGS ONCE DAILY.            STOP taking these medications      clonazePAM 1 MG disintegrating tablet  Commonly known as: KlonoPIN     isosorbide mononitrate 60 MG 24 hr tablet  Commonly known as: IMDUR     triamterene-hydrochlorothiazide 37.5-25 mg 37.5-25 mg per capsule  Commonly known as: DYAZIDE            ASK your doctor about these medications      ergocalciferol 50,000 unit Cap  Commonly known as: ERGOCALCIFEROL  Take 1 capsule (50,000 Units total) by mouth every 7 days.              Indwelling Lines/Drains at time of discharge:   Lines/Drains/Airways       None                   Time spent on the discharge of patient: 35 minutes         Katey Lange MD  Department of Hospital Medicine  [facility]

## 2023-01-31 NOTE — PLAN OF CARE
Problem: Adult Inpatient Plan of Care  Goal: Patient-Specific Goal (Individualized)  Outcome: Ongoing, Progressing     Problem: Adult Inpatient Plan of Care  Goal: Patient-Specific Goal (Individualized)  Outcome: Ongoing, Progressing     Problem: Adult Inpatient Plan of Care  Goal: Absence of Hospital-Acquired Illness or Injury  Outcome: Ongoing, Progressing     Problem: Adult Inpatient Plan of Care  Goal: Optimal Comfort and Wellbeing  Outcome: Ongoing, Progressing     Problem: Adult Inpatient Plan of Care  Goal: Readiness for Transition of Care  Outcome: Ongoing, Progressing     Problem: Fluid Imbalance (Pneumonia)  Goal: Fluid Balance  Outcome: Ongoing, Progressing     Problem: Infection (Pneumonia)  Goal: Resolution of Infection Signs and Symptoms  Outcome: Ongoing, Progressing     Problem: Respiratory Compromise (Pneumonia)  Goal: Effective Oxygenation and Ventilation  Outcome: Ongoing, Progressing

## 2023-01-31 NOTE — NURSING
Discharge instructions reviewed with pt and daughter.  IV removed without difficulty, catheter intact.  Pt left unit in stable condition via wheelchair.

## 2023-02-03 ENCOUNTER — TELEPHONE (OUTPATIENT)
Dept: PULMONOLOGY | Facility: CLINIC | Age: 73
End: 2023-02-03
Payer: MEDICARE

## 2023-02-03 DIAGNOSIS — J43.2 CENTRILOBULAR EMPHYSEMA: Primary | ICD-10-CM

## 2023-02-03 RX ORDER — IPRATROPIUM BROMIDE AND ALBUTEROL SULFATE 2.5; .5 MG/3ML; MG/3ML
3 SOLUTION RESPIRATORY (INHALATION) EVERY 6 HOURS PRN
Qty: 75 ML | Refills: 11 | Status: ON HOLD | OUTPATIENT
Start: 2023-02-03 | End: 2023-02-16 | Stop reason: SDUPTHER

## 2023-02-03 NOTE — TELEPHONE ENCOUNTER
Spoke with patient. Patient did not call. Advised nebulizer has been called in.     ----- Message from Debbie Torre sent at 2/3/2023 11:55 AM CST -----  Regarding: pt call back requested  Name of Who is Calling:CHARLES JARRETT [[id number]]          What is the request in detail: pt call back requested          Can the clinic reply by [facility]:          What Number to Call Back if not in [facility]: [phone] L[place]e

## 2023-02-04 ENCOUNTER — DOCUMENT SCAN (OUTPATIENT)
Dept: HOME HEALTH SERVICES | Facility: HOSPITAL | Age: 73
End: 2023-02-04
Payer: MEDICARE

## 2023-02-07 ENCOUNTER — TELEPHONE (OUTPATIENT)
Dept: CARDIOLOGY | Facility: CLINIC | Age: 73
End: 2023-02-07

## 2023-02-07 ENCOUNTER — OFFICE VISIT (OUTPATIENT)
Dept: CARDIOLOGY | Facility: CLINIC | Age: 73
End: 2023-02-07
Payer: MEDICARE

## 2023-02-07 ENCOUNTER — HOSPITAL ENCOUNTER (OUTPATIENT)
Dept: RADIOLOGY | Facility: HOSPITAL | Age: 73
Discharge: HOME | End: 2023-02-07
Payer: MEDICARE

## 2023-02-07 VITALS
HEART RATE: 91 BPM | HEIGHT: 62 IN | SYSTOLIC BLOOD PRESSURE: 110 MMHG | BODY MASS INDEX: 35.6 KG/M2 | DIASTOLIC BLOOD PRESSURE: 44 MMHG

## 2023-02-07 DIAGNOSIS — M79.89 SWELLING OF BOTH UPPER EXTREMITIES: ICD-10-CM

## 2023-02-07 DIAGNOSIS — I50.32 CHRONIC DIASTOLIC HEART FAILURE: ICD-10-CM

## 2023-02-07 DIAGNOSIS — I50.30 HEART FAILURE WITH PRESERVED EJECTION FRACTION, UNSPECIFIED HF CHRONICITY: ICD-10-CM

## 2023-02-07 DIAGNOSIS — J18.9 COMMUNITY ACQUIRED PNEUMONIA, UNSPECIFIED LATERALITY: ICD-10-CM

## 2023-02-07 DIAGNOSIS — I25.10 CORONARY ARTERY DISEASE INVOLVING NATIVE CORONARY ARTERY OF NATIVE HEART WITHOUT ANGINA PECTORIS: ICD-10-CM

## 2023-02-07 DIAGNOSIS — I10 BENIGN HYPERTENSION: Chronic | ICD-10-CM

## 2023-02-07 DIAGNOSIS — I20.0 UNSTABLE ANGINA: ICD-10-CM

## 2023-02-07 DIAGNOSIS — J96.11 CHRONIC RESPIRATORY FAILURE WITH HYPOXIA: ICD-10-CM

## 2023-02-07 DIAGNOSIS — I35.0 AORTIC VALVE STENOSIS, ETIOLOGY OF CARDIAC VALVE DISEASE UNSPECIFIED: Chronic | ICD-10-CM

## 2023-02-07 DIAGNOSIS — J96.11 CHRONIC RESPIRATORY FAILURE WITH HYPOXIA: Chronic | ICD-10-CM

## 2023-02-07 DIAGNOSIS — E83.42 HYPOMAGNESEMIA: ICD-10-CM

## 2023-02-07 DIAGNOSIS — I70.0 AORTIC ATHEROSCLEROSIS: ICD-10-CM

## 2023-02-07 DIAGNOSIS — J96.11 CHRONIC RESPIRATORY FAILURE WITH HYPOXIA: Primary | Chronic | ICD-10-CM

## 2023-02-07 PROCEDURE — 99999 PR PBB SHADOW E&M-EST. PATIENT-LVL III: ICD-10-PCS | Mod: PBBFAC,,,

## 2023-02-07 PROCEDURE — 71046 XR CHEST PA AND LATERAL: ICD-10-PCS | Mod: 26,,, | Performed by: RADIOLOGY

## 2023-02-07 PROCEDURE — 99215 OFFICE O/P EST HI 40 MIN: CPT | Mod: S$PBB,,,

## 2023-02-07 PROCEDURE — 71046 X-RAY EXAM CHEST 2 VIEWS: CPT | Mod: TC,FY,PO

## 2023-02-07 PROCEDURE — 71046 X-RAY EXAM CHEST 2 VIEWS: CPT | Mod: 26,,, | Performed by: RADIOLOGY

## 2023-02-07 PROCEDURE — 99213 OFFICE O/P EST LOW 20 MIN: CPT | Mod: PBBFAC,25,PO

## 2023-02-07 PROCEDURE — 99215 PR OFFICE/OUTPT VISIT, EST, LEVL V, 40-54 MIN: ICD-10-PCS | Mod: S$PBB,,,

## 2023-02-07 PROCEDURE — 99999 PR PBB SHADOW E&M-EST. PATIENT-LVL III: CPT | Mod: PBBFAC,,,

## 2023-02-07 NOTE — ASSESSMENT & PLAN NOTE
DSE to assess valve, for any ischemia, cardiac function given frequent hospitalizations for SOB  If valve ok and no ischemia, consider cardiomems implant

## 2023-02-07 NOTE — PROGRESS NOTES
Subjective:    Patient ID:  Betty Bacon is a 72 y.o. female patient here for evaluation Hypertension      History of Present Illness:    Mrs. Bacon is a pleasant 72 year old F who follows with Dr. Borjas here today for a hosp follow up.  She was admitted to [facility] with acute on chronic respiratory failure. She was evaluated by pulmonology and was diagnosed with pneumonia. She also received IV diuresis and was told to see outpatient primary cardiology for any further eval. She was discharged on 1/31/2023. She is on 4L home O2. She states her breathing feelings improved. No chest pain, dizziness, syncope,     No ischemic eval on records in 9 years. Angiogram 2014 no PCI but right renal artery stenting done.   Echo from 12/2022 with EF 65%, Aortic valve area is 0.94 cm2; peak velocity is 3.29 m/s; mean gradient is 26 mmHg.  Wants to change from Dr. Medeiros to Dr. Toro who she saw at [facility] on recent admit.       Review of patient's allergies indicates:   Allergen Reactions    Propoxyphene n-acetaminophen Other (See Comments)     Other reaction(s): Unknown    Codeine Anxiety       Past Medical History:   Diagnosis Date    Acute coronary artery obstruction without MI 10/2012    Benign hypertension     COPD (chronic obstructive pulmonary disease)     Coronary artery disease     Disorder of kidney and ureter     Dr. Morrow    Hepatitis B core antibody positive 03/03/2021    Negative sAg, suggests previous exposure but no chronic/active Hep B. At risk for reactivation with any immunosuppression medication, steroids, chemo, etc.      History of electroconvulsive therapy     Hyperlipidemia LDL goal < 70     Left ankle sprain     Major depressive disorder, recurrent episode, severe     s/p ECT    Positive AKIRA (antinuclear antibody) 03/03/2021    PVD (peripheral vascular disease)      Past Surgical History:   Procedure Laterality Date    CHOLECYSTECTOMY      CORONARY ANGIOPLASTY      CORONARY ANGIOPLASTY WITH  STENT PLACEMENT  10/2012    2 stents RCA (100% stenosis)    EYE SURGERY      cataract surgery    HYSTERECTOMY      LINA, ovaries intact. uterine prolapse    ILIAC ARTERY STENT      TONSILLECTOMY      TOTAL VAGINAL HYSTERECTOMY       Social History     Tobacco Use    Smoking status: Former     Packs/day: 2.00     Years: 40.00     Pack years: 80.00     Types: Cigarettes     Quit date: 2009     Years since quittin.1    Smokeless tobacco: Never   Substance Use Topics    Alcohol use: Yes     Comment: social    Drug use: No        REVIEW OF SYSTEMS: As noted in HPI   CARDIOVASCULAR: No recent chest pain, palpitations, arm, neck, or jaw pain  RESPIRATORY: No recent fever, cough chills, SOB or congestion  : No blood in the urine  GI: No Nausea, vomiting, constipation, diarrhea, blood, or reflux.  MUSCULOSKELETAL: No myalgias  NEURO: No lightheadedness or dizziness  EYES: No Double vision, blurry, vision or headache        Objective        Vitals:    23 1108   BP: (!) 110/44   Pulse: 91       LIPIDS - LAST 2   Lab Results   Component Value Date    CHOL 145 01/10/2023    CHOL 128 2022    HDL 52 01/10/2023    HDL 36 (L) 2022    LDLCALC 75.2 01/10/2023    LDLCALC 67.8 2022    TRIG 89 01/10/2023    TRIG 121 2022    CHOLHDL 35.9 01/10/2023    CHOLHDL 28.1 2022       CBC - LAST 2  Lab Results   Component Value Date    WBC 10.24 2023    WBC 8.48 2023    RBC 3.24 (L) 2023    RBC 3.21 (L) 2023    HGB 9.4 (L) 2023    HGB 9.4 (L) 2023    HCT 30.8 (L) 2023    HCT 30.4 (L) 2023    MCV 95 2023    MCV 95 2023    MCH 29.0 2023    MCH 29.3 2023    MCHC 30.5 (L) 2023    MCHC 30.9 (L) 2023    RDW 17.3 (H) 2023    RDW 17.2 (H) 2023     2023     2023    MPV 9.9 2023    MPV 9.9 2023    GRAN 80.0 (H) 2023    GRAN 80.0 (H) 2023    LYMPH 9.0 (L) 2023     LYMPH 11.0 (L) 01/30/2023    MONO 3.0 (L) 01/30/2023    MONO 0.0 (L) 01/30/2023    BASO 0.04 01/29/2023    BASO 0.03 01/29/2023    NRBC 0 01/30/2023    NRBC 0 01/30/2023       CHEMISTRY & LIVER FUNCTION - LAST 2  Lab Results   Component Value Date     01/31/2023     01/30/2023    K 4.0 01/31/2023    K 3.9 01/30/2023     01/31/2023     01/30/2023    CO2 29 01/31/2023    CO2 30 (H) 01/30/2023    ANIONGAP 6 (L) 01/31/2023    ANIONGAP 7 (L) 01/30/2023    BUN 32 (H) 01/31/2023    BUN 29 (H) 01/30/2023    CREATININE 0.9 01/31/2023    CREATININE 1.0 01/30/2023    GLU 90 01/31/2023    GLU 96 01/30/2023    CALCIUM 8.9 01/31/2023    CALCIUM 8.9 01/30/2023    MG 1.4 (L) 01/31/2023    MG 1.6 01/30/2023    ALBUMIN 2.4 (L) 01/25/2023    ALBUMIN 2.9 (L) 01/10/2023    PROT 6.0 01/25/2023    PROT 5.8 (L) 01/10/2023    ALKPHOS 68 01/25/2023    ALKPHOS 82 01/10/2023    ALT 11 01/25/2023    ALT 15 01/10/2023    AST 12 01/25/2023    AST 10 01/10/2023    BILITOT 0.8 01/25/2023    BILITOT 0.9 01/10/2023        CARDIAC PROFILE - LAST 2  Lab Results   Component Value Date     (H) 01/25/2023    BNP 92 01/01/2023    CPK 75 01/01/2023     01/03/2023     01/01/2023    TROPONINI 0.052 (H) 12/20/2022    TROPONINI 0.044 (H) 12/20/2022        COAGULATION - LAST 2  Lab Results   Component Value Date    LABPT 12.8 09/07/2021    INR 1.1 01/25/2023    INR 1.1 01/01/2023    APTT 26.8 01/25/2023    APTT 27.1 01/01/2023       ENDOCRINE & PSA - LAST 2  Lab Results   Component Value Date    HGBA1C 6.1 01/25/2023    HGBA1C 6.0 (H) 01/10/2023    TSH 1.678 07/06/2016    TSH 0.965 05/18/2015    PROCAL 0.36 01/25/2023    PROCAL 0.17 01/01/2023        ECHOCARDIOGRAM RESULTS  Results for orders placed during the hospital encounter of 12/20/22    Echo    Interpretation Summary  · The left ventricle is normal in size with severe concentric hypertrophy and normal systolic function.  · Grade II left ventricular diastolic  dysfunction.  · The estimated ejection fraction is 65%.  · Normal right ventricular size with normal right ventricular systolic function.  · Moderate aortic regurgitation.  · There is severe aortic valve stenosis.  · Aortic valve area is 0.94 cm2; peak velocity is 3.29 m/s; mean gradient is 26 mmHg.  · Mild pulmonic regurgitation.  · Mild tricuspid regurgitation.  · There is mild pulmonary hypertension.  · Normal central venous pressure (3 mmHg).  · The estimated PA systolic pressure is 44 mmHg.      CURRENT/PREVIOUS VISIT EKG  Results for orders placed or performed during the hospital encounter of 01/01/23   EKG 12-lead    Collection Time: 01/25/23  1:31 PM    Narrative    Test Reason : R06.02,    Vent. Rate : 110 BPM     Atrial Rate : 110 BPM     P-R Int : 144 ms          QRS Dur : 084 ms      QT Int : 330 ms       P-R-T Axes : 053 002 016 degrees     QTc Int : 446 ms    Sinus tachycardia  Inferior infarct (cited on or before 01-JAN-2023)  Abnormal ECG  When compared with ECG of 01-JAN-2023 14:42,  No significant change was found  Confirmed by Robert Roca MD ([id number]) on 1/28/2023 10:31:02 AM    Referred By: SELENA   SELF           Confirmed By:Robert Roca MD       PHYSICAL EXAM  CONSTITUTIONAL: Well built, well nourished in no apparent distress  NECK: no carotid bruit, no JVD, soft tissue swelling supraclavicular space bilaterally   LUNGS: wheeze rhonci throughout   CHEST WALL: no tenderness  HEART: regular rate and rhythm, late peaking sharp systolic murmur   ABDOMEN: soft, non-tender; bowel sounds normal; no masses,  no organomegaly  EXTREMITIES: Extremities normal, no edema, no calf tenderness noted  NEURO: AAO X 3    I HAVE REVIEWED :    The vital signs, nurses notes, and all the pertinent radiology and labs.    Current Outpatient Medications   Medication Instructions    acetaminophen (TYLENOL) 325 mg, Oral, Every 6 hours PRN    albuterol sulfate (PROAIR RESPICLICK) 90 mcg/actuation AePB 2 puffs,  Inhalation, Every 4-6 hours PRN    albuterol-ipratropium (DUO-NEB) 2.5 mg-0.5 mg/3 mL nebulizer solution 3 mLs, Nebulization, Every 6 hours PRN, Rescue    ALPRAZolam (XANAX) 0.25 mg, Oral, 2 times daily PRN    aspirin 81 mg, Oral, Daily, Every day    atorvastatin (LIPITOR) 40 MG tablet TAKE 1 TABLET BY MOUTH EVERY DAY    clopidogreL (PLAVIX) 75 mg tablet TAKE 1 TABLET BY MOUTH EVERY DAY    colchicine (COLCRYS) 0.6 mg tablet Take 2 po at gout flare onset, may repeat 1 in an hour prn, then 1 po bid until pain resolves ,or n/V/D starts    colestipoL (COLESTID) 1 g, Oral, 2 times daily PRN    ergocalciferol (ERGOCALCIFEROL) 50,000 Units, Oral, Every 7 days    esomeprazole (NEXIUM) 40 MG capsule TAKE 1 CAPSULE BY MOUTH BEFORE BREAKFAST.    fluticasone propionate (FLONASE) 50 mcg, Each Nostril, Daily    furosemide (LASIX) 20 mg, Oral, Every 48 hours    magnesium oxide (MAG-OX) 800 mg, Oral, 2 times daily    meclizine (ANTIVERT) 25 mg, Oral, 3 times daily PRN    metoprolol succinate (TOPROL-XL) 50 mg, Oral, Daily    paroxetine (PAXIL) 40 MG tablet TAKE 1 TABLET BY MOUTH EVERY DAY    paroxetine (PAXIL) 10 mg, Oral, Every morning, Total 50 mg    QUEtiapine (SEROQUEL) 100 mg, Oral, Daily    TRELEGY ELLIPTA 200-62.5-25 mcg inhaler 1 puff, Inhalation, Daily        Assessment & Plan     Chronic respiratory failure with hypoxia, on 4 L oxygen  Referral placed to dr. Toro and office contacted staff    Lower lung pneumonia  CXR today to confim resolution     Essential hypertension  BP soft today 110/44  Continue toprol 50 mg daily   Low Na diet   Adequate hydration to support pressure     Valvular heart disease, severe aortic stenosis, moderate aortic regurgitation  DSE to assess valve, for any ischemia, cardiac function given frequent hospitalizations for SOB  If valve ok and no ischemia, consider cardiomems implant     Unstable angina  DSE to assess valve, for any ischemia, cardiac function given frequent hospitalizations for  SOB  If valve ok and no ischemia, consider cardiomems implant     CAD (coronary artery disease)  DSE to assess valve, for any ischemia, cardiac function given frequent hospitalizations for SOB  If valve ok and no ischemia, consider cardiomems implant     (HFpEF) heart failure with preserved ejection fraction  Euvolemic on exam   CXR today   Continue toprol and lasix every other day     Cardiomems if valve and coronaries done need work up after DSE     Hypomagnesemia  Check today   If levels ok go down to 400 mg once daily         As scheduled with Dr. Borjas on 3.20.23.

## 2023-02-07 NOTE — ASSESSMENT & PLAN NOTE
BP soft today 110/44  Continue toprol 50 mg daily   Low Na diet   Adequate hydration to support pressure

## 2023-02-07 NOTE — TELEPHONE ENCOUNTER
----- Message from Marleny Link PA-C sent at 2/7/2023  3:13 PM CST -----  Hi, can someone call this lady and let her know Dr. Callejas does not think the swelling above her clavicles is anything of concern as I suggested. However, she did ask if I would get a arterial upper extremity ultrasound done.   I placed this order, can you please get her on the schedule.     Thank you!   Marleny Link PA-C

## 2023-02-07 NOTE — ASSESSMENT & PLAN NOTE
Euvolemic on exam   CXR today   Continue toprol and lasix every other day     Cardiomems if valve and coronaries done need work up after DSE

## 2023-02-08 ENCOUNTER — TELEPHONE (OUTPATIENT)
Dept: CARDIOLOGY | Facility: CLINIC | Age: 73
End: 2023-02-08
Payer: MEDICARE

## 2023-02-08 ENCOUNTER — TELEPHONE (OUTPATIENT)
Dept: PULMONOLOGY | Facility: CLINIC | Age: 73
End: 2023-02-08
Payer: MEDICARE

## 2023-02-08 NOTE — TELEPHONE ENCOUNTER
Pulm referral scheduled with daughterLoree: 3/9/2023 1520  [facility].  
18-May-2021 15:15

## 2023-02-08 NOTE — TELEPHONE ENCOUNTER
Pulm referral: phone message for patient with scheduling information. Called daughterLoree, and scheduled 3/2023  [facility].

## 2023-02-10 ENCOUNTER — PATIENT MESSAGE (OUTPATIENT)
Dept: FAMILY MEDICINE | Facility: CLINIC | Age: 73
End: 2023-02-10
Payer: MEDICARE

## 2023-02-10 ENCOUNTER — PATIENT MESSAGE (OUTPATIENT)
Dept: CARDIOLOGY | Facility: CLINIC | Age: 73
End: 2023-02-10
Payer: MEDICARE

## 2023-02-14 ENCOUNTER — HOSPITAL ENCOUNTER (INPATIENT)
Facility: HOSPITAL | Age: 73
LOS: 7 days | Discharge: HOME HEALTH | DRG: 291 | End: 2023-02-22
Attending: EMERGENCY MEDICINE | Admitting: INTERNAL MEDICINE
Payer: MEDICARE

## 2023-02-14 DIAGNOSIS — I50.32 CHRONIC DIASTOLIC HEART FAILURE: ICD-10-CM

## 2023-02-14 DIAGNOSIS — R09.02 HYPOXIA: ICD-10-CM

## 2023-02-14 DIAGNOSIS — R06.00 DYSPNEA: ICD-10-CM

## 2023-02-14 DIAGNOSIS — J96.11 CHRONIC RESPIRATORY FAILURE WITH HYPOXIA: Chronic | ICD-10-CM

## 2023-02-14 DIAGNOSIS — D62 ACUTE BLOOD LOSS ANEMIA: ICD-10-CM

## 2023-02-14 DIAGNOSIS — J43.2 CENTRILOBULAR EMPHYSEMA: ICD-10-CM

## 2023-02-14 DIAGNOSIS — J44.9 CHRONIC OBSTRUCTIVE PULMONARY DISEASE, UNSPECIFIED COPD TYPE: ICD-10-CM

## 2023-02-14 DIAGNOSIS — I50.9 ACUTE ON CHRONIC CONGESTIVE HEART FAILURE, UNSPECIFIED HEART FAILURE TYPE: Primary | ICD-10-CM

## 2023-02-14 LAB
ABO + RH BLD: NORMAL
ALBUMIN SERPL BCP-MCNC: 2.6 G/DL (ref 3.5–5.2)
ALP SERPL-CCNC: 95 U/L (ref 55–135)
ALT SERPL W/O P-5'-P-CCNC: 12 U/L (ref 10–44)
ANION GAP SERPL CALC-SCNC: 8 MMOL/L (ref 8–16)
APTT BLDCRRT: 26.3 SEC (ref 21–32)
AST SERPL-CCNC: 22 U/L (ref 10–40)
BASOPHILS # BLD AUTO: 0.05 K/UL (ref 0–0.2)
BASOPHILS NFR BLD: 0.6 % (ref 0–1.9)
BILIRUB SERPL-MCNC: 1.1 MG/DL (ref 0.1–1)
BLD GP AB SCN CELLS X3 SERPL QL: NORMAL
BNP SERPL-MCNC: 1002 PG/ML (ref 0–99)
BUN SERPL-MCNC: 16 MG/DL (ref 8–23)
CALCIUM SERPL-MCNC: 9.2 MG/DL (ref 8.7–10.5)
CHLORIDE SERPL-SCNC: 98 MMOL/L (ref 95–110)
CO2 SERPL-SCNC: 26 MMOL/L (ref 23–29)
CREAT SERPL-MCNC: 1.1 MG/DL (ref 0.5–1.4)
DIFFERENTIAL METHOD: ABNORMAL
EOSINOPHIL # BLD AUTO: 0.2 K/UL (ref 0–0.5)
EOSINOPHIL NFR BLD: 1.7 % (ref 0–8)
ERYTHROCYTE [DISTWIDTH] IN BLOOD BY AUTOMATED COUNT: 17.9 % (ref 11.5–14.5)
EST. GFR  (NO RACE VARIABLE): 52.4 ML/MIN/1.73 M^2
GLUCOSE SERPL-MCNC: 126 MG/DL (ref 70–110)
HCT VFR BLD AUTO: 33.9 % (ref 37–48.5)
HGB BLD-MCNC: 10.1 G/DL (ref 12–16)
IMM GRANULOCYTES # BLD AUTO: 0.1 K/UL (ref 0–0.04)
IMM GRANULOCYTES NFR BLD AUTO: 1.1 % (ref 0–0.5)
INFLUENZA A, MOLECULAR: NEGATIVE
INFLUENZA B, MOLECULAR: NEGATIVE
INR PPP: 1 (ref 0.8–1.2)
LACTATE SERPL-SCNC: 2.4 MMOL/L (ref 0.5–1.9)
LYMPHOCYTES # BLD AUTO: 0.9 K/UL (ref 1–4.8)
LYMPHOCYTES NFR BLD: 9.9 % (ref 18–48)
MCH RBC QN AUTO: 27.8 PG (ref 27–31)
MCHC RBC AUTO-ENTMCNC: 29.8 G/DL (ref 32–36)
MCV RBC AUTO: 93 FL (ref 82–98)
MONOCYTES # BLD AUTO: 0.6 K/UL (ref 0.3–1)
MONOCYTES NFR BLD: 6.5 % (ref 4–15)
NEUTROPHILS # BLD AUTO: 7.2 K/UL (ref 1.8–7.7)
NEUTROPHILS NFR BLD: 80.2 % (ref 38–73)
NRBC BLD-RTO: 0 /100 WBC
PLATELET # BLD AUTO: 230 K/UL (ref 150–450)
PMV BLD AUTO: 9.5 FL (ref 9.2–12.9)
POTASSIUM SERPL-SCNC: 4.1 MMOL/L (ref 3.5–5.1)
PROT SERPL-MCNC: 6.4 G/DL (ref 6–8.4)
PROTHROMBIN TIME: 10.9 SEC (ref 9–12.5)
RBC # BLD AUTO: 3.63 M/UL (ref 4–5.4)
SARS-COV-2 RDRP RESP QL NAA+PROBE: NEGATIVE
SODIUM SERPL-SCNC: 132 MMOL/L (ref 136–145)
SPECIMEN SOURCE: NORMAL
TROPONIN I SERPL HS-MCNC: 16.9 PG/ML (ref 0–14.9)
WBC # BLD AUTO: 8.99 K/UL (ref 3.9–12.7)

## 2023-02-14 PROCEDURE — U0002 COVID-19 LAB TEST NON-CDC: HCPCS | Performed by: EMERGENCY MEDICINE

## 2023-02-14 PROCEDURE — 99900035 HC TECH TIME PER 15 MIN (STAT)

## 2023-02-14 PROCEDURE — 96374 THER/PROPH/DIAG INJ IV PUSH: CPT

## 2023-02-14 PROCEDURE — 93010 ELECTROCARDIOGRAM REPORT: CPT | Mod: ,,, | Performed by: INTERNAL MEDICINE

## 2023-02-14 PROCEDURE — 25000003 PHARM REV CODE 250: Performed by: EMERGENCY MEDICINE

## 2023-02-14 PROCEDURE — 63600175 PHARM REV CODE 636 W HCPCS: Performed by: INTERNAL MEDICINE

## 2023-02-14 PROCEDURE — G0378 HOSPITAL OBSERVATION PER HR: HCPCS

## 2023-02-14 PROCEDURE — 85610 PROTHROMBIN TIME: CPT | Performed by: EMERGENCY MEDICINE

## 2023-02-14 PROCEDURE — 96376 TX/PRO/DX INJ SAME DRUG ADON: CPT

## 2023-02-14 PROCEDURE — 83880 ASSAY OF NATRIURETIC PEPTIDE: CPT | Performed by: EMERGENCY MEDICINE

## 2023-02-14 PROCEDURE — 87040 BLOOD CULTURE FOR BACTERIA: CPT | Performed by: EMERGENCY MEDICINE

## 2023-02-14 PROCEDURE — 85025 COMPLETE CBC W/AUTO DIFF WBC: CPT | Performed by: EMERGENCY MEDICINE

## 2023-02-14 PROCEDURE — 96375 TX/PRO/DX INJ NEW DRUG ADDON: CPT

## 2023-02-14 PROCEDURE — 96372 THER/PROPH/DIAG INJ SC/IM: CPT | Performed by: INTERNAL MEDICINE

## 2023-02-14 PROCEDURE — 85730 THROMBOPLASTIN TIME PARTIAL: CPT | Performed by: EMERGENCY MEDICINE

## 2023-02-14 PROCEDURE — 93005 ELECTROCARDIOGRAM TRACING: CPT | Performed by: INTERNAL MEDICINE

## 2023-02-14 PROCEDURE — 84484 ASSAY OF TROPONIN QUANT: CPT | Performed by: EMERGENCY MEDICINE

## 2023-02-14 PROCEDURE — 25000242 PHARM REV CODE 250 ALT 637 W/ HCPCS: Performed by: EMERGENCY MEDICINE

## 2023-02-14 PROCEDURE — 94640 AIRWAY INHALATION TREATMENT: CPT

## 2023-02-14 PROCEDURE — 93010 EKG 12-LEAD: ICD-10-PCS | Mod: ,,, | Performed by: INTERNAL MEDICINE

## 2023-02-14 PROCEDURE — 80053 COMPREHEN METABOLIC PANEL: CPT | Performed by: EMERGENCY MEDICINE

## 2023-02-14 PROCEDURE — 87154 CUL TYP ID BLD PTHGN 6+ TRGT: CPT | Performed by: EMERGENCY MEDICINE

## 2023-02-14 PROCEDURE — 99285 EMERGENCY DEPT VISIT HI MDM: CPT | Mod: 25

## 2023-02-14 PROCEDURE — 25000003 PHARM REV CODE 250: Performed by: INTERNAL MEDICINE

## 2023-02-14 PROCEDURE — 63600175 PHARM REV CODE 636 W HCPCS: Performed by: EMERGENCY MEDICINE

## 2023-02-14 PROCEDURE — 87502 INFLUENZA DNA AMP PROBE: CPT | Performed by: EMERGENCY MEDICINE

## 2023-02-14 PROCEDURE — 86900 BLOOD TYPING SEROLOGIC ABO: CPT | Performed by: EMERGENCY MEDICINE

## 2023-02-14 PROCEDURE — 83605 ASSAY OF LACTIC ACID: CPT | Performed by: EMERGENCY MEDICINE

## 2023-02-14 RX ORDER — IPRATROPIUM BROMIDE AND ALBUTEROL SULFATE 2.5; .5 MG/3ML; MG/3ML
3 SOLUTION RESPIRATORY (INHALATION)
Status: COMPLETED | OUTPATIENT
Start: 2023-02-14 | End: 2023-02-14

## 2023-02-14 RX ORDER — FUROSEMIDE 10 MG/ML
20 INJECTION INTRAMUSCULAR; INTRAVENOUS
Status: DISCONTINUED | OUTPATIENT
Start: 2023-02-15 | End: 2023-02-16

## 2023-02-14 RX ORDER — CLONAZEPAM 1 MG/1
1 TABLET ORAL 2 TIMES DAILY PRN
Status: DISCONTINUED | OUTPATIENT
Start: 2023-02-14 | End: 2023-02-22 | Stop reason: HOSPADM

## 2023-02-14 RX ORDER — FUROSEMIDE 10 MG/ML
20 INJECTION INTRAMUSCULAR; INTRAVENOUS ONCE
Status: COMPLETED | OUTPATIENT
Start: 2023-02-14 | End: 2023-02-14

## 2023-02-14 RX ORDER — CLONAZEPAM 1 MG/1
1 TABLET, ORALLY DISINTEGRATING ORAL 2 TIMES DAILY PRN
COMMUNITY
End: 2023-03-03 | Stop reason: SDUPTHER

## 2023-02-14 RX ORDER — TALC
6 POWDER (GRAM) TOPICAL NIGHTLY PRN
Status: DISCONTINUED | OUTPATIENT
Start: 2023-02-14 | End: 2023-02-22 | Stop reason: HOSPADM

## 2023-02-14 RX ORDER — CLOPIDOGREL BISULFATE 75 MG/1
75 TABLET ORAL DAILY
Status: DISCONTINUED | OUTPATIENT
Start: 2023-02-15 | End: 2023-02-22 | Stop reason: HOSPADM

## 2023-02-14 RX ORDER — ISOSORBIDE MONONITRATE 60 MG/1
60 TABLET, EXTENDED RELEASE ORAL DAILY
COMMUNITY
End: 2023-06-05

## 2023-02-14 RX ORDER — QUETIAPINE FUMARATE 100 MG/1
100 TABLET, FILM COATED ORAL DAILY
Status: DISCONTINUED | OUTPATIENT
Start: 2023-02-15 | End: 2023-02-14

## 2023-02-14 RX ORDER — ENOXAPARIN SODIUM 100 MG/ML
40 INJECTION SUBCUTANEOUS EVERY 24 HOURS
Status: DISCONTINUED | OUTPATIENT
Start: 2023-02-14 | End: 2023-02-17

## 2023-02-14 RX ORDER — HYDRALAZINE HYDROCHLORIDE 20 MG/ML
10 INJECTION INTRAMUSCULAR; INTRAVENOUS EVERY 6 HOURS PRN
Status: DISCONTINUED | OUTPATIENT
Start: 2023-02-14 | End: 2023-02-22 | Stop reason: HOSPADM

## 2023-02-14 RX ORDER — AMOXICILLIN 250 MG
1 CAPSULE ORAL 2 TIMES DAILY PRN
Status: DISCONTINUED | OUTPATIENT
Start: 2023-02-14 | End: 2023-02-22 | Stop reason: HOSPADM

## 2023-02-14 RX ORDER — IBUPROFEN 200 MG
24 TABLET ORAL
Status: DISCONTINUED | OUTPATIENT
Start: 2023-02-14 | End: 2023-02-22 | Stop reason: HOSPADM

## 2023-02-14 RX ORDER — LANOLIN ALCOHOL/MO/W.PET/CERES
800 CREAM (GRAM) TOPICAL 2 TIMES DAILY
Status: DISCONTINUED | OUTPATIENT
Start: 2023-02-14 | End: 2023-02-22 | Stop reason: HOSPADM

## 2023-02-14 RX ORDER — ISOSORBIDE MONONITRATE 60 MG/1
60 TABLET, EXTENDED RELEASE ORAL DAILY
Status: DISCONTINUED | OUTPATIENT
Start: 2023-02-15 | End: 2023-02-22 | Stop reason: HOSPADM

## 2023-02-14 RX ORDER — POLYETHYLENE GLYCOL 3350 17 G/17G
17 POWDER, FOR SOLUTION ORAL 2 TIMES DAILY PRN
Status: DISCONTINUED | OUTPATIENT
Start: 2023-02-14 | End: 2023-02-22 | Stop reason: HOSPADM

## 2023-02-14 RX ORDER — METOPROLOL SUCCINATE 25 MG/1
100 TABLET, EXTENDED RELEASE ORAL DAILY
Status: DISCONTINUED | OUTPATIENT
Start: 2023-02-15 | End: 2023-02-15

## 2023-02-14 RX ORDER — NALOXONE HCL 0.4 MG/ML
0.02 VIAL (ML) INJECTION
Status: DISCONTINUED | OUTPATIENT
Start: 2023-02-14 | End: 2023-02-22 | Stop reason: HOSPADM

## 2023-02-14 RX ORDER — METOPROLOL SUCCINATE 100 MG/1
100 TABLET, EXTENDED RELEASE ORAL DAILY
Status: ON HOLD | COMMUNITY
End: 2023-02-22 | Stop reason: HOSPADM

## 2023-02-14 RX ORDER — GLUCAGON 1 MG
1 KIT INJECTION
Status: DISCONTINUED | OUTPATIENT
Start: 2023-02-14 | End: 2023-02-22 | Stop reason: HOSPADM

## 2023-02-14 RX ORDER — SODIUM CHLORIDE 0.9 % (FLUSH) 0.9 %
10 SYRINGE (ML) INJECTION
Status: DISCONTINUED | OUTPATIENT
Start: 2023-02-14 | End: 2023-02-22 | Stop reason: HOSPADM

## 2023-02-14 RX ORDER — PAROXETINE HYDROCHLORIDE 20 MG/1
40 TABLET, FILM COATED ORAL DAILY
Status: DISCONTINUED | OUTPATIENT
Start: 2023-02-15 | End: 2023-02-15

## 2023-02-14 RX ORDER — METOPROLOL SUCCINATE 25 MG/1
50 TABLET, EXTENDED RELEASE ORAL DAILY
Status: DISCONTINUED | OUTPATIENT
Start: 2023-02-15 | End: 2023-02-22 | Stop reason: HOSPADM

## 2023-02-14 RX ORDER — ACETAMINOPHEN 325 MG/1
325 TABLET ORAL EVERY 6 HOURS PRN
Status: DISCONTINUED | OUTPATIENT
Start: 2023-02-14 | End: 2023-02-22 | Stop reason: HOSPADM

## 2023-02-14 RX ORDER — IBUPROFEN 200 MG
16 TABLET ORAL
Status: DISCONTINUED | OUTPATIENT
Start: 2023-02-14 | End: 2023-02-22 | Stop reason: HOSPADM

## 2023-02-14 RX ORDER — ONDANSETRON 2 MG/ML
4 INJECTION INTRAMUSCULAR; INTRAVENOUS EVERY 6 HOURS PRN
Status: DISCONTINUED | OUTPATIENT
Start: 2023-02-14 | End: 2023-02-22 | Stop reason: HOSPADM

## 2023-02-14 RX ORDER — SIMETHICONE 80 MG
1 TABLET,CHEWABLE ORAL 4 TIMES DAILY PRN
Status: DISCONTINUED | OUTPATIENT
Start: 2023-02-14 | End: 2023-02-22 | Stop reason: HOSPADM

## 2023-02-14 RX ORDER — IPRATROPIUM BROMIDE AND ALBUTEROL SULFATE 2.5; .5 MG/3ML; MG/3ML
3 SOLUTION RESPIRATORY (INHALATION) EVERY 6 HOURS PRN
Status: DISCONTINUED | OUTPATIENT
Start: 2023-02-14 | End: 2023-02-22 | Stop reason: HOSPADM

## 2023-02-14 RX ORDER — FUROSEMIDE 10 MG/ML
20 INJECTION INTRAMUSCULAR; INTRAVENOUS
Status: COMPLETED | OUTPATIENT
Start: 2023-02-14 | End: 2023-02-14

## 2023-02-14 RX ORDER — ASPIRIN 81 MG/1
81 TABLET ORAL DAILY
Status: DISCONTINUED | OUTPATIENT
Start: 2023-02-15 | End: 2023-02-22 | Stop reason: HOSPADM

## 2023-02-14 RX ORDER — QUETIAPINE FUMARATE 100 MG/1
100 TABLET, FILM COATED ORAL NIGHTLY
Status: DISCONTINUED | OUTPATIENT
Start: 2023-02-14 | End: 2023-02-22 | Stop reason: HOSPADM

## 2023-02-14 RX ORDER — PANTOPRAZOLE SODIUM 40 MG/1
40 TABLET, DELAYED RELEASE ORAL
Status: DISCONTINUED | OUTPATIENT
Start: 2023-02-15 | End: 2023-02-22 | Stop reason: HOSPADM

## 2023-02-14 RX ORDER — ALPRAZOLAM 0.25 MG/1
0.25 TABLET ORAL 2 TIMES DAILY PRN
Status: DISCONTINUED | OUTPATIENT
Start: 2023-02-14 | End: 2023-02-22 | Stop reason: HOSPADM

## 2023-02-14 RX ORDER — LABETALOL HYDROCHLORIDE 5 MG/ML
10 INJECTION, SOLUTION INTRAVENOUS
Status: COMPLETED | OUTPATIENT
Start: 2023-02-14 | End: 2023-02-14

## 2023-02-14 RX ADMIN — ALPRAZOLAM 0.25 MG: 0.25 TABLET ORAL at 09:02

## 2023-02-14 RX ADMIN — IPRATROPIUM BROMIDE AND ALBUTEROL SULFATE 3 ML: .5; 3 SOLUTION RESPIRATORY (INHALATION) at 02:02

## 2023-02-14 RX ADMIN — Medication 800 MG: at 08:02

## 2023-02-14 RX ADMIN — HYDRALAZINE HYDROCHLORIDE 10 MG: 20 INJECTION INTRAMUSCULAR; INTRAVENOUS at 08:02

## 2023-02-14 RX ADMIN — LABETALOL HYDROCHLORIDE 10 MG: 5 INJECTION, SOLUTION INTRAVENOUS at 04:02

## 2023-02-14 RX ADMIN — FUROSEMIDE 20 MG: 10 INJECTION, SOLUTION INTRAMUSCULAR; INTRAVENOUS at 10:02

## 2023-02-14 RX ADMIN — QUETIAPINE 100 MG: 100 TABLET ORAL at 09:02

## 2023-02-14 RX ADMIN — ENOXAPARIN SODIUM 40 MG: 100 INJECTION SUBCUTANEOUS at 08:02

## 2023-02-14 RX ADMIN — FUROSEMIDE 20 MG: 10 INJECTION INTRAMUSCULAR; INTRAVENOUS at 03:02

## 2023-02-14 NOTE — ED PROVIDER NOTES
"Encounter Date: 2/14/2023       History     Chief Complaint   Patient presents with    Shortness of Breath     Increased Sob. Pt states that she had blood work two weeks ago and then got a call that she had an infection. Unsure where the infection is and is not currently on antibiotics. States that she has had pneumonia several times in the past     73-year-old female with history of asbestosis, COPD on home oxygen, CAD, CHFpEF, presenting for evaluation of worsening dyspnea over the past several days.  She also tells me she was called 2 weeks ago and told she has a "infection in her blood."She denies any fever, chest pain, changes in her chronic cough.  She does not believe she is gaining weight or has any additional lower extremity edema from baseline.  No signs of bleeding.  She was taken off oxygen prior to being placed in the ED bed, oxygen saturations dropped into the 60s.  When placed on 4LPM, oxygen saturation micheline to 83-84% and she remained labored. This all has improved with nasal cannula at 6 L. At baseline her oxygen saturations are between 86-88% on 4 LPM NC. Home health is instructed to give her a  breathing treatment if sats drop below 86%. No other acute issues.    The history is provided by the patient and the EMS personnel.   Review of patient's allergies indicates:   Allergen Reactions    Propoxyphene n-acetaminophen Other (See Comments)     Other reaction(s): Unknown    Codeine Anxiety     Past Medical History:   Diagnosis Date    Acute coronary artery obstruction without MI 10/2012    Benign hypertension     COPD (chronic obstructive pulmonary disease)     Coronary artery disease     Disorder of kidney and ureter     Dr. Morrow    Hepatitis B core antibody positive 03/03/2021    Negative sAg, suggests previous exposure but no chronic/active Hep B. At risk for reactivation with any immunosuppression medication, steroids, chemo, etc.      History of electroconvulsive therapy     Hyperlipidemia " LDL goal < 70     Left ankle sprain     Major depressive disorder, recurrent episode, severe     s/p ECT    Positive AKIRA (antinuclear antibody) 2021    PVD (peripheral vascular disease)      Past Surgical History:   Procedure Laterality Date    CHOLECYSTECTOMY      CORONARY ANGIOPLASTY      CORONARY ANGIOPLASTY WITH STENT PLACEMENT  10/2012    2 stents RCA (100% stenosis)    EYE SURGERY      cataract surgery    HYSTERECTOMY      LINA, ovaries intact. uterine prolapse    ILIAC ARTERY STENT      TONSILLECTOMY      TOTAL VAGINAL HYSTERECTOMY       Family History   Problem Relation Age of Onset    Diabetes Mother     Heart disease Mother     Stroke Father     Heart disease Father     Heart disease Brother     Stroke Brother     Hypertension Daughter     Diabetes Maternal Aunt     Heart disease Maternal Aunt     Heart disease Maternal Uncle     Heart disease Paternal Aunt     Heart disease Paternal Uncle     Heart disease Maternal Grandfather     Diabetes Sister     Heart disease Sister     Cancer Sister         lung    Kidney disease Sister         mass, benign    Breast cancer Sister     Stroke Sister     Dementia Sister     Liver disease Sister     Melanoma Neg Hx     Psoriasis Neg Hx     Lupus Neg Hx     Eczema Neg Hx      Social History     Tobacco Use    Smoking status: Former     Packs/day: 2.00     Years: 40.00     Pack years: 80.00     Types: Cigarettes     Quit date: 2009     Years since quittin.2    Smokeless tobacco: Never   Substance Use Topics    Alcohol use: Yes     Comment: social    Drug use: No     Review of Systems   Constitutional:  Negative for fever and unexpected weight change.   HENT:  Negative for sinus pain and sore throat.    Respiratory:  Positive for cough (chronic, dry, without acute change) and shortness of breath.    Cardiovascular:  Negative for chest pain and leg swelling.   Gastrointestinal:  Negative for abdominal pain, blood in stool, diarrhea, nausea and vomiting.    Musculoskeletal:  Negative for myalgias.   Skin:  Negative for pallor.   Neurological:  Negative for syncope and light-headedness.     Physical Exam     Initial Vitals [02/14/23 1353]   BP Pulse Resp Temp SpO2   (!) 195/75 105 (!) 24 99.2 °F (37.3 °C) (!) 69 %      MAP       --         Physical Exam    Nursing note and vitals reviewed.  Constitutional: She appears well-developed and well-nourished. She is not diaphoretic. She appears distressed (moderate respiratory distress, speaking in 1-2 word phrases).   HENT:   Head: Normocephalic and atraumatic.   Eyes: Conjunctivae and EOM are normal.   Neck: Neck supple.   Normal range of motion.  Cardiovascular:  Normal rate and regular rhythm.           Pulmonary/Chest: She is in respiratory distress. She has no wheezes. She has no rhonchi. She has rales (diffuse).   Abdominal: Abdomen is soft. Bowel sounds are normal. She exhibits no distension. There is no abdominal tenderness.   Musculoskeletal:         General: Edema (trace BLE) present. No tenderness. Normal range of motion.      Cervical back: Normal range of motion and neck supple.     Neurological: She is alert and oriented to person, place, and time. She has normal strength.   Skin: Skin is warm and dry. Capillary refill takes less than 2 seconds. No rash noted.   Psychiatric: She has a normal mood and affect.       ED Course   Critical Care    Date/Time: 2/14/2023 4:00 PM  Performed by: Chelita Krishnan MD  Authorized by: Chelita Krishnan MD   Direct patient critical care time: 35 minutes  Total critical care time (exclusive of procedural time) : 35 minutes  Critical care time was exclusive of separately billable procedures and treating other patients.  Critical care was necessary to treat or prevent imminent or life-threatening deterioration of the following conditions: cardiac failure and respiratory failure.  Critical care was time spent personally by me on the following activities: development of treatment  plan with patient or surrogate, discussions with primary provider, examination of patient, evaluation of patient's response to treatment, obtaining history from patient or surrogate, ordering and review of laboratory studies, ordering and review of radiographic studies, pulse oximetry, re-evaluation of patient's condition and review of old charts.      Labs Reviewed   B-TYPE NATRIURETIC PEPTIDE - Abnormal; Notable for the following components:       Result Value    BNP 1,002 (*)     All other components within normal limits   CBC W/ AUTO DIFFERENTIAL - Abnormal; Notable for the following components:    RBC 3.63 (*)     Hemoglobin 10.1 (*)     Hematocrit 33.9 (*)     MCHC 29.8 (*)     RDW 17.9 (*)     Immature Granulocytes 1.1 (*)     Immature Grans (Abs) 0.10 (*)     Lymph # 0.9 (*)     Gran % 80.2 (*)     Lymph % 9.9 (*)     All other components within normal limits   COMPREHENSIVE METABOLIC PANEL - Abnormal; Notable for the following components:    Sodium 132 (*)     Glucose 126 (*)     Albumin 2.6 (*)     Total Bilirubin 1.1 (*)     eGFR 52.4 (*)     All other components within normal limits   LACTIC ACID, PLASMA - Abnormal; Notable for the following components:    Lactate (Lactic Acid) 2.4 (*)     All other components within normal limits    Narrative:     LA critical result(s) repeated. Called and verbal readback obtained   from Irene Olguin RN/ed by [facility] 02/14/2023 15:18   TROPONIN I HIGH SENSITIVITY - Abnormal; Notable for the following components:    Troponin I High Sensitivity 16.9 (*)     All other components within normal limits   CULTURE, BLOOD   APTT   PROTIME-INR   SARS-COV-2 RNA AMPLIFICATION, QUAL   INFLUENZA A AND B ANTIGEN   TYPE & SCREEN     EKG Readings: (Independently Interpreted)   Initial Reading: No STEMI. Previous EKG: Compared with most recent EKG Rhythm: Normal Sinus Rhythm. Heart Rate: 93. Ectopy: No Ectopy.   1511  T-wave inversions in inferior leads, unchanged from previous tracings.   Flattened T-waves in the lateral leads.  No STEMI   ECG Results              EKG 12-lead (In process)  Result time 02/14/23 15:15:15      In process by Interface, Lab In [facility] (02/14/23 15:15:15)                   Narrative:    Test Reason : R06.00,    Vent. Rate : 093 BPM     Atrial Rate : 093 BPM     P-R Int : 140 ms          QRS Dur : 086 ms      QT Int : 366 ms       P-R-T Axes : 040 003 -08 degrees     QTc Int : 455 ms    Normal sinus rhythm  Moderate voltage criteria for LVH, may be normal variant  Inferior infarct (cited on or before 01-JAN-2023)  Abnormal ECG  When compared with ECG of 25-JAN-2023 13:31,  T wave inversion now evident in Anterior leads    Referred By: [email or web address]   SELF           Confirmed By:                                   Imaging Results              X-Ray Chest AP Portable (Final result)  Result time 02/14/23 15:07:54      Final result by Penny Ribera MD (02/14/23 15:07:54)                   Narrative:    Portable chest x-ray at 2:43 PM is compared to prior study dated 1/29/2023    Clinical history is dyspnea    The heart is enlarged. There are mild increased interstitial markings throughout the lungs suggestive of edema. There are no confluent infiltrates or pleural effusions. There are no acute osseous abnormalities.    IMPRESSION: Cardiomegaly with diffuse increased interstitial markings suggestive of edema    Electronically signed by:  Penny Ribera MD  2/14/2023 3:07 PM CST Workstation: [id number]                                     Medications   albuterol-ipratropium 2.5 mg-0.5 mg/3 mL nebulizer solution 3 mL (3 mLs Nebulization Given 2/14/23 1414)   furosemide injection 20 mg (20 mg Intravenous Given 2/14/23 1558)   labetaloL injection 10 mg (10 mg Intravenous Given 2/14/23 1632)                 ED Course as of 02/14/23 1636   Tue Feb 14, 2023   1521 Reexamine.  Patient is feeling improved, she is oxygenating well on 5 L nasal cannula.  I will reduce it to home dose of  "4 LPM to a goal of 88-92%. Labs remarkable for elevated BNP, chest x-ray concerning for increased vascular marking neck is/pulmonary congestion.  I have given a dose of Lasix.  I did discuss Lasix dosing with her, she states after her last hospitalization, it was discontinued due to a renal issue.  I also checked into her recent lab work.  She had a leukocytosis 1 week ago, possibly why someone contacted her regarding "infection. " Today her lactic acid is 2.4 but could be from CHF.  Again she has not had any fevers or change in cough.  My suspicion for pneumonia is low.  I will hold IV fluids at this time considering volume overload.  I will hold antibiotics until cultures result.  Hospital medicine will be consulted for admission for observation and diuresis considering the degree of hypoxia on her home dose of oxygen and associated orthopnea. [RB]      ED Course User Index  [RB] Chelita Krishnan MD                 Clinical Impression:   Final diagnoses:  [R06.00] Dyspnea  [I50.9] Acute on chronic congestive heart failure, unspecified heart failure type (Primary)  [R09.02] Hypoxia  [J44.9] Chronic obstructive pulmonary disease, unspecified COPD type        ED Disposition Condition    Observation Stable                Chelita Krishnan MD  02/14/23 1633       Chelita Krishnan MD  02/14/23 1634       Chelita Krishnan MD  02/14/23 1636       Chelita Krishnan MD  02/14/23 1637    "

## 2023-02-14 NOTE — SUBJECTIVE & OBJECTIVE
Past Medical History:   Diagnosis Date    Acute coronary artery obstruction without MI 10/2012    Benign hypertension     COPD (chronic obstructive pulmonary disease)     Coronary artery disease     Disorder of kidney and ureter     Dr. Morrow    Hepatitis B core antibody positive 03/03/2021    Negative sAg, suggests previous exposure but no chronic/active Hep B. At risk for reactivation with any immunosuppression medication, steroids, chemo, etc.      History of electroconvulsive therapy     Hyperlipidemia LDL goal < 70     Left ankle sprain     Major depressive disorder, recurrent episode, severe     s/p ECT    Positive AKIRA (antinuclear antibody) 03/03/2021    PVD (peripheral vascular disease)        Past Surgical History:   Procedure Laterality Date    CHOLECYSTECTOMY      CORONARY ANGIOPLASTY      CORONARY ANGIOPLASTY WITH STENT PLACEMENT  10/2012    2 stents RCA (100% stenosis)    EYE SURGERY      cataract surgery    HYSTERECTOMY      LINA, ovaries intact. uterine prolapse    ILIAC ARTERY STENT      TONSILLECTOMY      TOTAL VAGINAL HYSTERECTOMY         Review of patient's allergies indicates:   Allergen Reactions    Propoxyphene n-acetaminophen Other (See Comments)     Other reaction(s): Unknown    Codeine Anxiety       No current facility-administered medications on file prior to encounter.     Current Outpatient Medications on File Prior to Encounter   Medication Sig    albuterol-ipratropium (DUO-NEB) 2.5 mg-0.5 mg/3 mL nebulizer solution Take 3 mLs by nebulization every 6 (six) hours as needed for Wheezing. Rescue    alendronate (FOSAMAX) 70 MG tablet TAKE 1 TABLET BY MOUTH ONE TIME PER WEEK (Patient taking differently: Take 70 mg by mouth every 7 days.)    ALPRAZolam (XANAX) 0.25 MG tablet Take 1 tablet (0.25 mg total) by mouth 2 (two) times daily as needed for Anxiety.    aspirin 81 mg Tab Take 81 mg by mouth once daily. Every day    atorvastatin (LIPITOR) 40 MG tablet TAKE 1 TABLET BY MOUTH EVERY DAY  (Patient taking differently: Take 40 mg by mouth once daily.)    clonazePAM (KLONOPIN) 1 MG disintegrating tablet Take 1 mg by mouth 2 (two) times daily as needed.    clopidogreL (PLAVIX) 75 mg tablet TAKE 1 TABLET BY MOUTH EVERY DAY (Patient taking differently: Take 75 mg by mouth once daily.)    esomeprazole (NEXIUM) 40 MG capsule TAKE 1 CAPSULE BY MOUTH BEFORE BREAKFAST. (Patient taking differently: Take 40 mg by mouth before breakfast.)    fluticasone propionate (FLONASE) 50 mcg/actuation nasal spray 1 spray (50 mcg total) by Each Nostril route once daily.    furosemide (LASIX) 20 MG tablet Take 1 tablet (20 mg total) by mouth every 48 hours.    isosorbide mononitrate (IMDUR) 60 MG 24 hr tablet Take 60 mg by mouth once daily.    magnesium oxide (MAG-OX) 400 mg (241.3 mg magnesium) tablet Take 2 tablets (800 mg total) by mouth 2 (two) times daily.    metoprolol succinate (TOPROL-XL) 100 MG 24 hr tablet Take 100 mg by mouth once daily.    paroxetine (PAXIL) 10 MG tablet Take 10 mg by mouth every morning. Total 50 mg    paroxetine (PAXIL) 40 MG tablet TAKE 1 TABLET BY MOUTH EVERY DAY (Patient taking differently: Take 40 mg by mouth once daily.)    QUEtiapine (SEROQUEL) 100 MG Tab Take 100 mg by mouth once daily.    TRELEGY ELLIPTA 200-62.5-25 mcg inhaler INHALE 1 PUFF INTO THE LUNGS ONCE DAILY.    acetaminophen (TYLENOL) 325 MG tablet Take 325 mg by mouth every 6 (six) hours as needed for Pain.    albuterol sulfate (PROAIR RESPICLICK) 90 mcg/actuation AePB Inhale 2 puffs into the lungs every 4 to 6 hours as needed.    colchicine (COLCRYS) 0.6 mg tablet Take 2 po at gout flare onset, may repeat 1 in an hour prn, then 1 po bid until pain resolves ,or n/V/D starts (Patient taking differently: Take 0.6 mg by mouth as needed. Take 2 po at gout flare onset, may repeat 1 in an hour prn, then 1 po bid until pain resolves ,or n/V/D starts)    colestipoL (COLESTID) 1 gram Tab Take 1 tablet (1 g total) by mouth 2 (two) times  daily as needed (diarrhea).    ergocalciferol (ERGOCALCIFEROL) 50,000 unit Cap Take 1 capsule (50,000 Units total) by mouth every 7 days. (Patient not taking: Reported on 2023)    meclizine (ANTIVERT) 25 mg tablet Take 1 tablet (25 mg total) by mouth 3 (three) times daily as needed for Dizziness.    metoprolol succinate (TOPROL-XL) 50 MG 24 hr tablet Take 1 tablet (50 mg total) by mouth once daily.     Family History       Problem Relation (Age of Onset)    Breast cancer Sister    Cancer Sister    Dementia Sister    Diabetes Mother, Maternal Aunt, Sister    Heart disease Mother, Father, Brother, Maternal Aunt, Maternal Uncle, Paternal Aunt, Paternal Uncle, Maternal Grandfather, Sister    Hypertension Daughter    Kidney disease Sister    Liver disease Sister    Stroke Father, Brother, Sister          Tobacco Use    Smoking status: Former     Packs/day: 2.00     Years: 40.00     Pack years: 80.00     Types: Cigarettes     Quit date: 2009     Years since quittin.2    Smokeless tobacco: Never   Substance and Sexual Activity    Alcohol use: Yes     Comment: social    Drug use: No    Sexual activity: Never     Birth control/protection: Abstinence     Review of Systems   Constitutional: Negative.    HENT: Negative.     Eyes: Negative.    Respiratory:  Positive for shortness of breath.    Cardiovascular: Negative.    Gastrointestinal: Negative.    Endocrine: Negative.    Genitourinary: Negative.    Musculoskeletal: Negative.    Skin: Negative.    Allergic/Immunologic: Negative.    Neurological: Negative.    Hematological: Negative.    Objective:     Vital Signs (Most Recent):  Temp: 99.2 °F (37.3 °C) (23 1353)  Pulse: 92 (23 1632)  Resp: (!) 26 (23 1632)  BP: (!) 191/83 (23 1632)  SpO2: (!) 92 % (23 1632)   Vital Signs (24h Range):  Temp:  [99.2 °F (37.3 °C)] 99.2 °F (37.3 °C)  Pulse:  [] 92  Resp:  [24-26] 26  SpO2:  [69 %-97 %] 92 %  BP: (191-213)/(75-84) 191/83      Weight: 90.7 kg (200 lb)  Body mass index is 36.58 kg/m².    Physical Exam  Vitals and nursing note reviewed. Exam conducted with a chaperone present.   Constitutional:       Appearance: She is obese.   HENT:      Head: Normocephalic and atraumatic.   Eyes:      Pupils: Pupils are equal, round, and reactive to light.   Cardiovascular:      Rate and Rhythm: Normal rate and regular rhythm.   Pulmonary:      Breath sounds: Rales present.   Abdominal:      General: Bowel sounds are normal.      Palpations: Abdomen is soft.   Skin:     General: Skin is warm and dry.      Capillary Refill: Capillary refill takes less than 2 seconds.   Neurological:      General: No focal deficit present.      Mental Status: She is alert.         CRANIAL NERVES     CN III, IV, VI   Pupils are equal, round, and reactive to light.     Significant Labs: All pertinent labs within the past 24 hours have been reviewed.    Significant Imaging: I have reviewed all pertinent imaging results/findings within the past 24 hours.

## 2023-02-15 LAB
ACINETOBACTER CALCOACETICUS/BAUMANNII COMPLEX: NOT DETECTED
ANION GAP SERPL CALC-SCNC: 9 MMOL/L (ref 8–16)
BACTEROIDES FRAGILIS: NOT DETECTED
BASOPHILS # BLD AUTO: 0.05 K/UL (ref 0–0.2)
BASOPHILS NFR BLD: 0.7 % (ref 0–1.9)
BUN SERPL-MCNC: 16 MG/DL (ref 8–23)
CALCIUM SERPL-MCNC: 8.7 MG/DL (ref 8.7–10.5)
CANDIDA ALBICANS: NOT DETECTED
CANDIDA AURIS: NOT DETECTED
CANDIDA GLABRATA: NOT DETECTED
CANDIDA KRUSEI: NOT DETECTED
CANDIDA PARAPSILOSIS: NOT DETECTED
CANDIDA TROPICALIS: NOT DETECTED
CHLORIDE SERPL-SCNC: 99 MMOL/L (ref 95–110)
CO2 SERPL-SCNC: 28 MMOL/L (ref 23–29)
CREAT SERPL-MCNC: 1.1 MG/DL (ref 0.5–1.4)
CRYPTOCOCCUS NEOFORMANS/GATTII: NOT DETECTED
CTX-M GENE: ABNORMAL
DIFFERENTIAL METHOD: ABNORMAL
ENTEROBACTER CLOACAE COMPLEX: NOT DETECTED
ENTEROBACTERALES: NOT DETECTED
ENTEROCOCCUS FAECALIS: NOT DETECTED
ENTEROCOCCUS FAECIUM: NOT DETECTED
EOSINOPHIL # BLD AUTO: 0.2 K/UL (ref 0–0.5)
EOSINOPHIL NFR BLD: 3.2 % (ref 0–8)
ERYTHROCYTE [DISTWIDTH] IN BLOOD BY AUTOMATED COUNT: 18.2 % (ref 11.5–14.5)
ESCHERICHIA COLI: NOT DETECTED
EST. GFR  (NO RACE VARIABLE): 52.4 ML/MIN/1.73 M^2
GLUCOSE SERPL-MCNC: 102 MG/DL (ref 70–110)
HAEMOPHILUS INFLUENZAE: NOT DETECTED
HCT VFR BLD AUTO: 30.9 % (ref 37–48.5)
HGB BLD-MCNC: 9 G/DL (ref 12–16)
IMM GRANULOCYTES # BLD AUTO: 0.11 K/UL (ref 0–0.04)
IMM GRANULOCYTES NFR BLD AUTO: 1.5 % (ref 0–0.5)
IMP GENE: ABNORMAL
KLEBSIELLA AEROGENES: NOT DETECTED
KLEBSIELLA OXYTOCA: NOT DETECTED
KLEBSIELLA PNEUMONIAE GROUP: NOT DETECTED
KPC: ABNORMAL
LISTERIA MONOCYTOGENES: NOT DETECTED
LYMPHOCYTES # BLD AUTO: 1.2 K/UL (ref 1–4.8)
LYMPHOCYTES NFR BLD: 17 % (ref 18–48)
MAGNESIUM SERPL-MCNC: 1 MG/DL (ref 1.6–2.6)
MCH RBC QN AUTO: 28.1 PG (ref 27–31)
MCHC RBC AUTO-ENTMCNC: 29.1 G/DL (ref 32–36)
MCR-1: ABNORMAL
MCV RBC AUTO: 97 FL (ref 82–98)
MEC A/C AND MREJ (MRSA): ABNORMAL
MEC A/C: ABNORMAL
MONOCYTES # BLD AUTO: 0.6 K/UL (ref 0.3–1)
MONOCYTES NFR BLD: 8.4 % (ref 4–15)
NDM: ABNORMAL
NEISSERIA MENINGITIDIS: NOT DETECTED
NEUTROPHILS # BLD AUTO: 5 K/UL (ref 1.8–7.7)
NEUTROPHILS NFR BLD: 69.2 % (ref 38–73)
NRBC BLD-RTO: 0 /100 WBC
OXA-48-LIKE: ABNORMAL
PHOSPHATE SERPL-MCNC: 4 MG/DL (ref 2.7–4.5)
PLATELET # BLD AUTO: 187 K/UL (ref 150–450)
PMV BLD AUTO: 12.6 FL (ref 9.2–12.9)
POTASSIUM SERPL-SCNC: 3.9 MMOL/L (ref 3.5–5.1)
PROTEUS SPECIES: NOT DETECTED
PSEUDOMONAS AERUGINOSA: NOT DETECTED
RBC # BLD AUTO: 3.2 M/UL (ref 4–5.4)
SALMONELLA SP: NOT DETECTED
SERRATIA MARCESCENS: NOT DETECTED
SODIUM SERPL-SCNC: 136 MMOL/L (ref 136–145)
STAPHYLOCOCCUS AUREUS: NOT DETECTED
STAPHYLOCOCCUS EPIDERMIDIS: NOT DETECTED
STAPHYLOCOCCUS LUGDUNESIS: NOT DETECTED
STAPHYLOCOCCUS SPECIES: DETECTED
STENOTROPHOMONAS MALTOPHILIA: NOT DETECTED
STREPTOCOCCUS AGALACTIAE: NOT DETECTED
STREPTOCOCCUS PNEUMONIAE: NOT DETECTED
STREPTOCOCCUS PYOGENES: NOT DETECTED
STREPTOCOCCUS SPECIES: NOT DETECTED
VAN A/B: ABNORMAL
VIM: ABNORMAL
WBC # BLD AUTO: 7.25 K/UL (ref 3.9–12.7)

## 2023-02-15 PROCEDURE — 21400001 HC TELEMETRY ROOM

## 2023-02-15 PROCEDURE — 12000002 HC ACUTE/MED SURGE SEMI-PRIVATE ROOM

## 2023-02-15 PROCEDURE — 63600175 PHARM REV CODE 636 W HCPCS: Performed by: INTERNAL MEDICINE

## 2023-02-15 PROCEDURE — 96376 TX/PRO/DX INJ SAME DRUG ADON: CPT

## 2023-02-15 PROCEDURE — 99900031 HC PATIENT EDUCATION (STAT)

## 2023-02-15 PROCEDURE — 25000003 PHARM REV CODE 250: Performed by: INTERNAL MEDICINE

## 2023-02-15 PROCEDURE — 99900035 HC TECH TIME PER 15 MIN (STAT)

## 2023-02-15 PROCEDURE — 85025 COMPLETE CBC W/AUTO DIFF WBC: CPT | Performed by: INTERNAL MEDICINE

## 2023-02-15 PROCEDURE — 36415 COLL VENOUS BLD VENIPUNCTURE: CPT | Performed by: INTERNAL MEDICINE

## 2023-02-15 PROCEDURE — 94761 N-INVAS EAR/PLS OXIMETRY MLT: CPT

## 2023-02-15 PROCEDURE — 27000221 HC OXYGEN, UP TO 24 HOURS

## 2023-02-15 PROCEDURE — 80048 BASIC METABOLIC PNL TOTAL CA: CPT | Performed by: INTERNAL MEDICINE

## 2023-02-15 PROCEDURE — 83735 ASSAY OF MAGNESIUM: CPT | Performed by: INTERNAL MEDICINE

## 2023-02-15 PROCEDURE — 84100 ASSAY OF PHOSPHORUS: CPT | Performed by: INTERNAL MEDICINE

## 2023-02-15 RX ORDER — MAGNESIUM SULFATE HEPTAHYDRATE 40 MG/ML
4 INJECTION, SOLUTION INTRAVENOUS ONCE
Status: COMPLETED | OUTPATIENT
Start: 2023-02-15 | End: 2023-02-15

## 2023-02-15 RX ADMIN — METOPROLOL SUCCINATE 50 MG: 25 TABLET, EXTENDED RELEASE ORAL at 09:02

## 2023-02-15 RX ADMIN — ISOSORBIDE MONONITRATE 60 MG: 60 TABLET, EXTENDED RELEASE ORAL at 09:02

## 2023-02-15 RX ADMIN — CLONAZEPAM 1 MG: 1 TABLET ORAL at 09:02

## 2023-02-15 RX ADMIN — PANTOPRAZOLE SODIUM 40 MG: 40 TABLET, DELAYED RELEASE ORAL at 05:02

## 2023-02-15 RX ADMIN — FUROSEMIDE 20 MG: 10 INJECTION, SOLUTION INTRAMUSCULAR; INTRAVENOUS at 04:02

## 2023-02-15 RX ADMIN — Medication 800 MG: at 09:02

## 2023-02-15 RX ADMIN — QUETIAPINE 100 MG: 100 TABLET ORAL at 09:02

## 2023-02-15 RX ADMIN — CLOPIDOGREL BISULFATE 75 MG: 75 TABLET, FILM COATED ORAL at 09:02

## 2023-02-15 RX ADMIN — ASPIRIN 81 MG: 81 TABLET, COATED ORAL at 09:02

## 2023-02-15 RX ADMIN — MAGNESIUM SULFATE 4 G: 2 INJECTION INTRAVENOUS at 05:02

## 2023-02-15 RX ADMIN — FUROSEMIDE 20 MG: 10 INJECTION, SOLUTION INTRAMUSCULAR; INTRAVENOUS at 05:02

## 2023-02-15 RX ADMIN — PAROXETINE 40 MG: 20 TABLET, FILM COATED ORAL at 09:02

## 2023-02-15 RX ADMIN — ENOXAPARIN SODIUM 40 MG: 100 INJECTION SUBCUTANEOUS at 04:02

## 2023-02-15 RX ADMIN — SIMETHICONE 80 MG: 80 TABLET, CHEWABLE ORAL at 09:02

## 2023-02-15 NOTE — ASSESSMENT & PLAN NOTE
Body mass index is 35.81 kg/m². Morbid obesity complicates all aspects of disease management from diagnostic modalities to treatment. Weight loss encouraged and health benefits explained to patient.

## 2023-02-15 NOTE — HPI
The patient is a 73-year-old female, who presents emergency room with complaint shortness breath.  She states that 2 weeks ago was admitted for pneumonia and congestive heart failure.  Today, no evidence of infection.  On chest x-ray shows bilateral cephalization of flow.  Echocardiogram done in December shows preserved EF with diastolic dysfunction.  Also shows aortic valve stenosis of 0.96.  This finding was discussed with patient, she states that she was aware and her cardiologist aware.  Blood pressure elevated in the emergency room averaging in the 170s systolic pressure.  Most likely, this represents etiology of pulmonary edema.    Plan to admit patient to observation diuresis and close monitoring

## 2023-02-15 NOTE — ASSESSMENT & PLAN NOTE
Reason for admission  Acute on chronic diastolic dysfunction  Congestive heart failure  EF of 60%

## 2023-02-15 NOTE — CARE UPDATE
02/15/23 0951   Patient Assessment/Suction   Level of Consciousness (AVPU) alert   All Lung Fields Breath Sounds crackles   PRE-TX-O2   Device (Oxygen Therapy) high flow nasal cannula   $ Is the patient on Low Flow Oxygen? Yes   Flow (L/min) 7   SpO2 (!) 93 %   Pulse Oximetry Type Intermittent   $ Pulse Oximetry - Multiple Charge Pulse Oximetry - Multiple   Pulse 96   Resp 15   Aerosol Therapy   $ Aerosol Therapy Charges PRN treatment not required   Education   $ Education Bronchodilator;15 min   Respiratory Evaluation   $ Care Plan Tech Time 15 min

## 2023-02-15 NOTE — PLAN OF CARE
02/15/23 1135   Readmission   Why were you hospitalized in the last 30 days? for COPD   Why were you readmitted? Alarmed about signs/symptoms   When you left the hospital how did you feel? I felt better   When you left the hospital where did you go? Home with Home Health   Did patient/caregiver refused recommended DC plan? No   Did you try to manage your symptoms your self? No   Did you call anyone? No   Did you try to see or did see a doctor or nurse before you came? No   Did you have  a follow-up appointment on discharge? Yes   Did you go? Yes   Was this a planned readmission? No

## 2023-02-15 NOTE — CONSULTS
Educated pt on heart healthy diet. Education focused on including healthy fats- gave examples and lowering LDL levels by excluding saturated/trans fat in the diet. Went over types of foods that have saturated/trans fat. Encouraged cooking with olive oil instead of butter. Choosing whole grains over refined grains, choosing canned foods with no salt added and plan frozen veggies instead of veggies cooked in butter and cream sauces. Encouraged patient to choose leaner cuts of meat and decreasing high fat meats such as dejesus, sausage, hot dogs, etc. Educated pt on decreasing sodium in diet. To try not to cook with salt and use herbs and spices to make food more flavorful. To limit eating out-if patient chooses to eat out, informed patient to discuss with  to cook meats and veggies with no salt and olive oil instead of butter. Provided handouts on all of these topics for pt to use in the future. Also provided RD contact information for pt to call with future questions.

## 2023-02-15 NOTE — PLAN OF CARE
[facility]  Initial Discharge Assessment       Primary Care Provider: Fareed Su RN    Admission Diagnosis: Acute on chronic congestive heart failure, unspecified heart failure type [I50.9]    Admission Date: 2/14/2023  Expected Discharge Date:     Discharge Barriers Identified: None    CM met at bedside to complete discharge assessment. Info verified on facesheet as correct. Pt lives at home alone and uses a walker for ambulation. Pt currently is active with [facility] and plans to resume services upon discharge. Pt also uses home oxygen but unsure of company. No needs identified at this time. CM to follow.     Payor: MEDICARE / Plan: MEDICARE PART A & B / Product Type: Government /     Extended Emergency Contact Information  Primary Emergency Contact: Loree Sanchez  Address: [address] [place] [address] [place] [address] [place]  Mobile Phone: [phone]  Relation: Daughter  Preferred language: English   needed? No  Secondary Emergency Contact: Gibson Bacon  Mobile Phone: [phone]  Relation: Son  Preferred language: English   needed? No    Discharge Plan A: Home Health  Discharge Plan B: Home Health      [id number] - [place], [place] - [address] AT [facility]  [address]  [place] [address]  Phone: [phone] Fax: [phone]    [facility]  [address]  [facility] [address]  Phone: [phone] Fax: [phone]    [facility] [place] [address]  [place] [address]  Phone: [phone] Fax: [phone]      Initial Assessment (most recent)       Adult Discharge Assessment - 02/15/23 1128          Discharge Assessment    Assessment Type Discharge Planning Assessment     Confirmed/corrected address, phone number and insurance Yes     Confirmed Demographics Correct on Facesheet     Source of Information patient     Does patient/caregiver  understand observation status Yes     Communicated BOB with patient/caregiver Date not available/Unable to determine     Reason For Admission Acute on chronic heart failure with preserved ejection fraction     People in Home alone     Facility Arrived From: home     Do you expect to return to your current living situation? Yes     Do you have help at home or someone to help you manage your care at home? No     Prior to hospitilization cognitive status: Alert/Oriented     Current cognitive status: Alert/Oriented     Walking or Climbing Stairs ambulation difficulty, requires equipment     Equipment Currently Used at Home oxygen;walker, rolling     Readmission within 30 days? Yes     Patient currently being followed by outpatient case management? No     Do you currently have service(s) that help you manage your care at home? Yes     Name and Contact number of agency [facility]     Is the pt/caregiver preference to resume services with current agency Yes     Do you take prescription medications? Yes     Do you have prescription coverage? Yes     Coverage Medicare Part A & B     Do you have any problems affording any of your prescribed medications? No     Is the patient taking medications as prescribed? yes     Who is going to help you get home at discharge? daughter     How do you get to doctors appointments? family or friend will provide     Are you on dialysis? No     Do you take coumadin? No     Discharge Plan A Home Health     Discharge Plan B Home Health     DME Needed Upon Discharge  none     Discharge Plan discussed with: Patient     Discharge Barriers Identified None        Physical Activity    On average, how many days per week do you engage in moderate to strenuous exercise (like a brisk walk)? 3 days     On average, how many minutes do you engage in exercise at this level? 30 min        Financial Resource Strain    How hard is it for you to pay for the very basics like food, housing, medical care, and  heating? Not hard at all        Housing Stability    In the last 12 months, was there a time when you were not able to pay the mortgage or rent on time? No     In the last 12 months, how many places have you lived? 1     In the last 12 months, was there a time when you did not have a steady place to sleep or slept in a shelter (including now)? No        Transportation Needs    In the past 12 months, has lack of transportation kept you from medical appointments or from getting medications? No     In the past 12 months, has lack of transportation kept you from meetings, work, or from getting things needed for daily living? No        Food Insecurity    Within the past 12 months, you worried that your food would run out before you got the money to buy more. Never true     Within the past 12 months, the food you bought just didn't last and you didn't have money to get more. Never true        Stress    Do you feel stress - tense, restless, nervous, or anxious, or unable to sleep at night because your mind is troubled all the time - these days? To some extent        Social Connections    In a typical week, how many times do you talk on the phone with family, friends, or neighbors? More than three times a week     How often do you get together with friends or relatives? More than three times a week     How often do you attend Zoroastrianism or Caodaism services? Never     Do you belong to any clubs or organizations such as Zoroastrianism groups, unions, fraternal or athletic groups, or school groups? No     How often do you attend meetings of the clubs or organizations you belong to? Never     Are you , , , , never , or living with a partner?         Alcohol Use    Q1: How often do you have a drink containing alcohol? Never     Q2: How many drinks containing alcohol do you have on a typical day when you are drinking? Patient does not drink     Q3: How often do you have six or more drinks on one  occasion? Never

## 2023-02-15 NOTE — H&P
[facility]  History & Physical    Patient Name: Betty Bacon  MRN: [id number]  Patient Class: OP- Observation  Admission Date: 2/14/2023  Attending Physician: Stephanie Thomas MD   Primary Care Provider: Fareed Su RN         Patient information was obtained from patient, past medical records and ER records.     Subjective:     Principal Problem:Chronic diastolic heart failure    Chief Complaint:   Chief Complaint   Patient presents with    Shortness of Breath     Increased Sob. Pt states that she had blood work two weeks ago and then got a call that she had an infection. Unsure where the infection is and is not currently on antibiotics. States that she has had pneumonia several times in the past        HPI: The patient is a 73-year-old female, who presents emergency room with complaint shortness breath.  She states that 2 weeks ago was admitted for pneumonia and congestive heart failure.  Today, no evidence of infection.  On chest x-ray shows bilateral cephalization of flow.  Echocardiogram done in December shows preserved EF with diastolic dysfunction.  Also shows aortic valve stenosis of 0.96.  This finding was discussed with patient, she states that she was aware and her cardiologist aware.  Blood pressure elevated in the emergency room averaging in the 170s systolic pressure.  Most likely, this represents etiology of pulmonary edema.    Plan to admit patient to observation diuresis and close monitoring      Past Medical History:   Diagnosis Date    Acute coronary artery obstruction without MI 10/2012    Benign hypertension     COPD (chronic obstructive pulmonary disease)     Coronary artery disease     Disorder of kidney and ureter     Dr. Morrow    Hepatitis B core antibody positive 03/03/2021    Negative sAg, suggests previous exposure but no chronic/active Hep B. At risk for reactivation with any immunosuppression medication, steroids, chemo, etc.       History of electroconvulsive therapy     Hyperlipidemia LDL goal < 70     Left ankle sprain     Major depressive disorder, recurrent episode, severe     s/p ECT    Positive AKIRA (antinuclear antibody) 03/03/2021    PVD (peripheral vascular disease)        Past Surgical History:   Procedure Laterality Date    CHOLECYSTECTOMY      CORONARY ANGIOPLASTY      CORONARY ANGIOPLASTY WITH STENT PLACEMENT  10/2012    2 stents RCA (100% stenosis)    EYE SURGERY      cataract surgery    HYSTERECTOMY      LINA, ovaries intact. uterine prolapse    ILIAC ARTERY STENT      TONSILLECTOMY      TOTAL VAGINAL HYSTERECTOMY         Review of patient's allergies indicates:   Allergen Reactions    Propoxyphene n-acetaminophen Other (See Comments)     Other reaction(s): Unknown    Codeine Anxiety       No current facility-administered medications on file prior to encounter.     Current Outpatient Medications on File Prior to Encounter   Medication Sig    albuterol-ipratropium (DUO-NEB) 2.5 mg-0.5 mg/3 mL nebulizer solution Take 3 mLs by nebulization every 6 (six) hours as needed for Wheezing. Rescue    alendronate (FOSAMAX) 70 MG tablet TAKE 1 TABLET BY MOUTH ONE TIME PER WEEK (Patient taking differently: Take 70 mg by mouth every 7 days.)    ALPRAZolam (XANAX) 0.25 MG tablet Take 1 tablet (0.25 mg total) by mouth 2 (two) times daily as needed for Anxiety.    aspirin 81 mg Tab Take 81 mg by mouth once daily. Every day    atorvastatin (LIPITOR) 40 MG tablet TAKE 1 TABLET BY MOUTH EVERY DAY (Patient taking differently: Take 40 mg by mouth once daily.)    clonazePAM (KLONOPIN) 1 MG disintegrating tablet Take 1 mg by mouth 2 (two) times daily as needed.    clopidogreL (PLAVIX) 75 mg tablet TAKE 1 TABLET BY MOUTH EVERY DAY (Patient taking differently: Take 75 mg by mouth once daily.)    esomeprazole (NEXIUM) 40 MG capsule TAKE 1 CAPSULE BY MOUTH BEFORE BREAKFAST. (Patient taking differently: Take 40 mg by mouth before  breakfast.)    fluticasone propionate (FLONASE) 50 mcg/actuation nasal spray 1 spray (50 mcg total) by Each Nostril route once daily.    furosemide (LASIX) 20 MG tablet Take 1 tablet (20 mg total) by mouth every 48 hours.    isosorbide mononitrate (IMDUR) 60 MG 24 hr tablet Take 60 mg by mouth once daily.    magnesium oxide (MAG-OX) 400 mg (241.3 mg magnesium) tablet Take 2 tablets (800 mg total) by mouth 2 (two) times daily.    metoprolol succinate (TOPROL-XL) 100 MG 24 hr tablet Take 100 mg by mouth once daily.    paroxetine (PAXIL) 10 MG tablet Take 10 mg by mouth every morning. Total 50 mg    paroxetine (PAXIL) 40 MG tablet TAKE 1 TABLET BY MOUTH EVERY DAY (Patient taking differently: Take 40 mg by mouth once daily.)    QUEtiapine (SEROQUEL) 100 MG Tab Take 100 mg by mouth once daily.    TRELEGY ELLIPTA 200-62.5-25 mcg inhaler INHALE 1 PUFF INTO THE LUNGS ONCE DAILY.    acetaminophen (TYLENOL) 325 MG tablet Take 325 mg by mouth every 6 (six) hours as needed for Pain.    albuterol sulfate (PROAIR RESPICLICK) 90 mcg/actuation AePB Inhale 2 puffs into the lungs every 4 to 6 hours as needed.    colchicine (COLCRYS) 0.6 mg tablet Take 2 po at gout flare onset, may repeat 1 in an hour prn, then 1 po bid until pain resolves ,or n/V/D starts (Patient taking differently: Take 0.6 mg by mouth as needed. Take 2 po at gout flare onset, may repeat 1 in an hour prn, then 1 po bid until pain resolves ,or n/V/D starts)    colestipoL (COLESTID) 1 gram Tab Take 1 tablet (1 g total) by mouth 2 (two) times daily as needed (diarrhea).    ergocalciferol (ERGOCALCIFEROL) 50,000 unit Cap Take 1 capsule (50,000 Units total) by mouth every 7 days. (Patient not taking: Reported on 2/7/2023)    meclizine (ANTIVERT) 25 mg tablet Take 1 tablet (25 mg total) by mouth 3 (three) times daily as needed for Dizziness.    metoprolol succinate (TOPROL-XL) 50 MG 24 hr tablet Take 1 tablet (50 mg total) by mouth once daily.     Family  History       Problem Relation (Age of Onset)    Breast cancer Sister    Cancer Sister    Dementia Sister    Diabetes Mother, Maternal Aunt, Sister    Heart disease Mother, Father, Brother, Maternal Aunt, Maternal Uncle, Paternal Aunt, Paternal Uncle, Maternal Grandfather, Sister    Hypertension Daughter    Kidney disease Sister    Liver disease Sister    Stroke Father, Brother, Sister          Tobacco Use    Smoking status: Former     Packs/day: 2.00     Years: 40.00     Pack years: 80.00     Types: Cigarettes     Quit date: 2009     Years since quittin.2    Smokeless tobacco: Never   Substance and Sexual Activity    Alcohol use: Yes     Comment: social    Drug use: No    Sexual activity: Never     Birth control/protection: Abstinence     Review of Systems   Constitutional: Negative.    HENT: Negative.     Eyes: Negative.    Respiratory:  Positive for shortness of breath.    Cardiovascular: Negative.    Gastrointestinal: Negative.    Endocrine: Negative.    Genitourinary: Negative.    Musculoskeletal: Negative.    Skin: Negative.    Allergic/Immunologic: Negative.    Neurological: Negative.    Hematological: Negative.    Objective:     Vital Signs (Most Recent):  Temp: 99.2 °F (37.3 °C) (23 1353)  Pulse: 92 (23 1632)  Resp: (!) 26 (23 1632)  BP: (!) 191/83 (23 1632)  SpO2: (!) 92 % (23 1632)   Vital Signs (24h Range):  Temp:  [99.2 °F (37.3 °C)] 99.2 °F (37.3 °C)  Pulse:  [] 92  Resp:  [24-26] 26  SpO2:  [69 %-97 %] 92 %  BP: (191-213)/(75-84) 191/83     Weight: 90.7 kg (200 lb)  Body mass index is 36.58 kg/m².    Physical Exam  Vitals and nursing note reviewed. Exam conducted with a chaperone present.   Constitutional:       Appearance: She is obese.   HENT:      Head: Normocephalic and atraumatic.   Eyes:      Pupils: Pupils are equal, round, and reactive to light.   Cardiovascular:      Rate and Rhythm: Normal rate and regular rhythm.   Pulmonary:      Breath  sounds: Rales present.   Abdominal:      General: Bowel sounds are normal.      Palpations: Abdomen is soft.   Skin:     General: Skin is warm and dry.      Capillary Refill: Capillary refill takes less than 2 seconds.   Neurological:      General: No focal deficit present.      Mental Status: She is alert.         CRANIAL NERVES     CN III, IV, VI   Pupils are equal, round, and reactive to light.     Significant Labs: All pertinent labs within the past 24 hours have been reviewed.    Significant Imaging: I have reviewed all pertinent imaging results/findings within the past 24 hours.    Assessment/Plan:     * Acute on chronic heart failure with preserved ejection fraction  Reason for admission  Acute on chronic diastolic dysfunction  Congestive heart failure  EF of 60%      Severe obesity with body mass index (BMI) of 35.0 to 39.9 with serious comorbidity  Body mass index is 35.81 kg/m². Morbid obesity complicates all aspects of disease management from diagnostic modalities to treatment. Weight loss encouraged and health benefits explained to patient.         Valvular heart disease, severe aortic stenosis, moderate aortic regurgitation  Aortic stenosis with valve area of 1 cm2  Tricuspid regurg  Echo shows preserved systolic function  Echo shows diastolic dysfunction    Essential hypertension  Monitor and treat  Probably etiology of poorly controlled pulmonary edema        VTE Risk Mitigation (From admission, onward)         Ordered     enoxaparin injection 40 mg  Daily         02/14/23 2008     IP VTE HIGH RISK PATIENT  Once         02/14/23 2008     Place sequential compression device  Until discontinued         02/14/23 2008                   Stephanie Thomas MD  Department of Hospital Medicine   [facility]

## 2023-02-15 NOTE — NURSING
Assumed care of patient at 2045, transferred from ED stretcher to bed and placed on cardiac monitor. Assessments completed per protocols, see flowsheets for details. Plan of care reviewed with patient and daughter, Loree who is present at bedside.     MD arrived to bedside shortly after patient, elevated BP discussed with Dr. Thomas. Hydralazine 10mg IVP given for 203/82. Reassessment reads 168/70, MD aware.     2215 Dr. Thomas notified of /76, received verbal order for 20mg lasix IVP once. MD at bedside to assess. Plan to give additional dose of hydralazine if BP remains unchanged with lasix.     BP improved post lasix. Pt rested comfortably on 4L NC with humidification overnight with sustained O2 saturations >86%.

## 2023-02-15 NOTE — PLAN OF CARE
Problem: Adult Inpatient Plan of Care  Goal: Plan of Care Review  Outcome: Ongoing, Progressing  Goal: Patient-Specific Goal (Individualized)  Outcome: Ongoing, Progressing  Goal: Absence of Hospital-Acquired Illness or Injury  Outcome: Ongoing, Progressing  Goal: Optimal Comfort and Wellbeing  Outcome: Ongoing, Progressing  Goal: Readiness for Transition of Care  Outcome: Ongoing, Progressing     Problem: Fluid and Electrolyte Imbalance (Acute Kidney Injury/Impairment)  Goal: Fluid and Electrolyte Balance  Outcome: Ongoing, Progressing     Problem: Oral Intake Inadequate (Acute Kidney Injury/Impairment)  Goal: Optimal Nutrition Intake  Outcome: Ongoing, Progressing     Problem: Renal Function Impairment (Acute Kidney Injury/Impairment)  Goal: Effective Renal Function  Outcome: Ongoing, Progressing     Problem: Fluid Imbalance (Pneumonia)  Goal: Fluid Balance  Outcome: Ongoing, Progressing     Problem: Infection (Pneumonia)  Goal: Resolution of Infection Signs and Symptoms  Outcome: Ongoing, Progressing     Problem: Respiratory Compromise (Pneumonia)  Goal: Effective Oxygenation and Ventilation  Outcome: Ongoing, Progressing     Problem: Skin Injury Risk Increased  Goal: Skin Health and Integrity  Outcome: Ongoing, Progressing

## 2023-02-15 NOTE — ASSESSMENT & PLAN NOTE
Aortic stenosis with valve area of 1 cm2  Tricuspid regurg  Echo shows preserved systolic function  Echo shows diastolic dysfunction   none

## 2023-02-15 NOTE — PLAN OF CARE
02/15/23 1136   HANNON Message   Medicare Outpatient and Observation Notification regarding financial responsibility Given to patient/caregiver;Explained to patient/caregiver;Signed/date by patient/caregiver   Date HANNON was signed 02/15/23   Time HANNON was signed 1049

## 2023-02-16 LAB
ANION GAP SERPL CALC-SCNC: 7 MMOL/L (ref 8–16)
BACTERIA BLD CULT: ABNORMAL
BASOPHILS # BLD AUTO: 0.04 K/UL (ref 0–0.2)
BASOPHILS NFR BLD: 0.5 % (ref 0–1.9)
BUN SERPL-MCNC: 19 MG/DL (ref 8–23)
CALCIUM SERPL-MCNC: 8.7 MG/DL (ref 8.7–10.5)
CHLORIDE SERPL-SCNC: 97 MMOL/L (ref 95–110)
CO2 SERPL-SCNC: 34 MMOL/L (ref 23–29)
CREAT SERPL-MCNC: 1.3 MG/DL (ref 0.5–1.4)
DIFFERENTIAL METHOD: ABNORMAL
EOSINOPHIL # BLD AUTO: 0.4 K/UL (ref 0–0.5)
EOSINOPHIL NFR BLD: 4.9 % (ref 0–8)
ERYTHROCYTE [DISTWIDTH] IN BLOOD BY AUTOMATED COUNT: 18.3 % (ref 11.5–14.5)
EST. GFR  (NO RACE VARIABLE): 42.9 ML/MIN/1.73 M^2
GLUCOSE SERPL-MCNC: 107 MG/DL (ref 70–110)
HCT VFR BLD AUTO: 28.9 % (ref 37–48.5)
HGB BLD-MCNC: 8.7 G/DL (ref 12–16)
IMM GRANULOCYTES # BLD AUTO: 0.16 K/UL (ref 0–0.04)
IMM GRANULOCYTES NFR BLD AUTO: 2 % (ref 0–0.5)
LACTATE SERPL-SCNC: 0.7 MMOL/L (ref 0.5–1.9)
LYMPHOCYTES # BLD AUTO: 1.2 K/UL (ref 1–4.8)
LYMPHOCYTES NFR BLD: 14.7 % (ref 18–48)
MAGNESIUM SERPL-MCNC: 2 MG/DL (ref 1.6–2.6)
MCH RBC QN AUTO: 28.2 PG (ref 27–31)
MCHC RBC AUTO-ENTMCNC: 30.1 G/DL (ref 32–36)
MCV RBC AUTO: 94 FL (ref 82–98)
MONOCYTES # BLD AUTO: 0.6 K/UL (ref 0.3–1)
MONOCYTES NFR BLD: 7.5 % (ref 4–15)
NEUTROPHILS # BLD AUTO: 5.6 K/UL (ref 1.8–7.7)
NEUTROPHILS NFR BLD: 70.4 % (ref 38–73)
NRBC BLD-RTO: 0 /100 WBC
PLATELET # BLD AUTO: 200 K/UL (ref 150–450)
PMV BLD AUTO: 10.1 FL (ref 9.2–12.9)
POTASSIUM SERPL-SCNC: 3.8 MMOL/L (ref 3.5–5.1)
RBC # BLD AUTO: 3.09 M/UL (ref 4–5.4)
SODIUM SERPL-SCNC: 138 MMOL/L (ref 136–145)
WBC # BLD AUTO: 7.9 K/UL (ref 3.9–12.7)

## 2023-02-16 PROCEDURE — 25000003 PHARM REV CODE 250: Performed by: INTERNAL MEDICINE

## 2023-02-16 PROCEDURE — 94799 UNLISTED PULMONARY SVC/PX: CPT

## 2023-02-16 PROCEDURE — 99223 1ST HOSP IP/OBS HIGH 75: CPT | Mod: ,,, | Performed by: INTERNAL MEDICINE

## 2023-02-16 PROCEDURE — 63600175 PHARM REV CODE 636 W HCPCS: Performed by: INTERNAL MEDICINE

## 2023-02-16 PROCEDURE — 99900035 HC TECH TIME PER 15 MIN (STAT)

## 2023-02-16 PROCEDURE — 83605 ASSAY OF LACTIC ACID: CPT | Performed by: INTERNAL MEDICINE

## 2023-02-16 PROCEDURE — 94640 AIRWAY INHALATION TREATMENT: CPT

## 2023-02-16 PROCEDURE — 99223 PR INITIAL HOSPITAL CARE,LEVL III: ICD-10-PCS | Mod: ,,, | Performed by: INTERNAL MEDICINE

## 2023-02-16 PROCEDURE — 94761 N-INVAS EAR/PLS OXIMETRY MLT: CPT

## 2023-02-16 PROCEDURE — 83735 ASSAY OF MAGNESIUM: CPT | Performed by: INTERNAL MEDICINE

## 2023-02-16 PROCEDURE — 36415 COLL VENOUS BLD VENIPUNCTURE: CPT | Performed by: INTERNAL MEDICINE

## 2023-02-16 PROCEDURE — 27000221 HC OXYGEN, UP TO 24 HOURS

## 2023-02-16 PROCEDURE — 99900031 HC PATIENT EDUCATION (STAT)

## 2023-02-16 PROCEDURE — 12000002 HC ACUTE/MED SURGE SEMI-PRIVATE ROOM

## 2023-02-16 PROCEDURE — 25000242 PHARM REV CODE 250 ALT 637 W/ HCPCS: Performed by: INTERNAL MEDICINE

## 2023-02-16 PROCEDURE — 80048 BASIC METABOLIC PNL TOTAL CA: CPT | Performed by: INTERNAL MEDICINE

## 2023-02-16 PROCEDURE — 85025 COMPLETE CBC W/AUTO DIFF WBC: CPT | Performed by: INTERNAL MEDICINE

## 2023-02-16 RX ORDER — BUDESONIDE 0.5 MG/2ML
0.5 INHALANT ORAL EVERY 12 HOURS
Status: DISCONTINUED | OUTPATIENT
Start: 2023-02-16 | End: 2023-02-22 | Stop reason: HOSPADM

## 2023-02-16 RX ORDER — IPRATROPIUM BROMIDE AND ALBUTEROL SULFATE 2.5; .5 MG/3ML; MG/3ML
3 SOLUTION RESPIRATORY (INHALATION) EVERY 6 HOURS PRN
Qty: 75 ML | Refills: 11 | Status: SHIPPED | OUTPATIENT
Start: 2023-02-16 | End: 2024-02-16

## 2023-02-16 RX ORDER — IPRATROPIUM BROMIDE AND ALBUTEROL SULFATE 2.5; .5 MG/3ML; MG/3ML
3 SOLUTION RESPIRATORY (INHALATION) EVERY 8 HOURS
Status: DISCONTINUED | OUTPATIENT
Start: 2023-02-16 | End: 2023-02-17

## 2023-02-16 RX ORDER — FUROSEMIDE 20 MG/1
20 TABLET ORAL 2 TIMES DAILY
Status: DISCONTINUED | OUTPATIENT
Start: 2023-02-17 | End: 2023-02-17

## 2023-02-16 RX ADMIN — ISOSORBIDE MONONITRATE 60 MG: 60 TABLET, EXTENDED RELEASE ORAL at 09:02

## 2023-02-16 RX ADMIN — ENOXAPARIN SODIUM 40 MG: 100 INJECTION SUBCUTANEOUS at 04:02

## 2023-02-16 RX ADMIN — FUROSEMIDE 20 MG: 10 INJECTION, SOLUTION INTRAMUSCULAR; INTRAVENOUS at 05:02

## 2023-02-16 RX ADMIN — PANTOPRAZOLE SODIUM 40 MG: 40 TABLET, DELAYED RELEASE ORAL at 05:02

## 2023-02-16 RX ADMIN — PAROXETINE HYDROCHLORIDE 50 MG: 20 TABLET, FILM COATED ORAL at 09:02

## 2023-02-16 RX ADMIN — METOPROLOL SUCCINATE 50 MG: 25 TABLET, EXTENDED RELEASE ORAL at 09:02

## 2023-02-16 RX ADMIN — BUDESONIDE 0.5 MG: 0.5 INHALANT RESPIRATORY (INHALATION) at 08:02

## 2023-02-16 RX ADMIN — QUETIAPINE 100 MG: 100 TABLET ORAL at 09:02

## 2023-02-16 RX ADMIN — CLOPIDOGREL BISULFATE 75 MG: 75 TABLET, FILM COATED ORAL at 09:02

## 2023-02-16 RX ADMIN — Medication 800 MG: at 09:02

## 2023-02-16 RX ADMIN — ASPIRIN 81 MG: 81 TABLET, COATED ORAL at 09:02

## 2023-02-16 RX ADMIN — CLONAZEPAM 1 MG: 1 TABLET ORAL at 10:02

## 2023-02-16 RX ADMIN — FUROSEMIDE 20 MG: 10 INJECTION, SOLUTION INTRAMUSCULAR; INTRAVENOUS at 04:02

## 2023-02-16 RX ADMIN — IPRATROPIUM BROMIDE AND ALBUTEROL SULFATE 3 ML: .5; 3 SOLUTION RESPIRATORY (INHALATION) at 03:02

## 2023-02-16 NOTE — CONSULTS
Pulmonary/Critical Care Consult      PATIENT NAME: Betty Bacon  MRN: [id number]  TODAY'S DATE: 2023  ADMIT DATE: 2023  AGE: 73 y.o. : 1950    CONSULT REQUESTED BY: Bashir Bermudez MD    REASON FOR CONSULT:   Acute on chronic hypoxic respiratory failure    HISTORY OF PRESENT ILLNESS   Betty Bacon is a 73 y.o. female with a PMH of COPD, asbestosis, recent COVID pneumonia, CAD, HFpEF, severe aortic stenosis on 4 L NC at baseline on whom we have been consulted for acute on chronic hypoxic respiratory failure.    She had increased orthopnea and leg swelling at the time of presentation. This and her SOB have improved with diuresis.      SMOKING HISTORY  Quit 25 years ago.  Smoked 2 PPD x 30 years.     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    REVIEW OF SYSTEMS  GENERAL: Feeling well. No fevers, chills, or night sweats.  EYES: Vision is good.  ENT: No sinusitis or pharyngitis.   HEART: No chest pain or palpitations.  LUNGS: No cough, sputum, or wheezing.  GI: No abdominal pain, nausea, vomiting, or diarrhea.  : No dysuria, urgency, or frequency.  SKIN: No lesions or rashes.  MUSCULOSKELETAL: No joint pain or myalgias.  NEURO: No headaches or neuropathy.  LYMPH: No edema or adenopathy.  PSYCH: No anxiety or depression.  ENDO: No unexpected weight change.    ALLERGIES  Review of patient's allergies indicates:   Allergen Reactions    Propoxyphene n-acetaminophen Other (See Comments)     Other reaction(s): Unknown    Codeine Anxiety       INPATIENT SCHEDULED MEDICATIONS   aspirin  81 mg Oral Daily    clopidogreL  75 mg Oral Daily    enoxaparin  40 mg Subcutaneous Daily    furosemide (LASIX) injection  20 mg Intravenous Q12H    isosorbide mononitrate  60 mg Oral Daily    magnesium oxide  800 mg Oral BID    metoprolol succinate  50 mg Oral Daily    pantoprazole  40 mg Oral Before breakfast    paroxetine  50 mg  Oral Daily    QUEtiapine  100 mg Oral QHS         MEDICAL AND SURGICAL HISTORY  Past Medical History:   Diagnosis Date    Acute coronary artery obstruction without MI 10/2012    Benign hypertension     COPD (chronic obstructive pulmonary disease)     Coronary artery disease     Disorder of kidney and ureter     Dr. Morrow    Hepatitis B core antibody positive 2021    Negative sAg, suggests previous exposure but no chronic/active Hep B. At risk for reactivation with any immunosuppression medication, steroids, chemo, etc.      History of electroconvulsive therapy     Hyperlipidemia LDL goal < 70     Left ankle sprain     Major depressive disorder, recurrent episode, severe     s/p ECT    Positive AKIRA (antinuclear antibody) 2021    PVD (peripheral vascular disease)      Past Surgical History:   Procedure Laterality Date    CHOLECYSTECTOMY      CORONARY ANGIOPLASTY      CORONARY ANGIOPLASTY WITH STENT PLACEMENT  10/2012    2 stents RCA (100% stenosis)    EYE SURGERY      cataract surgery    HYSTERECTOMY      LINA, ovaries intact. uterine prolapse    ILIAC ARTERY STENT      TONSILLECTOMY      TOTAL VAGINAL HYSTERECTOMY         ALCOHOL, TOBACCO AND DRUG USE  Social History     Tobacco Use   Smoking Status Former    Packs/day: 2.00    Years: 40.00    Pack years: 80.00    Types: Cigarettes    Quit date: 2009    Years since quittin.2   Smokeless Tobacco Never     Social History     Substance and Sexual Activity   Alcohol Use Yes    Comment: social     Social History     Substance and Sexual Activity   Drug Use No       FAMILY HISTORY  Family History   Problem Relation Age of Onset    Diabetes Mother     Heart disease Mother     Stroke Father     Heart disease Father     Heart disease Brother     Stroke Brother     Hypertension Daughter     Diabetes Maternal Aunt     Heart disease Maternal Aunt     Heart disease Maternal Uncle     Heart disease Paternal Aunt     Heart disease Paternal Uncle     Heart  disease Maternal Grandfather     Diabetes Sister     Heart disease Sister     Cancer Sister         lung    Kidney disease Sister         mass, benign    Breast cancer Sister     Stroke Sister     Dementia Sister     Liver disease Sister     Melanoma Neg Hx     Psoriasis Neg Hx     Lupus Neg Hx     Eczema Neg Hx        VITAL SIGNS (MOST RECENT)  Temp: 98.1 °F (36.7 °C) (02/16/23 0726)  Pulse: 90 (02/16/23 0726)  Resp: 20 (02/16/23 0726)  BP: (!) 144/67 (02/16/23 0726)  SpO2: (!) 90 % (fixed o2) (02/16/23 0726)    INTAKE AND OUTPUT (LAST 24 HOURS):  Intake/Output Summary (Last 24 hours) at 2/16/2023 1029  Last data filed at 2/16/2023 0929  Gross per 24 hour   Intake 700 ml   Output 1525 ml   Net -825 ml       WEIGHT  Wt Readings from Last 1 Encounters:   02/16/23 86.5 kg (190 lb 11.2 oz)       PHYSICAL EXAM  GENERAL: A&O. NAD.  HEENT: Extraocular movements intact. Pharynx moist.  NECK: Supple. No JVD or hepatojugular reflux.  HEART: Regular rate and normal rhythm. No murmur or gallop auscultated.  LUNGS: Crackles throughout lower and mid-lung fields   ABDOMEN: Soft, non-tender, non-distended, no masses palpated.  EXTREMITIES: Normal muscle tone and joint movement, no cyanosis or clubbing.   LYMPHATICS: No adenopathy palpated, no edema.  SKIN: Dry, intact, no lesions.   NEURO: No gross deficit.  PSYCH: Appropriate affect    ACUTE PHASE REACTANT (LAST 24 HOURS)  No results for input(s): FERRITIN, CRP, LDH, DDIMER in the last 24 hours.    CBC LAST (LAST 24 HOURS)  Recent Labs   Lab 02/16/23  0439   WBC 7.90   RBC 3.09*   HGB 8.7*   HCT 28.9*   MCV 94   MCH 28.2   MCHC 30.1*   RDW 18.3*      MPV 10.1   GRAN 70.4  5.6   LYMPH 14.7*  1.2   MONO 7.5  0.6   BASO 0.04   NRBC 0       CHEMISTRY LAST (LAST 24 HOURS)  Recent Labs   Lab 02/16/23  0439      K 3.8   CL 97   CO2 34*   ANIONGAP 7*   BUN 19   CREATININE 1.3      CALCIUM 8.7   MG 2.0       COAGULATION LAST (LAST 24 HOURS)  No results for input(s):  LABPT, INR, APTT in the last 24 hours.    CARDIAC PROFILE (LAST 24 HOURS)  Recent Labs   Lab 02/14/23 1412   BNP 1,002*       LAST 7 DAYS MICROBIOLOGY   Microbiology Results (last 7 days)       Procedure Component Value Units Date/Time    Blood culture #1 **CANNOT BE ORDERED STAT** [[id number]]  (Abnormal) Collected: 02/14/23 1412    Order Status: Completed Specimen: Blood from Peripheral, Antecubital, Left Updated: 02/16/23 0741     Blood Culture, Routine Gram stain aer bottle: Gram positive cocci      Results called to and read back by:Cristin Sneed RN-WINGB;  02/15/2023      12:38 CJD      COAGULASE-NEGATIVE STAPHYLOCOCCUS SPECIES  Organism is a probable contaminant      Rapid Organism ID by PCR (from Blood culture) [[id number]]  (Abnormal) Collected: 02/14/23 1412    Order Status: Completed Updated: 02/15/23 1345     Enterococcus faecials Not Detected     Enterococcus faecium Not Detected     Listeria Monocytogenes Not Detected     Staphylococcus spp. Detected     Staphylococcus aureus Not Detected     Staphylococcus epidermidis Not Detected     Staphylococcus lugdunensis Not Detected     Streptococcus species Not Detected     Streptococcus agalactiae Not Detected     Streptococcus pneumoniae Not Detected     Streptococcus pyogenes Not Detected     Acinetobacter calcoaceticus/baumannii complex Not Detected     Bacteroides fragilis Not Detected     Enterobacerales Not Detected     Enterobacter cloacae complex Not Detected     Escherichia Not Detected     Klebsiella aerogenes Not Detected     Klebsiella oxytoca Not Detected     Klebsiella pneumoniae group Not Detected     Proteus Not Detected     Salmonella sp Not Detected     Serratia marcescens Not Detected     Haemophilus influenzae Not Detected     Neisseria meningtidis Not Detected     Pseudomonas aeruginosa Not Detected     Stenotrophomonas maltophilia Not Detected     Candida albicans Not Detected     Candida auris Not Detected     Candida glabrata Not  Detected     Candida krusei Not Detected     Candida Parapsilosis Not Detected     Candida Tropicalis Not Detected     Cryptococcus neoformans/gattii Not Detected     CTX-M Gene Test not applicable     IMP Gene Test not applicable     KPC  Test not applicable     mcr-1  Test not applicable     mec A/C Test not applicable     mec A/C and MREJ (MRSA) Test not applicable     NDM Test not applicable     OXA-48-like Test not applicable     van A/B Test not applicable     VIM Test not applicable            MOST RECENT IMAGING  X-Ray Chest AP Portable  Portable chest x-ray at 2:43 PM is compared to prior study dated 1/29/2023    Clinical history is dyspnea    The heart is enlarged. There are mild increased interstitial markings throughout the lungs suggestive of edema. There are no confluent infiltrates or pleural effusions. There are no acute osseous abnormalities.    IMPRESSION: Cardiomegaly with diffuse increased interstitial markings suggestive of edema    Electronically signed by:  Penny Ribera MD  2/14/2023 3:07 PM CST Workstation: [id number]    My interpretation of the CXR agrees with that of the radiologist -- bilateral patchy opacities likely indicative of pulmonary edema.    CURRENT VISIT EKG  Results for orders placed or performed during the hospital encounter of 02/14/23   EKG 12-lead    Narrative    Test Reason : R06.00,    Vent. Rate : 093 BPM     Atrial Rate : 093 BPM     P-R Int : 140 ms          QRS Dur : 086 ms      QT Int : 366 ms       P-R-T Axes : 040 003 -08 degrees     QTc Int : 455 ms    Normal sinus rhythm  Moderate voltage criteria for LVH, may be normal variant  Inferior infarct (cited on or before 01-JAN-2023)  Abnormal ECG  When compared with ECG of 25-JAN-2023 13:31,  T wave inversion now evident in Anterior leads    Referred By: [email or web address]   SELF           Confirmed By:        ECHOCARDIOGRAM RESULTS  Results for orders placed during the hospital encounter of  12/20/22    Echo    Interpretation Summary  · The left ventricle is normal in size with severe concentric hypertrophy and normal systolic function.  · Grade II left ventricular diastolic dysfunction.  · The estimated ejection fraction is 65%.  · Normal right ventricular size with normal right ventricular systolic function.  · Moderate aortic regurgitation.  · There is severe aortic valve stenosis.  · Aortic valve area is 0.94 cm2; peak velocity is 3.29 m/s; mean gradient is 26 mmHg.  · Mild pulmonic regurgitation.  · Mild tricuspid regurgitation.  · There is mild pulmonary hypertension.  · Normal central venous pressure (3 mmHg).  · The estimated PA systolic pressure is 44 mmHg.        RESPIRATORY SUPPORT          LAST ARTERIAL BLOOD GAS  ABG  No results for input(s): PH, PO2, PCO2, HCO3, BE in the last 168 hours.    IMPRESSION AND PLAN  Betty Bacon is a 73 y.o. female with a PMH of COPD, asbestosis, recent COVID pneumonia, CAD, HFpEF, severe aortic stenosis on 4 L NC at baseline on whom we have been consulted for acute on chronic hypoxic respiratory failure.    #Acute on chronic hypoxic respiratory failure  #Acutely decompensated HFpEF  #Severe aortic stenosis, moderate aortic regurgitation  BNP elevated much higher than prior at 1002.  - continue judicious diuresis  - agree with cardiology consult  - continue supplemental O2 for SpO2 92-98%    #Blood culture of 2/14/22 positive for coag-negative Staph  Most likely contaminant. No fever, leukocytosis, or other evidence of infection.  - hold off on Abx for now  - monitor clinically  - no need for blood cultures at the moment    #Asbestosis  #COPD without acute exacerbation  - ICS/DEVON/GARETH nebs inpatient  - resume Trelegy and PRN inhaler and nebs outpatient  - scheduled for follow up with me in clinic next month    Discussed with hospitalist.    Pulmonary & Critical Care Medicine will sign off at this time.   Please call with any further questions or  concerns.    Matt Toro MD  [facility]  Department of Pulmonology  Date of Service: 02/16/2023  10:29 AM

## 2023-02-16 NOTE — PLAN OF CARE
Problem: Oral Intake Inadequate (Acute Kidney Injury/Impairment)  Goal: Optimal Nutrition Intake  Outcome: Ongoing, Progressing     Problem: Fluid Imbalance (Pneumonia)  Goal: Fluid Balance  Outcome: Ongoing, Progressing

## 2023-02-16 NOTE — PROGRESS NOTES
[facility] Medicine  Progress Note    Patient name: Betty Bacon  MRN: [id number]  Admit Date: 2/14/2023   LOS: 0 days     SUBJECTIVE:     Principal problem: Chronic diastolic heart failure    Interval History:  She states she is feeling better now.    Scheduled Meds:   aspirin  81 mg Oral Daily    clopidogreL  75 mg Oral Daily    enoxaparin  40 mg Subcutaneous Daily    furosemide (LASIX) injection  20 mg Intravenous Q12H    isosorbide mononitrate  60 mg Oral Daily    magnesium oxide  800 mg Oral BID    magnesium sulfate IVPB  4 g Intravenous Once    metoprolol succinate  50 mg Oral Daily    pantoprazole  40 mg Oral Before breakfast    [START ON 2/16/2023] paroxetine  50 mg Oral Daily    QUEtiapine  100 mg Oral QHS     Continuous Infusions:  PRN Meds:acetaminophen, albuterol-ipratropium, ALPRAZolam, clonazePAM, dextrose 10%, dextrose 10%, glucagon (human recombinant), glucose, glucose, hydrALAZINE, melatonin, naloxone, ondansetron, polyethylene glycol, senna-docusate 8.6-50 mg, simethicone, sodium chloride 0.9%    Review of patient's allergies indicates:   Allergen Reactions    Propoxyphene n-acetaminophen Other (See Comments)     Other reaction(s): Unknown    Codeine Anxiety       Review of Systems: As per interval history    OBJECTIVE:     Vital Signs (Most Recent)  Temp: 98.2 °F (36.8 °C) (02/15/23 1526)  Pulse: 85 (02/15/23 1526)  Resp: 20 (02/15/23 1526)  BP: (!) 112/56 (02/15/23 1526)  SpO2: 96 % (02/15/23 1713)    Vital Signs Range (Last 24H):  Temp:  [97.8 °F (36.6 °C)-98.4 °F (36.9 °C)]   Pulse:  []   Resp:  [15-32]   BP: (112-203)/(56-82)   SpO2:  [87 %-96 %]     I & O (Last 24H):  Intake/Output Summary (Last 24 hours) at 2/15/2023 1920  Last data filed at 2/15/2023 1527  Gross per 24 hour   Intake 360 ml   Output 2950 ml   Net -2590 ml       Physical Exam:  General: Patient resting comfortably in no acute distress. Appears as stated age. Calm  Eyes: No conjunctival  injection. No scleral icterus.  ENT: Hearing grossly intact. No discharge from ears. No nasal discharge.   Neck: Supple, trachea midline. No JVD  CVS: RRR. No LE edema BL  Lungs:  No tachypnea or accessory muscle use.  Clear to auscultation bilaterally  Abdomen:  Soft, nontender and nondistended.  No organomegaly  Neuro: AOx3. Moves all extremities. Follows commands. Responds appropriately   Skin:  No rash or erythema noted    Laboratory:  I have reviewed all pertinent lab results within the past 24 hours.  CBC:   Recent Labs   Lab 02/15/23  0438   WBC 7.25   RBC 3.20*   HGB 9.0*   HCT 30.9*      MCV 97   MCH 28.1   MCHC 29.1*     BMP:   Recent Labs   Lab 02/15/23  0438         K 3.9   CL 99   CO2 28   BUN 16   CREATININE 1.1   CALCIUM 8.7   MG 1.0*     CMP:   Recent Labs   Lab 02/14/23  1412 02/15/23  0438   * 102   CALCIUM 9.2 8.7   ALBUMIN 2.6*  --    PROT 6.4  --    * 136   K 4.1 3.9   CO2 26 28   CL 98 99   BUN 16 16   CREATININE 1.1 1.1   ALKPHOS 95  --    ALT 12  --    AST 22  --    BILITOT 1.1*  --        Diagnostic Results:  Labs: Reviewed  ECG: Reviewed  X-Ray: Reviewed    ASSESSMENT/PLAN:        * SOB/Hypoxia due to Acute on chronic heart failure with preserved ejection fraction/ ILD/Asbestosis  Reason for admission  Acute on chronic diastolic dysfunction  Congestive heart failure  EF of 60%  Patient had her Lasix dose recently cut down at home.  Consult cardiology.   Continue with Lasix IV 20 mg b.i.d. aspirin 81 mg a day clopidogrel 75 mg a day Imdur 60 mg a day metoprolol 50 mg daily.  Magnesium replacement and rechecked tomorrow morning patient has chronic hypomagnesemia and is taking p.o. at home per records.       Severe obesity with body mass index (BMI) of 35.0 to 39.9 with serious comorbidity  Body mass index is 35.81 kg/m². Morbid obesity complicates all aspects of disease management from diagnostic modalities to treatment. Weight loss encouraged and health  benefits explained to patient.                   Valvular heart disease, severe aortic stenosis, moderate aortic regurgitation  Aortic stenosis with valve area of 1 cm2  Tricuspid regurg  Echo shows preserved systolic function  Echo shows diastolic dysfunction     Essential hypertension  Monitor and treat  Probably etiology of poorly controlled pulmonary edema    Continue with home medications of pantoprazole paroxetine and quetiapine               VTE Risk Mitigation (From admission, onward)           Ordered       enoxaparin injection 40 mg  Daily         02/14/23 2008       IP VTE HIGH RISK PATIENT  Once         02/14/23 2008       Place sequential compression device  Until discontinued                     Department Hospital Medicine  [facility]  Bashir Bermudez MD  Date of service: 02/15/2023

## 2023-02-16 NOTE — CONSULTS
"[facility]  Department of Cardiology  Consult Note      PATIENT NAME: Betty Bacon  MRN: [id number]  TODAY'S DATE: 02/16/2023  ADMIT DATE: 2/14/2023                          CONSULT REQUESTED BY: Bashir Bermudez MD    SUBJECTIVE     PRINCIPAL PROBLEM: Chronic diastolic heart failure      REASON FOR CONSULT:  "CHF, Lasix at home recently cut down due to CARMELITA"      HPI:  Pt is 72 yo female who presented to hospital due to shortness of breath, cough and "not feeling good" ; Pt family states she had a fever a few nights ago but none lately. Pt states her HH nurse called an ambulance bc her VS were not good. Pt endorses noticing heart racing when she exerts herself. She wears O2 at home usually 4 LPM but had recently increased to 6lpm d/t dyspnea. Pt states she has been in hospital five times since Christmas 2022.    Pt is feeling better now, she says she has been in bed for 2 days, states today is first day she has been able to get out of bed and sit in a chair.    PMH: COPD, CKD, asbestosis (exposure from her  who worked w/ asbestos). Sees Dr. Toro for lungs, Dr. Pink in [place]. Dr. Pink had stress Echo and ultrasound planned in next 2 weeks.    Echo from 12/2022 with EF 65%, Aortic valve area is 0.94 cm2; peak velocity is 3.29 m/s; mean gradient is 26 mmHg.    Review of patient's allergies indicates:   Allergen Reactions    Propoxyphene n-acetaminophen Other (See Comments)     Other reaction(s): Unknown    Codeine Anxiety       Past Medical History:   Diagnosis Date    Acute coronary artery obstruction without MI 10/2012    Benign hypertension     COPD (chronic obstructive pulmonary disease)     Coronary artery disease     Disorder of kidney and ureter     Dr. Morrow    Hepatitis B core antibody positive 03/03/2021    Negative sAg, suggests previous exposure but no chronic/active Hep B. At risk for reactivation with any immunosuppression medication, steroids, chemo, etc.      History " of electroconvulsive therapy     Hyperlipidemia LDL goal < 70     Left ankle sprain     Major depressive disorder, recurrent episode, severe     s/p ECT    Positive AKIRA (antinuclear antibody) 2021    PVD (peripheral vascular disease)      Past Surgical History:   Procedure Laterality Date    CHOLECYSTECTOMY      CORONARY ANGIOPLASTY      CORONARY ANGIOPLASTY WITH STENT PLACEMENT  10/2012    2 stents RCA (100% stenosis)    EYE SURGERY      cataract surgery    HYSTERECTOMY      LINA, ovaries intact. uterine prolapse    ILIAC ARTERY STENT      TONSILLECTOMY      TOTAL VAGINAL HYSTERECTOMY       Social History     Tobacco Use    Smoking status: Former     Packs/day: 2.00     Years: 40.00     Pack years: 80.00     Types: Cigarettes     Quit date: 2009     Years since quittin.2    Smokeless tobacco: Never   Substance Use Topics    Alcohol use: Yes     Comment: social    Drug use: No        REVIEW OF SYSTEMS  CONSTITUTIONAL: Negative for chills, fatigue and fever.   NEURO: No headaches, No dizziness  RESPIRATORY: +cough, shortness of breath and wheezing.    CARDIOVASCULAR: Negative for chest pain. Negative for palpitations and leg swelling.   GI: Negative for abdominal pain, No melena, diarrhea, nausea and vomiting.   : Negative for dysuria and frequency, Negative for hematuria  SKIN: Negative for bruising, Negative for edema or discoloration noted.   MUSCULOSKELETAL: +weakness; Negative for neck pain, Negative for muscle weakness, Negative for back pain     OBJECTIVE     VITAL SIGNS (Most Recent)  Temp: 98.1 °F (36.7 °C) (23)  Pulse: 90 (23)  Resp: 20 (23)  BP: (!) 144/67 (23)  SpO2: (!) 90 % (fixed o2) (23)    VENTILATION STATUS  Resp: 20 (23)  SpO2: (!) 90 % (fixed o2) (23)       I & O (Last 24H):  Intake/Output Summary (Last 24 hours) at 2023 0931  Last data filed at 2023 0521  Gross per 24 hour   Intake 460 ml    Output 950 ml   Net -490 ml       WEIGHTS  Wt Readings from Last 3 Encounters:   02/16/23 0300 86.5 kg (190 lb 11.2 oz)   02/15/23 0641 88.5 kg (195 lb 1.7 oz)   02/14/23 2044 88.8 kg (195 lb 12.3 oz)   02/14/23 1353 90.7 kg (200 lb)   01/26/23 0400 88.3 kg (194 lb 10.7 oz)   01/25/23 1951 87.9 kg (193 lb 12.6 oz)   01/25/23 1339 90.3 kg (199 lb)   01/20/23 1040 90.6 kg (199 lb 11.8 oz)       PHYSICAL EXAM  GENERAL: elderly female OOB in chair on supplemental O2  in no apparent distress alert and oriented.   HEENT: Normocephalic. Pupils normal and conjunctivae normal.  Mucous membranes normal, no cyanosis or icterus, trachea central,no pallor or icterus is noted..   NECK: No JVD. No bruit..   THYROID: Thyroid not enlarged. No nodules present..   CARDIAC: Regular rate and rhythm. S1 is normal.S2 is normal. + systolic murmur noted  CHEST ANATOMY: normal.   LUNGS: Crackles bilateral bases; no wheezing noted  ABDOMEN: Soft no masses or organomegaly.  No abdomen pulsations or bruits.  Normal bowel sounds. No pulsations and no masses felt, No guarding or rebound.   URINARY: No yoder catheter   EXTREMITIES: No cyanosis, clubbing or edema noted at this time., no calf tenderness bilaterally.   PERIPHERAL VASCULAR SYSTEM: Good palpable distal pulses.   CENTRAL NERVOUS SYSTEM: No focal motor or sensory deficits noted.   SKIN: Skin without lesions, moist, well perfused.   MUSCLE STRENGTH & TONE: No noteable weakness, atrophy or abnormal movement.     HOME MEDICATIONS:  No current facility-administered medications on file prior to encounter.     Current Outpatient Medications on File Prior to Encounter   Medication Sig Dispense Refill    albuterol-ipratropium (DUO-NEB) 2.5 mg-0.5 mg/3 mL nebulizer solution Take 3 mLs by nebulization every 6 (six) hours as needed for Wheezing. Rescue 75 mL 11    alendronate (FOSAMAX) 70 MG tablet TAKE 1 TABLET BY MOUTH ONE TIME PER WEEK (Patient taking differently: Take 70 mg by mouth every 7  days.) 12 tablet 3    ALPRAZolam (XANAX) 0.25 MG tablet Take 1 tablet (0.25 mg total) by mouth 2 (two) times daily as needed for Anxiety. 30 tablet 0    aspirin 81 mg Tab Take 81 mg by mouth once daily. Every day      atorvastatin (LIPITOR) 40 MG tablet TAKE 1 TABLET BY MOUTH EVERY DAY (Patient taking differently: Take 40 mg by mouth once daily.) 90 tablet 3    clonazePAM (KLONOPIN) 1 MG disintegrating tablet Take 1 mg by mouth 2 (two) times daily as needed.      clopidogreL (PLAVIX) 75 mg tablet TAKE 1 TABLET BY MOUTH EVERY DAY (Patient taking differently: Take 75 mg by mouth once daily.) 90 tablet 3    esomeprazole (NEXIUM) 40 MG capsule TAKE 1 CAPSULE BY MOUTH BEFORE BREAKFAST. (Patient taking differently: Take 40 mg by mouth before breakfast.) 90 capsule 3    fluticasone propionate (FLONASE) 50 mcg/actuation nasal spray 1 spray (50 mcg total) by Each Nostril route once daily. 16 g PRN    furosemide (LASIX) 20 MG tablet Take 1 tablet (20 mg total) by mouth every 48 hours.  0    isosorbide mononitrate (IMDUR) 60 MG 24 hr tablet Take 60 mg by mouth once daily.      magnesium oxide (MAG-OX) 400 mg (241.3 mg magnesium) tablet Take 2 tablets (800 mg total) by mouth 2 (two) times daily. 360 tablet 0    metoprolol succinate (TOPROL-XL) 100 MG 24 hr tablet Take 100 mg by mouth once daily.      paroxetine (PAXIL) 10 MG tablet Take 10 mg by mouth every morning. Total 50 mg      paroxetine (PAXIL) 40 MG tablet TAKE 1 TABLET BY MOUTH EVERY DAY (Patient taking differently: Take 40 mg by mouth once daily.) 90 tablet 3    QUEtiapine (SEROQUEL) 100 MG Tab Take 100 mg by mouth once daily.      TRELEGY ELLIPTA 200-62.5-25 mcg inhaler INHALE 1 PUFF INTO THE LUNGS ONCE DAILY. 180 each 2    acetaminophen (TYLENOL) 325 MG tablet Take 325 mg by mouth every 6 (six) hours as needed for Pain.      albuterol sulfate (PROAIR RESPICLICK) 90 mcg/actuation AePB Inhale 2 puffs into the lungs every 4 to 6 hours as needed. 1 each 3    colchicine  (COLCRYS) 0.6 mg tablet Take 2 po at gout flare onset, may repeat 1 in an hour prn, then 1 po bid until pain resolves ,or n/V/D starts (Patient taking differently: Take 0.6 mg by mouth as needed. Take 2 po at gout flare onset, may repeat 1 in an hour prn, then 1 po bid until pain resolves ,or n/V/D starts) 20 tablet 0    colestipoL (COLESTID) 1 gram Tab Take 1 tablet (1 g total) by mouth 2 (two) times daily as needed (diarrhea). 180 tablet 3    ergocalciferol (ERGOCALCIFEROL) 50,000 unit Cap Take 1 capsule (50,000 Units total) by mouth every 7 days. (Patient not taking: Reported on 2/7/2023) 12 capsule 3    meclizine (ANTIVERT) 25 mg tablet Take 1 tablet (25 mg total) by mouth 3 (three) times daily as needed for Dizziness. 30 tablet PRN    metoprolol succinate (TOPROL-XL) 50 MG 24 hr tablet Take 1 tablet (50 mg total) by mouth once daily. 90 tablet 0       SCHEDULED MEDS:   aspirin  81 mg Oral Daily    clopidogreL  75 mg Oral Daily    enoxaparin  40 mg Subcutaneous Daily    furosemide (LASIX) injection  20 mg Intravenous Q12H    isosorbide mononitrate  60 mg Oral Daily    magnesium oxide  800 mg Oral BID    metoprolol succinate  50 mg Oral Daily    pantoprazole  40 mg Oral Before breakfast    paroxetine  50 mg Oral Daily    QUEtiapine  100 mg Oral QHS       CONTINUOUS INFUSIONS:    PRN MEDS:acetaminophen, albuterol-ipratropium, ALPRAZolam, clonazePAM, dextrose 10%, dextrose 10%, glucagon (human recombinant), glucose, glucose, hydrALAZINE, melatonin, naloxone, ondansetron, polyethylene glycol, senna-docusate 8.6-50 mg, simethicone, sodium chloride 0.9%    LABS AND DIAGNOSTICS     CBC LAST 3 DAYS  Recent Labs   Lab 02/14/23  1412 02/15/23  0438 02/16/23  0439   WBC 8.99 7.25 7.90   RBC 3.63* 3.20* 3.09*   HGB 10.1* 9.0* 8.7*   HCT 33.9* 30.9* 28.9*   MCV 93 97 94   MCH 27.8 28.1 28.2   MCHC 29.8* 29.1* 30.1*   RDW 17.9* 18.2* 18.3*    187 200   MPV 9.5 12.6 10.1   GRAN 80.2*  7.2 69.2  5.0 70.4  5.6   LYMPH  9.9*  0.9* 17.0*  1.2 14.7*  1.2   MONO 6.5  0.6 8.4  0.6 7.5  0.6   BASO 0.05 0.05 0.04   NRBC 0 0 0       COAGULATION LAST 3 DAYS  Recent Labs   Lab 02/14/23 1412   INR 1.0   APTT 26.3       CHEMISTRY LAST 3 DAYS  Recent Labs   Lab 02/14/23  1412 02/15/23  0438 02/16/23  0439   * 136 138   K 4.1 3.9 3.8   CL 98 99 97   CO2 26 28 34*   ANIONGAP 8 9 7*   BUN 16 16 19   CREATININE 1.1 1.1 1.3   * 102 107   CALCIUM 9.2 8.7 8.7   MG  --  1.0* 2.0   ALBUMIN 2.6*  --   --    PROT 6.4  --   --    ALKPHOS 95  --   --    ALT 12  --   --    AST 22  --   --    BILITOT 1.1*  --   --        CARDIAC PROFILE LAST 3 DAYS  Recent Labs   Lab 02/14/23 1412   BNP 1,002*       ENDOCRINE LAST 3 DAYS  No results for input(s): TSH, PROCAL in the last 168 hours.    LAST ARTERIAL BLOOD GAS  ABG  No results for input(s): PH, PO2, PCO2, HCO3, BE in the last 168 hours.    LAST 7 DAYS MICROBIOLOGY   Microbiology Results (last 7 days)       Procedure Component Value Units Date/Time    Blood culture #1 **CANNOT BE ORDERED STAT** [[id number]]  (Abnormal) Collected: 02/14/23 1412    Order Status: Completed Specimen: Blood from Peripheral, Antecubital, Left Updated: 02/16/23 0741     Blood Culture, Routine Gram stain aer bottle: Gram positive cocci      Results called to and read back by:Cristin Sneed RN-WINGB;  02/15/2023      12:38 CJD      COAGULASE-NEGATIVE STAPHYLOCOCCUS SPECIES  Organism is a probable contaminant      Rapid Organism ID by PCR (from Blood culture) [[id number]]  (Abnormal) Collected: 02/14/23 1412    Order Status: Completed Updated: 02/15/23 1345     Enterococcus faecials Not Detected     Enterococcus faecium Not Detected     Listeria Monocytogenes Not Detected     Staphylococcus spp. Detected     Staphylococcus aureus Not Detected     Staphylococcus epidermidis Not Detected     Staphylococcus lugdunensis Not Detected     Streptococcus species Not Detected     Streptococcus agalactiae Not Detected      Streptococcus pneumoniae Not Detected     Streptococcus pyogenes Not Detected     Acinetobacter calcoaceticus/baumannii complex Not Detected     Bacteroides fragilis Not Detected     Enterobacerales Not Detected     Enterobacter cloacae complex Not Detected     Escherichia Not Detected     Klebsiella aerogenes Not Detected     Klebsiella oxytoca Not Detected     Klebsiella pneumoniae group Not Detected     Proteus Not Detected     Salmonella sp Not Detected     Serratia marcescens Not Detected     Haemophilus influenzae Not Detected     Neisseria meningtidis Not Detected     Pseudomonas aeruginosa Not Detected     Stenotrophomonas maltophilia Not Detected     Candida albicans Not Detected     Candida auris Not Detected     Candida glabrata Not Detected     Candida krusei Not Detected     Candida Parapsilosis Not Detected     Candida Tropicalis Not Detected     Cryptococcus neoformans/gattii Not Detected     CTX-M Gene Test not applicable     IMP Gene Test not applicable     KPC  Test not applicable     mcr-1  Test not applicable     mec A/C Test not applicable     mec A/C and MREJ (MRSA) Test not applicable     NDM Test not applicable     OXA-48-like Test not applicable     van A/B Test not applicable     VIM Test not applicable            MOST RECENT IMAGING  X-Ray Chest AP Portable  Portable chest x-ray at 2:43 PM is compared to prior study dated 1/29/2023    Clinical history is dyspnea    The heart is enlarged. There are mild increased interstitial markings throughout the lungs suggestive of edema. There are no confluent infiltrates or pleural effusions. There are no acute osseous abnormalities.    IMPRESSION: Cardiomegaly with diffuse increased interstitial markings suggestive of edema    Electronically signed by:  Penny Ribera MD  2/14/2023 3:07 PM CST Workstation: [id number]      ECHOCARDIOGRAM RESULTS (last 5)  Results for orders placed during the hospital encounter of 12/20/22    Echo    Interpretation  Summary  · The left ventricle is normal in size with severe concentric hypertrophy and normal systolic function.  · Grade II left ventricular diastolic dysfunction.  · The estimated ejection fraction is 65%.  · Normal right ventricular size with normal right ventricular systolic function.  · Moderate aortic regurgitation.  · There is severe aortic valve stenosis.  · Aortic valve area is 0.94 cm2; peak velocity is 3.29 m/s; mean gradient is 26 mmHg.  · Mild pulmonic regurgitation.  · Mild tricuspid regurgitation.  · There is mild pulmonary hypertension.  · Normal central venous pressure (3 mmHg).  · The estimated PA systolic pressure is 44 mmHg.      Results for orders placed during the hospital encounter of 12/01/22    Echo    Interpretation Summary  · The left ventricle is normal in size with moderate concentric hypertrophy and normal systolic function.  · The estimated ejection fraction is 55%.  · Grade I left ventricular diastolic dysfunction.  · Normal right ventricular size with normal right ventricular systolic function.  · Severe left atrial enlargement.  · Moderate right atrial enlargement.  · Mild aortic regurgitation.  · There is moderate-to-severe aortic valve stenosis.  · Aortic valve area is 1.16 cm2; peak velocity is 3.59 m/s; mean gradient is 31 mmHg.  · Moderate tricuspid regurgitation.  · Normal central venous pressure (3 mmHg).  · The estimated PA systolic pressure is 53 mmHg.  · There is pulmonary hypertension.      Results for orders placed in visit on 06/02/22    Echo    Interpretation Summary  · The left ventricle is normal in size with concentric hypertrophy and normal systolic function.  · The estimated ejection fraction is 55%.  · Grade II left ventricular diastolic dysfunction.  · Normal right ventricular size with normal right ventricular systolic function.  · Severe left atrial enlargement.  · Mild right atrial enlargement.  · The aortic valve is trileaflet but malformed.  · Moderate  aortic regurgitation.  · There is moderate-to-severe aortic valve stenosis.  · Aortic valve area is 1.00 cm2; peak velocity is 3.38 m/s; mean gradient is 29 mmHg.  · Mild mitral regurgitation.  · Mild to moderate tricuspid regurgitation.  · Normal central venous pressure (3 mmHg).  · The estimated PA systolic pressure is 51 mmHg.  · There is pulmonary hypertension.      Results for orders placed in visit on 06/23/20    Echo Color Flow Doppler? Yes    Interpretation Summary  · The left ventricle is normal in size with mild concentric hypertrophy and normal systolic function.  · The estimated ejection fraction is 55%.  · Grade II left ventricular diastolic dysfunction.  · Normal right ventricular size with normal right ventricular systolic function.  · Moderate left atrial enlargement.  · There is moderate aortic valve stenosis.  · Aortic valve area is 1.17 cm2; peak velocity is 2.98 m/s; mean gradient is 21 mmHg.  · Mild aortic regurgitation.  · Mild mitral regurgitation.  · Normal central venous pressure (3 mmHg).  · The estimated PA systolic pressure is 41 mmHg.      Results for orders placed in visit on 06/22/20    Echo Color Flow Doppler? Yes    Interpretation Summary  · Normal left ventricular systolic function. The estimated ejection fraction is 55%.  · Mild eccentric left ventricular hypertrophy.  · Grade II (moderate) left ventricular diastolic dysfunction consistent with pseudonormalization.  · Normal right ventricular systolic function.  · Mild aortic regurgitation.  · Moderate aortic valve stenosis.  · Aortic valve area is 1.09 cm2; peak velocity is 2.99 m/s; mean gradient is 22 mmHg.  · Normal central venous pressure (3 mmHg).  · The estimated PA systolic pressure is 35 mmHg.      CURRENT/PREVIOUS VISIT EKG  Results for orders placed or performed during the hospital encounter of 02/14/23   EKG 12-lead    Collection Time: 02/14/23  3:11 PM    Narrative    Test Reason : R06.00,    Vent. Rate : 093 BPM      Atrial Rate : 093 BPM     P-R Int : 140 ms          QRS Dur : 086 ms      QT Int : 366 ms       P-R-T Axes : 040 003 -08 degrees     QTc Int : 455 ms    Normal sinus rhythm  Moderate voltage criteria for LVH, may be normal variant  Inferior infarct (cited on or before 01-JAN-2023)  Abnormal ECG  When compared with ECG of 25-JAN-2023 13:31,  T wave inversion now evident in Anterior leads    Referred By: [email or web address]   SELF           Confirmed By:            ASSESSMENT/PLAN:     Active Hospital Problems    Diagnosis    *Acute on chronic heart failure with preserved ejection fraction    Severe obesity with body mass index (BMI) of 35.0 to 39.9 with serious comorbidity    Valvular heart disease, severe aortic stenosis, moderate aortic regurgitation     moderate      Essential hypertension       ASSESSMENT & PLAN:   Acute on chronic CHF (HFpEF)  Severe aortic stenosis  Moderate Aortic regurgitation  HTN  Asbestosis  Anemia  COPD  Hx CKD 3      RECOMMENDATIONS:  HFpEF: Continue gentle diuresis with lasix 20 mg daily, metoprolol 50 mg daily, low sodium diet, strict I/o and daily weight.  BP well controlled in past 24 hrs  Continue isosorbide mononitrate 60 mg daily, aspirin 81 mg daily and plavix 75 mg daily  Management of AS per Dr. Joesph Borjas in [place] from patient's primary cardiologist for many years who has been monitoring her clinical symptoms in terms of aortic stenosis      Chetna Gutierrez NP  [facility]  Department of Cardiology  Date of Service: 02/16/2023      Extended discussions with the patient, her daughter in depth regarding immediate and further care upon discharge as well as discussed with the hospitalist.  At this point her primary problems appear to be pulmonary related, will continue to optimize this pulmonary status given its limitations of advanced asbestos lung.  I have personally interviewed and examined the patient, I have reviewed the Nurse Practitioner's history and  physical, assessment, and plan. I agree with the findings and plan.      Robert Roca M.D.  [facility]  Department of Cardiology  Date of Service: 02/16/2023  9:31 AM

## 2023-02-16 NOTE — CARE UPDATE
02/16/23 1557   Patient Assessment/Suction   Level of Consciousness (AVPU) alert   Respiratory Effort Unlabored   Expansion/Accessory Muscles/Retractions no use of accessory muscles   All Lung Fields Breath Sounds diminished   Rhythm/Pattern, Respiratory unlabored   Cough Frequency infrequent   Cough Type dry   PRE-TX-O2   Device (Oxygen Therapy) high flow nasal cannula   $ Is the patient on Low Flow Oxygen? Yes   Flow (L/min) 7   SpO2 96 %   Pulse Oximetry Type Intermittent   $ Pulse Oximetry - Multiple Charge Pulse Oximetry - Multiple   Pulse 97   Resp 18   Aerosol Therapy   $ Aerosol Therapy Charges Aerosol Treatment   Daily Review of Necessity (SVN) completed   Respiratory Treatment Status (SVN) given   Treatment Route (SVN) mask;oxygen   Patient Position (SVN) sitting in chair   Post Treatment Assessment (SVN) increased aeration   Signs of Intolerance (SVN) none   Education   $ Education Bronchodilator;15 min   Respiratory Evaluation   $ Care Plan Tech Time 15 min   $ Eval/Re-eval Charges Re-evaluation

## 2023-02-16 NOTE — PROGRESS NOTES
[facility] Medicine  Progress Note    Patient name: Betty Bacon  MRN: [id number]  Admit Date: 2/14/2023   LOS: 1 day     SUBJECTIVE:     Principal problem: Chronic diastolic heart failure    Interval History:  Patient states she is feeling better today sitting in her chair at the time examination    Scheduled Meds:   albuterol-ipratropium  3 mL Nebulization Q8H    aspirin  81 mg Oral Daily    budesonide  0.5 mg Nebulization Q12H    clopidogreL  75 mg Oral Daily    enoxaparin  40 mg Subcutaneous Daily    furosemide (LASIX) injection  20 mg Intravenous Q12H    isosorbide mononitrate  60 mg Oral Daily    magnesium oxide  800 mg Oral BID    metoprolol succinate  50 mg Oral Daily    pantoprazole  40 mg Oral Before breakfast    paroxetine  50 mg Oral Daily    QUEtiapine  100 mg Oral QHS     Continuous Infusions:  PRN Meds:acetaminophen, albuterol-ipratropium, ALPRAZolam, clonazePAM, dextrose 10%, dextrose 10%, glucagon (human recombinant), glucose, glucose, hydrALAZINE, melatonin, naloxone, ondansetron, polyethylene glycol, senna-docusate 8.6-50 mg, simethicone, sodium chloride 0.9%    Review of patient's allergies indicates:   Allergen Reactions    Propoxyphene n-acetaminophen Other (See Comments)     Other reaction(s): Unknown    Codeine Anxiety       Review of Systems: As per interval history    OBJECTIVE:     Vital Signs (Most Recent)  Temp: 97.2 °F (36.2 °C) (02/16/23 1500)  Pulse: 97 (02/16/23 1557)  Resp: 18 (02/16/23 1557)  BP: (!) 102/47 (02/16/23 1500)  SpO2: 96 % (02/16/23 1557)    Vital Signs Range (Last 24H):  Temp:  [97.2 °F (36.2 °C)-98.8 °F (37.1 °C)]   Pulse:  [81-97]   Resp:  [18-20]   BP: (102-144)/(47-89)   SpO2:  [90 %-96 %]     I & O (Last 24H):  Intake/Output Summary (Last 24 hours) at 2/16/2023 1657  Last data filed at 2/16/2023 1500  Gross per 24 hour   Intake 700 ml   Output 1475 ml   Net -775 ml        Physical Exam:  General: Patient resting comfortably in no  acute distress. Appears as stated age. Calm  Eyes: No conjunctival injection. No scleral icterus.  ENT: Hearing grossly intact. No discharge from ears. No nasal discharge.   Neck: Supple, trachea midline. No JVD  CVS: RRR. No LE edema BL  Lungs:  No tachypnea or accessory muscle use.  Clear to auscultation bilaterally  Abdomen:  Soft, nontender and nondistended.  No organomegaly  Neuro: AOx3. Moves all extremities. Follows commands. Responds appropriately   Skin:  No rash or erythema noted    Laboratory:  I have reviewed all pertinent lab results within the past 24 hours.  CBC:   Recent Labs   Lab 02/16/23 0439   WBC 7.90   RBC 3.09*   HGB 8.7*   HCT 28.9*      MCV 94   MCH 28.2   MCHC 30.1*     BMP:   Recent Labs   Lab 02/16/23 0439         K 3.8   CL 97   CO2 34*   BUN 19   CREATININE 1.3   CALCIUM 8.7   MG 2.0     CMP:   Recent Labs   Lab 02/14/23  1412 02/15/23  0438 02/16/23 0439   *   < > 107   CALCIUM 9.2   < > 8.7   ALBUMIN 2.6*  --   --    PROT 6.4  --   --    *   < > 138   K 4.1   < > 3.8   CO2 26   < > 34*   CL 98   < > 97   BUN 16   < > 19   CREATININE 1.1   < > 1.3   ALKPHOS 95  --   --    ALT 12  --   --    AST 22  --   --    BILITOT 1.1*  --   --     < > = values in this interval not displayed.       Diagnostic Results:  Labs: Reviewed  ECG: Reviewed  X-Ray: Reviewed    ASSESSMENT/PLAN:          * SOB/Hypoxia due to Acute on chronic heart failure with preserved ejection fraction/ ILD/Asbestosis  Reason for admission  Acute on chronic diastolic dysfunction  Congestive heart failure  EF of 60%  Patient had her Lasix dose recently cut down at home.  Consult cardiology, case disucssed with Dr Blount;la today, recommends to swich Lasix to PO, we will do, input appreciated.   Continue with aspirin 81 mg a day clopidogrel 75 mg a day Imdur 60 mg a day metoprolol 50 mg daily.  Magnesium replacement and rechecked tomorrow morning patient has chronic hypomagnesemia and is  taking p.o. at home per records.    Case discusssed with Dr. oTro (pulmonology) today, input appreciated.     Severe obesity with body mass index (BMI) of 35.0 to 39.9 with serious comorbidity  Body mass index is 35.81 kg/m². Morbid obesity complicates all aspects of disease management from diagnostic modalities to treatment. Weight loss encouraged and health benefits explained to patient.                   Valvular heart disease, severe aortic stenosis, moderate aortic regurgitation  Aortic stenosis with valve area of 1 cm2  Tricuspid regurg  Echo shows preserved systolic function  Echo shows diastolic dysfunction  Case disucssed with dr hull today, input appreciated.    Essential hypertension  Monitor and treat  Probably etiology of poorly controlled pulmonary edema     Continue with home medications of pantoprazole paroxetine and quetiapine                 VTE Risk Mitigation (From admission, onward)           Ordered       enoxaparin injection 40 mg  Daily         02/14/23 2008       IP VTE HIGH RISK PATIENT  Once         02/14/23 2008       Place sequential compression device  Until discontinued                        Department Hospital Medicine  [facility]  Bashir Bermudez MD  Date of service: 02/16/2023

## 2023-02-17 ENCOUNTER — EXTERNAL HOME HEALTH (OUTPATIENT)
Dept: HOME HEALTH SERVICES | Facility: HOSPITAL | Age: 73
End: 2023-02-17

## 2023-02-17 LAB
ANION GAP SERPL CALC-SCNC: 6 MMOL/L (ref 8–16)
ANION GAP SERPL CALC-SCNC: 8 MMOL/L (ref 8–16)
BASOPHILS # BLD AUTO: 0.04 K/UL (ref 0–0.2)
BASOPHILS NFR BLD: 0.4 % (ref 0–1.9)
BUN SERPL-MCNC: 35 MG/DL (ref 8–23)
BUN SERPL-MCNC: 41 MG/DL (ref 8–23)
CALCIUM SERPL-MCNC: 8.7 MG/DL (ref 8.7–10.5)
CALCIUM SERPL-MCNC: 9.1 MG/DL (ref 8.7–10.5)
CHLORIDE SERPL-SCNC: 93 MMOL/L (ref 95–110)
CHLORIDE SERPL-SCNC: 94 MMOL/L (ref 95–110)
CO2 SERPL-SCNC: 33 MMOL/L (ref 23–29)
CO2 SERPL-SCNC: 33 MMOL/L (ref 23–29)
CREAT SERPL-MCNC: 2.5 MG/DL (ref 0.5–1.4)
CREAT SERPL-MCNC: 2.7 MG/DL (ref 0.5–1.4)
DIFFERENTIAL METHOD: ABNORMAL
EOSINOPHIL # BLD AUTO: 0.3 K/UL (ref 0–0.5)
EOSINOPHIL NFR BLD: 3.5 % (ref 0–8)
ERYTHROCYTE [DISTWIDTH] IN BLOOD BY AUTOMATED COUNT: 18 % (ref 11.5–14.5)
EST. GFR  (NO RACE VARIABLE): 17.8 ML/MIN/1.73 M^2
EST. GFR  (NO RACE VARIABLE): 19.6 ML/MIN/1.73 M^2
GLUCOSE SERPL-MCNC: 105 MG/DL (ref 70–110)
GLUCOSE SERPL-MCNC: 165 MG/DL (ref 70–110)
HCT VFR BLD AUTO: 26.7 % (ref 37–48.5)
HGB BLD-MCNC: 8 G/DL (ref 12–16)
IMM GRANULOCYTES # BLD AUTO: 0.17 K/UL (ref 0–0.04)
IMM GRANULOCYTES NFR BLD AUTO: 1.9 % (ref 0–0.5)
LYMPHOCYTES # BLD AUTO: 1.6 K/UL (ref 1–4.8)
LYMPHOCYTES NFR BLD: 17.5 % (ref 18–48)
MAGNESIUM SERPL-MCNC: 1.9 MG/DL (ref 1.6–2.6)
MAGNESIUM SERPL-MCNC: 2.2 MG/DL (ref 1.6–2.6)
MCH RBC QN AUTO: 27.7 PG (ref 27–31)
MCHC RBC AUTO-ENTMCNC: 30 G/DL (ref 32–36)
MCV RBC AUTO: 92 FL (ref 82–98)
MONOCYTES # BLD AUTO: 0.8 K/UL (ref 0.3–1)
MONOCYTES NFR BLD: 8.6 % (ref 4–15)
NEUTROPHILS # BLD AUTO: 6.1 K/UL (ref 1.8–7.7)
NEUTROPHILS NFR BLD: 68.1 % (ref 38–73)
NRBC BLD-RTO: 0 /100 WBC
PLATELET # BLD AUTO: 202 K/UL (ref 150–450)
PMV BLD AUTO: 10.2 FL (ref 9.2–12.9)
POTASSIUM SERPL-SCNC: 3.5 MMOL/L (ref 3.5–5.1)
POTASSIUM SERPL-SCNC: 4.3 MMOL/L (ref 3.5–5.1)
RBC # BLD AUTO: 2.89 M/UL (ref 4–5.4)
SODIUM SERPL-SCNC: 133 MMOL/L (ref 136–145)
SODIUM SERPL-SCNC: 134 MMOL/L (ref 136–145)
WBC # BLD AUTO: 8.97 K/UL (ref 3.9–12.7)

## 2023-02-17 PROCEDURE — 80048 BASIC METABOLIC PNL TOTAL CA: CPT | Performed by: INTERNAL MEDICINE

## 2023-02-17 PROCEDURE — 83735 ASSAY OF MAGNESIUM: CPT | Mod: 91 | Performed by: INTERNAL MEDICINE

## 2023-02-17 PROCEDURE — 99900031 HC PATIENT EDUCATION (STAT)

## 2023-02-17 PROCEDURE — 63600175 PHARM REV CODE 636 W HCPCS: Performed by: INTERNAL MEDICINE

## 2023-02-17 PROCEDURE — 25000242 PHARM REV CODE 250 ALT 637 W/ HCPCS: Performed by: INTERNAL MEDICINE

## 2023-02-17 PROCEDURE — 36415 COLL VENOUS BLD VENIPUNCTURE: CPT | Performed by: INTERNAL MEDICINE

## 2023-02-17 PROCEDURE — 83735 ASSAY OF MAGNESIUM: CPT | Performed by: INTERNAL MEDICINE

## 2023-02-17 PROCEDURE — 12000002 HC ACUTE/MED SURGE SEMI-PRIVATE ROOM

## 2023-02-17 PROCEDURE — 94640 AIRWAY INHALATION TREATMENT: CPT

## 2023-02-17 PROCEDURE — 27000221 HC OXYGEN, UP TO 24 HOURS

## 2023-02-17 PROCEDURE — 94761 N-INVAS EAR/PLS OXIMETRY MLT: CPT

## 2023-02-17 PROCEDURE — 99900035 HC TECH TIME PER 15 MIN (STAT)

## 2023-02-17 PROCEDURE — 25000003 PHARM REV CODE 250: Performed by: INTERNAL MEDICINE

## 2023-02-17 PROCEDURE — 99232 SBSQ HOSP IP/OBS MODERATE 35: CPT | Mod: ,,, | Performed by: INTERNAL MEDICINE

## 2023-02-17 PROCEDURE — 99232 PR SUBSEQUENT HOSPITAL CARE,LEVL II: ICD-10-PCS | Mod: ,,, | Performed by: INTERNAL MEDICINE

## 2023-02-17 PROCEDURE — 85025 COMPLETE CBC W/AUTO DIFF WBC: CPT | Performed by: INTERNAL MEDICINE

## 2023-02-17 PROCEDURE — 80048 BASIC METABOLIC PNL TOTAL CA: CPT | Mod: 91 | Performed by: INTERNAL MEDICINE

## 2023-02-17 RX ORDER — SODIUM CHLORIDE 9 MG/ML
INJECTION, SOLUTION INTRAVENOUS CONTINUOUS
Status: DISCONTINUED | OUTPATIENT
Start: 2023-02-17 | End: 2023-02-20

## 2023-02-17 RX ORDER — IPRATROPIUM BROMIDE AND ALBUTEROL SULFATE 2.5; .5 MG/3ML; MG/3ML
3 SOLUTION RESPIRATORY (INHALATION)
Status: DISCONTINUED | OUTPATIENT
Start: 2023-02-17 | End: 2023-02-22 | Stop reason: HOSPADM

## 2023-02-17 RX ORDER — ENOXAPARIN SODIUM 100 MG/ML
30 INJECTION SUBCUTANEOUS EVERY 24 HOURS
Status: DISCONTINUED | OUTPATIENT
Start: 2023-02-17 | End: 2023-02-22

## 2023-02-17 RX ADMIN — IPRATROPIUM BROMIDE AND ALBUTEROL SULFATE 3 ML: .5; 3 SOLUTION RESPIRATORY (INHALATION) at 07:02

## 2023-02-17 RX ADMIN — SODIUM CHLORIDE: 0.9 INJECTION, SOLUTION INTRAVENOUS at 04:02

## 2023-02-17 RX ADMIN — ASPIRIN 81 MG: 81 TABLET, COATED ORAL at 08:02

## 2023-02-17 RX ADMIN — IPRATROPIUM BROMIDE AND ALBUTEROL SULFATE 3 ML: .5; 3 SOLUTION RESPIRATORY (INHALATION) at 03:02

## 2023-02-17 RX ADMIN — PANTOPRAZOLE SODIUM 40 MG: 40 TABLET, DELAYED RELEASE ORAL at 05:02

## 2023-02-17 RX ADMIN — IPRATROPIUM BROMIDE AND ALBUTEROL SULFATE 3 ML: .5; 3 SOLUTION RESPIRATORY (INHALATION) at 12:02

## 2023-02-17 RX ADMIN — BUDESONIDE 0.5 MG: 0.5 INHALANT RESPIRATORY (INHALATION) at 07:02

## 2023-02-17 RX ADMIN — CLOPIDOGREL BISULFATE 75 MG: 75 TABLET, FILM COATED ORAL at 08:02

## 2023-02-17 RX ADMIN — METOPROLOL SUCCINATE 50 MG: 25 TABLET, EXTENDED RELEASE ORAL at 08:02

## 2023-02-17 RX ADMIN — FUROSEMIDE 20 MG: 20 TABLET ORAL at 08:02

## 2023-02-17 RX ADMIN — ENOXAPARIN SODIUM 30 MG: 30 INJECTION SUBCUTANEOUS at 04:02

## 2023-02-17 RX ADMIN — Medication 800 MG: at 09:02

## 2023-02-17 RX ADMIN — ISOSORBIDE MONONITRATE 60 MG: 60 TABLET, EXTENDED RELEASE ORAL at 08:02

## 2023-02-17 RX ADMIN — PAROXETINE HYDROCHLORIDE 50 MG: 20 TABLET, FILM COATED ORAL at 08:02

## 2023-02-17 RX ADMIN — QUETIAPINE 100 MG: 100 TABLET ORAL at 09:02

## 2023-02-17 RX ADMIN — CLONAZEPAM 1 MG: 1 TABLET ORAL at 11:02

## 2023-02-17 NOTE — PROGRESS NOTES
"Which tell me on arm I would right temporoparietal had expiratory large gap helmet [facility]  Department of Cardiology  Consult Note      PATIENT NAME: Betty Bacon  MRN: [id number]  TODAY'S DATE: 02/17/2023  ADMIT DATE: 2/14/2023                          CONSULT REQUESTED BY: Bashir Bermudez MD    SUBJECTIVE     PRINCIPAL PROBLEM: Chronic diastolic heart failure    INTERVAL HISTORY  2/17/23:  Pt resting in bed, staters she is breathing better, still has dry cough  6 beats VT overnight, otherwise NSR on telemetry  O2 @ 7lpm, Creatinine up to 2.5.     REASON FOR CONSULT:  "CHF, Lasix at home recently cut down due to CARMELITA"      HPI:  Pt is 72 yo female who presented to hospital due to shortness of breath, cough and "not feeling good" ; Pt family states she had a fever a few nights ago but none lately. Pt states her HH nurse called an ambulance bc her VS were not good. Pt endorses noticing heart racing when she exerts herself. She wears O2 at home usually 4 LPM but had recently increased to 6lpm d/t dyspnea. Pt states she has been in hospital five times since Christmas 2022.    Pt is feeling better now, she says she has been in bed for 2 days, states today is first day she has been able to get out of bed and sit in a chair.    PMH: COPD, CKD, asbestosis (exposure from her  who worked w/ asbestos). Sees Dr. Toro for lungs, Dr. Pink in [place]. Dr. Pink had stress Echo and ultrasound planned in next 2 weeks.    Echo from 12/2022 with EF 65%, Aortic valve area is 0.94 cm2; peak velocity is 3.29 m/s; mean gradient is 26 mmHg.    Review of patient's allergies indicates:   Allergen Reactions    Propoxyphene n-acetaminophen Other (See Comments)     Other reaction(s): Unknown    Codeine Anxiety       Past Medical History:   Diagnosis Date    Acute coronary artery obstruction without MI 10/2012    Benign hypertension     COPD (chronic obstructive pulmonary disease)     Coronary artery disease     " "Disorder of kidney and ureter     Dr. Morrow    Hepatitis B core antibody positive 2021    Negative sAg, suggests previous exposure but no chronic/active Hep B. At risk for reactivation with any immunosuppression medication, steroids, chemo, etc.      History of electroconvulsive therapy     Hyperlipidemia LDL goal < 70     Left ankle sprain     Major depressive disorder, recurrent episode, severe     s/p ECT    Positive AKIRA (antinuclear antibody) 2021    PVD (peripheral vascular disease)      Past Surgical History:   Procedure Laterality Date    CHOLECYSTECTOMY      CORONARY ANGIOPLASTY      CORONARY ANGIOPLASTY WITH STENT PLACEMENT  10/2012    2 stents RCA (100% stenosis)    EYE SURGERY      cataract surgery    HYSTERECTOMY      LINA, ovaries intact. uterine prolapse    ILIAC ARTERY STENT      TONSILLECTOMY      TOTAL VAGINAL HYSTERECTOMY       Social History     Tobacco Use    Smoking status: Former     Packs/day: 2.00     Years: 40.00     Pack years: 80.00     Types: Cigarettes     Quit date: 2009     Years since quittin.2    Smokeless tobacco: Never   Substance Use Topics    Alcohol use: Yes     Comment: social    Drug use: No        REVIEW OF SYSTEMS  CONSTITUTIONAL: Negative for chills, fatigue and fever.   NEURO: No headaches, No dizziness  RESPIRATORY: "breathing better" +dry NP cough, shortness of breath and wheezing.    CARDIOVASCULAR: Negative for chest pain. Negative for palpitations; some leg swelling when sitting in chair.   GI: Negative for abdominal pain, No melena, diarrhea, nausea and vomiting.   : Negative for dysuria and frequency, Negative for hematuria  SKIN: Negative for bruising, Negative for edema or discoloration noted.   MUSCULOSKELETAL: +generalized weakness;     OBJECTIVE     VITAL SIGNS (Most Recent)  Temp: 97.4 °F (36.3 °C) (2330)  Pulse: 109 (2330)  Resp: 17 (23 0830)  BP: (!) 110/53 (23 0830)  SpO2: (!) 89 % (23 " 0830)    VENTILATION STATUS  Resp: 17 (02/17/23 0830)  SpO2: (!) 89 % (02/17/23 0830)       I & O (Last 24H):  Intake/Output Summary (Last 24 hours) at 2/17/2023 0929  Last data filed at 2/17/2023 0834  Gross per 24 hour   Intake 600 ml   Output 850 ml   Net -250 ml         WEIGHTS  Wt Readings from Last 3 Encounters:   02/16/23 0300 86.5 kg (190 lb 11.2 oz)   02/15/23 0641 88.5 kg (195 lb 1.7 oz)   02/14/23 2044 88.8 kg (195 lb 12.3 oz)   02/14/23 1353 90.7 kg (200 lb)   01/26/23 0400 88.3 kg (194 lb 10.7 oz)   01/25/23 1951 87.9 kg (193 lb 12.6 oz)   01/25/23 1339 90.3 kg (199 lb)   01/20/23 1040 90.6 kg (199 lb 11.8 oz)       PHYSICAL EXAM  GENERAL: elderly female OOB in chair on supplemental 7 Lpm O2  in no apparent distress alert and oriented.   HEENT: Normocephalic. Pupils normal and conjunctivae normal.  Mucous membranes normal, no cyanosis or icterus, trachea central,no pallor or icterus is noted..   NECK: No JVD. No bruit..    CARDIAC: Regular rate and rhythm. + systolic murmur noted  CHEST ANATOMY: normal.   LUNGS: Crackles still present bilateral bases; +dry cough, respirations unlabored, no wheezing noted  ABDOMEN: Soft no masses or organomegaly.  No abdomen pulsations or bruits.  Normal bowel sounds. No pulsations and no masses felt, No guarding or rebound.   URINARY: No yoder catheter   EXTREMITIES: No cyanosis, clubbing or edema noted at this time., no calf tenderness bilaterally.   PERIPHERAL VASCULAR SYSTEM: Good palpable distal pulses.   CENTRAL NERVOUS SYSTEM: No focal motor or sensory deficits noted.   SKIN: Skin without lesions, moist, well perfused.   MUSCLE STRENGTH & TONE: No noteable weakness, atrophy or abnormal movement.     HOME MEDICATIONS:  No current facility-administered medications on file prior to encounter.     Current Outpatient Medications on File Prior to Encounter   Medication Sig Dispense Refill    alendronate (FOSAMAX) 70 MG tablet TAKE 1 TABLET BY MOUTH ONE TIME PER WEEK  (Patient taking differently: Take 70 mg by mouth every 7 days.) 12 tablet 3    ALPRAZolam (XANAX) 0.25 MG tablet Take 1 tablet (0.25 mg total) by mouth 2 (two) times daily as needed for Anxiety. 30 tablet 0    aspirin 81 mg Tab Take 81 mg by mouth once daily. Every day      atorvastatin (LIPITOR) 40 MG tablet TAKE 1 TABLET BY MOUTH EVERY DAY (Patient taking differently: Take 40 mg by mouth once daily.) 90 tablet 3    clonazePAM (KLONOPIN) 1 MG disintegrating tablet Take 1 mg by mouth 2 (two) times daily as needed.      clopidogreL (PLAVIX) 75 mg tablet TAKE 1 TABLET BY MOUTH EVERY DAY (Patient taking differently: Take 75 mg by mouth once daily.) 90 tablet 3    esomeprazole (NEXIUM) 40 MG capsule TAKE 1 CAPSULE BY MOUTH BEFORE BREAKFAST. (Patient taking differently: Take 40 mg by mouth before breakfast.) 90 capsule 3    fluticasone propionate (FLONASE) 50 mcg/actuation nasal spray 1 spray (50 mcg total) by Each Nostril route once daily. 16 g PRN    furosemide (LASIX) 20 MG tablet Take 1 tablet (20 mg total) by mouth every 48 hours.  0    isosorbide mononitrate (IMDUR) 60 MG 24 hr tablet Take 60 mg by mouth once daily.      magnesium oxide (MAG-OX) 400 mg (241.3 mg magnesium) tablet Take 2 tablets (800 mg total) by mouth 2 (two) times daily. 360 tablet 0    metoprolol succinate (TOPROL-XL) 100 MG 24 hr tablet Take 100 mg by mouth once daily.      paroxetine (PAXIL) 10 MG tablet Take 10 mg by mouth every morning. Total 50 mg      paroxetine (PAXIL) 40 MG tablet TAKE 1 TABLET BY MOUTH EVERY DAY (Patient taking differently: Take 40 mg by mouth once daily.) 90 tablet 3    QUEtiapine (SEROQUEL) 100 MG Tab Take 100 mg by mouth once daily.      TRELEGY ELLIPTA 200-62.5-25 mcg inhaler INHALE 1 PUFF INTO THE LUNGS ONCE DAILY. 180 each 2    acetaminophen (TYLENOL) 325 MG tablet Take 325 mg by mouth every 6 (six) hours as needed for Pain.      albuterol sulfate (PROAIR RESPICLICK) 90 mcg/actuation AePB Inhale 2 puffs into the  lungs every 4 to 6 hours as needed. 1 each 3    colchicine (COLCRYS) 0.6 mg tablet Take 2 po at gout flare onset, may repeat 1 in an hour prn, then 1 po bid until pain resolves ,or n/V/D starts (Patient taking differently: Take 0.6 mg by mouth as needed. Take 2 po at gout flare onset, may repeat 1 in an hour prn, then 1 po bid until pain resolves ,or n/V/D starts) 20 tablet 0    colestipoL (COLESTID) 1 gram Tab Take 1 tablet (1 g total) by mouth 2 (two) times daily as needed (diarrhea). 180 tablet 3    ergocalciferol (ERGOCALCIFEROL) 50,000 unit Cap Take 1 capsule (50,000 Units total) by mouth every 7 days. (Patient not taking: Reported on 2/7/2023) 12 capsule 3    meclizine (ANTIVERT) 25 mg tablet Take 1 tablet (25 mg total) by mouth 3 (three) times daily as needed for Dizziness. 30 tablet PRN    metoprolol succinate (TOPROL-XL) 50 MG 24 hr tablet Take 1 tablet (50 mg total) by mouth once daily. 90 tablet 0       SCHEDULED MEDS:   albuterol-ipratropium  3 mL Nebulization Q8H    aspirin  81 mg Oral Daily    budesonide  0.5 mg Nebulization Q12H    clopidogreL  75 mg Oral Daily    enoxaparin  30 mg Subcutaneous Daily    furosemide  20 mg Oral BID    isosorbide mononitrate  60 mg Oral Daily    magnesium oxide  800 mg Oral BID    metoprolol succinate  50 mg Oral Daily    pantoprazole  40 mg Oral Before breakfast    paroxetine  50 mg Oral Daily    QUEtiapine  100 mg Oral QHS       CONTINUOUS INFUSIONS:    PRN MEDS:acetaminophen, albuterol-ipratropium, ALPRAZolam, clonazePAM, dextrose 10%, dextrose 10%, glucagon (human recombinant), glucose, glucose, hydrALAZINE, melatonin, naloxone, ondansetron, polyethylene glycol, senna-docusate 8.6-50 mg, simethicone, sodium chloride 0.9%    LABS AND DIAGNOSTICS     CBC LAST 3 DAYS  Recent Labs   Lab 02/15/23  0438 02/16/23  0439 02/17/23  0429   WBC 7.25 7.90 8.97   RBC 3.20* 3.09* 2.89*   HGB 9.0* 8.7* 8.0*   HCT 30.9* 28.9* 26.7*   MCV 97 94 92   MCH 28.1 28.2 27.7   MCHC 29.1*  30.1* 30.0*   RDW 18.2* 18.3* 18.0*    200 202   MPV 12.6 10.1 10.2   GRAN 69.2  5.0 70.4  5.6 68.1  6.1   LYMPH 17.0*  1.2 14.7*  1.2 17.5*  1.6   MONO 8.4  0.6 7.5  0.6 8.6  0.8   BASO 0.05 0.04 0.04   NRBC 0 0 0         COAGULATION LAST 3 DAYS  Recent Labs   Lab 02/14/23 1412   INR 1.0   APTT 26.3         CHEMISTRY LAST 3 DAYS  Recent Labs   Lab 02/14/23  1412 02/15/23  0438 02/16/23 0439 02/17/23  0429   * 136 138 133*   K 4.1 3.9 3.8 3.5   CL 98 99 97 94*   CO2 26 28 34* 33*   ANIONGAP 8 9 7* 6*   BUN 16 16 19 35*   CREATININE 1.1 1.1 1.3 2.5*   * 102 107 105   CALCIUM 9.2 8.7 8.7 8.7   MG  --  1.0* 2.0 1.9   ALBUMIN 2.6*  --   --   --    PROT 6.4  --   --   --    ALKPHOS 95  --   --   --    ALT 12  --   --   --    AST 22  --   --   --    BILITOT 1.1*  --   --   --          CARDIAC PROFILE LAST 3 DAYS  Recent Labs   Lab 02/14/23 1412   BNP 1,002*         ENDOCRINE LAST 3 DAYS  No results for input(s): TSH, PROCAL in the last 168 hours.    LAST ARTERIAL BLOOD GAS  ABG  No results for input(s): PH, PO2, PCO2, HCO3, BE in the last 168 hours.    LAST 7 DAYS MICROBIOLOGY   Microbiology Results (last 7 days)       Procedure Component Value Units Date/Time    Blood culture #1 **CANNOT BE ORDERED STAT** [[id number]]  (Abnormal) Collected: 02/14/23 1412    Order Status: Completed Specimen: Blood from Peripheral, Antecubital, Left Updated: 02/16/23 0741     Blood Culture, Routine Gram stain aer bottle: Gram positive cocci      Results called to and read back by:Cristin Sneed RN-WINGB;  02/15/2023      12:38 CJD      COAGULASE-NEGATIVE STAPHYLOCOCCUS SPECIES  Organism is a probable contaminant      Rapid Organism ID by PCR (from Blood culture) [[id number]]  (Abnormal) Collected: 02/14/23 1412    Order Status: Completed Updated: 02/15/23 1345     Enterococcus faecials Not Detected     Enterococcus faecium Not Detected     Listeria Monocytogenes Not Detected     Staphylococcus spp. Detected      Staphylococcus aureus Not Detected     Staphylococcus epidermidis Not Detected     Staphylococcus lugdunensis Not Detected     Streptococcus species Not Detected     Streptococcus agalactiae Not Detected     Streptococcus pneumoniae Not Detected     Streptococcus pyogenes Not Detected     Acinetobacter calcoaceticus/baumannii complex Not Detected     Bacteroides fragilis Not Detected     Enterobacerales Not Detected     Enterobacter cloacae complex Not Detected     Escherichia Not Detected     Klebsiella aerogenes Not Detected     Klebsiella oxytoca Not Detected     Klebsiella pneumoniae group Not Detected     Proteus Not Detected     Salmonella sp Not Detected     Serratia marcescens Not Detected     Haemophilus influenzae Not Detected     Neisseria meningtidis Not Detected     Pseudomonas aeruginosa Not Detected     Stenotrophomonas maltophilia Not Detected     Candida albicans Not Detected     Candida auris Not Detected     Candida glabrata Not Detected     Candida krusei Not Detected     Candida Parapsilosis Not Detected     Candida Tropicalis Not Detected     Cryptococcus neoformans/gattii Not Detected     CTX-M Gene Test not applicable     IMP Gene Test not applicable     KPC  Test not applicable     mcr-1  Test not applicable     mec A/C Test not applicable     mec A/C and MREJ (MRSA) Test not applicable     NDM Test not applicable     OXA-48-like Test not applicable     van A/B Test not applicable     VIM Test not applicable            MOST RECENT IMAGING  X-Ray Chest AP Portable  Portable chest x-ray at 2:43 PM is compared to prior study dated 1/29/2023    Clinical history is dyspnea    The heart is enlarged. There are mild increased interstitial markings throughout the lungs suggestive of edema. There are no confluent infiltrates or pleural effusions. There are no acute osseous abnormalities.    IMPRESSION: Cardiomegaly with diffuse increased interstitial markings suggestive of edema    Electronically  signed by:  Penny Ribera MD  2/14/2023 3:07 PM [facility] Workstation: [id number]      ECHOCARDIOGRAM RESULTS (last 5)  Results for orders placed during the hospital encounter of 12/20/22    Echo    Interpretation Summary  · The left ventricle is normal in size with severe concentric hypertrophy and normal systolic function.  · Grade II left ventricular diastolic dysfunction.  · The estimated ejection fraction is 65%.  · Normal right ventricular size with normal right ventricular systolic function.  · Moderate aortic regurgitation.  · There is severe aortic valve stenosis.  · Aortic valve area is 0.94 cm2; peak velocity is 3.29 m/s; mean gradient is 26 mmHg.  · Mild pulmonic regurgitation.  · Mild tricuspid regurgitation.  · There is mild pulmonary hypertension.  · Normal central venous pressure (3 mmHg).  · The estimated PA systolic pressure is 44 mmHg.      Results for orders placed during the hospital encounter of 12/01/22    Echo    Interpretation Summary  · The left ventricle is normal in size with moderate concentric hypertrophy and normal systolic function.  · The estimated ejection fraction is 55%.  · Grade I left ventricular diastolic dysfunction.  · Normal right ventricular size with normal right ventricular systolic function.  · Severe left atrial enlargement.  · Moderate right atrial enlargement.  · Mild aortic regurgitation.  · There is moderate-to-severe aortic valve stenosis.  · Aortic valve area is 1.16 cm2; peak velocity is 3.59 m/s; mean gradient is 31 mmHg.  · Moderate tricuspid regurgitation.  · Normal central venous pressure (3 mmHg).  · The estimated PA systolic pressure is 53 mmHg.  · There is pulmonary hypertension.      Results for orders placed in visit on 06/02/22    Echo    Interpretation Summary  · The left ventricle is normal in size with concentric hypertrophy and normal systolic function.  · The estimated ejection fraction is 55%.  · Grade II left ventricular diastolic dysfunction.  ·  Normal right ventricular size with normal right ventricular systolic function.  · Severe left atrial enlargement.  · Mild right atrial enlargement.  · The aortic valve is trileaflet but malformed.  · Moderate aortic regurgitation.  · There is moderate-to-severe aortic valve stenosis.  · Aortic valve area is 1.00 cm2; peak velocity is 3.38 m/s; mean gradient is 29 mmHg.  · Mild mitral regurgitation.  · Mild to moderate tricuspid regurgitation.  · Normal central venous pressure (3 mmHg).  · The estimated PA systolic pressure is 51 mmHg.  · There is pulmonary hypertension.      Results for orders placed in visit on 06/23/20    Echo Color Flow Doppler? Yes    Interpretation Summary  · The left ventricle is normal in size with mild concentric hypertrophy and normal systolic function.  · The estimated ejection fraction is 55%.  · Grade II left ventricular diastolic dysfunction.  · Normal right ventricular size with normal right ventricular systolic function.  · Moderate left atrial enlargement.  · There is moderate aortic valve stenosis.  · Aortic valve area is 1.17 cm2; peak velocity is 2.98 m/s; mean gradient is 21 mmHg.  · Mild aortic regurgitation.  · Mild mitral regurgitation.  · Normal central venous pressure (3 mmHg).  · The estimated PA systolic pressure is 41 mmHg.      Results for orders placed in visit on 06/22/20    Echo Color Flow Doppler? Yes    Interpretation Summary  · Normal left ventricular systolic function. The estimated ejection fraction is 55%.  · Mild eccentric left ventricular hypertrophy.  · Grade II (moderate) left ventricular diastolic dysfunction consistent with pseudonormalization.  · Normal right ventricular systolic function.  · Mild aortic regurgitation.  · Moderate aortic valve stenosis.  · Aortic valve area is 1.09 cm2; peak velocity is 2.99 m/s; mean gradient is 22 mmHg.  · Normal central venous pressure (3 mmHg).  · The estimated PA systolic pressure is 35 mmHg.      CURRENT/PREVIOUS  VISIT EKG  Results for orders placed or performed during the hospital encounter of 02/14/23   EKG 12-lead    Collection Time: 02/14/23  3:11 PM    Narrative    Test Reason : R06.00,    Vent. Rate : 093 BPM     Atrial Rate : 093 BPM     P-R Int : 140 ms          QRS Dur : 086 ms      QT Int : 366 ms       P-R-T Axes : 040 003 -08 degrees     QTc Int : 455 ms    Normal sinus rhythm  Moderate voltage criteria for LVH, may be normal variant  Inferior infarct (cited on or before 01-JAN-2023)  Abnormal ECG  When compared with ECG of 25-JAN-2023 13:31,  T wave inversion now evident in Anterior leads    Referred By: [email or web address]   SELF           Confirmed By:            ASSESSMENT/PLAN:     Active Hospital Problems    Diagnosis    *Acute on chronic heart failure with preserved ejection fraction    Severe obesity with body mass index (BMI) of 35.0 to 39.9 with serious comorbidity    Valvular heart disease, severe aortic stenosis, moderate aortic regurgitation     moderate      Essential hypertension       ASSESSMENT & PLAN:   Acute on chronic CHF (HFpEF)  Severe aortic stenosis  Moderate Aortic regurgitation  HTN  Asbestosis  Anemia  COPD  Hx CKD 3      RECOMMENDATIONS:  -HFpEF: Efx 55%. Stopped lasix d/t creatinine increase from 1.3 to 2.5.   Recheck BMP in AM and avoid nephrotoxic agents. Pt has hx of CKD, recommend nephrology consult for CARMELITA.   Continue metoprolol 50 mg daily,   Continue low sodium diet, strict I/o and daily weight.    -BP well controlled in past 24 hrs  Continue isosorbide mononitrate 60 mg daily, aspirin 81 mg daily and plavix 75 mg daily    -Management of AS per Dr. Joesph Borjas in [place] from patient's primary cardiologist for many years who has been monitoring her clinical symptoms in terms of aortic stenosis      Chetna Gutierrez NP  [facility]  Department of Cardiology  Date of Service: 02/17/2023      Patient is developing progressive acute renal insufficiency.  Recommend as  above to withdraw all nephrotoxic drugs as well as diuretics consider cautious hydration at 50 mL an hour and nephrology consultation.  Monitor labs and daily weights  I have personally interviewed and examined the patient, I have reviewed the Nurse Practitioner's history and physical, assessment, and plan. I agree with the findings and plan.      Robert Roca M.D.  [facility]  Department of Cardiology  Date of Service: 02/17/2023  9:31 AM

## 2023-02-17 NOTE — CARE UPDATE
02/16/23 2028   Patient Assessment/Suction   Level of Consciousness (AVPU) alert   Respiratory Effort Normal;Unlabored   Expansion/Accessory Muscles/Retractions no use of accessory muscles   All Lung Fields Breath Sounds diminished   Rhythm/Pattern, Respiratory unlabored;pattern regular   Cough Frequency infrequent   Cough Type dry   PRE-TX-O2   Device (Oxygen Therapy) nasal cannula with humidification   $ Is the patient on Low Flow Oxygen? Yes   Flow (L/min) 7   SpO2 96 %   Pulse 90   Resp 18   Aerosol Therapy   $ Aerosol Therapy Charges Aerosol Treatment   Daily Review of Necessity (SVN) completed   Respiratory Treatment Status (SVN) given   Treatment Route (SVN) mask;oxygen   Patient Position (SVN) sitting in chair   Post Treatment Assessment (SVN) increased aeration   Signs of Intolerance (SVN) none   Education   $ Education Bronchodilator;15 min   Respiratory Evaluation   $ Care Plan Tech Time 15 min   $ Eval/Re-eval Charges Evaluation

## 2023-02-17 NOTE — PROGRESS NOTES
Pharmacist Renal Dose Adjustment Note    Betty Bacon is a 73 y.o. female being treated with the medication enoxaparin    Patient Data:    Vital Signs (Most Recent):  Temp: 97.4 °F (36.3 °C) (02/17/23 0830)  Pulse: 109 (02/17/23 0830)  Resp: 17 (02/17/23 0830)  BP: (!) 110/53 (02/17/23 0830)  SpO2: (!) 89 % (02/17/23 0830)   Vital Signs (72h Range):  Temp:  [97.2 °F (36.2 °C)-99.2 °F (37.3 °C)]   Pulse:  []   Resp:  [15-32]   BP: (102-213)/(47-89)   SpO2:  [69 %-97 %]      Recent Labs   Lab 02/15/23  0438 02/16/23  0439 02/17/23 0429   CREATININE 1.1 1.3 2.5*     Serum creatinine: 2.5 mg/dL (H) 02/17/23 0429  Estimated creatinine clearance: 19.9 mL/min (A)    Enoxaparin 40 mg SC daily will be changed to enoxaparin 30 mg SC daily    Pharmacist's Name: Nickie Moreno  Pharmacist's Extension: [phone]

## 2023-02-17 NOTE — CARE UPDATE
02/17/23 0741   Patient Assessment/Suction   Level of Consciousness (AVPU) alert   Respiratory Effort Unlabored   Expansion/Accessory Muscles/Retractions no use of accessory muscles   All Lung Fields Breath Sounds diminished   Rhythm/Pattern, Respiratory unlabored;shortness of breath   Cough Frequency infrequent   PRE-TX-O2   Device (Oxygen Therapy) nasal cannula with humidification   $ Is the patient on Low Flow Oxygen? Yes   Flow (L/min) 5   SpO2 (!) 90 %   Pulse Oximetry Type Intermittent   $ Pulse Oximetry - Multiple Charge Pulse Oximetry - Multiple   Pulse 81   Resp 16   Aerosol Therapy   $ Aerosol Therapy Charges Aerosol Treatment   Daily Review of Necessity (SVN) completed   Respiratory Treatment Status (SVN) given   Treatment Route (SVN) mask;oxygen   Patient Position (SVN) HOB elevated   Post Treatment Assessment (SVN) breath sounds unchanged   Signs of Intolerance (SVN) none   Respiratory Evaluation   $ Care Plan Tech Time 15 min

## 2023-02-18 LAB
ANION GAP SERPL CALC-SCNC: 7 MMOL/L (ref 8–16)
BACTERIA #/AREA URNS HPF: NEGATIVE /HPF
BASOPHILS # BLD AUTO: 0.04 K/UL (ref 0–0.2)
BASOPHILS NFR BLD: 0.4 % (ref 0–1.9)
BILIRUB UR QL STRIP: NEGATIVE
BUN SERPL-MCNC: 44 MG/DL (ref 8–23)
CALCIUM SERPL-MCNC: 8.6 MG/DL (ref 8.7–10.5)
CHLORIDE SERPL-SCNC: 95 MMOL/L (ref 95–110)
CLARITY UR: ABNORMAL
CO2 SERPL-SCNC: 32 MMOL/L (ref 23–29)
COLOR UR: YELLOW
CREAT SERPL-MCNC: 2.6 MG/DL (ref 0.5–1.4)
DIFFERENTIAL METHOD: ABNORMAL
EOSINOPHIL # BLD AUTO: 0.4 K/UL (ref 0–0.5)
EOSINOPHIL NFR BLD: 4.2 % (ref 0–8)
ERYTHROCYTE [DISTWIDTH] IN BLOOD BY AUTOMATED COUNT: 18.1 % (ref 11.5–14.5)
EST. GFR  (NO RACE VARIABLE): 18.7 ML/MIN/1.73 M^2
GLUCOSE SERPL-MCNC: 110 MG/DL (ref 70–110)
GLUCOSE UR QL STRIP: NEGATIVE
HCT VFR BLD AUTO: 24.3 % (ref 37–48.5)
HCT VFR BLD AUTO: 27.1 % (ref 37–48.5)
HGB BLD-MCNC: 7.4 G/DL (ref 12–16)
HGB BLD-MCNC: 8.1 G/DL (ref 12–16)
HGB UR QL STRIP: NEGATIVE
HYALINE CASTS #/AREA URNS LPF: 29 /LPF
IMM GRANULOCYTES # BLD AUTO: 0.18 K/UL (ref 0–0.04)
IMM GRANULOCYTES NFR BLD AUTO: 1.9 % (ref 0–0.5)
KETONES UR QL STRIP: ABNORMAL
LEUKOCYTE ESTERASE UR QL STRIP: ABNORMAL
LYMPHOCYTES # BLD AUTO: 1.2 K/UL (ref 1–4.8)
LYMPHOCYTES NFR BLD: 12.7 % (ref 18–48)
MAGNESIUM SERPL-MCNC: 2.1 MG/DL (ref 1.6–2.6)
MCH RBC QN AUTO: 27.9 PG (ref 27–31)
MCHC RBC AUTO-ENTMCNC: 30.5 G/DL (ref 32–36)
MCV RBC AUTO: 92 FL (ref 82–98)
MICROSCOPIC COMMENT: ABNORMAL
MONOCYTES # BLD AUTO: 0.8 K/UL (ref 0.3–1)
MONOCYTES NFR BLD: 8.1 % (ref 4–15)
NEUTROPHILS # BLD AUTO: 7 K/UL (ref 1.8–7.7)
NEUTROPHILS NFR BLD: 72.7 % (ref 38–73)
NITRITE UR QL STRIP: NEGATIVE
NRBC BLD-RTO: 0 /100 WBC
PH UR STRIP: 5 [PH] (ref 5–8)
PLATELET # BLD AUTO: 216 K/UL (ref 150–450)
PMV BLD AUTO: 10.3 FL (ref 9.2–12.9)
POTASSIUM SERPL-SCNC: 4.2 MMOL/L (ref 3.5–5.1)
PROT UR QL STRIP: ABNORMAL
RBC # BLD AUTO: 2.65 M/UL (ref 4–5.4)
RBC #/AREA URNS HPF: 5 /HPF (ref 0–4)
SODIUM SERPL-SCNC: 134 MMOL/L (ref 136–145)
SP GR UR STRIP: 1.02 (ref 1–1.03)
SQUAMOUS #/AREA URNS HPF: 12 /HPF
URN SPEC COLLECT METH UR: ABNORMAL
UROBILINOGEN UR STRIP-ACNC: NEGATIVE EU/DL
WBC # BLD AUTO: 9.63 K/UL (ref 3.9–12.7)
WBC #/AREA URNS HPF: 4 /HPF (ref 0–5)

## 2023-02-18 PROCEDURE — 25000242 PHARM REV CODE 250 ALT 637 W/ HCPCS: Performed by: INTERNAL MEDICINE

## 2023-02-18 PROCEDURE — 99900035 HC TECH TIME PER 15 MIN (STAT)

## 2023-02-18 PROCEDURE — 27000221 HC OXYGEN, UP TO 24 HOURS

## 2023-02-18 PROCEDURE — 36415 COLL VENOUS BLD VENIPUNCTURE: CPT | Performed by: INTERNAL MEDICINE

## 2023-02-18 PROCEDURE — 80048 BASIC METABOLIC PNL TOTAL CA: CPT | Performed by: INTERNAL MEDICINE

## 2023-02-18 PROCEDURE — 85025 COMPLETE CBC W/AUTO DIFF WBC: CPT | Performed by: INTERNAL MEDICINE

## 2023-02-18 PROCEDURE — 85018 HEMOGLOBIN: CPT | Performed by: INTERNAL MEDICINE

## 2023-02-18 PROCEDURE — 99900031 HC PATIENT EDUCATION (STAT)

## 2023-02-18 PROCEDURE — 94761 N-INVAS EAR/PLS OXIMETRY MLT: CPT

## 2023-02-18 PROCEDURE — 25000003 PHARM REV CODE 250: Performed by: INTERNAL MEDICINE

## 2023-02-18 PROCEDURE — 81001 URINALYSIS AUTO W/SCOPE: CPT | Performed by: NURSE PRACTITIONER

## 2023-02-18 PROCEDURE — 94640 AIRWAY INHALATION TREATMENT: CPT

## 2023-02-18 PROCEDURE — 99233 PR SUBSEQUENT HOSPITAL CARE,LEVL III: ICD-10-PCS | Mod: ,,, | Performed by: SPECIALIST

## 2023-02-18 PROCEDURE — 85014 HEMATOCRIT: CPT | Performed by: INTERNAL MEDICINE

## 2023-02-18 PROCEDURE — 12000002 HC ACUTE/MED SURGE SEMI-PRIVATE ROOM

## 2023-02-18 PROCEDURE — 63600175 PHARM REV CODE 636 W HCPCS: Performed by: INTERNAL MEDICINE

## 2023-02-18 PROCEDURE — 99233 SBSQ HOSP IP/OBS HIGH 50: CPT | Mod: ,,, | Performed by: SPECIALIST

## 2023-02-18 PROCEDURE — 83735 ASSAY OF MAGNESIUM: CPT | Performed by: INTERNAL MEDICINE

## 2023-02-18 RX ORDER — ACETAMINOPHEN 500 MG
1000 TABLET ORAL EVERY 6 HOURS
Status: COMPLETED | OUTPATIENT
Start: 2023-02-18 | End: 2023-02-19

## 2023-02-18 RX ORDER — LANOLIN ALCOHOL/MO/W.PET/CERES
1 CREAM (GRAM) TOPICAL DAILY
Status: DISCONTINUED | OUTPATIENT
Start: 2023-02-18 | End: 2023-02-22 | Stop reason: HOSPADM

## 2023-02-18 RX ORDER — LANOLIN ALCOHOL/MO/W.PET/CERES
1000 CREAM (GRAM) TOPICAL DAILY
Status: DISCONTINUED | OUTPATIENT
Start: 2023-02-18 | End: 2023-02-22 | Stop reason: HOSPADM

## 2023-02-18 RX ADMIN — FERROUS SULFATE TAB 325 MG (65 MG ELEMENTAL FE) 1 EACH: 325 (65 FE) TAB at 02:02

## 2023-02-18 RX ADMIN — Medication 800 MG: at 09:02

## 2023-02-18 RX ADMIN — IPRATROPIUM BROMIDE AND ALBUTEROL SULFATE 3 ML: .5; 3 SOLUTION RESPIRATORY (INHALATION) at 02:02

## 2023-02-18 RX ADMIN — BUDESONIDE 0.5 MG: 0.5 INHALANT RESPIRATORY (INHALATION) at 08:02

## 2023-02-18 RX ADMIN — CLOPIDOGREL BISULFATE 75 MG: 75 TABLET, FILM COATED ORAL at 09:02

## 2023-02-18 RX ADMIN — ACETAMINOPHEN 1000 MG: 500 TABLET ORAL at 02:02

## 2023-02-18 RX ADMIN — QUETIAPINE 100 MG: 100 TABLET ORAL at 09:02

## 2023-02-18 RX ADMIN — IPRATROPIUM BROMIDE AND ALBUTEROL SULFATE 3 ML: .5; 3 SOLUTION RESPIRATORY (INHALATION) at 08:02

## 2023-02-18 RX ADMIN — CYANOCOBALAMIN TAB 1000 MCG 1000 MCG: 1000 TAB at 02:02

## 2023-02-18 RX ADMIN — ACETAMINOPHEN 1000 MG: 500 TABLET ORAL at 11:02

## 2023-02-18 RX ADMIN — PANTOPRAZOLE SODIUM 40 MG: 40 TABLET, DELAYED RELEASE ORAL at 05:02

## 2023-02-18 RX ADMIN — IPRATROPIUM BROMIDE AND ALBUTEROL SULFATE 3 ML: .5; 3 SOLUTION RESPIRATORY (INHALATION) at 07:02

## 2023-02-18 RX ADMIN — ISOSORBIDE MONONITRATE 60 MG: 60 TABLET, EXTENDED RELEASE ORAL at 09:02

## 2023-02-18 RX ADMIN — ASPIRIN 81 MG: 81 TABLET, COATED ORAL at 09:02

## 2023-02-18 RX ADMIN — BUDESONIDE 0.5 MG: 0.5 INHALANT RESPIRATORY (INHALATION) at 07:02

## 2023-02-18 RX ADMIN — ENOXAPARIN SODIUM 30 MG: 30 INJECTION SUBCUTANEOUS at 05:02

## 2023-02-18 RX ADMIN — PAROXETINE HYDROCHLORIDE 50 MG: 20 TABLET, FILM COATED ORAL at 09:02

## 2023-02-18 RX ADMIN — CLONAZEPAM 1 MG: 1 TABLET ORAL at 10:02

## 2023-02-18 NOTE — PLAN OF CARE
02/18/23 0830   Patient Assessment/Suction   Level of Consciousness (AVPU) alert   Respiratory Effort Normal;Unlabored   All Lung Fields Breath Sounds diminished   Rhythm/Pattern, Respiratory pattern regular;depth regular;unlabored   PRE-TX-O2   Device (Oxygen Therapy) nasal cannula with humidification   $ Is the patient on Low Flow Oxygen? Yes   Flow (L/min) 5   SpO2 (!) 92 %   Pulse Oximetry Type Intermittent   $ Pulse Oximetry - Multiple Charge Pulse Oximetry - Multiple   Pulse 85   Resp 18   Aerosol Therapy   $ Aerosol Therapy Charges Aerosol Treatment   Daily Review of Necessity (SVN) completed   Respiratory Treatment Status (SVN) given   Treatment Route (SVN) mask;oxygen   Patient Position (SVN) sitting in chair   Post Treatment Assessment (SVN) breath sounds improved   Signs of Intolerance (SVN) none   Education   $ Education Bronchodilator;15 min   Respiratory Evaluation   $ Care Plan Tech Time 15 min

## 2023-02-18 NOTE — CONSULTS
"[facility]  Adult Nutrition   Consult Note (Nutrition Education)     SUMMARY     Recommendations  1.) Continue cardiac diet restrictions.   2.) Monitor renal function.   3.)  to aid in food preferences.    Goals:   1.) Pt to consume/tolerate >75% of meals.   2.) Renal function to return to stabilized reference range.  Nutrition Goal Status: new    Dietitian Rounds Brief  Pt presents emergency room with complaint shortness breath. Noted worsening renal function and anemia. Pt reports fair to good appetite. Chart confirms with 100% intakes. She denies chew/swallowing issues. No GI distress. LBM 2/16. Skin intact. Wt reviewed. NFPE completed with no s/s of wasting. Pt does not meet malnutrition criteria. Provided wt loss education and encourage exercise. Provided a 1800 kcals 5 day menu plan. Pt verbalized understanding. Pt with cardiac education 2/15/23. She voiced understanding with no additional questions.    Diet order:   Current Diet Order: cardiac      Evaluation of Received Nutrient/Fluid Intake  Energy Calories Required: meeting needs  Protein Required: meeting needs  Fluid Required: meeting needs  Tolerance: tolerating     % Intake of Estimated Energy Needs: 75 - 100 %  % Meal Intake: 75 - 100 %      Intake/Output Summary (Last 24 hours) at 2/18/2023 0912  Last data filed at 2/18/2023 0735  Gross per 24 hour   Intake --   Output 300 ml   Net -300 ml        Anthropometrics  Temp: 97.4 °F (36.3 °C)  Height Method: Stated  Height: 5' 2" (157.5 cm)  Height (inches): 62 in  Weight Method: Bed Scale  Weight: 93.2 kg (205 lb 7.5 oz)  Weight (lb): 205.47 lb  Ideal Body Weight (IBW), Female: 110 lb  % Ideal Body Weight, Female (lb): 177.97 %  BMI (Calculated): 37.6  BMI Grade: 35 - 39.9 - obesity - grade II       Estimated/Assessed Needs  Weight Used For Calorie Calculations: 93.2 kg (205 lb 7.5 oz)  Energy Calorie Requirements (kcal): 6380-1250  Energy Need Method: Kcal/kg (20-25)  Protein " Requirements: 50-60 (1.0-1.2)  Weight Used For Protein Calculations: 50 kg (110 lb 3.7 oz) (IBW)  Fluid Requirements (mL): 1795-3736     RDA Method (mL): 1864       Reason for Assessment  Reason For Assessment: consult, length of stay (wt loss education)  Diagnosis: cardiac disease  Relevant Medical History: depression, PVD, HLD, CAD, COPD    Nutrition/Diet History  Food Allergies: NKFA  Factors Affecting Nutritional Intake: None identified at this time    Nutrition Risk Screen  Nutrition Risk Screen: no indicators present     MST Score: 3  Have you recently lost weight without trying?: Unsure  Weight loss score: 2  Have you been eating poorly because of a decreased appetite?: Yes  Appetite score: 1       Weight History:  Wt Readings from Last 5 Encounters:   02/18/23 93.2 kg (205 lb 7.5 oz)   01/26/23 88.3 kg (194 lb 10.7 oz)   01/20/23 90.6 kg (199 lb 11.8 oz)   01/10/23 89.5 kg (197 lb 5 oz)   01/04/23 89.7 kg (197 lb 12 oz)        Lab/Procedures/Meds: Pertinent Labs/Meds Reviewed    Medications:Pertinent Medications Reviewed  Scheduled Meds:   albuterol-ipratropium  3 mL Nebulization Q6H WAKE    aspirin  81 mg Oral Daily    budesonide  0.5 mg Nebulization Q12H    clopidogreL  75 mg Oral Daily    enoxaparin  30 mg Subcutaneous Daily    isosorbide mononitrate  60 mg Oral Daily    magnesium oxide  800 mg Oral BID    metoprolol succinate  50 mg Oral Daily    pantoprazole  40 mg Oral Before breakfast    paroxetine  50 mg Oral Daily    QUEtiapine  100 mg Oral QHS     Continuous Infusions:   sodium chloride 0.9% 50 mL/hr at 02/17/23 1642     PRN Meds:.acetaminophen, albuterol-ipratropium, ALPRAZolam, clonazePAM, dextrose 10%, dextrose 10%, glucagon (human recombinant), glucose, glucose, hydrALAZINE, melatonin, naloxone, ondansetron, polyethylene glycol, senna-docusate 8.6-50 mg, simethicone, sodium chloride 0.9%    Labs: Pertinent Labs Reviewed  Clinical Chemistry:  Recent Labs   Lab 02/14/23  1412 02/15/23  0599  02/16/23  0439 02/18/23  0424   * 136   < > 134*   K 4.1 3.9   < > 4.2   CL 98 99   < > 95   CO2 26 28   < > 32*   * 102   < > 110   BUN 16 16   < > 44*   CREATININE 1.1 1.1   < > 2.6*   CALCIUM 9.2 8.7   < > 8.6*   PROT 6.4  --   --   --    ALBUMIN 2.6*  --   --   --    BILITOT 1.1*  --   --   --    ALKPHOS 95  --   --   --    AST 22  --   --   --    ALT 12  --   --   --    ANIONGAP 8 9   < > 7*   MG  --  1.0*   < > 2.1   PHOS  --  4.0  --   --     < > = values in this interval not displayed.     CBC:   Recent Labs   Lab 02/18/23 0424   WBC 9.63   RBC 2.65*   HGB 7.4*   HCT 24.3*      MCV 92   MCH 27.9   MCHC 30.5*     Cardiac Profile:  Recent Labs   Lab 02/14/23  1412   BNP 1,002*       Monitor and Evaluation  Food and Nutrient Intake: energy intake, food and beverage intake  Food and Nutrient Adminstration: diet order  Knowledge/Beliefs/Attitudes: food and nutrition knowledge/skill  Physical Activity and Function: nutrition-related ADLs and IADLs  Anthropometric Measurements: weight, weight change  Biochemical Data, Medical Tests and Procedures: electrolyte and renal panel, gastrointestinal profile, glucose/endocrine profile  Nutrition-Focused Physical Findings: overall appearance     Nutrition Risk  Level of Risk/Frequency of Follow-up: low - moderate     Nutrition Follow-Up  RD Follow-up?: Yes      Stefanie Maier, CHRISTA 02/18/2023 9:12 AM

## 2023-02-18 NOTE — CONSULTS
[facility]  Cardiology  Progress Note    Patient Name: Betty Bacon  MRN: [id number]  Admission Date: 2/14/2023  Hospital Length of Stay: 3 days  Code Status: Full Code   Attending Physician: Bashir Bermudez MD   Primary Care Physician: Johanne Callejas MD  Expected Discharge Date: 2/17/2023  Principal Problem:Chronic diastolic heart failure    Subjective:     Hospital Course:  Patient is still short of breath    Interval History:  Problem 1 she is had previous coronary stenting of the right coronary artery in 2012 and 2014 catheterization demonstrated patent arteries including LAD and circumflex  She is not having chest pain and ejection fraction well maintained  2. She is had progressive aortic valve stenosis and current stroke volume index is less than 35 and dimensionless index is 25% and peak aortic velocity 3.67 which is increased  She has heart failure preserved ejection fraction secondary to aortic valve disease and obesity  3. She had a normal renal function on admission but did receive contrast for CT angiogram and her creatinine has risen after diuresis  She still is in heart failure  4. She is dietary noncompliant and eats a lot of Mexican and Chinese food and is not on a strict low-sodium diet  5. She is had right renal artery stenting back in 2014  No recent ultrasound of this right renal artery     Review of Systems   Constitutional: Positive for weight gain.   HENT: Negative.     Eyes: Negative.    Cardiovascular:  Positive for dyspnea on exertion, irregular heartbeat, leg swelling and orthopnea.   Respiratory:  Positive for shortness of breath.    Skin:  Positive for dry skin.   Musculoskeletal:  Positive for arthritis and joint pain.   Gastrointestinal:  Positive for heartburn.   Genitourinary:  Positive for bladder incontinence and frequency. Negative for flank pain.   Objective:     Vital Signs (Most Recent):  Temp: 97.5 °F (36.4 °C) (02/18/23 1100)  Pulse: 87 (02/18/23  1423)  Resp: 18 (23 1423)  BP: (!) 121/55 (23 1100)  SpO2: 98 % (23 1423)   Vital Signs (24h Range):  Temp:  [97.4 °F (36.3 °C)-98 °F (36.7 °C)] 97.5 °F (36.4 °C)  Pulse:  [80-98] 87  Resp:  [18-22] 18  SpO2:  [87 %-98 %] 98 %  BP: (110-165)/(45-69) 121/55     Weight: 93.2 kg (205 lb 7.5 oz)  Body mass index is 37.58 kg/m².    SpO2: 98 %         Intake/Output Summary (Last 24 hours) at 2023 1600  Last data filed at 2023 0735  Gross per 24 hour   Intake --   Output 300 ml   Net -300 ml       Lines/Drains/Airways       Drain  Duration             Female External Urinary Catheter 23 2045 3 days              Peripheral Intravenous Line  Duration                  Peripheral IV - Single Lumen 23 1412 20 G Left Antecubital 4 days                    Physical Exam blood pressure 122/55  Carotid bruits  Lungs reveal rales posteriorly bilaterally  Cardiac 3 to 4/6 systolic ejection murmur aortic area left sternal border  Abdomen obese  Extremities +2 edema  Slight decreased pulses    Significant Labs: CMP   Recent Labs   Lab 23  0429 23  1358 23  0424   * 134* 134*   K 3.5 4.3 4.2   CL 94* 93* 95   CO2 33* 33* 32*    165* 110   BUN 35* 41* 44*   CREATININE 2.5* 2.7* 2.6*   CALCIUM 8.7 9.1 8.6*   ANIONGAP 6* 8 7*   , CBC   Recent Labs   Lab 23  0429 23  0424 23  1415   WBC 8.97 9.63  --    HGB 8.0* 7.4* 8.1*   HCT 26.7* 24.3* 27.1*    216  --    , and Troponin No results for input(s): TROPONINI in the last 48 hours.    Significant Imagin  Assessment and Plan:   Patient has moderately severe to severe aortic valve stenosis with heart failure preserved ejection fraction exacerbated by her weight and dietary indiscretion with sodium including Mexican in Chinese food  1. When renal function improves she needs more diuresis or if renal function continues to deteriorate may need ultrafiltration dialysis  2. Check renal artery  ultrasound to check status right renal artery stent  3. She very well may be candidate for aortic valve replacement in light of her congestive failure-she possibly might be candidate for TAVR although velocity is not over 4  Instructed her on a low-sodium diet   Brief HPI:     Active Diagnoses:    Diagnosis Date Noted POA    PRINCIPAL PROBLEM:  Acute on chronic heart failure with preserved ejection fraction [I50.32] 10/29/2019 Yes    Severe obesity with body mass index (BMI) of 35.0 to 39.9 with serious comorbidity [E66.01] 06/07/2018 Yes    Valvular heart disease, severe aortic stenosis, moderate aortic regurgitation [I35.0] 05/16/2013 Yes     Chronic    Essential hypertension [I10]  Yes     Chronic      Problems Resolved During this Admission:   I substantially and  personally reviewed old and new ecg's, lab reports,, xray reports  and  other cardiovascular studies including  echo's, stress tests, angiogram reports, holters,and vascular studies .  In addition I evaluated original cardiac cath  _x__echo  ( severe as/ef 65/+PH ) ___x _cxr ( chf)______ct ____scan on PRX or DZZOM or other viewing platforms and  ____EKG's . ( Old inf ,  lvh ) x   I reviewed  office and hospital notes Yes ____ of  referring providers and other providers.  I reviewed personally old hospital and office notes   Time spent evaluating and managing this patient:_____45___min.         VTE Risk Mitigation (From admission, onward)           Ordered     enoxaparin injection 30 mg  Daily         02/17/23 0859     IP VTE HIGH RISK PATIENT  Once         02/14/23 2008     Place sequential compression device  Until discontinued         02/14/23 2008                    Aleksey Andre MD  Cardiology  [facility]

## 2023-02-18 NOTE — PROGRESS NOTES
[facility] Medicine  Progress Note    Patient name: Betty Bacon  MRN: [id number]  Admit Date: 2/14/2023   LOS: 2 days     SUBJECTIVE:     Principal problem: Chronic diastolic heart failure    Interval History:  Patient states she is feeling well. Has Miguelangel.    Scheduled Meds:   albuterol-ipratropium  3 mL Nebulization Q6H WAKE    aspirin  81 mg Oral Daily    budesonide  0.5 mg Nebulization Q12H    clopidogreL  75 mg Oral Daily    enoxaparin  30 mg Subcutaneous Daily    isosorbide mononitrate  60 mg Oral Daily    magnesium oxide  800 mg Oral BID    metoprolol succinate  50 mg Oral Daily    pantoprazole  40 mg Oral Before breakfast    paroxetine  50 mg Oral Daily    QUEtiapine  100 mg Oral QHS     Continuous Infusions:   sodium chloride 0.9% 50 mL/hr at 02/17/23 1642     PRN Meds:acetaminophen, albuterol-ipratropium, ALPRAZolam, clonazePAM, dextrose 10%, dextrose 10%, glucagon (human recombinant), glucose, glucose, hydrALAZINE, melatonin, naloxone, ondansetron, polyethylene glycol, senna-docusate 8.6-50 mg, simethicone, sodium chloride 0.9%    Review of patient's allergies indicates:   Allergen Reactions    Propoxyphene n-acetaminophen Other (See Comments)     Other reaction(s): Unknown    Codeine Anxiety       Review of Systems: As per interval history    OBJECTIVE:     Vital Signs (Most Recent)  Temp: 97.9 °F (36.6 °C) (02/17/23 2006)  Pulse: 91 (02/17/23 2006)  Resp: 18 (02/17/23 2006)  BP: (!) 117/53 (77) (02/17/23 2006)  SpO2: (!) 87 % (02/17/23 2006)    Vital Signs Range (Last 24H):  Temp:  [97.2 °F (36.2 °C)-97.9 °F (36.6 °C)]   Pulse:  []   Resp:  [16-19]   BP: ()/(50-60)   SpO2:  [87 %-97 %]     I & O (Last 24H):  Intake/Output Summary (Last 24 hours) at 2/17/2023 2221  Last data filed at 2/17/2023 0834  Gross per 24 hour   Intake 240 ml   Output 350 ml   Net -110 ml        Physical Exam:  General: Patient resting comfortably in no acute distress. Appears as stated  age. Calm  Eyes: No conjunctival injection. No scleral icterus.  ENT: Hearing grossly intact. No discharge from ears. No nasal discharge.   Neck: Supple, trachea midline. No JVD  CVS: RRR. No LE edema BL  Lungs:  No tachypnea or accessory muscle use.  Clear to auscultation bilaterally  Abdomen:  Soft, nontender and nondistended.  No organomegaly  Neuro: AOx3. Moves all extremities. Follows commands. Responds appropriately   Skin:  No rash or erythema noted       Laboratory:  I have reviewed all pertinent lab results within the past 24 hours.  CBC:   Recent Labs   Lab 02/17/23  0429   WBC 8.97   RBC 2.89*   HGB 8.0*   HCT 26.7*      MCV 92   MCH 27.7   MCHC 30.0*     BMP:   Recent Labs   Lab 02/17/23  1358   *   *   K 4.3   CL 93*   CO2 33*   BUN 41*   CREATININE 2.7*   CALCIUM 9.1   MG 2.2     CMP:   Recent Labs   Lab 02/14/23  1412 02/15/23  0438 02/17/23  1358   *   < > 165*   CALCIUM 9.2   < > 9.1   ALBUMIN 2.6*  --   --    PROT 6.4  --   --    *   < > 134*   K 4.1   < > 4.3   CO2 26   < > 33*   CL 98   < > 93*   BUN 16   < > 41*   CREATININE 1.1   < > 2.7*   ALKPHOS 95  --   --    ALT 12  --   --    AST 22  --   --    BILITOT 1.1*  --   --     < > = values in this interval not displayed.       Diagnostic Results:  Labs: Reviewed    ASSESSMENT/PLAN:      * SOB/Hypoxia due to Acute on chronic heart failure with preserved ejection fraction/ ILD/Asbestosis  Reason for admission  Acute on chronic diastolic dysfunction  Congestive heart failure  EF of 60%  Patient had her Lasix dose recently cut down at home.  cardiology d/lobo Lasix  PO due to CARMELITA, NS at 50 ml/l, consider nephrology consult, we will do, input appreciated.   Continue with aspirin 81 mg a day clopidogrel 75 mg a day Imdur 60 mg a day metoprolol 50 mg daily.  Magnesium replacement and rechecked tomorrow morning patient has chronic hypomagnesemia and is taking p.o. at home per records.    Case discusssed with Dr. Toro  (pulmonology) today, input appreciated.     Severe obesity with body mass index (BMI) of 35.0 to 39.9 with serious comorbidity  Body mass index is 35.81 kg/m². Morbid obesity complicates all aspects of disease management from diagnostic modalities to treatment. Weight loss encouraged and health benefits explained to patient.                   Valvular heart disease, severe aortic stenosis, moderate aortic regurgitation  Aortic stenosis with valve area of 1 cm2  Tricuspid regurg  Echo shows preserved systolic function  Echo shows diastolic dysfunction  Case disucssed with dr hull today, input appreciated.     Essential hypertension  Monitor and treat  Probably etiology of poorly controlled pulmonary edema     Continue with home medications of pantoprazole paroxetine and quetiapine                 VTE Risk Mitigation (From admission, onward)           Ordered       enoxaparin injection 40 mg  Daily         02/14/23 2008       IP VTE HIGH RISK PATIENT  Once         02/14/23 2008       Place sequential compression device  Until discontinued                         Department Hospital Medicine  [facility]  Bashir Bermudez MD  Date of service: 02/17/2023

## 2023-02-18 NOTE — PLAN OF CARE
Problem: Oral Intake Inadequate (Acute Kidney Injury/Impairment)  Goal: Optimal Nutrition Intake  Outcome: Ongoing, Progressing  Intervention: Promote and Optimize Nutrition  Flowsheets (Taken 2/18/2023 6898)  Oral Nutrition Promotion:   calorie-dense foods provided   other (see comments)     Recommendations  1.) Continue cardiac diet restrictions.   2.) Monitor renal function.   3.)  to aid in food preferences.     Goals:   1.) Pt to consume/tolerate >75% of meals.   2.) Renal function to return to stabilized reference range.  Nutrition Goal Status: new

## 2023-02-18 NOTE — CARE UPDATE
02/17/23 1940   Patient Assessment/Suction   Level of Consciousness (AVPU) alert   Respiratory Effort Normal   Expansion/Accessory Muscles/Retractions no use of accessory muscles   All Lung Fields Breath Sounds diminished   Rhythm/Pattern, Respiratory other (see comments);unlabored   Cough Frequency no cough   PRE-TX-O2   Device (Oxygen Therapy) nasal cannula   $ Is the patient on Low Flow Oxygen? Yes   Flow (L/min) 4   SpO2 (!) 93 %   Pulse Oximetry Type Intermittent   $ Pulse Oximetry - Multiple Charge Pulse Oximetry - Multiple   Pulse 91   Resp 19   Aerosol Therapy   $ Aerosol Therapy Charges Aerosol Treatment   Daily Review of Necessity (SVN) completed   Respiratory Treatment Status (SVN) given   Treatment Route (SVN) mask   Patient Position (SVN) sitting in chair   Post Treatment Assessment (SVN) breath sounds unchanged;vital signs unchanged   Signs of Intolerance (SVN) none   Education   $ Education Bronchodilator;15 min   Respiratory Evaluation   $ Care Plan Tech Time 15 min

## 2023-02-18 NOTE — CONSULTS
"Consult Note  Nephrology    Consult Requested By: Bashir Bermudez MD    Reason for Consult: CARMELITA on CKD 3    SUBJECTIVE:     History of Present Illness:  72 yo female who presented to ER on 2/14 due to shortness of breath, cough and "not feeling good" .  Has hx CKD 3 and f/u w/ Dr. Morrow in out patient clinic.  She is admitted for A/C CHF, in need of diuresis.  Cardiology is following, and stopped her lasix yesterday when Scr bumped.  Nephrology is consulted for co-management.      2/18  Scr normal on admit, 2.7 yesterday, 2.6 today.  Off lasix, held by cards when Scr bumped.  UOP not recorded.  No complaints, denies SOB.  Precipitous drop in Hgb noted.    Assessment/plan:    CARMELITA   CKD 3  Anemia of chronic dz  Mild hyponatremia  Alkalosis    --agree w/gentle hydration.  Ordered UA w/micro, f/u renal panel in am.  --Avoid nephrotoxic agents.  No NSAIDs, COXibs, or aminoglycoside antibiotics.  Dose all medications for level of renal function.  --Hgb has been dropping.  Iron studies done last month, ferritin stores 977.  Defer transfusion to primary team--may need GI consult.  --getting NS at 50  --likely contraction alkalosis from lasix--diuretics on hold.      Past Medical History:   Diagnosis Date    Acute coronary artery obstruction without MI 10/2012    Benign hypertension     COPD (chronic obstructive pulmonary disease)     Coronary artery disease     Disorder of kidney and ureter     Dr. Morrow    Hepatitis B core antibody positive 03/03/2021    Negative sAg, suggests previous exposure but no chronic/active Hep B. At risk for reactivation with any immunosuppression medication, steroids, chemo, etc.      History of electroconvulsive therapy     Hyperlipidemia LDL goal < 70     Left ankle sprain     Major depressive disorder, recurrent episode, severe     s/p ECT    Positive AKIRA (antinuclear antibody) 03/03/2021    PVD (peripheral vascular disease)      Past Surgical History:   Procedure Laterality Date    " CHOLECYSTECTOMY      CORONARY ANGIOPLASTY      CORONARY ANGIOPLASTY WITH STENT PLACEMENT  10/2012    2 stents RCA (100% stenosis)    EYE SURGERY      cataract surgery    HYSTERECTOMY      LINA, ovaries intact. uterine prolapse    ILIAC ARTERY STENT      TONSILLECTOMY      TOTAL VAGINAL HYSTERECTOMY       Family History   Problem Relation Age of Onset    Diabetes Mother     Heart disease Mother     Stroke Father     Heart disease Father     Heart disease Brother     Stroke Brother     Hypertension Daughter     Diabetes Maternal Aunt     Heart disease Maternal Aunt     Heart disease Maternal Uncle     Heart disease Paternal Aunt     Heart disease Paternal Uncle     Heart disease Maternal Grandfather     Diabetes Sister     Heart disease Sister     Cancer Sister         lung    Kidney disease Sister         mass, benign    Breast cancer Sister     Stroke Sister     Dementia Sister     Liver disease Sister     Melanoma Neg Hx     Psoriasis Neg Hx     Lupus Neg Hx     Eczema Neg Hx      Social History     Tobacco Use    Smoking status: Former     Packs/day: 2.00     Years: 40.00     Pack years: 80.00     Types: Cigarettes     Quit date: 2009     Years since quittin.2    Smokeless tobacco: Never   Substance Use Topics    Alcohol use: Yes     Comment: social    Drug use: No       Review of patient's allergies indicates:   Allergen Reactions    Propoxyphene n-acetaminophen Other (See Comments)     Other reaction(s): Unknown    Codeine Anxiety        Review of Systems:  Negative unless noted above    OBJECTIVE:     Vital Signs Range (Last 24H):  Temp:  [97.2 °F (36.2 °C)-98 °F (36.7 °C)]   Pulse:  [85-98]   Resp:  [16-22]   BP: ()/(45-69)   SpO2:  [87 %-97 %]     Physical Exam:  General- NAD noted  HEENT- WNL  Neck- supple  CV- Regular rate and rhythm  Resp- Lungs CTA Bilaterally, No increased WOB  GI- Non tender/non-distended, BS normoactive x4 quads  Extrem- No cyanosis, clubbing, +edema.  Derm- skin  w/d  Neuro-  No flap.     Body mass index is 37.58 kg/m².    Laboratory:  CBC:   Recent Labs   Lab 02/18/23  0424   WBC 9.63   RBC 2.65*   HGB 7.4*   HCT 24.3*      MCV 92   MCH 27.9   MCHC 30.5*     CMP:   Recent Labs   Lab 02/14/23  1412 02/15/23  0438 02/18/23  0424   *   < > 110   CALCIUM 9.2   < > 8.6*   ALBUMIN 2.6*  --   --    PROT 6.4  --   --    *   < > 134*   K 4.1   < > 4.2   CO2 26   < > 32*   CL 98   < > 95   BUN 16   < > 44*   CREATININE 1.1   < > 2.6*   ALKPHOS 95  --   --    ALT 12  --   --    AST 22  --   --    BILITOT 1.1*  --   --     < > = values in this interval not displayed.       Diagnostic Results:  Labs: Reviewed      ASSESSMENT/PLAN:     Active Hospital Problems    Diagnosis  POA    *Acute on chronic heart failure with preserved ejection fraction [I50.32]  Yes    Severe obesity with body mass index (BMI) of 35.0 to 39.9 with serious comorbidity [E66.01]  Yes    Valvular heart disease, severe aortic stenosis, moderate aortic regurgitation [I35.0]  Yes     Chronic     moderate      Essential hypertension [I10]  Yes     Chronic      Resolved Hospital Problems   No resolved problems to display.         Thank you for allowing us to participate in the care of your patient. We will follow the patient and provide recommendations as needed.      Time spent seeing patient( greater than 1/2 spent in direct contact) :     Xiomara Rodriguez NP

## 2023-02-19 LAB
ALBUMIN SERPL BCP-MCNC: 2.3 G/DL (ref 3.5–5.2)
ANION GAP SERPL CALC-SCNC: 8 MMOL/L (ref 8–16)
ANION GAP SERPL CALC-SCNC: 8 MMOL/L (ref 8–16)
BASOPHILS # BLD AUTO: 0.05 K/UL (ref 0–0.2)
BASOPHILS NFR BLD: 0.6 % (ref 0–1.9)
BUN SERPL-MCNC: 48 MG/DL (ref 8–23)
BUN SERPL-MCNC: 48 MG/DL (ref 8–23)
CALCIUM SERPL-MCNC: 8.3 MG/DL (ref 8.7–10.5)
CALCIUM SERPL-MCNC: 8.3 MG/DL (ref 8.7–10.5)
CHLORIDE SERPL-SCNC: 96 MMOL/L (ref 95–110)
CHLORIDE SERPL-SCNC: 96 MMOL/L (ref 95–110)
CO2 SERPL-SCNC: 31 MMOL/L (ref 23–29)
CO2 SERPL-SCNC: 31 MMOL/L (ref 23–29)
CREAT SERPL-MCNC: 2.1 MG/DL (ref 0.5–1.4)
CREAT SERPL-MCNC: 2.1 MG/DL (ref 0.5–1.4)
DIFFERENTIAL METHOD: ABNORMAL
EOSINOPHIL # BLD AUTO: 0.6 K/UL (ref 0–0.5)
EOSINOPHIL NFR BLD: 8.3 % (ref 0–8)
ERYTHROCYTE [DISTWIDTH] IN BLOOD BY AUTOMATED COUNT: 17.8 % (ref 11.5–14.5)
EST. GFR  (NO RACE VARIABLE): 24.1 ML/MIN/1.73 M^2
EST. GFR  (NO RACE VARIABLE): 24.1 ML/MIN/1.73 M^2
GLUCOSE SERPL-MCNC: 92 MG/DL (ref 70–110)
GLUCOSE SERPL-MCNC: 92 MG/DL (ref 70–110)
HCT VFR BLD AUTO: 23.1 % (ref 37–48.5)
HCT VFR BLD AUTO: 25.3 % (ref 37–48.5)
HGB BLD-MCNC: 7 G/DL (ref 12–16)
HGB BLD-MCNC: 7.4 G/DL (ref 12–16)
IMM GRANULOCYTES # BLD AUTO: 0.19 K/UL (ref 0–0.04)
IMM GRANULOCYTES NFR BLD AUTO: 2.5 % (ref 0–0.5)
IRON SERPL-MCNC: 42 UG/DL (ref 30–160)
LYMPHOCYTES # BLD AUTO: 1.2 K/UL (ref 1–4.8)
LYMPHOCYTES NFR BLD: 15.8 % (ref 18–48)
MAGNESIUM SERPL-MCNC: 2.4 MG/DL (ref 1.6–2.6)
MCH RBC QN AUTO: 28.1 PG (ref 27–31)
MCHC RBC AUTO-ENTMCNC: 30.3 G/DL (ref 32–36)
MCV RBC AUTO: 93 FL (ref 82–98)
MONOCYTES # BLD AUTO: 0.8 K/UL (ref 0.3–1)
MONOCYTES NFR BLD: 9.8 % (ref 4–15)
NEUTROPHILS # BLD AUTO: 4.9 K/UL (ref 1.8–7.7)
NEUTROPHILS NFR BLD: 63 % (ref 38–73)
NRBC BLD-RTO: 0 /100 WBC
PHOSPHATE SERPL-MCNC: 4.1 MG/DL (ref 2.7–4.5)
PLATELET # BLD AUTO: 255 K/UL (ref 150–450)
PMV BLD AUTO: 10.3 FL (ref 9.2–12.9)
POTASSIUM SERPL-SCNC: 4 MMOL/L (ref 3.5–5.1)
POTASSIUM SERPL-SCNC: 4 MMOL/L (ref 3.5–5.1)
RBC # BLD AUTO: 2.49 M/UL (ref 4–5.4)
SATURATED IRON: 17 % (ref 20–50)
SODIUM SERPL-SCNC: 135 MMOL/L (ref 136–145)
SODIUM SERPL-SCNC: 135 MMOL/L (ref 136–145)
TOTAL IRON BINDING CAPACITY: 241 UG/DL (ref 250–450)
TRANSFERRIN SERPL-MCNC: 172 MG/DL (ref 200–375)
WBC # BLD AUTO: 7.73 K/UL (ref 3.9–12.7)

## 2023-02-19 PROCEDURE — 12000002 HC ACUTE/MED SURGE SEMI-PRIVATE ROOM

## 2023-02-19 PROCEDURE — 83735 ASSAY OF MAGNESIUM: CPT | Performed by: INTERNAL MEDICINE

## 2023-02-19 PROCEDURE — 25000003 PHARM REV CODE 250: Performed by: INTERNAL MEDICINE

## 2023-02-19 PROCEDURE — 36415 COLL VENOUS BLD VENIPUNCTURE: CPT | Performed by: INTERNAL MEDICINE

## 2023-02-19 PROCEDURE — 99900031 HC PATIENT EDUCATION (STAT)

## 2023-02-19 PROCEDURE — 85025 COMPLETE CBC W/AUTO DIFF WBC: CPT | Performed by: INTERNAL MEDICINE

## 2023-02-19 PROCEDURE — 99232 SBSQ HOSP IP/OBS MODERATE 35: CPT | Mod: ,,, | Performed by: SPECIALIST

## 2023-02-19 PROCEDURE — 99232 PR SUBSEQUENT HOSPITAL CARE,LEVL II: ICD-10-PCS | Mod: ,,, | Performed by: SPECIALIST

## 2023-02-19 PROCEDURE — 84466 ASSAY OF TRANSFERRIN: CPT | Performed by: SPECIALIST

## 2023-02-19 PROCEDURE — 94640 AIRWAY INHALATION TREATMENT: CPT

## 2023-02-19 PROCEDURE — 27000221 HC OXYGEN, UP TO 24 HOURS

## 2023-02-19 PROCEDURE — 36415 COLL VENOUS BLD VENIPUNCTURE: CPT | Performed by: SPECIALIST

## 2023-02-19 PROCEDURE — 85018 HEMOGLOBIN: CPT | Performed by: INTERNAL MEDICINE

## 2023-02-19 PROCEDURE — 80069 RENAL FUNCTION PANEL: CPT | Performed by: NURSE PRACTITIONER

## 2023-02-19 PROCEDURE — 85014 HEMATOCRIT: CPT | Performed by: INTERNAL MEDICINE

## 2023-02-19 PROCEDURE — 94761 N-INVAS EAR/PLS OXIMETRY MLT: CPT

## 2023-02-19 PROCEDURE — 99900035 HC TECH TIME PER 15 MIN (STAT)

## 2023-02-19 PROCEDURE — 25000242 PHARM REV CODE 250 ALT 637 W/ HCPCS: Performed by: INTERNAL MEDICINE

## 2023-02-19 RX ADMIN — METOPROLOL SUCCINATE 50 MG: 25 TABLET, EXTENDED RELEASE ORAL at 09:02

## 2023-02-19 RX ADMIN — IPRATROPIUM BROMIDE AND ALBUTEROL SULFATE 3 ML: .5; 3 SOLUTION RESPIRATORY (INHALATION) at 07:02

## 2023-02-19 RX ADMIN — SODIUM CHLORIDE: 0.9 INJECTION, SOLUTION INTRAVENOUS at 09:02

## 2023-02-19 RX ADMIN — BUDESONIDE 0.5 MG: 0.5 INHALANT RESPIRATORY (INHALATION) at 09:02

## 2023-02-19 RX ADMIN — SODIUM CHLORIDE: 0.9 INJECTION, SOLUTION INTRAVENOUS at 12:02

## 2023-02-19 RX ADMIN — ASPIRIN 81 MG: 81 TABLET, COATED ORAL at 09:02

## 2023-02-19 RX ADMIN — ISOSORBIDE MONONITRATE 60 MG: 60 TABLET, EXTENDED RELEASE ORAL at 09:02

## 2023-02-19 RX ADMIN — CLOPIDOGREL BISULFATE 75 MG: 75 TABLET, FILM COATED ORAL at 09:02

## 2023-02-19 RX ADMIN — CLONAZEPAM 1 MG: 1 TABLET ORAL at 08:02

## 2023-02-19 RX ADMIN — ACETAMINOPHEN 1000 MG: 500 TABLET ORAL at 05:02

## 2023-02-19 RX ADMIN — QUETIAPINE 100 MG: 100 TABLET ORAL at 08:02

## 2023-02-19 RX ADMIN — IPRATROPIUM BROMIDE AND ALBUTEROL SULFATE 3 ML: .5; 3 SOLUTION RESPIRATORY (INHALATION) at 03:02

## 2023-02-19 RX ADMIN — Medication 800 MG: at 09:02

## 2023-02-19 RX ADMIN — IPRATROPIUM BROMIDE AND ALBUTEROL SULFATE 3 ML: .5; 3 SOLUTION RESPIRATORY (INHALATION) at 09:02

## 2023-02-19 RX ADMIN — PAROXETINE HYDROCHLORIDE 50 MG: 20 TABLET, FILM COATED ORAL at 09:02

## 2023-02-19 RX ADMIN — BUDESONIDE 0.5 MG: 0.5 INHALANT RESPIRATORY (INHALATION) at 07:02

## 2023-02-19 RX ADMIN — Medication 800 MG: at 08:02

## 2023-02-19 RX ADMIN — FERROUS SULFATE TAB 325 MG (65 MG ELEMENTAL FE) 1 EACH: 325 (65 FE) TAB at 09:02

## 2023-02-19 RX ADMIN — PANTOPRAZOLE SODIUM 40 MG: 40 TABLET, DELAYED RELEASE ORAL at 05:02

## 2023-02-19 RX ADMIN — CYANOCOBALAMIN TAB 1000 MCG 1000 MCG: 1000 TAB at 09:02

## 2023-02-19 NOTE — PLAN OF CARE
02/19/23 0946   Patient Assessment/Suction   Level of Consciousness (AVPU) alert   Respiratory Effort Normal;Unlabored   All Lung Fields Breath Sounds diminished;clear   PRE-TX-O2   Device (Oxygen Therapy) nasal cannula with humidification   $ Is the patient on Low Flow Oxygen? Yes   Flow (L/min) 5   SpO2 99 %   Pulse Oximetry Type Intermittent   $ Pulse Oximetry - Multiple Charge Pulse Oximetry - Multiple   Pulse 96   Resp 18   Aerosol Therapy   $ Aerosol Therapy Charges Aerosol Treatment   Daily Review of Necessity (SVN) completed   Respiratory Treatment Status (SVN) given   Treatment Route (SVN) mask;oxygen   Patient Position (SVN) Lieberman's;HOB elevated   Post Treatment Assessment (SVN) breath sounds unchanged   Signs of Intolerance (SVN) none   Education   $ Education Bronchodilator;15 min   Respiratory Evaluation   $ Care Plan Tech Time 15 min

## 2023-02-19 NOTE — CONSULTS
[facility]  Cardiology  Progress Note    Patient Name: Betty Bacon  MRN: [id number]  Admission Date: 2/14/2023  Hospital Length of Stay: 4 days  Code Status: Full Code   Attending Physician: Bashir Bermudez MD   Primary Care Physician: Johanne Callejas MD  Expected Discharge Date: 2/17/2023  Principal Problem:Chronic diastolic heart failure    Subjective:     Hospital Course:  Patient is feeling better and creatinine is rising slightly    Interval History:  Renal artery ultrasound is abnormal demonstrating of blunted Doppler signals suggestive but not diagnostic of bilateral renal artery stenosis.  Renal sizes are normal      ROS  Objective:     Vital Signs (Most Recent):  Temp: 97.6 °F (36.4 °C) (02/19/23 1210)  Pulse: 87 (02/19/23 1210)  Resp: 20 (02/19/23 1210)  BP: (!) 128/57 (02/19/23 1210)  SpO2: (!) 91 % (02/19/23 1210)   Vital Signs (24h Range):  Temp:  [97.1 °F (36.2 °C)-97.8 °F (36.6 °C)] 97.6 °F (36.4 °C)  Pulse:  [84-96] 87  Resp:  [18-20] 20  SpO2:  [91 %-99 %] 91 %  BP: (101-146)/(46-66) 128/57     Weight: 93.2 kg (205 lb 7.5 oz)  Body mass index is 37.58 kg/m².    SpO2: (!) 91 %         Intake/Output Summary (Last 24 hours) at 2/19/2023 1343  Last data filed at 2/19/2023 0924  Gross per 24 hour   Intake 120 ml   Output 350 ml   Net -230 ml       Lines/Drains/Airways       Drain  Duration             Female External Urinary Catheter 02/14/23 2045 4 days              Peripheral Intravenous Line  Duration                  Peripheral IV - Single Lumen 02/19/23 0015 20 G Posterior;Right Forearm <1 day                    Physical Exam lungs few crackles  Cardiac systolic murmur  Legs mild edema    Significant Labs: CMP   Recent Labs   Lab 02/17/23  1358 02/18/23  0424 02/19/23  0452   * 134* 135*  135*   K 4.3 4.2 4.0  4.0   CL 93* 95 96  96   CO2 33* 32* 31*  31*   * 110 92  92   BUN 41* 44* 48*  48*   CREATININE 2.7* 2.6* 2.1*  2.1*   CALCIUM 9.1 8.6* 8.3*  8.3*    ALBUMIN  --   --  2.3*   ANIONGAP 8 7* 8  8       Significant Imaging:   Assessment and Plan:   Heart failure preserved ejection fraction and aortic stenosis  Possible bilateral renal artery stenosis exacerbating heart failure although blood pressure is not high-she is not had flash pulmonary edema  Diuresis and contrast use for CTA contributed to deterioration renal function  Question is raised of the patient having bilateral renal artery stenosis although Dopplers are not typical; angiography using carbon dioxide could be performed rather than contrast to assess renal arteries if needed  Hold off for right now unless see how renal function does    Brief HPI:     Active Diagnoses:    Diagnosis Date Noted POA    PRINCIPAL PROBLEM:  Acute on chronic heart failure with preserved ejection fraction [I50.32] 10/29/2019 Yes    Severe obesity with body mass index (BMI) of 35.0 to 39.9 with serious comorbidity [E66.01] 06/07/2018 Yes    Valvular heart disease, severe aortic stenosis, moderate aortic regurgitation [I35.0] 05/16/2013 Yes     Chronic    Essential hypertension [I10]  Yes     Chronic      Problems Resolved During this Admission:       VTE Risk Mitigation (From admission, onward)           Ordered     enoxaparin injection 30 mg  Daily         02/17/23 0859     IP VTE HIGH RISK PATIENT  Once         02/14/23 2008     Place sequential compression device  Until discontinued         02/14/23 2008                    Aleksey Andre MD  Cardiology  Formerly [facility]

## 2023-02-19 NOTE — CONSULTS
"GASTROENTEROLOGY INPATIENT CONSULT NOTE  Patient Name: Betty Bacon  Patient MRN: [id number]  Patient : 1950    Admit Date: 2023  Service date: 2023    Reason for Consult: anemia, Gi bleeding    PCP: Johanne Callejas MD    Chief Complaint   Patient presents with    Shortness of Breath     Increased Sob. Pt states that she had blood work two weeks ago and then got a call that she had an infection. Unsure where the infection is and is not currently on antibiotics. States that she has had pneumonia several times in the past       HPI: Patient is a 73 y.o. female with PMHx  CAD, CKD 3, chronic hypoxic respiraotry failure on home 2-4L, CHF, AoCD who presents with SOB and malaise. Was admitted for hypervolemia and need for IV diuresis. During hospitalization, Hb has declined from 10 to 7 over past week. No clinically overt Gi bleeding or melena seen. Denies abdominal pain. Appetite is good. Daughter says that patient had been treated for "bleeding spots" on previous endoscopy with Dr Trevizo but patient cannot remember if it was on upper or lower endoscopy.    Past Medical History:  Past Medical History:   Diagnosis Date    Acute coronary artery obstruction without MI 10/2012    Benign hypertension     COPD (chronic obstructive pulmonary disease)     Coronary artery disease     Disorder of kidney and ureter     Dr. Morrow    Hepatitis B core antibody positive 2021    Negative sAg, suggests previous exposure but no chronic/active Hep B. At risk for reactivation with any immunosuppression medication, steroids, chemo, etc.      History of electroconvulsive therapy     Hyperlipidemia LDL goal < 70     Left ankle sprain     Major depressive disorder, recurrent episode, severe     s/p ECT    Positive AKIRA (antinuclear antibody) 2021    PVD (peripheral vascular disease)         Past Surgical History:  Past Surgical History:   Procedure Laterality Date    CHOLECYSTECTOMY      CORONARY " ANGIOPLASTY      CORONARY ANGIOPLASTY WITH STENT PLACEMENT  10/2012    2 stents RCA (100% stenosis)    EYE SURGERY      cataract surgery    HYSTERECTOMY      LINA, ovaries intact. uterine prolapse    ILIAC ARTERY STENT      TONSILLECTOMY      TOTAL VAGINAL HYSTERECTOMY          Home Medications:  Medications Prior to Admission   Medication Sig Dispense Refill Last Dose    alendronate (FOSAMAX) 70 MG tablet TAKE 1 TABLET BY MOUTH ONE TIME PER WEEK (Patient taking differently: Take 70 mg by mouth every 7 days.) 12 tablet 3 Past Week    ALPRAZolam (XANAX) 0.25 MG tablet Take 1 tablet (0.25 mg total) by mouth 2 (two) times daily as needed for Anxiety. 30 tablet 0 2/13/2023    aspirin 81 mg Tab Take 81 mg by mouth once daily. Every day   2/13/2023    atorvastatin (LIPITOR) 40 MG tablet TAKE 1 TABLET BY MOUTH EVERY DAY (Patient taking differently: Take 40 mg by mouth once daily.) 90 tablet 3 2/13/2023    clonazePAM (KLONOPIN) 1 MG disintegrating tablet Take 1 mg by mouth 2 (two) times daily as needed.   2/13/2023    clopidogreL (PLAVIX) 75 mg tablet TAKE 1 TABLET BY MOUTH EVERY DAY (Patient taking differently: Take 75 mg by mouth once daily.) 90 tablet 3 2/13/2023    esomeprazole (NEXIUM) 40 MG capsule TAKE 1 CAPSULE BY MOUTH BEFORE BREAKFAST. (Patient taking differently: Take 40 mg by mouth before breakfast.) 90 capsule 3 2/13/2023    fluticasone propionate (FLONASE) 50 mcg/actuation nasal spray 1 spray (50 mcg total) by Each Nostril route once daily. 16 g PRN 2/13/2023    furosemide (LASIX) 20 MG tablet Take 1 tablet (20 mg total) by mouth every 48 hours.  0 2/13/2023    isosorbide mononitrate (IMDUR) 60 MG 24 hr tablet Take 60 mg by mouth once daily.   2/13/2023    magnesium oxide (MAG-OX) 400 mg (241.3 mg magnesium) tablet Take 2 tablets (800 mg total) by mouth 2 (two) times daily. 360 tablet 0 2/13/2023    metoprolol succinate (TOPROL-XL) 100 MG 24 hr tablet Take 100 mg by mouth once daily.   2/13/2023    paroxetine  (PAXIL) 10 MG tablet Take 10 mg by mouth every morning. Total 50 mg   2/14/2023 at 09:00    paroxetine (PAXIL) 40 MG tablet TAKE 1 TABLET BY MOUTH EVERY DAY (Patient taking differently: Take 40 mg by mouth once daily.) 90 tablet 3 2/14/2023 at 09:00    QUEtiapine (SEROQUEL) 100 MG Tab Take 100 mg by mouth once daily.   2/13/2023    TRELEGY ELLIPTA 200-62.5-25 mcg inhaler INHALE 1 PUFF INTO THE LUNGS ONCE DAILY. 180 each 2 2/14/2023    acetaminophen (TYLENOL) 325 MG tablet Take 325 mg by mouth every 6 (six) hours as needed for Pain.       albuterol sulfate (PROAIR RESPICLICK) 90 mcg/actuation AePB Inhale 2 puffs into the lungs every 4 to 6 hours as needed. 1 each 3     colchicine (COLCRYS) 0.6 mg tablet Take 2 po at gout flare onset, may repeat 1 in an hour prn, then 1 po bid until pain resolves ,or n/V/D starts (Patient taking differently: Take 0.6 mg by mouth as needed. Take 2 po at gout flare onset, may repeat 1 in an hour prn, then 1 po bid until pain resolves ,or n/V/D starts) 20 tablet 0 Unknown    colestipoL (COLESTID) 1 gram Tab Take 1 tablet (1 g total) by mouth 2 (two) times daily as needed (diarrhea). 180 tablet 3     ergocalciferol (ERGOCALCIFEROL) 50,000 unit Cap Take 1 capsule (50,000 Units total) by mouth every 7 days. (Patient not taking: Reported on 2/7/2023) 12 capsule 3     meclizine (ANTIVERT) 25 mg tablet Take 1 tablet (25 mg total) by mouth 3 (three) times daily as needed for Dizziness. 30 tablet PRN Unknown    metoprolol succinate (TOPROL-XL) 50 MG 24 hr tablet Take 1 tablet (50 mg total) by mouth once daily. 90 tablet 0        Inpatient Medications:   albuterol-ipratropium  3 mL Nebulization Q6H WAKE    aspirin  81 mg Oral Daily    budesonide  0.5 mg Nebulization Q12H    clopidogreL  75 mg Oral Daily    cyanocobalamin  1,000 mcg Oral Daily    enoxaparin  30 mg Subcutaneous Daily    ferrous sulfate  1 tablet Oral Daily    isosorbide mononitrate  60 mg Oral Daily    magnesium oxide  800 mg Oral  BID    metoprolol succinate  50 mg Oral Daily    pantoprazole  40 mg Oral Before breakfast    paroxetine  50 mg Oral Daily    QUEtiapine  100 mg Oral QHS     acetaminophen, albuterol-ipratropium, ALPRAZolam, clonazePAM, dextrose 10%, dextrose 10%, glucagon (human recombinant), glucose, glucose, hydrALAZINE, melatonin, naloxone, ondansetron, polyethylene glycol, senna-docusate 8.6-50 mg, simethicone, sodium chloride 0.9%    Review of patient's allergies indicates:   Allergen Reactions    Propoxyphene n-acetaminophen Other (See Comments)     Other reaction(s): Unknown    Codeine Anxiety       Social History:   Social History     Occupational History    Not on file   Tobacco Use    Smoking status: Former     Packs/day: 2.00     Years: 40.00     Pack years: 80.00     Types: Cigarettes     Quit date: 2009     Years since quittin.2    Smokeless tobacco: Never   Substance and Sexual Activity    Alcohol use: Yes     Comment: social    Drug use: No    Sexual activity: Never     Birth control/protection: Abstinence       Family History:   Family History   Problem Relation Age of Onset    Diabetes Mother     Heart disease Mother     Stroke Father     Heart disease Father     Heart disease Brother     Stroke Brother     Hypertension Daughter     Diabetes Maternal Aunt     Heart disease Maternal Aunt     Heart disease Maternal Uncle     Heart disease Paternal Aunt     Heart disease Paternal Uncle     Heart disease Maternal Grandfather     Diabetes Sister     Heart disease Sister     Cancer Sister         lung    Kidney disease Sister         mass, benign    Breast cancer Sister     Stroke Sister     Dementia Sister     Liver disease Sister     Melanoma Neg Hx     Psoriasis Neg Hx     Lupus Neg Hx     Eczema Neg Hx        Review of Systems:  Review of Systems   Constitutional:  Negative for chills and fever.   HENT:  Negative for nosebleeds and sore throat.    Eyes:  Negative for pain and redness.   Respiratory:   "Negative for shortness of breath and wheezing.    Cardiovascular:  Negative for chest pain and leg swelling.   Gastrointestinal:  Negative for abdominal pain, blood in stool, constipation, diarrhea, melena, nausea and vomiting.   Genitourinary:  Negative for dysuria and hematuria.   Musculoskeletal:  Negative for falls and myalgias.   Skin:  Negative for itching and rash.   Neurological:  Negative for focal weakness and loss of consciousness.     OBJECTIVE:    Physical Exam:  24 Hour Vital Sign Ranges: Temp:  [97.1 °F (36.2 °C)-97.8 °F (36.6 °C)] 97.6 °F (36.4 °C)  Pulse:  [84-96] 87  Resp:  [18-20] 20  SpO2:  [91 %-99 %] 91 %  BP: (101-146)/(46-66) 128/57  Most recent vitals: BP (!) 128/57 (BP Location: Left arm, Patient Position: Sitting)   Pulse 87   Temp 97.6 °F (36.4 °C) (Oral)   Resp 20   Ht 5' 2" (1.575 m)   Wt 93.2 kg (205 lb 7.5 oz)   SpO2 (!) 91%   BMI 37.58 kg/m²    CONSTITUTIONAL: laying in bed, NAD  Eyes: PERRL, anicteric conjunctivae  ENT: nares patent, supplemental O2 in place via NC on 2L, oral mucosa pink and moist without lesion  NECK: trachea midline; Good ROM  CV: regular rate and rhythm, no murmurs or gallops  RESP: clear to auscultation bilaterally, no wheezes, rhonci or rales  ABD: soft, non-tender, non-distended, normal bowel sounds  MSK: no swelling or edema, 2+ pulses distally  INTEGUMENT: warm/dry, no rashes or jaundice on limited skin exam  NEURO: appropriately conversant, no asterixis  PSYCH: normal affect    Labs:   I have personally reviewed the pertinent/available labs in [facility].     Recent Labs     02/17/23  0429 02/18/23 0424 02/19/23 0453   WBC 8.97 9.63 7.73   MCV 92 92 93    216 255     Recent Labs     02/17/23  1358 02/18/23  0424 02/19/23  0452   * 134* 135*  135*   K 4.3 4.2 4.0  4.0   CL 93* 95 96  96   CO2 33* 32* 31*  31*   BUN 41* 44* 48*  48*   * 110 92  92     No results for input(s): ALB in the last 72 hours.    Invalid input(s): ALKP, " SGOT, SGPT, TBIL, DBIL, TPRO  No results for input(s): PT, INR, PTT in the last 72 hours.      Radiology Review:  I have personally reviewed the recent available imaging in [facility].    US Bladder Post Void Residual   Final Result      US Kidney   Final Result      US Doppler Renal Artery and Vein (xpd)   Final Result      X-Ray Chest AP Portable   Final Result          Endoscopy:  I have personally reviewed and interpreted the reports and images from the endoscopy reports available in Epic.    EGD 10/2021 with gastric AVM  Colonoscopy 10/21 with few sub-centimeter polyps removed. No active bleeding or AVMs reported.    IMPRESSION / RECOMMENDATIONS:  Acute on Chronic Anemia  GI angioectasias  - has previous gastric AVM and with aortic stenosis suspect possible AVM bleeding.  - Patient eating lunch on interview so not able to go for endoscopy today  - NPO after midnight. Can plan for EGD vs Push. enteroscopy to evaluate for UGI source. Diagnostic endoscopy is considered low risk by ASGE guidelines and even APC can be done safely on DAPT although this is to say not no risk. Discussed with patient that interventions will be limited as Plavix not held. Alternatively, can continue supportive measures and hodl Plavix for 5 days but with declining Hb and cardiac risk factors, reasonable to set up for endoscopy.  - I have discussed the risks, benefits, and alternatives of the procedure in detail with the patient. The risks include pain, discomfort, bleeding, infection, perforation, missed lesions or missed cancer, sedation/anesthesia risks, and even death. The benefit of the procedure is that it allows an evaluation of the reported problem or issue and possible intervention. The alternatives include not having the procedure or an alternative evaluation by another method.  The patient was given the opportunity to ask questions and they were answered. Informed consent was obtained.  - increased risk factors include CAD, DAPT use,  baseline supplemental O2 use        Thank you for this consult.    Jair Mcrae  2/19/2023  2:35 PM

## 2023-02-19 NOTE — CARE UPDATE
02/18/23 1950   Patient Assessment/Suction   Level of Consciousness (AVPU) alert   Respiratory Effort Normal   Expansion/Accessory Muscles/Retractions no retractions;no use of accessory muscles   All Lung Fields Breath Sounds diminished   Rhythm/Pattern, Respiratory unlabored   Cough Frequency no cough   PRE-TX-O2   Device (Oxygen Therapy) nasal cannula with humidification   $ Is the patient on Low Flow Oxygen? Yes   Flow (L/min) 5   SpO2 (!) 94 %   Pulse Oximetry Type Intermittent   $ Pulse Oximetry - Multiple Charge Pulse Oximetry - Multiple   Pulse 94   Resp 19   Aerosol Therapy   $ Aerosol Therapy Charges Aerosol Treatment   Daily Review of Necessity (SVN) completed   Respiratory Treatment Status (SVN) given   Treatment Route (SVN) mask   Patient Position (SVN) sitting in chair   Post Treatment Assessment (SVN) breath sounds unchanged;vital signs unchanged   Signs of Intolerance (SVN) none   Education   $ Education Bronchodilator;15 min   Respiratory Evaluation   $ Care Plan Tech Time 15 min

## 2023-02-19 NOTE — PROGRESS NOTES
[facility] Medicine  Progress Note    Patient name: Betty Bacon  MRN: [id number]  Admit Date: 2/14/2023   LOS: 3 days     SUBJECTIVE:     Principal problem: Chronic diastolic heart failure    Interval History:  Patient states she is feeling well. States she has feet pain.    Scheduled Meds:   acetaminophen  1,000 mg Oral Q6H    albuterol-ipratropium  3 mL Nebulization Q6H WAKE    aspirin  81 mg Oral Daily    budesonide  0.5 mg Nebulization Q12H    clopidogreL  75 mg Oral Daily    cyanocobalamin  1,000 mcg Oral Daily    enoxaparin  30 mg Subcutaneous Daily    ferrous sulfate  1 tablet Oral Daily    isosorbide mononitrate  60 mg Oral Daily    magnesium oxide  800 mg Oral BID    metoprolol succinate  50 mg Oral Daily    pantoprazole  40 mg Oral Before breakfast    paroxetine  50 mg Oral Daily    QUEtiapine  100 mg Oral QHS     Continuous Infusions:   sodium chloride 0.9% 50 mL/hr at 02/17/23 1642     PRN Meds:acetaminophen, albuterol-ipratropium, ALPRAZolam, clonazePAM, dextrose 10%, dextrose 10%, glucagon (human recombinant), glucose, glucose, hydrALAZINE, melatonin, naloxone, ondansetron, polyethylene glycol, senna-docusate 8.6-50 mg, simethicone, sodium chloride 0.9%    Review of patient's allergies indicates:   Allergen Reactions    Propoxyphene n-acetaminophen Other (See Comments)     Other reaction(s): Unknown    Codeine Anxiety       Review of Systems: As per interval history    OBJECTIVE:     Vital Signs (Most Recent)  Temp: 97.5 °F (36.4 °C) (02/18/23 1500)  Pulse: 94 (02/18/23 1950)  Resp: 19 (02/18/23 1950)  BP: (!) 101/46 (02/18/23 1500)  SpO2: (!) 94 % (02/18/23 1950)    Vital Signs Range (Last 24H):  Temp:  [97.4 °F (36.3 °C)-98 °F (36.7 °C)]   Pulse:  [80-98]   Resp:  [18-22]   BP: (101-165)/(45-69)   SpO2:  [87 %-98 %]     I & O (Last 24H):  Intake/Output Summary (Last 24 hours) at 2/18/2023 1956  Last data filed at 2/18/2023 0735  Gross per 24 hour   Intake --   Output  300 ml   Net -300 ml        Physical Exam:  General: Patient resting comfortably in no acute distress. Appears as stated age. Calm  Eyes: No conjunctival injection. No scleral icterus.  ENT: Hearing grossly intact. No discharge from ears. No nasal discharge.   Neck: Supple, trachea midline. No JVD  CVS: RRR. No LE edema BL  Lungs:  No tachypnea or accessory muscle use.  Clear to auscultation bilaterally  Abdomen:  Soft, nontender and nondistended.  No organomegaly  Neuro: AOx3. Moves all extremities. Follows commands. Responds appropriately   Skin:  No rash or erythema noted    Laboratory:  I have reviewed all pertinent lab results within the past 24 hours.  CBC:   Recent Labs   Lab 02/18/23  0424 02/18/23  1415   WBC 9.63  --    RBC 2.65*  --    HGB 7.4* 8.1*   HCT 24.3* 27.1*     --    MCV 92  --    MCH 27.9  --    MCHC 30.5*  --      BMP:   Recent Labs   Lab 02/18/23  0424      *   K 4.2   CL 95   CO2 32*   BUN 44*   CREATININE 2.6*   CALCIUM 8.6*   MG 2.1     CMP:   Recent Labs   Lab 02/14/23  1412 02/15/23  0438 02/18/23  0424   *   < > 110   CALCIUM 9.2   < > 8.6*   ALBUMIN 2.6*  --   --    PROT 6.4  --   --    *   < > 134*   K 4.1   < > 4.2   CO2 26   < > 32*   CL 98   < > 95   BUN 16   < > 44*   CREATININE 1.1   < > 2.6*   ALKPHOS 95  --   --    ALT 12  --   --    AST 22  --   --    BILITOT 1.1*  --   --     < > = values in this interval not displayed.       Diagnostic Results:  Labs: Reviewed  ECG: Reviewed  X-Ray: Reviewed    ASSESSMENT/PLAN:   * SOB/Hypoxia due to Acute on chronic heart failure with preserved ejection fraction/ ILD/Asbestosis  Reason for admission  Acute on chronic diastolic dysfunction  Congestive heart failure  EF of 60%  Patient had her Lasix dose recently cut down at home.  cardiology d/lobo Lasix  PO due to CARMELITA, NS at 50 ml/l, may neeed more diuresis once renal funciton imroves, renal artery ultrasound, input appreciated.   Continue with aspirin 81 mg  a day clopidogrel 75 mg a day Imdur 60 mg a day metoprolol 50 mg daily.  Magnesium replacement and rechecked tomorrow morning patient has chronic hypomagnesemia and is taking p.o. at home per records.    Case discusssed with Dr. Toro (pulmonology) today, input appreciated.  Pain manageemtn, we may do PT consult.    CARMELITA    On Ns at 50 ml/h. We will follow Nephrology recs: renal/bladder us CXR, UA. Input appreciated.    Anemia    Patient has low Iron and vit b12 leveles, we will start suplmentation.  FOBT. Morgan had colonoscopy in 04/2017, reported internal hemorrhoids, patient does say she still sees blood when she wipes.  Keep monitoring H/h, input from Nephrollgy appreciated.       Severe obesity with body mass index (BMI) of 35.0 to 39.9 with serious comorbidity  Body mass index is 35.81 kg/m². Morbid obesity complicates all aspects of disease management from diagnostic modalities to treatment. Weight loss encouraged and health benefits explained to patient.                   Valvular heart disease, severe aortic stenosis, moderate aortic regurgitation  Aortic stenosis with valve area of 1 cm2  Tricuspid regurg  Echo shows preserved systolic function  Echo shows diastolic dysfunction  Case disucssed with dr hull today, input appreciated.     Essential hypertension  Monitor and treat  Probably etiology of poorly controlled pulmonary edema     Continue with home medications of pantoprazole paroxetine and quetiapine                 VTE Risk Mitigation (From admission, onward)           Ordered       enoxaparin injection 40 mg  Daily         02/14/23 2008       IP VTE HIGH RISK PATIENT  Once         02/14/23 2008       Place sequential compression device  Until discontinued                                 Department Hospital Medicine  [facility]  Bashir Bermudez MD  Date of service: 02/18/2023

## 2023-02-19 NOTE — PROGRESS NOTES
"Consult Note  Nephrology    Consult Requested By: Bashir Bermudez MD    Reason for Consult: CARMELITA on CKD 3    SUBJECTIVE:     History of Present Illness:  72 yo female who presented to ER on 2/14 due to shortness of breath, cough and "not feeling good" .  Has hx CKD 3 and f/u w/ Dr. Morrow in out patient clinic.  She is admitted for A/C CHF, in need of diuresis.  Cardiology is following, and stopped her lasix yesterday when Scr bumped.  Nephrology is consulted for co-management.      2/18  Scr normal on admit, 2.7 yesterday, 2.6 today.  Off lasix, held by cards when Scr bumped.  UOP not recorded.  No complaints, denies SOB.  Precipitous drop in Hgb noted.  2/19  VSS, Hgb down to 7, primary ordered Fe+ supplement yesterday and stool for OB.  Renal function improving.  Pt denies pain, no n/v, denies bloody/black stool.  Saw Dr. Trevizo about a year ago, said she had a small bleed at that time.      Assessment/plan:    CARMELITA   CKD 3  Anemia of chronic dz  Mild hyponatremia  Alkalosis    --agree w/gentle hydration.  Ordered UA w/micro, f/u renal panel in am.  --Avoid nephrotoxic agents.  No NSAIDs, COXibs, or aminoglycoside antibiotics.  Dose all medications for level of renal function.  --Hgb has been dropping.  Iron studies done last month, ferritin stores 977.  Defer transfusion to primary team--may need GI consult.  --getting NS at 50  --likely contraction alkalosis from lasix--diuretics on hold.      Past Medical History:   Diagnosis Date    Acute coronary artery obstruction without MI 10/2012    Benign hypertension     COPD (chronic obstructive pulmonary disease)     Coronary artery disease     Disorder of kidney and ureter     Dr. Morrow    Hepatitis B core antibody positive 03/03/2021    Negative sAg, suggests previous exposure but no chronic/active Hep B. At risk for reactivation with any immunosuppression medication, steroids, chemo, etc.      History of electroconvulsive therapy     Hyperlipidemia LDL goal < " 70     Left ankle sprain     Major depressive disorder, recurrent episode, severe     s/p ECT    Positive AKIRA (antinuclear antibody) 2021    PVD (peripheral vascular disease)      Past Surgical History:   Procedure Laterality Date    CHOLECYSTECTOMY      CORONARY ANGIOPLASTY      CORONARY ANGIOPLASTY WITH STENT PLACEMENT  10/2012    2 stents RCA (100% stenosis)    EYE SURGERY      cataract surgery    HYSTERECTOMY      LINA, ovaries intact. uterine prolapse    ILIAC ARTERY STENT      TONSILLECTOMY      TOTAL VAGINAL HYSTERECTOMY       Family History   Problem Relation Age of Onset    Diabetes Mother     Heart disease Mother     Stroke Father     Heart disease Father     Heart disease Brother     Stroke Brother     Hypertension Daughter     Diabetes Maternal Aunt     Heart disease Maternal Aunt     Heart disease Maternal Uncle     Heart disease Paternal Aunt     Heart disease Paternal Uncle     Heart disease Maternal Grandfather     Diabetes Sister     Heart disease Sister     Cancer Sister         lung    Kidney disease Sister         mass, benign    Breast cancer Sister     Stroke Sister     Dementia Sister     Liver disease Sister     Melanoma Neg Hx     Psoriasis Neg Hx     Lupus Neg Hx     Eczema Neg Hx      Social History     Tobacco Use    Smoking status: Former     Packs/day: 2.00     Years: 40.00     Pack years: 80.00     Types: Cigarettes     Quit date: 2009     Years since quittin.2    Smokeless tobacco: Never   Substance Use Topics    Alcohol use: Yes     Comment: social    Drug use: No       Review of patient's allergies indicates:   Allergen Reactions    Propoxyphene n-acetaminophen Other (See Comments)     Other reaction(s): Unknown    Codeine Anxiety        Review of Systems:  Negative unless noted above    OBJECTIVE:     Vital Signs Range (Last 24H):  Temp:  [97.1 °F (36.2 °C)-97.8 °F (36.6 °C)]   Pulse:  [80-94]   Resp:  [18-20]   BP: (101-146)/(45-66)   SpO2:  [92 %-98 %]     Physical  Exam:  General- NAD noted  HEENT- WNL  Neck- supple  CV- Regular rate and rhythm  Resp- Lungs CTA Bilaterally, No increased WOB  GI- Non tender/non-distended, BS normoactive x4 quads  Extrem- No cyanosis, clubbing, +edema.  Derm- skin w/d  Neuro-  No flap.     Body mass index is 37.58 kg/m².    Laboratory:  CBC:   Recent Labs   Lab 02/19/23  0453   WBC 7.73   RBC 2.49*   HGB 7.0*   HCT 23.1*      MCV 93   MCH 28.1   MCHC 30.3*       CMP:   Recent Labs   Lab 02/14/23  1412 02/15/23  0438 02/19/23  0452   *   < > 92  92   CALCIUM 9.2   < > 8.3*  8.3*   ALBUMIN 2.6*  --  2.3*   PROT 6.4  --   --    *   < > 135*  135*   K 4.1   < > 4.0  4.0   CO2 26   < > 31*  31*   CL 98   < > 96  96   BUN 16   < > 48*  48*   CREATININE 1.1   < > 2.1*  2.1*   ALKPHOS 95  --   --    ALT 12  --   --    AST 22  --   --    BILITOT 1.1*  --   --     < > = values in this interval not displayed.         Diagnostic Results:  Labs: Reviewed      ASSESSMENT/PLAN:     Active Hospital Problems    Diagnosis  POA    *Acute on chronic heart failure with preserved ejection fraction [I50.32]  Yes    Severe obesity with body mass index (BMI) of 35.0 to 39.9 with serious comorbidity [E66.01]  Yes    Valvular heart disease, severe aortic stenosis, moderate aortic regurgitation [I35.0]  Yes     Chronic     moderate      Essential hypertension [I10]  Yes     Chronic      Resolved Hospital Problems   No resolved problems to display.         Thank you for allowing us to participate in the care of your patient. We will follow the patient and provide recommendations as needed.      Time spent seeing patient( greater than 1/2 spent in direct contact) :     Xiomara Rodriguez NP

## 2023-02-20 ENCOUNTER — ANESTHESIA EVENT (OUTPATIENT)
Dept: SURGERY | Facility: HOSPITAL | Age: 73
DRG: 291 | End: 2023-02-20
Payer: MEDICARE

## 2023-02-20 ENCOUNTER — ANESTHESIA (OUTPATIENT)
Dept: SURGERY | Facility: HOSPITAL | Age: 73
DRG: 291 | End: 2023-02-20
Payer: MEDICARE

## 2023-02-20 LAB
ABO + RH BLD: ABNORMAL
ANION GAP SERPL CALC-SCNC: 3 MMOL/L (ref 8–16)
BASOPHILS # BLD AUTO: 0.04 K/UL (ref 0–0.2)
BASOPHILS NFR BLD: 0.5 % (ref 0–1.9)
BLD GP AB SCN CELLS X3 SERPL QL: ABNORMAL
BLD PROD TYP BPU: NORMAL
BLOOD GROUP ANTIBODIES SERPL: NORMAL
BLOOD UNIT EXPIRATION DATE: NORMAL
BLOOD UNIT TYPE CODE: 5100
BLOOD UNIT TYPE: NORMAL
BUN SERPL-MCNC: 38 MG/DL (ref 8–23)
CALCIUM SERPL-MCNC: 8.4 MG/DL (ref 8.7–10.5)
CHLORIDE SERPL-SCNC: 100 MMOL/L (ref 95–110)
CO2 SERPL-SCNC: 32 MMOL/L (ref 23–29)
CODING SYSTEM: NORMAL
CREAT SERPL-MCNC: 1.6 MG/DL (ref 0.5–1.4)
CROSSMATCH INTERPRETATION: NORMAL
DIFFERENTIAL METHOD: ABNORMAL
DISPENSE STATUS: NORMAL
EOSINOPHIL # BLD AUTO: 0.6 K/UL (ref 0–0.5)
EOSINOPHIL NFR BLD: 8.5 % (ref 0–8)
ERYTHROCYTE [DISTWIDTH] IN BLOOD BY AUTOMATED COUNT: 18 % (ref 11.5–14.5)
EST. GFR  (NO RACE VARIABLE): 33.4 ML/MIN/1.73 M^2
GLUCOSE SERPL-MCNC: 94 MG/DL (ref 70–110)
HCT VFR BLD AUTO: 21.3 % (ref 37–48.5)
HCT VFR BLD AUTO: 26.6 % (ref 37–48.5)
HGB BLD-MCNC: 6.5 G/DL (ref 12–16)
HGB BLD-MCNC: 8 G/DL (ref 12–16)
IMM GRANULOCYTES # BLD AUTO: 0.15 K/UL (ref 0–0.04)
IMM GRANULOCYTES NFR BLD AUTO: 2 % (ref 0–0.5)
LYMPHOCYTES # BLD AUTO: 0.9 K/UL (ref 1–4.8)
LYMPHOCYTES NFR BLD: 12 % (ref 18–48)
MAGNESIUM SERPL-MCNC: 2.5 MG/DL (ref 1.6–2.6)
MCH RBC QN AUTO: 28.3 PG (ref 27–31)
MCHC RBC AUTO-ENTMCNC: 30.5 G/DL (ref 32–36)
MCV RBC AUTO: 93 FL (ref 82–98)
MONOCYTES # BLD AUTO: 0.7 K/UL (ref 0.3–1)
MONOCYTES NFR BLD: 9.2 % (ref 4–15)
NEUTROPHILS # BLD AUTO: 5.1 K/UL (ref 1.8–7.7)
NEUTROPHILS NFR BLD: 67.8 % (ref 38–73)
NRBC BLD-RTO: 0 /100 WBC
NUM UNITS TRANS PACKED RBC: NORMAL
PLATELET # BLD AUTO: 266 K/UL (ref 150–450)
PMV BLD AUTO: 9.9 FL (ref 9.2–12.9)
POTASSIUM SERPL-SCNC: 4.3 MMOL/L (ref 3.5–5.1)
RBC # BLD AUTO: 2.3 M/UL (ref 4–5.4)
SODIUM SERPL-SCNC: 135 MMOL/L (ref 136–145)
WBC # BLD AUTO: 7.5 K/UL (ref 3.9–12.7)

## 2023-02-20 PROCEDURE — 63600175 PHARM REV CODE 636 W HCPCS: Performed by: INTERNAL MEDICINE

## 2023-02-20 PROCEDURE — 99900031 HC PATIENT EDUCATION (STAT)

## 2023-02-20 PROCEDURE — 25000003 PHARM REV CODE 250: Performed by: NURSE ANESTHETIST, CERTIFIED REGISTERED

## 2023-02-20 PROCEDURE — 25000003 PHARM REV CODE 250: Performed by: INTERNAL MEDICINE

## 2023-02-20 PROCEDURE — 12000002 HC ACUTE/MED SURGE SEMI-PRIVATE ROOM

## 2023-02-20 PROCEDURE — P9016 RBC LEUKOCYTES REDUCED: HCPCS | Performed by: INTERNAL MEDICINE

## 2023-02-20 PROCEDURE — 27200043 HC FORCEPS, BIOPSY: Performed by: INTERNAL MEDICINE

## 2023-02-20 PROCEDURE — 37000008 HC ANESTHESIA 1ST 15 MINUTES: Performed by: INTERNAL MEDICINE

## 2023-02-20 PROCEDURE — 85025 COMPLETE CBC W/AUTO DIFF WBC: CPT | Performed by: INTERNAL MEDICINE

## 2023-02-20 PROCEDURE — 85014 HEMATOCRIT: CPT | Performed by: INTERNAL MEDICINE

## 2023-02-20 PROCEDURE — A4216 STERILE WATER/SALINE, 10 ML: HCPCS | Performed by: INTERNAL MEDICINE

## 2023-02-20 PROCEDURE — 37000009 HC ANESTHESIA EA ADD 15 MINS: Performed by: INTERNAL MEDICINE

## 2023-02-20 PROCEDURE — 85018 HEMOGLOBIN: CPT | Performed by: INTERNAL MEDICINE

## 2023-02-20 PROCEDURE — 80048 BASIC METABOLIC PNL TOTAL CA: CPT | Performed by: INTERNAL MEDICINE

## 2023-02-20 PROCEDURE — 88305 TISSUE EXAM BY PATHOLOGIST: CPT | Mod: TC,59

## 2023-02-20 PROCEDURE — 63600175 PHARM REV CODE 636 W HCPCS: Performed by: NURSE ANESTHETIST, CERTIFIED REGISTERED

## 2023-02-20 PROCEDURE — 83735 ASSAY OF MAGNESIUM: CPT | Performed by: INTERNAL MEDICINE

## 2023-02-20 PROCEDURE — 99232 PR SUBSEQUENT HOSPITAL CARE,LEVL II: ICD-10-PCS | Mod: ,,, | Performed by: SPECIALIST

## 2023-02-20 PROCEDURE — 44361 SMALL BOWEL ENDOSCOPY/BIOPSY: CPT | Performed by: INTERNAL MEDICINE

## 2023-02-20 PROCEDURE — 86922 COMPATIBILITY TEST ANTIGLOB: CPT | Performed by: INTERNAL MEDICINE

## 2023-02-20 PROCEDURE — 27000221 HC OXYGEN, UP TO 24 HOURS

## 2023-02-20 PROCEDURE — 36415 COLL VENOUS BLD VENIPUNCTURE: CPT | Performed by: INTERNAL MEDICINE

## 2023-02-20 PROCEDURE — 99900035 HC TECH TIME PER 15 MIN (STAT)

## 2023-02-20 PROCEDURE — 25000242 PHARM REV CODE 250 ALT 637 W/ HCPCS: Performed by: INTERNAL MEDICINE

## 2023-02-20 PROCEDURE — 86870 RBC ANTIBODY IDENTIFICATION: CPT | Performed by: INTERNAL MEDICINE

## 2023-02-20 PROCEDURE — 94761 N-INVAS EAR/PLS OXIMETRY MLT: CPT

## 2023-02-20 PROCEDURE — 94640 AIRWAY INHALATION TREATMENT: CPT

## 2023-02-20 PROCEDURE — 99232 SBSQ HOSP IP/OBS MODERATE 35: CPT | Mod: ,,, | Performed by: SPECIALIST

## 2023-02-20 PROCEDURE — 86900 BLOOD TYPING SEROLOGIC ABO: CPT | Performed by: INTERNAL MEDICINE

## 2023-02-20 RX ORDER — PROPOFOL 10 MG/ML
INJECTION, EMULSION INTRAVENOUS
Status: DISCONTINUED | OUTPATIENT
Start: 2023-02-20 | End: 2023-02-20

## 2023-02-20 RX ORDER — HYDROCODONE BITARTRATE AND ACETAMINOPHEN 500; 5 MG/1; MG/1
TABLET ORAL
Status: DISCONTINUED | OUTPATIENT
Start: 2023-02-20 | End: 2023-02-22 | Stop reason: HOSPADM

## 2023-02-20 RX ORDER — SODIUM CHLORIDE, SODIUM LACTATE, POTASSIUM CHLORIDE, CALCIUM CHLORIDE 600; 310; 30; 20 MG/100ML; MG/100ML; MG/100ML; MG/100ML
INJECTION, SOLUTION INTRAVENOUS CONTINUOUS PRN
Status: DISCONTINUED | OUTPATIENT
Start: 2023-02-20 | End: 2023-02-20

## 2023-02-20 RX ORDER — LIDOCAINE HCL/PF 100 MG/5ML
SYRINGE (ML) INTRAVENOUS
Status: DISCONTINUED | OUTPATIENT
Start: 2023-02-20 | End: 2023-02-20

## 2023-02-20 RX ORDER — FUROSEMIDE 10 MG/ML
20 INJECTION INTRAMUSCULAR; INTRAVENOUS ONCE
Status: COMPLETED | OUTPATIENT
Start: 2023-02-21 | End: 2023-02-21

## 2023-02-20 RX ADMIN — IPRATROPIUM BROMIDE AND ALBUTEROL SULFATE 3 ML: .5; 3 SOLUTION RESPIRATORY (INHALATION) at 07:02

## 2023-02-20 RX ADMIN — BUDESONIDE 0.5 MG: 0.5 INHALANT RESPIRATORY (INHALATION) at 08:02

## 2023-02-20 RX ADMIN — QUETIAPINE 100 MG: 100 TABLET ORAL at 07:02

## 2023-02-20 RX ADMIN — METOPROLOL SUCCINATE 50 MG: 25 TABLET, EXTENDED RELEASE ORAL at 08:02

## 2023-02-20 RX ADMIN — PROPOFOL 20 MG: 10 INJECTION, EMULSION INTRAVENOUS at 01:02

## 2023-02-20 RX ADMIN — CYANOCOBALAMIN TAB 1000 MCG 1000 MCG: 1000 TAB at 08:02

## 2023-02-20 RX ADMIN — PAROXETINE HYDROCHLORIDE 50 MG: 20 TABLET, FILM COATED ORAL at 08:02

## 2023-02-20 RX ADMIN — ISOSORBIDE MONONITRATE 60 MG: 60 TABLET, EXTENDED RELEASE ORAL at 08:02

## 2023-02-20 RX ADMIN — CLONAZEPAM 1 MG: 1 TABLET ORAL at 10:02

## 2023-02-20 RX ADMIN — FERROUS SULFATE TAB 325 MG (65 MG ELEMENTAL FE) 1 EACH: 325 (65 FE) TAB at 08:02

## 2023-02-20 RX ADMIN — Medication 800 MG: at 09:02

## 2023-02-20 RX ADMIN — LIDOCAINE HYDROCHLORIDE 40 MG: 20 INJECTION, SOLUTION INTRAVENOUS at 12:02

## 2023-02-20 RX ADMIN — PROPOFOL 50 MG: 10 INJECTION, EMULSION INTRAVENOUS at 12:02

## 2023-02-20 RX ADMIN — ENOXAPARIN SODIUM 30 MG: 30 INJECTION SUBCUTANEOUS at 05:02

## 2023-02-20 RX ADMIN — BUDESONIDE 0.5 MG: 0.5 INHALANT RESPIRATORY (INHALATION) at 07:02

## 2023-02-20 RX ADMIN — ASPIRIN 81 MG: 81 TABLET, COATED ORAL at 08:02

## 2023-02-20 RX ADMIN — CLOPIDOGREL BISULFATE 75 MG: 75 TABLET, FILM COATED ORAL at 08:02

## 2023-02-20 RX ADMIN — IPRATROPIUM BROMIDE AND ALBUTEROL SULFATE 3 ML: .5; 3 SOLUTION RESPIRATORY (INHALATION) at 08:02

## 2023-02-20 RX ADMIN — SODIUM CHLORIDE, SODIUM LACTATE, POTASSIUM CHLORIDE, AND CALCIUM CHLORIDE: .6; .31; .03; .02 INJECTION, SOLUTION INTRAVENOUS at 12:02

## 2023-02-20 RX ADMIN — PROPOFOL 50 MG: 10 INJECTION, EMULSION INTRAVENOUS at 01:02

## 2023-02-20 RX ADMIN — Medication 800 MG: at 07:02

## 2023-02-20 RX ADMIN — SODIUM CHLORIDE, PRESERVATIVE FREE 10 ML: 5 INJECTION INTRAVENOUS at 05:02

## 2023-02-20 NOTE — PROGRESS NOTES
[facility] Medicine  Progress Note    Patient name: Betty Bacon  MRN: [id number]  Admit Date: 2/14/2023   LOS: 4 days     SUBJECTIVE:     Principal problem: Chronic diastolic heart failure    Interval History:  Patient states she is feeling well. Dr. Mcrae present in the room during part of my evaluation.  Hb down to 7 today ASM, GI consulted.    Scheduled Meds:   albuterol-ipratropium  3 mL Nebulization Q6H WAKE    aspirin  81 mg Oral Daily    budesonide  0.5 mg Nebulization Q12H    clopidogreL  75 mg Oral Daily    cyanocobalamin  1,000 mcg Oral Daily    enoxaparin  30 mg Subcutaneous Daily    ferrous sulfate  1 tablet Oral Daily    isosorbide mononitrate  60 mg Oral Daily    magnesium oxide  800 mg Oral BID    metoprolol succinate  50 mg Oral Daily    pantoprazole  40 mg Oral Before breakfast    paroxetine  50 mg Oral Daily    QUEtiapine  100 mg Oral QHS     Continuous Infusions:   sodium chloride 0.9% 50 mL/hr at 02/19/23 2101     PRN Meds:acetaminophen, albuterol-ipratropium, ALPRAZolam, clonazePAM, dextrose 10%, dextrose 10%, glucagon (human recombinant), glucose, glucose, hydrALAZINE, melatonin, naloxone, ondansetron, polyethylene glycol, senna-docusate 8.6-50 mg, simethicone, sodium chloride 0.9%    Review of patient's allergies indicates:   Allergen Reactions    Propoxyphene n-acetaminophen Other (See Comments)     Other reaction(s): Unknown    Codeine Anxiety       Review of Systems: As per interval history    OBJECTIVE:     Vital Signs (Most Recent)  Temp: 98 °F (36.7 °C) (02/19/23 1935)  Pulse: 88 (02/19/23 1958)  Resp: 19 (02/19/23 1958)  BP: 105/60 (02/19/23 1935)  SpO2: (!) 92 % (ON 3L) (02/19/23 1958)    Vital Signs Range (Last 24H):  Temp:  [97.1 °F (36.2 °C)-98 °F (36.7 °C)]   Pulse:  [84-96]   Resp:  [18-20]   BP: (105-146)/(52-66)   SpO2:  [91 %-99 %]     I & O (Last 24H):  Intake/Output Summary (Last 24 hours) at 2/19/2023 2134  Last data filed at 2/19/2023  1554  Gross per 24 hour   Intake 210 ml   Output 550 ml   Net -340 ml        Physical Exam:  General: Patient resting comfortably in no acute distress. Appears as stated age. Calm  Eyes: No conjunctival injection. No scleral icterus.  ENT: Hearing grossly intact. No discharge from ears. No nasal discharge.   Neck: Supple, trachea midline. No JVD  CVS: RRR. No LE edema BL  Lungs:  No tachypnea or accessory muscle use.  Clear to auscultation bilaterally  Abdomen:  Soft, nontender and nondistended.  No organomegaly  Neuro: AOx3. Moves all extremities. Follows commands. Responds appropriately   Skin:  No rash or erythema noted    Laboratory:  I have reviewed all pertinent lab results within the past 24 hours.  CBC:   Recent Labs   Lab 02/19/23  0453 02/19/23  1431   WBC 7.73  --    RBC 2.49*  --    HGB 7.0* 7.4*   HCT 23.1* 25.3*     --    MCV 93  --    MCH 28.1  --    MCHC 30.3*  --      BMP:   Recent Labs   Lab 02/19/23  0452   GLU 92  92   *  135*   K 4.0  4.0   CL 96  96   CO2 31*  31*   BUN 48*  48*   CREATININE 2.1*  2.1*   CALCIUM 8.3*  8.3*   MG 2.4     CMP:   Recent Labs   Lab 02/14/23  1412 02/15/23  0438 02/19/23  0452   *   < > 92  92   CALCIUM 9.2   < > 8.3*  8.3*   ALBUMIN 2.6*  --  2.3*   PROT 6.4  --   --    *   < > 135*  135*   K 4.1   < > 4.0  4.0   CO2 26   < > 31*  31*   CL 98   < > 96  96   BUN 16   < > 48*  48*   CREATININE 1.1   < > 2.1*  2.1*   ALKPHOS 95  --   --    ALT 12  --   --    AST 22  --   --    BILITOT 1.1*  --   --     < > = values in this interval not displayed.       Diagnostic Results:  Labs: Reviewed    ASSESSMENT/PLAN:   * SOB/Hypoxia due to Acute on chronic heart failure with preserved ejection fraction/ ILD/Asbestosis  Reason for admission  Acute on chronic diastolic dysfunction  Congestive heart failure  EF of 60%  Patient had her Lasix dose recently cut down at home.  cardiology d/lobo Lasix  PO due to CARMELITA, NS at 50 ml/l, may neeed more  diuresis once renal funciton imroves, renal artery ultrasound reports b/l renal aa stenosis, input appreciated.   Continue with aspirin 81 mg a day clopidogrel 75 mg a day Imdur 60 mg a day metoprolol 50 mg daily.  Magnesium replacement and rechecked tomorrow morning patient has chronic hypomagnesemia and is taking p.o. at home per records.    Case discusssed with Dr. Toro (pulmonology) today, input appreciated.  Pain manageemtn, we may do PT consult.     CARMELITA     On Ns at 50 ml/h. We will follow Nephrology recs: renal/bladder us CXR, UA. Input appreciated.     Anemia     Patient has low Iron and vit b12 leveles, we will start suplmentation.  FOBT. Morgan had colonoscopy in 04/2017, reported internal hemorrhoids, patient does say she still sees blood when she wipes.  Keep monitoring H/h, EGD tomorrow, input from Nephrollgy and GI appreciated.        Severe obesity with body mass index (BMI) of 35.0 to 39.9 with serious comorbidity  Body mass index is 35.81 kg/m². Morbid obesity complicates all aspects of disease management from diagnostic modalities to treatment. Weight loss encouraged and health benefits explained to patient.                   Valvular heart disease, severe aortic stenosis, moderate aortic regurgitation  Aortic stenosis with valve area of 1 cm2  Tricuspid regurg  Echo shows preserved systolic function  Echo shows diastolic dysfunction  Case disucssed with dr hull today, input appreciated.     Essential hypertension  Monitor and treat  Probably etiology of poorly controlled pulmonary edema     Continue with home medications of pantoprazole paroxetine and quetiapine                 VTE Risk Mitigation (From admission, onward)           Ordered       enoxaparin injection 40 mg  Daily         02/14/23 2008       IP VTE HIGH RISK PATIENT  Once         02/14/23 2008       Place sequential compression device  Until discontinued                          Department Hospital Medicine  [facility]  [facility]  Bashir Bermudez MD  Date of service: 02/19/2023

## 2023-02-20 NOTE — TRANSFER OF CARE
"Anesthesia Transfer of Care Note    Patient: Betty Bacon    Procedure(s) Performed: Procedure(s) (LRB):  ENTEROSCOPY (N/A)    Patient location: Other:    Anesthesia Type: MAC    Transport from OR: Transported from OR on 6-10 L/min O2 by face mask with adequate spontaneous ventilation    Post pain: adequate analgesia    Post assessment: no apparent anesthetic complications    Post vital signs: stable    Level of consciousness: awake and alert    Nausea/Vomiting: no nausea/vomiting    Complications: none    Transfer of care protocol was followed      Last vitals:   Visit Vitals  BP (!) 177/74 (Patient Position: Sitting)   Pulse 85   Temp 36.9 °C (98.5 °F) (Oral)   Resp 18   Ht 5' 2" (1.575 m)   Wt 93.2 kg (205 lb 7.5 oz)   SpO2 (!) 92%   BMI 37.58 kg/m²     "

## 2023-02-20 NOTE — NURSING
pt complaining of bleeding when she has a bm and is scheduled for an EGD this morning. Her hemoglobin just came back critical. this AM her H&H is 6.5 and 21.3 and yesterday it was 7.4 and 25.3. On call physician notified. Will do new type and screen and transfuse 1 unit per Dr. Thomas

## 2023-02-20 NOTE — CARE UPDATE
02/19/23 1958   Patient Assessment/Suction   Level of Consciousness (AVPU) alert   Respiratory Effort Normal   Expansion/Accessory Muscles/Retractions no use of accessory muscles   All Lung Fields Breath Sounds clear;diminished   Cough Frequency no cough   PRE-TX-O2   Device (Oxygen Therapy) nasal cannula with humidification   $ Is the patient on Low Flow Oxygen? Yes   Flow (L/min) 5   SpO2 (!) 92 %  (ON 3L)   Pulse Oximetry Type Intermittent   $ Pulse Oximetry - Multiple Charge Pulse Oximetry - Multiple   Pulse 88   Resp 19   Aerosol Therapy   $ Aerosol Therapy Charges Aerosol Treatment   Daily Review of Necessity (SVN) completed   Respiratory Treatment Status (SVN) given   Treatment Route (SVN) mask   Patient Position (SVN) sitting in chair   Post Treatment Assessment (SVN) breath sounds unchanged;vital signs unchanged   Signs of Intolerance (SVN) none   Education   $ Education Bronchodilator;15 min   Respiratory Evaluation   $ Care Plan Tech Time 15 min

## 2023-02-20 NOTE — ANESTHESIA POSTPROCEDURE EVALUATION
Anesthesia Post Evaluation    Patient: Betty Bacon    Procedure(s) Performed: Procedure(s) (LRB):  ENTEROSCOPY (N/A)    Final Anesthesia Type: MAC      Patient location during evaluation: GI PACU  Patient participation: Yes- Able to Participate  Level of consciousness: awake and alert, oriented and awake  Post-procedure vital signs: reviewed and stable  Pain management: adequate  Airway patency: patent    PONV status at discharge: No PONV  Anesthetic complications: no      Cardiovascular status: blood pressure returned to baseline, hemodynamically stable and stable  Respiratory status: unassisted, spontaneous ventilation and nasal cannula  Hydration status: euvolemic  Follow-up not needed.          Vitals Value Taken Time   /65 02/20/23 1317   Temp 36.9 °C (98.5 °F) 02/20/23 1240   Pulse 81 02/20/23 1316   Resp 18 02/20/23 1240   SpO2 100 % 02/20/23 1316   Vitals shown include unvalidated device data.      No case tracking events are documented in the log.      Pain/Slime Score: Pain Rating Prior to Med Admin: 0 (2/19/2023  5:43 AM)

## 2023-02-20 NOTE — ANESTHESIA PREPROCEDURE EVALUATION
02/20/2023  Betty Bacon is a 73 y.o., female.           Tobacco Use:  The patient  reports that she quit smoking about 13 years ago. Her smoking use included cigarettes. She has a 80.00 pack-year smoking history. She has never used smokeless tobacco.     Results for orders placed or performed during the hospital encounter of 02/14/23   EKG 12-lead    Collection Time: 02/14/23  3:11 PM    Narrative    Test Reason : R06.00,    Vent. Rate : 093 BPM     Atrial Rate : 093 BPM     P-R Int : 140 ms          QRS Dur : 086 ms      QT Int : 366 ms       P-R-T Axes : 040 003 -08 degrees     QTc Int : 455 ms    Normal sinus rhythm  Moderate voltage criteria for LVH, may be normal variant  Inferior infarct (cited on or before 01-JAN-2023)  Abnormal ECG  When compared with ECG of 25-JAN-2023 13:31,  T wave inversion now evident in Anterior leads  Confirmed by Robert Roca MD ([id number]) on 2/17/2023 5:33:25 PM    Referred By: [email or web address]   SELF           Confirmed By:Robert Roca MD        Imaging Results          X-Ray Chest AP Portable (Final result)  Result time 02/14/23 15:07:54    Final result by Penny Ribera MD (02/14/23 15:07:54)                 Narrative:    Portable chest x-ray at 2:43 PM is compared to prior study dated 1/29/2023    Clinical history is dyspnea    The heart is enlarged. There are mild increased interstitial markings throughout the lungs suggestive of edema. There are no confluent infiltrates or pleural effusions. There are no acute osseous abnormalities.    IMPRESSION: Cardiomegaly with diffuse increased interstitial markings suggestive of edema    Electronically signed by:  Penny Ribera MD  2/14/2023 3:07 PM CST Workstation: [id number]                               Lab Results   Component Value Date    WBC 7.50 02/20/2023    HGB 6.5 (LL) 02/20/2023    HCT 21.3 (L)  02/20/2023    MCV 93 02/20/2023     02/20/2023     BMP  Lab Results   Component Value Date     (L) 02/20/2023    K 4.3 02/20/2023     02/20/2023    CO2 32 (H) 02/20/2023    BUN 38 (H) 02/20/2023    CREATININE 1.6 (H) 02/20/2023    CALCIUM 8.4 (L) 02/20/2023    ANIONGAP 3 (L) 02/20/2023    GLU 94 02/20/2023    GLU 92 02/19/2023    GLU 92 02/19/2023       Results for orders placed during the hospital encounter of 12/20/22    Echo    Interpretation Summary  · The left ventricle is normal in size with severe concentric hypertrophy and normal systolic function.  · Grade II left ventricular diastolic dysfunction.  · The estimated ejection fraction is 65%.  · Normal right ventricular size with normal right ventricular systolic function.  · Moderate aortic regurgitation.  · There is severe aortic valve stenosis.  · Aortic valve area is 0.94 cm2; peak velocity is 3.29 m/s; mean gradient is 26 mmHg.  · Mild pulmonic regurgitation.  · Mild tricuspid regurgitation.  · There is mild pulmonary hypertension.  · Normal central venous pressure (3 mmHg).  · The estimated PA systolic pressure is 44 mmHg.        Pre-op Assessment    I have reviewed the Patient Summary Reports.     I have reviewed the Nursing Notes. I have reviewed the NPO Status.   I have reviewed the Medications.     Review of Systems  Anesthesia Hx:  No problems with previous Anesthesia  Denies Family Hx of Anesthesia complications.   Denies Personal Hx of Anesthesia complications.   Social:  Former Smoker, Alcohol Use    Hematology/Oncology:         -- Anemia: Hematology Comments: Plavix therapy    Anemia, acute on chronic, progressive   Cardiovascular:   Hypertension, poorly controlled Valvular problems/Murmurs, AS CAD  CABG/stent  PVD ECG has been reviewed. Severe aortic valve stenosis.  · Aortic valve area is 0.94 cm2; peak velocity is 3.29 m/s; mean gradient is 26 mmHg.      Patient followed by Dr. Morrow   Pulmonary:   COPD, severe  Shortness of breath Interstitial lung disease    Trelegy Ellipta use    Rescue inhaler use 1 week ago     Chronic respiratory failure with hypoxia, on 4 L oxygen    Oxygen Saturation 91% - 93 % 4L / NC in OR Holding area    Renal/:   Chronic Renal Disease, CKD CKD (chronic kidney disease) stage 3,    Hepatic/GI:   GERD, well controlled Hepatitis B core antibody positive    Patient with no active nausea vomiting at this time     Patient with no intestinal obstruction at this time    Musculoskeletal:   Osteopenia    Chronic left-sided thoracic back pain Spine Disorders: thoracic Chronic Pain    Neurological:  Neurology Normal    Endocrine:   Secondary hyperparathyroidism of renal origin Obesity / BMI > 30  Psych:   Psychiatric History depression  Depression. Major depressive disorder, recurrent episode, severe         Physical Exam  General: Well nourished, Cooperative, Alert and Oriented    Airway:  Mallampati: III   Mouth Opening: Normal  TM Distance: > 6 cm  Tongue: Normal  Neck ROM: Extension Decreased    Dental:  Dentures, Edentulous    Chest/Lungs:  Clear to auscultation, Normal Respiratory Rate    Heart:  Rate: Normal  Rhythm: Regular Rhythm        Anesthesia Plan  Type of Anesthesia, risks & benefits discussed:    Anesthesia Type: MAC  Intra-op Monitoring Plan: Standard ASA Monitors  Induction:  IV  Informed Consent: Patient consented to blood products? Yes  ASA Score: 4  Anesthesia Plan Notes:     MAC with Propofol     POM Mask     Minimal IVF administration patient with severe aortic valve stenosis    Aortic valve stenosis precautions    Donald-Synephrine available    Esmolol available    Ready For Surgery From Anesthesia Perspective.     .

## 2023-02-20 NOTE — [employer] PATIENT INSTRUCTIONS
Discharge Summary/Instructions after an Endoscopic Procedure  Patient Name: Betty Bacon  Patient MRN: [id number]  Patient YOB: 1950 Monday, February 20, 2023  Margy Dumont MD  RESTRICTIONS:  During your procedure today, you received medications for sedation.  These   medications may affect your judgment, balance and coordination.  Therefore,   for 24 hours, you have the following restrictions:   - DO NOT drive a car, operate machinery, make legal/financial decisions,   sign important papers or drink alcohol.    ACTIVITY:  Today: no heavy lifting, straining or running due to procedural   sedation/anesthesia.  The following day: return to full activity including work.  DIET:  Eat and drink normally unless instructed otherwise.     TREATMENT FOR COMMON SIDE EFFECTS:  - Mild abdominal pain, nausea, belching, bloating or excessive gas:  rest,   eat lightly and use a heating pad.  - Sore Throat: treat with throat lozenges and/or gargle with warm salt   water.  - Because air was used during the procedure, expelling large amounts of air   from your rectum or belching is normal.  - If a bowel prep was taken, you may not have a bowel movement for 1-3 days.    This is normal.  SYMPTOMS TO WATCH FOR AND REPORT TO YOUR PHYSICIAN:  1. Abdominal pain or bloating, other than gas cramps.  2. Chest pain.  3. Back pain.  4. Signs of infection such as: chills or fever occurring within 24 hours   after the procedure.  5. Rectal bleeding, which would show as bright red, maroon, or black stools.   (A tablespoon of blood from the rectum is not serious, especially if   hemorrhoids are present.)  6. Vomiting.  7. Weakness or dizziness.  GO DIRECTLY TO THE NEAREST EMERGENCY ROOM IF YOU HAVE ANY OF THE FOLLOWING:      Difficulty breathing              Chills and/or fever over 101 F   Persistent vomiting and/or vomiting blood   Severe abdominal pain   Severe chest pain   Black, tarry stools   Bleeding- more than one  tablespoon   Any other symptom or condition that you feel may need urgent attention  Your doctor recommends these additional instructions:  If any biopsies were taken, your doctors clinic will contact you in 1 to 2   weeks with any results.  - Await pathology results.   - Use Protonix (pantoprazole) 40 mg PO daily for 1 month.   - Check hemogram with white blood cell count and platelets monthly.   - Resume Plavix (clopidogrel) at prior dose today.  Refer to managing   physician for further adjustment of therapy.   - The findings and recommendations were discussed with the patient's family.     - Refer to a hematologist at the next available appointment.   - To visualize the small bowel, perform video capsule endoscopy at the next   available appointment.   - Return patient to hospital resendiz for ongoing care.  For questions, problems or results please call your physician - Margy Dumont MD at Work:  [phone].  [facility], EMERGENCY ROOM PHONE NUMBER: [phone]  IF A COMPLICATION OR EMERGENCY SITUATION ARISES AND YOU ARE UNABLE TO REACH   YOUR PHYSICIAN - GO DIRECTLY TO THE EMERGENCY ROOM.  Margy Dumont MD  2/20/2023 1:43:17 PM  This report has been verified and signed electronically.  Dear patient,  As a result of recent federal legislation (The Federal Cures Act), you may   receive lab or pathology results from your procedure in your [facility]   account before your physician is able to contact you. Your physician or   their representative will relay the results to you with their   recommendations at their soonest availability.  Thank you,  [employer]ATION

## 2023-02-20 NOTE — NURSING
Called to room by nursing assistant Renee. Pt sitting on the floor. Per Renee pt being assisted back from bathroom and pt became weak. Pt was assisted down to floor. VSS. No injuries. A&Ox4. PERRL. No complaints. Notified MD. No new orders at this time. Will cont to monitor

## 2023-02-20 NOTE — CONSULTS
[facility]  Cardiology  Progress Note    Patient Name: Betty Bacon  MRN: [id number]  Admission Date: 2/14/2023  Hospital Length of Stay: 5 days  Code Status: Full Code   Attending Physician: Bashir Bermudez MD   Primary Care Physician: Johanne Callejas MD  Expected Discharge Date: 2/22/2023  Principal Problem:Chronic diastolic heart failure    Subjective:     Hospital Course:  Patient in sinus rhythm  She had EGD and received 1 unit of blood    Interval History:  Renal functions improving  She does have some fluid overload now and they are more rales in the lung    ROS  Objective:     Vital Signs (Most Recent):  Temp: 98.5 °F (36.9 °C) (02/20/23 1240)  Pulse: 76 (02/20/23 1321)  Resp: 16 (02/20/23 1321)  BP: (!) 151/67 (02/20/23 1321)  SpO2: 98 % (02/20/23 1321)   Vital Signs (24h Range):  Temp:  [97.7 °F (36.5 °C)-98.5 °F (36.9 °C)] 98.5 °F (36.9 °C)  Pulse:  [] 76  Resp:  [14-20] 16  SpO2:  [91 %-100 %] 98 %  BP: (105-177)/(52-74) 151/67     Weight: 93.2 kg (205 lb 7.5 oz)  Body mass index is 37.58 kg/m².    SpO2: 98 %         Intake/Output Summary (Last 24 hours) at 2/20/2023 1540  Last data filed at 2/20/2023 1309  Gross per 24 hour   Intake 450.42 ml   Output 200 ml   Net 250.42 ml       Lines/Drains/Airways       Drain  Duration             Female External Urinary Catheter 02/14/23 2045 5 days              Peripheral Intravenous Line  Duration                  Peripheral IV - Single Lumen 02/19/23 0015 20 G Posterior;Right Forearm 1 day                    Physical Exam  Crackles in the lung  Cardiac regular systolic ejection murmur  Legs +2 edema  Significant Labs: CMP   Recent Labs   Lab 02/19/23  0452 02/20/23  0421   *  135* 135*   K 4.0  4.0 4.3   CL 96  96 100   CO2 31*  31* 32*   GLU 92  92 94   BUN 48*  48* 38*   CREATININE 2.1*  2.1* 1.6*   CALCIUM 8.3*  8.3* 8.4*   ALBUMIN 2.3*  --    ANIONGAP 8  8 3*     Small hiatal hernia.                          - A  single non-bleeding angioectasia in the                          stomach.                          - The examined portion of the jejunum was normal.                          - Portal hypertensive gastropathy. Biopsied.                          - Erosive gastropathy with no bleeding and no   Significant Imaging: EGD :   Assessment and Plan:   Heart failure preserved ejection fraction; aortic valve stenosis    Will give 1 dose of Lasix tomorrow-  Brief HPI:     Active Diagnoses:    Diagnosis Date Noted POA    PRINCIPAL PROBLEM:  Acute on chronic heart failure with preserved ejection fraction [I50.32] 10/29/2019 Yes    Severe obesity with body mass index (BMI) of 35.0 to 39.9 with serious comorbidity [E66.01] 06/07/2018 Yes    Valvular heart disease, severe aortic stenosis, moderate aortic regurgitation [I35.0] 05/16/2013 Yes     Chronic    Essential hypertension [I10]  Yes     Chronic      Problems Resolved During this Admission:       VTE Risk Mitigation (From admission, onward)           Ordered     enoxaparin injection 30 mg  Daily         02/17/23 0859     IP VTE HIGH RISK PATIENT  Once         02/14/23 2008     Place sequential compression device  Until discontinued         02/14/23 2008                    Aleksey Andre MD  Cardiology  [facility]

## 2023-02-20 NOTE — PROGRESS NOTES
"Consult Note  Nephrology    Consult Requested By: Bashir Bermudez MD    Reason for Consult: CARMELITA on CKD 3    SUBJECTIVE:     History of Present Illness:  72 yo female who presented to ER on 2/14 due to shortness of breath, cough and "not feeling good" .  Has hx CKD 3 and f/u w/ Dr. Morrow in out patient clinic.  She is admitted for A/C CHF, in need of diuresis.  Cardiology is following, and stopped her lasix yesterday when Scr bumped.  Nephrology is consulted for co-management.      2/18  Scr normal on admit, 2.7 yesterday, 2.6 today.  Off lasix, held by cards when Scr bumped.  UOP not recorded.  No complaints, denies SOB.  Precipitous drop in Hgb noted.  2/19  VSS, Hgb down to 7, primary ordered Fe+ supplement yesterday and stool for OB.  Renal function improving.  Pt denies pain, no n/v, denies bloody/black stool.  Saw Dr. Trevizo about a year ago, said she had a small bleed at that time.    2/20  EGD planned for today.  Hgb 6.5, transfusion orders noted per primary for today.  Renal function improving.  VSS, UOP not properly recorded.  No complaints.    Assessment/plan:    CARMELITA   CKD 3  Anemia of chronic dz  Mild hyponatremia  Alkalosis    --agree w/gentle hydration.  Ordered UA w/micro, f/u renal panel in am.  --Avoid nephrotoxic agents.  No NSAIDs, COXibs, or aminoglycoside antibiotics.  Dose all medications for level of renal function.  --Hgb has been dropping.  Iron studies done last month, ferritin stores 977.  Defer transfusion to primary team--may need GI consult.  --getting NS at 50  --likely contraction alkalosis from lasix--diuretics on hold.      Past Medical History:   Diagnosis Date    Acute coronary artery obstruction without MI 10/2012    Benign hypertension     COPD (chronic obstructive pulmonary disease)     Coronary artery disease     Disorder of kidney and ureter     Dr. Morrow    Hepatitis B core antibody positive 03/03/2021    Negative sAg, suggests previous exposure but no chronic/active " Hep B. At risk for reactivation with any immunosuppression medication, steroids, chemo, etc.      History of electroconvulsive therapy     Hyperlipidemia LDL goal < 70     Left ankle sprain     Major depressive disorder, recurrent episode, severe     s/p ECT    Positive AKIRA (antinuclear antibody) 2021    PVD (peripheral vascular disease)      Past Surgical History:   Procedure Laterality Date    CHOLECYSTECTOMY      CORONARY ANGIOPLASTY      CORONARY ANGIOPLASTY WITH STENT PLACEMENT  10/2012    2 stents RCA (100% stenosis)    EYE SURGERY      cataract surgery    HYSTERECTOMY      LINA, ovaries intact. uterine prolapse    ILIAC ARTERY STENT      TONSILLECTOMY      TOTAL VAGINAL HYSTERECTOMY       Family History   Problem Relation Age of Onset    Diabetes Mother     Heart disease Mother     Stroke Father     Heart disease Father     Heart disease Brother     Stroke Brother     Hypertension Daughter     Diabetes Maternal Aunt     Heart disease Maternal Aunt     Heart disease Maternal Uncle     Heart disease Paternal Aunt     Heart disease Paternal Uncle     Heart disease Maternal Grandfather     Diabetes Sister     Heart disease Sister     Cancer Sister         lung    Kidney disease Sister         mass, benign    Breast cancer Sister     Stroke Sister     Dementia Sister     Liver disease Sister     Melanoma Neg Hx     Psoriasis Neg Hx     Lupus Neg Hx     Eczema Neg Hx      Social History     Tobacco Use    Smoking status: Former     Packs/day: 2.00     Years: 40.00     Pack years: 80.00     Types: Cigarettes     Quit date: 2009     Years since quittin.2    Smokeless tobacco: Never   Substance Use Topics    Alcohol use: Yes     Comment: social    Drug use: No       Review of patient's allergies indicates:   Allergen Reactions    Propoxyphene n-acetaminophen Other (See Comments)     Other reaction(s): Unknown    Codeine Anxiety        Review of Systems:  Negative unless noted above    OBJECTIVE:      Vital Signs Range (Last 24H):  Temp:  [97.4 °F (36.3 °C)-98.5 °F (36.9 °C)]   Pulse:  []   Resp:  [18-20]   BP: (105-150)/(52-73)   SpO2:  [91 %-99 %]     Physical Exam:  General- NAD noted  HEENT- WNL  Neck- supple  CV- Regular rate and rhythm  Resp- Lungs CTA Bilaterally, No increased WOB  GI- Non tender/non-distended, BS normoactive x4 quads  Extrem- No cyanosis, clubbing, +edema.  Derm- skin w/d  Neuro-  No flap.     Body mass index is 37.58 kg/m².    Laboratory:  CBC:   Recent Labs   Lab 02/20/23  0421   WBC 7.50   RBC 2.30*   HGB 6.5*   HCT 21.3*      MCV 93   MCH 28.3   MCHC 30.5*       CMP:   Recent Labs   Lab 02/14/23  1412 02/15/23  0438 02/19/23  0452 02/20/23  0421   *   < > 92  92 94   CALCIUM 9.2   < > 8.3*  8.3* 8.4*   ALBUMIN 2.6*  --  2.3*  --    PROT 6.4  --   --   --    *   < > 135*  135* 135*   K 4.1   < > 4.0  4.0 4.3   CO2 26   < > 31*  31* 32*   CL 98   < > 96  96 100   BUN 16   < > 48*  48* 38*   CREATININE 1.1   < > 2.1*  2.1* 1.6*   ALKPHOS 95  --   --   --    ALT 12  --   --   --    AST 22  --   --   --    BILITOT 1.1*  --   --   --     < > = values in this interval not displayed.         Diagnostic Results:  Labs: Reviewed      ASSESSMENT/PLAN:     Active Hospital Problems    Diagnosis  POA    *Acute on chronic heart failure with preserved ejection fraction [I50.32]  Yes    Severe obesity with body mass index (BMI) of 35.0 to 39.9 with serious comorbidity [E66.01]  Yes    Valvular heart disease, severe aortic stenosis, moderate aortic regurgitation [I35.0]  Yes     Chronic     moderate      Essential hypertension [I10]  Yes     Chronic      Resolved Hospital Problems   No resolved problems to display.         Thank you for allowing us to participate in the care of your patient. We will follow the patient and provide recommendations as needed.      Time spent seeing patient( greater than 1/2 spent in direct contact) :     Xiomara Rodriguez NP

## 2023-02-20 NOTE — CARE UPDATE
02/20/23 0740   Patient Assessment/Suction   Level of Consciousness (AVPU) alert   Respiratory Effort Normal;Unlabored   Expansion/Accessory Muscles/Retractions no use of accessory muscles;no retractions   All Lung Fields Breath Sounds clear;diminished   Rhythm/Pattern, Respiratory unlabored;pattern regular;depth regular;chest wiggle adequate;no shortness of breath reported   Cough Frequency no cough   PRE-TX-O2   Device (Oxygen Therapy) nasal cannula   $ Is the patient on Low Flow Oxygen? Yes   Flow (L/min) 5   SpO2 97 %   Pulse Oximetry Type Intermittent   $ Pulse Oximetry - Multiple Charge Pulse Oximetry - Multiple   Pulse 74   Resp 19   Aerosol Therapy   $ Aerosol Therapy Charges Aerosol Treatment   Daily Review of Necessity (SVN) completed   Respiratory Treatment Status (SVN) given   Treatment Route (SVN) oxygen;mask   Patient Position (SVN) HOB elevated   Post Treatment Assessment (SVN) increased aeration   Signs of Intolerance (SVN) none   Breath Sounds Post-Respiratory Treatment   Throughout All Fields Post-Treatment All Fields   Throughout All Fields Post-Treatment aeration increased   Post-treatment Heart Rate (beats/min) 85   Post-treatment Resp Rate (breaths/min) 19   Education   $ Education Bronchodilator;15 min   Respiratory Evaluation   $ Care Plan Tech Time 15 min

## 2023-02-20 NOTE — PLAN OF CARE
Problem: Adult Inpatient Plan of Care  Goal: Plan of Care Review  Outcome: Ongoing, Progressing  Goal: Optimal Comfort and Wellbeing  Outcome: Ongoing, Progressing   POC reviewed with pt.  Pt aware of planned Enteroscopy and NPO after midnight per notes.  Pt denies needs.  Pt up to chair talking with family.  No acute distress noted.  Call light within reach.

## 2023-02-21 ENCOUNTER — PATIENT MESSAGE (OUTPATIENT)
Dept: PULMONOLOGY | Facility: CLINIC | Age: 73
End: 2023-02-21
Payer: MEDICARE

## 2023-02-21 ENCOUNTER — PATIENT MESSAGE (OUTPATIENT)
Dept: CARDIOLOGY | Facility: CLINIC | Age: 73
End: 2023-02-21
Payer: MEDICARE

## 2023-02-21 PROBLEM — D62 ACUTE BLOOD LOSS ANEMIA: Status: ACTIVE | Noted: 2023-02-21

## 2023-02-21 LAB
ANION GAP SERPL CALC-SCNC: 3 MMOL/L (ref 8–16)
BASOPHILS # BLD AUTO: 0.05 K/UL (ref 0–0.2)
BASOPHILS NFR BLD: 0.7 % (ref 0–1.9)
BNP SERPL-MCNC: 739 PG/ML (ref 0–99)
BUN SERPL-MCNC: 28 MG/DL (ref 8–23)
CALCIUM SERPL-MCNC: 8.6 MG/DL (ref 8.7–10.5)
CHLORIDE SERPL-SCNC: 103 MMOL/L (ref 95–110)
CO2 SERPL-SCNC: 31 MMOL/L (ref 23–29)
CREAT SERPL-MCNC: 1.1 MG/DL (ref 0.5–1.4)
DIFFERENTIAL METHOD: ABNORMAL
EOSINOPHIL # BLD AUTO: 0.6 K/UL (ref 0–0.5)
EOSINOPHIL NFR BLD: 8.7 % (ref 0–8)
ERYTHROCYTE [DISTWIDTH] IN BLOOD BY AUTOMATED COUNT: 17.5 % (ref 11.5–14.5)
EST. GFR  (NO RACE VARIABLE): 52.4 ML/MIN/1.73 M^2
GLUCOSE SERPL-MCNC: 85 MG/DL (ref 70–110)
HCT VFR BLD AUTO: 26.6 % (ref 37–48.5)
HGB BLD-MCNC: 8 G/DL (ref 12–16)
IMM GRANULOCYTES # BLD AUTO: 0.13 K/UL (ref 0–0.04)
IMM GRANULOCYTES NFR BLD AUTO: 1.8 % (ref 0–0.5)
LYMPHOCYTES # BLD AUTO: 1.2 K/UL (ref 1–4.8)
LYMPHOCYTES NFR BLD: 15.6 % (ref 18–48)
MAGNESIUM SERPL-MCNC: 2.6 MG/DL (ref 1.6–2.6)
MCH RBC QN AUTO: 28.3 PG (ref 27–31)
MCHC RBC AUTO-ENTMCNC: 30.1 G/DL (ref 32–36)
MCV RBC AUTO: 94 FL (ref 82–98)
MONOCYTES # BLD AUTO: 0.6 K/UL (ref 0.3–1)
MONOCYTES NFR BLD: 8.7 % (ref 4–15)
NEUTROPHILS # BLD AUTO: 4.8 K/UL (ref 1.8–7.7)
NEUTROPHILS NFR BLD: 64.5 % (ref 38–73)
NRBC BLD-RTO: 0 /100 WBC
PLATELET # BLD AUTO: 310 K/UL (ref 150–450)
PMV BLD AUTO: 9.6 FL (ref 9.2–12.9)
POTASSIUM SERPL-SCNC: 4.3 MMOL/L (ref 3.5–5.1)
RBC # BLD AUTO: 2.83 M/UL (ref 4–5.4)
SODIUM SERPL-SCNC: 137 MMOL/L (ref 136–145)
URATE SERPL-MCNC: 9.7 MG/DL (ref 2.4–5.7)
WBC # BLD AUTO: 7.36 K/UL (ref 3.9–12.7)

## 2023-02-21 PROCEDURE — 27000221 HC OXYGEN, UP TO 24 HOURS

## 2023-02-21 PROCEDURE — 63600175 PHARM REV CODE 636 W HCPCS: Performed by: SPECIALIST

## 2023-02-21 PROCEDURE — 94640 AIRWAY INHALATION TREATMENT: CPT

## 2023-02-21 PROCEDURE — 99900031 HC PATIENT EDUCATION (STAT)

## 2023-02-21 PROCEDURE — 85025 COMPLETE CBC W/AUTO DIFF WBC: CPT | Performed by: INTERNAL MEDICINE

## 2023-02-21 PROCEDURE — 84165 PROTEIN E-PHORESIS SERUM: CPT | Performed by: INTERNAL MEDICINE

## 2023-02-21 PROCEDURE — 83010 ASSAY OF HAPTOGLOBIN QUANT: CPT | Performed by: INTERNAL MEDICINE

## 2023-02-21 PROCEDURE — 80048 BASIC METABOLIC PNL TOTAL CA: CPT | Performed by: INTERNAL MEDICINE

## 2023-02-21 PROCEDURE — 36415 COLL VENOUS BLD VENIPUNCTURE: CPT | Performed by: INTERNAL MEDICINE

## 2023-02-21 PROCEDURE — 83735 ASSAY OF MAGNESIUM: CPT | Performed by: INTERNAL MEDICINE

## 2023-02-21 PROCEDURE — 94761 N-INVAS EAR/PLS OXIMETRY MLT: CPT

## 2023-02-21 PROCEDURE — 99900035 HC TECH TIME PER 15 MIN (STAT)

## 2023-02-21 PROCEDURE — 25000003 PHARM REV CODE 250: Performed by: INTERNAL MEDICINE

## 2023-02-21 PROCEDURE — 84155 ASSAY OF PROTEIN SERUM: CPT | Performed by: INTERNAL MEDICINE

## 2023-02-21 PROCEDURE — 83880 ASSAY OF NATRIURETIC PEPTIDE: CPT | Performed by: SPECIALIST

## 2023-02-21 PROCEDURE — 99222 1ST HOSP IP/OBS MODERATE 55: CPT | Mod: ,,, | Performed by: INTERNAL MEDICINE

## 2023-02-21 PROCEDURE — 36415 COLL VENOUS BLD VENIPUNCTURE: CPT | Performed by: SPECIALIST

## 2023-02-21 PROCEDURE — 99233 PR SUBSEQUENT HOSPITAL CARE,LEVL III: ICD-10-PCS | Mod: ,,, | Performed by: SPECIALIST

## 2023-02-21 PROCEDURE — 25000242 PHARM REV CODE 250 ALT 637 W/ HCPCS: Performed by: INTERNAL MEDICINE

## 2023-02-21 PROCEDURE — 12000002 HC ACUTE/MED SURGE SEMI-PRIVATE ROOM

## 2023-02-21 PROCEDURE — 99233 SBSQ HOSP IP/OBS HIGH 50: CPT | Mod: ,,, | Performed by: SPECIALIST

## 2023-02-21 PROCEDURE — 84550 ASSAY OF BLOOD/URIC ACID: CPT | Performed by: SPECIALIST

## 2023-02-21 PROCEDURE — 63600175 PHARM REV CODE 636 W HCPCS: Performed by: INTERNAL MEDICINE

## 2023-02-21 PROCEDURE — 99222 PR INITIAL HOSPITAL CARE,LEVL II: ICD-10-PCS | Mod: ,,, | Performed by: INTERNAL MEDICINE

## 2023-02-21 RX ORDER — FUROSEMIDE 40 MG/1
40 TABLET ORAL DAILY
Status: DISCONTINUED | OUTPATIENT
Start: 2023-02-22 | End: 2023-02-22 | Stop reason: HOSPADM

## 2023-02-21 RX ADMIN — CLOPIDOGREL BISULFATE 75 MG: 75 TABLET, FILM COATED ORAL at 09:02

## 2023-02-21 RX ADMIN — METOPROLOL SUCCINATE 50 MG: 25 TABLET, EXTENDED RELEASE ORAL at 09:02

## 2023-02-21 RX ADMIN — PANTOPRAZOLE SODIUM 40 MG: 40 TABLET, DELAYED RELEASE ORAL at 05:02

## 2023-02-21 RX ADMIN — QUETIAPINE 100 MG: 100 TABLET ORAL at 08:02

## 2023-02-21 RX ADMIN — FUROSEMIDE 20 MG: 10 INJECTION, SOLUTION INTRAMUSCULAR; INTRAVENOUS at 09:02

## 2023-02-21 RX ADMIN — IPRATROPIUM BROMIDE AND ALBUTEROL SULFATE 3 ML: .5; 3 SOLUTION RESPIRATORY (INHALATION) at 07:02

## 2023-02-21 RX ADMIN — ASPIRIN 81 MG: 81 TABLET, COATED ORAL at 09:02

## 2023-02-21 RX ADMIN — PAROXETINE HYDROCHLORIDE 50 MG: 20 TABLET, FILM COATED ORAL at 09:02

## 2023-02-21 RX ADMIN — ENOXAPARIN SODIUM 30 MG: 30 INJECTION SUBCUTANEOUS at 04:02

## 2023-02-21 RX ADMIN — CYANOCOBALAMIN TAB 1000 MCG 1000 MCG: 1000 TAB at 09:02

## 2023-02-21 RX ADMIN — CLONAZEPAM 1 MG: 1 TABLET ORAL at 10:02

## 2023-02-21 RX ADMIN — FERROUS SULFATE TAB 325 MG (65 MG ELEMENTAL FE) 1 EACH: 325 (65 FE) TAB at 09:02

## 2023-02-21 RX ADMIN — ISOSORBIDE MONONITRATE 60 MG: 60 TABLET, EXTENDED RELEASE ORAL at 09:02

## 2023-02-21 RX ADMIN — BUDESONIDE 0.5 MG: 0.5 INHALANT RESPIRATORY (INHALATION) at 07:02

## 2023-02-21 RX ADMIN — IPRATROPIUM BROMIDE AND ALBUTEROL SULFATE 3 ML: .5; 3 SOLUTION RESPIRATORY (INHALATION) at 01:02

## 2023-02-21 RX ADMIN — Medication 800 MG: at 09:02

## 2023-02-21 RX ADMIN — Medication 800 MG: at 08:02

## 2023-02-21 NOTE — PROGRESS NOTES
[facility] Medicine  Progress Note    Patient name: Betty Bacon  MRN: [id number]  Admit Date: 2/14/2023   LOS: 6 days     SUBJECTIVE:     Principal problem: Chronic diastolic heart failure    Interval History: no SOB on supplemental oxygen.  No CP.  LE edema noted, pt not sure if it is better or not since previous.    Review of Systems: All systems reviewed and are negative except as noted per above.    OBJECTIVE:     Vital Signs (Most Recent)  Temp: 98.1 °F (36.7 °C) (02/21/23 1537)  Pulse: 82 (02/21/23 1537)  Resp: 20 (02/21/23 1537)  BP: (!) 157/69 (02/21/23 1537)  SpO2: (!) 94 % (02/21/23 1537)     Physical Exam:    Gen: alert, responsive  HEENT:  Eyes - no pallor  External ears with no lesions  Nares patent  Mouth, Throat:  trachea midline   CV: RRR, +murmur  Lungs: RALES  Abd: +BS, soft, NT, ND  Ext: no atrophy; +BLE edema  Skin: warm, dry  Neuro: grossly intact  Psych: pleasant    ASSESSMENT/PLAN:     Acute on chronic hypoxemic respiratory failure  Due to acute on chronic HFpEF 60% + AS  Complicated by ILD/Asbestosis  Complicated by CARMELITA on CKD, resolved  - b/l renal aa stenosis, will need follow-up  - IV lasix prn; pt currently back on po regimen  - [facility]  - possible discharge in AM, per cardiology recommendations   - cardiology, nephrology following, thank you    Acute on chronic anemia  - pRBC transfusions prn  - trending CBC  - enteroscopy 2/20: non-bleeding angioectasia, erosive gastropathy  - continue protonix for one month per GI  - follow up outpt GI  - hematology following, thank you    Chronic conditions as noted above/below; home medications reviewed personally by me and restarted as appropriate  Electrolyte derangement:  Trending BMP; Mg; replacement prn  DVT ppx: lovenox  FULL CODE    Department Hospital Medicine  [facility]  Lynette Shea MD  Date of service: 02/21/2023

## 2023-02-21 NOTE — PROGRESS NOTES
[facility] Medicine  Progress Note    Patient name: Betty Bacon  MRN: [id number]  Admit Date: 2/14/2023   LOS: 5 days     SUBJECTIVE:     Principal problem: Chronic diastolic heart failure    Interval History:  S/p 1 u prbc this morning due to hb less than 7.  Had endoscopy done today by GI. I saw her after procedure, she states she is feeling well.    Scheduled Meds:   albuterol-ipratropium  3 mL Nebulization Q6H WAKE    aspirin  81 mg Oral Daily    budesonide  0.5 mg Nebulization Q12H    clopidogreL  75 mg Oral Daily    cyanocobalamin  1,000 mcg Oral Daily    enoxaparin  30 mg Subcutaneous Daily    ferrous sulfate  1 tablet Oral Daily    [START ON 2/21/2023] furosemide (LASIX) injection  20 mg Intravenous Once    isosorbide mononitrate  60 mg Oral Daily    magnesium oxide  800 mg Oral BID    metoprolol succinate  50 mg Oral Daily    pantoprazole  40 mg Oral Before breakfast    paroxetine  50 mg Oral Daily    QUEtiapine  100 mg Oral QHS     Continuous Infusions:  PRN Meds:sodium chloride, sodium chloride, acetaminophen, albuterol-ipratropium, ALPRAZolam, clonazePAM, dextrose 10%, dextrose 10%, glucagon (human recombinant), glucose, glucose, hydrALAZINE, melatonin, naloxone, ondansetron, polyethylene glycol, senna-docusate 8.6-50 mg, simethicone, sodium chloride 0.9%    Review of patient's allergies indicates:   Allergen Reactions    Propoxyphene n-acetaminophen Other (See Comments)     Other reaction(s): Unknown    Codeine Anxiety       Review of Systems: As per interval history    OBJECTIVE:     Vital Signs (Most Recent)  Temp: (P) 97.4 °F (36.3 °C) (02/20/23 2011)  Pulse: (P) 77 (02/20/23 2011)  Resp: (P) 19 (02/20/23 2011)  BP: (!) (P) 151/52 (02/20/23 2011)  SpO2: (P) 97 % (breathing treatment) (02/20/23 2011)    Vital Signs Range (Last 24H):  Temp:  [97.4 °F (36.3 °C)-98.5 °F (36.9 °C)]   Pulse:  [74-97]   Resp:  [14-20]   BP: (121-177)/(52-74)   SpO2:  [91 %-100 %]     I & O  (Last 24H):  Intake/Output Summary (Last 24 hours) at 2/20/2023 2131  Last data filed at 2/20/2023 1950  Gross per 24 hour   Intake 720.42 ml   Output --   Net 720.42 ml        Physical Exam:  General: Patient resting comfortably in no acute distress. Appears as stated age. Calm  Eyes: No conjunctival injection. No scleral icterus.  ENT: Hearing grossly intact. No discharge from ears. No nasal discharge.   Neck: Supple, trachea midline. No JVD  CVS: RRR. No LE edema BL  Lungs:  No tachypnea or accessory muscle use.  Clear to auscultation bilaterally  Abdomen:  Soft, nontender and nondistended.  No organomegaly  Neuro: AOx3. Moves all extremities. Follows commands. Responds appropriately   Skin:  No rash or erythema noted      Laboratory:  I have reviewed all pertinent lab results within the past 24 hours.  CBC:   Recent Labs   Lab 02/20/23  0421 02/20/23  1455   WBC 7.50  --    RBC 2.30*  --    HGB 6.5* 8.0*   HCT 21.3* 26.6*     --    MCV 93  --    MCH 28.3  --    MCHC 30.5*  --      BMP:   Recent Labs   Lab 02/20/23  0421   GLU 94   *   K 4.3      CO2 32*   BUN 38*   CREATININE 1.6*   CALCIUM 8.4*   MG 2.5     CMP:   Recent Labs   Lab 02/14/23  1412 02/15/23  0438 02/19/23  0452 02/20/23  0421   *   < > 92  92 94   CALCIUM 9.2   < > 8.3*  8.3* 8.4*   ALBUMIN 2.6*  --  2.3*  --    PROT 6.4  --   --   --    *   < > 135*  135* 135*   K 4.1   < > 4.0  4.0 4.3   CO2 26   < > 31*  31* 32*   CL 98   < > 96  96 100   BUN 16   < > 48*  48* 38*   CREATININE 1.1   < > 2.1*  2.1* 1.6*   ALKPHOS 95  --   --   --    ALT 12  --   --   --    AST 22  --   --   --    BILITOT 1.1*  --   --   --     < > = values in this interval not displayed.       Diagnostic Results:  Labs: Reviewed    ASSESSMENT/PLAN:   * SOB/Hypoxia due to Acute on chronic heart failure with preserved ejection fraction/ ILD/Asbestosis  Reason for admission  Acute on chronic diastolic dysfunction  Congestive heart  failure  EF of 60%  Patient had her Lasix dose recently cut down at home.  cardiology d/lobo Lasix  PO due to CARMELITA, NS at 50 ml/l, may neeed more diuresis once renal funciton imroves, renal artery ultrasound reports b/l renal aa stenosis, input appreciated.   Continue with aspirin 81 mg a day clopidogrel 75 mg a day Imdur 60 mg a day metoprolol 50 mg daily.  Magnesium replacement and rechecked tomorrow morning patient has chronic hypomagnesemia and is taking p.o. at home per records.    Case discusssed with Dr. Toro (pulmonology), input appreciated.  Pain manageemtn, we may do PT consult.     CARMELITA     On Ns at 50 ml/h. We will follow Nephrology recs: renal/bladder us CXR, UA. Input appreciated.     Anemia     Patient has low Iron and vit b12 leveles, we will start suplmentation.  FOBT. Morgan had colonoscopy in 04/2017, reported internal hemorrhoids, patient does say she still sees blood when she wipes.  Keep monitoring H/h, GI recs: Protonix (pantoprazole) 40 mg PO daily for 1                          month.                          - Check hemogram with white blood cell count and                          platelets monthly.                          - Resume Plavix (clopidogrel) at prior dose today.                          Refer to managing physician for further adjustment                          of therapy.                          - The findings and recommendations were discussed                          with the patient's family.                          - Refer to a hematologist at the next available                          appointment.                          - To visualize the small bowel, perform video                          capsule endoscopy at the next available                          appointment.                          - Return patient to hospital resendiz for ongoing care., input from Nephrollgy and GI appreciated.        Severe obesity with body mass index (BMI) of 35.0 to 39.9 with serious  comorbidity  Body mass index is 35.81 kg/m². Morbid obesity complicates all aspects of disease management from diagnostic modalities to treatment. Weight loss encouraged and health benefits explained to patient.                   Valvular heart disease, severe aortic stenosis, moderate aortic regurgitation  Aortic stenosis with valve area of 1 cm2  Tricuspid regurg  Echo shows preserved systolic function  Echo shows diastolic dysfunction  Case disucssed with dr ukrtis cabello, input appreciated.     Essential hypertension  Monitor and treat  Probably etiology of poorly controlled pulmonary edema     Continue with home medications of pantoprazole paroxetine and quetiapine                 VTE Risk Mitigation (From admission, onward)           Ordered       enoxaparin injection 40 mg  Daily         02/14/23 2008       IP VTE HIGH RISK PATIENT  Once         02/14/23 2008       Place sequential compression device  Until discontinued                               Department Hospital Medicine  [facility]  Bashir Bermudez MD  Date of service: 02/20/2023

## 2023-02-21 NOTE — CONSULTS
[facility]  Cardiology  Progress Note    Patient Name: Betty Bacon  MRN: [id number]  Admission Date: 2/14/2023  Hospital Length of Stay: 6 days  Code Status: Full Code   Attending Physician: Lynette Shea MD   Primary Care Physician: Johanne Callejas MD  Expected Discharge Date: 2/22/2023  Principal Problem:Chronic diastolic heart failure    Subjective:     Hospital Course:  Creatinine is return to normal  Patient is still little bit short of breath    Interval History:  She received 20 Lasix today and diuresed\  She is potential candidate for TAVR versus aortic valve replacement    ROS  Objective:     Vital Signs (Most Recent):  Temp: 97.8 °F (36.6 °C) (02/21/23 1218)  Pulse: 85 (02/21/23 1351)  Resp: 19 (02/21/23 1351)  BP: (!) 144/64 (02/21/23 1218)  SpO2: 96 % (02/21/23 1351)   Vital Signs (24h Range):  Temp:  [97.2 °F (36.2 °C)-97.8 °F (36.6 °C)] 97.8 °F (36.6 °C)  Pulse:  [74-96] 85  Resp:  [19-22] 19  SpO2:  [85 %-97 %] 96 %  BP: (141-169)/(52-67) 144/64     Weight: 94.6 kg (208 lb 8.9 oz)  Body mass index is 38.15 kg/m².    SpO2: 96 %         Intake/Output Summary (Last 24 hours) at 2/21/2023 1636  Last data filed at 2/21/2023 0903  Gross per 24 hour   Intake 600 ml   Output 300 ml   Net 300 ml       Lines/Drains/Airways       Peripheral Intravenous Line  Duration                  Peripheral IV - Single Lumen 02/19/23 0015 20 G Posterior;Right Forearm 2 days                    Physical Exam lungs reveal crackles posteriorly  Cardiac systolic ejection murmur  +2 edema    Significant Labs: CMP   Recent Labs   Lab 02/20/23  0421 02/21/23  0444   * 137   K 4.3 4.3    103   CO2 32* 31*   GLU 94 85   BUN 38* 28*   CREATININE 1.6* 1.1   CALCIUM 8.4* 8.6*   ANIONGAP 3* 3*    and CBC   Recent Labs   Lab 02/20/23  0421 02/20/23  1455 02/21/23  0444   WBC 7.50  --  7.36   HGB 6.5* 8.0* 8.0*   HCT 21.3* 26.6* 26.6*     --  310       Significant Imaging:   Assessment and Plan:    Heart failure preserved ejection fraction secondary to aortic valve disease  Previous coronary stenting(last catheterization 2014 demonstrated patent artery)  Brief HPI:   Plan restart diuretics 3 days a week  She probably can be discharged tomorrow  Set up TAVR Clinic appointment at [facility]  Follow-up with   in [facility] .      Active Diagnoses:    Diagnosis Date Noted POA    PRINCIPAL PROBLEM:  Acute on chronic heart failure with preserved ejection fraction [I50.32] 10/29/2019 Yes    Acute blood loss anemia [D62] 02/21/2023 Yes    Anemia, acute on chronic, progressive [D64.9] 01/26/2023 Yes     Chronic    Severe obesity with body mass index (BMI) of 35.0 to 39.9 with serious comorbidity [E66.01] 06/07/2018 Yes    Valvular heart disease, severe aortic stenosis, moderate aortic regurgitation [I35.0] 05/16/2013 Yes     Chronic    Essential hypertension [I10]  Yes     Chronic      Problems Resolved During this Admission:       VTE Risk Mitigation (From admission, onward)           Ordered     enoxaparin injection 30 mg  Daily         02/17/23 0859     IP VTE HIGH RISK PATIENT  Once         02/14/23 2008     Place sequential compression device  Until discontinued         02/14/23 2008                I substantially and  personally reviewed old and new ecg's, lab reports,, xray reports  and  other cardiovascular studies including  echo's, stress tests, angiogram reports, holters,and vascular studies .  In addition I evaluated original cardiac cath  __x_echo  ____cxr ______ct ____scan on [employer] view or [employer] or other viewing platforms and  ____EKG's .   I reviewed  office and hospital notes Yes ___x_ of  referring providers and other providers.  I reviewed personally old hospital and office notes   Time spent evaluating and managing this patient:_____35 ___min.       Aleksey Andre MD  Cardiology  [facility]

## 2023-02-21 NOTE — CARE UPDATE
02/20/23 2007   Patient Assessment/Suction   Level of Consciousness (AVPU) alert   Respiratory Effort Normal   Expansion/Accessory Muscles/Retractions no use of accessory muscles   All Lung Fields Breath Sounds diminished;clear   Rhythm/Pattern, Respiratory unlabored   Cough Frequency no cough   PRE-TX-O2   Device (Oxygen Therapy) high flow nasal cannula   $ Is the patient on Low Flow Oxygen? Yes   Flow (L/min) 5   SpO2 (!) 91 %   Pulse Oximetry Type Intermittent   $ Pulse Oximetry - Multiple Charge Pulse Oximetry - Multiple   Pulse 79   Resp 19   Aerosol Therapy   $ Aerosol Therapy Charges Aerosol Treatment   Daily Review of Necessity (SVN) completed   Respiratory Treatment Status (SVN) given   Treatment Route (SVN) oxygen   Patient Position (SVN) semi-Lieberman's   Post Treatment Assessment (SVN) breath sounds unchanged;vital signs unchanged   Signs of Intolerance (SVN) none   Education   $ Education 15 min;Bronchodilator   Respiratory Evaluation   $ Care Plan Tech Time 15 min

## 2023-02-21 NOTE — CONSULTS
"[facility]  Hematology/Oncology  Consult Note    Patient Name: Betty Bacon  MRN: [id number]  Admission Date: 2/14/2023  Hospital Length of Stay: 6 days  Code Status: Full Code   Attending Provider: Lynette Shea MD  Consulting Provider: Cristian Felix MD  Primary Care Physician: Johanne Callejas MD  Principal Problem:Chronic diastolic heart failure    Consults  Subjective:     HPI:  Ms. Tran is a 74 yo female admitted for acute on chronic CHF-Diastolic and found to be anemic.  She fell to a low of 6.5g/dl and has been transfused 2 units of blood.  She denies any type of bleeding such as melena or BRBPR.  She states she had a colonoscopy about one year ago that had a few areas "cauterized" and upper endo done this admission showed on non-bleeding AVM.      Review of her available lab data shows that she has had anemia consistently since December 2022 but only intermittently prior to that.  Her WBC and platelets are normal and a check of her ferritin 3 weeks ago showed it as high at over 900.  MCV is normal and iron studies done this admit show a low TIBC.  Note is made of her prior history of renal disease.        Oncology Treatment Plan:   [No matching plan found]    Medications:  Continuous Infusions:  Scheduled Meds:   albuterol-ipratropium  3 mL Nebulization Q6H WAKE    aspirin  81 mg Oral Daily    budesonide  0.5 mg Nebulization Q12H    clopidogreL  75 mg Oral Daily    cyanocobalamin  1,000 mcg Oral Daily    enoxaparin  30 mg Subcutaneous Daily    ferrous sulfate  1 tablet Oral Daily    isosorbide mononitrate  60 mg Oral Daily    magnesium oxide  800 mg Oral BID    metoprolol succinate  50 mg Oral Daily    pantoprazole  40 mg Oral Before breakfast    paroxetine  50 mg Oral Daily    QUEtiapine  100 mg Oral QHS     PRN Meds:sodium chloride, sodium chloride, acetaminophen, albuterol-ipratropium, ALPRAZolam, clonazePAM, dextrose 10%, dextrose 10%, glucagon (human " recombinant), glucose, glucose, hydrALAZINE, melatonin, naloxone, ondansetron, polyethylene glycol, senna-docusate 8.6-50 mg, simethicone, sodium chloride 0.9%     Review of patient's allergies indicates:   Allergen Reactions    Propoxyphene n-acetaminophen Other (See Comments)     Other reaction(s): Unknown    Codeine Anxiety        Past Medical History:   Diagnosis Date    Acute coronary artery obstruction without MI 10/2012    Benign hypertension     COPD (chronic obstructive pulmonary disease)     Coronary artery disease     Disorder of kidney and ureter     Dr. Morrow    Hepatitis B core antibody positive 03/03/2021    Negative sAg, suggests previous exposure but no chronic/active Hep B. At risk for reactivation with any immunosuppression medication, steroids, chemo, etc.      History of electroconvulsive therapy     Hyperlipidemia LDL goal < 70     Left ankle sprain     Major depressive disorder, recurrent episode, severe     s/p ECT    Positive AKIRA (antinuclear antibody) 03/03/2021    PVD (peripheral vascular disease)      Past Surgical History:   Procedure Laterality Date    CHOLECYSTECTOMY      CORONARY ANGIOPLASTY      CORONARY ANGIOPLASTY WITH STENT PLACEMENT  10/2012    2 stents RCA (100% stenosis)    EYE SURGERY      cataract surgery    HYSTERECTOMY      LINA, ovaries intact. uterine prolapse    ILIAC ARTERY STENT      TONSILLECTOMY      TOTAL VAGINAL HYSTERECTOMY       Family History       Problem Relation (Age of Onset)    Breast cancer Sister    Cancer Sister    Dementia Sister    Diabetes Mother, Maternal Aunt, Sister    Heart disease Mother, Father, Brother, Maternal Aunt, Maternal Uncle, Paternal Aunt, Paternal Uncle, Maternal Grandfather, Sister    Hypertension Daughter    Kidney disease Sister    Liver disease Sister    Stroke Father, Brother, Sister          Tobacco Use    Smoking status: Former     Packs/day: 2.00     Years: 40.00     Pack years: 80.00     Types:  Cigarettes     Quit date: 2009     Years since quittin.2    Smokeless tobacco: Never   Substance and Sexual Activity    Alcohol use: Yes     Comment: social    Drug use: No    Sexual activity: Never     Birth control/protection: Abstinence       Review of Systems   Constitutional:  Negative for activity change, appetite change, diaphoresis, fatigue, fever and unexpected weight change.   HENT:  Negative for congestion and hearing loss.    Eyes:  Negative for visual disturbance.   Respiratory:  Negative for cough, chest tightness and shortness of breath.    Cardiovascular:  Negative for chest pain and leg swelling.   Gastrointestinal:  Negative for abdominal pain, blood in stool, diarrhea, nausea and vomiting.   Endocrine: Negative for cold intolerance and heat intolerance.   Genitourinary:  Negative for difficulty urinating, dysuria and hematuria.   Neurological:  Negative for dizziness and headaches.   Hematological:  Negative for adenopathy. Does not bruise/bleed easily.   Psychiatric/Behavioral:  Negative for behavioral problems.    Objective:     Vital Signs (Most Recent):  Temp: 97.8 °F (36.6 °C) (23 1218)  Pulse: 87 (23 1218)  Resp: 20 (23 1218)  BP: (!) 144/64 (23 1218)  SpO2: (!) 90 % (23 1218)   Vital Signs (24h Range):  Temp:  [97.2 °F (36.2 °C)-97.8 °F (36.6 °C)] 97.8 °F (36.6 °C)  Pulse:  [74-96] 87  Resp:  [19-22] 20  SpO2:  [85 %-97 %] 90 %  BP: (141-169)/(52-67) 144/64     Weight: 94.6 kg (208 lb 8.9 oz)  Body mass index is 38.15 kg/m².  Body surface area is 2.03 meters squared.      Intake/Output Summary (Last 24 hours) at 2023 1341  Last data filed at 2023 0903  Gross per 24 hour   Intake 600 ml   Output 300 ml   Net 300 ml       Physical Exam  Constitutional:       Appearance: She is well-developed.   HENT:      Head: Normocephalic and atraumatic.      Right Ear: External ear normal.      Left Ear: External ear normal.   Eyes:       Conjunctiva/sclera: Conjunctivae normal.      Pupils: Pupils are equal, round, and reactive to light.   Neck:      Thyroid: No thyromegaly.      Trachea: No tracheal deviation.   Cardiovascular:      Rate and Rhythm: Normal rate and regular rhythm.      Heart sounds: Normal heart sounds.   Pulmonary:      Effort: Pulmonary effort is normal.      Breath sounds: Normal breath sounds.   Abdominal:      General: Bowel sounds are normal. There is no distension.      Palpations: Abdomen is soft. There is no mass.      Tenderness: There is no abdominal tenderness.   Skin:     Findings: No rash.   Neurological:      Comments: Neuro intact througout   Psychiatric:         Behavior: Behavior normal.         Thought Content: Thought content normal.         Judgment: Judgment normal.       Significant Labs:   CBC:   Recent Labs   Lab 02/20/23  0421 02/20/23  1455 02/21/23  0444   WBC 7.50  --  7.36   HGB 6.5* 8.0* 8.0*   HCT 21.3* 26.6* 26.6*     --  310    and CMP:   Recent Labs   Lab 02/20/23  0421 02/21/23  0444   * 137   K 4.3 4.3    103   CO2 32* 31*   GLU 94 85   BUN 38* 28*   CREATININE 1.6* 1.1   CALCIUM 8.4* 8.6*   ANIONGAP 3* 3*       Diagnostic Results:  None    Assessment/Plan:     Anemia, acute on chronic, progressive  Patient has had anemia consistently since 12/2022 and now has fallen quite low to 6.5g/dl.  She has received blood and is feeling better at this time.  The work up done thus far has not shown a specific cause of this although I suspect CRI is playing a role.      She has no evidence of bleeding at this time and EGD shows no significant bleeding issues.  I will evaluate her with hemolysis labs and will check SPEP/UPEP.  I will also follow her up in clinic in the next few weeks and continue work up as indicated.  She is ok for discharge from my standpoint.           Thank you for your consult. I will follow-up with patient. Please contact us if you have any additional  questions.    Cristian Felix MD  Hematology/Oncology  [facility]

## 2023-02-21 NOTE — CARE UPDATE
02/21/23 0751   Patient Assessment/Suction   Level of Consciousness (AVPU) alert   Respiratory Effort Normal;Unlabored   Expansion/Accessory Muscles/Retractions no use of accessory muscles;no retractions;expansion symmetric   All Lung Fields Breath Sounds clear;diminished   Rhythm/Pattern, Respiratory unlabored;pattern regular;depth regular;chest wiggle adequate;no shortness of breath reported   Cough Frequency no cough   PRE-TX-O2   Device (Oxygen Therapy) nasal cannula with humidification   $ Is the patient on Low Flow Oxygen? Yes   Flow (L/min) 5   SpO2 97 %   Pulse Oximetry Type Intermittent   $ Pulse Oximetry - Multiple Charge Pulse Oximetry - Multiple   Pulse 85   Resp 19   Aerosol Therapy   $ Aerosol Therapy Charges Aerosol Treatment   Daily Review of Necessity (SVN) completed   Respiratory Treatment Status (SVN) given   Treatment Route (SVN) oxygen;mask   Patient Position (SVN) HOB elevated   Post Treatment Assessment (SVN) increased aeration   Signs of Intolerance (SVN) none   Breath Sounds Post-Respiratory Treatment   Throughout All Fields Post-Treatment All Fields   Throughout All Fields Post-Treatment aeration increased   Post-treatment Heart Rate (beats/min) 86   Post-treatment Resp Rate (breaths/min) 19   Education   $ Education Bronchodilator;15 min   Respiratory Evaluation   $ Care Plan Tech Time 15 min

## 2023-02-21 NOTE — SUBJECTIVE & OBJECTIVE
Oncology Treatment Plan:   [No matching plan found]    Medications:  Continuous Infusions:  Scheduled Meds:   albuterol-ipratropium  3 mL Nebulization Q6H WAKE    aspirin  81 mg Oral Daily    budesonide  0.5 mg Nebulization Q12H    clopidogreL  75 mg Oral Daily    cyanocobalamin  1,000 mcg Oral Daily    enoxaparin  30 mg Subcutaneous Daily    ferrous sulfate  1 tablet Oral Daily    isosorbide mononitrate  60 mg Oral Daily    magnesium oxide  800 mg Oral BID    metoprolol succinate  50 mg Oral Daily    pantoprazole  40 mg Oral Before breakfast    paroxetine  50 mg Oral Daily    QUEtiapine  100 mg Oral QHS     PRN Meds:sodium chloride, sodium chloride, acetaminophen, albuterol-ipratropium, ALPRAZolam, clonazePAM, dextrose 10%, dextrose 10%, glucagon (human recombinant), glucose, glucose, hydrALAZINE, melatonin, naloxone, ondansetron, polyethylene glycol, senna-docusate 8.6-50 mg, simethicone, sodium chloride 0.9%     Review of patient's allergies indicates:   Allergen Reactions    Propoxyphene n-acetaminophen Other (See Comments)     Other reaction(s): Unknown    Codeine Anxiety        Past Medical History:   Diagnosis Date    Acute coronary artery obstruction without MI 10/2012    Benign hypertension     COPD (chronic obstructive pulmonary disease)     Coronary artery disease     Disorder of kidney and ureter     Dr. Morrow    Hepatitis B core antibody positive 03/03/2021    Negative sAg, suggests previous exposure but no chronic/active Hep B. At risk for reactivation with any immunosuppression medication, steroids, chemo, etc.      History of electroconvulsive therapy     Hyperlipidemia LDL goal < 70     Left ankle sprain     Major depressive disorder, recurrent episode, severe     s/p ECT    Positive AKIRA (antinuclear antibody) 03/03/2021    PVD (peripheral vascular disease)      Past Surgical History:   Procedure Laterality Date    CHOLECYSTECTOMY      CORONARY ANGIOPLASTY      CORONARY ANGIOPLASTY WITH STENT  PLACEMENT  10/2012    2 stents RCA (100% stenosis)    EYE SURGERY      cataract surgery    HYSTERECTOMY      LINA, ovaries intact. uterine prolapse    ILIAC ARTERY STENT      TONSILLECTOMY      TOTAL VAGINAL HYSTERECTOMY       Family History       Problem Relation (Age of Onset)    Breast cancer Sister    Cancer Sister    Dementia Sister    Diabetes Mother, Maternal Aunt, Sister    Heart disease Mother, Father, Brother, Maternal Aunt, Maternal Uncle, Paternal Aunt, Paternal Uncle, Maternal Grandfather, Sister    Hypertension Daughter    Kidney disease Sister    Liver disease Sister    Stroke Father, Brother, Sister          Tobacco Use    Smoking status: Former     Packs/day: 2.00     Years: 40.00     Pack years: 80.00     Types: Cigarettes     Quit date: 2009     Years since quittin.2    Smokeless tobacco: Never   Substance and Sexual Activity    Alcohol use: Yes     Comment: social    Drug use: No    Sexual activity: Never     Birth control/protection: Abstinence       Review of Systems   Constitutional:  Negative for activity change, appetite change, diaphoresis, fatigue, fever and unexpected weight change.   HENT:  Negative for congestion and hearing loss.    Eyes:  Negative for visual disturbance.   Respiratory:  Negative for cough, chest tightness and shortness of breath.    Cardiovascular:  Negative for chest pain and leg swelling.   Gastrointestinal:  Negative for abdominal pain, blood in stool, diarrhea, nausea and vomiting.   Endocrine: Negative for cold intolerance and heat intolerance.   Genitourinary:  Negative for difficulty urinating, dysuria and hematuria.   Neurological:  Negative for dizziness and headaches.   Hematological:  Negative for adenopathy. Does not bruise/bleed easily.   Psychiatric/Behavioral:  Negative for behavioral problems.    Objective:     Vital Signs (Most Recent):  Temp: 97.8 °F (36.6 °C) (23 1218)  Pulse: 87 (23 1218)  Resp: 20 (23 1218)  BP: (!)  144/64 (02/21/23 1218)  SpO2: (!) 90 % (02/21/23 1218)   Vital Signs (24h Range):  Temp:  [97.2 °F (36.2 °C)-97.8 °F (36.6 °C)] 97.8 °F (36.6 °C)  Pulse:  [74-96] 87  Resp:  [19-22] 20  SpO2:  [85 %-97 %] 90 %  BP: (141-169)/(52-67) 144/64     Weight: 94.6 kg (208 lb 8.9 oz)  Body mass index is 38.15 kg/m².  Body surface area is 2.03 meters squared.      Intake/Output Summary (Last 24 hours) at 2/21/2023 1341  Last data filed at 2/21/2023 0903  Gross per 24 hour   Intake 600 ml   Output 300 ml   Net 300 ml       Physical Exam  Constitutional:       Appearance: She is well-developed.   HENT:      Head: Normocephalic and atraumatic.      Right Ear: External ear normal.      Left Ear: External ear normal.   Eyes:      Conjunctiva/sclera: Conjunctivae normal.      Pupils: Pupils are equal, round, and reactive to light.   Neck:      Thyroid: No thyromegaly.      Trachea: No tracheal deviation.   Cardiovascular:      Rate and Rhythm: Normal rate and regular rhythm.      Heart sounds: Normal heart sounds.   Pulmonary:      Effort: Pulmonary effort is normal.      Breath sounds: Normal breath sounds.   Abdominal:      General: Bowel sounds are normal. There is no distension.      Palpations: Abdomen is soft. There is no mass.      Tenderness: There is no abdominal tenderness.   Skin:     Findings: No rash.   Neurological:      Comments: Neuro intact througout   Psychiatric:         Behavior: Behavior normal.         Thought Content: Thought content normal.         Judgment: Judgment normal.       Significant Labs:   CBC:   Recent Labs   Lab 02/20/23  0421 02/20/23  1455 02/21/23  0444   WBC 7.50  --  7.36   HGB 6.5* 8.0* 8.0*   HCT 21.3* 26.6* 26.6*     --  310    and CMP:   Recent Labs   Lab 02/20/23  0421 02/21/23  0444   * 137   K 4.3 4.3    103   CO2 32* 31*   GLU 94 85   BUN 38* 28*   CREATININE 1.6* 1.1   CALCIUM 8.4* 8.6*   ANIONGAP 3* 3*       Diagnostic Results:  None

## 2023-02-21 NOTE — ASSESSMENT & PLAN NOTE
Patient has had anemia consistently since 12/2022 and now has fallen quite low to 6.5g/dl.  She has received blood and is feeling better at this time.  The work up done thus far has not shown a specific cause of this although I suspect CRI is playing a role.      She has no evidence of bleeding at this time and EGD shows no significant bleeding issues.  I will evaluate her with hemolysis labs and will check SPEP/UPEP.  I will also follow her up in clinic in the next few weeks and continue work up as indicated.  She is ok for discharge from my standpoint.

## 2023-02-21 NOTE — HPI
"Ms. Tran is a 72 yo female admitted for acute on chronic CHF-Diastolic and found to be anemic.  She fell to a low of 6.5g/dl and has been transfused 2 units of blood.  She denies any type of bleeding such as melena or BRBPR.  She states she had a colonoscopy about one year ago that had a few areas "cauterized" and upper endo done this admission showed on non-bleeding AVM.      Review of her available lab data shows that she has had anemia consistently since December 2022 but only intermittently prior to that.  Her WBC and platelets are normal and a check of her ferritin 3 weeks ago showed it as high at over 900.  MCV is normal and iron studies done this admit show a low TIBC.  Note is made of her prior history of renal disease.    "

## 2023-02-21 NOTE — PROGRESS NOTES
"Progress Note  Nephrology    Consult Requested By: Lynette Shea MD    Reason for Consult: CARMELITA on CKD 3    SUBJECTIVE:     History of Present Illness:  74 yo female who presented to ER on 2/14 due to shortness of breath, cough and "not feeling good" .  Has hx CKD 3 and f/u w/ Dr. Morrow in out patient clinic.  She is admitted for A/C CHF, in need of diuresis.  Cardiology is following, and stopped her lasix yesterday when Scr bumped.  Nephrology is consulted for co-management.      2/18  Scr normal on admit, 2.7 yesterday, 2.6 today.  Off lasix, held by cards when Scr bumped.  UOP not recorded.  No complaints, denies SOB.  Precipitous drop in Hgb noted.  2/19  VSS, Hgb down to 7, primary ordered Fe+ supplement yesterday and stool for OB.  Renal function improving.  Pt denies pain, no n/v, denies bloody/black stool.  Saw Dr. Trevizo about a year ago, said she had a small bleed at that time.    2/20  EGD planned for today.  Hgb 6.5, transfusion orders noted per primary for today.  Renal function improving.  VSS, UOP not properly recorded.  No complaints.  2/21 no complaints    Assessment/plan:    CARMELITA/CKD 3  Acute blood loss anemia  Hyponatremia  Alkalosis  HTN/Valvular CHF    - renal parameters are improved  - did get transfused and s/p EGD  - ok with resumption of outpatient lasix regimen  - cardiac diet with 1.5L fluid restriction    Review of Systems:  Negative unless noted above    OBJECTIVE:     Vital Signs Range (Last 24H):  Temp:  [97.2 °F (36.2 °C)-98.5 °F (36.9 °C)]   Pulse:  [74-96]   Resp:  [14-22]   BP: (141-177)/(52-74)   SpO2:  [85 %-100 %]     Physical Exam:  General- NAD noted  HEENT- WNL  Neck- supple  CV- Regular rate and rhythm  Resp- Lungs CTA Bilaterally, No increased WOB  GI- Non tender/non-distended, BS normoactive x4 quads  Extrem- No cyanosis, clubbing, +edema.  Derm- skin w/d  Neuro-  No flap.     Body mass index is 38.15 kg/m².    Laboratory:  CBC:   Recent Labs   Lab 02/21/23  0444   WBC " 7.36   RBC 2.83*   HGB 8.0*   HCT 26.6*      MCV 94   MCH 28.3   MCHC 30.1*       CMP:   Recent Labs   Lab 02/14/23  1412 02/15/23  0438 02/19/23  0452 02/20/23  0421 02/21/23  0444   *   < > 92  92   < > 85   CALCIUM 9.2   < > 8.3*  8.3*   < > 8.6*   ALBUMIN 2.6*  --  2.3*  --   --    PROT 6.4  --   --   --   --    *   < > 135*  135*   < > 137   K 4.1   < > 4.0  4.0   < > 4.3   CO2 26   < > 31*  31*   < > 31*   CL 98   < > 96  96   < > 103   BUN 16   < > 48*  48*   < > 28*   CREATININE 1.1   < > 2.1*  2.1*   < > 1.1   ALKPHOS 95  --   --   --   --    ALT 12  --   --   --   --    AST 22  --   --   --   --    BILITOT 1.1*  --   --   --   --     < > = values in this interval not displayed.         Diagnostic Results:  Labs: Reviewed      ASSESSMENT/PLAN:     Active Hospital Problems    Diagnosis  POA    *Acute on chronic heart failure with preserved ejection fraction [I50.32]  Yes    Severe obesity with body mass index (BMI) of 35.0 to 39.9 with serious comorbidity [E66.01]  Yes    Valvular heart disease, severe aortic stenosis, moderate aortic regurgitation [I35.0]  Yes     Chronic     moderate      Essential hypertension [I10]  Yes     Chronic      Resolved Hospital Problems   No resolved problems to display.         Thank you for allowing us to participate in the care of your patient. We will follow the patient and provide recommendations as needed.      Patient care time was spent personally by me on the following activities: > 35 minutes  Obtaining a history.  Examination of patient.  Providing medical care at the patients bedside.  Developing a treatment plan with patient or surrogate and bedside caregivers.  Ordering and reviewing laboratory studies, radiographic studies, pulse oximetry.  Ordering and performing treatments and interventions.  Evaluation of patient's response to treatment.  Discussions with consultants while on the unit and immediately available to the  patient.  Re-evaluation of the patient's condition.  Documentation in the medical record.      Maribel Christian MD

## 2023-02-22 ENCOUNTER — TELEPHONE (OUTPATIENT)
Dept: FAMILY MEDICINE | Facility: CLINIC | Age: 73
End: 2023-02-22
Payer: MEDICARE

## 2023-02-22 VITALS
BODY MASS INDEX: 35.96 KG/M2 | RESPIRATION RATE: 18 BRPM | TEMPERATURE: 97 F | WEIGHT: 195.44 LBS | HEIGHT: 62 IN | DIASTOLIC BLOOD PRESSURE: 55 MMHG | OXYGEN SATURATION: 90 % | HEART RATE: 78 BPM | SYSTOLIC BLOOD PRESSURE: 123 MMHG

## 2023-02-22 PROBLEM — D62 ACUTE BLOOD LOSS ANEMIA: Status: RESOLVED | Noted: 2023-02-21 | Resolved: 2023-02-22

## 2023-02-22 PROBLEM — D64.9 ANEMIA: Chronic | Status: RESOLVED | Noted: 2023-01-26 | Resolved: 2023-02-22

## 2023-02-22 LAB
ANION GAP SERPL CALC-SCNC: 5 MMOL/L (ref 8–16)
BASOPHILS # BLD AUTO: 0.06 K/UL (ref 0–0.2)
BASOPHILS NFR BLD: 0.9 % (ref 0–1.9)
BUN SERPL-MCNC: 25 MG/DL (ref 8–23)
CALCIUM SERPL-MCNC: 8.7 MG/DL (ref 8.7–10.5)
CHLORIDE SERPL-SCNC: 98 MMOL/L (ref 95–110)
CO2 SERPL-SCNC: 32 MMOL/L (ref 23–29)
CREAT SERPL-MCNC: 1.2 MG/DL (ref 0.5–1.4)
DIFFERENTIAL METHOD: ABNORMAL
EOSINOPHIL # BLD AUTO: 0.5 K/UL (ref 0–0.5)
EOSINOPHIL NFR BLD: 7.5 % (ref 0–8)
ERYTHROCYTE [DISTWIDTH] IN BLOOD BY AUTOMATED COUNT: 17.1 % (ref 11.5–14.5)
EST. GFR  (NO RACE VARIABLE): 47.2 ML/MIN/1.73 M^2
GLUCOSE SERPL-MCNC: 95 MG/DL (ref 70–110)
HCT VFR BLD AUTO: 25.1 % (ref 37–48.5)
HGB BLD-MCNC: 7.7 G/DL (ref 12–16)
IMM GRANULOCYTES # BLD AUTO: 0.11 K/UL (ref 0–0.04)
IMM GRANULOCYTES NFR BLD AUTO: 1.6 % (ref 0–0.5)
LYMPHOCYTES # BLD AUTO: 1.1 K/UL (ref 1–4.8)
LYMPHOCYTES NFR BLD: 16.2 % (ref 18–48)
MAGNESIUM SERPL-MCNC: 2.2 MG/DL (ref 1.6–2.6)
MCH RBC QN AUTO: 28.3 PG (ref 27–31)
MCHC RBC AUTO-ENTMCNC: 30.7 G/DL (ref 32–36)
MCV RBC AUTO: 92 FL (ref 82–98)
MONOCYTES # BLD AUTO: 0.6 K/UL (ref 0.3–1)
MONOCYTES NFR BLD: 9 % (ref 4–15)
NEUTROPHILS # BLD AUTO: 4.6 K/UL (ref 1.8–7.7)
NEUTROPHILS NFR BLD: 64.8 % (ref 38–73)
NRBC BLD-RTO: 0 /100 WBC
PLATELET # BLD AUTO: 320 K/UL (ref 150–450)
PMV BLD AUTO: 9.6 FL (ref 9.2–12.9)
POTASSIUM SERPL-SCNC: 4.1 MMOL/L (ref 3.5–5.1)
RBC # BLD AUTO: 2.72 M/UL (ref 4–5.4)
SODIUM SERPL-SCNC: 135 MMOL/L (ref 136–145)
WBC # BLD AUTO: 7.03 K/UL (ref 3.9–12.7)

## 2023-02-22 PROCEDURE — 25000003 PHARM REV CODE 250: Performed by: INTERNAL MEDICINE

## 2023-02-22 PROCEDURE — 27000221 HC OXYGEN, UP TO 24 HOURS

## 2023-02-22 PROCEDURE — 94640 AIRWAY INHALATION TREATMENT: CPT

## 2023-02-22 PROCEDURE — 99232 PR SUBSEQUENT HOSPITAL CARE,LEVL II: ICD-10-PCS | Mod: ,,, | Performed by: SPECIALIST

## 2023-02-22 PROCEDURE — 99232 SBSQ HOSP IP/OBS MODERATE 35: CPT | Mod: ,,, | Performed by: SPECIALIST

## 2023-02-22 PROCEDURE — 83735 ASSAY OF MAGNESIUM: CPT | Performed by: INTERNAL MEDICINE

## 2023-02-22 PROCEDURE — 25000242 PHARM REV CODE 250 ALT 637 W/ HCPCS: Performed by: INTERNAL MEDICINE

## 2023-02-22 PROCEDURE — 99900031 HC PATIENT EDUCATION (STAT)

## 2023-02-22 PROCEDURE — 80048 BASIC METABOLIC PNL TOTAL CA: CPT | Performed by: INTERNAL MEDICINE

## 2023-02-22 PROCEDURE — 85025 COMPLETE CBC W/AUTO DIFF WBC: CPT | Performed by: INTERNAL MEDICINE

## 2023-02-22 PROCEDURE — 94761 N-INVAS EAR/PLS OXIMETRY MLT: CPT

## 2023-02-22 PROCEDURE — 25000003 PHARM REV CODE 250: Performed by: SPECIALIST

## 2023-02-22 PROCEDURE — 99900035 HC TECH TIME PER 15 MIN (STAT)

## 2023-02-22 PROCEDURE — 36415 COLL VENOUS BLD VENIPUNCTURE: CPT | Performed by: INTERNAL MEDICINE

## 2023-02-22 RX ORDER — FERROUS SULFATE 325(65) MG
325 TABLET, DELAYED RELEASE (ENTERIC COATED) ORAL DAILY
Qty: 30 TABLET | Refills: 0 | Status: SHIPPED | OUTPATIENT
Start: 2023-02-22 | End: 2023-04-18

## 2023-02-22 RX ORDER — ENOXAPARIN SODIUM 100 MG/ML
40 INJECTION SUBCUTANEOUS EVERY 24 HOURS
Status: DISCONTINUED | OUTPATIENT
Start: 2023-02-22 | End: 2023-02-22 | Stop reason: HOSPADM

## 2023-02-22 RX ORDER — FUROSEMIDE 40 MG/1
TABLET ORAL
Qty: 30 TABLET | Refills: 0 | Status: SHIPPED | OUTPATIENT
Start: 2023-02-22 | End: 2023-04-24 | Stop reason: SDUPTHER

## 2023-02-22 RX ORDER — LANOLIN ALCOHOL/MO/W.PET/CERES
1000 CREAM (GRAM) TOPICAL DAILY
Qty: 30 TABLET | Refills: 0 | Status: SHIPPED | OUTPATIENT
Start: 2023-02-23 | End: 2023-05-24

## 2023-02-22 RX ADMIN — IPRATROPIUM BROMIDE AND ALBUTEROL SULFATE 3 ML: .5; 3 SOLUTION RESPIRATORY (INHALATION) at 09:02

## 2023-02-22 RX ADMIN — Medication 800 MG: at 09:02

## 2023-02-22 RX ADMIN — METOPROLOL SUCCINATE 50 MG: 25 TABLET, EXTENDED RELEASE ORAL at 08:02

## 2023-02-22 RX ADMIN — CLOPIDOGREL BISULFATE 75 MG: 75 TABLET, FILM COATED ORAL at 08:02

## 2023-02-22 RX ADMIN — FUROSEMIDE 40 MG: 40 TABLET ORAL at 08:02

## 2023-02-22 RX ADMIN — FERROUS SULFATE TAB 325 MG (65 MG ELEMENTAL FE) 1 EACH: 325 (65 FE) TAB at 08:02

## 2023-02-22 RX ADMIN — CYANOCOBALAMIN TAB 1000 MCG 1000 MCG: 1000 TAB at 08:02

## 2023-02-22 RX ADMIN — BUDESONIDE 0.5 MG: 0.5 INHALANT RESPIRATORY (INHALATION) at 09:02

## 2023-02-22 RX ADMIN — ASPIRIN 81 MG: 81 TABLET, COATED ORAL at 08:02

## 2023-02-22 RX ADMIN — ISOSORBIDE MONONITRATE 60 MG: 60 TABLET, EXTENDED RELEASE ORAL at 08:02

## 2023-02-22 RX ADMIN — PAROXETINE HYDROCHLORIDE 50 MG: 20 TABLET, FILM COATED ORAL at 08:02

## 2023-02-22 RX ADMIN — PANTOPRAZOLE SODIUM 40 MG: 40 TABLET, DELAYED RELEASE ORAL at 05:02

## 2023-02-22 NOTE — PROGRESS NOTES
[facility]  Progress Note    Patient name: Betty Bacon  MRN: [id number]  Admit Date: 2/14/2023   LOS: 7 days     SUBJECTIVE:     Principal problem: Chronic diastolic heart failure    Interval History: No SOB on supplemental oxygen.  No CP.  LE continues.    Review of Systems: All systems reviewed and are negative except as noted per above.    OBJECTIVE:     Vital Signs (Most Recent)  Temp: 97.2 °F (36.2 °C) (02/22/23 1209)  Pulse: 78 (02/22/23 1209)  Resp: 18 (02/22/23 1209)  BP: (!) 123/55 (02/22/23 1209)  SpO2: (!) 90 % (02/22/23 1209)     Physical Exam:    Gen: alert, responsive  HEENT:  Eyes - no pallor  External ears with no lesions  Nares patent  Mouth, Throat:  trachea midline   CV: RRR, +murmur  Lungs: RALES  Abd: +BS, soft, NT, ND  Ext: no atrophy; +BLE edema  Skin: warm, dry  Neuro: grossly intact  Psych: pleasant    ASSESSMENT/PLAN:     Acute on chronic hypoxemic respiratory failure, improved  Due to acute on chronic HFpEF 60% + severe AS  Complicated by ILD/Asbestosis  Complicated by CARMELITA on CKD, resolved  - b/l renal aa stenosis, will need follow-up outpt  - IV lasix prn; pt currently back on po regimen 3 days a week  - outpt TAVR at [facility]   - follow up with Dr Borden in [place]  - pt is chronically on 5L NC, she already has a tank that goes to 10L  - cardiology, nephrology following, thank you    Acute on chronic anemia  - pRBC transfusions prn  - trending CBC - will need follow-up outpt  - enteroscopy 2/20: non-bleeding angioectasia, erosive gastropathy  - continue protonix for one month per GI  - follow up outpt GI  - hematology following, thank you; they recommend outpt follow-up    Chronic conditions as noted above/below; home medications reviewed personally by me and restarted as appropriate  Electrolyte derangement:  Trending BMP; Mg; replacement prn  DVT ppx: lovenox  Dispo:  cardiology appts as noted above, PCP, hematology, and  GI.  FULL CODE    Department Hospital Medicine  [facility]  Lynette Shea MD  Date of service: 02/22/2023

## 2023-02-22 NOTE — PLAN OF CARE
Problem: Adult Inpatient Plan of Care  Goal: Plan of Care Review  Outcome: Met  Goal: Patient-Specific Goal (Individualized)  Outcome: Met  Goal: Absence of Hospital-Acquired Illness or Injury  Outcome: Met  Goal: Optimal Comfort and Wellbeing  Outcome: Met  Goal: Readiness for Transition of Care  Outcome: Met     Problem: Fluid and Electrolyte Imbalance (Acute Kidney Injury/Impairment)  Goal: Fluid and Electrolyte Balance  Outcome: Met     Problem: Oral Intake Inadequate (Acute Kidney Injury/Impairment)  Goal: Optimal Nutrition Intake  Outcome: Met     Problem: Renal Function Impairment (Acute Kidney Injury/Impairment)  Goal: Effective Renal Function  Outcome: Met     Problem: Fluid Imbalance (Pneumonia)  Goal: Fluid Balance  Outcome: Met     Problem: Infection (Pneumonia)  Goal: Resolution of Infection Signs and Symptoms  Outcome: Met     Problem: Respiratory Compromise (Pneumonia)  Goal: Effective Oxygenation and Ventilation  Outcome: Met     Problem: Skin Injury Risk Increased  Goal: Skin Health and Integrity  Outcome: Met     Problem: Fall Injury Risk  Goal: Absence of Fall and Fall-Related Injury  Outcome: Met

## 2023-02-22 NOTE — CONSULTS
[facility]  Cardiology  Progress Note    Patient Name: Betty Bacon  MRN: [id number]  Admission Date: 2/14/2023  Hospital Length of Stay: 7 days  Code Status: Full Code   Attending Physician: Lynette Shea MD   Primary Care Physician: Johanne Callejas MD  Expected Discharge Date: 2/22/2023  Principal Problem:Aortic stenosis    Subjective:     Hospital Course:  Patient is feeling better    Interval History:  Blood count has dropped slightly  She is see 1 dose of Lasix p.o. this morning    ROS  Objective:     Vital Signs (Most Recent):  Temp: 97.2 °F (36.2 °C) (02/22/23 1209)  Pulse: 78 (02/22/23 1209)  Resp: 18 (02/22/23 1209)  BP: (!) 123/55 (02/22/23 1209)  SpO2: (!) 90 % (02/22/23 1209)   Vital Signs (24h Range):  Temp:  [97.2 °F (36.2 °C)-98.2 °F (36.8 °C)] 97.2 °F (36.2 °C)  Pulse:  [76-86] 78  Resp:  [18-20] 18  SpO2:  [90 %-97 %] 90 %  BP: (123-172)/(55-82) 123/55     Weight: 88.6 kg (195 lb 7 oz)  Body mass index is 35.75 kg/m².    SpO2: (!) 90 %         Intake/Output Summary (Last 24 hours) at 2/22/2023 1418  Last data filed at 2/22/2023 1209  Gross per 24 hour   Intake 480 ml   Output 950 ml   Net -470 ml       Lines/Drains/Airways       None                   Physical Exam lungs crackles  Patient has pale complexion  Cardiac systolic ejection murmur  Legs mild edema    Significant Labs: CMP   Recent Labs   Lab 02/21/23  0444 02/22/23  0435    135*   K 4.3 4.1    98   CO2 31* 32*   GLU 85 95   BUN 28* 25*   CREATININE 1.1 1.2   CALCIUM 8.6* 8.7   ANIONGAP 3* 5*       Significant Imaging:   Assessment and Plan:   She is to be discharged today  Send her home on the medicine she is on plus iron and Lasix 40 mg p.o. 3 times a week  She is going to be set up to be seen in TAVR Clinic at [facility]  Brief HPI:     Active Diagnoses:    Diagnosis Date Noted POA    PRINCIPAL PROBLEM:  Valvular heart disease, severe aortic stenosis, moderate aortic regurgitation [I35.0]  05/16/2013 Yes     Chronic    Chronic respiratory failure with hypoxia, on 4 L oxygen [J96.11] 01/26/2023 Yes     Chronic    Acute on chronic heart failure with preserved ejection fraction [I50.32] 10/29/2019 Yes    Severe obesity with body mass index (BMI) of 35.0 to 39.9 with serious comorbidity [E66.01] 06/07/2018 Yes    Essential hypertension [I10]  Yes     Chronic      Problems Resolved During this Admission:    Diagnosis Date Noted Date Resolved POA    Acute blood loss anemia [D62] 02/21/2023 02/22/2023 Yes    Anemia, acute on chronic, progressive [D64.9] 01/26/2023 02/22/2023 Yes     Chronic       VTE Risk Mitigation (From admission, onward)           Ordered     enoxaparin injection 40 mg  Daily         02/22/23 1352     IP VTE HIGH RISK PATIENT  Once         02/14/23 2008     Place sequential compression device  Until discontinued         02/14/23 2008                    Aleksey Andre MD  Cardiology  [facility]

## 2023-02-22 NOTE — PROGRESS NOTES
[facility]  Department of Cardiology  Progress Note    PATIENT NAME: Betty Bacon  MRN: [id number]  TODAY'S DATE: 02/22/2023  ADMIT DATE: 2/14/2023    SUBJECTIVE     PRINCIPLE PROBLEM: Chronic diastolic heart failure    INTERVAL HISTORY:    2/22/2023  Patient sitting up in chair in no acute distress    Review of patient's allergies indicates:   Allergen Reactions    Propoxyphene n-acetaminophen Other (See Comments)     Other reaction(s): Unknown    Codeine Anxiety       REVIEW OF SYSTEMS    Denies CP    OBJECTIVE     VITAL SIGNS (Most Recent)  Temp: 97.2 °F (36.2 °C) (02/22/23 1209)  Pulse: 78 (02/22/23 1209)  Resp: 18 (02/22/23 1209)  BP: (!) 123/55 (02/22/23 1209)  SpO2: (!) 90 % (02/22/23 1209)    VENTILATION STATUS  Resp: 18 (02/22/23 1209)  SpO2: (!) 90 % (02/22/23 1209)       I & O (Last 24H):  Intake/Output Summary (Last 24 hours) at 2/22/2023 1226  Last data filed at 2/22/2023 1209  Gross per 24 hour   Intake 480 ml   Output 950 ml   Net -470 ml       WEIGHTS  Wt Readings from Last 3 Encounters:   02/22/23 0617 88.6 kg (195 lb 7 oz)   02/22/23 0508 93.9 kg (207 lb 0.2 oz)   02/21/23 0532 94.6 kg (208 lb 8.9 oz)   02/19/23 0500 93.2 kg (205 lb 7.5 oz)   02/18/23 0437 93.2 kg (205 lb 7.5 oz)   02/16/23 0300 86.5 kg (190 lb 11.2 oz)   02/15/23 0641 88.5 kg (195 lb 1.7 oz)   02/14/23 2044 88.8 kg (195 lb 12.3 oz)   02/14/23 1353 90.7 kg (200 lb)   01/26/23 0400 88.3 kg (194 lb 10.7 oz)   01/25/23 1951 87.9 kg (193 lb 12.6 oz)   01/25/23 1339 90.3 kg (199 lb)   01/20/23 1040 90.6 kg (199 lb 11.8 oz)       PHYSICAL EXAM  CONSTITUTIONAL: Well built, well nourished in no apparent distress  NECK: no carotid bruit, no JVD  LUNGS: Diminished  CHEST WALL: no tenderness  HEART:Systolic Murmur noted  ABDOMEN: soft, non-tender; bowel sounds normal; no masses,  no organomegaly  EXTREMITIES: edema noted  NEURO: AAO X 3    SCHEDULED MEDS:   albuterol-ipratropium  3 mL Nebulization Q6H WAKE    aspirin   81 mg Oral Daily    budesonide  0.5 mg Nebulization Q12H    clopidogreL  75 mg Oral Daily    cyanocobalamin  1,000 mcg Oral Daily    enoxaparin  30 mg Subcutaneous Daily    ferrous sulfate  1 tablet Oral Daily    furosemide  40 mg Oral Daily    isosorbide mononitrate  60 mg Oral Daily    magnesium oxide  800 mg Oral BID    metoprolol succinate  50 mg Oral Daily    pantoprazole  40 mg Oral Before breakfast    paroxetine  50 mg Oral Daily    QUEtiapine  100 mg Oral QHS       CONTINUOUS INFUSIONS:    PRN MEDS:sodium chloride, sodium chloride, acetaminophen, albuterol-ipratropium, ALPRAZolam, clonazePAM, dextrose 10%, dextrose 10%, glucagon (human recombinant), glucose, glucose, hydrALAZINE, melatonin, naloxone, ondansetron, polyethylene glycol, senna-docusate 8.6-50 mg, simethicone, sodium chloride 0.9%    LABS AND DIAGNOSTICS     CBC LAST 3 DAYS  Recent Labs   Lab 02/20/23 0421 02/20/23  1455 02/21/23 0444 02/22/23  0435   WBC 7.50  --  7.36 7.03   RBC 2.30*  --  2.83* 2.72*   HGB 6.5* 8.0* 8.0* 7.7*   HCT 21.3* 26.6* 26.6* 25.1*   MCV 93  --  94 92   MCH 28.3  --  28.3 28.3   MCHC 30.5*  --  30.1* 30.7*   RDW 18.0*  --  17.5* 17.1*     --  310 320   MPV 9.9  --  9.6 9.6   GRAN 67.8  5.1  --  64.5  4.8 64.8  4.6   LYMPH 12.0*  0.9*  --  15.6*  1.2 16.2*  1.1   MONO 9.2  0.7  --  8.7  0.6 9.0  0.6   BASO 0.04  --  0.05 0.06   NRBC 0  --  0 0       COAGULATION LAST 3 DAYS  No results for input(s): LABPT, INR, APTT in the last 168 hours.    CHEMISTRY LAST 3 DAYS  Recent Labs   Lab 02/19/23  0452 02/20/23  0421 02/21/23  0444 02/22/23  0435   *  135* 135* 137 135*   K 4.0  4.0 4.3 4.3 4.1   CL 96  96 100 103 98   CO2 31*  31* 32* 31* 32*   ANIONGAP 8  8 3* 3* 5*   BUN 48*  48* 38* 28* 25*   CREATININE 2.1*  2.1* 1.6* 1.1 1.2   GLU 92  92 94 85 95   CALCIUM 8.3*  8.3* 8.4* 8.6* 8.7   MG 2.4 2.5 2.6 2.2   ALBUMIN 2.3*  --   --   --        CARDIAC PROFILE LAST 3 DAYS  Recent Labs   Lab  02/21/23  0444   *       ENDOCRINE LAST 3 DAYS  No results for input(s): TSH, PROCAL in the last 168 hours.    LAST ARTERIAL BLOOD GAS  ABG  No results for input(s): PH, PO2, PCO2, HCO3, BE in the last 168 hours.    LAST 7 DAYS MICROBIOLOGY   Microbiology Results (last 7 days)       Procedure Component Value Units Date/Time    Blood culture #1 **CANNOT BE ORDERED STAT** [[id number]]  (Abnormal) Collected: 02/14/23 1412    Order Status: Completed Specimen: Blood from Peripheral, Antecubital, Left Updated: 02/16/23 0741     Blood Culture, Routine Gram stain aer bottle: Gram positive cocci      Results called to and read back by:Cristin Sneed RN-WING;  02/15/2023      12:38 CJD      COAGULASE-NEGATIVE STAPHYLOCOCCUS SPECIES  Organism is a probable contaminant      Rapid Organism ID by PCR (from Blood culture) [[id number]]  (Abnormal) Collected: 02/14/23 1412    Order Status: Completed Updated: 02/15/23 1345     Enterococcus faecials Not Detected     Enterococcus faecium Not Detected     Listeria Monocytogenes Not Detected     Staphylococcus spp. Detected     Staphylococcus aureus Not Detected     Staphylococcus epidermidis Not Detected     Staphylococcus lugdunensis Not Detected     Streptococcus species Not Detected     Streptococcus agalactiae Not Detected     Streptococcus pneumoniae Not Detected     Streptococcus pyogenes Not Detected     Acinetobacter calcoaceticus/baumannii complex Not Detected     Bacteroides fragilis Not Detected     Enterobacerales Not Detected     Enterobacter cloacae complex Not Detected     Escherichia Not Detected     Klebsiella aerogenes Not Detected     Klebsiella oxytoca Not Detected     Klebsiella pneumoniae group Not Detected     Proteus Not Detected     Salmonella sp Not Detected     Serratia marcescens Not Detected     Haemophilus influenzae Not Detected     Neisseria meningtidis Not Detected     Pseudomonas aeruginosa Not Detected     Stenotrophomonas maltophilia Not Detected      Candida albicans Not Detected     Candida auris Not Detected     Candida glabrata Not Detected     Candida krusei Not Detected     Candida Parapsilosis Not Detected     Candida Tropicalis Not Detected     Cryptococcus neoformans/gattii Not Detected     CTX-M Gene Test not applicable     IMP Gene Test not applicable     KPC  Test not applicable     mcr-1  Test not applicable     mec A/C Test not applicable     mec A/C and MREJ (MRSA) Test not applicable     NDM Test not applicable     OXA-48-like Test not applicable     van A/B Test not applicable     VIM Test not applicable            MOST RECENT IMAGING  X-Ray Chest AP Portable  Reason: CHF    FINDINGS:    Portable chest with comparison chest x-ray February 14, 2023 show mild decrease in size of enlarged cardiac mediastinal silhouette.  Bilateral diffuse interstitial lung opacities are mildly decreased at the lung bases otherwise there is no significant change of the lungs. Pulmonary vasculature is normal. No acute osseous abnormality.    IMPRESSION:    1.  Bilateral diffuse interstitial lung opacities are mildly decreased at the lung bases.  2.  Mild decrease in size of enlarged cardiomediastinal silhouette.    Electronically signed by:  Miguel Dumont DO  2/21/2023 6:22 PM [facility] Workstation: [id number]      Mimetas  Results for orders placed during the hospital encounter of 12/20/22    Echo    Interpretation Summary  · The left ventricle is normal in size with severe concentric hypertrophy and normal systolic function.  · Grade II left ventricular diastolic dysfunction.  · The estimated ejection fraction is 65%.  · Normal right ventricular size with normal right ventricular systolic function.  · Moderate aortic regurgitation.  · There is severe aortic valve stenosis.  · Aortic valve area is 0.94 cm2; peak velocity is 3.29 m/s; mean gradient is 26 mmHg.  · Mild pulmonic regurgitation.  · Mild tricuspid regurgitation.  · There is mild pulmonary hypertension.  ·  Normal central venous pressure (3 mmHg).  · The estimated PA systolic pressure is 44 mmHg.      CURRENT/PREVIOUS VISIT EKG  Results for orders placed or performed during the hospital encounter of 02/14/23   EKG 12-lead    Collection Time: 02/14/23  3:11 PM    Narrative    Test Reason : R06.00,    Vent. Rate : 093 BPM     Atrial Rate : 093 BPM     P-R Int : 140 ms          QRS Dur : 086 ms      QT Int : 366 ms       P-R-T Axes : 040 003 -08 degrees     QTc Int : 455 ms    Normal sinus rhythm  Moderate voltage criteria for LVH, may be normal variant  Inferior infarct (cited on or before 01-JAN-2023)  Abnormal ECG  When compared with ECG of 25-JAN-2023 13:31,  T wave inversion now evident in Anterior leads  Confirmed by Robert Roca MD ([id number]) on 2/17/2023 5:33:25 PM    Referred By: [email or web address]   SELF           Confirmed By:Robert Roca MD       ASSESSMENT/PLAN:     Active Hospital Problems    Diagnosis    *Acute on chronic heart failure with preserved ejection fraction    Acute blood loss anemia    Anemia, acute on chronic, progressive    Severe obesity with body mass index (BMI) of 35.0 to 39.9 with serious comorbidity    Valvular heart disease, severe aortic stenosis, moderate aortic regurgitation     moderate      Essential hypertension       ASSESSMENT & PLAN:     HFpEF  Severe Aortic Valve Stenosis   H/O CAD with PCI      RECOMMENDATIONS:    Going home today  She will follow up with her Cardiologist in [facility] already set  Follow-up TAVR clinic as well.    Stefanie Marroquin NP  [facility]  Department of Cardiology  Date of Service: 02/22/2023

## 2023-02-22 NOTE — CARE UPDATE
02/21/23 1955   Patient Assessment/Suction   Level of Consciousness (AVPU) alert   Respiratory Effort Normal   Expansion/Accessory Muscles/Retractions no use of accessory muscles   All Lung Fields Breath Sounds clear;diminished   Rhythm/Pattern, Respiratory unlabored   Cough Frequency no cough   PRE-TX-O2   Device (Oxygen Therapy) nasal cannula with humidification   $ Is the patient on Low Flow Oxygen? Yes   Flow (L/min) 5   SpO2 (!) 90 %   Pulse Oximetry Type Intermittent   $ Pulse Oximetry - Multiple Charge Pulse Oximetry - Multiple   Pulse 84   Resp 19   Aerosol Therapy   $ Aerosol Therapy Charges Aerosol Treatment   Daily Review of Necessity (SVN) completed   Respiratory Treatment Status (SVN) given   Treatment Route (SVN) mask   Patient Position (SVN) sitting in chair   Post Treatment Assessment (SVN) breath sounds unchanged;vital signs unchanged   Signs of Intolerance (SVN) none   Education   $ Education Bronchodilator;15 min   Respiratory Evaluation   $ Care Plan Tech Time 15 min

## 2023-02-22 NOTE — PROGRESS NOTES
Pharmacist Renal Dose Adjustment Note    Betty Bacon is a 73 y.o. female being treated with the medication enoxaparin    Patient Data:    Vital Signs (Most Recent):  Temp: 97.2 °F (36.2 °C) (02/22/23 1209)  Pulse: 78 (02/22/23 1209)  Resp: 18 (02/22/23 1209)  BP: (!) 123/55 (02/22/23 1209)  SpO2: (!) 90 % (02/22/23 1209)   Vital Signs (72h Range):  Temp:  [97.2 °F (36.2 °C)-98.5 °F (36.9 °C)]   Pulse:  []   Resp:  [14-22]   BP: (105-177)/(52-82)   SpO2:  [85 %-100 %]      Recent Labs   Lab 02/20/23  0421 02/21/23  0444 02/22/23  0435   CREATININE 1.6* 1.1 1.2     Serum creatinine: 1.2 mg/dL 02/22/23 0435  Estimated creatinine clearance: 41.9 mL/min    Enoxaparin 30 mg SC daily will be changed to enoxaparin 40 mg SC daily    Pharmacist's Name: Nickie Moreno  Pharmacist's Extension: [phone]

## 2023-02-22 NOTE — PLAN OF CARE
Critical access hospital  Discharge Final Note    Primary Care Provider: Johanne Callejas MD    Expected Discharge Date: 2/22/2023     met with Pt at bedside to confirm discharge plans. Pt verbalized plan to discharge to daughter's home via family transport and resume services with [facility] health.  call Pt's PCP office to schedule Pt follow-up appointment. Per Irene, Pt's PCP office will contact Pt to schedule appointment.  sent referral to [facility] to continue Pt's home health via careport.  observed reply - Pt to be seen 2/23/2022. Pt has no other needs to be addressed by case management. Pt cleared to discharge by case management.      Final Discharge Note (most recent)       Final Note - 02/22/23 1439          Final Note    Assessment Type Final Discharge Note     Antic[place] [facility]     What phone number can be called within the next 1-3 days to see how you are doing after discharge? [phone]     Hospital Resources/Appts/Education Provided Provided patient/caregiver with written discharge plan information;Appointments scheduled by Navigator/Coordinator        Post-Acute Status    Post-Acute Authorization Home Health     Home Health Status Set-up Complete/Auth obtained     Coverage Payor:  MEDICARE - MEDICARE PART A & B     Discharge Delays None known at this time                     Important Message from Medicare             Contact Info       Johanne Callejas MD   Specialty: Family Medicine   Relationship: PCP - General    Megan NELSON  [facility] [address]   Phone: [phone]       Next Steps: Schedule an appointment as soon as possible for a visit    Instructions: for follow up of RENAL ARTERY STENOSIS.  AND HOSPITAL DISCHARGE FOLLOW-UP    You receive a call to schedule your appointment    Margy Dumont MD   Specialty: Gastroenterology    [address] [facility] [address]   Phone:  [phone]       Next Steps: Schedule an appointment as soon as possible for a visit    Instructions: FOR FOLLOW UP OF STOMACH IRRITATION THAT MIGHT HAVE CAUSED BLEEDING    YOUR CURRENT CARDIOLOGIST        Next Steps: Follow up    Instructions: PLEASE FOLLOW UP WITH HIM ABOUT YOUR HEART AND ASK HIM ABOUT THE APPOINTMENT AT [facility]   Specialty: Hematology, Oncology, Hematology and Oncology    [address] [place] [id number]   Phone: [phone]       Next Steps: Schedule an appointment as soon as possible for a visit    Instructions: FOR FOLLOW UP OF YOUR BLOOD LEVELS

## 2023-02-22 NOTE — DISCHARGE SUMMARY
[facility]  Discharge Summary      Patient Name: Betty Bacon  MRN: [id number]  ALDO: [id number]  Patient Class: IP- Inpatient  Admission Date: 2/14/2023  Discharge Date and Time: 2/22/2023  3:02 PM  Discharging Provider: Lynette Shea MD  Primary Care Provider: Johanne Callejas MD    HPI:   The patient is a 73-year-old female, who presents emergency room with complaint shortness breath.  She states that 2 weeks ago was admitted for pneumonia and congestive heart failure.  Today, no evidence of infection.  On chest x-ray shows bilateral cephalization of flow.  Echocardiogram done in December shows preserved EF with diastolic dysfunction.  Also shows aortic valve stenosis of 0.96.  This finding was discussed with patient, she states that she was aware and her cardiologist aware.  Blood pressure elevated in the emergency room averaging in the 170s systolic pressure.  Most likely, this represents etiology of pulmonary edema.    Plan to admit patient to observation diuresis and close monitoring      Procedure(s) (LRB):  ENTEROSCOPY (N/A)      Hospital Course:     Pt treated for:    Acute on chronic hypoxemic respiratory failure, improved  Due to acute on chronic HFpEF 60% + severe AS  Complicated by ILD/Asbestosis  Complicated by CARMELITA on CKD, resolved  - b/l renal aa stenosis, will need follow-up outpt  - IV lasix prn; pt currently back on po regimen 3 days a week  - outpt TAVR at [facility]   - follow up with Dr Borden in [place]  - pt is chronically on 5L NC, she already has a tank that goes to 10L at home  - cardiology, nephrology following, thank you     Acute on chronic anemia, improved  - pRBC transfusions prn  - trending CBC - will need follow-up outpt  - enteroscopy 2/20: non-bleeding angioectasia, erosive gastropathy  - continue protonix for one month per GI  - follow up outpt GI  - hematology following, thank you; they recommend outpt follow-up     Chronic  conditions as noted above/below; home medications reviewed personally by me and restarted as appropriate  Electrolyte derangement:  Trending BMP; Mg; replacement prn  Dispo:  cardiology appts as noted above, PCP, hematology, and GI.  FULL CODE     Pt improved during inpt stay.  Pt received maximum benefit to inpt stay.  Patient was seen and examined on the date of discharge and determined to be suitable for discharge.      Vital Signs (Most Recent)  Temp: 97.2 °F (36.2 °C) (02/22/23 1209)  Pulse: 78 (02/22/23 1209)  Resp: 18 (02/22/23 1209)  BP: (!) 123/55 (02/22/23 1209)  SpO2: (!) 90 % (02/22/23 1209)     Physical Exam:     Gen: alert, responsive  HEENT:  Eyes - no pallor  External ears with no lesions  Nares patent  Mouth, Throat:  trachea midline   CV: RRR, +murmur  Lungs: RALES, improved  Abd: +BS, soft, NT, ND  Ext: no atrophy; +BLE edema  Skin: warm, dry  Neuro: grossly intact  Psych: pleasant    Consults:   Consults (From admission, onward)          Status Ordering Provider     Inpatient consult to Gastroenterology  Once        Provider:  (Not yet assigned)    Completed ARTAVIA, ALEX     Inpatient consult to Nephrology  Once        Provider:  Gregorio Poe MD    Completed ARTAVIA, ALEX     Inpatient consult to Registered Dietitian/Nutritionist  Once        Provider:  (Not yet assigned)    Completed ARTAVIA, ALEX     Inpatient consult to Pulmonology  Once        Provider:  Matt Toro MD    Completed ARTAVIA, ALEX     Inpatient consult to Cardiology  Once        Provider:  Elham Hilario MD    Completed ARTAVIA, ALEX     Inpatient consult to Registered Dietitian/Nutritionist  Once        Provider:  (Not yet assigned)    Completed LOAN HEADLEY          Final Active Diagnoses:    Diagnosis Date Noted POA    PRINCIPAL PROBLEM:  Valvular heart disease, severe aortic stenosis, moderate aortic regurgitation [I35.0] 05/16/2013 Yes     Chronic    Chronic respiratory failure with  hypoxia, on 4 L oxygen [J96.11] 01/26/2023 Yes     Chronic    Acute on chronic heart failure with preserved ejection fraction [I50.32] 10/29/2019 Yes    Severe obesity with body mass index (BMI) of 35.0 to 39.9 with serious comorbidity [E66.01] 06/07/2018 Yes    Essential hypertension [I10]  Yes     Chronic      Problems Resolved During this Admission:    Diagnosis Date Noted Date Resolved POA    Acute blood loss anemia [D62] 02/21/2023 02/22/2023 Yes    Anemia, acute on chronic, progressive [D64.9] 01/26/2023 02/22/2023 Yes     Chronic       Discharged Condition: stable    [place] [facility] [place]:   Follow-up Information       Johanne Callejas MD. Schedule an appointment as soon as possible for a visit.    Specialty: Family Medicine  Why: for follow up of RENAL ARTERY STENOSIS.  AND HOSPITAL DISCHARGE FOLLOW-UP    You receive a call to schedule your appointment  Contact information:  [address]  [place] [address]  [phone]               Eladio Dumont MD. Schedule an appointment as soon as possible for a visit.    Specialty: Gastroenterology  Why: FOR FOLLOW UP OF STOMACH IRRITATION THAT MIGHT HAVE CAUSED BLEEDING  Contact information:  [address]  [place] [address]  [phone]               YOUR CURRENT CARDIOLOGIST Follow up.    Why: PLEASE FOLLOW UP WITH HIM ABOUT YOUR HEART AND ASK HIM ABOUT THE APPOINTMENT AT [facility]             Cristian Felix MD. Schedule an appointment as soon as possible for a visit.    Specialties: Hematology, Oncology, Hematology and Oncology  Why: FOR FOLLOW UP OF YOUR BLOOD LEVELS  Contact information:  [address]  [address]  [place] [address]  [phone]                           Patient Instructions:      Ambulatory referral/consult to Gastroenterology   Standing Status: Future   Referral Priority: Routine Referral Type: Consultation   Referral Reason: Specialty Services Required   Referred to Provider: ELADIO DUMONT  Requested Specialty: Gastroenterology   Number of Visits Requested: 1     Ambulatory referral/consult to Hematology / Oncology   Standing Status: Future   Referral Priority: Routine Referral Type: Consultation   Referral Reason: Specialty Services Required   Referred to Provider: LEILA ALEXANDER Requested Specialty: Hematology and Oncology   Number of Visits Requested: 1     Ambulatory referral/consult to Home Health   Standing Status: Future   Referral Priority: Routine Referral Type: Home Health   Referral Reason: Specialty Services Required   Requested Specialty: Home Health Services   Number of Visits Requested: 1     Pending Diagnostic Studies:       None           Medications:  Reconciled Home Medications:      Medication List        START taking these medications      cyanocobalamin 1000 MCG tablet  Commonly known as: VITAMIN B-12  Take 1 tablet (1,000 mcg total) by mouth once daily.     ferrous sulfate 325 (65 FE) MG EC tablet  Take 1 tablet (325 mg total) by mouth once daily.            CHANGE how you take these medications      alendronate 70 MG tablet  Commonly known as: FOSAMAX  TAKE 1 TABLET BY MOUTH ONE TIME PER WEEK  What changed: when to take this     atorvastatin 40 MG tablet  Commonly known as: LIPITOR  TAKE 1 TABLET BY MOUTH EVERY DAY  What changed: when to take this     clopidogreL 75 mg tablet  Commonly known as: PLAVIX  TAKE 1 TABLET BY MOUTH EVERY DAY  What changed: when to take this     colchicine 0.6 mg tablet  Commonly known as: COLCRYS  Take 2 po at gout flare onset, may repeat 1 in an hour prn, then 1 po bid until pain resolves ,or n/V/D starts  What changed:   how much to take  how to take this  when to take this  reasons to take this     furosemide 40 MG tablet  Commonly known as: LASIX  Take 1 tablet (40 mg total) by mouth once daily for 3 days, THEN 1 tablet (40 mg total) every 48 hours.  Start taking on: February 22, 2023  What changed:   medication strength  See the new  instructions.     metoprolol succinate 50 MG 24 hr tablet  Commonly known as: TOPROL-XL  Take 1 tablet (50 mg total) by mouth once daily.  What changed: Another medication with the same name was removed. Continue taking this medication, and follow the directions you see here.     * paroxetine 40 MG tablet  Commonly known as: PAXIL  TAKE 1 TABLET BY MOUTH EVERY DAY  What changed: when to take this     * paroxetine 10 MG tablet  Commonly known as: PAXIL  Take 10 mg by mouth every morning. Total 50 mg  What changed: Another medication with the same name was changed. Make sure you understand how and when to take each.           * This list has 2 medication(s) that are the same as other medications prescribed for you. Read the directions carefully, and ask your doctor or other care provider to review them with you.                CONTINUE taking these medications      acetaminophen 325 MG tablet  Commonly known as: TYLENOL  Take 325 mg by mouth every 6 (six) hours as needed for Pain.     albuterol sulfate 90 mcg/actuation inhaler  Commonly known as: PROAIR RESPICLICK  Inhale 2 puffs into the lungs every 4 to 6 hours as needed.     albuterol-ipratropium 2.5 mg-0.5 mg/3 mL nebulizer solution  Commonly known as: DUO-NEB  Take 3 mLs by nebulization every 6 (six) hours as needed for Wheezing. Rescue     ALPRAZolam 0.25 MG tablet  Commonly known as: XANAX  Take 1 tablet (0.25 mg total) by mouth 2 (two) times daily as needed for Anxiety.     aspirin 81 mg Tab  Take 81 mg by mouth once daily. Every day     clonazePAM 1 MG disintegrating tablet  Commonly known as: KlonoPIN  Take 1 mg by mouth 2 (two) times daily as needed.     colestipoL 1 gram Tab  Commonly known as: COLESTID  Take 1 tablet (1 g total) by mouth 2 (two) times daily as needed (diarrhea).     esomeprazole 40 MG capsule  Commonly known as: NEXIUM  TAKE 1 CAPSULE BY MOUTH BEFORE BREAKFAST.     fluticasone propionate 50 mcg/actuation nasal spray  Commonly known as:  FLONASE  1 spray (50 mcg total) by Each Nostril route once daily.     isosorbide mononitrate 60 MG 24 hr tablet  Commonly known as: IMDUR  Take 60 mg by mouth once daily.     magnesium oxide 400 mg (241.3 mg magnesium) tablet  Commonly known as: MAG-OX  Take 2 tablets (800 mg total) by mouth 2 (two) times daily.     meclizine 25 mg tablet  Commonly known as: ANTIVERT  Take 1 tablet (25 mg total) by mouth 3 (three) times daily as needed for Dizziness.     QUEtiapine 100 MG Tab  Commonly known as: SEROQUEL  Take 100 mg by mouth once daily.     TRELEGY ELLIPTA 200-62.5-25 mcg inhaler  Generic drug: fluticasone-umeclidin-vilanter  INHALE 1 PUFF INTO THE LUNGS ONCE DAILY.            ASK your doctor about these medications      ergocalciferol 50,000 unit Cap  Commonly known as: ERGOCALCIFEROL  Take 1 capsule (50,000 Units total) by mouth every 7 days.              Indwelling Lines/Drains at time of discharge:   Lines/Drains/Airways       None                 Time spent on the discharge of patient: 31 minutes    Lynette Shea MD  Department of Hospital Medicine  [facility]

## 2023-02-22 NOTE — TELEPHONE ENCOUNTER
----- Message from Irene Padilla sent at 2/22/2023  2:44 PM CST -----  Regarding: HOSPITAL F/U  Type: Patient Call Back         Who called: [facility]         What is the request in detail: calling to [facility] hospital f/u ASAP; discharge 2/22; please advise         Best call back number: [phone]         Additional Information:            Thank You

## 2023-02-22 NOTE — PLAN OF CARE
02/22/23 0936   Patient Assessment/Suction   Level of Consciousness (AVPU) alert   Respiratory Effort Normal;Unlabored   All Lung Fields Breath Sounds diminished;clear   Rhythm/Pattern, Respiratory no shortness of breath reported;pattern regular;unlabored   PRE-TX-O2   Device (Oxygen Therapy) nasal cannula with humidification   $ Is the patient on Low Flow Oxygen? Yes   Flow (L/min) 5   SpO2 97 %   Pulse Oximetry Type Intermittent   $ Pulse Oximetry - Multiple Charge Pulse Oximetry - Multiple   Pulse 76   Resp 18   Aerosol Therapy   $ Aerosol Therapy Charges Aerosol Treatment   Daily Review of Necessity (SVN) completed   Respiratory Treatment Status (SVN) given   Treatment Route (SVN) mask;oxygen   Patient Position (SVN) Lieberman's;HOB elevated   Post Treatment Assessment (SVN) breath sounds improved   Signs of Intolerance (SVN) none   Education   $ Education Bronchodilator;15 min   Respiratory Evaluation   $ Care Plan Tech Time 15 min

## 2023-02-22 NOTE — PROGRESS NOTES
"Progress Note  Nephrology    Consult Requested By: Lynette Shea MD    Reason for Consult: CARMELITA on CKD 3    SUBJECTIVE:     History of Present Illness:  72 yo female who presented to ER on 2/14 due to shortness of breath, cough and "not feeling good" .  Has hx CKD 3 and f/u w/ Dr. Morrow in out patient clinic.  She is admitted for A/C CHF, in need of diuresis.  Cardiology is following, and stopped her lasix yesterday when Scr bumped.  Nephrology is consulted for co-management.      2/18  Scr normal on admit, 2.7 yesterday, 2.6 today.  Off lasix, held by cards when Scr bumped.  UOP not recorded.  No complaints, denies SOB.  Precipitous drop in Hgb noted.  2/19  VSS, Hgb down to 7, primary ordered Fe+ supplement yesterday and stool for OB.  Renal function improving.  Pt denies pain, no n/v, denies bloody/black stool.  Saw Dr. Trevizo about a year ago, said she had a small bleed at that time.    2/20  EGD planned for today.  Hgb 6.5, transfusion orders noted per primary for today.  Renal function improving.  VSS, UOP not properly recorded.  No complaints.  2/21 no complaints  2/22 VSS, on 5L NC, UOP 750cc    Assessment/plan:    CARMELITA/CKD 3  Acute blood loss anemia  Hyponatremia  Alkalosis  HTN/Valvular CHF    - renal parameters are improved  - transfused and s/p EGD- H/H fluctuating some  - continue outpatient lasix regimen  - cardiac diet with 1.5L fluid restriction    Review of Systems:  Negative unless noted above    OBJECTIVE:     Vital Signs Range (Last 24H):  Temp:  [97.7 °F (36.5 °C)-98.2 °F (36.8 °C)]   Pulse:  [76-87]   Resp:  [18-20]   BP: (144-172)/(57-82)   SpO2:  [90 %-97 %]     Physical Exam:  General- NAD noted  HEENT- WNL  Neck- supple  CV- Regular rate and rhythm  Resp- Lungs CTA Bilaterally, No increased WOB  GI- Non tender/non-distended, BS normoactive x4 quads  Extrem- No cyanosis, clubbing, +edema.  Derm- skin w/d  Neuro-  No flap.     Body mass index is 35.75 kg/m².    Laboratory:  CBC:   Recent " Labs   Lab 02/22/23  0435   WBC 7.03   RBC 2.72*   HGB 7.7*   HCT 25.1*      MCV 92   MCH 28.3   MCHC 30.7*       CMP:   Recent Labs   Lab 02/19/23  0452 02/20/23  0421 02/22/23  0435   GLU 92  92   < > 95   CALCIUM 8.3*  8.3*   < > 8.7   ALBUMIN 2.3*  --   --    *  135*   < > 135*   K 4.0  4.0   < > 4.1   CO2 31*  31*   < > 32*   CL 96  96   < > 98   BUN 48*  48*   < > 25*   CREATININE 2.1*  2.1*   < > 1.2    < > = values in this interval not displayed.         Diagnostic Results:  Labs: Reviewed      ASSESSMENT/PLAN:     Active Hospital Problems    Diagnosis  POA    *Acute on chronic heart failure with preserved ejection fraction [I50.32]  Yes    Acute blood loss anemia [D62]  Yes    Anemia, acute on chronic, progressive [D64.9]  Yes     Chronic    Severe obesity with body mass index (BMI) of 35.0 to 39.9 with serious comorbidity [E66.01]  Yes    Valvular heart disease, severe aortic stenosis, moderate aortic regurgitation [I35.0]  Yes     Chronic     moderate      Essential hypertension [I10]  Yes     Chronic      Resolved Hospital Problems   No resolved problems to display.         Thank you for allowing us to participate in the care of your patient. We will follow the patient and provide recommendations as needed.      Patient care time was spent personally by me on the following activities: > 35 minutes  Obtaining a history.  Examination of patient.  Providing medical care at the patients bedside.  Developing a treatment plan with patient or surrogate and bedside caregivers.  Ordering and reviewing laboratory studies, radiographic studies, pulse oximetry.  Ordering and performing treatments and interventions.  Evaluation of patient's response to treatment.  Discussions with consultants while on the unit and immediately available to the patient.  Re-evaluation of the patient's condition.  Documentation in the medical record.      Maribel Christian MD

## 2023-02-23 ENCOUNTER — PATIENT MESSAGE (OUTPATIENT)
Dept: CARDIOLOGY | Facility: CLINIC | Age: 73
End: 2023-02-23
Payer: MEDICARE

## 2023-02-23 DIAGNOSIS — N18.9 CHRONIC KIDNEY DISEASE, UNSPECIFIED CKD STAGE: ICD-10-CM

## 2023-02-23 DIAGNOSIS — I35.0 AORTIC VALVE STENOSIS, ETIOLOGY OF CARDIAC VALVE DISEASE UNSPECIFIED: Primary | ICD-10-CM

## 2023-02-23 LAB — HAPTOGLOB SERPL-MCNC: 224 MG/DL (ref 42–346)

## 2023-02-24 LAB
ALBUMIN SERPL ELPH-MCNC: 2.4 G/DL (ref 2.9–4.4)
ALBUMIN/GLOB SERPL: 0.7 {RATIO} (ref 0.7–1.7)
ALPHA1 GLOB SERPL ELPH-MCNC: 0.6 G/DL (ref 0–0.4)
ALPHA2 GLOB SERPL ELPH-MCNC: 0.9 G/DL (ref 0.4–1)
B-GLOBULIN SERPL ELPH-MCNC: 1 G/DL (ref 0.7–1.3)
GAMMA GLOB SERPL ELPH-MCNC: 1.2 G/DL (ref 0.4–1.8)
GLOBULIN SER CALC-MCNC: 3.6 G/DL (ref 2.2–3.9)
LABORATORY COMMENT REPORT: ABNORMAL
M PROTEIN SERPL ELPH-MCNC: ABNORMAL G/DL
PROT SERPL-MCNC: 6 G/DL (ref 6–8.5)

## 2023-02-28 ENCOUNTER — TELEPHONE (OUTPATIENT)
Dept: FAMILY MEDICINE | Facility: CLINIC | Age: 73
End: 2023-02-28
Payer: MEDICARE

## 2023-03-03 ENCOUNTER — DOCUMENT SCAN (OUTPATIENT)
Dept: HOME HEALTH SERVICES | Facility: HOSPITAL | Age: 73
End: 2023-03-03
Payer: MEDICARE

## 2023-03-03 ENCOUNTER — OFFICE VISIT (OUTPATIENT)
Dept: FAMILY MEDICINE | Facility: CLINIC | Age: 73
End: 2023-03-03
Payer: MEDICARE

## 2023-03-03 VITALS
BODY MASS INDEX: 35.75 KG/M2 | OXYGEN SATURATION: 92 % | RESPIRATION RATE: 20 BRPM | HEIGHT: 62 IN | HEART RATE: 87 BPM | TEMPERATURE: 99 F | DIASTOLIC BLOOD PRESSURE: 60 MMHG | SYSTOLIC BLOOD PRESSURE: 130 MMHG

## 2023-03-03 DIAGNOSIS — F41.1 GENERALIZED ANXIETY DISORDER: ICD-10-CM

## 2023-03-03 DIAGNOSIS — J84.9 INTERSTITIAL LUNG DISEASE: ICD-10-CM

## 2023-03-03 DIAGNOSIS — I50.30 HEART FAILURE WITH PRESERVED EJECTION FRACTION, UNSPECIFIED HF CHRONICITY: ICD-10-CM

## 2023-03-03 DIAGNOSIS — Z09 HOSPITAL DISCHARGE FOLLOW-UP: Primary | ICD-10-CM

## 2023-03-03 DIAGNOSIS — N18.32 STAGE 3B CHRONIC KIDNEY DISEASE: ICD-10-CM

## 2023-03-03 DIAGNOSIS — F41.1 GENERALIZED ANXIETY DISORDER: Primary | ICD-10-CM

## 2023-03-03 DIAGNOSIS — F41.9 ANXIETY: ICD-10-CM

## 2023-03-03 DIAGNOSIS — D64.9 ANEMIA, UNSPECIFIED TYPE: ICD-10-CM

## 2023-03-03 PROCEDURE — 99214 OFFICE O/P EST MOD 30 MIN: CPT | Mod: S$PBB,,, | Performed by: NURSE PRACTITIONER

## 2023-03-03 PROCEDURE — 99999 PR PBB SHADOW E&M-EST. PATIENT-LVL V: ICD-10-PCS | Mod: PBBFAC,,, | Performed by: NURSE PRACTITIONER

## 2023-03-03 PROCEDURE — 99215 OFFICE O/P EST HI 40 MIN: CPT | Mod: PBBFAC,PO | Performed by: NURSE PRACTITIONER

## 2023-03-03 PROCEDURE — 99214 PR OFFICE/OUTPT VISIT, EST, LEVL IV, 30-39 MIN: ICD-10-PCS | Mod: S$PBB,,, | Performed by: NURSE PRACTITIONER

## 2023-03-03 PROCEDURE — 99999 PR PBB SHADOW E&M-EST. PATIENT-LVL V: CPT | Mod: PBBFAC,,, | Performed by: NURSE PRACTITIONER

## 2023-03-03 RX ORDER — CLONAZEPAM 1 MG/1
1 TABLET, ORALLY DISINTEGRATING ORAL 2 TIMES DAILY PRN
Status: CANCELLED | OUTPATIENT
Start: 2023-03-03

## 2023-03-03 NOTE — PROGRESS NOTES
Subjective:       Patient ID: Betty Bacon is a 73 y.o. female.    Chief Complaint: Transitional Care     HPI   72 y/o female patient with medical problems listed below presents for hospital discharge follow up. Patient was accompanied by a daughter.     Admission Date: 2/14/2023 at [facility]  Discharge Date and Time: 2/22/2023  3:02 PM    Patient presented to ED with complaint of shortness breath. She was admitted for pneumonia and congestive heart failure 2 weeks prior to the recent ED visit. On chest x-ray showed bilateral cephalization of flow. Echocardiogram done in December showed preserved EF with diastolic dysfunction. Also showed aortic valve stenosis of 0.96. Blood pressure elevated in the emergency room averaging in the 170s systolic pressure. Patient was admitted for diuresis.     Hospital Course:      Pt treated for:     Acute on chronic hypoxemic respiratory failure, improved  Due to acute on chronic HFpEF 60% + severe AS  Complicated by ILD/Asbestosis  Complicated by CARMELITA on CKD, resolved  - b/l renal aa stenosis, will need follow-up outpt  - IV lasix prn; pt currently back on po regimen 3 days a week  - outpt TAVR at [facility]   - follow up with Dr Borden in [place]  - pt is chronically on 5L NC, she already has a tank that goes to 10L at home  - cardiology, nephrology following, thank you     Acute on chronic anemia, improved  - pRBC transfusions prn  - trending CBC - will need follow-up outpt  - enteroscopy 2/20: non-bleeding angioectasia, erosive gastropathy  - continue protonix for one month per GI  - follow up outpt GI  - hematology following, thank you; they recommend outpt follow-up     Chronic conditions as noted above/below; home medications reviewed personally by me and restarted as appropriate  Electrolyte derangement: Trending BMP; Mg; replacement prn  Dispo:  cardiology appts as noted above, PCP, hematology, and GI.  FULL CODE     Today patient states symptoms are  improving. Patient  has scheduled appt of pulm and cardiology.   She also requests refill on clonopin, states takes it 1mg as needed, mostly 1mg at night for anxiety. Saw xanax on the med list, but she states has never taken it. She is on home oxygen 5-6 L    Labs reviewed from 2/2023- Hgb/Hct 8.0/26.6 on 2/21<- 6.5/21.3 on 2/20<- 7.4/25.3 (patient had 1 pack of RBC during the recent hospitalization and had 1 pack of RBC during pervious hospitalization), Na 135, BUN 25, GFR 47.2    Patient Active Problem List   Diagnosis    CKD (chronic kidney disease) stage 3, GFR 30-59 ml/min    Hyperlipidemia LDL goal < 70    Essential hypertension    LVH (left ventricular hypertrophy) due to hypertensive disease    Depression    Unstable angina    PAD (peripheral artery disease)    Generalized anxiety disorder    Valvular heart disease, severe aortic stenosis, moderate aortic regurgitation    CAD (coronary artery disease)    Abnormal stress test    GERD (gastroesophageal reflux disease)    Interstitial lung disease    Osteopenia    History of electroconvulsive therapy    Former smoker    Impaired fasting blood sugar    Severe obesity with body mass index (BMI) of 35.0 to 39.9 with serious comorbidity    Secondary hyperparathyroidism of renal origin    Chronic left-sided thoracic back pain    Acute on chronic heart failure with preserved ejection fraction    Elevated alkaline phosphatase level    Positive AKIRA (antinuclear antibody)    Hepatitis B core antibody positive    Acute gout of left knee    Pain and swelling of left lower leg    Aortic atherosclerosis    Recurrent major depressive disorder, in partial remission    Dyspnea    Acute drug-induced gout of left foot    Acute pulmonary edema    SARS-CoV-2 positive    (HFpEF) heart failure with preserved ejection fraction    Hypocalcemia    Hypokalemia    Lower lung pneumonia    Hypomagnesemia    Chronic respiratory failure with hypoxia, on 4 L oxygen    CARMELITA (acute kidney  injury)      Review of patient's allergies indicates:   Allergen Reactions    Propoxyphene n-acetaminophen Other (See Comments)     Other reaction(s): Unknown    Codeine Anxiety     Past Surgical History:   Procedure Laterality Date    CHOLECYSTECTOMY      CORONARY ANGIOPLASTY      CORONARY ANGIOPLASTY WITH STENT PLACEMENT  10/2012    2 stents RCA (100% stenosis)    EYE SURGERY      cataract surgery    HYSTERECTOMY      LINA, ovaries intact. uterine prolapse    ILIAC ARTERY STENT      SMALL BOWEL ENTEROSCOPY N/A 2/20/2023    Procedure: ENTEROSCOPY;  Surgeon: Margy Dumont MD;  Location: [facility];  Service: Endoscopy;  Laterality: N/A;    TONSILLECTOMY      TOTAL VAGINAL HYSTERECTOMY          Current Outpatient Medications:     acetaminophen (TYLENOL) 325 MG tablet, Take 325 mg by mouth every 6 (six) hours as needed for Pain., Disp: , Rfl:     albuterol sulfate (PROAIR RESPICLICK) 90 mcg/actuation AePB, Inhale 2 puffs into the lungs every 4 to 6 hours as needed., Disp: 1 each, Rfl: 3    albuterol-ipratropium (DUO-NEB) 2.5 mg-0.5 mg/3 mL nebulizer solution, Take 3 mLs by nebulization every 6 (six) hours as needed for Wheezing. Rescue, Disp: 75 mL, Rfl: 11    alendronate (FOSAMAX) 70 MG tablet, TAKE 1 TABLET BY MOUTH ONE TIME PER WEEK (Patient taking differently: Take 70 mg by mouth every 7 days.), Disp: 12 tablet, Rfl: 3    aspirin 81 mg Tab, Take 81 mg by mouth once daily. Every day, Disp: , Rfl:     atorvastatin (LIPITOR) 40 MG tablet, TAKE 1 TABLET BY MOUTH EVERY DAY (Patient taking differently: Take 40 mg by mouth once daily.), Disp: 90 tablet, Rfl: 3    clonazePAM (KLONOPIN) 1 MG disintegrating tablet, Take 1 mg by mouth 2 (two) times daily as needed., Disp: , Rfl:     clopidogreL (PLAVIX) 75 mg tablet, TAKE 1 TABLET BY MOUTH EVERY DAY (Patient taking differently: Take 75 mg by mouth once daily.), Disp: 90 tablet, Rfl: 3    colchicine (COLCRYS) 0.6 mg tablet, Take 2 po at gout flare onset, may repeat 1 in an  hour prn, then 1 po bid until pain resolves ,or n/V/D starts (Patient taking differently: Take 0.6 mg by mouth as needed. Take 2 po at gout flare onset, may repeat 1 in an hour prn, then 1 po bid until pain resolves ,or n/V/D starts), Disp: 20 tablet, Rfl: 0    cyanocobalamin (VITAMIN B-12) 1000 MCG tablet, Take 1 tablet (1,000 mcg total) by mouth once daily., Disp: 30 tablet, Rfl: 0    ergocalciferol (ERGOCALCIFEROL) 50,000 unit Cap, Take 1 capsule (50,000 Units total) by mouth every 7 days., Disp: 12 capsule, Rfl: 3    esomeprazole (NEXIUM) 40 MG capsule, TAKE 1 CAPSULE BY MOUTH BEFORE BREAKFAST. (Patient taking differently: Take 40 mg by mouth before breakfast.), Disp: 90 capsule, Rfl: 3    ferrous sulfate 325 (65 FE) MG EC tablet, Take 1 tablet (325 mg total) by mouth once daily., Disp: 30 tablet, Rfl: 0    fluticasone propionate (FLONASE) 50 mcg/actuation nasal spray, 1 spray (50 mcg total) by Each Nostril route once daily., Disp: 16 g, Rfl: PRN    furosemide (LASIX) 40 MG tablet, Take 1 tablet (40 mg total) by mouth once daily for 3 days, THEN 1 tablet (40 mg total) every 48 hours., Disp: 30 tablet, Rfl: 0    isosorbide mononitrate (IMDUR) 60 MG 24 hr tablet, Take 60 mg by mouth once daily., Disp: , Rfl:     magnesium oxide (MAG-OX) 400 mg (241.3 mg magnesium) tablet, Take 2 tablets (800 mg total) by mouth 2 (two) times daily., Disp: 360 tablet, Rfl: 0    meclizine (ANTIVERT) 25 mg tablet, Take 1 tablet (25 mg total) by mouth 3 (three) times daily as needed for Dizziness., Disp: 30 tablet, Rfl: PRN    metoprolol succinate (TOPROL-XL) 50 MG 24 hr tablet, Take 1 tablet (50 mg total) by mouth once daily., Disp: 90 tablet, Rfl: 0    paroxetine (PAXIL) 10 MG tablet, Take 10 mg by mouth every morning. Total 50 mg, Disp: , Rfl:     paroxetine (PAXIL) 40 MG tablet, TAKE 1 TABLET BY MOUTH EVERY DAY (Patient taking differently: Take 40 mg by mouth once daily.), Disp: 90 tablet, Rfl: 3    QUEtiapine (SEROQUEL) 100 MG  "Tab, Take 100 mg by mouth once daily., Disp: , Rfl:     DASH ELLIPTA 200-62.5-25 mcg inhaler, INHALE 1 PUFF INTO THE LUNGS ONCE DAILY., Disp: 180 each, Rfl: 2    ALPRAZolam (XANAX) 0.25 MG tablet, Take 1 tablet (0.25 mg total) by mouth 2 (two) times daily as needed for Anxiety., Disp: 30 tablet, Rfl: 0    colestipoL (COLESTID) 1 gram Tab, Take 1 tablet (1 g total) by mouth 2 (two) times daily as needed (diarrhea)., Disp: 180 tablet, Rfl: 3    Lab Results   Component Value Date    WBC 7.03 02/22/2023    HGB 7.7 (L) 02/22/2023    HCT 25.1 (L) 02/22/2023     02/22/2023    CHOL 145 01/10/2023    TRIG 89 01/10/2023    HDL 52 01/10/2023    ALT 12 02/14/2023    AST 22 02/14/2023     (L) 02/22/2023    K 4.1 02/22/2023    CL 98 02/22/2023    CREATININE 1.2 02/22/2023    BUN 25 (H) 02/22/2023    CO2 32 (H) 02/22/2023    TSH 1.678 07/06/2016    INR 1.0 02/14/2023    GLUF 107 09/25/2009    HGBA1C 6.1 01/25/2023     Review of Systems   Constitutional:  Negative for chills and fever.   Respiratory:  Negative for chest tightness and shortness of breath.    Cardiovascular:  Negative for chest pain and palpitations.   Gastrointestinal:  Negative for abdominal pain.   Neurological:  Negative for dizziness and headaches.       Objective:   /60 (BP Location: Right arm, Patient Position: Sitting, BP Method: Large (Manual))   Pulse 87   Temp 98.5 °F (36.9 °C) (Oral)   Resp 20   Ht 5' 2" (1.575 m)   SpO2 (!) 92%   BMI 35.75 kg/m²       Physical Exam  Constitutional:       General: She is not in acute distress.     Appearance: Normal appearance.      Comments: Patient is sitting on wheelchair comfortably   HENT:      Head: Atraumatic.   Cardiovascular:      Rate and Rhythm: Normal rate and regular rhythm.      Pulses: Normal pulses.      Heart sounds: Normal heart sounds.   Pulmonary:      Effort: Pulmonary effort is normal.      Breath sounds: Rales present.   Abdominal:      General: Abdomen is flat. Bowel " sounds are normal.      Palpations: Abdomen is soft.   Skin:     General: Skin is warm and dry.   Neurological:      General: No focal deficit present.      Mental Status: She is alert and oriented to person, place, and time.   Psychiatric:         Mood and Affect: Mood normal.       Assessment:       1. Hospital discharge follow-up    2. Anemia, unspecified type    3. Stage 3b chronic kidney disease    4. Heart failure with preserved ejection fraction, unspecified HF chronicity    5. Interstitial lung disease    6. Generalized anxiety disorder        Plan:       1. Anemia, unspecified type  - Enteroscopy 2/20: non-bleeding angioectasia, erosive gastropathy  - Ambulatory referral/consult to Hematology / Oncology; Future  - CBC Auto Differential; Future  - Continue with ferrous sulfate (advised to take stool softener if constipated)   - Continue to f/u with GI as scheduled on 3/9    2. Stage 3b chronic kidney disease  - CBC Auto Differential; Future  - Comprehensive Metabolic Panel; Future  - Continue to follow up with nephrologist Dr. Huerta    3. Heart failure with preserved ejection fraction, unspecified HF chronicity  - patient reports symptoms are improving  - continue with current regimen of Furosemide  - continue with US b/l arms and stress echo as scheduled and follow up with cardio on 3/20    4. Interstitial lung disease  - Continue with current inhalers and with home oxygen   - Continue to follow up with pulm as scheduled on 3/9    5. Hospital discharge follow-up    6. Generalized anxiety disorder  - noted xanax on the med list but patient reports has never taken it  - will submit clonopin request refill to dr. horne    Patient with be reevaluated in  as scheduled  or sooner talib Armijo NP

## 2023-03-05 RX ORDER — CLONAZEPAM 1 MG/1
1 TABLET, ORALLY DISINTEGRATING ORAL 2 TIMES DAILY PRN
Qty: 60 TABLET | Refills: 1 | Status: SHIPPED | OUTPATIENT
Start: 2023-03-05 | End: 2023-08-04

## 2023-03-06 PROCEDURE — G0179 PR HOME HEALTH MD RECERTIFICATION: ICD-10-PCS | Mod: CR,,, | Performed by: FAMILY MEDICINE

## 2023-03-06 PROCEDURE — G0179 MD RECERTIFICATION HHA PT: HCPCS | Mod: CR,,, | Performed by: FAMILY MEDICINE

## 2023-03-09 ENCOUNTER — OFFICE VISIT (OUTPATIENT)
Dept: PULMONOLOGY | Facility: CLINIC | Age: 73
End: 2023-03-09
Payer: MEDICARE

## 2023-03-09 ENCOUNTER — DOCUMENT SCAN (OUTPATIENT)
Dept: HOME HEALTH SERVICES | Facility: HOSPITAL | Age: 73
End: 2023-03-09
Payer: MEDICARE

## 2023-03-09 ENCOUNTER — PATIENT MESSAGE (OUTPATIENT)
Dept: FAMILY MEDICINE | Facility: CLINIC | Age: 73
End: 2023-03-09
Payer: MEDICARE

## 2023-03-09 ENCOUNTER — TELEPHONE (OUTPATIENT)
Dept: FAMILY MEDICINE | Facility: CLINIC | Age: 73
End: 2023-03-09
Payer: MEDICARE

## 2023-03-09 ENCOUNTER — LAB VISIT (OUTPATIENT)
Dept: LAB | Facility: HOSPITAL | Age: 73
End: 2023-03-09
Payer: MEDICARE

## 2023-03-09 VITALS
HEIGHT: 62 IN | DIASTOLIC BLOOD PRESSURE: 56 MMHG | HEART RATE: 87 BPM | SYSTOLIC BLOOD PRESSURE: 107 MMHG | BODY MASS INDEX: 35.38 KG/M2 | WEIGHT: 192.25 LBS | OXYGEN SATURATION: 64 %

## 2023-03-09 DIAGNOSIS — N18.32 STAGE 3B CHRONIC KIDNEY DISEASE: ICD-10-CM

## 2023-03-09 DIAGNOSIS — J44.9 CHRONIC OBSTRUCTIVE PULMONARY DISEASE, UNSPECIFIED COPD TYPE: ICD-10-CM

## 2023-03-09 DIAGNOSIS — J96.11 CHRONIC RESPIRATORY FAILURE WITH HYPOXIA: Chronic | ICD-10-CM

## 2023-03-09 DIAGNOSIS — J43.2 CENTRILOBULAR EMPHYSEMA: ICD-10-CM

## 2023-03-09 DIAGNOSIS — D64.9 ANEMIA, UNSPECIFIED TYPE: ICD-10-CM

## 2023-03-09 PROCEDURE — 80053 COMPREHEN METABOLIC PANEL: CPT | Performed by: NURSE PRACTITIONER

## 2023-03-09 PROCEDURE — 99214 OFFICE O/P EST MOD 30 MIN: CPT | Mod: S$PBB,,, | Performed by: INTERNAL MEDICINE

## 2023-03-09 PROCEDURE — 85025 COMPLETE CBC W/AUTO DIFF WBC: CPT | Performed by: NURSE PRACTITIONER

## 2023-03-09 PROCEDURE — 99215 OFFICE O/P EST HI 40 MIN: CPT | Mod: PBBFAC,PO | Performed by: INTERNAL MEDICINE

## 2023-03-09 PROCEDURE — 36415 COLL VENOUS BLD VENIPUNCTURE: CPT | Mod: PO | Performed by: NURSE PRACTITIONER

## 2023-03-09 PROCEDURE — 99999 PR PBB SHADOW E&M-EST. PATIENT-LVL V: CPT | Mod: PBBFAC,,, | Performed by: INTERNAL MEDICINE

## 2023-03-09 PROCEDURE — 99214 PR OFFICE/OUTPT VISIT, EST, LEVL IV, 30-39 MIN: ICD-10-PCS | Mod: S$PBB,,, | Performed by: INTERNAL MEDICINE

## 2023-03-09 PROCEDURE — 99999 PR PBB SHADOW E&M-EST. PATIENT-LVL V: ICD-10-PCS | Mod: PBBFAC,,, | Performed by: INTERNAL MEDICINE

## 2023-03-09 RX ORDER — FLUTICASONE FUROATE, UMECLIDINIUM BROMIDE AND VILANTEROL TRIFENATATE 200; 62.5; 25 UG/1; UG/1; UG/1
1 POWDER RESPIRATORY (INHALATION) DAILY
Qty: 180 EACH | Refills: 11 | Status: SHIPPED | OUTPATIENT
Start: 2023-03-09

## 2023-03-09 NOTE — PROGRESS NOTES
Pulmonary Clinic New Patient    HISTORY OF PRESENT ILLNESS   Betty Bacon is a 73 y.o. female with a PMH of COPD, asbestosis, recent COVID pneumonia, CAD, HFpEF, severe aortic stenosis on 4 L NC at baseline who presents to establish outpatient care for her chronic pulmonary conditions.    She was hospitalized last month for a heart failure exacerbation in the setting of her known severe AS. She was also hospitalized in January for CAP. Now taking Lasix 40 mg every other day.      SMOKING HISTORY  Quit 25 years ago.  Smoked 2 PPD x 30 years.     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    REVIEW OF SYSTEMS  GENERAL: Feeling well. No fevers, chills, or night sweats.  EYES: Vision is good.  ENT: No sinusitis or pharyngitis.   HEART: No chest pain or palpitations. Presyncope with activity. Leg welling. Orthopnea -- 2 pillows.  LUNGS: No sputum, or wheezing. VILLA even with showering. Non-productive cough.  GI: No abdominal pain, nausea, vomiting, or diarrhea.  : No dysuria, urgency, or frequency.  SKIN: No lesions or rashes.  MUSCULOSKELETAL: No joint pain or myalgias.  NEURO: No headaches or neuropathy.  LYMPH: No edema or adenopathy.  PSYCH: No anxiety or depression.  ENDO: No unexpected weight change.    PFSH:  Past Medical History:   Diagnosis Date    Acute coronary artery obstruction without MI 10/2012    Benign hypertension     COPD (chronic obstructive pulmonary disease)     Coronary artery disease     Disorder of kidney and ureter     Dr. Morrow    Hepatitis B core antibody positive 03/03/2021    Negative sAg, suggests previous exposure but no chronic/active Hep B. At risk for reactivation with any immunosuppression medication, steroids, chemo, etc.      History of electroconvulsive therapy     Hyperlipidemia LDL goal < 70     Left ankle sprain     Major depressive disorder, recurrent episode, severe     s/p ECT    Positive  AKIRA (antinuclear antibody) 2021    PVD (peripheral vascular disease)          Past Surgical History:   Procedure Laterality Date    CHOLECYSTECTOMY      CORONARY ANGIOPLASTY      CORONARY ANGIOPLASTY WITH STENT PLACEMENT  10/2012    2 stents RCA (100% stenosis)    EYE SURGERY      cataract surgery    HYSTERECTOMY      LINA, ovaries intact. uterine prolapse    ILIAC ARTERY STENT      SMALL BOWEL ENTEROSCOPY N/A 2023    Procedure: ENTEROSCOPY;  Surgeon: Margy Dumont MD;  Location: [facility];  Service: Endoscopy;  Laterality: N/A;    TONSILLECTOMY      TOTAL VAGINAL HYSTERECTOMY       Social History     Tobacco Use    Smoking status: Former     Packs/day: 2.00     Years: 40.00     Pack years: 80.00     Types: Cigarettes     Quit date: 2009     Years since quittin.2    Smokeless tobacco: Never   Substance Use Topics    Alcohol use: Yes     Comment: social    Drug use: No     Family History   Problem Relation Age of Onset    Diabetes Mother     Heart disease Mother     Stroke Father     Heart disease Father     Heart disease Brother     Stroke Brother     Hypertension Daughter     Diabetes Maternal Aunt     Heart disease Maternal Aunt     Heart disease Maternal Uncle     Heart disease Paternal Aunt     Heart disease Paternal Uncle     Heart disease Maternal Grandfather     Diabetes Sister     Heart disease Sister     Cancer Sister         lung    Kidney disease Sister         mass, benign    Breast cancer Sister     Stroke Sister     Dementia Sister     Liver disease Sister     Melanoma Neg Hx     Psoriasis Neg Hx     Lupus Neg Hx     Eczema Neg Hx      Review of patient's allergies indicates:   Allergen Reactions    Propoxyphene n-acetaminophen Other (See Comments)     Other reaction(s): Unknown    Codeine Anxiety         VITAL SIGNS (MOST RECENT)  Pulse: 87 (23 1600)  BP: (!) 107/56 (23 1600)  SpO2: (!) 64 % (room air at rest) (23 1600)    PHYSICAL EXAM  GENERAL: NAD.  HEENT:  Pupils equal and reactive. Extraocular movements intact.   NECK: Supple.   HEART: Regular rate and rhythm. 2/6 systolic murmur at LUSB radiating to carotids.  LUNGS: Crackles from mid-lungs down increasing toward bases.  ABDOMEN: Bowel sounds present. Non-tender, no masses palpated.  EXTREMITIES: Normal muscle tone and joint movement, no cyanosis or clubbing.   LYMPHATICS: 3+ b/l pretibial pitting edema  SKIN: Dry, intact, no lesions.   NEURO: No gross cognitive or motor deficits.  PSYCH: Appropriate affect.    Lab Results   Component Value Date    WBC 7.03 02/22/2023    HGB 7.7 (L) 02/22/2023    HCT 25.1 (L) 02/22/2023     02/22/2023    CHOL 145 01/10/2023    TRIG 89 01/10/2023    HDL 52 01/10/2023    ALT 12 02/14/2023    AST 22 02/14/2023     (L) 02/22/2023    K 4.1 02/22/2023    CL 98 02/22/2023    CREATININE 1.2 02/22/2023    BUN 25 (H) 02/22/2023    CO2 32 (H) 02/22/2023    TSH 1.678 07/06/2016    INR 1.0 02/14/2023    GLUF 107 09/25/2009    HGBA1C 6.1 01/25/2023       LAST ARTERIAL BLOOD GAS  @LABRCNTIP[PH,PO2,PCO2,HCO3,BE@      LAST 7 DAYS MICROBIOLOGY   Microbiology Results (last 7 days)       ** No results found for the last 168 hours. **            MOST RECENT IMAGING  X-Ray Chest AP Portable  Reason: CHF    FINDINGS:    Portable chest with comparison chest x-ray February 14, 2023 show mild decrease in size of enlarged cardiac mediastinal silhouette.  Bilateral diffuse interstitial lung opacities are mildly decreased at the lung bases otherwise there is no significant change of the lungs. Pulmonary vasculature is normal. No acute osseous abnormality.    IMPRESSION:    1.  Bilateral diffuse interstitial lung opacities are mildly decreased at the lung bases.  2.  Mild decrease in size of enlarged cardiomediastinal silhouette.    Electronically signed by:  Miguel Dumont DO  2/21/2023 6:22 PM CST Workstation: [id number]      CURRENT VISIT EKG  No results found for this visit on  03/09/23.    ECHOCARDIOGRAM RESULTS  Results for orders placed during the hospital encounter of 12/20/22    Echo    Interpretation Summary  · The left ventricle is normal in size with severe concentric hypertrophy and normal systolic function.  · Grade II left ventricular diastolic dysfunction.  · The estimated ejection fraction is 65%.  · Normal right ventricular size with normal right ventricular systolic function.  · Moderate aortic regurgitation.  · There is severe aortic valve stenosis.  · Aortic valve area is 0.94 cm2; peak velocity is 3.29 m/s; mean gradient is 26 mmHg.  · Mild pulmonic regurgitation.  · Mild tricuspid regurgitation.  · There is mild pulmonary hypertension.  · Normal central venous pressure (3 mmHg).  · The estimated PA systolic pressure is 44 mmHg.      Pulmonary Function Tests      I have personally reviewed recent labs and ABGs, and I have viewed the images of recent chest x-rays and chest CTs.    ASSESSMENT & PLAN   Betty Bacon is a 73 y.o. female with a PMH of COPD, asbestosis, recent COVID pneumonia, CAD, HFpEF, severe aortic stenosis on 4 L NC at baseline who presents to establish outpatient care for her chronic pulmonary conditions.    #Asbestosis  #COPD without acute exacerbation  #Chronic hypoxic respiratory failure  Last PFTs in 2013 were consistent with combined restrictive and obstructive lung disease.  - continue Trelegy and PRN inhaler and nebs  - continue supplemental O2  - got flu vaccine 10/2022  - got PCV-13 in 2015 and PPSV-23 in 2016  - repeat PPSV-23 now  - repeat PFTs now  - repeat home oxygen evaluation  - oxygen tanks ordered    #Severe aortic stenosis  #HFpEF  - continue follow up with cardiologist, GDMT, and consideration for TAVR    Follow up in 1 month.  No follow-ups on file.    Matt Toro MD  Pulmonary and Critical Care Medicine  [facility]  Date of Service: 03/09/2023  3:49 PM

## 2023-03-10 ENCOUNTER — PATIENT MESSAGE (OUTPATIENT)
Dept: PULMONOLOGY | Facility: CLINIC | Age: 73
End: 2023-03-10
Payer: MEDICARE

## 2023-03-10 ENCOUNTER — CLINICAL SUPPORT (OUTPATIENT)
Dept: CARDIOLOGY | Facility: HOSPITAL | Age: 73
End: 2023-03-10
Payer: MEDICARE

## 2023-03-10 ENCOUNTER — PATIENT MESSAGE (OUTPATIENT)
Dept: CARDIOLOGY | Facility: CLINIC | Age: 73
End: 2023-03-10
Payer: MEDICARE

## 2023-03-10 DIAGNOSIS — M79.89 SWELLING OF BOTH UPPER EXTREMITIES: ICD-10-CM

## 2023-03-10 DIAGNOSIS — J96.11 CHRONIC RESPIRATORY FAILURE WITH HYPOXIA: Primary | Chronic | ICD-10-CM

## 2023-03-10 LAB
ALBUMIN SERPL BCP-MCNC: 2.5 G/DL (ref 3.5–5.2)
ALP SERPL-CCNC: 94 U/L (ref 55–135)
ALT SERPL W/O P-5'-P-CCNC: 6 U/L (ref 10–44)
ANION GAP SERPL CALC-SCNC: 12 MMOL/L (ref 8–16)
AST SERPL-CCNC: 9 U/L (ref 10–40)
BASOPHILS # BLD AUTO: 0.06 K/UL (ref 0–0.2)
BASOPHILS NFR BLD: 0.6 % (ref 0–1.9)
BILIRUB SERPL-MCNC: 0.4 MG/DL (ref 0.1–1)
BUN SERPL-MCNC: 14 MG/DL (ref 8–23)
CALCIUM SERPL-MCNC: 8.5 MG/DL (ref 8.7–10.5)
CHLORIDE SERPL-SCNC: 102 MMOL/L (ref 95–110)
CO2 SERPL-SCNC: 29 MMOL/L (ref 23–29)
CREAT SERPL-MCNC: 1.2 MG/DL (ref 0.5–1.4)
DIFFERENTIAL METHOD: ABNORMAL
EOSINOPHIL # BLD AUTO: 0.5 K/UL (ref 0–0.5)
EOSINOPHIL NFR BLD: 5.1 % (ref 0–8)
ERYTHROCYTE [DISTWIDTH] IN BLOOD BY AUTOMATED COUNT: 16.7 % (ref 11.5–14.5)
EST. GFR  (NO RACE VARIABLE): 47.2 ML/MIN/1.73 M^2
GLUCOSE SERPL-MCNC: 107 MG/DL (ref 70–110)
HCT VFR BLD AUTO: 31.3 % (ref 37–48.5)
HGB BLD-MCNC: 8.9 G/DL (ref 12–16)
IMM GRANULOCYTES # BLD AUTO: 0.06 K/UL (ref 0–0.04)
IMM GRANULOCYTES NFR BLD AUTO: 0.6 % (ref 0–0.5)
LYMPHOCYTES # BLD AUTO: 1.2 K/UL (ref 1–4.8)
LYMPHOCYTES NFR BLD: 12.3 % (ref 18–48)
MCH RBC QN AUTO: 27.7 PG (ref 27–31)
MCHC RBC AUTO-ENTMCNC: 28.4 G/DL (ref 32–36)
MCV RBC AUTO: 98 FL (ref 82–98)
MONOCYTES # BLD AUTO: 0.7 K/UL (ref 0.3–1)
MONOCYTES NFR BLD: 6.7 % (ref 4–15)
NEUTROPHILS # BLD AUTO: 7.5 K/UL (ref 1.8–7.7)
NEUTROPHILS NFR BLD: 74.7 % (ref 38–73)
NRBC BLD-RTO: 0 /100 WBC
PLATELET # BLD AUTO: 319 K/UL (ref 150–450)
PMV BLD AUTO: 10.3 FL (ref 9.2–12.9)
POTASSIUM SERPL-SCNC: 4.1 MMOL/L (ref 3.5–5.1)
PROT SERPL-MCNC: 6.2 G/DL (ref 6–8.4)
RBC # BLD AUTO: 3.21 M/UL (ref 4–5.4)
SODIUM SERPL-SCNC: 143 MMOL/L (ref 136–145)
WBC # BLD AUTO: 9.99 K/UL (ref 3.9–12.7)

## 2023-03-10 PROCEDURE — 93930 UPPER EXTREMITY STUDY: CPT | Mod: 26,,, | Performed by: INTERNAL MEDICINE

## 2023-03-10 PROCEDURE — 93930 CV US DOPPLER ARTERIAL ARMS BILATERAL (CUPID ONLY): ICD-10-PCS | Mod: 26,,, | Performed by: INTERNAL MEDICINE

## 2023-03-10 PROCEDURE — 93930 UPPER EXTREMITY STUDY: CPT | Mod: 50,PO

## 2023-03-13 LAB
LEFT WRIST BRACHIAL INDEX: 1 WBI
RIGHT WRIST BRACHIAL INDEX: 1 WBI
UPPER ARTERIAL LEFT ARM AXILLARY SYS MAX: 70 CM/S
UPPER ARTERIAL LEFT ARM BRACHIAL SYS MAX: 110 CM/S
UPPER ARTERIAL LEFT ARM RADIAL SYS MAX: 60 CM/S
UPPER ARTERIAL LEFT ARM SUBCLAVIAN SYS MAX: 204 CM/S
UPPER ARTERIAL LEFT ARM ULNAR SYS MAX: 56 CM/S
UPPER ARTERIAL RIGHT ARM AXILLARY SYS MAX: 75 CM/S
UPPER ARTERIAL RIGHT ARM BRACHIAL SYS MAX: 113 CM/S
UPPER ARTERIAL RIGHT ARM RADIAL SYS MAX: 88 CM/S
UPPER ARTERIAL RIGHT ARM SUBCLAVIAN SYS MAX: 158 CM/S
UPPER ARTERIAL RIGHT ARM ULNAR SYS MAX: 57 CM/S

## 2023-03-13 NOTE — TELEPHONE ENCOUNTER
Dthr states pharmacy never recvd vaccine order   appears it was listed to be done in office instead of being sent to pharmacy  please review and sign corrected order

## 2023-03-15 ENCOUNTER — PATIENT MESSAGE (OUTPATIENT)
Dept: PULMONOLOGY | Facility: CLINIC | Age: 73
End: 2023-03-15
Payer: MEDICARE

## 2023-03-15 ENCOUNTER — PATIENT MESSAGE (OUTPATIENT)
Dept: CARDIOLOGY | Facility: CLINIC | Age: 73
End: 2023-03-15
Payer: MEDICARE

## 2023-03-15 ENCOUNTER — TELEPHONE (OUTPATIENT)
Dept: PULMONOLOGY | Facility: CLINIC | Age: 73
End: 2023-03-15
Payer: MEDICARE

## 2023-03-15 NOTE — TELEPHONE ENCOUNTER
Talked with patient and gave information needed for oxygen tanks and questions about delivery . 3/15/2023

## 2023-03-20 ENCOUNTER — OFFICE VISIT (OUTPATIENT)
Dept: CARDIOLOGY | Facility: CLINIC | Age: 73
End: 2023-03-20
Payer: MEDICARE

## 2023-03-20 ENCOUNTER — LAB VISIT (OUTPATIENT)
Dept: LAB | Facility: HOSPITAL | Age: 73
End: 2023-03-20
Attending: INTERNAL MEDICINE
Payer: MEDICARE

## 2023-03-20 VITALS
HEIGHT: 62 IN | BODY MASS INDEX: 36.44 KG/M2 | DIASTOLIC BLOOD PRESSURE: 74 MMHG | HEART RATE: 81 BPM | WEIGHT: 198 LBS | SYSTOLIC BLOOD PRESSURE: 167 MMHG

## 2023-03-20 DIAGNOSIS — E78.5 HYPERLIPIDEMIA WITH TARGET LOW DENSITY LIPOPROTEIN (LDL) CHOLESTEROL LESS THAN 70 MG/DL: ICD-10-CM

## 2023-03-20 DIAGNOSIS — I10 BENIGN HYPERTENSION: Chronic | ICD-10-CM

## 2023-03-20 DIAGNOSIS — I25.10 CORONARY ARTERY DISEASE INVOLVING NATIVE CORONARY ARTERY OF NATIVE HEART WITHOUT ANGINA PECTORIS: Primary | ICD-10-CM

## 2023-03-20 DIAGNOSIS — I35.0 AORTIC VALVE STENOSIS, ETIOLOGY OF CARDIAC VALVE DISEASE UNSPECIFIED: Chronic | ICD-10-CM

## 2023-03-20 DIAGNOSIS — K29.60 PHLEGMONOUS GASTRITIS: ICD-10-CM

## 2023-03-20 DIAGNOSIS — I73.9 PAD (PERIPHERAL ARTERY DISEASE): ICD-10-CM

## 2023-03-20 DIAGNOSIS — D50.0 IRON DEFICIENCY ANEMIA SECONDARY TO BLOOD LOSS (CHRONIC): Primary | ICD-10-CM

## 2023-03-20 LAB
BASOPHILS # BLD AUTO: 0.06 K/UL (ref 0–0.2)
BASOPHILS NFR BLD: 0.7 % (ref 0–1.9)
DIFFERENTIAL METHOD: ABNORMAL
EOSINOPHIL # BLD AUTO: 0.6 K/UL (ref 0–0.5)
EOSINOPHIL NFR BLD: 7.4 % (ref 0–8)
ERYTHROCYTE [DISTWIDTH] IN BLOOD BY AUTOMATED COUNT: 16 % (ref 11.5–14.5)
HCT VFR BLD AUTO: 30.8 % (ref 37–48.5)
HGB BLD-MCNC: 9.2 G/DL (ref 12–16)
IMM GRANULOCYTES # BLD AUTO: 0.04 K/UL (ref 0–0.04)
IMM GRANULOCYTES NFR BLD AUTO: 0.5 % (ref 0–0.5)
LYMPHOCYTES # BLD AUTO: 1.2 K/UL (ref 1–4.8)
LYMPHOCYTES NFR BLD: 14.8 % (ref 18–48)
MCH RBC QN AUTO: 28.2 PG (ref 27–31)
MCHC RBC AUTO-ENTMCNC: 29.9 G/DL (ref 32–36)
MCV RBC AUTO: 95 FL (ref 82–98)
MONOCYTES # BLD AUTO: 0.5 K/UL (ref 0.3–1)
MONOCYTES NFR BLD: 6.2 % (ref 4–15)
NEUTROPHILS # BLD AUTO: 5.9 K/UL (ref 1.8–7.7)
NEUTROPHILS NFR BLD: 70.4 % (ref 38–73)
NRBC BLD-RTO: 0 /100 WBC
PLATELET # BLD AUTO: 275 K/UL (ref 150–450)
PMV BLD AUTO: 9.8 FL (ref 9.2–12.9)
RBC # BLD AUTO: 3.26 M/UL (ref 4–5.4)
WBC # BLD AUTO: 8.33 K/UL (ref 3.9–12.7)

## 2023-03-20 PROCEDURE — 36415 COLL VENOUS BLD VENIPUNCTURE: CPT | Performed by: INTERNAL MEDICINE

## 2023-03-20 PROCEDURE — 85025 COMPLETE CBC W/AUTO DIFF WBC: CPT | Performed by: INTERNAL MEDICINE

## 2023-03-20 PROCEDURE — 99999 PR PBB SHADOW E&M-EST. PATIENT-LVL IV: ICD-10-PCS | Mod: PBBFAC,,, | Performed by: INTERNAL MEDICINE

## 2023-03-20 PROCEDURE — 99214 PR OFFICE/OUTPT VISIT, EST, LEVL IV, 30-39 MIN: ICD-10-PCS | Mod: S$PBB,,, | Performed by: INTERNAL MEDICINE

## 2023-03-20 PROCEDURE — 99214 OFFICE O/P EST MOD 30 MIN: CPT | Mod: PBBFAC,PO | Performed by: INTERNAL MEDICINE

## 2023-03-20 PROCEDURE — 99999 PR PBB SHADOW E&M-EST. PATIENT-LVL IV: CPT | Mod: PBBFAC,,, | Performed by: INTERNAL MEDICINE

## 2023-03-20 PROCEDURE — 99214 OFFICE O/P EST MOD 30 MIN: CPT | Mod: S$PBB,,, | Performed by: INTERNAL MEDICINE

## 2023-03-20 NOTE — PROGRESS NOTES
Subjective:    Patient ID:  Betty Bacon is a 73 y.o. female who presents for follow-up of AS , CAD    HPI  She has been admitted 3 times to [facility] over the last couple of months with worsening shortness of breath - congestive heart failure  She denies chest pain as such.  She has long history of coronary artery disease as well as moderate-to-severe aortic stenosis.    Review of Systems   Constitutional: Negative for decreased appetite, malaise/fatigue, weight gain and weight loss.   Cardiovascular:  Negative for chest pain, dyspnea on exertion, leg swelling, palpitations and syncope.   Respiratory:  Negative for cough and shortness of breath.    Gastrointestinal: Negative.    Neurological:  Negative for weakness.   All other systems reviewed and are negative.     Objective:      Physical Exam  Vitals and nursing note reviewed.   Constitutional:       Appearance: Normal appearance. She is well-developed.   HENT:      Head: Normocephalic.   Eyes:      Pupils: Pupils are equal, round, and reactive to light.   Neck:      Thyroid: No thyromegaly.      Vascular: No carotid bruit or JVD.   Cardiovascular:      Rate and Rhythm: Normal rate and regular rhythm.      Chest Wall: PMI is not displaced.      Pulses: Normal pulses and intact distal pulses.      Heart sounds: Murmur heard.   Harsh midsystolic murmur is present at the upper right sternal border radiating to the neck.     No gallop.   Pulmonary:      Effort: Pulmonary effort is normal.      Breath sounds: Normal breath sounds.   Abdominal:      Palpations: Abdomen is soft. There is no mass.      Tenderness: There is no abdominal tenderness.   Musculoskeletal:         General: Normal range of motion.      Cervical back: Normal range of motion and neck supple.   Skin:     General: Skin is warm.   Neurological:      Mental Status: She is alert and oriented to person, place, and time.      Sensory: No sensory deficit.      Deep Tendon Reflexes:  Reflexes are normal and symmetric.       Assessment:       1. Coronary artery disease involving native coronary artery of native heart without angina pectoris    2. Essential hypertension    3. Hyperlipidemia LDL goal < 70    4. PAD (peripheral artery disease)    5. Aortic valve stenosis, etiology of cardiac valve disease unspecified         Plan:     Will refer to [facility] to have coronary angiography to rule out CAD.  If CAD is ruled out, she will need TAVR workup

## 2023-03-24 ENCOUNTER — TELEPHONE (OUTPATIENT)
Dept: CARDIOLOGY | Facility: CLINIC | Age: 73
End: 2023-03-24
Payer: MEDICARE

## 2023-03-24 NOTE — TELEPHONE ENCOUNTER
Second attempt to contact patient for referral to Dr. Blevins.  (First call Tuesday 3/21)  Can do virtual visit if prefers.  Dr. Borjas spoke to Dr. Blevins and wants patient to have LHC at [facility] then proceed with TAVR work up.

## 2023-03-27 ENCOUNTER — HOSPITAL ENCOUNTER (OUTPATIENT)
Dept: PULMONOLOGY | Facility: HOSPITAL | Age: 73
Discharge: HOME | End: 2023-03-27
Attending: INTERNAL MEDICINE
Payer: MEDICARE

## 2023-03-27 DIAGNOSIS — J43.2 CENTRILOBULAR EMPHYSEMA: ICD-10-CM

## 2023-03-27 DIAGNOSIS — J96.11 CHRONIC RESPIRATORY FAILURE WITH HYPOXIA: Chronic | ICD-10-CM

## 2023-03-27 PROBLEM — J81.0 ACUTE PULMONARY EDEMA: Status: RESOLVED | Noted: 2022-12-21 | Resolved: 2023-03-27

## 2023-03-27 PROCEDURE — 94729 DIFFUSING CAPACITY: CPT

## 2023-03-27 PROCEDURE — 94060 EVALUATION OF WHEEZING: CPT | Mod: 26,,, | Performed by: INTERNAL MEDICINE

## 2023-03-27 PROCEDURE — 94726 PLETHYSMOGRAPHY LUNG VOLUMES: CPT

## 2023-03-27 PROCEDURE — 94060 EVALUATION OF WHEEZING: CPT

## 2023-03-27 PROCEDURE — 94726 PULM FUNCT TST PLETHYSMOGRAP: ICD-10-PCS | Mod: 26,,, | Performed by: INTERNAL MEDICINE

## 2023-03-27 PROCEDURE — 94060 PR EVAL OF BRONCHOSPASM: ICD-10-PCS | Mod: 26,,, | Performed by: INTERNAL MEDICINE

## 2023-03-27 PROCEDURE — 94726 PLETHYSMOGRAPHY LUNG VOLUMES: CPT | Mod: 26,,, | Performed by: INTERNAL MEDICINE

## 2023-03-27 PROCEDURE — 94729 PR C02/MEMBANE DIFFUSE CAPACITY: ICD-10-PCS | Mod: 26,,, | Performed by: INTERNAL MEDICINE

## 2023-03-27 PROCEDURE — 94729 DIFFUSING CAPACITY: CPT | Mod: 26,,, | Performed by: INTERNAL MEDICINE

## 2023-03-27 RX ORDER — ALBUTEROL SULFATE 2.5 MG/.5ML
2.5 SOLUTION RESPIRATORY (INHALATION)
Status: COMPLETED | OUTPATIENT
Start: 2023-03-27 | End: 2023-03-27

## 2023-03-27 RX ADMIN — ALBUTEROL SULFATE 2.5 MG: 2.5 SOLUTION RESPIRATORY (INHALATION) at 12:03

## 2023-03-28 ENCOUNTER — PATIENT MESSAGE (OUTPATIENT)
Dept: PULMONOLOGY | Facility: CLINIC | Age: 73
End: 2023-03-28
Payer: MEDICARE

## 2023-03-28 ENCOUNTER — PATIENT MESSAGE (OUTPATIENT)
Dept: CARDIOLOGY | Facility: CLINIC | Age: 73
End: 2023-03-28
Payer: MEDICARE

## 2023-03-28 LAB
BRPFT: ABNORMAL
DLCO SINGLE BREATH LLN: 13.58
DLCO SINGLE BREATH PRE REF: 15.1 %
DLCO SINGLE BREATH REF: 19.31
DLCOC SBVA LLN: 2.66
DLCOC SBVA REF: 4.19
DLCOC SINGLE BREATH LLN: 13.58
DLCOC SINGLE BREATH REF: 19.31
DLCOVA LLN: 2.66
DLCOVA PRE REF: 39 %
DLCOVA PRE: 1.64 ML/(MIN*MMHG*L) (ref 2.66–5.73)
DLCOVA REF: 4.19
ERV LLN: -16449.45
ERV PRE REF: 89.8 %
ERV REF: 0.55
FEF 25 75 CHG: 22 %
FEF 25 75 LLN: 0.71
FEF 25 75 POST REF: 34 %
FEF 25 75 PRE REF: 27.9 %
FEF 25 75 REF: 1.62
FET100 CHG: 7.7 %
FEV1 CHG: 5.9 %
FEV1 FVC CHG: 2 %
FEV1 FVC LLN: 64
FEV1 FVC POST REF: 96.1 %
FEV1 FVC PRE REF: 94.1 %
FEV1 FVC REF: 78
FEV1 LLN: 1.38
FEV1 POST REF: 38 %
FEV1 PRE REF: 35.9 %
FEV1 REF: 1.93
FRCPLETH LLN: 1.78
FRCPLETH PREREF: 68.7 %
FRCPLETH REF: 2.6
FVC CHG: 3.8 %
FVC LLN: 1.79
FVC POST REF: 39.2 %
FVC PRE REF: 37.8 %
FVC REF: 2.5
IVC PRE: 1.01 L (ref 1.79–3.25)
IVC SINGLE BREATH LLN: 1.79
IVC SINGLE BREATH PRE REF: 40.2 %
IVC SINGLE BREATH REF: 2.5
MVV LLN: 59
MVV PRE REF: 46.1 %
MVV REF: 70
PEF CHG: -30.1 %
PEF LLN: 3.34
PEF POST REF: 39 %
PEF PRE REF: 55.7 %
PEF REF: 4.94
POST FEF 25 75: 0.55 L/S (ref 0.71–2.96)
POST FET 100: 6.55 SEC
POST FEV1 FVC: 74.73 % (ref 63.77–89.91)
POST FEV1: 0.73 L (ref 1.38–2.46)
POST FVC: 0.98 L (ref 1.79–3.25)
POST PEF: 1.93 L/S (ref 3.34–6.55)
PRE DLCO: 2.91 ML/(MIN*MMHG) (ref 13.58–25.04)
PRE ERV: 0.5 L (ref -16449.45–16450.55)
PRE FEF 25 75: 0.45 L/S (ref 0.71–2.96)
PRE FET 100: 6.08 SEC
PRE FEV1 FVC: 73.23 % (ref 63.77–89.91)
PRE FEV1: 0.69 L (ref 1.38–2.46)
PRE FRC PL: 1.79 L (ref 1.78–3.43)
PRE FVC: 0.94 L (ref 1.79–3.25)
PRE MVV: 32.2 L/MIN (ref 59.35–80.29)
PRE PEF: 2.75 L/S (ref 3.34–6.55)
PRE RV: 1.29 L (ref 1.47–2.63)
PRE TLC: 2.25 L (ref 3.62–5.59)
RAW LLN: 3.06
RAW PRE REF: 266.5 %
RAW PRE: 8.15 CMH2O*S/L (ref 3.06–3.06)
RAW REF: 3.06
RV LLN: 1.47
RV PRE REF: 63 %
RV REF: 2.05
RVTLC LLN: 35
RVTLC PRE REF: 129.3 %
RVTLC PRE: 57.48 % (ref 34.87–54.05)
RVTLC REF: 44
TLC LLN: 3.62
TLC PRE REF: 48.8 %
TLC REF: 4.6
VA PRE: 1.78 L (ref 4.45–4.45)
VA SINGLE BREATH LLN: 4.45
VA SINGLE BREATH PRE REF: 39.9 %
VA SINGLE BREATH REF: 4.45
VC LLN: 1.79
VC PRE REF: 38.2 %
VC PRE: 0.96 L (ref 1.79–3.25)
VC REF: 2.5

## 2023-03-29 ENCOUNTER — PATIENT MESSAGE (OUTPATIENT)
Dept: CARDIOLOGY | Facility: CLINIC | Age: 73
End: 2023-03-29
Payer: MEDICARE

## 2023-03-30 DIAGNOSIS — I50.32 CHRONIC DIASTOLIC HEART FAILURE: ICD-10-CM

## 2023-03-30 DIAGNOSIS — I35.0 SEVERE AORTIC STENOSIS: Primary | ICD-10-CM

## 2023-03-30 RX ORDER — SODIUM CHLORIDE 9 MG/ML
INJECTION, SOLUTION INTRAVENOUS CONTINUOUS
Status: CANCELLED | OUTPATIENT
Start: 2023-03-30 | End: 2023-03-30

## 2023-03-30 RX ORDER — SODIUM CHLORIDE 0.9 % (FLUSH) 0.9 %
10 SYRINGE (ML) INJECTION
Status: DISCONTINUED | OUTPATIENT
Start: 2023-03-30 | End: 2023-06-14 | Stop reason: HOSPADM

## 2023-03-30 RX ORDER — DIPHENHYDRAMINE HCL 50 MG
50 CAPSULE ORAL ONCE
Status: CANCELLED | OUTPATIENT
Start: 2023-03-30 | End: 2023-03-30

## 2023-04-04 NOTE — PROGRESS NOTES
"                                                         PROGRESS NOTE    Subjective:       Patient ID: Betty Bacon is a 73 y.o. female.    Chief Complaint:  No chief complaint on file.      History of Present Illness:   Betty Bacon is a 73 y.o. female who presents for follow up of anemia.     Hospital work up most c/w with a chronic disease process.  No bleeding was seen and ferritin was elevated.    She has since had a capsule endo and states a bleeding site was seen.  Dr. Trevizo trying to get cardiac clearance for gen anesthesia for enteroscopy.     2/20/2023:  Small bowel Ent:  Single non-bleeding angioectasia in stomach, injected. O/w unremarkable    2/21/2023 [facility] Consult HPI:  Ms. rTan is a 72 yo female admitted for acute on chronic CHF-Diastolic and found to be anemic.  She fell to a low of 6.5g/dl and has been transfused 2 units of blood.  She denies any type of bleeding such as melena or BRBPR.  She states she had a colonoscopy about one year ago that had a few areas "cauterized" and upper endo done this admission showed on non-bleeding AVM.       Review of her available lab data shows that she has had anemia consistently since December 2022 but only intermittently prior to that.  Her WBC and platelets are normal and a check of her ferritin 3 weeks ago showed it as high at over 900.  MCV is normal and iron studies done this admit show a low TIBC.  Note is made of her prior history of renal disease.      Family and Social history reviewed and is unchanged from 2/21/2023      ROS:  Review of Systems   Constitutional:  Negative for fever.   Respiratory:  Negative for shortness of breath.    Cardiovascular:  Negative for chest pain and leg swelling.   Gastrointestinal:  Negative for abdominal pain and blood in stool.   Genitourinary:  Negative for hematuria.   Skin:  Negative for rash.        Current Outpatient Medications:     acetaminophen (TYLENOL) 325 MG tablet, Take 325 mg by " mouth every 6 (six) hours as needed for Pain., Disp: , Rfl:     albuterol sulfate (PROAIR RESPICLICK) 90 mcg/actuation AePB, Inhale 2 puffs into the lungs every 4 to 6 hours as needed., Disp: 1 each, Rfl: 3    albuterol-ipratropium (DUO-NEB) 2.5 mg-0.5 mg/3 mL nebulizer solution, Take 3 mLs by nebulization every 6 (six) hours as needed for Wheezing. Rescue, Disp: 75 mL, Rfl: 11    alendronate (FOSAMAX) 70 MG tablet, TAKE 1 TABLET BY MOUTH ONE TIME PER WEEK (Patient taking differently: Take 70 mg by mouth every 7 days.), Disp: 12 tablet, Rfl: 3    aspirin 81 mg Tab, Take 81 mg by mouth once daily. Every day, Disp: , Rfl:     atorvastatin (LIPITOR) 40 MG tablet, TAKE 1 TABLET BY MOUTH EVERY DAY (Patient taking differently: Take 40 mg by mouth once daily.), Disp: 90 tablet, Rfl: 3    clonazePAM (KLONOPIN) 1 MG disintegrating tablet, Take 1 tablet (1 mg total) by mouth 2 (two) times daily as needed (anxiety)., Disp: 60 tablet, Rfl: 1    clopidogreL (PLAVIX) 75 mg tablet, TAKE 1 TABLET BY MOUTH EVERY DAY (Patient taking differently: Take 75 mg by mouth once daily.), Disp: 90 tablet, Rfl: 3    colchicine (COLCRYS) 0.6 mg tablet, Take 2 po at gout flare onset, may repeat 1 in an hour prn, then 1 po bid until pain resolves ,or n/V/D starts (Patient taking differently: Take 0.6 mg by mouth as needed. Take 2 po at gout flare onset, may repeat 1 in an hour prn, then 1 po bid until pain resolves ,or n/V/D starts), Disp: 20 tablet, Rfl: 0    cyanocobalamin (VITAMIN B-12) 1000 MCG tablet, Take 1 tablet (1,000 mcg total) by mouth once daily., Disp: 30 tablet, Rfl: 0    ergocalciferol (ERGOCALCIFEROL) 50,000 unit Cap, Take 1 capsule (50,000 Units total) by mouth every 7 days., Disp: 12 capsule, Rfl: 3    esomeprazole (NEXIUM) 40 MG capsule, TAKE 1 CAPSULE BY MOUTH BEFORE BREAKFAST. (Patient taking differently: Take 40 mg by mouth before breakfast.), Disp: 90 capsule, Rfl: 3    fluticasone propionate (FLONASE) 50 mcg/actuation nasal  "spray, 1 spray (50 mcg total) by Each Nostril route once daily., Disp: 16 g, Rfl: PRN    fluticasone-umeclidin-vilanter (TRELEGY ELLIPTA) 200-62.5-25 mcg inhaler, Inhale 1 puff into the lungs once daily., Disp: 180 each, Rfl: 11    furosemide (LASIX) 40 MG tablet, Take 1 tablet (40 mg total) by mouth once daily for 3 days, THEN 1 tablet (40 mg total) every 48 hours., Disp: 30 tablet, Rfl: 0    isosorbide mononitrate (IMDUR) 60 MG 24 hr tablet, Take 60 mg by mouth once daily., Disp: , Rfl:     magnesium oxide (MAG-OX) 400 mg (241.3 mg magnesium) tablet, Take 2 tablets (800 mg total) by mouth 2 (two) times daily., Disp: 360 tablet, Rfl: 0    meclizine (ANTIVERT) 25 mg tablet, Take 1 tablet (25 mg total) by mouth 3 (three) times daily as needed for Dizziness., Disp: 30 tablet, Rfl: PRN    metoprolol succinate (TOPROL-XL) 50 MG 24 hr tablet, Take 1 tablet (50 mg total) by mouth once daily., Disp: 90 tablet, Rfl: 0    paroxetine (PAXIL) 10 MG tablet, Take 10 mg by mouth every morning. Total 50 mg, Disp: , Rfl:     paroxetine (PAXIL) 40 MG tablet, TAKE 1 TABLET BY MOUTH EVERY DAY (Patient taking differently: Take 40 mg by mouth once daily.), Disp: 90 tablet, Rfl: 3    QUEtiapine (SEROQUEL) 100 MG Tab, Take 100 mg by mouth once daily., Disp: , Rfl:     colestipoL (COLESTID) 1 gram Tab, Take 1 tablet (1 g total) by mouth 2 (two) times daily as needed (diarrhea)., Disp: 180 tablet, Rfl: 3    ferrous sulfate 325 (65 FE) MG EC tablet, Take 1 tablet (325 mg total) by mouth once daily. (Patient not taking: Reported on 4/5/2023), Disp: 30 tablet, Rfl: 0    Current Facility-Administered Medications:     sodium chloride 0.9% flush 10 mL, 10 mL, Intravenous, PRN, Neeraj Finn MD        Objective:       Physical Examination:     BP (!) 178/78   Pulse 77   Temp 99.1 °F (37.3 °C)   Resp 20   Ht 5' 2" (1.575 m)   SpO2 (!) 86% Comment: 6lts O2  BMI 36.21 kg/m²     Physical Exam  Constitutional:       Appearance: She is " well-developed.   HENT:      Head: Normocephalic and atraumatic.      Right Ear: External ear normal.      Left Ear: External ear normal.   Eyes:      Conjunctiva/sclera: Conjunctivae normal.      Pupils: Pupils are equal, round, and reactive to light.   Neck:      Thyroid: No thyromegaly.      Trachea: No tracheal deviation.   Cardiovascular:      Rate and Rhythm: Normal rate and regular rhythm.      Heart sounds: Normal heart sounds.   Pulmonary:      Effort: Pulmonary effort is normal.      Breath sounds: Normal breath sounds.   Abdominal:      General: Bowel sounds are normal. There is no distension.      Palpations: Abdomen is soft. There is no mass.      Tenderness: There is no abdominal tenderness.   Skin:     Findings: No rash.   Neurological:      Comments: Neuro intact througout   Psychiatric:         Behavior: Behavior normal.         Thought Content: Thought content normal.         Judgment: Judgment normal.       Labs:   No results found for this or any previous visit (from the past 336 hour(s)).  CMP  Sodium   Date Value Ref Range Status   03/09/2023 143 136 - 145 mmol/L Final     Potassium   Date Value Ref Range Status   03/09/2023 4.1 3.5 - 5.1 mmol/L Final     Chloride   Date Value Ref Range Status   03/09/2023 102 95 - 110 mmol/L Final     CO2   Date Value Ref Range Status   03/09/2023 29 23 - 29 mmol/L Final     Glucose   Date Value Ref Range Status   03/09/2023 107 70 - 110 mg/dL Final     BUN   Date Value Ref Range Status   03/09/2023 14 8 - 23 mg/dL Final     Creatinine   Date Value Ref Range Status   03/09/2023 1.2 0.5 - 1.4 mg/dL Final   10/14/2012 1.1 0.2 - 1.4 mg/dl Final     Calcium   Date Value Ref Range Status   03/09/2023 8.5 (L) 8.7 - 10.5 mg/dL Final   10/14/2012 8.3 (L) 8.6 - 10.2 mg/dl Final     Total Protein   Date Value Ref Range Status   03/09/2023 6.2 6.0 - 8.4 g/dL Final     Albumin   Date Value Ref Range Status   03/09/2023 2.5 (L) 3.5 - 5.2 g/dL Final     Total Bilirubin    Date Value Ref Range Status   03/09/2023 0.4 0.1 - 1.0 mg/dL Final     Comment:     For infants and newborns, interpretation of results should be based  on gestational age, weight and in agreement with clinical  observations.    Premature Infant recommended reference ranges:  Up to 24 hours.............<8.0 mg/dL  Up to 48 hours............<12.0 mg/dL  3-5 days..................<15.0 mg/dL  6-29 days.................<15.0 mg/dL       Alkaline Phosphatase   Date Value Ref Range Status   03/09/2023 94 55 - 135 U/L Final   10/10/2012 179 (H) 23 - 119 UNIT/L Final     AST   Date Value Ref Range Status   03/09/2023 9 (L) 10 - 40 U/L Final   10/10/2012 19 10 - 30 UNIT/L Final     ALT   Date Value Ref Range Status   03/09/2023 6 (L) 10 - 44 U/L Final     Anion Gap   Date Value Ref Range Status   03/09/2023 12 8 - 16 mmol/L Final   10/14/2012 7 5 - 15 meq/L Final     eGFR if    Date Value Ref Range Status   07/20/2022 51.4 (A) >60 mL/min/1.73 m^2 Final   07/20/2022 46.7 (A) >60 mL/min/1.73 m^2 Final     eGFR if non    Date Value Ref Range Status   07/20/2022 44.6 (A) >60 mL/min/1.73 m^2 Final     Comment:     Calculation used to obtain the estimated glomerular filtration  rate (eGFR) is the CKD-EPI equation.      07/20/2022 40.5 (A) >60 mL/min/1.73 m^2 Final     Comment:     Calculation used to obtain the estimated glomerular filtration  rate (eGFR) is the CKD-EPI equation.        No results found for: CEA  No results found for: PSA        Assessment/Plan:     Problem List Items Addressed This Visit       Anemia of chronic disease     Patient has been found to have another bleeding ectasia.  Need to get capsule report and recheck her ferritin level now.  Will give iron infusion if deficient but need to check this first.  She will have cath done and a TAVR is being considered in this process.  Will have her back with me in 6 weeks to monitor this anemia to be sure she is cleared for these  things.      Lastly,  Enteroscopy being considered based on cardiac clearance.            Relevant Orders    CBC Auto Differential    Ferritin    CBC Auto Differential     Other Visit Diagnoses       Acute blood loss anemia                Discussion:     Follow up in about 6 weeks (around 5/17/2023).      Electronically signed by Cristian Felix

## 2023-04-05 ENCOUNTER — LAB VISIT (OUTPATIENT)
Dept: LAB | Facility: HOSPITAL | Age: 73
End: 2023-04-05
Attending: INTERNAL MEDICINE
Payer: MEDICARE

## 2023-04-05 ENCOUNTER — OFFICE VISIT (OUTPATIENT)
Dept: HEMATOLOGY/ONCOLOGY | Facility: CLINIC | Age: 73
End: 2023-04-05
Payer: MEDICARE

## 2023-04-05 ENCOUNTER — PATIENT OUTREACH (OUTPATIENT)
Dept: HOME HEALTH SERVICES | Facility: HOSPITAL | Age: 73
End: 2023-04-05
Payer: MEDICARE

## 2023-04-05 ENCOUNTER — EXTERNAL HOME HEALTH (OUTPATIENT)
Dept: HOME HEALTH SERVICES | Facility: HOSPITAL | Age: 73
End: 2023-04-05
Payer: MEDICARE

## 2023-04-05 ENCOUNTER — TELEPHONE (OUTPATIENT)
Dept: FAMILY MEDICINE | Facility: CLINIC | Age: 73
End: 2023-04-05

## 2023-04-05 VITALS
BODY MASS INDEX: 36.21 KG/M2 | HEART RATE: 77 BPM | TEMPERATURE: 99 F | OXYGEN SATURATION: 86 % | SYSTOLIC BLOOD PRESSURE: 178 MMHG | DIASTOLIC BLOOD PRESSURE: 78 MMHG | HEIGHT: 62 IN | RESPIRATION RATE: 20 BRPM

## 2023-04-05 DIAGNOSIS — D62 ACUTE BLOOD LOSS ANEMIA: ICD-10-CM

## 2023-04-05 DIAGNOSIS — D50.0 BLOOD LOSS ANEMIA: Primary | ICD-10-CM

## 2023-04-05 DIAGNOSIS — I50.9 CONGESTIVE HEART FAILURE, UNSPECIFIED HF CHRONICITY, UNSPECIFIED HEART FAILURE TYPE: ICD-10-CM

## 2023-04-05 DIAGNOSIS — D63.8 ANEMIA OF CHRONIC DISEASE: ICD-10-CM

## 2023-04-05 LAB
BASOPHILS # BLD AUTO: 0.05 K/UL (ref 0–0.2)
BASOPHILS # BLD AUTO: 0.05 K/UL (ref 0–0.2)
BASOPHILS NFR BLD: 0.6 % (ref 0–1.9)
BASOPHILS NFR BLD: 0.6 % (ref 0–1.9)
DIFFERENTIAL METHOD: ABNORMAL
DIFFERENTIAL METHOD: ABNORMAL
EOSINOPHIL # BLD AUTO: 0.5 K/UL (ref 0–0.5)
EOSINOPHIL # BLD AUTO: 0.5 K/UL (ref 0–0.5)
EOSINOPHIL NFR BLD: 5.2 % (ref 0–8)
EOSINOPHIL NFR BLD: 5.2 % (ref 0–8)
ERYTHROCYTE [DISTWIDTH] IN BLOOD BY AUTOMATED COUNT: 14.9 % (ref 11.5–14.5)
ERYTHROCYTE [DISTWIDTH] IN BLOOD BY AUTOMATED COUNT: 14.9 % (ref 11.5–14.5)
FERRITIN SERPL-MCNC: 188 NG/ML (ref 20–300)
HCT VFR BLD AUTO: 30.2 % (ref 37–48.5)
HCT VFR BLD AUTO: 30.2 % (ref 37–48.5)
HGB BLD-MCNC: 9.1 G/DL (ref 12–16)
HGB BLD-MCNC: 9.1 G/DL (ref 12–16)
IMM GRANULOCYTES # BLD AUTO: 0.05 K/UL (ref 0–0.04)
IMM GRANULOCYTES # BLD AUTO: 0.05 K/UL (ref 0–0.04)
IMM GRANULOCYTES NFR BLD AUTO: 0.6 % (ref 0–0.5)
IMM GRANULOCYTES NFR BLD AUTO: 0.6 % (ref 0–0.5)
LYMPHOCYTES # BLD AUTO: 0.9 K/UL (ref 1–4.8)
LYMPHOCYTES # BLD AUTO: 0.9 K/UL (ref 1–4.8)
LYMPHOCYTES NFR BLD: 10.1 % (ref 18–48)
LYMPHOCYTES NFR BLD: 10.1 % (ref 18–48)
MCH RBC QN AUTO: 27.9 PG (ref 27–31)
MCH RBC QN AUTO: 27.9 PG (ref 27–31)
MCHC RBC AUTO-ENTMCNC: 30.1 G/DL (ref 32–36)
MCHC RBC AUTO-ENTMCNC: 30.1 G/DL (ref 32–36)
MCV RBC AUTO: 93 FL (ref 82–98)
MCV RBC AUTO: 93 FL (ref 82–98)
MONOCYTES # BLD AUTO: 0.5 K/UL (ref 0.3–1)
MONOCYTES # BLD AUTO: 0.5 K/UL (ref 0.3–1)
MONOCYTES NFR BLD: 6 % (ref 4–15)
MONOCYTES NFR BLD: 6 % (ref 4–15)
NEUTROPHILS # BLD AUTO: 7 K/UL (ref 1.8–7.7)
NEUTROPHILS # BLD AUTO: 7 K/UL (ref 1.8–7.7)
NEUTROPHILS NFR BLD: 77.5 % (ref 38–73)
NEUTROPHILS NFR BLD: 77.5 % (ref 38–73)
NRBC BLD-RTO: 0 /100 WBC
NRBC BLD-RTO: 0 /100 WBC
PLATELET # BLD AUTO: 256 K/UL (ref 150–450)
PLATELET # BLD AUTO: 256 K/UL (ref 150–450)
PMV BLD AUTO: 9.6 FL (ref 9.2–12.9)
PMV BLD AUTO: 9.6 FL (ref 9.2–12.9)
RBC # BLD AUTO: 3.26 M/UL (ref 4–5.4)
RBC # BLD AUTO: 3.26 M/UL (ref 4–5.4)
WBC # BLD AUTO: 9.07 K/UL (ref 3.9–12.7)
WBC # BLD AUTO: 9.07 K/UL (ref 3.9–12.7)

## 2023-04-05 PROCEDURE — 36415 COLL VENOUS BLD VENIPUNCTURE: CPT | Performed by: INTERNAL MEDICINE

## 2023-04-05 PROCEDURE — 85025 COMPLETE CBC W/AUTO DIFF WBC: CPT | Performed by: INTERNAL MEDICINE

## 2023-04-05 PROCEDURE — 99213 PR OFFICE/OUTPT VISIT, EST, LEVL III, 20-29 MIN: ICD-10-PCS | Mod: S$GLB,,, | Performed by: INTERNAL MEDICINE

## 2023-04-05 PROCEDURE — 99213 OFFICE O/P EST LOW 20 MIN: CPT | Mod: S$GLB,,, | Performed by: INTERNAL MEDICINE

## 2023-04-05 PROCEDURE — 82728 ASSAY OF FERRITIN: CPT | Performed by: INTERNAL MEDICINE

## 2023-04-05 NOTE — ASSESSMENT & PLAN NOTE
Patient has been found to have another bleeding ectasia.  Need to get capsule report and recheck her ferritin level now.  Will give iron infusion if deficient but need to check this first.  She will have cath done and a TAVR is being considered in this process.  Will have her back with me in 6 weeks to monitor this anemia to be sure she is cleared for these things.      Lastly,  Enteroscopy being considered based on cardiac clearance.

## 2023-04-05 NOTE — TELEPHONE ENCOUNTER
----- Message from Anitra Orourke RN sent at 4/5/2023  9:04 AM CDT -----  Regarding: Outpatient Case Management Poential- HH recertification  The recertification of home health services for Betty Bacon  has been reviewed by our Post[facility]Ac[facility] team.  Please note the following:  Per [employer] guidelines, criteria has not been met for home health recertification due to Skilled service not identified.  A potential need for OPCM has been identified. Please consider referring to OPCM for further assistance.    CHF education      Respectfully,    CHARANJIT Orourke RN  Clinical Coordinator  [facility] Utilization Management

## 2023-04-06 ENCOUNTER — LAB VISIT (OUTPATIENT)
Dept: LAB | Facility: HOSPITAL | Age: 73
End: 2023-04-06
Attending: INTERNAL MEDICINE
Payer: MEDICARE

## 2023-04-06 DIAGNOSIS — D50.0 IRON DEFICIENCY ANEMIA SECONDARY TO BLOOD LOSS (CHRONIC): ICD-10-CM

## 2023-04-06 DIAGNOSIS — I13.0 HYPERTENSIVE HEART AND RENAL DISEASE WITH CONGESTIVE HEART FAILURE: Primary | ICD-10-CM

## 2023-04-06 LAB
BASOPHILS # BLD AUTO: 0.05 K/UL (ref 0–0.2)
BASOPHILS NFR BLD: 0.6 % (ref 0–1.9)
DIFFERENTIAL METHOD: ABNORMAL
EOSINOPHIL # BLD AUTO: 0.5 K/UL (ref 0–0.5)
EOSINOPHIL NFR BLD: 5.4 % (ref 0–8)
ERYTHROCYTE [DISTWIDTH] IN BLOOD BY AUTOMATED COUNT: 14.9 % (ref 11.5–14.5)
HCT VFR BLD AUTO: 31 % (ref 37–48.5)
HGB BLD-MCNC: 9.5 G/DL (ref 12–16)
IMM GRANULOCYTES # BLD AUTO: 0.04 K/UL (ref 0–0.04)
IMM GRANULOCYTES NFR BLD AUTO: 0.4 % (ref 0–0.5)
IRON SERPL-MCNC: 47 UG/DL (ref 30–160)
LYMPHOCYTES # BLD AUTO: 1.1 K/UL (ref 1–4.8)
LYMPHOCYTES NFR BLD: 11.8 % (ref 18–48)
MCH RBC QN AUTO: 28.4 PG (ref 27–31)
MCHC RBC AUTO-ENTMCNC: 30.6 G/DL (ref 32–36)
MCV RBC AUTO: 93 FL (ref 82–98)
MONOCYTES # BLD AUTO: 0.5 K/UL (ref 0.3–1)
MONOCYTES NFR BLD: 5.4 % (ref 4–15)
NEUTROPHILS # BLD AUTO: 6.8 K/UL (ref 1.8–7.7)
NEUTROPHILS NFR BLD: 76.4 % (ref 38–73)
NRBC BLD-RTO: 0 /100 WBC
PLATELET # BLD AUTO: 275 K/UL (ref 150–450)
PMV BLD AUTO: 10.1 FL (ref 9.2–12.9)
RBC # BLD AUTO: 3.34 M/UL (ref 4–5.4)
SATURATED IRON: 16 % (ref 20–50)
TOTAL IRON BINDING CAPACITY: 302 UG/DL (ref 250–450)
TRANSFERRIN SERPL-MCNC: 204 MG/DL (ref 200–375)
WBC # BLD AUTO: 8.92 K/UL (ref 3.9–12.7)

## 2023-04-06 PROCEDURE — 84466 ASSAY OF TRANSFERRIN: CPT | Performed by: INTERNAL MEDICINE

## 2023-04-06 PROCEDURE — 36415 COLL VENOUS BLD VENIPUNCTURE: CPT | Performed by: INTERNAL MEDICINE

## 2023-04-06 PROCEDURE — 85025 COMPLETE CBC W/AUTO DIFF WBC: CPT | Performed by: INTERNAL MEDICINE

## 2023-04-10 ENCOUNTER — TELEPHONE (OUTPATIENT)
Dept: HEMATOLOGY/ONCOLOGY | Facility: CLINIC | Age: 73
End: 2023-04-10

## 2023-04-12 ENCOUNTER — PATIENT MESSAGE (OUTPATIENT)
Dept: CARDIOLOGY | Facility: CLINIC | Age: 73
End: 2023-04-12
Payer: MEDICARE

## 2023-04-13 ENCOUNTER — PATIENT MESSAGE (OUTPATIENT)
Dept: PULMONOLOGY | Facility: CLINIC | Age: 73
End: 2023-04-13
Payer: MEDICARE

## 2023-04-13 NOTE — PROGRESS NOTES
Pre-Visit Chart Review  For Appointment Scheduled on 5/23/17.    Health Maintenance Due   Topic Date Due    Mammogram  07/06/2017                     
yes

## 2023-04-17 ENCOUNTER — DOCUMENT SCAN (OUTPATIENT)
Dept: HOME HEALTH SERVICES | Facility: HOSPITAL | Age: 73
End: 2023-04-17
Payer: MEDICARE

## 2023-04-18 ENCOUNTER — OFFICE VISIT (OUTPATIENT)
Dept: CARDIOLOGY | Facility: CLINIC | Age: 73
End: 2023-04-18
Payer: MEDICARE

## 2023-04-18 ENCOUNTER — TELEPHONE (OUTPATIENT)
Dept: FAMILY MEDICINE | Facility: CLINIC | Age: 73
End: 2023-04-18
Payer: MEDICARE

## 2023-04-18 DIAGNOSIS — I25.118 CORONARY ARTERY DISEASE INVOLVING NATIVE CORONARY ARTERY OF NATIVE HEART WITH OTHER FORM OF ANGINA PECTORIS: ICD-10-CM

## 2023-04-18 DIAGNOSIS — I35.0 AORTIC VALVE STENOSIS, ETIOLOGY OF CARDIAC VALVE DISEASE UNSPECIFIED: Primary | Chronic | ICD-10-CM

## 2023-04-18 DIAGNOSIS — I70.0 AORTIC ATHEROSCLEROSIS: ICD-10-CM

## 2023-04-18 DIAGNOSIS — I35.0 NONRHEUMATIC AORTIC VALVE STENOSIS: Chronic | ICD-10-CM

## 2023-04-18 PROCEDURE — 99213 OFFICE O/P EST LOW 20 MIN: CPT | Mod: 95,,, | Performed by: INTERNAL MEDICINE

## 2023-04-18 PROCEDURE — 99213 PR OFFICE/OUTPT VISIT, EST, LEVL III, 20-29 MIN: ICD-10-PCS | Mod: 95,,, | Performed by: INTERNAL MEDICINE

## 2023-04-18 NOTE — PROGRESS NOTES
INTERVENTIONAL CARDIOLOGY TELEMEDICINE VISIT    The patient location is: Home ([place])    Visit type: audiovisual    Face to Face time with patient: 20  35 minutes of total time spent on the encounter, which includes face to face time and non-face to face time preparing to see the patient (eg, review of tests), Obtaining and/or reviewing separately obtained history, Documenting clinical information in the electronic or other health record, Independently interpreting results (not separately reported) and communicating results to the patient/family/caregiver, or Care coordination (not separately reported).       Each patient to whom he or she provides medical services by telemedicine is:  (1) informed of the relationship between the physician and patient and the respective role of any other health care provider with respect to management of the patient; and (2) notified that he or she may decline to receive medical services by telemedicine and may withdraw from such care at any time.      REFERRING PHYSICIAN: Dr Borjas    REASON FOR CONSULT/VISIT:  Aortic stenosis    HISTORY OF PRESENT ILLNESS  Betty Bacon is a 73 y.o. female referred by Dr. Borjas for evaluation of aortic stenosis.    The patient has a history of restrictive lung disease, asbestosis, peripheral vascular disease and moderate aortic stenosis in her last echo in December.  She has had several admissions over the past 7 months.  All of these have been due to volume overload.  She was sent to us to be evaluated for the aortic stenosis treatment.  Patient does not have a recent echo.  She has not had an angiogram in the last 9 years.  Of note on her last echo her LV function was normal.    PAST MEDICAL HISTORY  Past Medical History:   Diagnosis Date    Acute coronary artery obstruction without MI 10/2012    Benign hypertension     COPD (chronic obstructive pulmonary disease)     Coronary artery disease     Disorder of kidney and ureter      Dr. Morrow    Hepatitis B core antibody positive 03/03/2021    Negative sAg, suggests previous exposure but no chronic/active Hep B. At risk for reactivation with any immunosuppression medication, steroids, chemo, etc.      History of electroconvulsive therapy     Hyperlipidemia LDL goal < 70     Left ankle sprain     Major depressive disorder, recurrent episode, severe     s/p ECT    Positive AKIRA (antinuclear antibody) 03/03/2021    PVD (peripheral vascular disease)         PAST SURGICAL HISTORY  Past Surgical History:   Procedure Laterality Date    CHOLECYSTECTOMY      CORONARY ANGIOPLASTY      CORONARY ANGIOPLASTY WITH STENT PLACEMENT  10/2012    2 stents RCA (100% stenosis)    EYE SURGERY      cataract surgery    HYSTERECTOMY      LINA, ovaries intact. uterine prolapse    ILIAC ARTERY STENT      SMALL BOWEL ENTEROSCOPY N/A 2/20/2023    Procedure: ENTEROSCOPY;  Surgeon: Margy Dumont MD;  Location: [facility];  Service: Endoscopy;  Laterality: N/A;    TONSILLECTOMY      TOTAL VAGINAL HYSTERECTOMY         MEDICATIONS  Current Outpatient Medications on File Prior to Visit   Medication Sig Dispense Refill    acetaminophen (TYLENOL) 325 MG tablet Take 325 mg by mouth every 6 (six) hours as needed for Pain.      albuterol sulfate (PROAIR RESPICLICK) 90 mcg/actuation AePB Inhale 2 puffs into the lungs every 4 to 6 hours as needed. 1 each 3    albuterol-ipratropium (DUO-NEB) 2.5 mg-0.5 mg/3 mL nebulizer solution Take 3 mLs by nebulization every 6 (six) hours as needed for Wheezing. Rescue 75 mL 11    alendronate (FOSAMAX) 70 MG tablet TAKE 1 TABLET BY MOUTH ONE TIME PER WEEK (Patient taking differently: Take 70 mg by mouth as needed.) 12 tablet 3    aspirin 81 mg Tab Take 81 mg by mouth once daily. Every day      atorvastatin (LIPITOR) 40 MG tablet TAKE 1 TABLET BY MOUTH EVERY DAY (Patient taking differently: Take 40 mg by mouth once daily.) 90 tablet 3    clonazePAM (KLONOPIN) 1 MG disintegrating tablet Take 1  tablet (1 mg total) by mouth 2 (two) times daily as needed (anxiety). 60 tablet 1    clopidogreL (PLAVIX) 75 mg tablet TAKE 1 TABLET BY MOUTH EVERY DAY (Patient taking differently: Take 75 mg by mouth once daily.) 90 tablet 3    esomeprazole (NEXIUM) 40 MG capsule TAKE 1 CAPSULE BY MOUTH BEFORE BREAKFAST. (Patient taking differently: Take 40 mg by mouth before breakfast.) 90 capsule 3    fluticasone-umeclidin-vilanter (TRELEGY ELLIPTA) 200-62.5-25 mcg inhaler Inhale 1 puff into the lungs once daily. 180 each 11    furosemide (LASIX) 40 MG tablet Take 1 tablet (40 mg total) by mouth once daily for 3 days, THEN 1 tablet (40 mg total) every 48 hours. 30 tablet 0    isosorbide mononitrate (IMDUR) 60 MG 24 hr tablet Take 60 mg by mouth once daily.      metoprolol succinate (TOPROL-XL) 50 MG 24 hr tablet Take 1 tablet (50 mg total) by mouth once daily. (Patient taking differently: Take 50 mg by mouth 2 (two) times daily.) 90 tablet 0    paroxetine (PAXIL) 10 MG tablet Take 10 mg by mouth every morning. Total 50 mg      paroxetine (PAXIL) 40 MG tablet TAKE 1 TABLET BY MOUTH EVERY DAY (Patient taking differently: Take 40 mg by mouth once daily.) 90 tablet 3    QUEtiapine (SEROQUEL) 100 MG Tab Take 100 mg by mouth once daily.      colchicine (COLCRYS) 0.6 mg tablet Take 2 po at gout flare onset, may repeat 1 in an hour prn, then 1 po bid until pain resolves ,or n/V/D starts (Patient taking differently: Take 0.6 mg by mouth as needed. Take 2 po at gout flare onset, may repeat 1 in an hour prn, then 1 po bid until pain resolves ,or n/V/D starts) 20 tablet 0    colestipoL (COLESTID) 1 gram Tab Take 1 tablet (1 g total) by mouth 2 (two) times daily as needed (diarrhea). 180 tablet 3    cyanocobalamin (VITAMIN B-12) 1000 MCG tablet Take 1 tablet (1,000 mcg total) by mouth once daily. (Patient not taking: Reported on 4/18/2023) 30 tablet 0    ergocalciferol (ERGOCALCIFEROL) 50,000 unit Cap Take 1 capsule (50,000 Units total) by  mouth every 7 days. (Patient not taking: Reported on 4/18/2023) 12 capsule 3    ferrous sulfate 325 (65 FE) MG EC tablet Take 1 tablet (325 mg total) by mouth once daily. 30 tablet 0    fluticasone propionate (FLONASE) 50 mcg/actuation nasal spray 1 spray (50 mcg total) by Each Nostril route once daily. (Patient not taking: Reported on 4/18/2023) 16 g PRN    magnesium oxide (MAG-OX) 400 mg (241.3 mg magnesium) tablet Take 2 tablets (800 mg total) by mouth 2 (two) times daily. (Patient not taking: Reported on 4/18/2023) 360 tablet 0    meclizine (ANTIVERT) 25 mg tablet Take 1 tablet (25 mg total) by mouth 3 (three) times daily as needed for Dizziness. 30 tablet PRN    [DISCONTINUED] TRELEGY ELLIPTA 200-62.5-25 mcg inhaler INHALE 1 PUFF INTO THE LUNGS ONCE DAILY. 180 each 2     Current Facility-Administered Medications on File Prior to Visit   Medication Dose Route Frequency Provider Last Rate Last Admin    sodium chloride 0.9% flush 10 mL  10 mL Intravenous PRN Neeraj Finn MD            SOCIAL HISTORY  TOBACCO: Denies  ETOH: Denies    REVIEW OF SYSTEMS  10 organ system ROS performed and non contributory    PHYSICAL EXAM  Unable to perform on telemedicine visit. However, patient alert awake and able to hold complete conversation with me    LAB AND RADIOLOGY DATA  Recent labwork including BMP, CBC, lipid profile were reviewed and discussed pertinent results with the patient.  Echocardiogram, EKGs and previous angiogram studies were reviewed.    ASSESSMENT AND PLAN    Valvular heart disease, severe aortic stenosis, moderate aortic regurgitation  Will need to get a recent echocardiogram.  Will evaluate the severity of her valve disease and decide on further therapies    CAD (coronary artery disease)  Your history of coronary artery disease and recurrence of heart failure symptoms, will proceed to coronary angiography plus or minus intervention.  The patient seems very nervous about any invasive procedures, after  discussion with me about their risks and alternatives she decided to proceed.    Cardiac catheterization with probable PCI.   Antiplatelets: Will need to load ASA/Plavix  Access: Right radial  Catheters: Cherry Fontaine is a NEFTALY candidate and understands the importance of taking plavix for at least one year in ACS cases and 6 months in stable CAD. The patient understands that in case of receiving a drug coated stent the failure to comply with dual anti-platelet therapy as prescribed is likely to result in stent clothing, heart attack and death.   The risks, benefits, and alternatives of coronary vascular angiography and possible intervention were discussed with the patient. All questions were answered and informed consent was obtained. I had a detailed discussion with the patient regarding risk of stroke, MI, bleeding access site complications including limb loss, allergy, kidney failure including dialysis and death.  The patient understands the risks and benefits and wishes to go ahead with the procedure.  CSHA Clinical Frailty Scale: Vulnerable  All patient's questions were answered      Aortic atherosclerosis  Seen on imaging      There is no height or weight on file to calculate BMI. Healthy lifestyle was discussed with the patient as well as the importance of physical activity to improve cardiovascular health.      Neeraj Blevins MD [facility]  Interventional Cardiology  Structural/Valvular heart disease  [phone]

## 2023-04-18 NOTE — ASSESSMENT & PLAN NOTE
Your history of coronary artery disease and recurrence of heart failure symptoms, will proceed to coronary angiography plus or minus intervention.  The patient seems very nervous about any invasive procedures, after discussion with me about their risks and alternatives she decided to proceed.    1. Cardiac catheterization with probable PCI.   2. Antiplatelets: Will need to load ASA/Plavix  3. Access: Right radial  4. Catheters: Cherry  5. Pt is a NEFTALY candidate and understands the importance of taking plavix for at least one year in ACS cases and 6 months in stable CAD. The patient understands that in case of receiving a drug coated stent the failure to comply with dual anti-platelet therapy as prescribed is likely to result in stent clothing, heart attack and death.   6. The risks, benefits, and alternatives of coronary vascular angiography and possible intervention were discussed with the patient. All questions were answered and informed consent was obtained. I had a detailed discussion with the patient regarding risk of stroke, MI, bleeding access site complications including limb loss, allergy, kidney failure including dialysis and death.  7. The patient understands the risks and benefits and wishes to go ahead with the procedure.  8. CSHA Clinical Frailty Scale: Vulnerable  9. All patient's questions were answered

## 2023-04-18 NOTE — H&P (VIEW-ONLY)
INTERVENTIONAL CARDIOLOGY TELEMEDICINE VISIT    The patient location is: Home ([place])    Visit type: audiovisual    Face to Face time with patient: 20  35 minutes of total time spent on the encounter, which includes face to face time and non-face to face time preparing to see the patient (eg, review of tests), Obtaining and/or reviewing separately obtained history, Documenting clinical information in the electronic or other health record, Independently interpreting results (not separately reported) and communicating results to the patient/family/caregiver, or Care coordination (not separately reported).       Each patient to whom he or she provides medical services by telemedicine is:  (1) informed of the relationship between the physician and patient and the respective role of any other health care provider with respect to management of the patient; and (2) notified that he or she may decline to receive medical services by telemedicine and may withdraw from such care at any time.      REFERRING PHYSICIAN: Dr Borjas    REASON FOR CONSULT/VISIT:  Aortic stenosis    HISTORY OF PRESENT ILLNESS  Betty Bacon is a 73 y.o. female referred by Dr. Borjas for evaluation of aortic stenosis.    The patient has a history of restrictive lung disease, asbestosis, peripheral vascular disease and moderate aortic stenosis in her last echo in December.  She has had several admissions over the past 7 months.  All of these have been due to volume overload.  She was sent to us to be evaluated for the aortic stenosis treatment.  Patient does not have a recent echo.  She has not had an angiogram in the last 9 years.  Of note on her last echo her LV function was normal.    PAST MEDICAL HISTORY  Past Medical History:   Diagnosis Date    Acute coronary artery obstruction without MI 10/2012    Benign hypertension     COPD (chronic obstructive pulmonary disease)     Coronary artery disease     Disorder of kidney and ureter      Dr. Morrow    Hepatitis B core antibody positive 03/03/2021    Negative sAg, suggests previous exposure but no chronic/active Hep B. At risk for reactivation with any immunosuppression medication, steroids, chemo, etc.      History of electroconvulsive therapy     Hyperlipidemia LDL goal < 70     Left ankle sprain     Major depressive disorder, recurrent episode, severe     s/p ECT    Positive AKIRA (antinuclear antibody) 03/03/2021    PVD (peripheral vascular disease)         PAST SURGICAL HISTORY  Past Surgical History:   Procedure Laterality Date    CHOLECYSTECTOMY      CORONARY ANGIOPLASTY      CORONARY ANGIOPLASTY WITH STENT PLACEMENT  10/2012    2 stents RCA (100% stenosis)    EYE SURGERY      cataract surgery    HYSTERECTOMY      LINA, ovaries intact. uterine prolapse    ILIAC ARTERY STENT      SMALL BOWEL ENTEROSCOPY N/A 2/20/2023    Procedure: ENTEROSCOPY;  Surgeon: Margy Dumont MD;  Location: [facility];  Service: Endoscopy;  Laterality: N/A;    TONSILLECTOMY      TOTAL VAGINAL HYSTERECTOMY         MEDICATIONS  Current Outpatient Medications on File Prior to Visit   Medication Sig Dispense Refill    acetaminophen (TYLENOL) 325 MG tablet Take 325 mg by mouth every 6 (six) hours as needed for Pain.      albuterol sulfate (PROAIR RESPICLICK) 90 mcg/actuation AePB Inhale 2 puffs into the lungs every 4 to 6 hours as needed. 1 each 3    albuterol-ipratropium (DUO-NEB) 2.5 mg-0.5 mg/3 mL nebulizer solution Take 3 mLs by nebulization every 6 (six) hours as needed for Wheezing. Rescue 75 mL 11    alendronate (FOSAMAX) 70 MG tablet TAKE 1 TABLET BY MOUTH ONE TIME PER WEEK (Patient taking differently: Take 70 mg by mouth as needed.) 12 tablet 3    aspirin 81 mg Tab Take 81 mg by mouth once daily. Every day      atorvastatin (LIPITOR) 40 MG tablet TAKE 1 TABLET BY MOUTH EVERY DAY (Patient taking differently: Take 40 mg by mouth once daily.) 90 tablet 3    clonazePAM (KLONOPIN) 1 MG disintegrating tablet Take 1  tablet (1 mg total) by mouth 2 (two) times daily as needed (anxiety). 60 tablet 1    clopidogreL (PLAVIX) 75 mg tablet TAKE 1 TABLET BY MOUTH EVERY DAY (Patient taking differently: Take 75 mg by mouth once daily.) 90 tablet 3    esomeprazole (NEXIUM) 40 MG capsule TAKE 1 CAPSULE BY MOUTH BEFORE BREAKFAST. (Patient taking differently: Take 40 mg by mouth before breakfast.) 90 capsule 3    fluticasone-umeclidin-vilanter (TRELEGY ELLIPTA) 200-62.5-25 mcg inhaler Inhale 1 puff into the lungs once daily. 180 each 11    furosemide (LASIX) 40 MG tablet Take 1 tablet (40 mg total) by mouth once daily for 3 days, THEN 1 tablet (40 mg total) every 48 hours. 30 tablet 0    isosorbide mononitrate (IMDUR) 60 MG 24 hr tablet Take 60 mg by mouth once daily.      metoprolol succinate (TOPROL-XL) 50 MG 24 hr tablet Take 1 tablet (50 mg total) by mouth once daily. (Patient taking differently: Take 50 mg by mouth 2 (two) times daily.) 90 tablet 0    paroxetine (PAXIL) 10 MG tablet Take 10 mg by mouth every morning. Total 50 mg      paroxetine (PAXIL) 40 MG tablet TAKE 1 TABLET BY MOUTH EVERY DAY (Patient taking differently: Take 40 mg by mouth once daily.) 90 tablet 3    QUEtiapine (SEROQUEL) 100 MG Tab Take 100 mg by mouth once daily.      colchicine (COLCRYS) 0.6 mg tablet Take 2 po at gout flare onset, may repeat 1 in an hour prn, then 1 po bid until pain resolves ,or n/V/D starts (Patient taking differently: Take 0.6 mg by mouth as needed. Take 2 po at gout flare onset, may repeat 1 in an hour prn, then 1 po bid until pain resolves ,or n/V/D starts) 20 tablet 0    colestipoL (COLESTID) 1 gram Tab Take 1 tablet (1 g total) by mouth 2 (two) times daily as needed (diarrhea). 180 tablet 3    cyanocobalamin (VITAMIN B-12) 1000 MCG tablet Take 1 tablet (1,000 mcg total) by mouth once daily. (Patient not taking: Reported on 4/18/2023) 30 tablet 0    ergocalciferol (ERGOCALCIFEROL) 50,000 unit Cap Take 1 capsule (50,000 Units total) by  mouth every 7 days. (Patient not taking: Reported on 4/18/2023) 12 capsule 3    ferrous sulfate 325 (65 FE) MG EC tablet Take 1 tablet (325 mg total) by mouth once daily. 30 tablet 0    fluticasone propionate (FLONASE) 50 mcg/actuation nasal spray 1 spray (50 mcg total) by Each Nostril route once daily. (Patient not taking: Reported on 4/18/2023) 16 g PRN    magnesium oxide (MAG-OX) 400 mg (241.3 mg magnesium) tablet Take 2 tablets (800 mg total) by mouth 2 (two) times daily. (Patient not taking: Reported on 4/18/2023) 360 tablet 0    meclizine (ANTIVERT) 25 mg tablet Take 1 tablet (25 mg total) by mouth 3 (three) times daily as needed for Dizziness. 30 tablet PRN    [DISCONTINUED] TRELEGY ELLIPTA 200-62.5-25 mcg inhaler INHALE 1 PUFF INTO THE LUNGS ONCE DAILY. 180 each 2     Current Facility-Administered Medications on File Prior to Visit   Medication Dose Route Frequency Provider Last Rate Last Admin    sodium chloride 0.9% flush 10 mL  10 mL Intravenous PRN Neeraj Finn MD            SOCIAL HISTORY  TOBACCO: Denies  ETOH: Denies    REVIEW OF SYSTEMS  10 organ system ROS performed and non contributory    PHYSICAL EXAM  Unable to perform on telemedicine visit. However, patient alert awake and able to hold complete conversation with me    LAB AND RADIOLOGY DATA  Recent labwork including BMP, CBC, lipid profile were reviewed and discussed pertinent results with the patient.  Echocardiogram, EKGs and previous angiogram studies were reviewed.    ASSESSMENT AND PLAN    Valvular heart disease, severe aortic stenosis, moderate aortic regurgitation  Will need to get a recent echocardiogram.  Will evaluate the severity of her valve disease and decide on further therapies    CAD (coronary artery disease)  Your history of coronary artery disease and recurrence of heart failure symptoms, will proceed to coronary angiography plus or minus intervention.  The patient seems very nervous about any invasive procedures, after  discussion with me about their risks and alternatives she decided to proceed.    Cardiac catheterization with probable PCI.   Antiplatelets: Will need to load ASA/Plavix  Access: Right radial  Catheters: Cherry Fontaine is a NEFTALY candidate and understands the importance of taking plavix for at least one year in ACS cases and 6 months in stable CAD. The patient understands that in case of receiving a drug coated stent the failure to comply with dual anti-platelet therapy as prescribed is likely to result in stent clothing, heart attack and death.   The risks, benefits, and alternatives of coronary vascular angiography and possible intervention were discussed with the patient. All questions were answered and informed consent was obtained. I had a detailed discussion with the patient regarding risk of stroke, MI, bleeding access site complications including limb loss, allergy, kidney failure including dialysis and death.  The patient understands the risks and benefits and wishes to go ahead with the procedure.  CSHA Clinical Frailty Scale: Vulnerable  All patient's questions were answered      Aortic atherosclerosis  Seen on imaging      There is no height or weight on file to calculate BMI. Healthy lifestyle was discussed with the patient as well as the importance of physical activity to improve cardiovascular health.      Neeraj Blevins MD [facility]  Interventional Cardiology  Structural/Valvular heart disease  [phone]

## 2023-04-18 NOTE — ASSESSMENT & PLAN NOTE
Will need to get a recent echocardiogram.  Will evaluate the severity of her valve disease and decide on further therapies

## 2023-04-19 ENCOUNTER — PATIENT MESSAGE (OUTPATIENT)
Dept: CARDIOLOGY | Facility: CLINIC | Age: 73
End: 2023-04-19
Payer: MEDICARE

## 2023-04-21 ENCOUNTER — DOCUMENT SCAN (OUTPATIENT)
Dept: HOME HEALTH SERVICES | Facility: HOSPITAL | Age: 73
End: 2023-04-21
Payer: MEDICARE

## 2023-04-24 ENCOUNTER — PATIENT MESSAGE (OUTPATIENT)
Dept: CARDIOLOGY | Facility: CLINIC | Age: 73
End: 2023-04-24
Payer: MEDICARE

## 2023-04-24 RX ORDER — FUROSEMIDE 40 MG/1
TABLET ORAL
Qty: 30 TABLET | Refills: 11 | Status: ON HOLD | OUTPATIENT
Start: 2023-04-24 | End: 2023-06-14 | Stop reason: HOSPADM

## 2023-04-25 ENCOUNTER — TELEPHONE (OUTPATIENT)
Dept: FAMILY MEDICINE | Facility: CLINIC | Age: 73
End: 2023-04-25
Payer: MEDICARE

## 2023-04-25 NOTE — TELEPHONE ENCOUNTER
----- Message from Sameer Boyer sent at 4/25/2023 11:03 AM CDT -----  Regarding: Orders  Contact: CHARLES JARRETT [[id number]]  Type: Needs Medical Advice    Who Called:  [facility]    Symptoms (please be specific):  na    How long has patient had these symptoms:  priscila    Pharmacy name and phone #:  na    Best Call Back Number: [phone]    Additional Information: Needs orders for HH signed that was sent. Been over a month since faxed. Will fax again. Please call to advise.

## 2023-04-26 ENCOUNTER — OUTPATIENT CASE MANAGEMENT (OUTPATIENT)
Dept: ADMINISTRATIVE | Facility: OTHER | Age: 73
End: 2023-04-26
Payer: MEDICARE

## 2023-04-26 ENCOUNTER — LAB VISIT (OUTPATIENT)
Dept: LAB | Facility: HOSPITAL | Age: 73
End: 2023-04-26
Attending: INTERNAL MEDICINE
Payer: MEDICARE

## 2023-04-26 DIAGNOSIS — N18.32 STAGE 3B CHRONIC KIDNEY DISEASE: ICD-10-CM

## 2023-04-26 PROCEDURE — 82570 ASSAY OF URINE CREATININE: CPT | Performed by: INTERNAL MEDICINE

## 2023-04-26 NOTE — PROGRESS NOTES
4/26/23 1688  Spoke with patient's daughter, Loree Sanchez (listed on Involvement in Care) regarding OPCM referral/enrollment. Loree expressed interest in OPCM for her mother,  but is unable to complete call at this time and request a call back next week.   Brinda Alcantar RN OPCM

## 2023-04-26 NOTE — LETTER
Betty Bacon  [address] [place] [address] [place] [address]      Dear Betty Bacon,     I work with [facility] Outpatient Care Management Department. We received a referral to call you to discuss your medical history. These services are free of charge and are offered to [facility] patients who have recently been discharged from any of our facilities or who have medical conditions that may require the skill of a nurse to assist with management.     I am a Registered Nurse who specializes in connecting patients with available medical and financial resources as well as addressing any educational needs that may be indicated.    I attempted to reach you by telephone, but was unable to complete the call.I will reach out again on 5/3/23 so that we can go over some questions with you, regarding your health.    The Outpatient Care Management Department can be reached at [phone], from 8:00AM to 4:30 PM, on Monday thru Friday.     Additionally, [facility] also has a program where a nurse is available 24/7 to answer questions or provide medical advice, their number is [phone].      Thanks,        Brinda Alcantar RN  Outpatient Care Management  Phone # [phone]

## 2023-04-27 LAB
CREAT UR-MCNC: 90 MG/DL (ref 15–325)
PROT UR-MCNC: 23 MG/DL (ref 0–15)
PROT/CREAT UR: 0.26 MG/G{CREAT} (ref 0–0.2)

## 2023-05-01 ENCOUNTER — HOSPITAL ENCOUNTER (OUTPATIENT)
Facility: HOSPITAL | Age: 73
Discharge: HOME | End: 2023-05-01
Attending: INTERNAL MEDICINE | Admitting: INTERNAL MEDICINE
Payer: MEDICARE

## 2023-05-01 ENCOUNTER — PATIENT MESSAGE (OUTPATIENT)
Dept: CARDIOLOGY | Facility: CLINIC | Age: 73
End: 2023-05-01

## 2023-05-01 ENCOUNTER — HOSPITAL ENCOUNTER (OUTPATIENT)
Dept: CARDIOLOGY | Facility: HOSPITAL | Age: 73
Discharge: HOME | End: 2023-05-01
Attending: INTERNAL MEDICINE | Admitting: INTERNAL MEDICINE
Payer: MEDICARE

## 2023-05-01 VITALS
DIASTOLIC BLOOD PRESSURE: 79 MMHG | RESPIRATION RATE: 18 BRPM | WEIGHT: 194 LBS | HEIGHT: 62 IN | SYSTOLIC BLOOD PRESSURE: 196 MMHG | BODY MASS INDEX: 35.7 KG/M2 | OXYGEN SATURATION: 97 % | TEMPERATURE: 98 F | HEART RATE: 66 BPM

## 2023-05-01 VITALS
WEIGHT: 198 LBS | SYSTOLIC BLOOD PRESSURE: 170 MMHG | HEART RATE: 75 BPM | DIASTOLIC BLOOD PRESSURE: 70 MMHG | BODY MASS INDEX: 36.44 KG/M2 | HEIGHT: 62 IN

## 2023-05-01 DIAGNOSIS — I35.0 NONRHEUMATIC AORTIC VALVE STENOSIS: Primary | Chronic | ICD-10-CM

## 2023-05-01 DIAGNOSIS — I35.0 SEVERE AORTIC STENOSIS: Primary | ICD-10-CM

## 2023-05-01 DIAGNOSIS — I35.0 AORTIC VALVE STENOSIS, ETIOLOGY OF CARDIAC VALVE DISEASE UNSPECIFIED: ICD-10-CM

## 2023-05-01 DIAGNOSIS — I50.32 CHRONIC DIASTOLIC HEART FAILURE: ICD-10-CM

## 2023-05-01 DIAGNOSIS — I35.0 SEVERE AORTIC STENOSIS: ICD-10-CM

## 2023-05-01 LAB
ABO + RH BLD: ABNORMAL
ANION GAP SERPL CALC-SCNC: 9 MMOL/L (ref 8–16)
APTT PPP: 24 SEC (ref 21–32)
ASCENDING AORTA: 3.62 CM
AV INDEX (PROSTH): 0.32
AV MEAN GRADIENT: 34 MMHG
AV PEAK GRADIENT: 65 MMHG
AV REGURGITATION PRESSURE HALF TIME: 365 MS
AV VALVE AREA: 1.16 CM2
AV VELOCITY RATIO: 0.31
BLD GP AB SCN CELLS X3 SERPL QL: ABNORMAL
BLOOD GROUP ANTIBODIES SERPL: NORMAL
BSA FOR ECHO PROCEDURE: 1.98 M2
BUN SERPL-MCNC: 24 MG/DL (ref 8–23)
CALCIUM SERPL-MCNC: 9.6 MG/DL (ref 8.7–10.5)
CHLORIDE SERPL-SCNC: 100 MMOL/L (ref 95–110)
CO2 SERPL-SCNC: 34 MMOL/L (ref 23–29)
CREAT SERPL-MCNC: 1.2 MG/DL (ref 0.5–1.4)
CV ECHO LV RWT: 0.46 CM
DAT IGG-SP REAG RBC-IMP: NORMAL
DOP CALC AO PEAK VEL: 4.03 M/S
DOP CALC AO VTI: 106.95 CM
DOP CALC LVOT AREA: 3.6 CM2
DOP CALC LVOT DIAMETER: 2.14 CM
DOP CALC LVOT PEAK VEL: 1.25 M/S
DOP CALC LVOT STROKE VOLUME: 123.6 CM3
DOP CALCLVOT PEAK VEL VTI: 34.38 CM
E WAVE DECELERATION TIME: 196.2 MSEC
E/A RATIO: 0.83
E/E' RATIO: 10.71 M/S
ECHO LV POSTERIOR WALL: 1.17 CM (ref 0.6–1.1)
EJECTION FRACTION: 60 %
ERYTHROCYTE [DISTWIDTH] IN BLOOD BY AUTOMATED COUNT: 14.1 % (ref 11.5–14.5)
EST. GFR  (NO RACE VARIABLE): 47.2 ML/MIN/1.73 M^2
FRACTIONAL SHORTENING: 33 % (ref 28–44)
GLUCOSE SERPL-MCNC: 109 MG/DL (ref 70–110)
HCT VFR BLD AUTO: 31.7 % (ref 37–48.5)
HGB BLD-MCNC: 9.2 G/DL (ref 12–16)
INR PPP: 1 (ref 0.8–1.2)
INTERVENTRICULAR SEPTUM: 1.34 CM (ref 0.6–1.1)
IVRT: 77.07 MSEC
LA MAJOR: 5.51 CM
LA MINOR: 5.51 CM
LA WIDTH: 4.33 CM
LEFT ATRIUM SIZE: 4.45 CM
LEFT ATRIUM VOLUME INDEX MOD: 45 ML/M2
LEFT ATRIUM VOLUME INDEX: 47.5 ML/M2
LEFT ATRIUM VOLUME MOD: 85.54 CM3
LEFT ATRIUM VOLUME: 90.24 CM3
LEFT INTERNAL DIMENSION IN SYSTOLE: 3.38 CM (ref 2.1–4)
LEFT VENTRICLE DIASTOLIC VOLUME INDEX: 64.24 ML/M2
LEFT VENTRICLE DIASTOLIC VOLUME: 122.06 ML
LEFT VENTRICLE MASS INDEX: 134 G/M2
LEFT VENTRICLE SYSTOLIC VOLUME INDEX: 24.7 ML/M2
LEFT VENTRICLE SYSTOLIC VOLUME: 46.92 ML
LEFT VENTRICULAR INTERNAL DIMENSION IN DIASTOLE: 5.07 CM (ref 3.5–6)
LEFT VENTRICULAR MASS: 254.52 G
LV LATERAL E/E' RATIO: 9.38 M/S
LV SEPTAL E/E' RATIO: 12.5 M/S
MCH RBC QN AUTO: 28.4 PG (ref 27–31)
MCHC RBC AUTO-ENTMCNC: 29 G/DL (ref 32–36)
MCV RBC AUTO: 98 FL (ref 82–98)
MV A" WAVE DURATION": 19.12 MSEC
MV PEAK A VEL: 0.9 M/S
MV PEAK E VEL: 0.75 M/S
MV STENOSIS PRESSURE HALF TIME: 56.9 MS
MV VALVE AREA P 1/2 METHOD: 3.87 CM2
PISA TR MAX VEL: 3.28 M/S
PLATELET # BLD AUTO: 226 K/UL (ref 150–450)
PMV BLD AUTO: 10.8 FL (ref 9.2–12.9)
POTASSIUM SERPL-SCNC: 4.8 MMOL/L (ref 3.5–5.1)
PROTHROMBIN TIME: 10.4 SEC (ref 9–12.5)
PULM VEIN S/D RATIO: 1.78
PV PEAK D VEL: 0.23 M/S
PV PEAK S VEL: 0.41 M/S
RA MAJOR: 5.62 CM
RA PRESSURE: 8 MMHG
RA WIDTH: 4.02 CM
RBC # BLD AUTO: 3.24 M/UL (ref 4–5.4)
RIGHT VENTRICULAR END-DIASTOLIC DIMENSION: 3.98 CM
RV TISSUE DOPPLER FREE WALL SYSTOLIC VELOCITY 1 (APICAL 4 CHAMBER VIEW): 8.68 CM/S
SINUS: 3.66 CM
SODIUM SERPL-SCNC: 143 MMOL/L (ref 136–145)
SPECIMEN OUTDATE: ABNORMAL
STJ: 3.13 CM
TDI LATERAL: 0.08 M/S
TDI SEPTAL: 0.06 M/S
TDI: 0.07 M/S
TR MAX PG: 43 MMHG
TRICUSPID ANNULAR PLANE SYSTOLIC EXCURSION: 1.69 CM
TV REST PULMONARY ARTERY PRESSURE: 51 MMHG
WBC # BLD AUTO: 8.48 K/UL (ref 3.9–12.7)

## 2023-05-01 PROCEDURE — 25500020 PHARM REV CODE 255: Performed by: INTERNAL MEDICINE

## 2023-05-01 PROCEDURE — 93454 CORONARY ARTERY ANGIO S&I: CPT | Mod: 26,,, | Performed by: INTERNAL MEDICINE

## 2023-05-01 PROCEDURE — 93306 ECHO (CUPID ONLY): ICD-10-PCS | Mod: 26,,, | Performed by: INTERNAL MEDICINE

## 2023-05-01 PROCEDURE — 93010 ELECTROCARDIOGRAM REPORT: CPT | Mod: ,,, | Performed by: INTERNAL MEDICINE

## 2023-05-01 PROCEDURE — 93005 ELECTROCARDIOGRAM TRACING: CPT

## 2023-05-01 PROCEDURE — 80048 BASIC METABOLIC PNL TOTAL CA: CPT | Performed by: INTERNAL MEDICINE

## 2023-05-01 PROCEDURE — 86880 COOMBS TEST DIRECT: CPT | Performed by: INTERNAL MEDICINE

## 2023-05-01 PROCEDURE — C1894 INTRO/SHEATH, NON-LASER: HCPCS | Performed by: INTERNAL MEDICINE

## 2023-05-01 PROCEDURE — 85610 PROTHROMBIN TIME: CPT | Performed by: INTERNAL MEDICINE

## 2023-05-01 PROCEDURE — 25000003 PHARM REV CODE 250: Performed by: INTERNAL MEDICINE

## 2023-05-01 PROCEDURE — 63600175 PHARM REV CODE 636 W HCPCS: Performed by: INTERNAL MEDICINE

## 2023-05-01 PROCEDURE — 85027 COMPLETE CBC AUTOMATED: CPT | Performed by: INTERNAL MEDICINE

## 2023-05-01 PROCEDURE — 93454 PR CATH PLACE/CORONARY ANGIO, IMG SUPER/INTERP: ICD-10-PCS | Mod: 26,,, | Performed by: INTERNAL MEDICINE

## 2023-05-01 PROCEDURE — 86870 RBC ANTIBODY IDENTIFICATION: CPT | Performed by: INTERNAL MEDICINE

## 2023-05-01 PROCEDURE — 93306 TTE W/DOPPLER COMPLETE: CPT

## 2023-05-01 PROCEDURE — 93010 EKG 12-LEAD: ICD-10-PCS | Mod: ,,, | Performed by: INTERNAL MEDICINE

## 2023-05-01 PROCEDURE — C1887 CATHETER, GUIDING: HCPCS | Performed by: INTERNAL MEDICINE

## 2023-05-01 PROCEDURE — C1769 GUIDE WIRE: HCPCS | Performed by: INTERNAL MEDICINE

## 2023-05-01 PROCEDURE — 86900 BLOOD TYPING SEROLOGIC ABO: CPT | Performed by: INTERNAL MEDICINE

## 2023-05-01 PROCEDURE — 93306 TTE W/DOPPLER COMPLETE: CPT | Mod: 26,,, | Performed by: INTERNAL MEDICINE

## 2023-05-01 PROCEDURE — 85730 THROMBOPLASTIN TIME PARTIAL: CPT | Performed by: INTERNAL MEDICINE

## 2023-05-01 PROCEDURE — 93454 CORONARY ARTERY ANGIO S&I: CPT | Performed by: INTERNAL MEDICINE

## 2023-05-01 RX ORDER — SODIUM CHLORIDE 9 MG/ML
INJECTION, SOLUTION INTRAVENOUS CONTINUOUS
Status: ACTIVE | OUTPATIENT
Start: 2023-05-01 | End: 2023-05-01

## 2023-05-01 RX ORDER — SODIUM CHLORIDE 9 MG/ML
INJECTION, SOLUTION INTRAVENOUS CONTINUOUS
Status: DISCONTINUED | OUTPATIENT
Start: 2023-05-01 | End: 2023-05-01 | Stop reason: HOSPADM

## 2023-05-01 RX ORDER — ACETAMINOPHEN 325 MG/1
650 TABLET ORAL EVERY 4 HOURS PRN
Status: DISCONTINUED | OUTPATIENT
Start: 2023-05-01 | End: 2023-05-01 | Stop reason: HOSPADM

## 2023-05-01 RX ORDER — ASPIRIN 325 MG
325 TABLET, DELAYED RELEASE (ENTERIC COATED) ORAL DAILY
Status: DISCONTINUED | OUTPATIENT
Start: 2023-05-01 | End: 2023-05-01 | Stop reason: HOSPADM

## 2023-05-01 RX ORDER — DIPHENHYDRAMINE HCL 50 MG
50 CAPSULE ORAL ONCE
Status: COMPLETED | OUTPATIENT
Start: 2023-05-01 | End: 2023-05-01

## 2023-05-01 RX ORDER — HEPARIN SOD,PORCINE/0.9 % NACL 1000/500ML
INTRAVENOUS SOLUTION INTRAVENOUS
Status: DISCONTINUED | OUTPATIENT
Start: 2023-05-01 | End: 2023-05-01 | Stop reason: HOSPADM

## 2023-05-01 RX ORDER — SODIUM CHLORIDE 9 MG/ML
INJECTION, SOLUTION INTRAVENOUS CONTINUOUS
Status: CANCELLED | OUTPATIENT
Start: 2023-05-01 | End: 2023-05-01

## 2023-05-01 RX ORDER — ONDANSETRON 8 MG/1
8 TABLET, ORALLY DISINTEGRATING ORAL EVERY 8 HOURS PRN
Status: DISCONTINUED | OUTPATIENT
Start: 2023-05-01 | End: 2023-05-01 | Stop reason: HOSPADM

## 2023-05-01 RX ORDER — DIPHENHYDRAMINE HCL 50 MG
50 CAPSULE ORAL ONCE
Status: CANCELLED | OUTPATIENT
Start: 2023-05-01 | End: 2023-05-01

## 2023-05-01 RX ORDER — HEPARIN SODIUM 1000 [USP'U]/ML
INJECTION, SOLUTION INTRAVENOUS; SUBCUTANEOUS
Status: DISCONTINUED | OUTPATIENT
Start: 2023-05-01 | End: 2023-05-01 | Stop reason: HOSPADM

## 2023-05-01 RX ORDER — LIDOCAINE HYDROCHLORIDE 20 MG/ML
INJECTION, SOLUTION EPIDURAL; INFILTRATION; INTRACAUDAL; PERINEURAL
Status: DISCONTINUED | OUTPATIENT
Start: 2023-05-01 | End: 2023-05-01 | Stop reason: HOSPADM

## 2023-05-01 RX ORDER — LABETALOL HYDROCHLORIDE 5 MG/ML
INJECTION, SOLUTION INTRAVENOUS
Status: DISCONTINUED | OUTPATIENT
Start: 2023-05-01 | End: 2023-05-01 | Stop reason: HOSPADM

## 2023-05-01 RX ORDER — SODIUM CHLORIDE 0.9 % (FLUSH) 0.9 %
10 SYRINGE (ML) INJECTION
Status: DISCONTINUED | OUTPATIENT
Start: 2023-05-01 | End: 2023-06-14 | Stop reason: HOSPADM

## 2023-05-01 RX ADMIN — DIPHENHYDRAMINE HYDROCHLORIDE 50 MG: 50 CAPSULE ORAL at 01:05

## 2023-05-01 RX ADMIN — SODIUM CHLORIDE: 9 INJECTION, SOLUTION INTRAVENOUS at 11:05

## 2023-05-01 NOTE — Clinical Note
dry, intact, no bleeding and no hematoma. Vasband in place on right radial with hemostasis at 1431 pm

## 2023-05-01 NOTE — NURSING
Vasc band removed from right wrist without difficulty.  Gauze/tegaderm applied to right wrist.  No bleeding or hematoma noted.  Report to MARAJN Alcala.

## 2023-05-01 NOTE — BRIEF OP NOTE
"    Post Cath Note  Referring Physician: Neeraj Finn MD  Procedure: Angiogram, Coronary, with Left Heart Cath (N/A)      : Neeraj Finn MD     Referring Physician: Neeraj Morse     All Operators: Surgeon(s):  MD Alex Hernández MD     Preoperative Diagnosis: Severe aortic stenosis [I35.0]  Chronic diastolic heart failure [I50.32]     Postop Diagnosis: Severe aortic stenosis [I35.0]  Chronic diastolic heart failure [I50.32]    Treatments/Procedures: Procedure(s) (LRB):  Angiogram, Coronary, with Left Heart Cath (N/A)    Estimated Blood loss: <50 cc         Access: Right radial    See full report for further details    Intervention:   Successful coronary angiogram. She has mild ISR or RCA stents. No interventions pursued. No complications.     Closure device: Radial band    Post Cath Exam:   BP (!) 171/77 (BP Location: Left arm, Patient Position: Lying)   Pulse 70   Temp 97.2 °F (36.2 °C) (Temporal)   Resp 20   Ht 5' 2" (1.575 m)   Wt 88 kg (194 lb)   SpO2 95%   Breastfeeding No   BMI 35.48 kg/m²   No unusual pain, hematoma, thrill or bruit at vascular access site.  Distal pulse present without signs of ischemia.    Recommendations:   - Routine post-cath care  - IVF at 150 cc/hr for 2 hrs    Roberto Alba    "

## 2023-05-01 NOTE — PLAN OF CARE
IV hep lock removed. AVS printed. Home care instructions reviewed . All questions answered. Family at bedside.  Right wrist dressing intact. No bleeding noted. Patient denies any complaints. Transport with wheelchair for discharge..

## 2023-05-01 NOTE — PLAN OF CARE
Patient arrived to room. PIV placed, labs sent. Admit assessment completed. Plan of care discussed with patient. Daughter at bedside. Nurse call bell within reach. Will monitor

## 2023-05-01 NOTE — Clinical Note
The catheter was inserted into the ostial  left coronary artery. An angiography was performed of the left coronary arteries. Multiple views were taken.

## 2023-05-01 NOTE — Clinical Note
The groin and right radial was prepped. The site was prepped with ChloraPrep. The site was clipped. The patient was draped. The patient was positioned supine. The patient was secured with ulnar pads and to an armboard.

## 2023-05-01 NOTE — DISCHARGE SUMMARY
[facility] - [facility] Lab  Interventional Cardiology  Discharge Summary      Patient Name: Betty Bacon  MRN: [id number]  Admission Date: 5/1/2023  Hospital Length of Stay: 0 days  Discharge Date and Time:  05/01/2023 2:46 PM  Attending Physician: Neeraj Finn MD  Discharging Provider: Roberto Alba MD  Primary Care Physician: Johanne Callejas MD    HPI:   Betty Bacon is a 73 y.o. female referred by Dr. Borjas for evaluation of aortic stenosis.     The patient has a history of restrictive lung disease, asbestosis, peripheral vascular disease and moderate aortic stenosis in her last echo in December.  She has had several admissions over the past 7 months.  All of these have been due to volume overload.  She was sent to us to be evaluated for the aortic stenosis treatment.  Patient does not have a recent echo.  She has not had an angiogram in the last 9 years.  Of note on her last echo her LV function was normal.    Procedure(s) (LRB):  Angiogram, Coronary, with Left Heart Cath (N/A)     Indwelling Lines/Drains at time of discharge:  Lines/Drains/Airways       None                   Hospital Course (synopsis of major diagnoses, care, treatment, and services provided during the course of the hospital stay): Successful coronary angiogram. She has mild ISR or RCA stents. No interventions pursued. No complications.       Discharged Condition: good    Follow Up:    Patient Instructions:   No discharge procedures on file.  Medications:  Reconciled Home Medications:      Medication List        CHANGE how you take these medications      alendronate 70 MG tablet  Commonly known as: FOSAMAX  TAKE 1 TABLET BY MOUTH ONE TIME PER WEEK  What changed:   when to take this  reasons to take this     atorvastatin 40 MG tablet  Commonly known as: LIPITOR  TAKE 1 TABLET BY MOUTH EVERY DAY  What changed: when to take this     clopidogreL 75 mg tablet  Commonly known as: PLAVIX  TAKE 1 TABLET BY MOUTH EVERY DAY  What  changed: when to take this     colchicine 0.6 mg tablet  Commonly known as: COLCRYS  Take 2 po at gout flare onset, may repeat 1 in an hour prn, then 1 po bid until pain resolves ,or n/V/D starts  What changed:   how much to take  how to take this  when to take this  reasons to take this     * paroxetine 40 MG tablet  Commonly known as: PAXIL  TAKE 1 TABLET BY MOUTH EVERY DAY  What changed: when to take this     * paroxetine 10 MG tablet  Commonly known as: PAXIL  Take 10 mg by mouth every morning. Total 50 mg  What changed: Another medication with the same name was changed. Make sure you understand how and when to take each.           * This list has 2 medication(s) that are the same as other medications prescribed for you. Read the directions carefully, and ask your doctor or other care provider to review them with you.                CONTINUE taking these medications      acetaminophen 325 MG tablet  Commonly known as: TYLENOL  Take 325 mg by mouth every 6 (six) hours as needed for Pain.     albuterol sulfate 90 mcg/actuation inhaler  Commonly known as: PROAIR RESPICLICK  Inhale 2 puffs into the lungs every 4 to 6 hours as needed.     albuterol-ipratropium 2.5 mg-0.5 mg/3 mL nebulizer solution  Commonly known as: DUO-NEB  Take 3 mLs by nebulization every 6 (six) hours as needed for Wheezing. Rescue     aspirin 81 mg Tab  Take 81 mg by mouth once daily. PATIENT NOT CURRENTLY TAKING     clonazePAM 1 MG disintegrating tablet  Commonly known as: KlonoPIN  Take 1 tablet (1 mg total) by mouth 2 (two) times daily as needed (anxiety).     colestipoL 1 gram Tab  Commonly known as: COLESTID  Take 1 tablet (1 g total) by mouth 2 (two) times daily as needed (diarrhea).     esomeprazole 40 MG capsule  Commonly known as: NEXIUM  TAKE 1 CAPSULE BY MOUTH BEFORE BREAKFAST.     fluticasone propionate 50 mcg/actuation nasal spray  Commonly known as: FLONASE  1 spray (50 mcg total) by Each Nostril route once daily.     furosemide 40  MG tablet  Commonly known as: LASIX  Take 1 tablet (40 mg total) by mouth once daily for 3 days, THEN 1 tablet (40 mg total) every 48 hours.  Start taking on: April 24, 2023     isosorbide mononitrate 60 MG 24 hr tablet  Commonly known as: IMDUR  Take 60 mg by mouth once daily.     metoprolol succinate 50 MG 24 hr tablet  Commonly known as: TOPROL-XL  Take 1 tablet (50 mg total) by mouth once daily.     QUEtiapine 100 MG Tab  Commonly known as: SEROQUEL  Take 100 mg by mouth once daily.     TRELEGY ELLIPTA 200-62.5-25 mcg inhaler  Generic drug: fluticasone-umeclidin-vilanter  Inhale 1 puff into the lungs once daily.            ASK your doctor about these medications      cyanocobalamin 1000 MCG tablet  Commonly known as: VITAMIN B-12  Take 1 tablet (1,000 mcg total) by mouth once daily.     ergocalciferol 50,000 unit Cap  Commonly known as: ERGOCALCIFEROL  Take 1 capsule (50,000 Units total) by mouth every 7 days.     magnesium oxide 400 mg (241.3 mg magnesium) tablet  Commonly known as: MAG-OX  Take 2 tablets (800 mg total) by mouth 2 (two) times daily.              Time spent on the discharge of patient: 25 minutes    Roberto Alba MD  Interventional Cardiology  [facility]

## 2023-05-01 NOTE — Clinical Note
30 ml of contrast were injected throughout the case. 120 mL of contrast was the total wasted during the case. 150 mL was the total amount used during the case.

## 2023-05-02 ENCOUNTER — TELEPHONE (OUTPATIENT)
Dept: FAMILY MEDICINE | Facility: CLINIC | Age: 73
End: 2023-05-02
Payer: MEDICARE

## 2023-05-03 ENCOUNTER — OUTPATIENT CASE MANAGEMENT (OUTPATIENT)
Dept: ADMINISTRATIVE | Facility: OTHER | Age: 73
End: 2023-05-03
Payer: MEDICARE

## 2023-05-03 NOTE — PROGRESS NOTES
5/3/23 4311  Spoke with 's daughter Loree Sanchez. Mrs. Sanchez requested that RN case manger call back at a later time due to a sensitive family situation that is currently being dealt with. RN case manager will plan a return call next week.   Brinda Alcantar RN OPCM

## 2023-05-08 PROBLEM — N17.9 AKI (ACUTE KIDNEY INJURY): Status: RESOLVED | Noted: 2023-01-27 | Resolved: 2023-05-08

## 2023-05-08 PROBLEM — J18.9 CAP (COMMUNITY ACQUIRED PNEUMONIA): Status: RESOLVED | Noted: 2023-01-25 | Resolved: 2023-05-08

## 2023-05-11 ENCOUNTER — OUTPATIENT CASE MANAGEMENT (OUTPATIENT)
Dept: ADMINISTRATIVE | Facility: OTHER | Age: 73
End: 2023-05-11
Payer: MEDICARE

## 2023-05-11 ENCOUNTER — OFFICE VISIT (OUTPATIENT)
Dept: PULMONOLOGY | Facility: CLINIC | Age: 73
End: 2023-05-11
Payer: MEDICARE

## 2023-05-11 VITALS
OXYGEN SATURATION: 69 % | DIASTOLIC BLOOD PRESSURE: 49 MMHG | WEIGHT: 194 LBS | BODY MASS INDEX: 35.7 KG/M2 | SYSTOLIC BLOOD PRESSURE: 104 MMHG | HEART RATE: 88 BPM | HEIGHT: 62 IN

## 2023-05-11 DIAGNOSIS — J84.9 ILD (INTERSTITIAL LUNG DISEASE): Primary | ICD-10-CM

## 2023-05-11 PROCEDURE — 99999 PR PBB SHADOW E&M-EST. PATIENT-LVL V: ICD-10-PCS | Mod: PBBFAC,,, | Performed by: INTERNAL MEDICINE

## 2023-05-11 PROCEDURE — 99999 PR PBB SHADOW E&M-EST. PATIENT-LVL V: CPT | Mod: PBBFAC,,, | Performed by: INTERNAL MEDICINE

## 2023-05-11 PROCEDURE — 99213 PR OFFICE/OUTPT VISIT, EST, LEVL III, 20-29 MIN: ICD-10-PCS | Mod: S$PBB,,, | Performed by: INTERNAL MEDICINE

## 2023-05-11 PROCEDURE — 99215 OFFICE O/P EST HI 40 MIN: CPT | Mod: PBBFAC,PO | Performed by: INTERNAL MEDICINE

## 2023-05-11 PROCEDURE — 99213 OFFICE O/P EST LOW 20 MIN: CPT | Mod: S$PBB,,, | Performed by: INTERNAL MEDICINE

## 2023-05-11 NOTE — LETTER
Betty Bacon  [address] [place] [address] [place] [address]      Dear Mrs. Bacon,     I work with [facility] Outpatient Care Management Department. We received a referral to call you to discuss your medical history. These services are free of charge and are offered to [facility] patients who have recently been discharged from any of our facilities or who have medical conditions that may require the skill of a nurse to assist with management.     I am a Registered Nurse who specializes in connecting patients with available medical and financial resources as well as addressing any educational needs that may be indicated.    I attempted to reach you by telephone, but I was unsuccessful. Please call our department so that we can go over some questions with you, regarding your health.    The Outpatient Care Management Department can be reached at [phone], from 8:00AM to 4:30 PM, on Monday thru Friday.     Additionally, [facility] also has a program where a nurse is available 24/7 to answer questions or provide medical advice, their number is [phone].      Thanks,        Brinda Alcantar RN  Outpatient Care Management  Phone [phone]

## 2023-05-11 NOTE — PROGRESS NOTES
Pulmonary Clinic Progress Note    HISTORY OF PRESENT ILLNESS   Betty Bacon is a 73 y.o. female with a PMH of COPD, asbestosis, recent COVID pneumonia, CAD, HFpEF, severe aortic stenosis on 4 L NC at baseline who presents to establish outpatient care for her chronic pulmonary conditions.    She was hospitalized last month for a heart failure exacerbation in the setting of her known severe AS. She was also hospitalized in January for CAP. Now taking Lasix 40 mg every other day.    5/11/23: Recent PFTs show severe restrictive lung disease. She had a recent cardiac cath that showed mild in-stent restenosis of her RCA stents not requiring intervention. Going for TAVR 5/25/23, as well as possibly a PPM. Using nebulizer about daily for SOB. Using Trelegy daily.    SMOKING HISTORY  Quit 25 years ago.  Smoked 2 PPD x 30 years.     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    REVIEW OF SYSTEMS  GENERAL: Feeling well. No fevers, chills, or night sweats.  EYES: Vision is good.  ENT: No sinusitis or pharyngitis.   HEART: No chest pain or palpitations. Presyncope with activity. Leg welling. Orthopnea -- 2 pillows.  LUNGS: No sputum, or wheezing. VILLA even with showering. Non-productive cough.  GI: No abdominal pain, nausea, vomiting, or diarrhea.  : No dysuria, urgency, or frequency.  SKIN: No lesions or rashes.  MUSCULOSKELETAL: No joint pain or myalgias.  NEURO: No headaches or neuropathy.  LYMPH: No edema or adenopathy.  PSYCH: No anxiety or depression.  ENDO: No unexpected weight change.    PFSH:  Past Medical History:   Diagnosis Date    Acute coronary artery obstruction without MI 10/2012    Benign hypertension     COPD (chronic obstructive pulmonary disease)     Coronary artery disease     Disorder of kidney and ureter     Dr. Morrow    Hepatitis B core antibody positive 03/03/2021    Negative sAg, suggests previous exposure but no  chronic/active Hep B. At risk for reactivation with any immunosuppression medication, steroids, chemo, etc.      History of electroconvulsive therapy     Hyperlipidemia LDL goal < 70     Left ankle sprain     Major depressive disorder, recurrent episode, severe     s/p ECT    Positive AKIRA (antinuclear antibody) 2021    PVD (peripheral vascular disease)          Past Surgical History:   Procedure Laterality Date    ANGIOGRAM, CORONARY, WITH LEFT HEART CATHETERIZATION N/A 2023    Procedure: Angiogram, Coronary, with Left Heart Cath;  Surgeon: Neeraj Finn MD;  Location: [facility] CATH LAB;  Service: Cardiology;  Laterality: N/A;    CHOLECYSTECTOMY      CORONARY ANGIOPLASTY      CORONARY ANGIOPLASTY WITH STENT PLACEMENT  10/2012    2 stents RCA (100% stenosis)    EYE SURGERY      cataract surgery    HYSTERECTOMY      LINA, ovaries intact. uterine prolapse    ILIAC ARTERY STENT      SMALL BOWEL ENTEROSCOPY N/A 2023    Procedure: ENTEROSCOPY;  Surgeon: Margy Dumont MD;  Location: [facility] ENDO;  Service: Endoscopy;  Laterality: N/A;    TONSILLECTOMY      TOTAL VAGINAL HYSTERECTOMY       Social History     Tobacco Use    Smoking status: Former     Packs/day: 2.00     Years: 40.00     Pack years: 80.00     Types: Cigarettes     Quit date: 2009     Years since quittin.4    Smokeless tobacco: Never   Substance Use Topics    Alcohol use: Yes     Comment: social    Drug use: No     Family History   Problem Relation Age of Onset    Diabetes Mother     Heart disease Mother     Stroke Father     Heart disease Father     Heart disease Brother     Stroke Brother     Hypertension Daughter     Diabetes Maternal Aunt     Heart disease Maternal Aunt     Heart disease Maternal Uncle     Heart disease Paternal Aunt     Heart disease Paternal Uncle     Heart disease Maternal Grandfather     Diabetes Sister     Heart disease Sister     Cancer Sister         lung    Kidney disease Sister         mass, benign     Breast cancer Sister     Stroke Sister     Dementia Sister     Liver disease Sister     Melanoma Neg Hx     Psoriasis Neg Hx     Lupus Neg Hx     Eczema Neg Hx      Review of patient's allergies indicates:   Allergen Reactions    Propoxyphene n-acetaminophen Other (See Comments)     Other reaction(s): Unknown    Codeine Anxiety         VITAL SIGNS (MOST RECENT)       PHYSICAL EXAM  GENERAL: NAD.  HEENT: Pupils equal and reactive. Extraocular movements intact.   NECK: Supple.   HEART: Regular rate and rhythm. 2/6 systolic murmur at LUSB radiating to carotids.  LUNGS: Crackles throughout lungs b/l.  ABDOMEN: Bowel sounds present. Non-tender, no masses palpated.  EXTREMITIES: Normal muscle tone and joint movement, no cyanosis or clubbing.   LYMPHATICS: 3+ b/l pretibial pitting edema  SKIN: Dry, intact, no lesions.   NEURO: No gross cognitive or motor deficits.  PSYCH: Appropriate affect.    Lab Results   Component Value Date    WBC 8.48 05/01/2023    HGB 9.2 (L) 05/01/2023    HCT 31.7 (L) 05/01/2023     05/01/2023    CHOL 145 01/10/2023    TRIG 89 01/10/2023    HDL 52 01/10/2023    ALT 6 (L) 03/09/2023    AST 9 (L) 03/09/2023     05/01/2023    K 4.8 05/01/2023     05/01/2023    CREATININE 1.2 05/01/2023    BUN 24 (H) 05/01/2023    CO2 34 (H) 05/01/2023    TSH 1.678 07/06/2016    INR 1.0 05/01/2023    GLUF 107 09/25/2009    HGBA1C 6.1 01/25/2023       LAST ARTERIAL BLOOD GAS  @[email or web address][PH,PO2,PCO2,HCO3,BE@      LAST 7 DAYS MICROBIOLOGY   Microbiology Results (last 7 days)       ** No results found for the last 168 hours. **            MOST RECENT IMAGING  Cardiac catheterization    The estimated blood loss was none.      CURRENT VISIT EKG  No results found for this visit on 03/09/23.    ECHOCARDIOGRAM RESULTS  Results for orders placed during the hospital encounter of 12/20/22    Echo    Interpretation Summary  · The left ventricle is normal in size with severe concentric hypertrophy and normal  systolic function.  · Grade II left ventricular diastolic dysfunction.  · The estimated ejection fraction is 65%.  · Normal right ventricular size with normal right ventricular systolic function.  · Moderate aortic regurgitation.  · There is severe aortic valve stenosis.  · Aortic valve area is 0.94 cm2; peak velocity is 3.29 m/s; mean gradient is 26 mmHg.  · Mild pulmonic regurgitation.  · Mild tricuspid regurgitation.  · There is mild pulmonary hypertension.  · Normal central venous pressure (3 mmHg).  · The estimated PA systolic pressure is 44 mmHg.      Pulmonary Function Tests      I have personally reviewed recent labs and ABGs, and I have viewed the images of recent chest x-rays and chest CTs.    ASSESSMENT & PLAN   Betty Bacon is a 73 y.o. female with a PMH of COPD, asbestosis, recent COVID pneumonia, CAD, HFpEF, severe aortic stenosis on 4 L NC at baseline who presents to establish outpatient care for her chronic pulmonary conditions.    #Asbestosis  #Severe restrictive lung disease  #COPD without acute exacerbation  #Chronic hypoxic respiratory failure  Last PFTs in 2013 were consistent with combined restrictive and obstructive lung disease.  - continue Trelegy and PRN inhaler and nebs  - continue supplemental O2 6 LPM   - oxygen tanks ordered  - referred to ILD clinic    #Severe aortic stenosis  #HFpEF  - TAVR planned for 5/25/23    #Health maintenance  Got flu vaccine 10/2022.  Got PCV-13 in 2015 and PPSV-23 in 2016, and repeat PPSV 2023.    Follow up in 3 months.  No follow-ups on file.    Matt Toro MD  Pulmonary and Critical Care Medicine  [facility]  Date of Service: 05/11/2023  3:49 PM

## 2023-05-11 NOTE — PROGRESS NOTES
5/11/23 1058  Attempt assessment with patient for outpatient case management. Left voicemail message requesting call back. RN OPCM 3rd assessment attempt. In basket message sent to Dr. Callejas informing her that OPCM RN case manager has been unsuccessful x3 attempts at reaching  for assessment/enrollment and that her case has been closed. OPCM program brochure with RN case  information and [facility] on Call brochure mailed to . CASE COSED.  Brinda Alcantar RN

## 2023-05-16 ENCOUNTER — PATIENT MESSAGE (OUTPATIENT)
Dept: PULMONOLOGY | Facility: CLINIC | Age: 73
End: 2023-05-16
Payer: MEDICARE

## 2023-05-16 ENCOUNTER — TELEPHONE (OUTPATIENT)
Dept: CARDIOTHORACIC SURGERY | Facility: CLINIC | Age: 73
End: 2023-05-16
Payer: MEDICARE

## 2023-05-16 ENCOUNTER — PATIENT MESSAGE (OUTPATIENT)
Dept: CARDIOLOGY | Facility: CLINIC | Age: 73
End: 2023-05-16
Payer: MEDICARE

## 2023-05-16 NOTE — TELEPHONE ENCOUNTER
Dr Prater is not taking new patients at this time. Notified IC nurse Cadence that I would have to change the date of her appointment.    Next available appointment would be with Dr Munoz on Monday. Called pt's daughter to schedule appointment. Left detailed message with my callback number.    Will follow up with IC and patient.    Julie Haase RN  [facility] Nurse Navigator [phone]

## 2023-05-17 ENCOUNTER — DOCUMENT SCAN (OUTPATIENT)
Dept: HOME HEALTH SERVICES | Facility: HOSPITAL | Age: 73
End: 2023-05-17
Payer: MEDICARE

## 2023-05-17 ENCOUNTER — TELEPHONE (OUTPATIENT)
Dept: TRANSPLANT | Facility: CLINIC | Age: 73
End: 2023-05-17
Payer: MEDICARE

## 2023-05-17 ENCOUNTER — OFFICE VISIT (OUTPATIENT)
Dept: HEMATOLOGY/ONCOLOGY | Facility: CLINIC | Age: 73
End: 2023-05-17
Payer: MEDICARE

## 2023-05-17 VITALS
BODY MASS INDEX: 35.48 KG/M2 | OXYGEN SATURATION: 91 % | SYSTOLIC BLOOD PRESSURE: 141 MMHG | TEMPERATURE: 98 F | HEART RATE: 80 BPM | DIASTOLIC BLOOD PRESSURE: 63 MMHG | RESPIRATION RATE: 20 BRPM | HEIGHT: 62 IN

## 2023-05-17 DIAGNOSIS — D63.8 ANEMIA OF CHRONIC DISEASE: ICD-10-CM

## 2023-05-17 PROCEDURE — 99213 OFFICE O/P EST LOW 20 MIN: CPT | Mod: S$GLB,,, | Performed by: INTERNAL MEDICINE

## 2023-05-17 PROCEDURE — 99213 PR OFFICE/OUTPT VISIT, EST, LEVL III, 20-29 MIN: ICD-10-PCS | Mod: S$GLB,,, | Performed by: INTERNAL MEDICINE

## 2023-05-17 NOTE — TELEPHONE ENCOUNTER
Lung transplant referral received from Dr. Toro on 5/11/23.  Message sent to Dr. Toro for clarification regarding whether the referral was for Advanced Lung Disease since there referral was for ILD evaluation.  Still haven't received a response from Dr. Toro.  Message sent to Dr. Linder regarding the referral.  Awaiting recommendations.

## 2023-05-17 NOTE — PROGRESS NOTES
"                                                         PROGRESS NOTE    Subjective:       Patient ID: Betty Bacon is a 73 y.o. female.    Chief Complaint:  No chief complaint on file.      History of Present Illness:   Betty Bacon is a 73 y.o. female who presents for follow up of anemia.     Patient is doing ok today.  No new complaints.    TAVR planned for 5/25/2023    She has since had a capsule endo and states a bleeding site was seen.  Dr. Trevizo trying to get cardiac clearance for gen anesthesia for enteroscopy.  This not done as of today, 5/17/2023.     2/20/2023:  Small bowel Ent:  Single non-bleeding angioectasia in stomach, injected. O/w unremarkable    2/21/2023 [facility] Consult HPI:  Ms. Tran is a 72 yo female admitted for acute on chronic CHF-Diastolic and found to be anemic.  She fell to a low of 6.5g/dl and has been transfused 2 units of blood.  She denies any type of bleeding such as melena or BRBPR.  She states she had a colonoscopy about one year ago that had a few areas "cauterized" and upper endo done this admission showed on non-bleeding AVM.       Review of her available lab data shows that she has had anemia consistently since December 2022 but only intermittently prior to that.  Her WBC and platelets are normal and a check of her ferritin 3 weeks ago showed it as high at over 900.  MCV is normal and iron studies done this admit show a low TIBC.  Note is made of her prior history of renal disease.      Family and Social history reviewed and is unchanged from 2/21/2023      ROS:  Review of Systems   Constitutional:  Positive for unexpected weight change. Negative for appetite change and fever.   HENT:  Negative for mouth sores.    Eyes:  Negative for visual disturbance.   Respiratory:  Positive for cough. Negative for shortness of breath.    Cardiovascular:  Negative for chest pain and leg swelling.   Gastrointestinal:  Positive for diarrhea. Negative for abdominal pain " and blood in stool.   Genitourinary:  Positive for frequency. Negative for hematuria.   Musculoskeletal:  Positive for back pain.   Skin:  Negative for rash.   Neurological:  Positive for headaches.   Hematological:  Negative for adenopathy.   Psychiatric/Behavioral:  The patient is nervous/anxious.         Current Outpatient Medications:     acetaminophen (TYLENOL) 325 MG tablet, Take 325 mg by mouth every 6 (six) hours as needed for Pain., Disp: , Rfl:     albuterol sulfate (PROAIR RESPICLICK) 90 mcg/actuation AePB, Inhale 2 puffs into the lungs every 4 to 6 hours as needed., Disp: 1 each, Rfl: 3    albuterol-ipratropium (DUO-NEB) 2.5 mg-0.5 mg/3 mL nebulizer solution, Take 3 mLs by nebulization every 6 (six) hours as needed for Wheezing. Rescue, Disp: 75 mL, Rfl: 11    alendronate (FOSAMAX) 70 MG tablet, TAKE 1 TABLET BY MOUTH ONE TIME PER WEEK (Patient taking differently: Take 70 mg by mouth as needed.), Disp: 12 tablet, Rfl: 3    aspirin 81 mg Tab, Take 81 mg by mouth once daily. PATIENT NOT CURRENTLY TAKING, Disp: , Rfl:     atorvastatin (LIPITOR) 40 MG tablet, TAKE 1 TABLET BY MOUTH EVERY DAY (Patient taking differently: Take 40 mg by mouth once daily.), Disp: 90 tablet, Rfl: 3    clonazePAM (KLONOPIN) 1 MG disintegrating tablet, Take 1 tablet (1 mg total) by mouth 2 (two) times daily as needed (anxiety)., Disp: 60 tablet, Rfl: 1    clopidogreL (PLAVIX) 75 mg tablet, TAKE 1 TABLET BY MOUTH EVERY DAY (Patient taking differently: Take 75 mg by mouth once daily.), Disp: 90 tablet, Rfl: 3    colchicine (COLCRYS) 0.6 mg tablet, Take 2 po at gout flare onset, may repeat 1 in an hour prn, then 1 po bid until pain resolves ,or n/V/D starts (Patient taking differently: Take 0.6 mg by mouth as needed. Take 2 po at gout flare onset, may repeat 1 in an hour prn, then 1 po bid until pain resolves ,or n/V/D starts), Disp: 20 tablet, Rfl: 0    cyanocobalamin (VITAMIN B-12) 1000 MCG tablet, Take 1 tablet (1,000 mcg total) by  mouth once daily., Disp: 30 tablet, Rfl: 0    ergocalciferol (ERGOCALCIFEROL) 50,000 unit Cap, Take 1 capsule (50,000 Units total) by mouth every 7 days., Disp: 12 capsule, Rfl: 3    esomeprazole (NEXIUM) 40 MG capsule, TAKE 1 CAPSULE BY MOUTH BEFORE BREAKFAST. (Patient taking differently: Take 40 mg by mouth before breakfast.), Disp: 90 capsule, Rfl: 3    fluticasone propionate (FLONASE) 50 mcg/actuation nasal spray, 1 spray (50 mcg total) by Each Nostril route once daily., Disp: 16 g, Rfl: PRN    fluticasone-umeclidin-vilanter (TRELEGY ELLIPTA) 200-62.5-25 mcg inhaler, Inhale 1 puff into the lungs once daily., Disp: 180 each, Rfl: 11    furosemide (LASIX) 40 MG tablet, Take 1 tablet (40 mg total) by mouth once daily for 3 days, THEN 1 tablet (40 mg total) every 48 hours., Disp: 30 tablet, Rfl: 11    isosorbide mononitrate (IMDUR) 60 MG 24 hr tablet, Take 60 mg by mouth once daily., Disp: , Rfl:     magnesium oxide (MAG-OX) 400 mg (241.3 mg magnesium) tablet, Take 2 tablets (800 mg total) by mouth 2 (two) times daily., Disp: 360 tablet, Rfl: 0    metoprolol succinate (TOPROL-XL) 50 MG 24 hr tablet, Take 1 tablet (50 mg total) by mouth once daily. (Patient taking differently: Take 50 mg by mouth 2 (two) times daily.), Disp: 90 tablet, Rfl: 0    paroxetine (PAXIL) 10 MG tablet, Take 10 mg by mouth every morning. Total 50 mg, Disp: , Rfl:     paroxetine (PAXIL) 40 MG tablet, TAKE 1 TABLET BY MOUTH EVERY DAY (Patient taking differently: Take 40 mg by mouth once daily.), Disp: 90 tablet, Rfl: 3    QUEtiapine (SEROQUEL) 100 MG Tab, Take 100 mg by mouth once daily., Disp: , Rfl:     colestipoL (COLESTID) 1 gram Tab, Take 1 tablet (1 g total) by mouth 2 (two) times daily as needed (diarrhea)., Disp: 180 tablet, Rfl: 3    Current Facility-Administered Medications:     sodium chloride 0.9% flush 10 mL, 10 mL, Intravenous, PRN, Neeraj Finn MD    sodium chloride 0.9% flush 10 mL, 10 mL, Intravenous, PRN, Neeraj Blevins  "MD Aakash        Objective:       Physical Examination:     BP (!) 141/63   Pulse 80   Temp 98.3 °F (36.8 °C)   Resp 20   Ht 5' 2" (1.575 m)   SpO2 (!) 91% Comment: 6 lts O2  BMI 35.48 kg/m²     Physical Exam  Constitutional:       Appearance: She is well-developed.   HENT:      Head: Normocephalic and atraumatic.      Right Ear: External ear normal.      Left Ear: External ear normal.   Eyes:      Conjunctiva/sclera: Conjunctivae normal.      Pupils: Pupils are equal, round, and reactive to light.   Neck:      Thyroid: No thyromegaly.      Trachea: No tracheal deviation.   Cardiovascular:      Rate and Rhythm: Normal rate and regular rhythm.      Heart sounds: Normal heart sounds.   Pulmonary:      Effort: Pulmonary effort is normal.      Breath sounds: Normal breath sounds.   Abdominal:      General: Bowel sounds are normal. There is no distension.      Palpations: Abdomen is soft. There is no mass.      Tenderness: There is no abdominal tenderness.   Skin:     Findings: No rash.   Neurological:      Comments: Neuro intact througout   Psychiatric:         Behavior: Behavior normal.         Thought Content: Thought content normal.         Judgment: Judgment normal.       Labs:   No results found for this or any previous visit (from the past 336 hour(s)).  CMP  Sodium   Date Value Ref Range Status   05/01/2023 143 136 - 145 mmol/L Final     Potassium   Date Value Ref Range Status   05/01/2023 4.8 3.5 - 5.1 mmol/L Final     Chloride   Date Value Ref Range Status   05/01/2023 100 95 - 110 mmol/L Final     CO2   Date Value Ref Range Status   05/01/2023 34 (H) 23 - 29 mmol/L Final     Glucose   Date Value Ref Range Status   05/01/2023 109 70 - 110 mg/dL Final     BUN   Date Value Ref Range Status   05/01/2023 24 (H) 8 - 23 mg/dL Final     Creatinine   Date Value Ref Range Status   05/01/2023 1.2 0.5 - 1.4 mg/dL Final   10/14/2012 1.1 0.2 - 1.4 mg/dl Final     Calcium   Date Value Ref Range Status   05/01/2023 9.6 " 8.7 - 10.5 mg/dL Final   10/14/2012 8.3 (L) 8.6 - 10.2 mg/dl Final     Total Protein   Date Value Ref Range Status   03/09/2023 6.2 6.0 - 8.4 g/dL Final     Albumin   Date Value Ref Range Status   04/26/2023 2.9 (L) 3.5 - 5.2 g/dL Final     Total Bilirubin   Date Value Ref Range Status   03/09/2023 0.4 0.1 - 1.0 mg/dL Final     Comment:     For infants and newborns, interpretation of results should be based  on gestational age, weight and in agreement with clinical  observations.    Premature Infant recommended reference ranges:  Up to 24 hours.............<8.0 mg/dL  Up to 48 hours............<12.0 mg/dL  3-5 days..................<15.0 mg/dL  6-29 days.................<15.0 mg/dL       Alkaline Phosphatase   Date Value Ref Range Status   03/09/2023 94 55 - 135 U/L Final   10/10/2012 179 (H) 23 - 119 UNIT/L Final     AST   Date Value Ref Range Status   03/09/2023 9 (L) 10 - 40 U/L Final   10/10/2012 19 10 - 30 UNIT/L Final     ALT   Date Value Ref Range Status   03/09/2023 6 (L) 10 - 44 U/L Final     Anion Gap   Date Value Ref Range Status   05/01/2023 9 8 - 16 mmol/L Final   10/14/2012 7 5 - 15 meq/L Final     eGFR if    Date Value Ref Range Status   07/20/2022 51.4 (A) >60 mL/min/1.73 m^2 Final   07/20/2022 46.7 (A) >60 mL/min/1.73 m^2 Final     eGFR if non    Date Value Ref Range Status   07/20/2022 44.6 (A) >60 mL/min/1.73 m^2 Final     Comment:     Calculation used to obtain the estimated glomerular filtration  rate (eGFR) is the CKD-EPI equation.      07/20/2022 40.5 (A) >60 mL/min/1.73 m^2 Final     Comment:     Calculation used to obtain the estimated glomerular filtration  rate (eGFR) is the CKD-EPI equation.        No results found for: CEA  No results found for: PSA        Assessment/Plan:     Problem List Items Addressed This Visit       Anemia of chronic disease     Patient is doing ok from this standpoint.  Her Hb is stable at this time and will continue to monitor this  conservatively.  She is due for TAVR next week.  I doubt there will be significant blood loss with this procedure.      Will have her back with me in three months.            Relevant Orders    CBC Auto Differential    Comprehensive Metabolic Panel     Discussion:     Follow up in about 3 months (around 8/17/2023).      Electronically signed by Cristian Felix

## 2023-05-17 NOTE — ASSESSMENT & PLAN NOTE
Patient is doing ok from this standpoint.  Her Hb is stable at this time and will continue to monitor this conservatively.  She is due for TAVR next week.  I doubt there will be significant blood loss with this procedure.      Will have her back with me in three months.

## 2023-05-17 NOTE — TELEPHONE ENCOUNTER
"Message sent to patient via [facility] regarding the ALD referral and to determine if she would like to schedule an appointment on 23 with a 6 minute walk test prior to the appointment with Dr. Linder.    ----- Message from Med Linder MD sent at 2023  1:04 PM CDT -----  Regarding: RE: New Referral  ALD routine    Testinmwt with continuous/1minute interval SPO2 monitoring    ----- Message -----  From: Yessica Figueroa RN  Sent: 2023  11:43 AM CDT  To: Med Linder MD  Subject: New Referral                                     Lung Transplant Referral entered, but per the note it appears to be an ALD Referral  Comments in the referral stated, "ILD evaluation: nintedanib or pirfenidone? Per clinic note, referred to ILD clinic  (Can we change to ALD Referral?)    Referral from:  Matt Toro MD    Lung diagnosis: ILD;     Age: 73 y.o.    Height/Weight/BMI:  HT: 5'2" / WT: 88 kg /Body mass index is 35.48 kg/m².    Smoking history: Social History    Tobacco Use      Smoking status: Former        Packs/day: 2.00        Years: 40.00        Pack years: 80        Types: Cigarettes        Quit date: 2009        Years since quittin.4      Smokeless tobacco: Never    PFT date: 2023  FVC          0.95 / 37.8  FEV1        0.69 / 35.9  FEV1/FVC      73  TLC          2.25 / 48.8  DLCO      13.58 / 15.1    No 6 minute walk test noted -- on 4 L NC at baseline     CXR date: 2023  Impression:  Bilateral diffuse interstitial lung opacities are mildly decreased at the lung bases.  2.  Mild decrease in size of enlarged cardiomediastinal silhouette.    Chest CTA date: 2023  Impression:  1.  No CT evidence of acute pulmonary thromboembolism.   2.  Diffuse inter lobar and interlobular septal thickening with evidence of moderate peribronchial cuffing on a chronic basis due to underlying interstitial fibrotic process.    Echo date: 2023    Impression:  · Moderate left " atrial enlargement.  · The left ventricle is mildly enlarged with mild concentric hypertrophy and normal systolic function.  · The estimated ejection fraction is 60%.  · Grade II left ventricular diastolic dysfunction.  · Mild right atrial enlargement.  · Normal right ventricular size with normal right ventricular systolic function.  · Aortic valve area is 1.16 cm2; peak velocity is 4.03 m/s; mean gradient is 34 mmHg.  · There is moderate-to-severe aortic valve stenosis.  · Mild-to-moderate aortic regurgitation.  · Mild to moderate tricuspid regurgitation.  · Intermediate central venous pressure (8 mmHg).  · There is pulmonary hypertension. The estimated PA systolic pressure is 51 mmHg.  · Trivial pericardial effusion.      Other pertinent medical history:  COPD, asbestosis, recent COVID pneumonia, CAD, HFpEF, severe aortic stenosis; CAD - RCA stents, HTN, PVD, major depressive disorder    recent cardiac cath that showed mild in-stent restenosis of her RCA stents not requiring intervention. Going for TAVR 5/25/23, as well as possibly a PPM.     Hospitalization last month for a heart failure exacerbation in the setting of her known severe AS. She was also hospitalized in January for CAP.      Recommendations?

## 2023-05-18 ENCOUNTER — HOSPITAL ENCOUNTER (OUTPATIENT)
Dept: RADIOLOGY | Facility: HOSPITAL | Age: 73
Discharge: HOME | End: 2023-05-18
Attending: INTERNAL MEDICINE
Payer: MEDICARE

## 2023-05-18 ENCOUNTER — PATIENT MESSAGE (OUTPATIENT)
Dept: TRANSPLANT | Facility: CLINIC | Age: 73
End: 2023-05-18
Payer: MEDICARE

## 2023-05-18 ENCOUNTER — OFFICE VISIT (OUTPATIENT)
Dept: CARDIOLOGY | Facility: CLINIC | Age: 73
End: 2023-05-18
Payer: MEDICARE

## 2023-05-18 ENCOUNTER — TELEPHONE (OUTPATIENT)
Dept: CARDIOTHORACIC SURGERY | Facility: CLINIC | Age: 73
End: 2023-05-18

## 2023-05-18 VITALS
OXYGEN SATURATION: 89 % | BODY MASS INDEX: 32.37 KG/M2 | DIASTOLIC BLOOD PRESSURE: 74 MMHG | SYSTOLIC BLOOD PRESSURE: 177 MMHG | HEART RATE: 87 BPM | HEIGHT: 64 IN | WEIGHT: 189.63 LBS

## 2023-05-18 DIAGNOSIS — I50.32 CHRONIC DIASTOLIC HEART FAILURE: ICD-10-CM

## 2023-05-18 DIAGNOSIS — J96.11 CHRONIC RESPIRATORY FAILURE WITH HYPOXIA: Chronic | ICD-10-CM

## 2023-05-18 DIAGNOSIS — I35.0 NONRHEUMATIC AORTIC VALVE STENOSIS: ICD-10-CM

## 2023-05-18 DIAGNOSIS — I70.0 AORTIC ATHEROSCLEROSIS: ICD-10-CM

## 2023-05-18 PROCEDURE — 99214 PR OFFICE/OUTPT VISIT, EST, LEVL IV, 30-39 MIN: ICD-10-PCS | Mod: S$PBB,NTX,, | Performed by: PHYSICIAN ASSISTANT

## 2023-05-18 PROCEDURE — 99999 PR PBB SHADOW E&M-EST. PATIENT-LVL V: CPT | Mod: PBBFAC,TXP,, | Performed by: PHYSICIAN ASSISTANT

## 2023-05-18 PROCEDURE — 74174 CTA ABD&PLVS W/CONTRAST: CPT | Mod: TC,NTX

## 2023-05-18 PROCEDURE — 99215 OFFICE O/P EST HI 40 MIN: CPT | Mod: PBBFAC,25,TXP | Performed by: PHYSICIAN ASSISTANT

## 2023-05-18 PROCEDURE — 74174 CTA CARDIAC TAVR_PARTNERS (XPD): ICD-10-PCS | Mod: 26,NTX,, | Performed by: RADIOLOGY

## 2023-05-18 PROCEDURE — 71275 CT ANGIOGRAPHY CHEST: CPT | Mod: 26,NTX,, | Performed by: RADIOLOGY

## 2023-05-18 PROCEDURE — 99999 PR PBB SHADOW E&M-EST. PATIENT-LVL V: ICD-10-PCS | Mod: PBBFAC,TXP,, | Performed by: PHYSICIAN ASSISTANT

## 2023-05-18 PROCEDURE — 99214 OFFICE O/P EST MOD 30 MIN: CPT | Mod: S$PBB,NTX,, | Performed by: PHYSICIAN ASSISTANT

## 2023-05-18 PROCEDURE — 71275 CT ANGIOGRAPHY CHEST: CPT | Mod: TC,TXP

## 2023-05-18 PROCEDURE — 25500020 PHARM REV CODE 255: Mod: TXP | Performed by: INTERNAL MEDICINE

## 2023-05-18 PROCEDURE — 74174 CTA ABD&PLVS W/CONTRAST: CPT | Mod: 26,NTX,, | Performed by: RADIOLOGY

## 2023-05-18 PROCEDURE — 71275 CTA CARDIAC TAVR_PARTNERS (XPD): ICD-10-PCS | Mod: 26,NTX,, | Performed by: RADIOLOGY

## 2023-05-18 RX ADMIN — IOHEXOL 100 ML: 350 INJECTION, SOLUTION INTRAVENOUS at 10:05

## 2023-05-18 NOTE — TELEPHONE ENCOUNTER
----- Message from Marina Varghese sent at 5/18/2023 12:24 PM CDT -----  Regarding: Appt  Contact: Daughter @ [phone]  Pt's daughter is calling to get urgent appt for Monday. Asking for an urgent call back. Message is for [place]

## 2023-05-18 NOTE — PROGRESS NOTES
Subjective:     Referring Physician: Dr Borjas     HPI  Betty Bacon is a 73 y.o. female who presents for evaluation of severe, symptomatic aortic stenosis.     The patient has a history of restrictive lung disease, asbestosis, anemia, peripheral vascular disease and moderate aortic stenosis in her last echo in December.  She has had several admissions over the past 7 months.  All of these have been due to volume overload.  She was sent to us to be evaluated for the aortic stenosis treatment.  Patient does not have a recent echo.  She has not had an angiogram in the last 9 years.  Of note on her last echo her LV function was normal.    Betty Bacon is a 73 y.o. female referred by Dr Borjas for evaluation of severe AS (NYHA Class IV sx).    The patient has undergone the following TAVR work-up:   ECHO (Date 5/1/23): RAMIRO= 1.16 cm2 (AVAi 0.58), MG= 34 mmHg, Peak Mikey= 4.03 m/s, EF= 60%.   LHC (Date 5/1/23): mild (20%) RCA ISR. Otherwise normal coronaries.   STS: 4%   Frailty: 3/4   Iliacs are >5.96 on R and > 6.13 on L   LVOT area by CTA is 4.65 cm2 (26.4 mm X 25.8 mm) and Avg Diameter is 24.3 per Dr Blevins  Incidental findings on CT: 1.7 cm right upper pole renal lesion. Sent to PCP  CT Surgery risk assessment: needs  Rhythm issues: none  PFTs: FEV1 35.9 % pred, DLCO 15% pred  KCCQ/5 meter walk: needs  Comorbidities: severe restrictive lung disease on supplemental O2         NYHA:IV CCS:0    Review of Systems   Constitutional: Negative for chills and fever.   HENT:  Negative for sore throat.    Eyes:  Negative for blurred vision.   Cardiovascular:  Positive for dyspnea on exertion. Negative for chest pain, claudication, cyanosis, irregular heartbeat, leg swelling, near-syncope, orthopnea, palpitations, paroxysmal nocturnal dyspnea and syncope.   Respiratory:  Negative for cough and sputum production.    Hematologic/Lymphatic: Does not bruise/bleed easily.   Skin:  Negative for itching, rash  and suspicious lesions.   Musculoskeletal:  Negative for falls.   Gastrointestinal:  Negative for abdominal pain and change in bowel habit.   Genitourinary:  Negative for dysuria.   Neurological:  Negative for disturbances in coordination, dizziness and loss of balance.   Psychiatric/Behavioral:  Negative for altered mental status.         Past Medical History:   Diagnosis Date    Acute coronary artery obstruction without MI 10/2012    Benign hypertension     COPD (chronic obstructive pulmonary disease)     Coronary artery disease     Disorder of kidney and ureter     Dr. Morrow    Hepatitis B core antibody positive 03/03/2021    Negative sAg, suggests previous exposure but no chronic/active Hep B. At risk for reactivation with any immunosuppression medication, steroids, chemo, etc.      History of electroconvulsive therapy     Hyperlipidemia LDL goal < 70     Left ankle sprain     Major depressive disorder, recurrent episode, severe     s/p ECT    Positive AKIRA (antinuclear antibody) 03/03/2021    PVD (peripheral vascular disease)        Current Outpatient Medications:     acetaminophen (TYLENOL) 325 MG tablet, Take 325 mg by mouth every 6 (six) hours as needed for Pain., Disp: , Rfl:     albuterol sulfate (PROAIR RESPICLICK) 90 mcg/actuation AePB, Inhale 2 puffs into the lungs every 4 to 6 hours as needed., Disp: 1 each, Rfl: 3    albuterol-ipratropium (DUO-NEB) 2.5 mg-0.5 mg/3 mL nebulizer solution, Take 3 mLs by nebulization every 6 (six) hours as needed for Wheezing. Rescue, Disp: 75 mL, Rfl: 11    atorvastatin (LIPITOR) 40 MG tablet, TAKE 1 TABLET BY MOUTH EVERY DAY (Patient taking differently: Take 40 mg by mouth once daily.), Disp: 90 tablet, Rfl: 3    clonazePAM (KLONOPIN) 1 MG disintegrating tablet, Take 1 tablet (1 mg total) by mouth 2 (two) times daily as needed (anxiety)., Disp: 60 tablet, Rfl: 1    clopidogreL (PLAVIX) 75 mg tablet, TAKE 1 TABLET BY MOUTH EVERY DAY (Patient taking differently: Take 75  mg by mouth once daily.), Disp: 90 tablet, Rfl: 3    colchicine (COLCRYS) 0.6 mg tablet, Take 2 po at gout flare onset, may repeat 1 in an hour prn, then 1 po bid until pain resolves ,or n/V/D starts (Patient taking differently: Take 0.6 mg by mouth as needed. Take 2 po at gout flare onset, may repeat 1 in an hour prn, then 1 po bid until pain resolves ,or n/V/D starts), Disp: 20 tablet, Rfl: 0    colestipoL (COLESTID) 1 gram Tab, Take 1 tablet (1 g total) by mouth 2 (two) times daily as needed (diarrhea)., Disp: 180 tablet, Rfl: 3    esomeprazole (NEXIUM) 40 MG capsule, TAKE 1 CAPSULE BY MOUTH BEFORE BREAKFAST. (Patient taking differently: Take 40 mg by mouth before breakfast.), Disp: 90 capsule, Rfl: 3    fluticasone propionate (FLONASE) 50 mcg/actuation nasal spray, 1 spray (50 mcg total) by Each Nostril route once daily., Disp: 16 g, Rfl: PRN    fluticasone-umeclidin-vilanter (TRELEGY ELLIPTA) 200-62.5-25 mcg inhaler, Inhale 1 puff into the lungs once daily., Disp: 180 each, Rfl: 11    furosemide (LASIX) 40 MG tablet, Take 1 tablet (40 mg total) by mouth once daily for 3 days, THEN 1 tablet (40 mg total) every 48 hours., Disp: 30 tablet, Rfl: 11    isosorbide mononitrate (IMDUR) 60 MG 24 hr tablet, Take 60 mg by mouth once daily., Disp: , Rfl:     metoprolol succinate (TOPROL-XL) 50 MG 24 hr tablet, Take 1 tablet (50 mg total) by mouth once daily. (Patient taking differently: Take 50 mg by mouth 2 (two) times daily.), Disp: 90 tablet, Rfl: 0    paroxetine (PAXIL) 10 MG tablet, Take 10 mg by mouth every morning. Total 50 mg, Disp: , Rfl:     paroxetine (PAXIL) 40 MG tablet, TAKE 1 TABLET BY MOUTH EVERY DAY (Patient taking differently: Take 40 mg by mouth once daily.), Disp: 90 tablet, Rfl: 3    QUEtiapine (SEROQUEL) 100 MG Tab, Take 100 mg by mouth once daily., Disp: , Rfl:     alendronate (FOSAMAX) 70 MG tablet, TAKE 1 TABLET BY MOUTH ONE TIME PER WEEK (Patient taking differently: Take 70 mg by mouth as  "needed.), Disp: 12 tablet, Rfl: 3    aspirin 81 mg Tab, Take 81 mg by mouth once daily. PATIENT NOT CURRENTLY TAKING, Disp: , Rfl:     cyanocobalamin (VITAMIN B-12) 1000 MCG tablet, Take 1 tablet (1,000 mcg total) by mouth once daily., Disp: 30 tablet, Rfl: 0    ergocalciferol (ERGOCALCIFEROL) 50,000 unit Cap, Take 1 capsule (50,000 Units total) by mouth every 7 days., Disp: 12 capsule, Rfl: 3    magnesium oxide (MAG-OX) 400 mg (241.3 mg magnesium) tablet, Take 2 tablets (800 mg total) by mouth 2 (two) times daily., Disp: 360 tablet, Rfl: 0    Current Facility-Administered Medications:     sodium chloride 0.9% flush 10 mL, 10 mL, Intravenous, PRN, Neeraj Finn MD    sodium chloride 0.9% flush 10 mL, 10 mL, Intravenous, PRN, Neeraj Finn MD    Objective:    Physical Exam  Vitals reviewed.   Constitutional:       General: She is not in acute distress.     Appearance: She is well-developed. She is not diaphoretic.   HENT:      Head: Normocephalic and atraumatic.   Neck:      Vascular: No JVD.   Cardiovascular:      Rate and Rhythm: Normal rate and regular rhythm.      Pulses: Intact distal pulses.      Heart sounds: Murmur heard.   Harsh midsystolic murmur is present at the upper right sternal border radiating to the neck.   Pulmonary:      Effort: Pulmonary effort is normal. No respiratory distress.   Musculoskeletal:      Cervical back: Normal range of motion.      Right lower leg: No edema.      Left lower leg: No edema.   Skin:     General: Skin is warm and dry.   Neurological:      Mental Status: She is alert and oriented to person, place, and time.           Vitals:    05/18/23 1314 05/18/23 1316   BP: (!) 167/70 (!) 177/74   BP Location: Right arm Left arm   Patient Position: Sitting Sitting   BP Method: Large (Automatic) Large (Automatic)   Pulse: 87 87   SpO2: (!) 89%    Weight: 86 kg (189 lb 9.5 oz)    Height: 5' 4.17" (1.63 m)      Body mass index is 32.37 kg/m².    Test(s) Reviewed  I have " reviewed the following in detail:  [] Stress test   [] Angiography   [x] Echocardiogram   [x] Labs   [] Other       Chemistry        Component Value Date/Time     05/01/2023 1039    K 4.8 05/01/2023 1039     05/01/2023 1039    CO2 34 (H) 05/01/2023 1039    BUN 24 (H) 05/01/2023 1039    CREATININE 1.2 05/01/2023 1039    CREATININE 1.1 10/14/2012 0315     05/01/2023 1039        Component Value Date/Time    CALCIUM 9.6 05/01/2023 1039    CALCIUM 8.3 (L) 10/14/2012 0315    ALKPHOS 94 03/09/2023 1417    ALKPHOS 179 (H) 10/10/2012 1525    AST 9 (L) 03/09/2023 1417    AST 19 10/10/2012 1525    ALT 6 (L) 03/09/2023 1417    BILITOT 0.4 03/09/2023 1417    ESTGFRAFRICA 51.4 (A) 07/20/2022 1303    ESTGFRAFRICA 46.7 (A) 07/20/2022 1303    EGFRNONAA 44.6 (A) 07/20/2022 1303    EGFRNONAA 40.5 (A) 07/20/2022 1303            TTE 5/1/23  Moderate left atrial enlargement.  The left ventricle is mildly enlarged with mild concentric hypertrophy and normal systolic function.  The estimated ejection fraction is 60%.  Grade II left ventricular diastolic dysfunction.  Mild right atrial enlargement.  Normal right ventricular size with normal right ventricular systolic function.  Aortic valve area is 1.16 cm2; peak velocity is 4.03 m/s; mean gradient is 34 mmHg.  There is moderate-to-severe aortic valve stenosis.  Mild-to-moderate aortic regurgitation.  Mild to moderate tricuspid regurgitation.  Intermediate central venous pressure (8 mmHg).  There is pulmonary hypertension. The estimated PA systolic pressure is 51 mmHg.  Trivial pericardial effusion.    Assessment:   Nonrheumatic aortic valve stenosis  Betty Bacon is a 73 y.o. female referred by Dr Borjas for evaluation of severe AS (NYHA Class IV sx).    The patient has undergone the following TAVR work-up:   ECHO (Date 5/1/23): RAMIRO= 1.16 cm2 (AVAi 0.58), MG= 34 mmHg, Peak Mikey= 4.03 m/s, EF= 60%.   [facility] (Date 5/1/23): mild (20%) RCA ISR. Otherwise normal  coronaries.   STS: 4%   Frailty: 3/4   Iliacs are >5.96 on R and > 6.13 on L   LVOT area by CTA is 4.65 cm2 (26.4 mm X 25.8 mm) and Avg Diameter is 24.3 per Dr Blevins  Incidental findings on CT: 1.7 cm right upper pole renal lesion. Sent to PCP  CT Surgery risk assessment: needs  Rhythm issues: none  PFTs: FEV1 35.9 % pred, DLCO 15% pred  KCCQ/5 meter walk: needs  Comorbidities: severe restrictive lung disease on supplemental O2     Chronic diastolic heart failure  NYHA IV with multiple hospitalizations. Proceed with TAVR    Aortic atherosclerosis  See imaging. Follow up with Cardiology.     Chronic respiratory failure with hypoxia  On 6L O2 continuous    Plan:     Patient will come back next week to see Dr Carver. We will see her in Dr Blevins's clinic to sign consents and finalize TAVR plans.              Sheela Talamantes PA-C  Valve and Structural Heart Disease  [facility]

## 2023-05-19 ENCOUNTER — DOCUMENT SCAN (OUTPATIENT)
Dept: HOME HEALTH SERVICES | Facility: HOSPITAL | Age: 73
End: 2023-05-19
Payer: MEDICARE

## 2023-05-23 ENCOUNTER — DOCUMENTATION ONLY (OUTPATIENT)
Dept: TRANSPLANT | Facility: CLINIC | Age: 73
End: 2023-05-23
Payer: MEDICARE

## 2023-05-23 ENCOUNTER — TELEPHONE (OUTPATIENT)
Dept: TRANSPLANT | Facility: CLINIC | Age: 73
End: 2023-05-23
Payer: MEDICARE

## 2023-05-23 DIAGNOSIS — J84.9 INTERSTITIAL LUNG DISEASE: Primary | ICD-10-CM

## 2023-05-23 NOTE — PROGRESS NOTES
Subjective:      Patient ID: Betty Bacon is a 73 y.o. female.    Chief Complaint: No chief complaint on file.      HPI:  Betty Bacon is a 73 y.o. female who presents for surgical evaluation of AS. Medical conditions include restrictive lung disease, asbestosis on O2, anemia, peripheral vascular disease and moderate aortic stenosis in her last echo in December.  She has had several admissions over the past 7 months.  All of these have been due to volume overload.  She was sent to us to be evaluated for the aortic stenosis treatment.  Presented to clinic in a wheelchair.      Betty Bacon is a 73 y.o. female referred by Dr Borjas for evaluation of severe AS (NYHA Class IV sx).     The patient has undergone the following TAVR work-up:   ECHO (Date 5/1/23): RAMIRO= 1.16 cm2 (AVAi 0.58), MG= 34 mmHg, Peak Mikey= 4.03 m/s, EF= 60%.   LHC (Date 5/1/23): mild (20%) RCA ISR. Otherwise normal coronaries.   STS: 4%   Frailty: 3/4   Iliacs are >5.96 on R and > 6.13 on L   LVOT area by CTA is 4.65 cm2 (26.4 mm X 25.8 mm) and Avg Diameter is 24.3 per Dr Blevins  Incidental findings on CT: 1.7 cm right upper pole renal lesion. Sent to PCP  CT Surgery risk assessment: needs  Rhythm issues: none  PFTs: FEV1 35.9 % pred, DLCO 15% pred  KCCQ/5 meter walk: needs  Comorbidities: severe restrictive lung disease on supplemental O2      FEV1 38%  DLCO 15%    Family and social history reviewed    Current Outpatient Medications   Medication Instructions    acetaminophen (TYLENOL) 325 mg, Oral, Every 6 hours PRN    albuterol sulfate (PROAIR RESPICLICK) 90 mcg/actuation AePB 2 puffs, Inhalation, Every 4-6 hours PRN    albuterol-ipratropium (DUO-NEB) 2.5 mg-0.5 mg/3 mL nebulizer solution 3 mLs, Nebulization, Every 6 hours PRN, Rescue    alendronate (FOSAMAX) 70 MG tablet TAKE 1 TABLET BY MOUTH ONE TIME PER WEEK    aspirin 81 mg, Oral, Daily, PATIENT NOT CURRENTLY TAKING    atorvastatin (LIPITOR) 40 MG tablet TAKE  1 TABLET BY MOUTH EVERY DAY    clonazePAM (KLONOPIN) 1 mg, Oral, 2 times daily PRN    clopidogreL (PLAVIX) 75 mg tablet TAKE 1 TABLET BY MOUTH EVERY DAY    colchicine (COLCRYS) 0.6 mg tablet Take 2 po at gout flare onset, may repeat 1 in an hour prn, then 1 po bid until pain resolves ,or n/V/D starts    colestipoL (COLESTID) 1 g, Oral, 2 times daily PRN    cyanocobalamin (VITAMIN B-12) 1,000 mcg, Oral, Daily    ergocalciferol (ERGOCALCIFEROL) 50,000 Units, Oral, Every 7 days    esomeprazole (NEXIUM) 40 MG capsule TAKE 1 CAPSULE BY MOUTH BEFORE BREAKFAST.    fluticasone propionate (FLONASE) 50 mcg, Each Nostril, Daily    fluticasone-umeclidin-vilanter (TRELEGY ELLIPTA) 200-62.5-25 mcg inhaler 1 puff, Inhalation, Daily    furosemide (LASIX) 40 MG tablet Take 1 tablet (40 mg total) by mouth once daily for 3 days, THEN 1 tablet (40 mg total) every 48 hours.    isosorbide mononitrate (IMDUR) 60 mg, Oral, Daily    magnesium oxide (MAG-OX) 800 mg, Oral, 2 times daily    metoprolol succinate (TOPROL-XL) 50 mg, Oral, Daily    paroxetine (PAXIL) 40 MG tablet TAKE 1 TABLET BY MOUTH EVERY DAY    paroxetine (PAXIL) 10 mg, Oral, Every morning, Total 50 mg    QUEtiapine (SEROQUEL) 100 mg, Oral, Daily         Review of patient's allergies indicates:   Allergen Reactions    Propoxyphene n-acetaminophen Other (See Comments)     Other reaction(s): Unknown    Codeine Anxiety     Past Medical History:   Diagnosis Date    Acute coronary artery obstruction without MI 10/2012    Benign hypertension     COPD (chronic obstructive pulmonary disease)     Coronary artery disease     Disorder of kidney and ureter     Dr. Morrow    Hepatitis B core antibody positive 03/03/2021    Negative sAg, suggests previous exposure but no chronic/active Hep B. At risk for reactivation with any immunosuppression medication, steroids, chemo, etc.      History of electroconvulsive therapy     Hyperlipidemia LDL goal < 70     Left ankle sprain     Major  depressive disorder, recurrent episode, severe     s/p ECT    Positive AKIRA (antinuclear antibody) 2021    PVD (peripheral vascular disease)      Past Surgical History:   Procedure Laterality Date    ANGIOGRAM, CORONARY, WITH LEFT HEART CATHETERIZATION N/A 2023    Procedure: Angiogram, Coronary, with Left Heart Cath;  Surgeon: Neeraj Finn MD;  Location: [facility] CATH LAB;  Service: Cardiology;  Laterality: N/A;    CHOLECYSTECTOMY      CORONARY ANGIOPLASTY      CORONARY ANGIOPLASTY WITH STENT PLACEMENT  10/2012    2 stents RCA (100% stenosis)    EYE SURGERY      cataract surgery    HYSTERECTOMY      LINA, ovaries intact. uterine prolapse    ILIAC ARTERY STENT      SMALL BOWEL ENTEROSCOPY N/A 2023    Procedure: ENTEROSCOPY;  Surgeon: Margy Dumont MD;  Location: [facility] ENDO;  Service: Endoscopy;  Laterality: N/A;    TONSILLECTOMY      TOTAL VAGINAL HYSTERECTOMY       Family History       Problem Relation (Age of Onset)    Breast cancer Sister    Cancer Sister    Dementia Sister    Diabetes Mother, Maternal Aunt, Sister    Heart disease Mother, Father, Brother, Maternal Aunt, Maternal Uncle, Paternal Aunt, Paternal Uncle, Maternal Grandfather, Sister    Hypertension Daughter    Kidney disease Sister    Liver disease Sister    Stroke Father, Brother, Sister          Social History     Socioeconomic History    Marital status:    Tobacco Use    Smoking status: Former     Packs/day: 2.00     Years: 40.00     Pack years: 80.00     Types: Cigarettes     Quit date: 2009     Years since quittin.4    Smokeless tobacco: Never   Substance and Sexual Activity    Alcohol use: Yes     Comment: social    Drug use: No    Sexual activity: Never     Birth control/protection: Abstinence     Social Determinants of Health     Financial Resource Strain: Low Risk     Difficulty of Paying Living Expenses: Not hard at all   Food Insecurity: No Food Insecurity    Worried About Running Out of Food in the Last  Year: Never true    Ran Out of Food in the Last Year: Never true   Transportation Needs: No Transportation Needs    Lack of Transportation (Medical): No    Lack of Transportation (Non-Medical): No   Physical Activity: Insufficiently Active    Days of Exercise per Week: 3 days    Minutes of Exercise per Session: 30 min   Stress: Stress Concern Present    Feeling of Stress : To some extent   Social Connections: Socially Isolated    Frequency of Communication with Friends and Family: More than three times a week    Frequency of Social Gatherings with Friends and Family: More than three times a week    Attends Hoahaoism Services: Never    Active Member of Clubs or Organizations: No    Attends Club or Organization Meetings: Never    Marital Status:    Housing Stability: Low Risk     Unable to Pay for Housing in the Last Year: No    Number of Places Lived in the Last Year: 1    Unstable Housing in the Last Year: No       Current medications Reviewed    Review of Systems   Constitutional:  Positive for activity change.   HENT:  Negative for nosebleeds.    Eyes:  Negative for visual disturbance.   Respiratory:  Positive for shortness of breath.    Cardiovascular:  Negative for chest pain.   Gastrointestinal:  Negative for nausea.   Musculoskeletal:  Negative for gait problem.   Skin:  Negative for color change.   Neurological:  Negative for seizures.   Hematological:  Does not bruise/bleed easily.   Psychiatric/Behavioral:  Negative for sleep disturbance.    Objective:   Physical Exam  Constitutional:       General: She is not in acute distress.  HENT:      Head: Normocephalic and atraumatic.   Eyes:      Pupils: Pupils are equal, round, and reactive to light.   Cardiovascular:      Rate and Rhythm: Normal rate.   Pulmonary:      Effort: Pulmonary effort is normal. No respiratory distress.   Musculoskeletal:         General: Normal range of motion.      Cervical back: Normal range of motion.   Skin:     Coloration:  Skin is not pale.   Neurological:      General: No focal deficit present.      Mental Status: She is alert.   Psychiatric:         Mood and Affect: Mood normal.         Behavior: Behavior normal.       Diagnostic Results: reviewed   TTE 5/1/23  Moderate left atrial enlargement.  The left ventricle is mildly enlarged with mild concentric hypertrophy and normal systolic function.  The estimated ejection fraction is 60%.  Grade II left ventricular diastolic dysfunction.  Mild right atrial enlargement.  Normal right ventricular size with normal right ventricular systolic function.  Aortic valve area is 1.16 cm2; peak velocity is 4.03 m/s; mean gradient is 34 mmHg.  There is moderate-to-severe aortic valve stenosis.  Mild-to-moderate aortic regurgitation.  Mild to moderate tricuspid regurgitation.  Intermediate central venous pressure (8 mmHg).  There is pulmonary hypertension. The estimated PA systolic pressure is 51 mmHg.  Trivial pericardial effusion.  Assessment:   AS  Plan:     I have seen the patient and reviewed the physician assistant's note above. I have personally interviewed and examined the patient at bedside and agree with the findings.     73 y.o. female with multiple comorbidities presented with symptomatic severe aortic stenosis. Deemed inoperable for surgical aortic valve replacement (SAVR) due to restrictive lung disease on home oxygen and frailty.  Appropriate candidate for TAVR evaluation.  We explained the patient's condition, pathology and prognosis of severe aortic stenosis, treatment options including medical therapy, SAVR and TAVR, details of SAVR and the TAVR procedure, risks and benefits of SAVR and TAVR including myocardial infarction, stroke, pacemaker, bleeding, infection, acute renal injury, conversion to open surgery, need for emergency mechanical circulatory support, and potential complications and recovery course with patient and family, and also durability of surgical vs transcatheter  valve and options for reintervention in the future.  Patient and family understood and agreed to proceed. All questions answered.    We spent >70 minutes reviewing, examining and evaluating this patient, including reviewing relevant diagnostic studies, and 50% of which were spent on patient counseling including the patient's condition, diagnosis, prognosis, expected recovery after SAVR vs TAVR, follow-up plan and next steps.      Shared Decision Making:  This patient was seen by a multidisciplinary heart team involving both a Structural Heart Specialist (Interventional Cardiologist) and a Cardiothoracic Surgeon. The patient was involved in shared decision-making to define clearer goals for treatment and align health decisions with their current values.  The patient understands the risks and expected benefits of their treatment options.        Ruddy Carver MD  Cardiothoracic Surgery  [facility]

## 2023-05-23 NOTE — TELEPHONE ENCOUNTER
Patient's daughter replied to the portal message sent by Yessica Figueroa RN regarding the advanced lung disease referral ordered by Dr. Toro. Called Mrs. Sanchez to inform her that we were able to schedule the 6 MWT and clinic visit with Dr. Linder on 5/24/23 per her request. Reviewed the times for all appointments on 5/24, which Mrs. Sanchze accepted for her mother. She denied having any questions at this time.

## 2023-05-24 ENCOUNTER — OFFICE VISIT (OUTPATIENT)
Dept: CARDIOLOGY | Facility: CLINIC | Age: 73
End: 2023-05-24
Payer: MEDICARE

## 2023-05-24 ENCOUNTER — OFFICE VISIT (OUTPATIENT)
Dept: TRANSPLANT | Facility: CLINIC | Age: 73
End: 2023-05-24
Payer: MEDICARE

## 2023-05-24 ENCOUNTER — OFFICE VISIT (OUTPATIENT)
Dept: CARDIOTHORACIC SURGERY | Facility: CLINIC | Age: 73
End: 2023-05-24
Payer: MEDICARE

## 2023-05-24 ENCOUNTER — HOSPITAL ENCOUNTER (OUTPATIENT)
Dept: PULMONOLOGY | Facility: CLINIC | Age: 73
Discharge: HOME | End: 2023-05-24
Payer: MEDICARE

## 2023-05-24 ENCOUNTER — EDUCATION (OUTPATIENT)
Dept: CARDIOLOGY | Facility: CLINIC | Age: 73
End: 2023-05-24
Payer: MEDICARE

## 2023-05-24 ENCOUNTER — PATIENT MESSAGE (OUTPATIENT)
Dept: CARDIOLOGY | Facility: CLINIC | Age: 73
End: 2023-05-24

## 2023-05-24 VITALS
BODY MASS INDEX: 34.6 KG/M2 | HEART RATE: 67 BPM | RESPIRATION RATE: 20 BRPM | WEIGHT: 188 LBS | OXYGEN SATURATION: 90 % | DIASTOLIC BLOOD PRESSURE: 86 MMHG | HEIGHT: 62 IN | TEMPERATURE: 97 F | HEIGHT: 62 IN | WEIGHT: 188 LBS | SYSTOLIC BLOOD PRESSURE: 139 MMHG | BODY MASS INDEX: 34.6 KG/M2

## 2023-05-24 VITALS
HEIGHT: 64 IN | BODY MASS INDEX: 32.1 KG/M2 | RESPIRATION RATE: 20 BRPM | SYSTOLIC BLOOD PRESSURE: 183 MMHG | WEIGHT: 188 LBS | OXYGEN SATURATION: 92 % | HEART RATE: 72 BPM | DIASTOLIC BLOOD PRESSURE: 75 MMHG

## 2023-05-24 VITALS
WEIGHT: 188 LBS | HEART RATE: 74 BPM | DIASTOLIC BLOOD PRESSURE: 67 MMHG | HEIGHT: 62 IN | BODY MASS INDEX: 34.6 KG/M2 | SYSTOLIC BLOOD PRESSURE: 163 MMHG | OXYGEN SATURATION: 91 %

## 2023-05-24 DIAGNOSIS — I50.33 ACUTE ON CHRONIC HEART FAILURE WITH PRESERVED EJECTION FRACTION: ICD-10-CM

## 2023-05-24 DIAGNOSIS — J61 ASBESTOSIS: ICD-10-CM

## 2023-05-24 DIAGNOSIS — I70.0 AORTIC ATHEROSCLEROSIS: ICD-10-CM

## 2023-05-24 DIAGNOSIS — J96.11 CHRONIC RESPIRATORY FAILURE WITH HYPOXIA: ICD-10-CM

## 2023-05-24 DIAGNOSIS — I35.0 NONRHEUMATIC AORTIC VALVE STENOSIS: Primary | ICD-10-CM

## 2023-05-24 DIAGNOSIS — J84.9 INTERSTITIAL LUNG DISEASE: Primary | ICD-10-CM

## 2023-05-24 DIAGNOSIS — Z95.2 S/P TAVR (TRANSCATHETER AORTIC VALVE REPLACEMENT): Primary | ICD-10-CM

## 2023-05-24 DIAGNOSIS — J84.9 INTERSTITIAL LUNG DISEASE: ICD-10-CM

## 2023-05-24 PROCEDURE — 99215 OFFICE O/P EST HI 40 MIN: CPT | Mod: PBBFAC,27,NTX | Performed by: INTERNAL MEDICINE

## 2023-05-24 PROCEDURE — 99204 OFFICE O/P NEW MOD 45 MIN: CPT | Mod: 25,S$PBB,NTX, | Performed by: INTERNAL MEDICINE

## 2023-05-24 PROCEDURE — 99999 PR PBB SHADOW E&M-EST. PATIENT-LVL V: CPT | Mod: PBBFAC,TXP,, | Performed by: INTERNAL MEDICINE

## 2023-05-24 PROCEDURE — 94618 PULMONARY STRESS TESTING: ICD-10-PCS | Mod: 26,S$PBB,NTX, | Performed by: INTERNAL MEDICINE

## 2023-05-24 PROCEDURE — 99213 OFFICE O/P EST LOW 20 MIN: CPT | Mod: PBBFAC,25,27,NTX | Performed by: INTERNAL MEDICINE

## 2023-05-24 PROCEDURE — 99205 PR OFFICE/OUTPT VISIT, NEW, LEVL V, 60-74 MIN: ICD-10-PCS | Mod: S$PBB,NTX,, | Performed by: THORACIC SURGERY (CARDIOTHORACIC VASCULAR SURGERY)

## 2023-05-24 PROCEDURE — 99205 OFFICE O/P NEW HI 60 MIN: CPT | Mod: S$PBB,NTX,, | Performed by: THORACIC SURGERY (CARDIOTHORACIC VASCULAR SURGERY)

## 2023-05-24 PROCEDURE — 99999 PR PBB SHADOW E&M-EST. PATIENT-LVL III: ICD-10-PCS | Mod: PBBFAC,TXP,, | Performed by: INTERNAL MEDICINE

## 2023-05-24 PROCEDURE — 99999 PR PBB SHADOW E&M-EST. PATIENT-LVL V: ICD-10-PCS | Mod: PBBFAC,TXP,, | Performed by: INTERNAL MEDICINE

## 2023-05-24 PROCEDURE — 94618 PULMONARY STRESS TESTING: CPT | Mod: 26,S$PBB,NTX, | Performed by: INTERNAL MEDICINE

## 2023-05-24 PROCEDURE — 99214 OFFICE O/P EST MOD 30 MIN: CPT | Mod: S$PBB,NTX,, | Performed by: INTERNAL MEDICINE

## 2023-05-24 PROCEDURE — 94618 PULMONARY STRESS TESTING: CPT | Mod: PBBFAC,NTX | Performed by: INTERNAL MEDICINE

## 2023-05-24 PROCEDURE — 99214 PR OFFICE/OUTPT VISIT, EST, LEVL IV, 30-39 MIN: ICD-10-PCS | Mod: S$PBB,NTX,, | Performed by: INTERNAL MEDICINE

## 2023-05-24 PROCEDURE — 99204 PR OFFICE/OUTPT VISIT, NEW, LEVL IV, 45-59 MIN: ICD-10-PCS | Mod: 25,S$PBB,NTX, | Performed by: INTERNAL MEDICINE

## 2023-05-24 PROCEDURE — 99999 PR PBB SHADOW E&M-EST. PATIENT-LVL III: CPT | Mod: PBBFAC,TXP,, | Performed by: INTERNAL MEDICINE

## 2023-05-24 NOTE — PROGRESS NOTES
Subjective:     Referring Physician: Dr Suad RODRIGUEZ  Betty Bacon is a 73 y.o. female who presents for evaluation of severe, symptomatic aortic stenosis.     The patient has a history of restrictive lung disease, asbestosis, anemia, peripheral vascular disease and moderate aortic stenosis in her last echo in December.  She has had several admissions over the past 7 months.  All of these have been due to volume overload.  She was sent to us to be evaluated for the aortic stenosis treatment.      Patient states that up until the end of last year she was doing great. Starting about 7 months ago she had a rapid decline in her breathing followed by several hospital admission. She is now on chronic 6L of O2 which is new starting about 6 months ago.       Betty Bacon is a 73 y.o. female referred by Dr Borjas for evaluation of severe AS (NYHA Class IV sx).    The patient has undergone the following TAVR work-up:   ECHO (Date 5/1/23): RAMIRO= 1.16 cm2 (AVAi 0.58), MG= 34 mmHg, Peak Mikey= 4.03 m/s, EF= 60%.   LHC (Date 5/1/23): mild (20%) RCA ISR. Otherwise normal coronaries.   STS: 4%   Frailty: 4/4   Iliacs are >5.96 on R and > 6.13 on L   LVOT area by CTA is 4.65 cm2 (26.4 mm X 25.8 mm) and Avg Diameter is 24.3 per Dr Blevins  Incidental findings on CT: 1.7 cm right upper pole renal lesion. Sent to PCP  CT Surgery risk assessment: inoperable per Dr Carver due to restrictive lung disease on home oxygen and frailty  Rhythm issues: none  PFTs: FEV1 35.9 % pred, DLCO 15% pred  KCCQ/5 meter walk: done  Comorbidities: severe restrictive lung disease on supplemental O2       Betty Bacon is a 29 mm  Evolut valve candidate via RTF access.          NYHA:IV CCS:0    Review of Systems   Constitutional: Negative for chills and fever.   HENT:  Negative for sore throat.    Eyes:  Negative for blurred vision.   Cardiovascular:  Positive for dyspnea on exertion. Negative for chest pain, claudication,  cyanosis, irregular heartbeat, leg swelling, near-syncope, orthopnea, palpitations, paroxysmal nocturnal dyspnea and syncope.   Respiratory:  Negative for cough and sputum production.    Hematologic/Lymphatic: Does not bruise/bleed easily.   Skin:  Negative for itching, rash and suspicious lesions.   Musculoskeletal:  Negative for falls.   Gastrointestinal:  Negative for abdominal pain and change in bowel habit.   Genitourinary:  Negative for dysuria.   Neurological:  Negative for disturbances in coordination, dizziness and loss of balance.   Psychiatric/Behavioral:  Negative for altered mental status.         Past Medical History:   Diagnosis Date    Acute coronary artery obstruction without MI 10/2012    Benign hypertension     COPD (chronic obstructive pulmonary disease)     Coronary artery disease     Disorder of kidney and ureter     Dr. Morrow    Hepatitis B core antibody positive 03/03/2021    Negative sAg, suggests previous exposure but no chronic/active Hep B. At risk for reactivation with any immunosuppression medication, steroids, chemo, etc.      History of electroconvulsive therapy     Hyperlipidemia LDL goal < 70     Left ankle sprain     Major depressive disorder, recurrent episode, severe     s/p ECT    Positive AKIRA (antinuclear antibody) 03/03/2021    PVD (peripheral vascular disease)        Current Outpatient Medications:     acetaminophen (TYLENOL) 325 MG tablet, Take 325 mg by mouth every 6 (six) hours as needed for Pain., Disp: , Rfl:     albuterol sulfate (PROAIR RESPICLICK) 90 mcg/actuation AePB, Inhale 2 puffs into the lungs every 4 to 6 hours as needed., Disp: 1 each, Rfl: 3    albuterol-ipratropium (DUO-NEB) 2.5 mg-0.5 mg/3 mL nebulizer solution, Take 3 mLs by nebulization every 6 (six) hours as needed for Wheezing. Rescue, Disp: 75 mL, Rfl: 11    atorvastatin (LIPITOR) 40 MG tablet, TAKE 1 TABLET BY MOUTH EVERY DAY (Patient taking differently: Take 40 mg by mouth once daily.), Disp: 90  tablet, Rfl: 3    clonazePAM (KLONOPIN) 1 MG disintegrating tablet, Take 1 tablet (1 mg total) by mouth 2 (two) times daily as needed (anxiety)., Disp: 60 tablet, Rfl: 1    clopidogreL (PLAVIX) 75 mg tablet, TAKE 1 TABLET BY MOUTH EVERY DAY (Patient taking differently: Take 75 mg by mouth once daily.), Disp: 90 tablet, Rfl: 3    colchicine (COLCRYS) 0.6 mg tablet, Take 2 po at gout flare onset, may repeat 1 in an hour prn, then 1 po bid until pain resolves ,or n/V/D starts (Patient taking differently: Take 0.6 mg by mouth as needed. Take 2 po at gout flare onset, may repeat 1 in an hour prn, then 1 po bid until pain resolves ,or n/V/D starts), Disp: 20 tablet, Rfl: 0    colestipoL (COLESTID) 1 gram Tab, Take 1 tablet (1 g total) by mouth 2 (two) times daily as needed (diarrhea)., Disp: 180 tablet, Rfl: 3    esomeprazole (NEXIUM) 40 MG capsule, TAKE 1 CAPSULE BY MOUTH BEFORE BREAKFAST. (Patient taking differently: Take 40 mg by mouth before breakfast.), Disp: 90 capsule, Rfl: 3    fluticasone propionate (FLONASE) 50 mcg/actuation nasal spray, 1 spray (50 mcg total) by Each Nostril route once daily., Disp: 16 g, Rfl: PRN    fluticasone-umeclidin-vilanter (TRELEGY ELLIPTA) 200-62.5-25 mcg inhaler, Inhale 1 puff into the lungs once daily., Disp: 180 each, Rfl: 11    furosemide (LASIX) 40 MG tablet, Take 1 tablet (40 mg total) by mouth once daily for 3 days, THEN 1 tablet (40 mg total) every 48 hours., Disp: 30 tablet, Rfl: 11    isosorbide mononitrate (IMDUR) 60 MG 24 hr tablet, Take 60 mg by mouth once daily., Disp: , Rfl:     metoprolol succinate (TOPROL-XL) 50 MG 24 hr tablet, Take 1 tablet (50 mg total) by mouth once daily. (Patient taking differently: Take 50 mg by mouth 2 (two) times daily.), Disp: 90 tablet, Rfl: 0    paroxetine (PAXIL) 10 MG tablet, Take 10 mg by mouth every morning. Total 50 mg, Disp: , Rfl:     paroxetine (PAXIL) 40 MG tablet, TAKE 1 TABLET BY MOUTH EVERY DAY (Patient taking differently: Take  "40 mg by mouth once daily.), Disp: 90 tablet, Rfl: 3    QUEtiapine (SEROQUEL) 100 MG Tab, Take 100 mg by mouth once daily., Disp: , Rfl:     Current Facility-Administered Medications:     sodium chloride 0.9% flush 10 mL, 10 mL, Intravenous, PRN, Neeraj Finn MD    sodium chloride 0.9% flush 10 mL, 10 mL, Intravenous, PRN, Neeraj Finn MD    Objective:    Physical Exam  Vitals reviewed.   Constitutional:       General: She is not in acute distress.     Appearance: She is well-developed. She is not diaphoretic.   HENT:      Head: Normocephalic and atraumatic.   Neck:      Vascular: No JVD.   Cardiovascular:      Rate and Rhythm: Normal rate and regular rhythm.      Pulses: Intact distal pulses.      Heart sounds: Murmur heard.   Harsh midsystolic murmur is present at the upper right sternal border radiating to the neck.   Pulmonary:      Effort: Pulmonary effort is normal. No respiratory distress.   Musculoskeletal:      Cervical back: Normal range of motion.      Right lower leg: No edema.      Left lower leg: No edema.   Skin:     General: Skin is warm and dry.   Neurological:      Mental Status: She is alert and oriented to person, place, and time.           Vitals:    05/24/23 1344 05/24/23 1346   Pulse: 74 74   SpO2: (!) 91%    Weight: 85.3 kg (188 lb)    Height: 5' 2" (1.575 m)        Body mass index is 34.39 kg/m².    Test(s) Reviewed  I have reviewed the following in detail:  [] Stress test   [] Angiography   [x] Echocardiogram   [x] Labs   [] Other       Chemistry        Component Value Date/Time     05/01/2023 1039    K 4.8 05/01/2023 1039     05/01/2023 1039    CO2 34 (H) 05/01/2023 1039    BUN 24 (H) 05/01/2023 1039    CREATININE 1.2 05/01/2023 1039    CREATININE 1.1 10/14/2012 0315     05/01/2023 1039        Component Value Date/Time    CALCIUM 9.6 05/01/2023 1039    CALCIUM 8.3 (L) 10/14/2012 0315    ALKPHOS 94 03/09/2023 1417    ALKPHOS 179 (H) 10/10/2012 1525    AST 9 " (L) 03/09/2023 1417    AST 19 10/10/2012 1525    ALT 6 (L) 03/09/2023 1417    BILITOT 0.4 03/09/2023 1417    ESTGFRAFRICA 51.4 (A) 07/20/2022 1303    ESTGFRAFRICA 46.7 (A) 07/20/2022 1303    EGFRNONAA 44.6 (A) 07/20/2022 1303    EGFRNONAA 40.5 (A) 07/20/2022 1303            TTE 5/1/23  Moderate left atrial enlargement.  The left ventricle is mildly enlarged with mild concentric hypertrophy and normal systolic function.  The estimated ejection fraction is 60%.  Grade II left ventricular diastolic dysfunction.  Mild right atrial enlargement.  Normal right ventricular size with normal right ventricular systolic function.  Aortic valve area is 1.16 cm2; peak velocity is 4.03 m/s; mean gradient is 34 mmHg.  There is moderate-to-severe aortic valve stenosis.  Mild-to-moderate aortic regurgitation.  Mild to moderate tricuspid regurgitation.  Intermediate central venous pressure (8 mmHg).  There is pulmonary hypertension. The estimated PA systolic pressure is 51 mmHg.  Trivial pericardial effusion.    Assessment:   Nonrheumatic aortic valve stenosis  Patient is very high risk for TAVR given frailty and oxygen dependence. Will try medical management and follow up in 2 weeks.     Betty Bacon is a 73 y.o. female referred by Dr Borjas for evaluation of severe AS (NYHA Class IV sx).    The patient has undergone the following TAVR work-up:   ECHO (Date 5/1/23): RAMIRO= 1.16 cm2 (AVAi 0.58), MG= 34 mmHg, Peak Mikey= 4.03 m/s, EF= 60%.   LHC (Date 5/1/23): mild (20%) RCA ISR. Otherwise normal coronaries.   STS: 4%   Frailty: 4/4   Iliacs are >5.96 on R and > 6.13 on L   LVOT area by CTA is 4.65 cm2 (26.4 mm X 25.8 mm) and Avg Diameter is 24.3 per Dr Blevins  Incidental findings on CT: 1.7 cm right upper pole renal lesion. Sent to PCP  CT Surgery risk assessment: inoperable per Dr Carver due to restrictive lung disease on home oxygen and frailty  Rhythm issues: none  PFTs: FEV1 35.9 % pred, DLCO 15% pred  KCCQ/5 meter walk:  done  Comorbidities: severe restrictive lung disease on supplemental O2       Betty Bacon is a 29 mm  Evolut valve candidate via RTF access.    Chronic diastolic heart failure  NYHA IV with multiple hospitalizations. Plan to increase Lasix.     Aortic atherosclerosis  See imaging. Follow up with Cardiology.     Chronic respiratory failure with hypoxia  On 6L O2 continuous    Plan:     Increase Lasix (120 mg tomorrow then 80 mg daily starting the day after).  Follow up in 2 weeks with BMP.                 Sheela Talamantes PA-C  Valve and Structural Heart Disease  [facility]

## 2023-05-24 NOTE — PROGRESS NOTES
Order per provider for urine testing  per autoimmune order set/per provider. Patient's daughter is aware. They are still in lab and will collect now.

## 2023-05-24 NOTE — PROGRESS NOTES
[facility] INITIAL EVALUATION                                                                                                                                             Reason for Visit:  Evaluation for ILD    Referring Physician: Matt Toro MD    History of Present Illness: Betty Bacon is a 73 y.o. female who is on 6L of oxygen.  She is on no assisted ventilation.  Her New York Heart Association Class is III and a Karnofsky score of 50% - Requires considerable assistance and frequent medical care. She is not diabetic.    Patient presents today for evaluation of her ILD. Patient states she had dyspnea on exertion that began about ten years ago. She noticed she would walk slower than others and become more winded with minimal exertion. Her symptoms remained relatively stable over the last decade. She was started on 2L oxygen to be used prn and nightly around 3 years prior. Overall, she was stable until December 2022 when she had roughly 5 hospitalizations from December to February. She states she tested positive for COVID and required admission. She received antibiotics and broad spectrum antibiotics. Since December, she has required continuous oxygen supplementation. She now requires 6L at rest and minimum 8L with exertion. She reports dyspnea with very minimal exertion, orthopnea, and rhinorrhea with oxygen use. Overall, she states she has had a precipitous decline in her functional status since December.     Patient is a former smoker with a 60 pack year history and quit over 25 years ago. She has a history of COPD, severe aortic valve stenosis, +AKIRA, HFpEF, s/p RCA stent, peripheral vascular disease. She was evaluated by Dr. Carver for surgical aortic valve replacement (SAVR), but deemed poor surgical candidate due to restrictive lung disease on home oxygen and frailty.  She is scheduled for TAVR tomorrow. Patient states she is no longer able to perform her ADLs without  assistance. She presents to the clinic today with her daughter, who has become her primary caregiver. Patient states she has a history of asbestos exposures when her  worked in the [employer] (touched clothing with asbestos). She remains on Trelegy inhaler for management of her COPD. No history of prolonged outpatient steroid taper. She has a history of reflux which is well controlled on PPI therapy. She has a history of +AKIRA, but no formal autoimmune workup. No history of diffuse joint pains, dysphagia, rashes, SICCA sx. No prior intubations/chest surgeries/TBBx. Weight has remained stable.     Past Medical History:   Diagnosis Date    Acute coronary artery obstruction without MI 10/2012    Benign hypertension     COPD (chronic obstructive pulmonary disease)     Coronary artery disease     Disorder of kidney and ureter     Dr. Morrow    Hepatitis B core antibody positive 03/03/2021    Negative sAg, suggests previous exposure but no chronic/active Hep B. At risk for reactivation with any immunosuppression medication, steroids, chemo, etc.      History of electroconvulsive therapy     Hyperlipidemia LDL goal < 70     Left ankle sprain     Major depressive disorder, recurrent episode, severe     s/p ECT    Positive AKIRA (antinuclear antibody) 03/03/2021    PVD (peripheral vascular disease)        Past Surgical History:   Procedure Laterality Date    ANGIOGRAM, CORONARY, WITH LEFT HEART CATHETERIZATION N/A 5/1/2023    Procedure: Angiogram, Coronary, with Left Heart Cath;  Surgeon: Neeraj Finn MD;  Location: [facility] CATH LAB;  Service: Cardiology;  Laterality: N/A;    CHOLECYSTECTOMY      CORONARY ANGIOPLASTY      CORONARY ANGIOPLASTY WITH STENT PLACEMENT  10/2012    2 stents RCA (100% stenosis)    EYE SURGERY      cataract surgery    HYSTERECTOMY      LINA, ovaries intact. uterine prolapse    ILIAC ARTERY STENT      SMALL BOWEL ENTEROSCOPY N/A 2/20/2023    Procedure: ENTEROSCOPY;  Surgeon: Margy Dumont MD;   Location: [facility];  Service: Endoscopy;  Laterality: N/A;    TONSILLECTOMY      TOTAL VAGINAL HYSTERECTOMY         Allergies: Propoxyphene n-acetaminophen and Codeine    Current Outpatient Medications   Medication Sig    acetaminophen (TYLENOL) 325 MG tablet Take 325 mg by mouth every 6 (six) hours as needed for Pain.    albuterol sulfate (PROAIR RESPICLICK) 90 mcg/actuation AePB Inhale 2 puffs into the lungs every 4 to 6 hours as needed.    albuterol-ipratropium (DUO-NEB) 2.5 mg-0.5 mg/3 mL nebulizer solution Take 3 mLs by nebulization every 6 (six) hours as needed for Wheezing. Rescue    atorvastatin (LIPITOR) 40 MG tablet TAKE 1 TABLET BY MOUTH EVERY DAY (Patient taking differently: Take 40 mg by mouth once daily.)    clonazePAM (KLONOPIN) 1 MG disintegrating tablet Take 1 tablet (1 mg total) by mouth 2 (two) times daily as needed (anxiety).    clopidogreL (PLAVIX) 75 mg tablet TAKE 1 TABLET BY MOUTH EVERY DAY (Patient taking differently: Take 75 mg by mouth once daily.)    colchicine (COLCRYS) 0.6 mg tablet Take 2 po at gout flare onset, may repeat 1 in an hour prn, then 1 po bid until pain resolves ,or n/V/D starts (Patient taking differently: Take 0.6 mg by mouth as needed. Take 2 po at gout flare onset, may repeat 1 in an hour prn, then 1 po bid until pain resolves ,or n/V/D starts)    colestipoL (COLESTID) 1 gram Tab Take 1 tablet (1 g total) by mouth 2 (two) times daily as needed (diarrhea).    esomeprazole (NEXIUM) 40 MG capsule TAKE 1 CAPSULE BY MOUTH BEFORE BREAKFAST. (Patient taking differently: Take 40 mg by mouth before breakfast.)    fluticasone propionate (FLONASE) 50 mcg/actuation nasal spray 1 spray (50 mcg total) by Each Nostril route once daily.    fluticasone-umeclidin-vilanter (TRELEGY ELLIPTA) 200-62.5-25 mcg inhaler Inhale 1 puff into the lungs once daily.    furosemide (LASIX) 40 MG tablet Take 1 tablet (40 mg total) by mouth once daily for 3 days, THEN 1 tablet (40 mg total) every 48  hours.    isosorbide mononitrate (IMDUR) 60 MG 24 hr tablet Take 60 mg by mouth once daily.    metoprolol succinate (TOPROL-XL) 50 MG 24 hr tablet Take 1 tablet (50 mg total) by mouth once daily. (Patient taking differently: Take 50 mg by mouth 2 (two) times daily.)    paroxetine (PAXIL) 10 MG tablet Take 10 mg by mouth every morning. Total 50 mg    paroxetine (PAXIL) 40 MG tablet TAKE 1 TABLET BY MOUTH EVERY DAY (Patient taking differently: Take 40 mg by mouth once daily.)    QUEtiapine (SEROQUEL) 100 MG Tab Take 100 mg by mouth once daily.     Current Facility-Administered Medications   Medication    sodium chloride 0.9% flush 10 mL    sodium chloride 0.9% flush 10 mL       Immunization History   Administered Date(s) Administered    COVID-19, MRNA, LN-S, PF (MODERNA FULL 0.5 ML DOSE) 02/01/2021, 03/01/2021    COVID-19, MRNA, LN-S, PF (Pfizer) (Purple Cap) 03/24/2021, 04/14/2021    Influenza 11/04/2014, 11/04/2014, 12/16/2015, 12/16/2015    Influenza - High Dose - PF (65 years and older) 10/21/2013, 11/22/2016, 10/26/2017, 09/10/2018, 10/07/2021    Influenza - Quadrivalent - High Dose - PF (65 years and older) 10/11/2022    Influenza - Quadrivalent - PF *Preferred* (6 months and older) 01/18/2013, 01/18/2013    Influenza - Trivalent - PF (ADULT) 11/10/2015, 11/10/2015    Influenza Split 01/18/2013    Pneumococcal Conjugate - 13 Valent 06/16/2015    Pneumococcal Polysaccharide - 23 Valent 11/22/2016    Tdap 06/16/2015    Zoster 06/16/2015, 06/16/2015    Zoster Recombinant 05/20/2019, 08/20/2019     Family History:    Family History   Problem Relation Age of Onset    Diabetes Mother     Heart disease Mother     Stroke Father     Heart disease Father     Heart disease Brother     Stroke Brother     Hypertension Daughter     Diabetes Maternal Aunt     Heart disease Maternal Aunt     Heart disease Maternal Uncle     Heart disease Paternal Aunt     Heart disease Paternal Uncle     Heart disease Maternal Grandfather      Diabetes Sister     Heart disease Sister     Cancer Sister         lung    Kidney disease Sister         mass, benign    Breast cancer Sister     Stroke Sister     Dementia Sister     Liver disease Sister     Melanoma Neg Hx     Psoriasis Neg Hx     Lupus Neg Hx     Eczema Neg Hx      Social History     Substance and Sexual Activity   Alcohol Use Yes    Comment: social      Social History     Substance and Sexual Activity   Drug Use No      Social History     Socioeconomic History    Marital status:    Tobacco Use    Smoking status: Former     Packs/day: 2.00     Years: 40.00     Pack years: 80.00     Types: Cigarettes     Quit date: 2009     Years since quittin.4    Smokeless tobacco: Never   Substance and Sexual Activity    Alcohol use: Yes     Comment: social    Drug use: No    Sexual activity: Never     Birth control/protection: Abstinence     Social Determinants of Health     Financial Resource Strain: Low Risk     Difficulty of Paying Living Expenses: Not hard at all   Food Insecurity: No Food Insecurity    Worried About Running Out of Food in the Last Year: Never true    Ran Out of Food in the Last Year: Never true   Transportation Needs: No Transportation Needs    Lack of Transportation (Medical): No    Lack of Transportation (Non-Medical): No   Physical Activity: Insufficiently Active    Days of Exercise per Week: 3 days    Minutes of Exercise per Session: 30 min   Stress: Stress Concern Present    Feeling of Stress : To some extent   Social Connections: Socially Isolated    Frequency of Communication with Friends and Family: More than three times a week    Frequency of Social Gatherings with Friends and Family: More than three times a week    Attends Jainism Services: Never    Active Member of Clubs or Organizations: No    Attends Club or Organization Meetings: Never    Marital Status:    Housing Stability: Low Risk     Unable to Pay for Housing in the Last Year: No    Number of  "Places Lived in the Last Year: 1    Unstable Housing in the Last Year: No     Review of Systems   Constitutional:  Positive for malaise/fatigue. Negative for chills, diaphoresis, fever and weight loss.   HENT:  Negative for congestion, ear discharge, ear pain, hearing loss, nosebleeds, sinus pain, sore throat and tinnitus.    Eyes:  Negative for blurred vision, double vision, photophobia, pain, discharge and redness.   Respiratory:  Positive for shortness of breath. Negative for cough, hemoptysis, sputum production, wheezing and stridor.    Cardiovascular:  Positive for orthopnea, claudication and leg swelling. Negative for chest pain, palpitations and PND.   Gastrointestinal:  Negative for abdominal pain, blood in stool, constipation, diarrhea, heartburn, melena, nausea and vomiting.   Genitourinary:  Negative for dysuria, flank pain, frequency, hematuria and urgency.   Musculoskeletal:  Negative for back pain, falls, joint pain, myalgias and neck pain.   Skin:  Negative for itching and rash.   Neurological:  Negative for dizziness, tingling, tremors, sensory change, speech change, focal weakness, seizures, loss of consciousness, weakness and headaches.   Endo/Heme/Allergies:  Negative for environmental allergies and polydipsia. Does not bruise/bleed easily.   Psychiatric/Behavioral:  Positive for depression. Negative for hallucinations, memory loss, substance abuse and suicidal ideas. The patient is nervous/anxious. The patient does not have insomnia.    Vitals  /86   Pulse 67   Temp 96.6 °F (35.9 °C) (Oral)   Resp 20   Ht 5' 2" (1.575 m)   Wt 85.3 kg (188 lb)   SpO2 (!) 90% Comment: 6 litere  BMI 34.39 kg/m²   Physical Exam  Vitals and nursing note reviewed.   Constitutional:       General: She is not in acute distress.     Appearance: Normal appearance. She is obese.      Interventions: Nasal cannula in place.      Comments: Presents in wheelchair   HENT:      Head: Normocephalic and atraumatic.     "  Nose: No congestion or rhinorrhea.      Mouth/Throat:      Mouth: Mucous membranes are moist.   Eyes:      General: No scleral icterus.     Conjunctiva/sclera: Conjunctivae normal.   Cardiovascular:      Rate and Rhythm: Normal rate.      Heart sounds: No murmur heard.    No friction rub.   Pulmonary:      Effort: No respiratory distress.      Breath sounds: No wheezing, rhonchi or rales.   Abdominal:      General: Bowel sounds are normal. There is no distension.      Palpations: Abdomen is soft.      Tenderness: There is no abdominal tenderness.   Musculoskeletal:      Right lower leg: No edema.      Left lower leg: No edema.   Skin:     General: Skin is warm and dry.   Neurological:      General: No focal deficit present.      Mental Status: She is alert and oriented to person, place, and time.   Psychiatric:         Mood and Affect: Mood normal.         Behavior: Behavior normal.     Labs:  No visits with results within 7 Day(s) from this visit.   Latest known visit with results is:   Admission on 05/01/2023, Discharged on 05/01/2023   Component Date Value    Sodium 05/01/2023 143     Potassium 05/01/2023 4.8     Chloride 05/01/2023 100     CO2 05/01/2023 34 (H)     Glucose 05/01/2023 109     BUN 05/01/2023 24 (H)     Creatinine 05/01/2023 1.2     Calcium 05/01/2023 9.6     Anion Gap 05/01/2023 9     eGFR 05/01/2023 47.2 (A)     WBC 05/01/2023 8.48     RBC 05/01/2023 3.24 (L)     Hemoglobin 05/01/2023 9.2 (L)     Hematocrit 05/01/2023 31.7 (L)     MCV 05/01/2023 98     MCH 05/01/2023 28.4     MCHC 05/01/2023 29.0 (L)     RDW 05/01/2023 14.1     Platelets 05/01/2023 226     MPV 05/01/2023 10.8     aPTT 05/01/2023 24.0     Prothrombin Time 05/01/2023 10.4     INR 05/01/2023 1.0     Group & Rh 05/01/2023 O POS     Indirect Aníbal 05/01/2023 POS (A)     Specimen Outdate 05/01/2023 05/04/2023 23:59     Antibody ID 05/01/2023 POS     Direct Aníbal (MADELAINE) 05/01/2023 NEG        No flowsheet data found.  6MW 5/24/2023    6MWT Status completed with stops   Patient Reported Dyspnea   Was O2 used? Yes   Delivery Method Cannula;Pull Tank;Continuous Flow   6MW Distance walked (feet) 400   Distance walked (meters) 121.92   Did patient stop? Yes   Oxygen Saturation 98   Supplemental Oxygen 6 L/M   Heart Rate 69   Blood Pressure 158/70   Paul Dyspnea Rating  moderate   Oxygen Saturation 92   Supplemental Oxygen 6 L/M   Heart Rate 84   Blood Pressure 139/74   Paul Dyspnea Rating  somewhat heavy   Recovery Time (seconds) 79   Oxygen Saturation 95   Supplemental Oxygen (No Data)   Heart Rate 77       Imaging:  Results for orders placed during the hospital encounter of 02/07/23    X-Ray Chest PA And Lateral    Narrative  EXAMINATION:  XR CHEST PA AND LATERAL    CLINICAL HISTORY:  Chronic respiratory failure with hypoxia    TECHNIQUE:  PA and lateral views of the chest were performed.    COMPARISON:  Chest radiograph 01/29/2023.    FINDINGS:  Redemonstrated chronic extensive and diffuse interstitial lung changes as well as patchy airspace disease as seen on prior exams.  Similar mild prominence of the cardiac silhouette and central pulmonary vasculature.  No new consolidation, pneumothorax, or pleural effusion.  Aortic atherosclerotic changes are again noted.    No acute bony changes.  Diffuse bony demineralization and exaggerated thoracic kyphosis.    Impression  1. Chronic and extensive interstitial lung changes and patchy airspace disease similar to the prior examination without evidence of an acute cardiopulmonary process.      Electronically signed by: Damian Harkins  Date:    02/07/2023  Time:    13:04    Results for orders placed during the hospital encounter of 12/16/20    DXA Bone Density Spine And Hip    Narrative  EXAMINATION:  DEXA BONE DENSITY SPINE HIP    CLINICAL HISTORY:  Unspecified menopausal and perimenopausal disorder    TECHNIQUE:  DXA scanning was performed over the left hip and lumbar spine.  Review of the images confirms  satisfactory positioning and technique.    COMPARISON:  11/24/2017    FINDINGS:  The L1 to L4 vertebral bone mineral density is equal to 0.973 g/cm squared with a T score of -0.7.  Prior BMD 0.965.    The left femoral neck bone mineral density is equal to 0.652 g/cm squared with a T score of -1.8.  Prior BMD 0.641.    The total hip bone mineral density is equal to 0.764 g/cm squared with a T score of -1.5.  Prior BMD 0.739.    There is a 10% risk of a major osteoporotic fracture and a 1.9% risk of hip fracture in the next 10 years (FRAX).    Impression  Osteopenia.    Consider FDA approved medical therapies in postmenopausal women and men aged 50 years and older, based on the following:    *A hip or vertebral (clinical or morphometric) fracture  *T score less than or equal to -2.5 at the femoral neck or spine after appropriate evaluation to exclude secondary causes.  *Low bone mass -- also known as osteopenia (T score between -1.0 and -2.5 at the femoral neck or spine) and a 10 year probability of hip fracture greater than or equal to 3% or a 10 year probability of major osteoporosis-related fracture greater than or equal to 20% based on the [place]-adapted WHO algorithm.  *Clinician's judgment and/or patient preference may indicate treatment for people with 10 year fracture probabilities is above or below these levels.      Electronically signed by: Adolph Schwab  Date:    12/16/2020  Time:    09:37    No valid procedures specified.  Results for orders placed during the hospital encounter of 07/24/14    US Abdomen Complete    Narrative  Abdominal ultrasound    History: Right lower quadrant pain    Comparison:  None.    Technique:  Grayscale and color Doppler sonographic imaging of the abdomen was performed.    FINDINGS:  The visualized portions of the pancreas are unremarkable.  Abdominal aorta is normal caliber.  A the liver is enlarged measuring greater than 18 cm in the caudal dimension.  No focal lesion.  The liver  exhibits increased parenchymal  echogenicity indicative of fatty infiltration.  There is cholecystectomy.  The common bile duct measures 8 mm, within the range expected given post cholecystectomy status.  The right kidney is normal in size and appearance measuring 11.9 cm in length.  The left kidney is also normal in size and appearance measuring 11.5 cm in length.  The spleen is normal in size and appearance measuring 10.8 cm in length.  IMPRESSION:    1.  Hepatomegaly and fatty infiltration of the liver.  2.  Status post cholecystectomy.      Electronically signed by: Adolph Giordano MD  Date:     07/24/14  Time:    09:19    Results for orders placed during the hospital encounter of 05/09/13    Radiology US Carotid Bilateral    Narrative  BILATERAL CAROTID DUPLEX ULTRASOUND.    Sagittal and transverse images are obtained through both carotid arterial system with Doppler waveforms and velocities obtained from each vessel. Narrowing is measured and reported  utilizing NASCET criteria.    FINDINGS:    Right common carotid:  Peak systolic velocity 72.8 cm/sec.    Right internal carotid artery:  Peak systolic velocity: 116 cm/sec.  IC/CC ratio:  1.6.    Left common carotid:  Peak systolic velocity 67.8 cm/sec.    Left internal carotid artery:  Peak systolic velocity 131.2 cm/sec.  IC/CC ratio 1.9.    On real time ultrasound there is heavily calcified atherosclerotic plaque, moderate in severity on both sides.  This is most prominent in the left external carotid artery.  Severe stenosis is not seen..Both vertebral arteries demonstrate antegrade flow.  IMPRESSION:    Although there is moderate atherosclerotic plaque and calcification identified in both carotid bifurcations, severe stenosis by NASCET criteria is not identified on either side.  Velocity within the left internal carotid artery today is borderline.        Electronically signed by: Stanton Wright MD  Date:     05/09/13  Time:    13:33    Results for orders  placed or performed during the hospital encounter of 05/18/23 (from the past 2160 hour(s))   CTA Cardiac TAVR_Partners (xpd)    Narrative    EXAMINATION:  CTA CARDIAC TAVR_PARTNERS (XPD)    CLINICAL HISTORY:  TAVR;  Nonrheumatic aortic (valve) stenosis    TECHNIQUE:  Initial noncontrast images of the chest, abdomen, and pelvis. Using 100 of Omnipaque 350 IV contrast material, and multi-detector helical CT technique, axial CT angiogram 0.625-mm images of the abdomen were obtained from just above the level of the aortic arch to the proximal femurs. The thoracic portion was done with ECG gating.  2D post-processing coronal and sagittal reconstructions of the heart, thoracic and abdominal aorta, as well as iliac arteries were performed.    COMPARISON:  CTA chest 01/25/2020    FINDINGS:  Base of the neck: No significant abnormalities.    Thoracic soft tissues: Unremarkable.    Aorta: Left-sided three-vessel aorta.  Normal in caliber.  Severe calcific atherosclerosis of the aortic arch and descending aorta.    Heart: Mildly enlarged.  Trace pericardial fluid.  Severe multivessel coronary artery atherosclerosis.    Margaux/Mediastinum: Enlarged mediastinal lymph nodes, including 1.2 cm right paratracheal lymph node (series 4, image 35).    Lungs: Diffuse mosaic attenuation with prominent interlobular septal thickening and regions of subpleural fibrosis, similar when compared to prior.  Stable linear bandlike opacities suggestive of atelectasis/scarring.  There is prominent superimposed ground-glass component, may represent active pneumonitis, similar to the prior exam.  No honeycombing.  0.5 cm solid nodule of the right middle lobe (series 4, image 56).  No pneumothorax or pleural effusion.    Liver: Normal in size and attenuation noting Riedel's configuration..  No focal hepatic lesions.    Gallbladder: Surgically absent.    Bile Ducts: No evidence of dilated ducts.    Pancreas: No mass or peripancreatic fat stranding. Mild  parenchymal atrophy.    Spleen: Normal in size and attenuation.    Adrenals: No significant abnormalities.    Kidneys/ Ureters: Small in size with perinephric stranding.  Indeterminate right renal lesion measuring 1.7 cm (series 607, image 54) with Hounsfield units 51.  Multiple renal cysts noted.  No hydronephrosis or nephrolithiasis. No ureteral dilatation.    Bladder: No evidence of wall thickening.    Reproductive organs: Uterus surgically removed.    GI Tract/Mesentery: Stomach is unremarkable.  No obstruction or inflammatory changes surrounding the small or large bowel.    Peritoneal Space: No ascites. No free air.    Retroperitoneum:  No significant intra-abdominal adenopathy.    Abdominal wall:  Bilateral fat containing inguinal hernias.  Fat containing umbilical hernia.    Bones: No acute fracture. No suspicious osseous lesions.    Vasculature:There is severe calcific atherosclerotic plaque of the abdominal aorta.  Bilateral common iliac stents are patent.    Celiac artery: Patent with severe calcific atherosclerotic plaque and moderate stenosis at the celiac origin.    SMA: Patent with severe calcific atherosclerosis and moderate to severe stenosis at the SMA origin.    Left renal artery: Patent with moderate calcific atherosclerosis and mild stenosis at its origin.    Right renal artery: Patent with severe calcific atherosclerosis and mild stenosis at its origin.    TAWANDA: Patent with mild calcific atherosclerosis at its origin.    TAVR measurements:    Immediate infrarenal abdominal aorta: 1.1 cm (series 6, image 467).    Aortic bifurcation: 0.7 cm (series 6, image 548).    Left common iliac artery 0.5 cm (series 6, image 621).    Left external iliac artery 0.6 cm (series 6, image 727).    Left common femoral artery 0.6 cm (series 6, image 832).    Right common iliac artery 0.4 cm (series 6, image 620).    Right external iliac artery 0.6 cm (series 6, image 719).    Right common femoral artery 0.5 cm  (series 6, image 85).      Impression    TAVR measurements, as above.    Extensive scattered calcific atherosclerosis.    Prominent chronic interstitial/ fibrotic lung changes, as above, similar to the prior exam.    Cardiomegaly.    Mild mediastinal lymphadenopathy.    Indeterminate 1.7 cm right upper pole renal lesion.  Further evaluation could be performed renal ultrasound.    This report was flagged in Epic as abnormal.    Electronically signed by resident: Mona Epstein  Date:    05/18/2023  Time:    11:50    Electronically signed by: Fernando Murray MD  Date:    05/18/2023  Time:    14:22     *Note: Due to a large number of results and/or encounters for the requested time period, some results have not been displayed. A complete set of results can be found in Results Review.           Cardiodiagnostics:  Results for orders placed during the hospital encounter of 05/01/23    Cardiac catheterization    Conclusion    The estimated blood loss was none.  Results for orders placed during the hospital encounter of 05/01/23    Echo    Interpretation Summary  · Moderate left atrial enlargement.  · The left ventricle is mildly enlarged with mild concentric hypertrophy and normal systolic function.  · The estimated ejection fraction is 60%.  · Grade II left ventricular diastolic dysfunction.  · Mild right atrial enlargement.  · Normal right ventricular size with normal right ventricular systolic function.  · Aortic valve area is 1.16 cm2; peak velocity is 4.03 m/s; mean gradient is 34 mmHg.  · There is moderate-to-severe aortic valve stenosis.  · Mild-to-moderate aortic regurgitation.  · Mild to moderate tricuspid regurgitation.  · Intermediate central venous pressure (8 mmHg).  · There is pulmonary hypertension. The estimated PA systolic pressure is 51 mmHg.  · Trivial pericardial effusion.        Assessment:  1. Interstitial lung disease    2. Chronic respiratory failure with hypoxia      Plan:     CT chest and spirometry  reviewed. Will check autoimmune panel.     Continue oxygen supplementation at rest and with exertion. Would benefit from formal oxygen titration study, as she desaturates to 87% while on 8L NC. Recommend pulmonary rehab, referral placed. Continue Trelegy per Dr. Toro.     RTC in 1 month or sooner if needed. Repeat spirometry and 6MWT at that time.       Kelly Deleon PA-C  Lung Transplant/[facility]

## 2023-05-24 NOTE — LETTER
May 24, 2023        Matt Toro  [address] [place] [address]  Phone: [phone]  Fax: [phone]             [facility] Trans[facility] 1st Fl  [address]  [facility] [address]  Phone: [phone]   Patient: Betty Bacon   MR Number: [id number]   YOB: 1950   Date of Visit: 5/24/2023       Dear Dr. Matt Toro    Thank you for referring Betty Bacon to me for evaluation. Attached you will find relevant portions of my assessment and plan of care.    If you have questions, please do not hesitate to call me. I look forward to following Betty Bacon along with you.    Sincerely,    Med Linder MD    Enclosure    If you would like to receive this communication electronically, please contact [email or web address] or [phone] to request [employer] access.    [employer] is a tool which provides read-only access to select patient information with whom you have a relationship. Its easy to use and provides real time access to review your patients record including encounter summaries, notes, results, and demographic information.    If you feel you have received this communication in error or would no longer like to receive these types of communications, please e-mail [email or web address]

## 2023-05-24 NOTE — PROGRESS NOTES
Transcatheter Valve Replacement (TAVR)    You have been scheduled for your valve replacement on Thursday, May 25, 2023.     Please report to the Cardiology Waiting Area on the Third floor of the hospital and check in at 6 AM.   You will then be taken to the SSCU (Short Stay Cardiac Unit) and prepared for your procedure. Please be aware that this is not the time of your procedure but the time you are to arrive.     Preperations for your procedure:  Shower with Dial soap the night before and the morning of your procedure.  No solid foods 8 hours prior to your procedure.  You may have clear liquids until the time of your admission which should be 2 hours prior to your procedure.  You are encouraged to have at least 8 ounces of clear liquids prior to admission to SSCU.  Patients with gastric emptying issues should be fasting for 6- 8 hours prior to the procedure.  Clear liquids include water, black coffee, clear juices, and performance drinks - no pulp or milk.    Heart failure or dialysis patients will be limited to 8 ounces (1 cup) of clear liquids up until 2 hours of the procedure.  You may take your regular morning medications         with as much water as necessary.   Bring your cane and/or walker with you to the hospital.  Bring CPAP/BiPAP, or oxygen if you are on oxygen at home.  Call the office for any signs of infection ( fever, cough, pneumonia, urinary tract, etc.) We cannot implant a device in an infected patient.    Medications:  You may take your regular scheduled medications the morning of your procedure with as much water as necessary.  If you are diabetic and on Metformin (Glucophage), do not take it the day before, the day of, and 2 days after your procedure.  If you are on Pradaxa, Xarelto, Eliquis, or Coumadin hold 3 days prior to your procedure.     How long will the procedure take?  The entire procedure takes three to four hours.  After the procedure, you will go to an ICU where you will be  monitored closely for the next several hours.  You will remain in the ICU overnight.        If you walk with a cane or walker, please bring it with you to the hospital so that we can get you out of bed several hours after your valve replacement.  Our goal is to get you up in a chair and moving as quickly as possible as long as it is safe for you to do so.    When can I go home?  Your length of stay in the hospital, will be determined by your physician.  Be prepared to stay 1-3 days.  You will be discharged when your physician thinks it is safe for you to go home.  You must have someone to drive you home when you are discharged.    Follow Up After Valve Replacement:  It is required that you return to [facility] for the follow up in one month and one year with an echo, lab work, and a clinic visit.  If you are in a research trial, your follow up will be slightly different and according to the trial requirements.  You can follow up with your regular cardiologist regarding any other heart issues.    Contacts one of our nurses at [phone] for any questions.  Cadence Vicente, MARJAN Cornejo RN        THE ABOVE INSTRUCTIONS WERE GIVEN TO THE PATIENT VERBALLY AND THEY VERBALIZED UNDERSTANDING.  THEY DO NOT REQUIRE ANY SPECIAL NEEDS AND DO NOT HAVE ANY LEARNING BARRIERS.          Directions for Reporting to Cardiology Waiting Area in the Hospital  If you park in the Parking Garage:  Take elevators to the1st floor of the parking garage.  Continue past the gift shop, coffee shop, and piano.  Take a right and go to the gold elevators. (Elevator B)  Take the elevator to the 3rd floor.  Follow the arrow on the sign on the wall that says Cath Lab Registration/EP Lab Registration.  Follow the long hallway all the way around until you come to a big open area.  This is the registration area.  Check in at Reception Desk.    OR    If family is dropping you off:  Have them drop you off at the front of the Hospital under the green  overhang.  Enter through the doors and take a right.  Take the E elevators to the 3rd floor Cardiology Waiting Area.  Check in at the Reception Desk in the waiting room.

## 2023-05-25 ENCOUNTER — TELEPHONE (OUTPATIENT)
Dept: TRANSPLANT | Facility: CLINIC | Age: 73
End: 2023-05-25
Payer: MEDICARE

## 2023-05-25 DIAGNOSIS — J84.9 INTERSTITIAL LUNG DISEASE: Primary | ICD-10-CM

## 2023-05-25 NOTE — TELEPHONE ENCOUNTER
Received a verbal order from Dr. Linder to schedule patient for a 6 minute walk test with Oxygen titration. With Dr. Linder's permission, the walk test well be scheduled on 6/8/23 when the patient is at [facility] for other appointments.  Will send patient a portal message to update her.

## 2023-05-26 ENCOUNTER — PATIENT OUTREACH (OUTPATIENT)
Dept: HOME HEALTH SERVICES | Facility: HOSPITAL | Age: 73
End: 2023-05-26
Payer: MEDICARE

## 2023-05-26 ENCOUNTER — EXTERNAL HOME HEALTH (OUTPATIENT)
Dept: HOME HEALTH SERVICES | Facility: HOSPITAL | Age: 73
End: 2023-05-26
Payer: MEDICARE

## 2023-05-26 NOTE — PROCEDURES
Betty Bacon is a 73 y.o.  female patient, who presents for a 6 minute walk test ordered by MD Niyah.  The diagnosis is Interstitial Lung Disease; Oxygen Titration.  The patient's BMI is 34.4 kg/m2.  Predicted distance (lower limit of normal) is 226.09 meters.      Test Results:    The test was completed with stops.  The patient stopped 1 time for a total of 21 seconds.  The total time walked was 339 seconds.  During walking, the patient reported:  Dyspnea.  The patient used supplemental oxygen during testing.     05/24/2023---------Distance: 121.92 meters (400 feet)     Lap Time O2 Sat % Supplemental Oxygen Heart Rate Blood Pressure Paul Scale Comment   Pre-exercise  (Resting) 0 0 98 % 6 L/M 69 bpm 158/70 mmHg 3    During Exercise 1 186 sec 87 % 6 L/M 77 bpm   Oxygen Increased   End of Exercise  360 sec 92 % 8 L/M 84 bpm 139/74 mmHg 4    Post-exercise  (Recovery)   95 % 8 L/M 77 bpm        Recovery Time: 79 seconds    Performing nurse/tech: JAVIER Baltazar      PREVIOUS STUDY:   The patient has not had a previous study.      CLINICAL INTERPRETATION:  Six minute walk distance is 121.92 meters (400 feet) with somewhat heavy dyspnea.  During exercise, there was significant desaturation while breathing supplemental oxygen.  Both blood pressure and heart rate remained stable with walking.  Hypertension was present prior to exercise.  The patient did not report non-pulmonary symptoms during exercise.  Severe exercise impairment is likely due to desaturation and subjective symptoms.  The patient did complete the study, walking 339 seconds of the 360 second test.  No previous study performed.  Based upon age and body mass index, exercise capacity is less than predicted.

## 2023-05-31 ENCOUNTER — SOCIAL WORK (OUTPATIENT)
Dept: TRANSPLANT | Facility: CLINIC | Age: 73
End: 2023-05-31
Payer: MEDICARE

## 2023-06-01 ENCOUNTER — TELEPHONE (OUTPATIENT)
Dept: TRANSPLANT | Facility: CLINIC | Age: 73
End: 2023-06-01
Payer: MEDICARE

## 2023-06-01 ENCOUNTER — TELEPHONE (OUTPATIENT)
Dept: FAMILY MEDICINE | Facility: CLINIC | Age: 73
End: 2023-06-01
Payer: MEDICARE

## 2023-06-01 NOTE — TELEPHONE ENCOUNTER
Discussed patient during ALD / Pre-Lung Transplant Patient Review Meeting on 5/30/23.  OFEV on hold until after TAVR procedure.  Informed Dr. Linder that patient may not be able to enroll in cardiac rehab due to her need for oxygen.  Informed her that many cardiac rehabs do not have oxygen to provide for patient.  Dr. Linder stated that she hasn't heard from cardiology regarding cardiac rehab.  Also notified Dr. Linder of the scheduled oxygen titration study on 6/8/23.    6/1/23 - Informed Dr. Linder that orders for pulmonary rehab were sent to [facility] for patient.

## 2023-06-02 NOTE — TELEPHONE ENCOUNTER
----- Message from Sheela Talamantes PA-C sent at 5/22/2023  8:35 AM CDT -----  Hi Betty Constantino had a CTA as part of routine workup for TAVR. The report is attached with incidental findings that may require follow up. Please let me know if you have any questions or concerns.      Sheela Talamantes PA-C  Valve and Structural Heart Disease  [facility]

## 2023-06-02 NOTE — TELEPHONE ENCOUNTER
CTA chest showed incidental but not new findings. Has h/o renal cysts followed by nephro. Last renal u/s 2/23.  No further testing needed

## 2023-06-04 ENCOUNTER — PATIENT MESSAGE (OUTPATIENT)
Dept: CARDIOLOGY | Facility: CLINIC | Age: 73
End: 2023-06-04
Payer: MEDICARE

## 2023-06-04 DIAGNOSIS — I50.33 ACUTE ON CHRONIC HEART FAILURE WITH PRESERVED EJECTION FRACTION: Primary | ICD-10-CM

## 2023-06-05 RX ORDER — ISOSORBIDE MONONITRATE 60 MG/1
TABLET, EXTENDED RELEASE ORAL
Qty: 90 TABLET | Refills: 3 | Status: SHIPPED | OUTPATIENT
Start: 2023-06-05

## 2023-06-07 ENCOUNTER — DOCUMENT SCAN (OUTPATIENT)
Dept: HOME HEALTH SERVICES | Facility: HOSPITAL | Age: 73
End: 2023-06-07
Payer: MEDICARE

## 2023-06-08 ENCOUNTER — PATIENT MESSAGE (OUTPATIENT)
Dept: CARDIOLOGY | Facility: CLINIC | Age: 73
End: 2023-06-08

## 2023-06-08 ENCOUNTER — OFFICE VISIT (OUTPATIENT)
Dept: CARDIOLOGY | Facility: CLINIC | Age: 73
DRG: 319 | End: 2023-06-08
Payer: MEDICARE

## 2023-06-08 ENCOUNTER — HOSPITAL ENCOUNTER (INPATIENT)
Facility: HOSPITAL | Age: 73
LOS: 6 days | Discharge: HOME HEALTH | DRG: 319 | End: 2023-06-14
Attending: EMERGENCY MEDICINE | Admitting: INTERNAL MEDICINE
Payer: MEDICARE

## 2023-06-08 ENCOUNTER — HOSPITAL ENCOUNTER (OUTPATIENT)
Dept: PULMONOLOGY | Facility: CLINIC | Age: 73
Discharge: HOME | DRG: 319 | End: 2023-06-08
Payer: MEDICARE

## 2023-06-08 VITALS — WEIGHT: 181 LBS | BODY MASS INDEX: 33.31 KG/M2 | HEIGHT: 62 IN

## 2023-06-08 VITALS
HEIGHT: 62 IN | BODY MASS INDEX: 34.98 KG/M2 | HEART RATE: 66 BPM | OXYGEN SATURATION: 93 % | WEIGHT: 190.06 LBS | SYSTOLIC BLOOD PRESSURE: 208 MMHG | DIASTOLIC BLOOD PRESSURE: 84 MMHG

## 2023-06-08 DIAGNOSIS — I35.0 NONRHEUMATIC AORTIC VALVE STENOSIS: Primary | ICD-10-CM

## 2023-06-08 DIAGNOSIS — I50.9 ACUTE ON CHRONIC CONGESTIVE HEART FAILURE, UNSPECIFIED HEART FAILURE TYPE: Primary | ICD-10-CM

## 2023-06-08 DIAGNOSIS — J84.9 INTERSTITIAL LUNG DISEASE: ICD-10-CM

## 2023-06-08 DIAGNOSIS — I50.9 ACUTE DECOMPENSATED HEART FAILURE: ICD-10-CM

## 2023-06-08 DIAGNOSIS — I50.33 ACUTE ON CHRONIC DIASTOLIC HEART FAILURE: ICD-10-CM

## 2023-06-08 DIAGNOSIS — I50.31 ACUTE DIASTOLIC HEART FAILURE: ICD-10-CM

## 2023-06-08 DIAGNOSIS — J96.11 CHRONIC RESPIRATORY FAILURE WITH HYPOXIA: ICD-10-CM

## 2023-06-08 DIAGNOSIS — R06.02 SHORTNESS OF BREATH: ICD-10-CM

## 2023-06-08 DIAGNOSIS — I35.0 SEVERE AORTIC STENOSIS: ICD-10-CM

## 2023-06-08 DIAGNOSIS — I35.0 AORTIC STENOSIS: ICD-10-CM

## 2023-06-08 DIAGNOSIS — I70.0 AORTIC ATHEROSCLEROSIS: ICD-10-CM

## 2023-06-08 LAB
ALBUMIN SERPL BCP-MCNC: 3.1 G/DL (ref 3.5–5.2)
ALBUMIN SERPL BCP-MCNC: 3.3 G/DL (ref 3.5–5.2)
ALP SERPL-CCNC: 140 U/L (ref 55–135)
ALP SERPL-CCNC: 148 U/L (ref 55–135)
ALT SERPL W/O P-5'-P-CCNC: 6 U/L (ref 10–44)
ALT SERPL W/O P-5'-P-CCNC: 6 U/L (ref 10–44)
ANION GAP SERPL CALC-SCNC: 11 MMOL/L (ref 8–16)
ANION GAP SERPL CALC-SCNC: 11 MMOL/L (ref 8–16)
AST SERPL-CCNC: 12 U/L (ref 10–40)
AST SERPL-CCNC: 13 U/L (ref 10–40)
BASOPHILS # BLD AUTO: 0.03 K/UL (ref 0–0.2)
BASOPHILS # BLD AUTO: 0.04 K/UL (ref 0–0.2)
BASOPHILS NFR BLD: 0.4 % (ref 0–1.9)
BASOPHILS NFR BLD: 0.4 % (ref 0–1.9)
BILIRUB SERPL-MCNC: 0.3 MG/DL (ref 0.1–1)
BILIRUB SERPL-MCNC: 0.3 MG/DL (ref 0.1–1)
BNP SERPL-MCNC: 115 PG/ML (ref 0–99)
BUN SERPL-MCNC: 32 MG/DL (ref 8–23)
BUN SERPL-MCNC: 33 MG/DL (ref 8–23)
CALCIUM SERPL-MCNC: 8.7 MG/DL (ref 8.7–10.5)
CALCIUM SERPL-MCNC: 8.7 MG/DL (ref 8.7–10.5)
CHLORIDE SERPL-SCNC: 97 MMOL/L (ref 95–110)
CHLORIDE SERPL-SCNC: 98 MMOL/L (ref 95–110)
CO2 SERPL-SCNC: 31 MMOL/L (ref 23–29)
CO2 SERPL-SCNC: 31 MMOL/L (ref 23–29)
CREAT SERPL-MCNC: 1.5 MG/DL (ref 0.5–1.4)
CREAT SERPL-MCNC: 1.6 MG/DL (ref 0.5–1.4)
DIFFERENTIAL METHOD: ABNORMAL
DIFFERENTIAL METHOD: ABNORMAL
EOSINOPHIL # BLD AUTO: 0.4 K/UL (ref 0–0.5)
EOSINOPHIL # BLD AUTO: 0.5 K/UL (ref 0–0.5)
EOSINOPHIL NFR BLD: 4.6 % (ref 0–8)
EOSINOPHIL NFR BLD: 4.7 % (ref 0–8)
ERYTHROCYTE [DISTWIDTH] IN BLOOD BY AUTOMATED COUNT: 13.7 % (ref 11.5–14.5)
ERYTHROCYTE [DISTWIDTH] IN BLOOD BY AUTOMATED COUNT: 13.8 % (ref 11.5–14.5)
EST. GFR  (NO RACE VARIABLE): 33.4 ML/MIN/1.73 M^2
EST. GFR  (NO RACE VARIABLE): 36.1 ML/MIN/1.73 M^2
GLUCOSE SERPL-MCNC: 103 MG/DL (ref 70–110)
GLUCOSE SERPL-MCNC: 106 MG/DL (ref 70–110)
HCT VFR BLD AUTO: 33.2 % (ref 37–48.5)
HCT VFR BLD AUTO: 33.9 % (ref 37–48.5)
HGB BLD-MCNC: 10.2 G/DL (ref 12–16)
HGB BLD-MCNC: 10.3 G/DL (ref 12–16)
IMM GRANULOCYTES # BLD AUTO: 0.03 K/UL (ref 0–0.04)
IMM GRANULOCYTES # BLD AUTO: 0.03 K/UL (ref 0–0.04)
IMM GRANULOCYTES NFR BLD AUTO: 0.3 % (ref 0–0.5)
IMM GRANULOCYTES NFR BLD AUTO: 0.4 % (ref 0–0.5)
LYMPHOCYTES # BLD AUTO: 1.1 K/UL (ref 1–4.8)
LYMPHOCYTES # BLD AUTO: 1.3 K/UL (ref 1–4.8)
LYMPHOCYTES NFR BLD: 12.7 % (ref 18–48)
LYMPHOCYTES NFR BLD: 14 % (ref 18–48)
MAGNESIUM SERPL-MCNC: 0.8 MG/DL (ref 1.6–2.6)
MAGNESIUM SERPL-MCNC: 1.5 MG/DL (ref 1.6–2.6)
MCH RBC QN AUTO: 28.1 PG (ref 27–31)
MCH RBC QN AUTO: 28.2 PG (ref 27–31)
MCHC RBC AUTO-ENTMCNC: 30.1 G/DL (ref 32–36)
MCHC RBC AUTO-ENTMCNC: 31 G/DL (ref 32–36)
MCV RBC AUTO: 91 FL (ref 82–98)
MCV RBC AUTO: 93 FL (ref 82–98)
MONOCYTES # BLD AUTO: 0.5 K/UL (ref 0.3–1)
MONOCYTES # BLD AUTO: 0.6 K/UL (ref 0.3–1)
MONOCYTES NFR BLD: 5.9 % (ref 4–15)
MONOCYTES NFR BLD: 6 % (ref 4–15)
NEUTROPHILS # BLD AUTO: 5.8 K/UL (ref 1.8–7.7)
NEUTROPHILS # BLD AUTO: 7.6 K/UL (ref 1.8–7.7)
NEUTROPHILS NFR BLD: 74.7 % (ref 38–73)
NEUTROPHILS NFR BLD: 75.9 % (ref 38–73)
NRBC BLD-RTO: 0 /100 WBC
NRBC BLD-RTO: 0 /100 WBC
PHOSPHATE SERPL-MCNC: 3.3 MG/DL (ref 2.7–4.5)
PLATELET # BLD AUTO: 188 K/UL (ref 150–450)
PLATELET # BLD AUTO: 211 K/UL (ref 150–450)
PMV BLD AUTO: 10.7 FL (ref 9.2–12.9)
PMV BLD AUTO: 10.8 FL (ref 9.2–12.9)
POTASSIUM SERPL-SCNC: 4 MMOL/L (ref 3.5–5.1)
POTASSIUM SERPL-SCNC: 4.1 MMOL/L (ref 3.5–5.1)
PROT SERPL-MCNC: 7.2 G/DL (ref 6–8.4)
PROT SERPL-MCNC: 7.5 G/DL (ref 6–8.4)
RBC # BLD AUTO: 3.63 M/UL (ref 4–5.4)
RBC # BLD AUTO: 3.65 M/UL (ref 4–5.4)
SODIUM SERPL-SCNC: 139 MMOL/L (ref 136–145)
SODIUM SERPL-SCNC: 140 MMOL/L (ref 136–145)
TROPONIN I SERPL DL<=0.01 NG/ML-MCNC: 0.02 NG/ML (ref 0–0.03)
WBC # BLD AUTO: 10.02 K/UL (ref 3.9–12.7)
WBC # BLD AUTO: 7.78 K/UL (ref 3.9–12.7)

## 2023-06-08 PROCEDURE — 99291 CRITICAL CARE FIRST HOUR: CPT | Mod: ,,, | Performed by: EMERGENCY MEDICINE

## 2023-06-08 PROCEDURE — 96374 THER/PROPH/DIAG INJ IV PUSH: CPT | Mod: NTX

## 2023-06-08 PROCEDURE — 94618 PULMONARY STRESS TESTING: CPT | Mod: 26,S$PBB,NTX, | Performed by: INTERNAL MEDICINE

## 2023-06-08 PROCEDURE — 99214 OFFICE O/P EST MOD 30 MIN: CPT | Mod: PBBFAC,TXP,25 | Performed by: PHYSICIAN ASSISTANT

## 2023-06-08 PROCEDURE — 36415 COLL VENOUS BLD VENIPUNCTURE: CPT | Mod: NTX | Performed by: HOSPITALIST

## 2023-06-08 PROCEDURE — 99223 PR INITIAL HOSPITAL CARE,LEVL III: ICD-10-PCS | Mod: AI,GC,NTX, | Performed by: INTERNAL MEDICINE

## 2023-06-08 PROCEDURE — 83735 ASSAY OF MAGNESIUM: CPT | Mod: 91,NTX | Performed by: INTERNAL MEDICINE

## 2023-06-08 PROCEDURE — 93010 ELECTROCARDIOGRAM REPORT: CPT | Mod: NTX,,, | Performed by: INTERNAL MEDICINE

## 2023-06-08 PROCEDURE — 93005 ELECTROCARDIOGRAM TRACING: CPT | Mod: NTX

## 2023-06-08 PROCEDURE — 85025 COMPLETE CBC W/AUTO DIFF WBC: CPT | Mod: NTX | Performed by: EMERGENCY MEDICINE

## 2023-06-08 PROCEDURE — 83880 ASSAY OF NATRIURETIC PEPTIDE: CPT | Mod: NTX | Performed by: EMERGENCY MEDICINE

## 2023-06-08 PROCEDURE — 85025 COMPLETE CBC W/AUTO DIFF WBC: CPT | Mod: 91,NTX | Performed by: HOSPITALIST

## 2023-06-08 PROCEDURE — 93010 ELECTROCARDIOGRAM REPORT: CPT | Mod: 77,NTX,, | Performed by: INTERNAL MEDICINE

## 2023-06-08 PROCEDURE — 20600001 HC STEP DOWN PRIVATE ROOM: Mod: NTX

## 2023-06-08 PROCEDURE — 36415 COLL VENOUS BLD VENIPUNCTURE: CPT | Mod: NTX | Performed by: INTERNAL MEDICINE

## 2023-06-08 PROCEDURE — 25000003 PHARM REV CODE 250: Mod: NTX | Performed by: INTERNAL MEDICINE

## 2023-06-08 PROCEDURE — 93010 EKG 12-LEAD: ICD-10-PCS | Mod: NTX,,, | Performed by: INTERNAL MEDICINE

## 2023-06-08 PROCEDURE — 94618 PULMONARY STRESS TESTING: ICD-10-PCS | Mod: 26,S$PBB,NTX, | Performed by: INTERNAL MEDICINE

## 2023-06-08 PROCEDURE — 63600175 PHARM REV CODE 636 W HCPCS: Mod: NTX | Performed by: STUDENT IN AN ORGANIZED HEALTH CARE EDUCATION/TRAINING PROGRAM

## 2023-06-08 PROCEDURE — 99999 PR PBB SHADOW E&M-EST. PATIENT-LVL IV: ICD-10-PCS | Mod: PBBFAC,TXP,, | Performed by: PHYSICIAN ASSISTANT

## 2023-06-08 PROCEDURE — 63600175 PHARM REV CODE 636 W HCPCS: Mod: NTX | Performed by: EMERGENCY MEDICINE

## 2023-06-08 PROCEDURE — 80053 COMPREHEN METABOLIC PANEL: CPT | Mod: 91,NTX | Performed by: HOSPITALIST

## 2023-06-08 PROCEDURE — 93010 EKG 12-LEAD: ICD-10-PCS | Mod: 77,NTX,, | Performed by: INTERNAL MEDICINE

## 2023-06-08 PROCEDURE — 94640 AIRWAY INHALATION TREATMENT: CPT | Mod: NTX

## 2023-06-08 PROCEDURE — 99285 EMERGENCY DEPT VISIT HI MDM: CPT | Mod: 25,27,NTX

## 2023-06-08 PROCEDURE — 99999 PR PBB SHADOW E&M-EST. PATIENT-LVL IV: CPT | Mod: PBBFAC,TXP,, | Performed by: PHYSICIAN ASSISTANT

## 2023-06-08 PROCEDURE — 99214 PR OFFICE/OUTPT VISIT, EST, LEVL IV, 30-39 MIN: ICD-10-PCS | Mod: S$PBB,NTX,, | Performed by: PHYSICIAN ASSISTANT

## 2023-06-08 PROCEDURE — 25000242 PHARM REV CODE 250 ALT 637 W/ HCPCS: Mod: NTX | Performed by: INTERNAL MEDICINE

## 2023-06-08 PROCEDURE — 99214 OFFICE O/P EST MOD 30 MIN: CPT | Mod: S$PBB,NTX,, | Performed by: PHYSICIAN ASSISTANT

## 2023-06-08 PROCEDURE — 99291 PR CRITICAL CARE, E/M 30-74 MINUTES: ICD-10-PCS | Mod: ,,, | Performed by: EMERGENCY MEDICINE

## 2023-06-08 PROCEDURE — 80053 COMPREHEN METABOLIC PANEL: CPT | Mod: NTX | Performed by: EMERGENCY MEDICINE

## 2023-06-08 PROCEDURE — 25000003 PHARM REV CODE 250: Mod: NTX | Performed by: HOSPITALIST

## 2023-06-08 PROCEDURE — 84484 ASSAY OF TROPONIN QUANT: CPT | Mod: NTX | Performed by: EMERGENCY MEDICINE

## 2023-06-08 PROCEDURE — 94618 PULMONARY STRESS TESTING: CPT | Mod: PBBFAC,NTX | Performed by: INTERNAL MEDICINE

## 2023-06-08 PROCEDURE — 99223 1ST HOSP IP/OBS HIGH 75: CPT | Mod: AI,GC,NTX, | Performed by: INTERNAL MEDICINE

## 2023-06-08 PROCEDURE — 63600175 PHARM REV CODE 636 W HCPCS: Mod: NTX | Performed by: INTERNAL MEDICINE

## 2023-06-08 PROCEDURE — 84100 ASSAY OF PHOSPHORUS: CPT | Mod: NTX | Performed by: HOSPITALIST

## 2023-06-08 PROCEDURE — 83735 ASSAY OF MAGNESIUM: CPT | Mod: NTX | Performed by: HOSPITALIST

## 2023-06-08 PROCEDURE — 99900035 HC TECH TIME PER 15 MIN (STAT): Mod: NTX

## 2023-06-08 RX ORDER — MAGNESIUM SULFATE HEPTAHYDRATE 40 MG/ML
2 INJECTION, SOLUTION INTRAVENOUS ONCE
Status: COMPLETED | OUTPATIENT
Start: 2023-06-08 | End: 2023-06-08

## 2023-06-08 RX ORDER — PANTOPRAZOLE SODIUM 40 MG/1
40 TABLET, DELAYED RELEASE ORAL DAILY
Status: DISCONTINUED | OUTPATIENT
Start: 2023-06-08 | End: 2023-06-08

## 2023-06-08 RX ORDER — CLONAZEPAM 0.25 MG/1
1 TABLET, ORALLY DISINTEGRATING ORAL 2 TIMES DAILY PRN
Status: DISCONTINUED | OUTPATIENT
Start: 2023-06-08 | End: 2023-06-11

## 2023-06-08 RX ORDER — IPRATROPIUM BROMIDE 0.5 MG/2.5ML
0.5 SOLUTION RESPIRATORY (INHALATION) EVERY 4 HOURS PRN
Status: DISCONTINUED | OUTPATIENT
Start: 2023-06-08 | End: 2023-06-14 | Stop reason: HOSPADM

## 2023-06-08 RX ORDER — SODIUM CHLORIDE 0.9 % (FLUSH) 0.9 %
10 SYRINGE (ML) INJECTION
Status: DISCONTINUED | OUTPATIENT
Start: 2023-06-08 | End: 2023-06-14 | Stop reason: HOSPADM

## 2023-06-08 RX ORDER — ISOSORBIDE MONONITRATE 60 MG/1
60 TABLET, EXTENDED RELEASE ORAL DAILY
Status: DISCONTINUED | OUTPATIENT
Start: 2023-06-09 | End: 2023-06-08

## 2023-06-08 RX ORDER — FUROSEMIDE 10 MG/ML
80 INJECTION INTRAMUSCULAR; INTRAVENOUS DAILY
Status: DISCONTINUED | OUTPATIENT
Start: 2023-06-09 | End: 2023-06-09

## 2023-06-08 RX ORDER — FLUTICASONE FUROATE AND VILANTEROL 200; 25 UG/1; UG/1
1 POWDER RESPIRATORY (INHALATION) DAILY
Status: DISCONTINUED | OUTPATIENT
Start: 2023-06-08 | End: 2023-06-14 | Stop reason: HOSPADM

## 2023-06-08 RX ORDER — COLCHICINE 0.6 MG/1
0.6 TABLET, FILM COATED ORAL DAILY
Status: DISCONTINUED | OUTPATIENT
Start: 2023-06-09 | End: 2023-06-08

## 2023-06-08 RX ORDER — PAROXETINE HYDROCHLORIDE 20 MG/1
40 TABLET, FILM COATED ORAL DAILY
Status: DISCONTINUED | OUTPATIENT
Start: 2023-06-08 | End: 2023-06-08

## 2023-06-08 RX ORDER — HYDRALAZINE HYDROCHLORIDE 25 MG/1
25 TABLET, FILM COATED ORAL EVERY 8 HOURS
Status: DISCONTINUED | OUTPATIENT
Start: 2023-06-08 | End: 2023-06-09

## 2023-06-08 RX ORDER — CLOPIDOGREL BISULFATE 75 MG/1
75 TABLET ORAL DAILY
Status: DISCONTINUED | OUTPATIENT
Start: 2023-06-08 | End: 2023-06-14 | Stop reason: HOSPADM

## 2023-06-08 RX ORDER — HYDRALAZINE HYDROCHLORIDE 25 MG/1
25 TABLET, FILM COATED ORAL EVERY 8 HOURS
Status: DISCONTINUED | OUTPATIENT
Start: 2023-06-08 | End: 2023-06-08

## 2023-06-08 RX ORDER — CLOPIDOGREL BISULFATE 75 MG/1
75 TABLET ORAL DAILY
Status: DISCONTINUED | OUTPATIENT
Start: 2023-06-09 | End: 2023-06-08

## 2023-06-08 RX ORDER — FLUTICASONE FUROATE AND VILANTEROL 200; 25 UG/1; UG/1
1 POWDER RESPIRATORY (INHALATION) DAILY
Status: DISCONTINUED | OUTPATIENT
Start: 2023-06-09 | End: 2023-06-08

## 2023-06-08 RX ORDER — METOPROLOL SUCCINATE 25 MG/1
25 TABLET, EXTENDED RELEASE ORAL DAILY
Status: DISCONTINUED | OUTPATIENT
Start: 2023-06-08 | End: 2023-06-11

## 2023-06-08 RX ORDER — PAROXETINE HYDROCHLORIDE 20 MG/1
40 TABLET, FILM COATED ORAL DAILY
Status: DISCONTINUED | OUTPATIENT
Start: 2023-06-09 | End: 2023-06-08

## 2023-06-08 RX ORDER — ATORVASTATIN CALCIUM 40 MG/1
40 TABLET, FILM COATED ORAL DAILY
Status: DISCONTINUED | OUTPATIENT
Start: 2023-06-09 | End: 2023-06-08

## 2023-06-08 RX ORDER — FUROSEMIDE 10 MG/ML
80 INJECTION INTRAMUSCULAR; INTRAVENOUS EVERY 8 HOURS
Status: DISCONTINUED | OUTPATIENT
Start: 2023-06-08 | End: 2023-06-08

## 2023-06-08 RX ORDER — PAROXETINE 10 MG/1
10 TABLET, FILM COATED ORAL EVERY MORNING
Status: DISCONTINUED | OUTPATIENT
Start: 2023-06-08 | End: 2023-06-08

## 2023-06-08 RX ORDER — PAROXETINE 10 MG/1
10 TABLET, FILM COATED ORAL EVERY MORNING
Status: DISCONTINUED | OUTPATIENT
Start: 2023-06-09 | End: 2023-06-08

## 2023-06-08 RX ORDER — ALBUTEROL SULFATE 2.5 MG/.5ML
2.5 SOLUTION RESPIRATORY (INHALATION) EVERY 4 HOURS PRN
Status: DISCONTINUED | OUTPATIENT
Start: 2023-06-08 | End: 2023-06-14 | Stop reason: HOSPADM

## 2023-06-08 RX ORDER — QUETIAPINE FUMARATE 25 MG/1
100 TABLET, FILM COATED ORAL NIGHTLY
Status: DISCONTINUED | OUTPATIENT
Start: 2023-06-08 | End: 2023-06-14 | Stop reason: HOSPADM

## 2023-06-08 RX ORDER — FUROSEMIDE 10 MG/ML
80 INJECTION INTRAMUSCULAR; INTRAVENOUS
Status: COMPLETED | OUTPATIENT
Start: 2023-06-08 | End: 2023-06-08

## 2023-06-08 RX ORDER — ISOSORBIDE MONONITRATE 60 MG/1
60 TABLET, EXTENDED RELEASE ORAL DAILY
Status: DISCONTINUED | OUTPATIENT
Start: 2023-06-08 | End: 2023-06-14 | Stop reason: HOSPADM

## 2023-06-08 RX ORDER — PANTOPRAZOLE SODIUM 40 MG/1
40 TABLET, DELAYED RELEASE ORAL DAILY
Status: DISCONTINUED | OUTPATIENT
Start: 2023-06-08 | End: 2023-06-14 | Stop reason: HOSPADM

## 2023-06-08 RX ORDER — MAGNESIUM SULFATE HEPTAHYDRATE 40 MG/ML
2 INJECTION, SOLUTION INTRAVENOUS ONCE
Status: COMPLETED | OUTPATIENT
Start: 2023-06-08 | End: 2023-06-09

## 2023-06-08 RX ORDER — ACETAMINOPHEN 325 MG/1
650 TABLET ORAL EVERY 6 HOURS PRN
Status: DISCONTINUED | OUTPATIENT
Start: 2023-06-08 | End: 2023-06-14 | Stop reason: HOSPADM

## 2023-06-08 RX ORDER — QUETIAPINE FUMARATE 25 MG/1
100 TABLET, FILM COATED ORAL DAILY
Status: DISCONTINUED | OUTPATIENT
Start: 2023-06-08 | End: 2023-06-08

## 2023-06-08 RX ORDER — ATORVASTATIN CALCIUM 40 MG/1
40 TABLET, FILM COATED ORAL DAILY
Status: DISCONTINUED | OUTPATIENT
Start: 2023-06-08 | End: 2023-06-14 | Stop reason: HOSPADM

## 2023-06-08 RX ADMIN — QUETIAPINE FUMARATE 100 MG: 25 TABLET ORAL at 11:06

## 2023-06-08 RX ADMIN — PAROXETINE HYDROCHLORIDE 50 MG: 20 TABLET, FILM COATED ORAL at 04:06

## 2023-06-08 RX ADMIN — MAGNESIUM SULFATE 2 G: 2 INJECTION INTRAVENOUS at 05:06

## 2023-06-08 RX ADMIN — PANTOPRAZOLE SODIUM 40 MG: 40 TABLET, DELAYED RELEASE ORAL at 04:06

## 2023-06-08 RX ADMIN — ISOSORBIDE MONONITRATE 60 MG: 60 TABLET, EXTENDED RELEASE ORAL at 04:06

## 2023-06-08 RX ADMIN — CLOPIDOGREL BISULFATE 75 MG: 75 TABLET ORAL at 04:06

## 2023-06-08 RX ADMIN — HYDRALAZINE HYDROCHLORIDE 25 MG: 25 TABLET, FILM COATED ORAL at 04:06

## 2023-06-08 RX ADMIN — HYDRALAZINE HYDROCHLORIDE 25 MG: 25 TABLET, FILM COATED ORAL at 11:06

## 2023-06-08 RX ADMIN — METOPROLOL SUCCINATE 25 MG: 25 TABLET, EXTENDED RELEASE ORAL at 04:06

## 2023-06-08 RX ADMIN — CLONAZEPAM 1 MG: 0.25 TABLET, ORALLY DISINTEGRATING ORAL at 11:06

## 2023-06-08 RX ADMIN — ATORVASTATIN CALCIUM 40 MG: 40 TABLET, FILM COATED ORAL at 04:06

## 2023-06-08 RX ADMIN — FLUTICASONE FUROATE AND VILANTEROL TRIFENATATE 1 PUFF: 200; 25 POWDER RESPIRATORY (INHALATION) at 05:06

## 2023-06-08 RX ADMIN — FUROSEMIDE 80 MG: 10 INJECTION, SOLUTION INTRAMUSCULAR; INTRAVENOUS at 12:06

## 2023-06-08 RX ADMIN — MAGNESIUM SULFATE 2 G: 2 INJECTION INTRAVENOUS at 11:06

## 2023-06-08 NOTE — H&P
[facility] - Emergency Dept  Cardiology  History and Physical     Patient Name: Betty Bacon  MRN: [id number]  Admission Date: 6/8/2023  Code Status: Full Code   Attending Provider: Ailyn Hines MD   Primary Care Physician: Johanne Callejas MD  Principal Problem:Acute diastolic heart failure    Patient information was obtained from patient and ER records.     Subjective:     Chief Complaint:  SOB     HPI:  73 y.o. female with past medical history of restrictive lung disease, as asbestosis, peripheral vascular disease, severe aortic stenosis presented to the clinic for evaluation for severe symptomatic aortic stenosis.  She was complaining of having shortness of breath for which she was sent to the emergency room.  She has multiple admissions in the past for volume overload.  Patient is currently on Lasix 40 mg  daily.  Patient also complains of having orthopnea PND but denies having any lightheadedness, dizziness.  Denies having any chest pain loss of consciousness.            Past Medical History:   Diagnosis Date    Acute coronary artery obstruction without MI 10/2012    Benign hypertension     COPD (chronic obstructive pulmonary disease)     Coronary artery disease     Disorder of kidney and ureter     Dr. Morrow    Hepatitis B core antibody positive 03/03/2021    Negative sAg, suggests previous exposure but no chronic/active Hep B. At risk for reactivation with any immunosuppression medication, steroids, chemo, etc.      History of electroconvulsive therapy     Hyperlipidemia LDL goal < 70     Left ankle sprain     Major depressive disorder, recurrent episode, severe     s/p ECT    Positive AKIRA (antinuclear antibody) 03/03/2021    PVD (peripheral vascular disease)        Past Surgical History:   Procedure Laterality Date    ANGIOGRAM, CORONARY, WITH LEFT HEART CATHETERIZATION N/A 5/1/2023    Procedure: Angiogram, Coronary, with Left Heart Cath;  Surgeon: Neeraj Finn MD;   [facility] C[facility] LAB;  Service: Cardiology;  Laterality: N/A;    CHOLECYSTECTOMY      CORONARY ANGIOPLASTY      CORONARY ANGIOPLASTY WITH STENT PLACEMENT  10/2012    2 stents RCA (100% stenosis)    EYE SURGERY      cataract surgery    HYSTERECTOMY      LINA, ovaries intact. uterine prolapse    ILIAC ARTERY STENT      SMALL BOWEL ENTEROSCOPY N/A 2/20/2023    Procedure: ENTEROSCOPY;  Surgeon: Margy Dumont MD;  Location: [facility] ENDO;  Service: Endoscopy;  Laterality: N/A;    TONSILLECTOMY      TOTAL VAGINAL HYSTERECTOMY         Review of patient's allergies indicates:   Allergen Reactions    Propoxyphene n-acetaminophen Other (See Comments)     Other reaction(s): Unknown    Codeine Anxiety       Current Facility-Administered Medications on File Prior to Encounter   Medication    sodium chloride 0.9% flush 10 mL    sodium chloride 0.9% flush 10 mL     Current Outpatient Medications on File Prior to Encounter   Medication Sig    acetaminophen (TYLENOL) 325 MG tablet Take 325 mg by mouth every 6 (six) hours as needed for Pain.    albuterol sulfate (PROAIR RESPICLICK) 90 mcg/actuation AePB Inhale 2 puffs into the lungs every 4 to 6 hours as needed.    albuterol-ipratropium (DUO-NEB) 2.5 mg-0.5 mg/3 mL nebulizer solution Take 3 mLs by nebulization every 6 (six) hours as needed for Wheezing. Rescue    atorvastatin (LIPITOR) 40 MG tablet TAKE 1 TABLET BY MOUTH EVERY DAY (Patient taking differently: Take 40 mg by mouth once daily.)    clonazePAM (KLONOPIN) 1 MG disintegrating tablet Take 1 tablet (1 mg total) by mouth 2 (two) times daily as needed (anxiety).    clopidogreL (PLAVIX) 75 mg tablet TAKE 1 TABLET BY MOUTH EVERY DAY (Patient taking differently: Take 75 mg by mouth once daily.)    colchicine (COLCRYS) 0.6 mg tablet Take 2 po at gout flare onset, may repeat 1 in an hour prn, then 1 po bid until pain resolves ,or n/V/D starts    colestipoL (COLESTID) 1 gram Tab Take 1 tablet (1 g total) by  mouth 2 (two) times daily as needed (diarrhea).    esomeprazole (NEXIUM) 40 MG capsule TAKE 1 CAPSULE BY MOUTH BEFORE BREAKFAST. (Patient taking differently: Take 40 mg by mouth before breakfast.)    fluticasone propionate (FLONASE) 50 mcg/actuation nasal spray 1 spray (50 mcg total) by Each Nostril route once daily.    fluticasone-umeclidin-vilanter (TRELEGY ELLIPTA) 200-62.5-25 mcg inhaler Inhale 1 puff into the lungs once daily.    furosemide (LASIX) 40 MG tablet Take 1 tablet (40 mg total) by mouth once daily for 3 days, THEN 1 tablet (40 mg total) every 48 hours.    isosorbide mononitrate (IMDUR) 60 MG 24 hr tablet TAKE 1 TABLET BY MOUTH EVERY DAY    metoprolol succinate (TOPROL-XL) 50 MG 24 hr tablet Take 1 tablet (50 mg total) by mouth once daily. (Patient taking differently: Take 50 mg by mouth 2 (two) times daily.)    paroxetine (PAXIL) 10 MG tablet Take 10 mg by mouth every morning. Total 50 mg    paroxetine (PAXIL) 40 MG tablet TAKE 1 TABLET BY MOUTH EVERY DAY (Patient taking differently: Take 40 mg by mouth once daily.)    QUEtiapine (SEROQUEL) 100 MG Tab Take 100 mg by mouth once daily.    [DISCONTINUED] TRELEGY ELLIPTA 200-62.5-25 mcg inhaler INHALE 1 PUFF INTO THE LUNGS ONCE DAILY.     Family History       Problem Relation (Age of Onset)    Breast cancer Sister    Cancer Sister    Dementia Sister    Diabetes Mother, Maternal Aunt, Sister    Heart disease Mother, Father, Brother, Maternal Aunt, Maternal Uncle, Paternal Aunt, Paternal Uncle, Maternal Grandfather, Sister    Hypertension Daughter    Kidney disease Sister    Liver disease Sister    Stroke Father, Brother, Sister          Tobacco Use    Smoking status: Former     Packs/day: 2.00     Years: 40.00     Pack years: 80.00     Types: Cigarettes     Quit date: 2009     Years since quittin.5    Smokeless tobacco: Never   Substance and Sexual Activity    Alcohol use: Yes     Alcohol/week: 1.0 standard drink     Types: 1 Shots  of liquor per week     Comment: rare /occ    Drug use: No    Sexual activity: Never     Birth control/protection: Abstinence     Review of Systems   Constitutional: Positive for weight gain.   HENT: Negative.     Eyes: Negative.    Cardiovascular:  Positive for dyspnea on exertion, leg swelling, orthopnea and paroxysmal nocturnal dyspnea.   Respiratory:  Positive for shortness of breath.    Endocrine: Negative.    Musculoskeletal: Negative.    Objective:     Vital Signs (Most Recent):  Temp: 97.7 °F (36.5 °C) (06/08/23 1137)  Pulse: 61 (06/08/23 1506)  Resp: 20 (06/08/23 1333)  BP: (!) 191/79 (06/08/23 1506)  SpO2: 99 % (06/08/23 1506) Vital Signs (24h Range):  Temp:  [97.7 °F (36.5 °C)] 97.7 °F (36.5 °C)  Pulse:  [61-89] 61  Resp:  [20] 20  SpO2:  [91 %-99 %] 99 %  BP: (180-216)/() 191/79     Weight: 86.2 kg (190 lb)  Body mass index is 34.75 kg/m².    SpO2: 99 %         Intake/Output Summary (Last 24 hours) at 6/8/2023 1523  Last data filed at 6/8/2023 1457  Gross per 24 hour   Intake --   Output 1000 ml   Net -1000 ml       Lines/Drains/Airways       Peripheral Intravenous Line  Duration                  Peripheral IV - Single Lumen 06/08/23 1214 20 G Left Antecubital <1 day                     Physical Exam  Constitutional:       Appearance: Normal appearance.   HENT:      Head: Normocephalic.   Eyes:      Extraocular Movements: Extraocular movements intact.      Pupils: Pupils are equal, round, and reactive to light.   Cardiovascular:      Rate and Rhythm: Normal rate and regular rhythm.      Pulses: Normal pulses.      Heart sounds: Murmur heard.   Pulmonary:      Effort: Pulmonary effort is normal.      Breath sounds: Normal breath sounds.   Abdominal:      General: Abdomen is flat. Bowel sounds are normal.      Palpations: Abdomen is soft.   Musculoskeletal:         General: Normal range of motion.      Cervical back: Normal range of motion.   Skin:     General: Skin is warm.      Capillary Refill:  Capillary refill takes less than 2 seconds.   Neurological:      General: No focal deficit present.      Mental Status: She is alert and oriented to person, place, and time.        Significant Labs: All pertinent lab results from the last 24 hours have been reviewed.    Significant Imaging: EKG: nsr    Assessment and Plan:     * Acute diastolic heart failure  Patient is mildly volume overload on exam.  Chest x-ray shows mild pleural effusions.  Received IV Lasix 80 mg in the Lasix and she made 1 L urine.  Her BNP is only 115 when compared to 700 in February.  Will keep her on IV Lasix 80 mg daily.  Will continue to watch creatinine.  Also aggressive control of blood pressure as that would be making her symptoms more worse.  Keep potassium greater than 4, magnesium greater than 2.    Chronic respiratory failure with hypoxia  Patient is on 6 L home oxygen.  Currently requiring requiring 6 L to keep her sats above 95.  Multifactorial ILD and mild congestion due to severe aortic stenosis  Continue diuresis, DuoNebs round the clock     Interstitial lung disease  Patient has history of interstitial lung disease following pulmonology.  On DuoNebs and LABA with ICS.    Valvular heart disease, severe aortic stenosis, moderate aortic regurgitation  Patient is currently being worked up for TAVR at valve clinic.  Last echo showed an a peak velocity of 4 with a mean gradient of 35 and a valve area of 1.12  Plan for POBA once patient is euvolemic by intervention cardiology    Essential hypertension  Elevated blood pressures up to systolics in 200s on presentation.  Patient is on Imdur 60 mg, Toprol-XL 25 mg at home for her blood pressures.  Will start patient on hydralazine 25 TID along with the home medications    CKD (chronic kidney disease) stage 3, GFR 30-59 ml/min  Creatinine presentation is 1.6 which is elevated from baseline.  Will continue to monitor with diuresis.        VTE Risk Mitigation (From admission, onward)          Ordered     IP VTE HIGH RISK PATIENT  Once         06/08/23 1454     Place sequential compression device  Until discontinued         06/08/23 1454                Jeffery Collins MD  Cardiology   [facility] Dept

## 2023-06-08 NOTE — ED NOTES
APPEARANCE: awake and alert in NAD. Pt resting in stretcher. Daughter at bedside.  SKIN: warm, dry and intact. No breakdown or bruising.  MUSCULOSKELETAL: Patient moving all extremities spontaneously, no obvious swelling or deformities noted. Ambulates independently.  RESPIRATORY: Reports shortness of breath. Respirations unlabored. On 6L NC satting 96%.  CARDIAC: Denies CP, 2+ distal pulses; no peripheral edema noted.  ABDOMEN: S/ND/NT, Denies nausea/vomiting/diarrhea.  : voids spontaneously, denies difficulty  Neurologic: AAO x 4; follows commands equal strength in all extremities; denies numbness/tingling. Denies dizziness/lightheadedness/HA.

## 2023-06-08 NOTE — ED TRIAGE NOTES
Patient comes into the emergency department by POV with complaints of SOB. Patient states that she's been having worsening SOB for the past several months, has had 6 previous admissions for the same problem. Patient on 6L of O2 at home; currently satting 86 on 6L. Pt denies any other symptoms.

## 2023-06-08 NOTE — Clinical Note
The catheter was reinserted into the left ventricle. Hemodynamics were performed.  Valve gradient=16.8

## 2023-06-08 NOTE — SUBJECTIVE & OBJECTIVE
Past Medical History:   Diagnosis Date    Acute coronary artery obstruction without MI 10/2012    Benign hypertension     COPD (chronic obstructive pulmonary disease)     Coronary artery disease     Disorder of kidney and ureter     Dr. Morrow    Hepatitis B core antibody positive 03/03/2021    Negative sAg, suggests previous exposure but no chronic/active Hep B. At risk for reactivation with any immunosuppression medication, steroids, chemo, etc.      History of electroconvulsive therapy     Hyperlipidemia LDL goal < 70     Left ankle sprain     Major depressive disorder, recurrent episode, severe     s/p ECT    Positive AKIRA (antinuclear antibody) 03/03/2021    PVD (peripheral vascular disease)        Past Surgical History:   Procedure Laterality Date    ANGIOGRAM, CORONARY, WITH LEFT HEART CATHETERIZATION N/A 5/1/2023    Procedure: Angiogram, Coronary, with Left Heart Cath;  Surgeon: Neeraj Finn MD;  Location: [facility] CATH LAB;  Service: Cardiology;  Laterality: N/A;    CHOLECYSTECTOMY      CORONARY ANGIOPLASTY      CORONARY ANGIOPLASTY WITH STENT PLACEMENT  10/2012    2 stents RCA (100% stenosis)    EYE SURGERY      cataract surgery    HYSTERECTOMY      LINA, ovaries intact. uterine prolapse    ILIAC ARTERY STENT      SMALL BOWEL ENTEROSCOPY N/A 2/20/2023    Procedure: ENTEROSCOPY;  Surgeon: Margy Dumont MD;  Location: [facility] ENDO;  Service: Endoscopy;  Laterality: N/A;    TONSILLECTOMY      TOTAL VAGINAL HYSTERECTOMY         Review of patient's allergies indicates:   Allergen Reactions    Propoxyphene n-acetaminophen Other (See Comments)     Other reaction(s): Unknown    Codeine Anxiety       Current Facility-Administered Medications on File Prior to Encounter   Medication    sodium chloride 0.9% flush 10 mL    sodium chloride 0.9% flush 10 mL     Current Outpatient Medications on File Prior to Encounter   Medication Sig    acetaminophen (TYLENOL) 325 MG tablet Take 325 mg by mouth every 6 (six) hours  as needed for Pain.    albuterol sulfate (PROAIR RESPICLICK) 90 mcg/actuation AePB Inhale 2 puffs into the lungs every 4 to 6 hours as needed.    albuterol-ipratropium (DUO-NEB) 2.5 mg-0.5 mg/3 mL nebulizer solution Take 3 mLs by nebulization every 6 (six) hours as needed for Wheezing. Rescue    atorvastatin (LIPITOR) 40 MG tablet TAKE 1 TABLET BY MOUTH EVERY DAY (Patient taking differently: Take 40 mg by mouth once daily.)    clonazePAM (KLONOPIN) 1 MG disintegrating tablet Take 1 tablet (1 mg total) by mouth 2 (two) times daily as needed (anxiety).    clopidogreL (PLAVIX) 75 mg tablet TAKE 1 TABLET BY MOUTH EVERY DAY (Patient taking differently: Take 75 mg by mouth once daily.)    colchicine (COLCRYS) 0.6 mg tablet Take 2 po at gout flare onset, may repeat 1 in an hour prn, then 1 po bid until pain resolves ,or n/V/D starts    colestipoL (COLESTID) 1 gram Tab Take 1 tablet (1 g total) by mouth 2 (two) times daily as needed (diarrhea).    esomeprazole (NEXIUM) 40 MG capsule TAKE 1 CAPSULE BY MOUTH BEFORE BREAKFAST. (Patient taking differently: Take 40 mg by mouth before breakfast.)    fluticasone propionate (FLONASE) 50 mcg/actuation nasal spray 1 spray (50 mcg total) by Each Nostril route once daily.    fluticasone-umeclidin-vilanter (TRELEGY ELLIPTA) 200-62.5-25 mcg inhaler Inhale 1 puff into the lungs once daily.    furosemide (LASIX) 40 MG tablet Take 1 tablet (40 mg total) by mouth once daily for 3 days, THEN 1 tablet (40 mg total) every 48 hours.    isosorbide mononitrate (IMDUR) 60 MG 24 hr tablet TAKE 1 TABLET BY MOUTH EVERY DAY    metoprolol succinate (TOPROL-XL) 50 MG 24 hr tablet Take 1 tablet (50 mg total) by mouth once daily. (Patient taking differently: Take 50 mg by mouth 2 (two) times daily.)    paroxetine (PAXIL) 10 MG tablet Take 10 mg by mouth every morning. Total 50 mg    paroxetine (PAXIL) 40 MG tablet TAKE 1 TABLET BY MOUTH EVERY DAY (Patient taking differently: Take 40 mg by mouth once  daily.)    QUEtiapine (SEROQUEL) 100 MG Tab Take 100 mg by mouth once daily.    [DISCONTINUED] TRELEGY ELLIPTA 200-62.5-25 mcg inhaler INHALE 1 PUFF INTO THE LUNGS ONCE DAILY.     Family History       Problem Relation (Age of Onset)    Breast cancer Sister    Cancer Sister    Dementia Sister    Diabetes Mother, Maternal Aunt, Sister    Heart disease Mother, Father, Brother, Maternal Aunt, Maternal Uncle, Paternal Aunt, Paternal Uncle, Maternal Grandfather, Sister    Hypertension Daughter    Kidney disease Sister    Liver disease Sister    Stroke Father, Brother, Sister          Tobacco Use    Smoking status: Former     Packs/day: 2.00     Years: 40.00     Pack years: 80.00     Types: Cigarettes     Quit date: 2009     Years since quittin.5    Smokeless tobacco: Never   Substance and Sexual Activity    Alcohol use: Yes     Alcohol/week: 1.0 standard drink     Types: 1 Shots of liquor per week     Comment: rare /occ    Drug use: No    Sexual activity: Never     Birth control/protection: Abstinence     Review of Systems   Constitutional: Positive for weight gain.   HENT: Negative.     Eyes: Negative.    Cardiovascular:  Positive for dyspnea on exertion, leg swelling, orthopnea and paroxysmal nocturnal dyspnea.   Respiratory:  Positive for shortness of breath.    Endocrine: Negative.    Musculoskeletal: Negative.    Objective:     Vital Signs (Most Recent):  Temp: 97.7 °F (36.5 °C) (23 1137)  Pulse: 61 (23 1506)  Resp: 20 (23 1333)  BP: (!) 191/79 (23 1506)  SpO2: 99 % (23 1506) Vital Signs (24h Range):  Temp:  [97.7 °F (36.5 °C)] 97.7 °F (36.5 °C)  Pulse:  [61-89] 61  Resp:  [20] 20  SpO2:  [91 %-99 %] 99 %  BP: (180-216)/() 191/79     Weight: 86.2 kg (190 lb)  Body mass index is 34.75 kg/m².    SpO2: 99 %         Intake/Output Summary (Last 24 hours) at 2023 1523  Last data filed at 2023 1457  Gross per 24 hour   Intake --   Output 1000 ml   Net -1000 ml        Lines/Drains/Airways       Peripheral Intravenous Line  Duration                  Peripheral IV - Single Lumen 06/08/23 1214 20 G Left Antecubital <1 day                     Physical Exam  Constitutional:       Appearance: Normal appearance.   HENT:      Head: Normocephalic.   Eyes:      Extraocular Movements: Extraocular movements intact.      Pupils: Pupils are equal, round, and reactive to light.   Cardiovascular:      Rate and Rhythm: Normal rate and regular rhythm.      Pulses: Normal pulses.      Heart sounds: Murmur heard.   Pulmonary:      Effort: Pulmonary effort is normal.      Breath sounds: Normal breath sounds.   Abdominal:      General: Abdomen is flat. Bowel sounds are normal.      Palpations: Abdomen is soft.   Musculoskeletal:         General: Normal range of motion.      Cervical back: Normal range of motion.   Skin:     General: Skin is warm.      Capillary Refill: Capillary refill takes less than 2 seconds.   Neurological:      General: No focal deficit present.      Mental Status: She is alert and oriented to person, place, and time.        Significant Labs: All pertinent lab results from the last 24 hours have been reviewed.    Significant Imaging: EKG: nsr

## 2023-06-08 NOTE — ASSESSMENT & PLAN NOTE
Patient has history of interstitial lung disease following pulmonology.  On DuoNebs and LABA with ICS.

## 2023-06-08 NOTE — PROGRESS NOTES
Subjective:     Referring Physician: Dr Suad RODRIGUEZ  Betty Bacon is a 73 y.o. female who presents for evaluation of severe, symptomatic aortic stenosis.     The patient has a history of restrictive lung disease, asbestosis, anemia, peripheral vascular disease and moderate aortic stenosis in her last echo in December.  She has had several admissions over the past 7 months.  All of these have been due to volume overload.  She was sent to us to be evaluated for the aortic stenosis treatment.      Patient states that up until the end of last year she was doing great. Starting about 7 months ago she had a rapid decline in her breathing followed by several hospital admission. She is now on chronic 6L of O2 which is new starting about 6 months ago.     Interval History  This patient was seen 2 weeks ago. Lasix was increased substantially in hopes to improve her oxygenation and make the TAVR less risky. Unfortunately she had no improvement with outpatient diuresis. She continues on 6L of oxygen. Per daughter, she desaturates to the 40's with exertion.     Betty Bacon is a 73 y.o. female referred by Dr Borjas for evaluation of severe AS (NYHA Class IV sx).    The patient has undergone the following TAVR work-up:   ECHO (Date 5/1/23): RAMIRO= 1.16 cm2 (AVAi 0.58), MG= 34 mmHg, Peak Mikey= 4.03 m/s, EF= 60%.   LHC (Date 5/1/23): mild (20%) RCA ISR. Otherwise normal coronaries.   STS: 4%   Frailty: 4/4   Iliacs are >5.96 on R and > 6.13 on L   LVOT area by CTA is 4.65 cm2 (26.4 mm X 25.8 mm) and Avg Diameter is 24.3 per Dr Blevins  Incidental findings on CT: 1.7 cm right upper pole renal lesion. Sent to PCP  CT Surgery risk assessment: inoperable per Dr Carver due to restrictive lung disease on home oxygen and frailty  Rhythm issues: none  PFTs: FEV1 35.9 % pred, DLCO 15% pred  KCCQ/5 meter walk: done  Comorbidities: severe restrictive lung disease on supplemental O2       Betty Bacon is a  29 mm  Evolut valve candidate via RTF access.      NYHA:IV CCS:0    Review of Systems   Constitutional: Negative for chills and fever.   HENT:  Negative for sore throat.    Eyes:  Negative for blurred vision.   Cardiovascular:  Positive for dyspnea on exertion. Negative for chest pain, claudication, cyanosis, irregular heartbeat, leg swelling, near-syncope, orthopnea, palpitations, paroxysmal nocturnal dyspnea and syncope.   Respiratory:  Negative for cough and sputum production.    Hematologic/Lymphatic: Does not bruise/bleed easily.   Skin:  Negative for itching, rash and suspicious lesions.   Musculoskeletal:  Negative for falls.   Gastrointestinal:  Negative for abdominal pain and change in bowel habit.   Genitourinary:  Negative for dysuria.   Neurological:  Negative for disturbances in coordination, dizziness and loss of balance.   Psychiatric/Behavioral:  Negative for altered mental status.         Past Medical History:   Diagnosis Date    Acute coronary artery obstruction without MI 10/2012    Benign hypertension     COPD (chronic obstructive pulmonary disease)     Coronary artery disease     Disorder of kidney and ureter     Dr. Morrow    Hepatitis B core antibody positive 03/03/2021    Negative sAg, suggests previous exposure but no chronic/active Hep B. At risk for reactivation with any immunosuppression medication, steroids, chemo, etc.      History of electroconvulsive therapy     Hyperlipidemia LDL goal < 70     Left ankle sprain     Major depressive disorder, recurrent episode, severe     s/p ECT    Positive AKIRA (antinuclear antibody) 03/03/2021    PVD (peripheral vascular disease)        Current Facility-Administered Medications:     sodium chloride 0.9% flush 10 mL, 10 mL, Intravenous, PRN, Neeraj Finn MD    sodium chloride 0.9% flush 10 mL, 10 mL, Intravenous, PRN, Neeraj Finn MD    Current Outpatient Medications:     albuterol-ipratropium (DUO-NEB) 2.5 mg-0.5 mg/3 mL nebulizer  solution, Take 3 mLs by nebulization every 6 (six) hours as needed for Wheezing. Rescue, Disp: 75 mL, Rfl: 11    atorvastatin (LIPITOR) 40 MG tablet, TAKE 1 TABLET BY MOUTH EVERY DAY (Patient taking differently: Take 40 mg by mouth once daily.), Disp: 90 tablet, Rfl: 3    clonazePAM (KLONOPIN) 1 MG disintegrating tablet, Take 1 tablet (1 mg total) by mouth 2 (two) times daily as needed (anxiety)., Disp: 60 tablet, Rfl: 1    clopidogreL (PLAVIX) 75 mg tablet, TAKE 1 TABLET BY MOUTH EVERY DAY (Patient taking differently: Take 75 mg by mouth once daily.), Disp: 90 tablet, Rfl: 3    esomeprazole (NEXIUM) 40 MG capsule, TAKE 1 CAPSULE BY MOUTH BEFORE BREAKFAST. (Patient taking differently: Take 40 mg by mouth before breakfast.), Disp: 90 capsule, Rfl: 3    fluticasone-umeclidin-vilanter (TRELEGY ELLIPTA) 200-62.5-25 mcg inhaler, Inhale 1 puff into the lungs once daily., Disp: 180 each, Rfl: 11    furosemide (LASIX) 40 MG tablet, Take 1 tablet (40 mg total) by mouth once daily for 3 days, THEN 1 tablet (40 mg total) every 48 hours., Disp: 30 tablet, Rfl: 11    isosorbide mononitrate (IMDUR) 60 MG 24 hr tablet, TAKE 1 TABLET BY MOUTH EVERY DAY, Disp: 90 tablet, Rfl: 3    metoprolol succinate (TOPROL-XL) 50 MG 24 hr tablet, Take 1 tablet (50 mg total) by mouth once daily. (Patient taking differently: Take 50 mg by mouth 2 (two) times daily.), Disp: 90 tablet, Rfl: 0    paroxetine (PAXIL) 10 MG tablet, Take 10 mg by mouth every morning. Total 50 mg, Disp: , Rfl:     paroxetine (PAXIL) 40 MG tablet, TAKE 1 TABLET BY MOUTH EVERY DAY (Patient taking differently: Take 40 mg by mouth once daily.), Disp: 90 tablet, Rfl: 3    QUEtiapine (SEROQUEL) 100 MG Tab, Take 100 mg by mouth once daily., Disp: , Rfl:     acetaminophen (TYLENOL) 325 MG tablet, Take 325 mg by mouth every 6 (six) hours as needed for Pain., Disp: , Rfl:     albuterol sulfate (PROAIR RESPICLICK) 90 mcg/actuation AePB, Inhale 2 puffs into the lungs every 4 to 6  "hours as needed. (Patient not taking: Reported on 6/8/2023), Disp: 1 each, Rfl: 3    colchicine (COLCRYS) 0.6 mg tablet, Take 2 po at gout flare onset, may repeat 1 in an hour prn, then 1 po bid until pain resolves ,or n/V/D starts (Patient not taking: Reported on 6/8/2023), Disp: 20 tablet, Rfl: 0    colestipoL (COLESTID) 1 gram Tab, Take 1 tablet (1 g total) by mouth 2 (two) times daily as needed (diarrhea). (Patient not taking: Reported on 6/8/2023), Disp: 180 tablet, Rfl: 3    fluticasone propionate (FLONASE) 50 mcg/actuation nasal spray, 1 spray (50 mcg total) by Each Nostril route once daily. (Patient not taking: Reported on 6/8/2023), Disp: 16 g, Rfl: PRN    Objective:    Physical Exam  Vitals reviewed.   Constitutional:       General: She is not in acute distress.     Appearance: She is well-developed. She is not diaphoretic.   HENT:      Head: Normocephalic and atraumatic.   Neck:      Vascular: No JVD.   Cardiovascular:      Rate and Rhythm: Normal rate and regular rhythm.      Pulses: Intact distal pulses.      Heart sounds: Murmur heard.   Harsh midsystolic murmur is present at the upper right sternal border radiating to the neck.   Pulmonary:      Effort: Pulmonary effort is normal. No respiratory distress.   Musculoskeletal:      Cervical back: Normal range of motion.      Right lower leg: No edema.      Left lower leg: No edema.   Skin:     General: Skin is warm and dry.   Neurological:      Mental Status: She is alert and oriented to person, place, and time.           Vitals:    06/08/23 1045 06/08/23 1048   BP: (!) 188/78 (!) 208/84   BP Location: Left arm Right arm   Patient Position: Sitting Sitting   BP Method: Large (Automatic) Large (Automatic)   Pulse: 78 66   SpO2: (!) 93%    Weight: 86.2 kg (190 lb 0.6 oz)    Height: 5' 2" (1.575 m)          Body mass index is 34.76 kg/m².    Test(s) Reviewed  I have reviewed the following in detail:  [] Stress test   [] Angiography   [x] Echocardiogram "   [x] Labs   [] Other       Chemistry        Component Value Date/Time     06/08/2023 0839    K 4.3 06/08/2023 0839    CL 98 06/08/2023 0839    CO2 35 (H) 06/08/2023 0839    BUN 33 (H) 06/08/2023 0839    CREATININE 1.5 (H) 06/08/2023 0839    CREATININE 1.1 10/14/2012 0315     06/08/2023 0839        Component Value Date/Time    CALCIUM 8.5 (L) 06/08/2023 0839    CALCIUM 8.3 (L) 10/14/2012 0315    ALKPHOS 94 03/09/2023 1417    ALKPHOS 179 (H) 10/10/2012 1525    AST 9 (L) 03/09/2023 1417    AST 19 10/10/2012 1525    ALT 6 (L) 03/09/2023 1417    BILITOT 0.4 03/09/2023 1417    ESTGFRAFRICA 51.4 (A) 07/20/2022 1303    ESTGFRAFRICA 46.7 (A) 07/20/2022 1303    EGFRNONAA 44.6 (A) 07/20/2022 1303    EGFRNONAA 40.5 (A) 07/20/2022 1303            TTE 5/1/23  Moderate left atrial enlargement.  The left ventricle is mildly enlarged with mild concentric hypertrophy and normal systolic function.  The estimated ejection fraction is 60%.  Grade II left ventricular diastolic dysfunction.  Mild right atrial enlargement.  Normal right ventricular size with normal right ventricular systolic function.  Aortic valve area is 1.16 cm2; peak velocity is 4.03 m/s; mean gradient is 34 mmHg.  There is moderate-to-severe aortic valve stenosis.  Mild-to-moderate aortic regurgitation.  Mild to moderate tricuspid regurgitation.  Intermediate central venous pressure (8 mmHg).  There is pulmonary hypertension. The estimated PA systolic pressure is 51 mmHg.  Trivial pericardial effusion.    Assessment:   Nonrheumatic aortic valve stenosis  Patient is very high risk for TAVR given frailty and oxygen dependence. Will plan to admit to to CCU service for fluid management and possible BAV next week.     Betty Bacon is a 73 y.o. female referred by Dr Borjas for evaluation of severe AS (NYHA Class IV sx).    The patient has undergone the following TAVR work-up:   ECHO (Date 5/1/23): RAMIRO= 1.16 cm2 (AVAi 0.58), MG= 34 mmHg, Peak Mikey=  4.03 m/s, EF= 60%.   LHC (Date 5/1/23): mild (20%) RCA ISR. Otherwise normal coronaries.   STS: 4%   Frailty: 4/4   Iliacs are >5.96 on R and > 6.13 on L   LVOT area by CTA is 4.65 cm2 (26.4 mm X 25.8 mm) and Avg Diameter is 24.3 per Dr Blevins  Incidental findings on CT: 1.7 cm right upper pole renal lesion. Sent to PCP  CT Surgery risk assessment: inoperable per Dr Carver due to restrictive lung disease on home oxygen and frailty  Rhythm issues: none  PFTs: FEV1 35.9 % pred, DLCO 15% pred  KCCQ/5 meter walk: done  Comorbidities: severe restrictive lung disease on supplemental O2       Betty Bacon is a 29 mm  Evolut valve candidate via RTF access.    Acute on chronic diastolic heart failure  NYHA IV with multiple hospitalizations. Remains on 6L O2 today.     Aortic atherosclerosis  See imaging. Follow up with Cardiology.     Chronic respiratory failure with hypoxia  On 6L O2 continuous. She has known severe restrictive lung disease.     Plan:       Patient was sent to the ED for heart failure admission. ER Cardiology fellow aware. Plan for admission to CCU service for IV Lasix and PT/OT. Will plan for possible BAV next week with Dr Blevins depending on fluid and functional status.                  Sheela Talamantes PA-C  Valve and Structural Heart Disease  [facility]

## 2023-06-08 NOTE — ASSESSMENT & PLAN NOTE
Elevated blood pressures up to systolics in 200s on presentation.  Patient is on Imdur 60 mg, Toprol-XL 25 mg at home for her blood pressures.  Will start patient on hydralazine 25 TID along with the home medications

## 2023-06-08 NOTE — HPI
73 y.o. female with past medical history of restrictive lung disease, as asbestosis, peripheral vascular disease, severe aortic stenosis presented to the clinic for evaluation for severe symptomatic aortic stenosis.  She was complaining of having shortness of breath for which she was sent to the emergency room.  She has multiple admissions in the past for volume overload.  Patient is currently on Lasix 40 mg  daily.  Patient also complains of having orthopnea PND but denies having any lightheadedness, dizziness.  Denies having any chest pain loss of consciousness.

## 2023-06-08 NOTE — ED PROVIDER NOTES
Encounter Date: 6/8/2023       History     Chief Complaint   Patient presents with    Referral     Patient presents from cardiology clinic for worsening shortness of breath x several months. Cards fellow present during triage and reports they have been trying to diurese her as an outpatient without success.      73-year-old female, history of COPD, PVD, oxygen dependent, CAD, severe aortic stenosis, referred to the ED by Cardiology from the clinic for admission to the CCU given persistent, severe shortness of breath.  Patient is supposed to have a TAVR procedure however given her persistent volume overload status and severe dyspnea requiring 6 L of oxygen she is been unable to get this procedure.  Her diuretics were adjusted over the last 2 weeks and she was re-evaluated again today and the cardiology clinic but it showed no improvement so she was referred to the ED for admission to the CCU.  Patient states she has severe dyspnea with any exertion though this has not changed at all in the last several months and she is had no acute worsening over the past day.  Patient is on 6 L of oxygen at home.    The history is provided by the patient and a relative.   Review of patient's allergies indicates:   Allergen Reactions    Propoxyphene n-acetaminophen Other (See Comments)     Other reaction(s): Unknown    Codeine Anxiety     Past Medical History:   Diagnosis Date    Acute coronary artery obstruction without MI 10/2012    Benign hypertension     COPD (chronic obstructive pulmonary disease)     Coronary artery disease     Disorder of kidney and ureter     Dr. Morrow    Hepatitis B core antibody positive 03/03/2021    Negative sAg, suggests previous exposure but no chronic/active Hep B. At risk for reactivation with any immunosuppression medication, steroids, chemo, etc.      History of electroconvulsive therapy     Hyperlipidemia LDL goal < 70     Left ankle sprain     Major depressive disorder, recurrent episode,  severe     s/p ECT    Positive AKIRA (antinuclear antibody) 2021    PVD (peripheral vascular disease)      Past Surgical History:   Procedure Laterality Date    ANGIOGRAM, CORONARY, WITH LEFT HEART CATHETERIZATION N/A 2023    Procedure: Angiogram, Coronary, with Left Heart Cath;  Surgeon: Neeraj Finn MD;  Location: [facility] CATH LAB;  Service: Cardiology;  Laterality: N/A;    CHOLECYSTECTOMY      CORONARY ANGIOPLASTY      CORONARY ANGIOPLASTY WITH STENT PLACEMENT  10/2012    2 stents RCA (100% stenosis)    EYE SURGERY      cataract surgery    HYSTERECTOMY      LINA, ovaries intact. uterine prolapse    ILIAC ARTERY STENT      SMALL BOWEL ENTEROSCOPY N/A 2023    Procedure: ENTEROSCOPY;  Surgeon: Margy Dumont MD;  Location: [facility] ENDO;  Service: Endoscopy;  Laterality: N/A;    TONSILLECTOMY      TOTAL VAGINAL HYSTERECTOMY       Family History   Problem Relation Age of Onset    Diabetes Mother     Heart disease Mother     Stroke Father     Heart disease Father     Heart disease Brother     Stroke Brother     Hypertension Daughter     Diabetes Maternal Aunt     Heart disease Maternal Aunt     Heart disease Maternal Uncle     Heart disease Paternal Aunt     Heart disease Paternal Uncle     Heart disease Maternal Grandfather     Diabetes Sister     Heart disease Sister     Cancer Sister         lung    Kidney disease Sister         mass, benign    Breast cancer Sister     Stroke Sister     Dementia Sister     Liver disease Sister     Melanoma Neg Hx     Psoriasis Neg Hx     Lupus Neg Hx     Eczema Neg Hx      Social History     Tobacco Use    Smoking status: Former     Packs/day: 2.00     Years: 40.00     Pack years: 80.00     Types: Cigarettes     Quit date: 2009     Years since quittin.5    Smokeless tobacco: Never   Substance Use Topics    Alcohol use: Yes     Alcohol/week: 1.0 standard drink     Types: 1 Shots of liquor per week     Comment: rare /occ    Drug use: No     Review of  Systems    Physical Exam     Initial Vitals [06/08/23 1137]   BP Pulse Resp Temp SpO2   (!) 192/82 65 20 97.7 °F (36.5 °C) (!) 91 %      MAP       --         Physical Exam    Nursing note and vitals reviewed.  Constitutional: She appears well-developed and well-nourished. She is not diaphoretic. She appears ill. No distress.   Eyes: Conjunctivae are normal.   Neck: Neck supple.   Pulmonary/Chest: Tachypnea noted. No respiratory distress. She has rales.   Musculoskeletal:         General: Edema present.      Cervical back: Neck supple.     Neurological: She is alert and oriented to person, place, and time.   Skin: Skin is warm.   Psychiatric: She has a normal mood and affect. Thought content normal.       ED Course   Procedures  Labs Reviewed   CBC W/ AUTO DIFFERENTIAL - Abnormal; Notable for the following components:       Result Value    RBC 3.63 (*)     Hemoglobin 10.2 (*)     Hematocrit 33.9 (*)     MCHC 30.1 (*)     Gran % 75.9 (*)     Lymph % 12.7 (*)     All other components within normal limits   COMPREHENSIVE METABOLIC PANEL - Abnormal; Notable for the following components:    CO2 31 (*)     BUN 33 (*)     Creatinine 1.6 (*)     Albumin 3.3 (*)     Alkaline Phosphatase 148 (*)     ALT 6 (*)     eGFR 33.4 (*)     All other components within normal limits   B-TYPE NATRIURETIC PEPTIDE - Abnormal; Notable for the following components:     (*)     All other components within normal limits   TROPONIN I     EKG Readings: (Independently Interpreted)   Initial Reading: No STEMI. Rhythm: Normal Sinus Rhythm. Ectopy: No Ectopy. Conduction: Normal. ST Segments: Normal ST Segments.   ECG Results              EKG 12-lead (Final result)  Result time 06/08/23 15:10:10      Final result by Interface, Lab In [facility] (06/08/23 15:10:10)                   Narrative:    Test Reason : R06.02,    Vent. Rate : 068 BPM     Atrial Rate : 068 BPM     P-R Int : 180 ms          QRS Dur : 092 ms      QT Int : 422 ms       P-R-T  Axes : 041 -01 -01 degrees     QTc Int : 448 ms    Normal sinus rhythm with sinus arrhythmia  Moderate voltage criteria for LVH, may be normal variant ( R in aVL ,   [place] product )  Nonspecific T wave abnormality  Abnormal ECG  When compared with ECG of 01-MAY-2023 11:12,  No significant change was found  Confirmed by CLAYTON BURKETT MD ([id number]) on 6/8/2023 3:10:00 PM    Referred By:             Confirmed By:CLAYTON BURKETT MD                                  Imaging Results              X-Ray Chest AP Portable (Final result)  Result time 06/08/23 13:21:24      Final result by Alex Ray MD (06/08/23 13:21:24)                   Impression:      See above      Electronically signed by: Alex Ray MD  Date:    06/08/2023  Time:    13:21               Narrative:    EXAMINATION:  XR CHEST AP PORTABLE    CLINICAL HISTORY:  CHF;    TECHNIQUE:  Single frontal view of the chest was performed.    COMPARISON:  No 05/18/2023 CT chest ne    FINDINGS:  Cardiomegaly and diffuse accentuation interstitial markings probably edema more marked at the lung bases.  Slight opacification at the right lung base could be related to volume loss or small amount of pleural effusion.  The lung apices are clear.                                    X-Rays:   Independently Interpreted Readings:   Chest X-Ray: Increased vascular markings consistent with CHF are present.   Medications   albuterol sulfate nebulizer solution 2.5 mg (has no administration in time range)     And   ipratropium 0.02 % nebulizer solution 0.5 mg (has no administration in time range)   clonazePAM disintegrating tablet 1 mg (has no administration in time range)   metoprolol succinate (TOPROL-XL) 24 hr tablet 25 mg (25 mg Oral Given 6/8/23 1634)   sodium chloride 0.9% flush 10 mL (has no administration in time range)   furosemide injection 80 mg (has no administration in time range)   atorvastatin tablet 40 mg (40 mg Oral Given 6/8/23 1634)   clopidogreL tablet 75 mg (75 mg  Oral Given 6/8/23 1634)   fluticasone furoate-vilanteroL 200-25 mcg/dose diskus inhaler 1 puff (1 puff Inhalation Given 6/8/23 1734)   hydrALAZINE tablet 25 mg (25 mg Oral Given 6/8/23 1634)   isosorbide mononitrate 24 hr tablet 60 mg (60 mg Oral Given 6/8/23 1634)   pantoprazole EC tablet 40 mg (40 mg Oral Given 6/8/23 1634)   QUEtiapine tablet 100 mg (has no administration in time range)   acetaminophen tablet 650 mg (has no administration in time range)   paroxetine tablet 50 mg (50 mg Oral Given 6/8/23 1638)   colchicine split tablet 0.3 mg (has no administration in time range)   magnesium sulfate 2g in water 50mL IVPB (premix) (2 g Intravenous New Bag 6/8/23 1748)   furosemide injection 80 mg (80 mg Intravenous Given 6/8/23 1229)     Medical Decision Making:   History:   Old Medical Records: I decided to obtain old medical records.  Old Records Summarized: records from clinic visits and records from previous admission(s).  Initial Assessment:   DDx for SOB would be broad.  It would include serious conditions such as CHF, PE, ACS, pneumothorax, pleural effusion, pericardial effusion, severe anemia, asthma and/or COPD exacerbation and pneumonia.  Other less serious processes like anxiety/panic attack are also a possibility.    I think the most likely etiology of this patient's SOB is CHF, severe aortic stenosis, underlying pulmonary fibrosis and COPD    The ED work-up for this patient's SOB will include the following interventions:  -EKG  -Labs:  CBC, CMP, BNP, troponin  -Imaging:  Chest x-ray  -I discussed the case with the cardiology fellow on-call and he says that the CCU team will be admitting the patient to their service in his requesting that we give the patient Lasix 80 mg IV in the meantime.  -continue on oxygen 6 L.  -Clinical reassessment  -Disposition: admission to CCU    Independently Interpreted Test(s):   I have ordered and independently interpreted X-rays - see prior notes.  I have ordered and  independently interpreted EKG Reading(s) - see prior notes  Clinical Tests:   Lab Tests: Ordered and Reviewed  Radiological Study: Ordered and Reviewed  Medical Tests: Reviewed and Ordered  Other:   I have discussed this case with another health care provider.       <> Summary of the Discussion: Cardiology fellow          Attending Attestation:         Attending Critical Care:   Critical Care Times:   ==============================================================  Total Critical Care Time - exclusive of procedural time: 35 minutes.  ==============================================================  Critical care was necessary to treat or prevent imminent or life-threatening deterioration of the following conditions: congestive heart failure and COPD exacerbation.   The following critical care procedures were done by me (see procedure notes): pulse oximetry.   Critical care was time spent personally by me on the following activities: obtaining history from patient or relative, examination of patient, review of x-rays / CT sent with the patient, review of old charts, development of treatment plan with patient or relative, ordering lab, x-rays, and/or EKG, ordering and performing treatments and interventions, evaluation of patient's response to treatment, discussion with consultants and re-evaluation of patient's conition.   Critical Care Condition: potentially life-threatening                      Clinical Impression:   Final diagnoses:  [R06.02] Shortness of breath  [I50.9] Acute on chronic congestive heart failure, unspecified heart failure type (Primary)        ED Disposition Condition    Admit Stable                Cece Rubio MD  06/08/23 7823

## 2023-06-08 NOTE — PROGRESS NOTES
Pharmacist Renal Dose Adjustment Note    Betty Bacon is a 73 y.o. female being treated with colchicine 0.6 mg daily    Patient Data:    Vital Signs (Most Recent):  Temp: 97.7 °F (36.5 °C) (06/08/23 1137)  Pulse: 60 (06/08/23 1517)  Resp: 20 (06/08/23 1333)  BP: (!) 177/76 (06/08/23 1517)  SpO2: 100 % (06/08/23 1517) Vital Signs (72h Range):  Temp:  [97.7 °F (36.5 °C)]   Pulse:  [60-89]   Resp:  [20]   BP: (177-216)/()   SpO2:  [91 %-100 %]      Recent Labs   Lab 06/08/23  0839 06/08/23  1209   CREATININE 1.5* 1.6*     Serum creatinine: 1.6 mg/dL (H) 06/08/23 1209  Estimated creatinine clearance: 30.9 mL/min (A)    Adjusted to   Colchicine 0.3 mg daily per pharmacy renal protocol    Pharmacist's Name: Payal Clark  Pharmacist's Extension: [phone]

## 2023-06-08 NOTE — ASSESSMENT & PLAN NOTE
Patient is currently being worked up for TAVR at valve clinic.  Last echo showed an a peak velocity of 4 with a mean gradient of 35 and a valve area of 1.12  Plan for POBA once patient is euvolemic by intervention cardiology

## 2023-06-08 NOTE — CONSULTS
[facility] - Cardiology Stepdown  Cardiology  Consult Note    Patient Name: Betty Bacon  MRN: [id number]  Admission Date: 6/8/2023  Hospital Length of Stay: 0 days  Code Status: Full Code   Attending Provider: Ailyn Hines MD   Consulting Provider: Jonathan Bledsoe MD  Primary Care Physician: Johanne Callejas MD  Principal Problem:Acute diastolic heart failure    Patient information was obtained from patient, past medical records and ER records.     Inpatient consult to Cardiology  Consult performed by: Jonathan Bledsoe MD  Consult ordered by: Cece Rubio MD  Reason for consult: Sent from IC clinic, severe AS, volume overload, NYHA Class IV  Assessment/Recommendations: Admitted to CCU, please see H&P from CCU for further details      Thank you for your consult.     Jonathan Bledsoe MD  Cardiology PGY5  [facility] - Cardiology Stepdown

## 2023-06-08 NOTE — ASSESSMENT & PLAN NOTE
Patient is on 6 L home oxygen.  Currently requiring requiring 6 L to keep her sats above 95.  Multifactorial ILD and mild congestion due to severe aortic stenosis  Continue diuresis, DuoNebs round the clock

## 2023-06-08 NOTE — ASSESSMENT & PLAN NOTE
Creatinine presentation is 1.6 which is elevated from baseline.  Will continue to monitor with diuresis.

## 2023-06-08 NOTE — Clinical Note
0 ml of contrast were injected throughout the case. 150 mL of contrast was the total wasted during the case. 150 mL was the total amount used during the case.

## 2023-06-08 NOTE — Clinical Note
The catheter was inserted into the left ventricle. Hemodynamics were performed.  Valve gradient=27.1

## 2023-06-08 NOTE — ASSESSMENT & PLAN NOTE
Patient is mildly volume overload on exam.  Chest x-ray shows mild pleural effusions.  Received IV Lasix 80 mg in the Lasix and she made 1 L urine.  Her BNP is only 115 when compared to 700 in February.  Will keep her on IV Lasix 80 mg daily.  Will continue to watch creatinine.  Also aggressive control of blood pressure as that would be making her symptoms more worse.  Keep potassium greater than 4, magnesium greater than 2.

## 2023-06-08 NOTE — PROVIDER PROGRESS NOTES - EMERGENCY DEPT.
Encounter Date: 6/8/2023    ED Physician Progress Notes         EKG - STEMI Decision  Initial Reading: No STEMI present.

## 2023-06-09 LAB
ANION GAP SERPL CALC-SCNC: 9 MMOL/L (ref 8–16)
ASCENDING AORTA: 2.7 CM
AV INDEX (PROSTH): 0.24
AV MEAN GRADIENT: 44 MMHG
AV PEAK GRADIENT: 68 MMHG
AV VALVE AREA: 0.75 CM2
AV VELOCITY RATIO: 0.23
BSA FOR ECHO PROCEDURE: 1.91 M2
BUN SERPL-MCNC: 35 MG/DL (ref 8–23)
CALCIUM SERPL-MCNC: 8.6 MG/DL (ref 8.7–10.5)
CHLORIDE SERPL-SCNC: 97 MMOL/L (ref 95–110)
CO2 SERPL-SCNC: 34 MMOL/L (ref 23–29)
CREAT SERPL-MCNC: 1.5 MG/DL (ref 0.5–1.4)
CV ECHO LV RWT: 0.36 CM
DOP CALC AO PEAK VEL: 4.12 M/S
DOP CALC AO VTI: 117.49 CM
DOP CALC LVOT AREA: 3.1 CM2
DOP CALC LVOT DIAMETER: 2 CM
DOP CALC LVOT PEAK VEL: 0.93 M/S
DOP CALC LVOT STROKE VOLUME: 87.76 CM3
DOP CALCLVOT PEAK VEL VTI: 27.95 CM
E WAVE DECELERATION TIME: 148.44 MSEC
E/A RATIO: 0.71
E/E' RATIO: 15.6 M/S
ECHO LV POSTERIOR WALL: 0.89 CM (ref 0.6–1.1)
EJECTION FRACTION: 55 %
EST. GFR  (NO RACE VARIABLE): 36.1 ML/MIN/1.73 M^2
FRACTIONAL SHORTENING: 33 % (ref 28–44)
GLUCOSE SERPL-MCNC: 100 MG/DL (ref 70–110)
INTERVENTRICULAR SEPTUM: 1.18 CM (ref 0.6–1.1)
LA MAJOR: 5.2 CM
LA MINOR: 5.71 CM
LA WIDTH: 4.21 CM
LEFT ATRIUM SIZE: 4.09 CM
LEFT ATRIUM VOLUME INDEX MOD: 42.1 ML/M2
LEFT ATRIUM VOLUME INDEX: 43.3 ML/M2
LEFT ATRIUM VOLUME MOD: 77.46 CM3
LEFT ATRIUM VOLUME: 79.67 CM3
LEFT INTERNAL DIMENSION IN SYSTOLE: 3.35 CM (ref 2.1–4)
LEFT VENTRICLE DIASTOLIC VOLUME INDEX: 64.45 ML/M2
LEFT VENTRICLE DIASTOLIC VOLUME: 118.58 ML
LEFT VENTRICLE MASS INDEX: 104 G/M2
LEFT VENTRICLE SYSTOLIC VOLUME INDEX: 24.8 ML/M2
LEFT VENTRICLE SYSTOLIC VOLUME: 45.64 ML
LEFT VENTRICULAR INTERNAL DIMENSION IN DIASTOLE: 5.01 CM (ref 3.5–6)
LEFT VENTRICULAR MASS: 191.25 G
LV LATERAL E/E' RATIO: 11.14 M/S
LV SEPTAL E/E' RATIO: 26 M/S
MV PEAK A VEL: 1.1 M/S
MV PEAK E VEL: 0.78 M/S
MV STENOSIS PRESSURE HALF TIME: 43.05 MS
MV VALVE AREA P 1/2 METHOD: 5.11 CM2
PISA TR MAX VEL: 2.9 M/S
POTASSIUM SERPL-SCNC: 4 MMOL/L (ref 3.5–5.1)
RA MAJOR: 4.56 CM
RA PRESSURE: 3 MMHG
RA WIDTH: 3.72 CM
RIGHT VENTRICULAR END-DIASTOLIC DIMENSION: 3.35 CM
SINUS: 3.39 CM
SODIUM SERPL-SCNC: 140 MMOL/L (ref 136–145)
STJ: 2.65 CM
TDI LATERAL: 0.07 M/S
TDI SEPTAL: 0.03 M/S
TDI: 0.05 M/S
TR MAX PG: 34 MMHG
TRICUSPID ANNULAR PLANE SYSTOLIC EXCURSION: 2.3 CM
TV REST PULMONARY ARTERY PRESSURE: 37 MMHG

## 2023-06-09 PROCEDURE — 94640 AIRWAY INHALATION TREATMENT: CPT | Mod: NTX

## 2023-06-09 PROCEDURE — 20600001 HC STEP DOWN PRIVATE ROOM: Mod: NTX

## 2023-06-09 PROCEDURE — 99233 SBSQ HOSP IP/OBS HIGH 50: CPT | Mod: NTX,,, | Performed by: INTERNAL MEDICINE

## 2023-06-09 PROCEDURE — 27000221 HC OXYGEN, UP TO 24 HOURS: Mod: NTX

## 2023-06-09 PROCEDURE — 36415 COLL VENOUS BLD VENIPUNCTURE: CPT | Mod: NTX | Performed by: HOSPITALIST

## 2023-06-09 PROCEDURE — 25000003 PHARM REV CODE 250: Mod: NTX | Performed by: HOSPITALIST

## 2023-06-09 PROCEDURE — 94761 N-INVAS EAR/PLS OXIMETRY MLT: CPT | Mod: NTX

## 2023-06-09 PROCEDURE — 63600175 PHARM REV CODE 636 W HCPCS: Mod: NTX | Performed by: HOSPITALIST

## 2023-06-09 PROCEDURE — 99233 PR SUBSEQUENT HOSPITAL CARE,LEVL III: ICD-10-PCS | Mod: NTX,,, | Performed by: INTERNAL MEDICINE

## 2023-06-09 PROCEDURE — 25000003 PHARM REV CODE 250: Mod: NTX | Performed by: INTERNAL MEDICINE

## 2023-06-09 PROCEDURE — 80048 BASIC METABOLIC PNL TOTAL CA: CPT | Mod: NTX | Performed by: HOSPITALIST

## 2023-06-09 RX ORDER — HYDRALAZINE HYDROCHLORIDE 50 MG/1
50 TABLET, FILM COATED ORAL EVERY 8 HOURS
Status: DISCONTINUED | OUTPATIENT
Start: 2023-06-09 | End: 2023-06-14 | Stop reason: HOSPADM

## 2023-06-09 RX ORDER — ENOXAPARIN SODIUM 100 MG/ML
40 INJECTION SUBCUTANEOUS EVERY 24 HOURS
Status: DISCONTINUED | OUTPATIENT
Start: 2023-06-09 | End: 2023-06-11

## 2023-06-09 RX ADMIN — PANTOPRAZOLE SODIUM 40 MG: 40 TABLET, DELAYED RELEASE ORAL at 09:06

## 2023-06-09 RX ADMIN — FLUTICASONE FUROATE AND VILANTEROL TRIFENATATE 1 PUFF: 200; 25 POWDER RESPIRATORY (INHALATION) at 09:06

## 2023-06-09 RX ADMIN — HYDRALAZINE HYDROCHLORIDE 25 MG: 25 TABLET, FILM COATED ORAL at 06:06

## 2023-06-09 RX ADMIN — FUROSEMIDE 80 MG: 10 INJECTION, SOLUTION INTRAMUSCULAR; INTRAVENOUS at 09:06

## 2023-06-09 RX ADMIN — METOPROLOL SUCCINATE 25 MG: 25 TABLET, EXTENDED RELEASE ORAL at 09:06

## 2023-06-09 RX ADMIN — QUETIAPINE FUMARATE 100 MG: 25 TABLET ORAL at 10:06

## 2023-06-09 RX ADMIN — CLONAZEPAM 1 MG: 0.25 TABLET, ORALLY DISINTEGRATING ORAL at 10:06

## 2023-06-09 RX ADMIN — CLOPIDOGREL BISULFATE 75 MG: 75 TABLET ORAL at 09:06

## 2023-06-09 RX ADMIN — ENOXAPARIN SODIUM 40 MG: 40 INJECTION SUBCUTANEOUS at 05:06

## 2023-06-09 RX ADMIN — HYDRALAZINE HYDROCHLORIDE 50 MG: 50 TABLET ORAL at 10:06

## 2023-06-09 RX ADMIN — ISOSORBIDE MONONITRATE 60 MG: 60 TABLET, EXTENDED RELEASE ORAL at 09:06

## 2023-06-09 RX ADMIN — ATORVASTATIN CALCIUM 40 MG: 40 TABLET, FILM COATED ORAL at 09:06

## 2023-06-09 RX ADMIN — PAROXETINE HYDROCHLORIDE 50 MG: 20 TABLET, FILM COATED ORAL at 06:06

## 2023-06-09 NOTE — ASSESSMENT & PLAN NOTE
Patient is mildly volume overload on exam.  Chest x-ray shows mild pleural effusions.  Received IV Lasix 80 mg in the Lasix and she made 1 L urine.  Her BNP is only 115 when compared to 700 in February.    Will keep her on IV Lasix 80 mg daily.  Will continue to watch creatinine.  Also aggressive control of blood pressure as that would be making her symptoms more worse. Keep potassium greater than 4, magnesium greater than 2. Continue Metorpolol and imdur.

## 2023-06-09 NOTE — HOSPITAL COURSE
Patient presented to cardiology with shortness of breath 2/2 to severe aortic stenosis and ILD (on 6L Home O2). She has not underwent TAVR. Her Scr increased to 1.7, s/p IV lasix; now discontinued and kidney function improved. ECHO with EF 55%, severe AS, Grade I diastolic dysfunction, and PASP 37. She underwent balloon aortic valvuloplasty 6/13 without complications. Discontinue furosemide and toprol XL on discharge. Continue amlodipine, hydralazine, and home medications as prescribed. Discharge with Home Health. Plan to follow up with cardiology outpatient.

## 2023-06-09 NOTE — ASSESSMENT & PLAN NOTE
Patient is on 6 L home oxygen.  Currently requiring requiring 6 L to keep her sats above 95. Multifactorial ILD and mild congestion due to severe aortic stenosis    Continue diuresis, DuoNebs prn, O2 per NC, pulse oximetry

## 2023-06-09 NOTE — ASSESSMENT & PLAN NOTE
Creatinine presentation is 1.6 which is elevated from baseline. Continue to monitor with diuresis.

## 2023-06-09 NOTE — PROCEDURES
Betty Bacon is a 73 y.o.  female patient, who presents for a 6 minute walk test ordered by MD Niyah.  The diagnosis is Interstitial Lung Disease; Aortic Valve Disorder; Oxygen Titration.  The patient's BMI is 33.1 kg/m2.  Predicted distance (lower limit of normal) is 234.21 meters.      Test Results:    The test was completed without stopping.  The total time walked was 360 seconds.  During walking, the patient reported:  Dyspnea.  The patient used supplemental oxygen and a wheelchair for assistance during testing.     06/08/2023---------Distance: 137.16 meters (450 feet)     Lap Walk Time O2 Sat % Supplemental Oxygen Heart Rate Blood Pressure Paul Scale Comment   Pre-exercise  (Resting) 0 0 94 % 6 L/M 70 bpm 185/77 mmHg 1    During Exercise 1 176 sec 86 % 6 L/M 74 bpm   Oxygen increased   During Exercise 2 336 sec 95 % 8 L/M 88 bpm      End of Exercise  360 sec 91 % 8 L/M 89 bpm 226/89 mmHg 2    Post-exercise  (Recovery)   93 % 8 L/M  69 bpm 194/74 mmHg       Recovery Time: 724 seconds    Performing nurse/tech: JAVIER Paredes      PREVIOUS STUDY:   05/24/2023---------Distance: 121.92 meters (400 feet)       Lap Time O2 Sat % Supplemental Oxygen Heart Rate Blood Pressure Paul Scale Comment   Pre-exercise  (Resting) 0 0 98 % 6 L/M 69 bpm 158/70 mmHg 3     During Exercise 1 186 sec 87 % 6 L/M 77 bpm     Oxygen Increased   End of Exercise   360 sec 92 % 8 L/M 84 bpm 139/74 mmHg 4     Post-exercise  (Recovery)     95 % 8 L/M 77 bpm             CLINICAL INTERPRETATION:  Six minute walk distance is 137.16 meters (450 feet) with light dyspnea.  During exercise, there was significant desaturation while breathing supplemental oxygen.  Blood pressure increased significantly and Heart rate remained stable with walking.  Hypertension was present prior to exercise.  The patient did not report non-pulmonary symptoms during exercise.  Severe exercise impairment is likely due to desaturation, cardiovascular  causes, and subjective symptoms.  The patient did complete the study, walking 360 seconds of the 360 second test.  Since the previous study in May 2023, exercise capacity is unchanged.  Based upon age and body mass index, exercise capacity is less than predicted.

## 2023-06-09 NOTE — SUBJECTIVE & OBJECTIVE
Interval History: NAEON. Continue Lasix 80 daily. Will likely need balloon angioplasty next week.     Review of Systems   Constitutional: Positive for weight gain.   HENT: Negative.     Eyes: Negative.    Cardiovascular:  Positive for dyspnea on exertion, leg swelling, orthopnea and paroxysmal nocturnal dyspnea.   Respiratory:  Positive for shortness of breath.    Endocrine: Negative.    Musculoskeletal: Negative.    Objective:     Vital Signs (Most Recent):  Temp: 98.2 °F (36.8 °C) (06/09/23 1144)  Pulse: 70 (06/09/23 1144)  Resp: 18 (06/09/23 1144)  BP: (!) 125/53 (06/09/23 1144)  SpO2: (!) 91 % (06/09/23 1144) Vital Signs (24h Range):  Temp:  [97.6 °F (36.4 °C)-98.3 °F (36.8 °C)] 98.2 °F (36.8 °C)  Pulse:  [60-89] 70  Resp:  [16-20] 18  SpO2:  [91 %-100 %] 91 %  BP: (112-205)/() 125/53     Weight: 83.5 kg (184 lb)  Body mass index is 33.65 kg/m².     SpO2: (!) 91 %         Intake/Output Summary (Last 24 hours) at 6/9/2023 1237  Last data filed at 6/9/2023 0159  Gross per 24 hour   Intake 422 ml   Output 1900 ml   Net -1478 ml       Lines/Drains/Airways       Peripheral Intravenous Line  Duration                  Peripheral IV - Single Lumen 06/08/23 1214 20 G Left Antecubital 1 day                       Physical Exam  Constitutional:       Appearance: Normal appearance.   HENT:      Head: Normocephalic.   Eyes:      Extraocular Movements: Extraocular movements intact.      Pupils: Pupils are equal, round, and reactive to light.   Cardiovascular:      Rate and Rhythm: Normal rate and regular rhythm.      Pulses: Normal pulses.      Heart sounds: Murmur heard.   Pulmonary:      Effort: Pulmonary effort is normal.      Breath sounds: Normal breath sounds.   Abdominal:      General: Abdomen is flat. Bowel sounds are normal.      Palpations: Abdomen is soft.   Musculoskeletal:         General: Normal range of motion.      Cervical back: Normal range of motion.   Skin:     General: Skin is warm.      Capillary  Refill: Capillary refill takes less than 2 seconds.   Neurological:      General: No focal deficit present.      Mental Status: She is alert and oriented to person, place, and time.          Significant Labs: CMP   Recent Labs   Lab 06/08/23  1209 06/08/23  1628 06/09/23  0558    139 140   K 4.1 4.0 4.0   CL 98 97 97   CO2 31* 31* 34*    103 100   BUN 33* 32* 35*   CREATININE 1.6* 1.5* 1.5*   CALCIUM 8.7 8.7 8.6*   PROT 7.5 7.2  --    ALBUMIN 3.3* 3.1*  --    BILITOT 0.3 0.3  --    ALKPHOS 148* 140*  --    AST 13 12  --    ALT 6* 6*  --    ANIONGAP 11 11 9    and CBC   Recent Labs   Lab 06/08/23  1209 06/08/23  1628   WBC 10.02 7.78   HGB 10.2* 10.3*   HCT 33.9* 33.2*    188       Significant Imaging: See imaging tab

## 2023-06-09 NOTE — PLAN OF CARE
[facility]  Initial Discharge Assessment       Primary Care Provider: Johanne Callejas MD    Admission Diagnosis: Shortness of breath [R06.02]  Aortic stenosis [I35.0]  Acute decompensated heart failure [I50.9]  Acute on chronic congestive heart failure, unspecified heart failure type [I50.9]    Admission Date: 6/8/2023  Expected Discharge Date: 6/13/2023    Transition of Care Barriers: None    Payor: MEDICARE / Plan: MEDICARE PART A & B / Product Type: Government /     Extended Emergency Contact Information  Primary Emergency Contact: Loree Sanchez  Address: [address] [place] [address] [place] [address] [place]  Mobile Phone: [phone]  Relation: Daughter  Preferred language: English   needed? No  Secondary Emergency Contact: Gibson Bacon  Mobile Phone: [phone]  Relation: Son  Preferred language: English   needed? No    Discharge Plan A: Home with family, Home Health  Discharge Plan B: Home with family      [id number] [place], [place] - [address] AT [facility]  [address]  [place] [address]  Phone: [phone] Fax: [phone]    [facility]  [address]  [facility] [address]  Phone: [phone] Fax: [phone]    [facility] - [place] - [address] [facility] [address]  [address] [facility] [address]  [place] [address]  Phone: [phone] Fax: [phone]      Initial Assessment (most recent)       Adult Discharge Assessment - 06/09/23 1655          Discharge Assessment    Assessment Type Discharge Planning Assessment     Confirmed/corrected address, phone number and insurance Yes     Confirmed Demographics Correct on Facesheet     Source of Information patient;family     Communicated BOB with patient/caregiver Date not available/Unable to determine     Reason For Admission acute diastolic heart failure     People in Home child(donna), adult     Facility Arrived From: Clinic visit with  Dr Zhu.     Do you expect to return to your current living situation? Yes     Do you have help at home or someone to help you manage your care at home? Yes     Who are your caregiver(s) and their phone number(s)? Loree Sanchez (daughter) [phone]     Prior to hospitilization cognitive status: Alert/Oriented     Current cognitive status: Alert/Oriented     Walking or Climbing Stairs ambulation difficulty, requires equipment     Mobility Management walker,rolling     Dressing/Bathing bathing difficulty, requires equipment     Dressing/Bathing Management shower chair     Equipment Currently Used at Home oxygen;walker, rolling     Readmission within 30 days? No     Patient currently being followed by outpatient case management? No     Do you currently have service(s) that help you manage your care at home? Yes     Name and Contact number of [facility] ([phone])     Do you take prescription medications? Yes     Do you have prescription coverage? Yes     Coverage Medicare A & B and [employer]     Do you have any problems affording any of your prescribed medications? No     Is the patient taking medications as prescribed? yes     Who is going to help you get home at discharge? Loree Sanchez (daughter) [phone]     How do you get to doctors appointments? family or friend will provide     Are you on dialysis? No     Do you take coumadin? No     Discharge Plan A Home with family;Home Health     Discharge Plan B Home with family     DME Needed Upon Discharge  none     Discharge Plan discussed with: Patient;Adult children     Transition of Care Barriers None        OTHER    Name(s) of People in Home Loree Sanchez (daughter) [phone]                   CM met with patient and Loree Sanchez (daughter) [phone] at bedside and discussed discharge process. Contact numbers on white board . She verified her name and  , PCP , and insurance. Patient lives in a single story house  with her daughter with one step to point of entry. She has Oxygen at home and was on 6L as stated per patient. She has a shower stool and a walker with wheels. She denied use of coumadin and is not a dialysis patient. [facility] [place] [facility]. Will continue to monitor for discharge needs.     Torrey Pop RN CM   [phone]

## 2023-06-09 NOTE — PROGRESS NOTES
[facility]  Cardiology  Progress Note    Patient Name: Betty Bacon  MRN: [id number]  Admission Date: 6/8/2023  Hospital Length of Stay: 1 days  Code Status: Full Code   Attending Physician: Ailyn Hines MD   Primary Care Physician: Johanne Callejas MD  Expected Discharge Date:   Principal Problem:Acute diastolic heart failure    Subjective:     Hospital Course:   Patient presented to cardiology with shortness of breath 2/2 to severe aortic stenosis and ILD. She has not underwent TAVR. Likely will undergo balloon angioplasty next week.      Interval History: NAEON. Continue Lasix 80 daily. Will likely need balloon angioplasty next week.     Review of Systems   Constitutional: Positive for weight gain.   HENT: Negative.     Eyes: Negative.    Cardiovascular:  Positive for dyspnea on exertion, leg swelling, orthopnea and paroxysmal nocturnal dyspnea.   Respiratory:  Positive for shortness of breath.    Endocrine: Negative.    Musculoskeletal: Negative.    Objective:     Vital Signs (Most Recent):  Temp: 98.2 °F (36.8 °C) (06/09/23 1144)  Pulse: 70 (06/09/23 1144)  Resp: 18 (06/09/23 1144)  BP: (!) 125/53 (06/09/23 1144)  SpO2: (!) 91 % (06/09/23 1144) Vital Signs (24h Range):  Temp:  [97.6 °F (36.4 °C)-98.3 °F (36.8 °C)] 98.2 °F (36.8 °C)  Pulse:  [60-89] 70  Resp:  [16-20] 18  SpO2:  [91 %-100 %] 91 %  BP: (112-205)/() 125/53     Weight: 83.5 kg (184 lb)  Body mass index is 33.65 kg/m².     SpO2: (!) 91 %         Intake/Output Summary (Last 24 hours) at 6/9/2023 1237  Last data filed at 6/9/2023 0159  Gross per 24 hour   Intake 422 ml   Output 1900 ml   Net -1478 ml       Lines/Drains/Airways       Peripheral Intravenous Line  Duration                  Peripheral IV - Single Lumen 06/08/23 1214 20 G Left Antecubital 1 day                       Physical Exam  Constitutional:       Appearance: Normal appearance.   HENT:      Head: Normocephalic.   Eyes:      Extraocular  Movements: Extraocular movements intact.      Pupils: Pupils are equal, round, and reactive to light.   Cardiovascular:      Rate and Rhythm: Normal rate and regular rhythm.      Pulses: Normal pulses.      Heart sounds: Murmur heard.   Pulmonary:      Effort: Pulmonary effort is normal.      Breath sounds: Normal breath sounds.   Abdominal:      General: Abdomen is flat. Bowel sounds are normal.      Palpations: Abdomen is soft.   Musculoskeletal:         General: Normal range of motion.      Cervical back: Normal range of motion.   Skin:     General: Skin is warm.      Capillary Refill: Capillary refill takes less than 2 seconds.   Neurological:      General: No focal deficit present.      Mental Status: She is alert and oriented to person, place, and time.          Significant Labs: CMP   Recent Labs   Lab 06/08/23  1209 06/08/23  1628 06/09/23  0558    139 140   K 4.1 4.0 4.0   CL 98 97 97   CO2 31* 31* 34*    103 100   BUN 33* 32* 35*   CREATININE 1.6* 1.5* 1.5*   CALCIUM 8.7 8.7 8.6*   PROT 7.5 7.2  --    ALBUMIN 3.3* 3.1*  --    BILITOT 0.3 0.3  --    ALKPHOS 148* 140*  --    AST 13 12  --    ALT 6* 6*  --    ANIONGAP 11 11 9    and CBC   Recent Labs   Lab 06/08/23  1209 06/08/23  1628   WBC 10.02 7.78   HGB 10.2* 10.3*   HCT 33.9* 33.2*    188       Significant Imaging: See imaging tab    Assessment and Plan:       * Acute diastolic heart failure  Patient is mildly volume overload on exam.  Chest x-ray shows mild pleural effusions.  Received IV Lasix 80 mg in the Lasix and she made 1 L urine.  Her BNP is only 115 when compared to 700 in February.    Will keep her on IV Lasix 80 mg daily.  Will continue to watch creatinine.  Also aggressive control of blood pressure as that would be making her symptoms more worse. Keep potassium greater than 4, magnesium greater than 2. Continue Metorpolol and imdur.     Chronic respiratory failure with hypoxia  Patient is on 6 L home oxygen.  Currently  requiring requiring 6 L to keep her sats above 95. Multifactorial ILD and mild congestion due to severe aortic stenosis    Continue diuresis, DuoNebs prn, O2 per NC, pulse oximetry     Interstitial lung disease  Patient has history of interstitial lung disease following pulmonology.  On DuoNebs and LABA with ICS.    CAD (coronary artery disease)  NEFTALY RCA 2004  2 Stents RCA 2012    Continue plavix    Valvular heart disease, severe aortic stenosis, moderate aortic regurgitation  Patient is currently being worked up for TAVR at valve clinic.  Last echo showed an a peak velocity of 4 with a mean gradient of 35 and a valve area of 1.12    Plan for POBA once patient is euvolemic by intervention cardiology.    Depression  Continue paroxetine  Seroquel QHS    Essential hypertension  Elevated blood pressures up to systolics in 200s on presentation.  Patient is on Imdur 60 mg, Toprol-XL 25 mg at home for her blood pressures.     Start patient on hydralazine 25 TID along with the home medications.    Hyperlipidemia LDL goal < 70  Atorvastatin 40    CKD (chronic kidney disease) stage 3, GFR 30-59 ml/min  Creatinine presentation is 1.6 which is elevated from baseline. Continue to monitor with diuresis.      VTE Risk Mitigation (From admission, onward)         Ordered     enoxaparin injection 40 mg  Every 24 hours         06/09/23 1123     IP VTE HIGH RISK PATIENT  Once         06/08/23 1454     Place sequential compression device  Until discontinued         06/08/23 1454                Stanton Harrison MD  Cardiology  [facility] Step[facility]

## 2023-06-09 NOTE — ASSESSMENT & PLAN NOTE
Elevated blood pressures up to systolics in 200s on presentation.  Patient is on Imdur 60 mg, Toprol-XL 25 mg at home for her blood pressures.     Start patient on hydralazine 25 TID along with the home medications.

## 2023-06-09 NOTE — ASSESSMENT & PLAN NOTE
Patient is currently being worked up for TAVR at valve clinic.  Last echo showed an a peak velocity of 4 with a mean gradient of 35 and a valve area of 1.12    Plan for POBA once patient is euvolemic by intervention cardiology.

## 2023-06-10 ENCOUNTER — PATIENT MESSAGE (OUTPATIENT)
Dept: CARDIOLOGY | Facility: CLINIC | Age: 73
End: 2023-06-10
Payer: MEDICARE

## 2023-06-10 LAB
ANION GAP SERPL CALC-SCNC: 13 MMOL/L (ref 8–16)
BUN SERPL-MCNC: 40 MG/DL (ref 8–23)
CALCIUM SERPL-MCNC: 8.9 MG/DL (ref 8.7–10.5)
CHLORIDE SERPL-SCNC: 96 MMOL/L (ref 95–110)
CO2 SERPL-SCNC: 31 MMOL/L (ref 23–29)
CREAT SERPL-MCNC: 1.7 MG/DL (ref 0.5–1.4)
EST. GFR  (NO RACE VARIABLE): 31.1 ML/MIN/1.73 M^2
GLUCOSE SERPL-MCNC: 103 MG/DL (ref 70–110)
POTASSIUM SERPL-SCNC: 3.7 MMOL/L (ref 3.5–5.1)
SODIUM SERPL-SCNC: 140 MMOL/L (ref 136–145)
TROPONIN I SERPL DL<=0.01 NG/ML-MCNC: 0.02 NG/ML (ref 0–0.03)

## 2023-06-10 PROCEDURE — 99233 PR SUBSEQUENT HOSPITAL CARE,LEVL III: ICD-10-PCS | Mod: NTX,,, | Performed by: INTERNAL MEDICINE

## 2023-06-10 PROCEDURE — 80048 BASIC METABOLIC PNL TOTAL CA: CPT | Mod: NTX | Performed by: HOSPITALIST

## 2023-06-10 PROCEDURE — 94640 AIRWAY INHALATION TREATMENT: CPT | Mod: NTX

## 2023-06-10 PROCEDURE — 99900035 HC TECH TIME PER 15 MIN (STAT): Mod: NTX

## 2023-06-10 PROCEDURE — 94761 N-INVAS EAR/PLS OXIMETRY MLT: CPT | Mod: NTX

## 2023-06-10 PROCEDURE — 25000003 PHARM REV CODE 250: Mod: NTX | Performed by: INTERNAL MEDICINE

## 2023-06-10 PROCEDURE — 27000221 HC OXYGEN, UP TO 24 HOURS: Mod: NTX

## 2023-06-10 PROCEDURE — 84484 ASSAY OF TROPONIN QUANT: CPT | Mod: NTX | Performed by: HOSPITALIST

## 2023-06-10 PROCEDURE — 20600001 HC STEP DOWN PRIVATE ROOM: Mod: NTX

## 2023-06-10 PROCEDURE — 36415 COLL VENOUS BLD VENIPUNCTURE: CPT | Mod: NTX | Performed by: HOSPITALIST

## 2023-06-10 PROCEDURE — 25000003 PHARM REV CODE 250: Mod: NTX | Performed by: HOSPITALIST

## 2023-06-10 PROCEDURE — 63600175 PHARM REV CODE 636 W HCPCS: Mod: NTX | Performed by: HOSPITALIST

## 2023-06-10 PROCEDURE — 99233 SBSQ HOSP IP/OBS HIGH 50: CPT | Mod: NTX,,, | Performed by: INTERNAL MEDICINE

## 2023-06-10 RX ADMIN — ACETAMINOPHEN 650 MG: 325 TABLET ORAL at 08:06

## 2023-06-10 RX ADMIN — ATORVASTATIN CALCIUM 40 MG: 40 TABLET, FILM COATED ORAL at 08:06

## 2023-06-10 RX ADMIN — QUETIAPINE FUMARATE 100 MG: 25 TABLET ORAL at 11:06

## 2023-06-10 RX ADMIN — ENOXAPARIN SODIUM 40 MG: 40 INJECTION SUBCUTANEOUS at 04:06

## 2023-06-10 RX ADMIN — PANTOPRAZOLE SODIUM 40 MG: 40 TABLET, DELAYED RELEASE ORAL at 08:06

## 2023-06-10 RX ADMIN — HYDRALAZINE HYDROCHLORIDE 50 MG: 50 TABLET ORAL at 02:06

## 2023-06-10 RX ADMIN — FLUTICASONE FUROATE AND VILANTEROL TRIFENATATE 1 PUFF: 200; 25 POWDER RESPIRATORY (INHALATION) at 09:06

## 2023-06-10 RX ADMIN — ISOSORBIDE MONONITRATE 60 MG: 60 TABLET, EXTENDED RELEASE ORAL at 08:06

## 2023-06-10 RX ADMIN — CLOPIDOGREL BISULFATE 75 MG: 75 TABLET ORAL at 08:06

## 2023-06-10 RX ADMIN — PAROXETINE HYDROCHLORIDE 50 MG: 20 TABLET, FILM COATED ORAL at 06:06

## 2023-06-10 RX ADMIN — HYDRALAZINE HYDROCHLORIDE 50 MG: 50 TABLET ORAL at 06:06

## 2023-06-10 RX ADMIN — HYDRALAZINE HYDROCHLORIDE 50 MG: 50 TABLET ORAL at 11:06

## 2023-06-10 RX ADMIN — METOPROLOL SUCCINATE 25 MG: 25 TABLET, EXTENDED RELEASE ORAL at 08:06

## 2023-06-10 NOTE — ASSESSMENT & PLAN NOTE
Patient is currently being worked up for TAVR at valve clinic.  Last echo showed an a peak velocity of 4 with a mean gradient of 35 and a valve area of 1.12    - Plan for BAV next week once clear per IC

## 2023-06-10 NOTE — ASSESSMENT & PLAN NOTE
Patient was mildly volume overload on exam.  Chest x-ray shows mild pleural effusions.  Received IV Lasix 80 mg and she made 1 L urine.  Her BNP is only 115 when compared to 700 in February.    - Discontinue lasix  - Strict I's/O's  - Trend Scr    - K> 4, Mg>2  - Metorpolol succinate  - Isosorbide mononitrate  - Hydralazine

## 2023-06-10 NOTE — ASSESSMENT & PLAN NOTE
Elevated blood pressures up to systolics in 200s on presentation.  Patient is on Imdur 60 mg, Toprol-XL 25 mg at home for her blood pressures.     - Continue hydralazine 25 TID along with the home medications

## 2023-06-10 NOTE — ASSESSMENT & PLAN NOTE
Creatinine presentation is 1.6 which is elevated from baseline. Continue to monitor with daily renal labs.

## 2023-06-10 NOTE — SUBJECTIVE & OBJECTIVE
Interval History: No events overnight. Discontinue IV diuresis. Her Scr went up to 1.7; likely 2/2 to lasix. Otherwise patient denies any new onset CP or SOB this morning. She is on 6LNC 92. Will likely need balloon angioplasty next week.     Review of Systems   Constitutional: Positive for weight gain.   HENT: Negative.     Eyes: Negative.    Cardiovascular:  Positive for dyspnea on exertion, leg swelling, orthopnea and paroxysmal nocturnal dyspnea.   Respiratory:  Positive for shortness of breath.    Endocrine: Negative.    Musculoskeletal: Negative.    Objective:     Vital Signs (Most Recent):  Temp: 98.5 °F (36.9 °C) (06/10/23 1208)  Pulse: 73 (06/10/23 1208)  Resp: 18 (06/10/23 1208)  BP: (!) 131/59 (06/10/23 1208)  SpO2: (!) 92 % (06/10/23 1208) Vital Signs (24h Range):  Temp:  [97.7 °F (36.5 °C)-98.5 °F (36.9 °C)] 98.5 °F (36.9 °C)  Pulse:  [68-89] 73  Resp:  [16-19] 18  SpO2:  [91 %-97 %] 92 %  BP: (119-174)/(56-81) 131/59     Weight: 83.5 kg (184 lb)  Body mass index is 33.65 kg/m².     SpO2: (!) 92 %         Intake/Output Summary (Last 24 hours) at 6/10/2023 1237  Last data filed at 6/10/2023 1106  Gross per 24 hour   Intake 420 ml   Output 950 ml   Net -530 ml         Lines/Drains/Airways       Peripheral Intravenous Line  Duration                  Peripheral IV - Single Lumen 06/09/23 2250 20 G Posterior;Right Hand <1 day                       Physical Exam  Constitutional:       Appearance: Normal appearance.   HENT:      Head: Normocephalic.   Eyes:      Extraocular Movements: Extraocular movements intact.      Pupils: Pupils are equal, round, and reactive to light.   Cardiovascular:      Rate and Rhythm: Normal rate and regular rhythm.      Pulses: Normal pulses.      Heart sounds: Murmur heard.   Pulmonary:      Effort: Pulmonary effort is normal.      Breath sounds: Normal breath sounds.   Abdominal:      General: Abdomen is flat. Bowel sounds are normal.      Palpations: Abdomen is soft.    Musculoskeletal:         General: Normal range of motion.      Cervical back: Normal range of motion.   Skin:     General: Skin is warm.      Capillary Refill: Capillary refill takes less than 2 seconds.   Neurological:      General: No focal deficit present.      Mental Status: She is alert and oriented to person, place, and time.          Significant Labs: CMP   Recent Labs   Lab 06/08/23  1628 06/09/23  0558 06/10/23  0256    140 140   K 4.0 4.0 3.7   CL 97 97 96   CO2 31* 34* 31*    100 103   BUN 32* 35* 40*   CREATININE 1.5* 1.5* 1.7*   CALCIUM 8.7 8.6* 8.9   PROT 7.2  --   --    ALBUMIN 3.1*  --   --    BILITOT 0.3  --   --    ALKPHOS 140*  --   --    AST 12  --   --    ALT 6*  --   --    ANIONGAP 11 9 13      and CBC   Recent Labs   Lab 06/08/23  1628   WBC 7.78   HGB 10.3*   HCT 33.2*            Significant Imaging: See imaging tab

## 2023-06-10 NOTE — PROGRESS NOTES
[facility]  Cardiology  Progress Note    Patient Name: Betty Bacon  MRN: [id number]  Admission Date: 6/8/2023  Hospital Length of Stay: 2 days  Code Status: Full Code   Attending Physician: James Addison MD   Primary Care Physician: Johanne Callejas MD  Expected Discharge Date: 6/13/2023  Principal Problem:Acute diastolic heart failure    Subjective:     Hospital Course:   Patient presented to cardiology with shortness of breath 2/2 to severe aortic stenosis and ILD. She has not underwent TAVR. Her Scr increased to 1.7, s/p IV lasix; since discontinued. ECHO with EF 55%, severe AS, Grade I diastolic dysfunction, and PASP 37. Likely will undergo balloon angioplasty next week.      Interval History: No events overnight. Discontinue IV diuresis. Her Scr went up to 1.7; likely 2/2 to lasix. Otherwise patient denies any new onset CP or SOB this morning. She is on 6LNC 92. Will likely need balloon angioplasty next week.     Review of Systems   Constitutional: Positive for weight gain.   HENT: Negative.     Eyes: Negative.    Cardiovascular:  Positive for dyspnea on exertion, leg swelling, orthopnea and paroxysmal nocturnal dyspnea.   Respiratory:  Positive for shortness of breath.    Endocrine: Negative.    Musculoskeletal: Negative.    Objective:     Vital Signs (Most Recent):  Temp: 98.5 °F (36.9 °C) (06/10/23 1208)  Pulse: 73 (06/10/23 1208)  Resp: 18 (06/10/23 1208)  BP: (!) 131/59 (06/10/23 1208)  SpO2: (!) 92 % (06/10/23 1208) Vital Signs (24h Range):  Temp:  [97.7 °F (36.5 °C)-98.5 °F (36.9 °C)] 98.5 °F (36.9 °C)  Pulse:  [68-89] 73  Resp:  [16-19] 18  SpO2:  [91 %-97 %] 92 %  BP: (119-174)/(56-81) 131/59     Weight: 83.5 kg (184 lb)  Body mass index is 33.65 kg/m².     SpO2: (!) 92 %         Intake/Output Summary (Last 24 hours) at 6/10/2023 1237  Last data filed at 6/10/2023 1106  Gross per 24 hour   Intake 420 ml   Output 950 ml   Net -530 ml         Lines/Drains/Airways        Peripheral Intravenous Line  Duration                  Peripheral IV - Single Lumen 06/09/23 2250 20 G Posterior;Right Hand <1 day                       Physical Exam  Constitutional:       Appearance: Normal appearance.   HENT:      Head: Normocephalic.   Eyes:      Extraocular Movements: Extraocular movements intact.      Pupils: Pupils are equal, round, and reactive to light.   Cardiovascular:      Rate and Rhythm: Normal rate and regular rhythm.      Pulses: Normal pulses.      Heart sounds: Murmur heard.   Pulmonary:      Effort: Pulmonary effort is normal.      Breath sounds: Normal breath sounds.   Abdominal:      General: Abdomen is flat. Bowel sounds are normal.      Palpations: Abdomen is soft.   Musculoskeletal:         General: Normal range of motion.      Cervical back: Normal range of motion.   Skin:     General: Skin is warm.      Capillary Refill: Capillary refill takes less than 2 seconds.   Neurological:      General: No focal deficit present.      Mental Status: She is alert and oriented to person, place, and time.          Significant Labs: CMP   Recent Labs   Lab 06/08/23  1628 06/09/23  0558 06/10/23  0256    140 140   K 4.0 4.0 3.7   CL 97 97 96   CO2 31* 34* 31*    100 103   BUN 32* 35* 40*   CREATININE 1.5* 1.5* 1.7*   CALCIUM 8.7 8.6* 8.9   PROT 7.2  --   --    ALBUMIN 3.1*  --   --    BILITOT 0.3  --   --    ALKPHOS 140*  --   --    AST 12  --   --    ALT 6*  --   --    ANIONGAP 11 9 13      and CBC   Recent Labs   Lab 06/08/23  1628   WBC 7.78   HGB 10.3*   HCT 33.2*            Significant Imaging: See imaging tab    Assessment and Plan:       * Acute diastolic heart failure  Patient was mildly volume overload on exam.  Chest x-ray shows mild pleural effusions.  Received IV Lasix 80 mg and she made 1 L urine.  Her BNP is only 115 when compared to 700 in February.    - Discontinue lasix  - Strict I's/O's  - Trend Scr    - K> 4, Mg>2  - Metorpolol succinate  - Isosorbide  mononitrate  - Hydralazine     Chronic respiratory failure with hypoxia  Patient is on 6 L home oxygen.  Currently requiring requiring 6 L to keep her sats above 95. Multifactorial ILD and mild congestion due to severe aortic stenosis    - DuoNebs prn   - O2 per NC  - pulse oximetry  - breathing treatment     Interstitial lung disease  Patient has history of interstitial lung disease following pulmonology. On DuoNebs and LABA with ICS.    - continue duonebs q4h prn  - continuous O2 per Nc 6L  - pulse oximetry  - telemetry  - breathing inhalation treatment    CAD (coronary artery disease)  NEFTALY RCA 2004  2 Stents RCA 2012    - Continue plavix    Valvular heart disease, severe aortic stenosis, moderate aortic regurgitation  Patient is currently being worked up for TAVR at valve clinic.  Last echo showed an a peak velocity of 4 with a mean gradient of 35 and a valve area of 1.12    - Plan for BAV next week once clear per IC    Depression  Continue paroxetine  Seroquel QHS    Essential hypertension  Elevated blood pressures up to systolics in 200s on presentation.  Patient is on Imdur 60 mg, Toprol-XL 25 mg at home for her blood pressures.     - Continue hydralazine 25 TID along with the home medications    Hyperlipidemia LDL goal < 70  Atorvastatin 40    CKD (chronic kidney disease) stage 3, GFR 30-59 ml/min  Creatinine presentation is 1.6 which is elevated from baseline. Continue to monitor with daily renal labs.        VTE Risk Mitigation (From admission, onward)         Ordered     enoxaparin injection 40 mg  Every 24 hours         06/09/23 1123     IP VTE HIGH RISK PATIENT  Once         06/08/23 1454     Place sequential compression device  Until discontinued         06/08/23 1454                Stanton Harrison MD  Cardiology  [facility]

## 2023-06-10 NOTE — ASSESSMENT & PLAN NOTE
Patient has history of interstitial lung disease following pulmonology. On DuoNebs and LABA with ICS.    - continue duonebs q4h prn  - continuous O2 per Nc 6L  - pulse oximetry  - telemetry  - breathing inhalation treatment

## 2023-06-10 NOTE — ASSESSMENT & PLAN NOTE
Patient is on 6 L home oxygen.  Currently requiring requiring 6 L to keep her sats above 95. Multifactorial ILD and mild congestion due to severe aortic stenosis    - DuoNebs prn   - O2 per NC  - pulse oximetry  - breathing treatment

## 2023-06-11 LAB
ANION GAP SERPL CALC-SCNC: 10 MMOL/L (ref 8–16)
BASOPHILS # BLD AUTO: 0.03 K/UL (ref 0–0.2)
BASOPHILS NFR BLD: 0.4 % (ref 0–1.9)
BUN SERPL-MCNC: 51 MG/DL (ref 8–23)
CALCIUM SERPL-MCNC: 9 MG/DL (ref 8.7–10.5)
CHLORIDE SERPL-SCNC: 97 MMOL/L (ref 95–110)
CO2 SERPL-SCNC: 31 MMOL/L (ref 23–29)
CREAT SERPL-MCNC: 1.7 MG/DL (ref 0.5–1.4)
DIFFERENTIAL METHOD: ABNORMAL
EOSINOPHIL # BLD AUTO: 0.3 K/UL (ref 0–0.5)
EOSINOPHIL NFR BLD: 4.4 % (ref 0–8)
ERYTHROCYTE [DISTWIDTH] IN BLOOD BY AUTOMATED COUNT: 14.1 % (ref 11.5–14.5)
EST. GFR  (NO RACE VARIABLE): 31.1 ML/MIN/1.73 M^2
GLUCOSE SERPL-MCNC: 99 MG/DL (ref 70–110)
HCT VFR BLD AUTO: 30.1 % (ref 37–48.5)
HGB BLD-MCNC: 9.4 G/DL (ref 12–16)
IMM GRANULOCYTES # BLD AUTO: 0.03 K/UL (ref 0–0.04)
IMM GRANULOCYTES NFR BLD AUTO: 0.4 % (ref 0–0.5)
LYMPHOCYTES # BLD AUTO: 0.9 K/UL (ref 1–4.8)
LYMPHOCYTES NFR BLD: 12.4 % (ref 18–48)
MCH RBC QN AUTO: 28.3 PG (ref 27–31)
MCHC RBC AUTO-ENTMCNC: 31.2 G/DL (ref 32–36)
MCV RBC AUTO: 91 FL (ref 82–98)
MONOCYTES # BLD AUTO: 0.5 K/UL (ref 0.3–1)
MONOCYTES NFR BLD: 6.6 % (ref 4–15)
NEUTROPHILS # BLD AUTO: 5.4 K/UL (ref 1.8–7.7)
NEUTROPHILS NFR BLD: 75.8 % (ref 38–73)
NRBC BLD-RTO: 0 /100 WBC
PLATELET # BLD AUTO: 176 K/UL (ref 150–450)
PMV BLD AUTO: 10.9 FL (ref 9.2–12.9)
POTASSIUM SERPL-SCNC: 3.9 MMOL/L (ref 3.5–5.1)
RBC # BLD AUTO: 3.32 M/UL (ref 4–5.4)
SODIUM SERPL-SCNC: 138 MMOL/L (ref 136–145)
WBC # BLD AUTO: 7.1 K/UL (ref 3.9–12.7)

## 2023-06-11 PROCEDURE — 63600175 PHARM REV CODE 636 W HCPCS: Mod: NTX | Performed by: INTERNAL MEDICINE

## 2023-06-11 PROCEDURE — 99233 SBSQ HOSP IP/OBS HIGH 50: CPT | Mod: NTX,,, | Performed by: INTERNAL MEDICINE

## 2023-06-11 PROCEDURE — 20600001 HC STEP DOWN PRIVATE ROOM: Mod: NTX

## 2023-06-11 PROCEDURE — 94640 AIRWAY INHALATION TREATMENT: CPT | Mod: NTX

## 2023-06-11 PROCEDURE — 25000003 PHARM REV CODE 250: Mod: NTX | Performed by: INTERNAL MEDICINE

## 2023-06-11 PROCEDURE — 27000221 HC OXYGEN, UP TO 24 HOURS: Mod: NTX

## 2023-06-11 PROCEDURE — 99233 PR SUBSEQUENT HOSPITAL CARE,LEVL III: ICD-10-PCS | Mod: NTX,,, | Performed by: INTERNAL MEDICINE

## 2023-06-11 PROCEDURE — 36415 COLL VENOUS BLD VENIPUNCTURE: CPT | Mod: NTX | Performed by: HOSPITALIST

## 2023-06-11 PROCEDURE — 99900035 HC TECH TIME PER 15 MIN (STAT): Mod: NTX

## 2023-06-11 PROCEDURE — 25000003 PHARM REV CODE 250: Mod: NTX | Performed by: HOSPITALIST

## 2023-06-11 PROCEDURE — 94761 N-INVAS EAR/PLS OXIMETRY MLT: CPT | Mod: NTX

## 2023-06-11 PROCEDURE — 85025 COMPLETE CBC W/AUTO DIFF WBC: CPT | Mod: NTX | Performed by: HOSPITALIST

## 2023-06-11 PROCEDURE — 80048 BASIC METABOLIC PNL TOTAL CA: CPT | Mod: NTX | Performed by: HOSPITALIST

## 2023-06-11 PROCEDURE — 25000003 PHARM REV CODE 250: Mod: NTX

## 2023-06-11 RX ORDER — ENOXAPARIN SODIUM 100 MG/ML
30 INJECTION SUBCUTANEOUS EVERY 24 HOURS
Status: DISCONTINUED | OUTPATIENT
Start: 2023-06-11 | End: 2023-06-13

## 2023-06-11 RX ORDER — AMLODIPINE BESYLATE 10 MG/1
10 TABLET ORAL DAILY
Status: DISCONTINUED | OUTPATIENT
Start: 2023-06-11 | End: 2023-06-14 | Stop reason: HOSPADM

## 2023-06-11 RX ORDER — ALPRAZOLAM 0.5 MG/1
1 TABLET ORAL 2 TIMES DAILY PRN
Status: DISCONTINUED | OUTPATIENT
Start: 2023-06-11 | End: 2023-06-14 | Stop reason: HOSPADM

## 2023-06-11 RX ADMIN — HYDRALAZINE HYDROCHLORIDE 50 MG: 50 TABLET ORAL at 11:06

## 2023-06-11 RX ADMIN — PAROXETINE HYDROCHLORIDE 50 MG: 20 TABLET, FILM COATED ORAL at 08:06

## 2023-06-11 RX ADMIN — QUETIAPINE FUMARATE 100 MG: 25 TABLET ORAL at 11:06

## 2023-06-11 RX ADMIN — ALPRAZOLAM 1 MG: 0.5 TABLET ORAL at 11:06

## 2023-06-11 RX ADMIN — PANTOPRAZOLE SODIUM 40 MG: 40 TABLET, DELAYED RELEASE ORAL at 08:06

## 2023-06-11 RX ADMIN — HYDRALAZINE HYDROCHLORIDE 50 MG: 50 TABLET ORAL at 01:06

## 2023-06-11 RX ADMIN — FLUTICASONE FUROATE AND VILANTEROL TRIFENATATE 1 PUFF: 200; 25 POWDER RESPIRATORY (INHALATION) at 08:06

## 2023-06-11 RX ADMIN — CLOPIDOGREL BISULFATE 75 MG: 75 TABLET ORAL at 08:06

## 2023-06-11 RX ADMIN — ISOSORBIDE MONONITRATE 60 MG: 60 TABLET, EXTENDED RELEASE ORAL at 08:06

## 2023-06-11 RX ADMIN — AMLODIPINE BESYLATE 10 MG: 10 TABLET ORAL at 08:06

## 2023-06-11 RX ADMIN — ALPRAZOLAM 1 MG: 0.5 TABLET ORAL at 01:06

## 2023-06-11 RX ADMIN — ATORVASTATIN CALCIUM 40 MG: 40 TABLET, FILM COATED ORAL at 08:06

## 2023-06-11 RX ADMIN — ENOXAPARIN SODIUM 30 MG: 30 INJECTION SUBCUTANEOUS at 04:06

## 2023-06-11 RX ADMIN — HYDRALAZINE HYDROCHLORIDE 50 MG: 50 TABLET ORAL at 06:06

## 2023-06-11 NOTE — PROGRESS NOTES
Pharmacist Renal Dose Adjustment Note    Betty Bacon is a 73 y.o. female being treated with the medication enoxaparin    Patient Data:    Vital Signs (Most Recent):  Temp: 97.6 °F (36.4 °C) (06/11/23 1141)  Pulse: 74 (06/11/23 1141)  Resp: 18 (06/11/23 1141)  BP: 133/60 (06/11/23 1141)  SpO2: 97 % (06/11/23 1141) Vital Signs (72h Range):  Temp:  [97.6 °F (36.4 °C)-98.5 °F (36.9 °C)]   Pulse:  [60-89]   Resp:  [16-22]   BP: (112-198)/(53-82)   SpO2:  [91 %-100 %]      Recent Labs   Lab 06/09/23  0558 06/10/23  0256 06/11/23  0814   CREATININE 1.5* 1.7* 1.7*     Serum creatinine: 1.7 mg/dL (H) 06/11/23 0814  Estimated creatinine clearance: 28.9 mL/min (A)    Medication:enoxaparin decreased from 40 mg to 30 mg for crcl < 30 mL/min    Pharmacist's Name: Suzy Mondragon  Pharmacist's Extension: [id number]

## 2023-06-11 NOTE — PROGRESS NOTES
[facility]  Cardiology  Progress Note    Patient Name: Betty Bacon  MRN: [id number]  Admission Date: 6/8/2023  Hospital Length of Stay: 3 days  Code Status: Full Code   Attending Physician: James Addison MD   Primary Care Physician: Johanne Callejas MD  Expected Discharge Date: 6/13/2023  Principal Problem:Acute diastolic heart failure    Subjective:        Interval History:  No events overnight.  Currently stable on 6 L nasal cannula.  Creatinine slightly trending up for which IV Lasix were held yesterday.  Plan to do BAV on Tuesday    ROS  Objective:     Vital Signs (Most Recent):  Temp: 97.7 °F (36.5 °C) (06/11/23 0725)  Pulse: 78 (06/11/23 1100)  Resp: (!) 22 (06/11/23 0837)  BP: (!) 155/65 (06/11/23 0725)  SpO2: (!) 93 % (06/11/23 0837) Vital Signs (24h Range):  Temp:  [97.7 °F (36.5 °C)-98.5 °F (36.9 °C)] 97.7 °F (36.5 °C)  Pulse:  [65-78] 78  Resp:  [17-22] 22  SpO2:  [92 %-99 %] 93 %  BP: (131-161)/(59-70) 155/65     Weight: 84.8 kg (186 lb 15.2 oz)  Body mass index is 34.19 kg/m².     SpO2: (!) 93 %         Intake/Output Summary (Last 24 hours) at 6/11/2023 1133  Last data filed at 6/11/2023 0533  Gross per 24 hour   Intake 120 ml   Output 510 ml   Net -390 ml       Lines/Drains/Airways       Peripheral Intravenous Line  Duration                  Peripheral IV - Single Lumen 06/09/23 2250 20 G Posterior;Right Hand 1 day                       Physical Exam  Constitutional:       Appearance: She is ill-appearing.   HENT:      Head: Normocephalic and atraumatic.      Mouth/Throat:      Mouth: Mucous membranes are moist.   Eyes:      Extraocular Movements: Extraocular movements intact.      Pupils: Pupils are equal, round, and reactive to light.   Cardiovascular:      Rate and Rhythm: Normal rate and regular rhythm.   Pulmonary:      Effort: Pulmonary effort is normal.      Breath sounds: Normal breath sounds.   Abdominal:      General: Abdomen is flat. Bowel sounds are normal.       Palpations: Abdomen is soft.   Musculoskeletal:         General: Normal range of motion.      Cervical back: Normal range of motion.   Skin:     General: Skin is warm.      Capillary Refill: Capillary refill takes less than 2 seconds.   Neurological:      General: No focal deficit present.      Mental Status: She is alert and oriented to person, place, and time.          Significant Labs: All pertinent lab results from the last 24 hours have been reviewed.        Assessment and Plan:          * Acute diastolic heart failure  Patient was mildly volume overload on exam.  Chest x-ray shows mild pleural effusions.  Received IV Lasix 80 mg and she made 1 L urine.  Her BNP is only 115 when compared to 700 in February.    - Discontinue lasix  - Strict I's/O's  - Trend Scr    - K> 4, Mg>2  -  Amlodipine   - Isosorbide mononitrate  - Hydralazine     Chronic respiratory failure with hypoxia  Patient is on 6 L home oxygen.  Currently requiring requiring 6 L to keep her sats above 95. Multifactorial ILD and mild congestion due to severe aortic stenosis    - DuoNebs prn   - O2 per NC  - pulse oximetry  - breathing treatment     Interstitial lung disease  Patient has history of interstitial lung disease following pulmonology. On DuoNebs and LABA with ICS.    - continue duonebs q4h prn  - continuous O2 per N[place] 6L  - pulse oximetry  - telemetry  - breathing inhalation treatment    CAD (coronary artery disease)  NEFTALY RCA 2004  2 Stents RCA 2012    - Continue plavix    Valvular heart disease, severe aortic stenosis, moderate aortic regurgitation  Patient is currently being worked up for TAVR at valve clinic.  Last echo showed an a peak velocity of 4 with a mean gradient of 35 and a valve area of 1.12    - Plan for BAV on Tuesday    Depression  Continue paroxetine  Seroquel QHS    Essential hypertension  Elevated blood pressures up to systolics in 200s on presentation.  Patient is on Imdur 60 mg, Toprol-XL 25 mg at home for her  blood pressures.     - Continue hydralazine 25 TID along with the home medications    Hyperlipidemia LDL goal < 70  Atorvastatin 40    CKD (chronic kidney disease) stage 3, GFR 30-59 ml/min  Creatinine presentation is 1.7 which is elevated from baseline. Cont to hold lasix         VTE Risk Mitigation (From admission, onward)         Ordered     enoxaparin injection 40 mg  Every 24 hours         06/09/23 1123     IP VTE HIGH RISK PATIENT  Once         06/08/23 1454     Place sequential compression device  Until discontinued         06/08/23 1454                Jeffery Collins MD  Cardiology  [facility]

## 2023-06-11 NOTE — ASSESSMENT & PLAN NOTE
Patient was mildly volume overload on exam.  Chest x-ray shows mild pleural effusions.  Received IV Lasix 80 mg and she made 1 L urine.  Her BNP is only 115 when compared to 700 in February.    - Discontinue lasix  - Strict I's/O's  - Trend Scr    - K> 4, Mg>2  -  Amlodipine   - Isosorbide mononitrate  - Hydralazine

## 2023-06-11 NOTE — SUBJECTIVE & OBJECTIVE
Interval History:  No events overnight.  Currently stable on 6 L nasal cannula.  Creatinine slightly trending up for which IV Lasix were held yesterday.  Plan to do BAV on Tuesday    ROS  Objective:     Vital Signs (Most Recent):  Temp: 97.7 °F (36.5 °C) (06/11/23 0725)  Pulse: 78 (06/11/23 1100)  Resp: (!) 22 (06/11/23 0837)  BP: (!) 155/65 (06/11/23 0725)  SpO2: (!) 93 % (06/11/23 0837) Vital Signs (24h Range):  Temp:  [97.7 °F (36.5 °C)-98.5 °F (36.9 °C)] 97.7 °F (36.5 °C)  Pulse:  [65-78] 78  Resp:  [17-22] 22  SpO2:  [92 %-99 %] 93 %  BP: (131-161)/(59-70) 155/65     Weight: 84.8 kg (186 lb 15.2 oz)  Body mass index is 34.19 kg/m².     SpO2: (!) 93 %         Intake/Output Summary (Last 24 hours) at 6/11/2023 1133  Last data filed at 6/11/2023 0533  Gross per 24 hour   Intake 120 ml   Output 510 ml   Net -390 ml       Lines/Drains/Airways       Peripheral Intravenous Line  Duration                  Peripheral IV - Single Lumen 06/09/23 2250 20 G Posterior;Right Hand 1 day                       Physical Exam  Constitutional:       Appearance: She is ill-appearing.   HENT:      Head: Normocephalic and atraumatic.      Mouth/Throat:      Mouth: Mucous membranes are moist.   Eyes:      Extraocular Movements: Extraocular movements intact.      Pupils: Pupils are equal, round, and reactive to light.   Cardiovascular:      Rate and Rhythm: Normal rate and regular rhythm.   Pulmonary:      Effort: Pulmonary effort is normal.      Breath sounds: Normal breath sounds.   Abdominal:      General: Abdomen is flat. Bowel sounds are normal.      Palpations: Abdomen is soft.   Musculoskeletal:         General: Normal range of motion.      Cervical back: Normal range of motion.   Skin:     General: Skin is warm.      Capillary Refill: Capillary refill takes less than 2 seconds.   Neurological:      General: No focal deficit present.      Mental Status: She is alert and oriented to person, place, and time.          Significant  Labs: All pertinent lab results from the last 24 hours have been reviewed.

## 2023-06-12 ENCOUNTER — ANESTHESIA EVENT (OUTPATIENT)
Dept: MEDSURG UNIT | Facility: HOSPITAL | Age: 73
DRG: 319 | End: 2023-06-12
Payer: MEDICARE

## 2023-06-12 LAB
ANION GAP SERPL CALC-SCNC: 10 MMOL/L (ref 8–16)
BUN SERPL-MCNC: 48 MG/DL (ref 8–23)
CALCIUM SERPL-MCNC: 9.1 MG/DL (ref 8.7–10.5)
CHLORIDE SERPL-SCNC: 97 MMOL/L (ref 95–110)
CO2 SERPL-SCNC: 30 MMOL/L (ref 23–29)
CREAT SERPL-MCNC: 1.7 MG/DL (ref 0.5–1.4)
EST. GFR  (NO RACE VARIABLE): 31.1 ML/MIN/1.73 M^2
GLUCOSE SERPL-MCNC: 100 MG/DL (ref 70–110)
POTASSIUM SERPL-SCNC: 4 MMOL/L (ref 3.5–5.1)
SODIUM SERPL-SCNC: 137 MMOL/L (ref 136–145)

## 2023-06-12 PROCEDURE — 99900035 HC TECH TIME PER 15 MIN (STAT): Mod: NTX

## 2023-06-12 PROCEDURE — 80048 BASIC METABOLIC PNL TOTAL CA: CPT | Mod: NTX | Performed by: HOSPITALIST

## 2023-06-12 PROCEDURE — 63600175 PHARM REV CODE 636 W HCPCS: Mod: NTX | Performed by: INTERNAL MEDICINE

## 2023-06-12 PROCEDURE — 99223 1ST HOSP IP/OBS HIGH 75: CPT | Mod: NTX,,, | Performed by: INTERNAL MEDICINE

## 2023-06-12 PROCEDURE — 94640 AIRWAY INHALATION TREATMENT: CPT | Mod: NTX

## 2023-06-12 PROCEDURE — 99223 PR INITIAL HOSPITAL CARE,LEVL III: ICD-10-PCS | Mod: NTX,,, | Performed by: INTERNAL MEDICINE

## 2023-06-12 PROCEDURE — 25000003 PHARM REV CODE 250: Mod: NTX

## 2023-06-12 PROCEDURE — 25000003 PHARM REV CODE 250: Mod: NTX | Performed by: HOSPITALIST

## 2023-06-12 PROCEDURE — 25000003 PHARM REV CODE 250: Mod: NTX | Performed by: INTERNAL MEDICINE

## 2023-06-12 PROCEDURE — 36415 COLL VENOUS BLD VENIPUNCTURE: CPT | Mod: NTX | Performed by: HOSPITALIST

## 2023-06-12 PROCEDURE — 99233 SBSQ HOSP IP/OBS HIGH 50: CPT | Mod: NTX,,, | Performed by: INTERNAL MEDICINE

## 2023-06-12 PROCEDURE — 27000221 HC OXYGEN, UP TO 24 HOURS: Mod: NTX

## 2023-06-12 PROCEDURE — 20600001 HC STEP DOWN PRIVATE ROOM: Mod: NTX

## 2023-06-12 PROCEDURE — 94760 N-INVAS EAR/PLS OXIMETRY 1: CPT | Mod: NTX

## 2023-06-12 PROCEDURE — 99233 PR SUBSEQUENT HOSPITAL CARE,LEVL III: ICD-10-PCS | Mod: NTX,,, | Performed by: INTERNAL MEDICINE

## 2023-06-12 RX ORDER — SODIUM CHLORIDE 9 MG/ML
INJECTION, SOLUTION INTRAVENOUS CONTINUOUS
Status: CANCELLED | OUTPATIENT
Start: 2023-06-12 | End: 2023-06-12

## 2023-06-12 RX ORDER — SODIUM CHLORIDE 0.9 % (FLUSH) 0.9 %
10 SYRINGE (ML) INJECTION
Status: DISCONTINUED | OUTPATIENT
Start: 2023-06-12 | End: 2023-06-14 | Stop reason: HOSPADM

## 2023-06-12 RX ORDER — DIPHENHYDRAMINE HCL 50 MG
50 CAPSULE ORAL ONCE
Status: CANCELLED | OUTPATIENT
Start: 2023-06-12 | End: 2023-06-12

## 2023-06-12 RX ADMIN — CLOPIDOGREL BISULFATE 75 MG: 75 TABLET ORAL at 08:06

## 2023-06-12 RX ADMIN — PANTOPRAZOLE SODIUM 40 MG: 40 TABLET, DELAYED RELEASE ORAL at 08:06

## 2023-06-12 RX ADMIN — ATORVASTATIN CALCIUM 40 MG: 40 TABLET, FILM COATED ORAL at 08:06

## 2023-06-12 RX ADMIN — ACETAMINOPHEN 650 MG: 325 TABLET ORAL at 10:06

## 2023-06-12 RX ADMIN — HYDRALAZINE HYDROCHLORIDE 50 MG: 50 TABLET ORAL at 11:06

## 2023-06-12 RX ADMIN — AMLODIPINE BESYLATE 10 MG: 10 TABLET ORAL at 08:06

## 2023-06-12 RX ADMIN — QUETIAPINE FUMARATE 100 MG: 25 TABLET ORAL at 11:06

## 2023-06-12 RX ADMIN — ENOXAPARIN SODIUM 30 MG: 30 INJECTION SUBCUTANEOUS at 04:06

## 2023-06-12 RX ADMIN — ALPRAZOLAM 1 MG: 0.5 TABLET ORAL at 02:06

## 2023-06-12 RX ADMIN — ISOSORBIDE MONONITRATE 60 MG: 60 TABLET, EXTENDED RELEASE ORAL at 08:06

## 2023-06-12 RX ADMIN — HYDRALAZINE HYDROCHLORIDE 50 MG: 50 TABLET ORAL at 06:06

## 2023-06-12 RX ADMIN — FLUTICASONE FUROATE AND VILANTEROL TRIFENATATE 1 PUFF: 200; 25 POWDER RESPIRATORY (INHALATION) at 09:06

## 2023-06-12 RX ADMIN — PAROXETINE HYDROCHLORIDE 50 MG: 20 TABLET, FILM COATED ORAL at 08:06

## 2023-06-12 RX ADMIN — HYDRALAZINE HYDROCHLORIDE 50 MG: 50 TABLET ORAL at 02:06

## 2023-06-12 NOTE — PROGRESS NOTES
[facility]  Cardiology  Progress Note    Patient Name: Betty Bacon  MRN: [id number]  Admission Date: 6/8/2023  Hospital Length of Stay: 4 days  Code Status: Full Code   Attending Physician: James Addison MD   Primary Care Physician: Johanne Callejas MD  Expected Discharge Date: 6/13/2023  Principal Problem:Acute diastolic heart failure    Subjective:     Hospital Course:   Patient presented to cardiology with shortness of breath 2/2 to severe aortic stenosis and ILD. She has not underwent TAVR. Her Scr increased to 1.7, s/p IV lasix; since discontinued. ECHO with EF 55%, severe AS, Grade I diastolic dysfunction, and PASP 37. Likely will undergo balloon angioplasty early this week.      Interval History:  No events overnight.  Currently stable on 5 L nasal cannula.  Creatinine slightly trending up for which IV Lasix was held.  Plan to do BAV on Tuesday    Review of Systems   Constitutional: Negative for fever and weight gain.   Cardiovascular:  Positive for dyspnea on exertion. Negative for chest pain and leg swelling.   Respiratory:  Positive for shortness of breath. Negative for cough.    Genitourinary:  Negative for bladder incontinence.   Objective:     Vital Signs (Most Recent):  Temp: 97.9 °F (36.6 °C) (06/12/23 0720)  Pulse: 79 (06/12/23 1108)  Resp: 17 (06/12/23 0921)  BP: (!) 144/68 (06/12/23 0720)  SpO2: 96 % (06/12/23 0921) Vital Signs (24h Range):  Temp:  [97.2 °F (36.2 °C)-97.9 °F (36.6 °C)] 97.9 °F (36.6 °C)  Pulse:  [78-87] 79  Resp:  [17-18] 17  SpO2:  [91 %-96 %] 96 %  BP: (136-176)/(65-78) 144/68     Weight: 84.8 kg (186 lb 15.2 oz)  Body mass index is 34.19 kg/m².     SpO2: 96 %         Intake/Output Summary (Last 24 hours) at 6/12/2023 1146  Last data filed at 6/12/2023 0049  Gross per 24 hour   Intake 462 ml   Output 500 ml   Net -38 ml         Lines/Drains/Airways       Peripheral Intravenous Line  Duration                  Peripheral IV - Single Lumen 06/09/23  2250 20 G Posterior;Right Hand 2 days                       Physical Exam  Constitutional:       Appearance: She is ill-appearing.   HENT:      Head: Normocephalic and atraumatic.      Mouth/Throat:      Mouth: Mucous membranes are moist.   Eyes:      Extraocular Movements: Extraocular movements intact.      Pupils: Pupils are equal, round, and reactive to light.   Cardiovascular:      Rate and Rhythm: Normal rate and regular rhythm.   Pulmonary:      Effort: Pulmonary effort is normal.      Breath sounds: Normal breath sounds.   Abdominal:      General: Abdomen is flat. Bowel sounds are normal.      Palpations: Abdomen is soft.   Musculoskeletal:         General: Normal range of motion.      Cervical back: Normal range of motion.   Skin:     General: Skin is warm.      Capillary Refill: Capillary refill takes less than 2 seconds.   Neurological:      General: No focal deficit present.      Mental Status: She is alert and oriented to person, place, and time.          Significant Labs: All pertinent lab results from the last 24 hours have been reviewed.        Assessment and Plan:       * Acute diastolic heart failure  Patient was mildly volume overload on exam.  Chest x-ray shows mild pleural effusions.  Received IV Lasix 80 mg and she made 1 L urine.  Her BNP is only 115 when compared to 700 in February.    - Discontinue lasix  - Strict I's/O's  - Trend Scr    - K> 4, Mg>2  -  Amlodipine   - Isosorbide mononitrate  - Hydralazine     Chronic respiratory failure with hypoxia  Patient is on 6 L home oxygen.  Currently requiring requiring 6 L to keep her sats above 95. Multifactorial ILD and mild congestion due to severe aortic stenosis    - DuoNebs prn   - O2 per NC  - pulse oximetry  - breathing treatment     Interstitial lung disease  Patient has history of interstitial lung disease following pulmonology. On DuoNebs and LABA with ICS.    - continue duonebs q4h prn  - continuous O2 per [place] 6L  - pulse oximetry  -  telemetry  - breathing inhalation treatment    CAD (coronary artery disease)  NEFTALY RCA 2004  2 Stents RCA 2012    - Continue plavix    Valvular heart disease, severe aortic stenosis, moderate aortic regurgitation  Patient is currently being worked up for TAVR at [facility] clinic.  Last echo showed an a peak velocity of 4 with a mean gradient of 35 and a valve area of 1.12    - Plan for BAV likely early this week     Depression  Continue paroxetine  Seroquel QHS    Essential hypertension  Elevated blood pressures up to systolics in 200s on presentation.  Patient is on Imdur 60 mg, Toprol-XL 25 mg at home for her blood pressures.     - Continue hydralazine 25 TID along with the home medications    Hyperlipidemia LDL goal < 70  Atorvastatin 40    CKD (chronic kidney disease) stage 3, GFR 30-59 ml/min  Creatinine presentation is 1.7 which is elevated from baseline. Cont to hold lasix         VTE Risk Mitigation (From admission, onward)         Ordered     enoxaparin injection 30 mg  Every 24 hours         06/11/23 1449     IP VTE HIGH RISK PATIENT  Once         06/08/23 1454     Place sequential compression device  Until discontinued         06/08/23 1454                Stanton Harrison MD  Cardiology  [facility]

## 2023-06-12 NOTE — ANESTHESIA PREPROCEDURE EVALUATION
[facility]  Anesthesia Pre-Operative Evaluation         Patient Name: Betty Bacon  YOB: 1950  MRN: [id number]    SUBJECTIVE:     Pre-operative evaluation for Procedure(s) (LRB):  REPAIR, AORTIC VALVE (N/A)  Scheduled for 6/13/2023    HPI 06/12/2023:  Betty Bacon is a 73 y.o. female with hx of restrictive lung disease, as asbestosis, peripheral vascular disease, severe aortic stenosis presented to the clinic for evaluation for severe symptomatic aortic stenosis and was sent to ED for admission.      Presents now for above procedure.    TTE 6/9/23:   The left ventricle is normal in size with eccentric hypertrophy and normal systolic function. The estimated ejection fraction is 55%.   Normal right ventricular size with normal right ventricular systolic function.   Grade I left ventricular diastolic dysfunction.   Moderate left atrial enlargement.   There is severe aortic valve stenosis.   Aortic valve area is 0.75 cm2; peak velocity is 4.12 m/s; mean gradient is 44 mmHg.   Mild aortic regurgitation.   The estimated PA systolic pressure is 37 mmHg.   Normal central venous pressure (3 mmHg).      PFTs 3/28/23:  FEV1 35.9% predicted, DLCO 15% predicted    Prev airway:   None on file    Oxygen/Ventilation Requirements:  On 6L [place] home O2 (used PRN until December 2022, now uses constantly)           Patient Active Problem List   Diagnosis    CKD (chronic kidney disease) stage 3, GFR 30-59 ml/min    Hyperlipidemia LDL goal < 70    Essential hypertension    LVH (left ventricular hypertrophy) due to hypertensive disease    Depression    Unstable angina    PAD (peripheral artery disease)    Generalized anxiety disorder    Valvular heart disease, severe aortic stenosis, moderate aortic regurgitation    CAD (coronary artery disease)    Abnormal stress test    GERD (gastroesophageal reflux disease)    Interstitial lung disease    Osteopenia    History  of electroconvulsive therapy    Former smoker    Impaired fasting blood sugar    Severe obesity with body mass index (BMI) of 35.0 to 39.9 with serious comorbidity    Secondary hyperparathyroidism of renal origin    Chronic left-sided thoracic back pain    Chronic diastolic heart failure    Elevated alkaline phosphatase level    Positive AKIRA (antinuclear antibody)    Hepatitis B core antibody positive    Acute gout of left knee    Pain and swelling of left lower leg    Aortic atherosclerosis    Recurrent major depressive disorder, in partial remission    Dyspnea    Acute drug-induced gout of left foot    SARS-CoV-2 positive    (HFpEF) heart failure with preserved ejection fraction    Hypocalcemia    Hypokalemia    Anemia of chronic disease    Hypomagnesemia    Chronic respiratory failure with hypoxia    Nonrheumatic aortic valve stenosis    Asbestosis    Acute diastolic heart failure       Review of patient's allergies indicates:   Allergen Reactions    Propoxyphene n-acetaminophen Other (See Comments)     Other reaction(s): Unknown    Codeine Anxiety       Outpatient Medications:  No current facility-administered medications on file prior to encounter.     Current Outpatient Medications on File Prior to Encounter   Medication Sig Dispense Refill    acetaminophen (TYLENOL) 325 MG tablet Take 325 mg by mouth every 6 (six) hours as needed for Pain.      albuterol sulfate (PROAIR RESPICLICK) 90 mcg/actuation AePB Inhale 2 puffs into the lungs every 4 to 6 hours as needed. 1 each 3    albuterol-ipratropium (DUO-NEB) 2.5 mg-0.5 mg/3 mL nebulizer solution Take 3 mLs by nebulization every 6 (six) hours as needed for Wheezing. Rescue 75 mL 11    atorvastatin (LIPITOR) 40 MG tablet TAKE 1 TABLET BY MOUTH EVERY DAY (Patient taking differently: Take 40 mg by mouth once daily.) 90 tablet 3    clonazePAM (KLONOPIN) 1 MG disintegrating tablet Take 1 tablet (1 mg total) by mouth 2 (two) times daily  as needed (anxiety). 60 tablet 1    clopidogreL (PLAVIX) 75 mg tablet TAKE 1 TABLET BY MOUTH EVERY DAY (Patient taking differently: Take 75 mg by mouth once daily.) 90 tablet 3    colchicine (COLCRYS) 0.6 mg tablet Take 2 po at gout flare onset, may repeat 1 in an hour prn, then 1 po bid until pain resolves ,or n/V/D starts 20 tablet 0    colestipoL (COLESTID) 1 gram Tab Take 1 tablet (1 g total) by mouth 2 (two) times daily as needed (diarrhea). 180 tablet 3    esomeprazole (NEXIUM) 40 MG capsule TAKE 1 CAPSULE BY MOUTH BEFORE BREAKFAST. (Patient taking differently: Take 40 mg by mouth before breakfast.) 90 capsule 3    fluticasone propionate (FLONASE) 50 mcg/actuation nasal spray 1 spray (50 mcg total) by Each Nostril route once daily. 16 g PRN    fluticasone-umeclidin-vilanter (TRELEGY ELLIPTA) 200-62.5-25 mcg inhaler Inhale 1 puff into the lungs once daily. 180 each 11    furosemide (LASIX) 40 MG tablet Take 1 tablet (40 mg total) by mouth once daily for 3 days, THEN 1 tablet (40 mg total) every 48 hours. 30 tablet 11    isosorbide mononitrate (IMDUR) 60 MG 24 hr tablet TAKE 1 TABLET BY MOUTH EVERY DAY 90 tablet 3    metoprolol succinate (TOPROL-XL) 50 MG 24 hr tablet Take 1 tablet (50 mg total) by mouth once daily. (Patient taking differently: Take 50 mg by mouth 2 (two) times daily.) 90 tablet 0    paroxetine (PAXIL) 10 MG tablet Take 10 mg by mouth every morning. Total 50 mg      paroxetine (PAXIL) 40 MG tablet TAKE 1 TABLET BY MOUTH EVERY DAY (Patient taking differently: Take 40 mg by mouth once daily.) 90 tablet 3    QUEtiapine (SEROQUEL) 100 MG Tab Take 100 mg by mouth once daily.      [DISCONTINUED] TRELEGY ELLIPTA 200-62.5-25 mcg inhaler INHALE 1 PUFF INTO THE LUNGS ONCE DAILY. 180 each 2        Current Inpatient Medications:   amLODIPine  10 mg Oral Daily    atorvastatin  40 mg Oral Daily    clopidogreL  75 mg Oral Daily    enoxparin  30 mg Subcutaneous Q24H (prophylaxis, 1700)     fluticasone furoate-vilanteroL  1 puff Inhalation Daily    hydrALAZINE  50 mg Oral Q8H    isosorbide mononitrate  60 mg Oral Daily    pantoprazole  40 mg Oral Daily    paroxetine  50 mg Oral QAM    QUEtiapine  100 mg Oral QHS       Past Surgical History:   Procedure Laterality Date    ANGIOGRAM, CORONARY, WITH LEFT HEART CATHETERIZATION N/A 2023    Procedure: Angiogram, Coronary, with Left Heart Cath;  Surgeon: Neeraj Finn MD;  Location: [facility] CATH LAB;  Service: Cardiology;  Laterality: N/A;    CHOLECYSTECTOMY      CORONARY ANGIOPLASTY      CORONARY ANGIOPLASTY WITH STENT PLACEMENT  10/2012    2 stents RCA (100% stenosis)    EYE SURGERY      cataract surgery    HYSTERECTOMY      LINA, ovaries intact. uterine prolapse    ILIAC ARTERY STENT      SMALL BOWEL ENTEROSCOPY N/A 2023    Procedure: ENTEROSCOPY;  Surgeon: Margy Dumont MD;  Location: [facility] ENDO;  Service: Endoscopy;  Laterality: N/A;    TONSILLECTOMY      TOTAL VAGINAL HYSTERECTOMY         Social History     Socioeconomic History    Marital status:    Tobacco Use    Smoking status: Former     Packs/day: 2.00     Years: 40.00     Pack years: 80.00     Types: Cigarettes     Quit date: 2009     Years since quittin.5    Smokeless tobacco: Never   Substance and Sexual Activity    Alcohol use: Yes     Alcohol/week: 1.0 standard drink     Types: 1 Shots of liquor per week     Comment: rare /occ    Drug use: No    Sexual activity: Never     Birth control/protection: Abstinence     Social Determinants of Health     Financial Resource Strain: Low Risk     Difficulty of Paying Living Expenses: Not hard at all   Food Insecurity: No Food Insecurity    Worried About Running Out of Food in the Last Year: Never true    Ran Out of Food in the Last Year: Never true   Transportation Needs: No Transportation Needs    Lack of Transportation (Medical): No    Lack of Transportation (Non-Medical): No   Physical Activity:  Insufficiently Active    Days of Exercise per Week: 3 days    Minutes of Exercise per Session: 30 min   Stress: Stress Concern Present    Feeling of Stress : To some extent   Social Connections: Socially Isolated    Frequency of Communication with Friends and Family: More than three times a week    Frequency of Social Gatherings with Friends and Family: More than three times a week    Attends Hindu Services: Never    Active Member of Clubs or Organizations: No    Attends Club or Organization Meetings: Never    Marital Status:    Housing Stability: Low Risk     Unable to Pay for Housing in the Last Year: No    Number of Places Lived in the Last Year: 1    Unstable Housing in the Last Year: No       OBJECTIVE:     Weight:  Wt Readings from Last 1 Encounters:   06/11/23 84.8 kg (186 lb 15.2 oz)     Body mass index is 34.19 kg/m².    Recent Blood Pressure Readings:  BP Readings from Last 3 Encounters:   06/12/23 122/60   06/08/23 (!) 208/84   05/24/23 (!) 163/67       Vital Signs Range (Last 24H):  Temp:  [36.2 °C (97.1 °F)-36.6 °C (97.9 °F)]   Pulse:  [79-91]   Resp:  [16-18]   BP: (122-176)/(60-78)   SpO2:  [92 %-96 %]       CBC:   Lab Results   Component Value Date    WBC 7.10 06/11/2023    HGB 9.4 (L) 06/11/2023    HCT 30.1 (L) 06/11/2023    MCV 91 06/11/2023     06/11/2023       CMP:     Chemistry        Component Value Date/Time     06/12/2023 0246    K 4.0 06/12/2023 0246    CL 97 06/12/2023 0246    CO2 30 (H) 06/12/2023 0246    BUN 48 (H) 06/12/2023 0246    CREATININE 1.7 (H) 06/12/2023 0246    CREATININE 1.1 10/14/2012 0315     06/12/2023 0246        Component Value Date/Time    CALCIUM 9.1 06/12/2023 0246    CALCIUM 8.3 (L) 10/14/2012 0315    ALKPHOS 140 (H) 06/08/2023 1628    ALKPHOS 179 (H) 10/10/2012 1525    AST 12 06/08/2023 1628    AST 19 10/10/2012 1525    ALT 6 (L) 06/08/2023 1628    BILITOT 0.3 06/08/2023 1628    ESTGFRAFRICA 51.4 (A) 07/20/2022 1303     ESTGFRAFRICA 46.7 (A) 07/20/2022 1303    EGFRNONAA 44.6 (A) 07/20/2022 1303    EGFRNONAA 40.5 (A) 07/20/2022 1303            INR:  Lab Results   Component Value Date    INR 1.0 05/01/2023    INR 1.0 02/14/2023    INR 1.1 01/25/2023       Diagnostic Studies:      EKG:    Results for orders placed or performed during the hospital encounter of 06/08/23   EKG 12-lead    Collection Time: 06/08/23  5:55 PM    Narrative    Test Reason : R06.02,    Vent. Rate : 067 BPM     Atrial Rate : 067 BPM     P-R Int : 172 ms          QRS Dur : 092 ms      QT Int : 426 ms       P-R-T Axes : -10 -21 -19 degrees     QTc Int : 450 ms    Normal sinus rhythm with sinus arrhythmia  Minimal voltage criteria for LVH, may be normal variant ( [employer] product )  Abnormal R wave progression  Nonspecific T wave abnormality  Abnormal ECG  When compared with ECG of 08-JUN-2023 11:40,  No significant change was found  Confirmed by Nicholas Barrera MD ([id number]) on 6/9/2023 11:08:13 PM    Referred By: [email or web address]   SELF           Confirmed By:Nicholas Barrera MD       2D Echo:    Results for orders placed or performed in visit on 11/18/13   2D echo with color flow doppler   Result Value Ref Range    EF + QEF 45     Diastolic Dysfunction Yes        Results for orders placed or performed during the hospital encounter of 06/08/23   Echo   Result Value Ref Range    Ascending aorta 2.70 cm    STJ 2.65 cm    AV mean gradient 44 mmHg    Ao peak mikey 4.12 m/s    Ao .49 cm    IVS 1.18 (A) 0.6 - 1.1 cm    LA size 4.09 cm    Left Atrium Major Axis 5.20 cm    Left Atrium Minor Axis 5.71 cm    LVIDd 5.01 3.5 - 6.0 cm    LVIDs 3.35 2.1 - 4.0 cm    LVOT diameter 2.00 cm    LVOT peak VTI 27.95 cm    Posterior Wall 0.89 0.6 - 1.1 cm    MV Peak A Mikey 1.10 m/s    E wave deceleration time 148.44 msec    MV Peak E Mikey 0.78 m/s    RA Major Axis 4.56 cm    RA Width 3.72 cm    RVDD 3.35 cm    Sinus 3.39 cm    TAPSE 2.30 cm    TR Max Mikey 2.90 m/s    LA WIDTH 4.21 cm    MV stenosis  pressure 1/2 time 43.05 ms    LV Diastolic Volume 118.58 mL    LV Systolic Volume 45.64 mL    LVOT peak sukhwinder 0.93 m/s    TDI LATERAL 0.07 m/s    TDI SEPTAL 0.03 m/s    LA volume (mod) 77.46 cm3    LV LATERAL E/E' RATIO 11.14 m/s    LV SEPTAL E/E' RATIO 26.00 m/s    FS 33 %    LA volume 79.67 cm3    LV mass 191.25 g    Left Ventricle Relative Wall Thickness 0.36 cm    AV valve area 0.75 cm2    AV Velocity Ratio 0.23     AV index (prosthetic) 0.24     MV valve area p 1/2 method 5.11 cm2    E/A ratio 0.71     Mean e' 0.05 m/s    LVOT area 3.1 cm2    LVOT stroke volume 87.76 cm3    AV peak gradient 68 mmHg    E/E' ratio 15.60 m/s    LV Systolic Volume Index 24.8 mL/m2    LV Diastolic Volume Index 64.45 mL/m2    LA Volume Index 43.3 mL/m2    LV Mass Index 104 g/m2    Triscuspid Valve Regurgitation Peak Gradient 34 mmHg    LA Volume Index (Mod) 42.1 mL/m2    BSA 1.91 m2    Right Atrial Pressure (from IVC) 3 mmHg    EF 55 %    TV rest pulmonary artery pressure 37 mmHg    Narrative    · The left ventricle is normal in size with eccentric hypertrophy and   normal systolic function. The estimated ejection fraction is 55%.  · Normal right ventricular size with normal right ventricular systolic   function.  · Grade I left ventricular diastolic dysfunction.  · Moderate left atrial enlargement.  · There is severe aortic valve stenosis.  · Aortic valve area is 0.75 cm2; peak velocity is 4.12 m/s; mean gradient   is 44 mmHg.  · Mild aortic regurgitation.  · The estimated PA systolic pressure is 37 mmHg.  · Normal central venous pressure (3 mmHg).          No results found. However, due to the size of the patient record, not all encounters were searched. Please check Results Review for a complete set of results.      ASSESSMENT/PLAN:           Pre-op Assessment    I have reviewed the Patient Summary Reports.    I have reviewed the NPO Status.      Review of Systems  Social:  Former Smoker    Cardiovascular:   Hypertension Valvular  problems/Murmurs, AS CAD  CABG/stent  Angina, with exertion PVD    Pulmonary:   COPD Shortness of breath    Hepatic/GI:   GERD Hepatitis    Neurological:   Denies CVA.    Endocrine:   Denies Diabetes.  Obesity / BMI > 30  Psych:   Psychiatric History depression          Physical Exam  General: Well nourished, Cooperative, Alert and Oriented    Airway:  Mallampati: II   Mouth Opening: Normal  TM Distance: Normal  Tongue: Normal    Dental:  Dentures        Anesthesia Plan  Type of Anesthesia, risks & benefits discussed:    Anesthesia Type: Gen ETT, Gen Natural Airway, MAC, Gen Supraglottic Airway  Intra-op Monitoring Plan: Standard ASA Monitors, Art Line and Central Line  Post Op Pain Control Plan: multimodal analgesia and IV/PO Opioids PRN  Induction:  IV  Airway Plan: Direct and Video, Post-Induction  Informed Consent: Informed consent signed with the Patient and all parties understand the risks and agree with anesthesia plan.  All questions answered.   ASA Score: 4  Day of Surgery Review of History & Physical: H&P Update referred to the surgeon/provider.    Ready For Surgery From Anesthesia Perspective.     .

## 2023-06-12 NOTE — H&P
73 y.o. female with past medical history of restrictive lung disease, as asbestosis, peripheral vascular disease, severe aortic stenosis presented to the clinic for evaluation for severe symptomatic aortic stenosis.  She was complaining of having shortness of breath for which she was sent to the emergency room.  She has multiple admissions in the past for volume overload.  Patient is currently on Lasix 40 mg  daily.  Patient also complains of having orthopnea PND but denies having any lightheadedness, dizziness.  Denies having any chest pain loss of consciousness.    Betty Bacon is a 73 y.o. female referred by Dr Borjas for evaluation of severe AS (NYHA Class IV sx).     The patient has undergone the following TAVR work-up:   ECHO (Date 5/1/23): RAMIRO= 1.16 cm2 (AVAi 0.58), MG= 34 mmHg, Peak Mikey= 4.03 m/s, EF= 60%.   LHC (Date 5/1/23): mild (20%) RCA ISR. Otherwise normal coronaries.   STS: 4%   Frailty: 4/4   Iliacs are >5.96 on R and > 6.13 on L   LVOT area by CTA is 4.65 cm2 (26.4 mm X 25.8 mm) and Avg Diameter is 24.3 per Dr Blevins  Incidental findings on CT: 1.7 cm right upper pole renal lesion. Sent to PCP  CT Surgery risk assessment: inoperable per Dr Carver due to restrictive lung disease on home oxygen and frailty  Rhythm issues: none  PFTs: FEV1 35.9 % pred, DLCO 15% pred  KCCQ/5 meter walk: done  Comorbidities: severe restrictive lung disease on supplemental O2         Betty Bacon is a 29 mm  Evolut valve candidate via RTF access.    Past Medical History:   Diagnosis Date    Acute coronary artery obstruction without MI 10/2012    Benign hypertension     COPD (chronic obstructive pulmonary disease)     Coronary artery disease     Disorder of kidney and ureter     Dr. Morrow    Hepatitis B core antibody positive 03/03/2021    Negative sAg, suggests previous exposure but no chronic/active Hep B. At risk for reactivation with any immunosuppression medication, steroids, chemo, etc.       History of electroconvulsive therapy     Hyperlipidemia LDL goal < 70     Left ankle sprain     Major depressive disorder, recurrent episode, severe     s/p ECT    Positive AKIRA (antinuclear antibody) 03/03/2021    PVD (peripheral vascular disease)       Past Surgical History:   Procedure Laterality Date    ANGIOGRAM, CORONARY, WITH LEFT HEART CATHETERIZATION N/A 5/1/2023    Procedure: Angiogram, Coronary, with Left Heart Cath;  Surgeon: Neeraj Finn MD;  Location: [facility] CATH LAB;  Service: Cardiology;  Laterality: N/A;    CHOLECYSTECTOMY      CORONARY ANGIOPLASTY      CORONARY ANGIOPLASTY WITH STENT PLACEMENT  10/2012    2 stents RCA (100% stenosis)    EYE SURGERY      cataract surgery    HYSTERECTOMY      LINA, ovaries intact. uterine prolapse    ILIAC ARTERY STENT      SMALL BOWEL ENTEROSCOPY N/A 2/20/2023    Procedure: ENTEROSCOPY;  Surgeon: Margy Dumont MD;  Location: [facility] ENDO;  Service: Endoscopy;  Laterality: N/A;    TONSILLECTOMY      TOTAL VAGINAL HYSTERECTOMY        Current Outpatient Medications   Medication Instructions    acetaminophen (TYLENOL) 325 mg, Oral, Every 6 hours PRN    albuterol sulfate (PROAIR RESPICLICK) 90 mcg/actuation AePB 2 puffs, Inhalation, Every 4-6 hours PRN    albuterol-ipratropium (DUO-NEB) 2.5 mg-0.5 mg/3 mL nebulizer solution 3 mLs, Nebulization, Every 6 hours PRN, Rescue    atorvastatin (LIPITOR) 40 MG tablet TAKE 1 TABLET BY MOUTH EVERY DAY    clonazePAM (KLONOPIN) 1 mg, Oral, 2 times daily PRN    clopidogreL (PLAVIX) 75 mg tablet TAKE 1 TABLET BY MOUTH EVERY DAY    colchicine (COLCRYS) 0.6 mg tablet Take 2 po at gout flare onset, may repeat 1 in an hour prn, then 1 po bid until pain resolves ,or n/V/D starts    colestipoL (COLESTID) 1 g, Oral, 2 times daily PRN    esomeprazole (NEXIUM) 40 MG capsule TAKE 1 CAPSULE BY MOUTH BEFORE BREAKFAST.    fluticasone propionate (FLONASE) 50 mcg, Each Nostril, Daily    fluticasone-umeclidin-vilanter (TRELEGY ELLIPTA)  200-62.5-25 mcg inhaler 1 puff, Inhalation, Daily    furosemide (LASIX) 40 MG tablet Take 1 tablet (40 mg total) by mouth once daily for 3 days, THEN 1 tablet (40 mg total) every 48 hours.    isosorbide mononitrate (IMDUR) 60 MG 24 hr tablet TAKE 1 TABLET BY MOUTH EVERY DAY    metoprolol succinate (TOPROL-XL) 50 mg, Oral, Daily    paroxetine (PAXIL) 40 MG tablet TAKE 1 TABLET BY MOUTH EVERY DAY    paroxetine (PAXIL) 10 mg, Oral, Every morning, Total 50 mg    QUEtiapine (SEROQUEL) 100 mg, Oral, Daily      Vitals:    06/12/23 0720 06/12/23 0921 06/12/23 1108 06/12/23 1200   BP: (!) 144/68   135/63   BP Location: Left arm   Left arm   Patient Position: Sitting   Lying   Pulse: 87 84 79 79   Resp: 17 17  18   Temp: 97.9 °F (36.6 °C)   97.1 °F (36.2 °C)   TempSrc: Oral   Oral   SpO2: (!) 93% 96%  96%   Weight:       Height:        Constitutional:       General: She is not in acute distress.     Appearance: She is well-developed. She is not diaphoretic.   HENT:      Head: Normocephalic and atraumatic.   Neck:      Vascular: No JVD.   Cardiovascular:      Rate and Rhythm: Normal rate and regular rhythm.      Pulses: Intact distal pulses.      Heart sounds: Murmur heard.   Harsh midsystolic murmur is present at the upper right sternal border radiating to the neck.   Pulmonary:      Effort: Pulmonary effort is normal. No respiratory distress.   Musculoskeletal:      Cervical back: Normal range of motion.      Right lower leg: No edema.      Left lower leg: No edema.   Skin:     General: Skin is warm and dry.   Neurological:      Mental Status: She is alert and oriented to person, place, and time.     Component      Latest Ref Rng & Units 6/11/2023   WBC      3.90 - 12.70 K/uL 7.10   RBC      4.00 - 5.40 M/uL 3.32 (L)   Hemoglobin      12.0 - 16.0 g/dL 9.4 (L)   Hematocrit      37.0 - 48.5 % 30.1 (L)   MCV      82 - 98 fL 91   MCH      27.0 - 31.0 pg 28.3   MCHC      32.0 - 36.0 g/dL 31.2 (L)   RDW      11.5 - 14.5 % 14.1    Platelets      150 - 450 K/uL 176   MPV      9.2 - 12.9 fL 10.9   Immature Granulocytes      0.0 - 0.5 % 0.4   Gran # (ANC)      1.8 - 7.7 K/uL 5.4   Immature Grans (Abs)      0.00 - 0.04 K/uL 0.03   Lymph #      1.0 - 4.8 K/uL 0.9 (L)   Mono #      0.3 - 1.0 K/uL 0.5   Eos #      0.0 - 0.5 K/uL 0.3   Baso #      0.00 - 0.20 K/uL 0.03   nRBC      0 /100 WBC 0   Gran %      38.0 - 73.0 % 75.8 (H)   Lymph %      18.0 - 48.0 % 12.4 (L)   Mono %      4.0 - 15.0 % 6.6   Eosinophil %      0.0 - 8.0 % 4.4   Basophil %      0.0 - 1.9 % 0.4   Differential Method       Automated   Sodium      136 - 145 mmol/L 138   Potassium      3.5 - 5.1 mmol/L 3.9   Chloride      95 - 110 mmol/L 97   CO2      23 - 29 mmol/L 31 (H)   Glucose      70 - 110 mg/dL 99   BUN      8 - 23 mg/dL 51 (H)   Creatinine      0.5 - 1.4 mg/dL 1.7 (H)   Calcium      8.7 - 10.5 mg/dL 9.0   Anion Gap      8 - 16 mmol/L 10   eGFR      >60 mL/min/1.73 m:2 31.1 (A)       Assessment and plan:    Nonrheumatic aortic valve stenosis  Patient is very high risk for TAVR given frailty and oxygen dependence  Started on diuresis   Plan for BAV tomorrow   NPO at midnight   Access RCFA   Antiplatelet can continue Plavix 75 mg PO daily     Imad MD [place]  Interventional Cardiology Fellow      I have personally performed a face to face diagnostic evaluation on this patient. I have reviewed the ACP note and agree with the history, exam and plan of care, except as noted.

## 2023-06-12 NOTE — ASSESSMENT & PLAN NOTE
Patient is currently being worked up for TAVR at valve clinic.  Last echo showed an a peak velocity of 4 with a mean gradient of 35 and a valve area of 1.12    - Plan for BAV likely early this week

## 2023-06-12 NOTE — SUBJECTIVE & OBJECTIVE
Interval History:  No events overnight.  Currently stable on 5 L nasal cannula.  Creatinine slightly trending up for which IV Lasix was held.  Plan to do BAV on Tuesday    Review of Systems   Constitutional: Negative for fever and weight gain.   Cardiovascular:  Positive for dyspnea on exertion. Negative for chest pain and leg swelling.   Respiratory:  Positive for shortness of breath. Negative for cough.    Genitourinary:  Negative for bladder incontinence.   Objective:     Vital Signs (Most Recent):  Temp: 97.9 °F (36.6 °C) (06/12/23 0720)  Pulse: 79 (06/12/23 1108)  Resp: 17 (06/12/23 0921)  BP: (!) 144/68 (06/12/23 0720)  SpO2: 96 % (06/12/23 0921) Vital Signs (24h Range):  Temp:  [97.2 °F (36.2 °C)-97.9 °F (36.6 °C)] 97.9 °F (36.6 °C)  Pulse:  [78-87] 79  Resp:  [17-18] 17  SpO2:  [91 %-96 %] 96 %  BP: (136-176)/(65-78) 144/68     Weight: 84.8 kg (186 lb 15.2 oz)  Body mass index is 34.19 kg/m².     SpO2: 96 %         Intake/Output Summary (Last 24 hours) at 6/12/2023 1146  Last data filed at 6/12/2023 0049  Gross per 24 hour   Intake 462 ml   Output 500 ml   Net -38 ml         Lines/Drains/Airways       Peripheral Intravenous Line  Duration                  Peripheral IV - Single Lumen 06/09/23 2250 20 G Posterior;Right Hand 2 days                       Physical Exam  Constitutional:       Appearance: She is ill-appearing.   HENT:      Head: Normocephalic and atraumatic.      Mouth/Throat:      Mouth: Mucous membranes are moist.   Eyes:      Extraocular Movements: Extraocular movements intact.      Pupils: Pupils are equal, round, and reactive to light.   Cardiovascular:      Rate and Rhythm: Normal rate and regular rhythm.   Pulmonary:      Effort: Pulmonary effort is normal.      Breath sounds: Normal breath sounds.   Abdominal:      General: Abdomen is flat. Bowel sounds are normal.      Palpations: Abdomen is soft.   Musculoskeletal:         General: Normal range of motion.      Cervical back: Normal range  of motion.   Skin:     General: Skin is warm.      Capillary Refill: Capillary refill takes less than 2 seconds.   Neurological:      General: No focal deficit present.      Mental Status: She is alert and oriented to person, place, and time.          Significant Labs: All pertinent lab results from the last 24 hours have been reviewed.

## 2023-06-13 ENCOUNTER — ANESTHESIA (OUTPATIENT)
Dept: MEDSURG UNIT | Facility: HOSPITAL | Age: 73
DRG: 319 | End: 2023-06-13
Payer: MEDICARE

## 2023-06-13 ENCOUNTER — TELEPHONE (OUTPATIENT)
Dept: FAMILY MEDICINE | Facility: CLINIC | Age: 73
End: 2023-06-13
Payer: MEDICARE

## 2023-06-13 LAB
ABO + RH BLD: ABNORMAL
ANION GAP SERPL CALC-SCNC: 8 MMOL/L (ref 8–16)
BLD GP AB SCN CELLS X3 SERPL QL: ABNORMAL
BUN SERPL-MCNC: 45 MG/DL (ref 8–23)
CALCIUM SERPL-MCNC: 9.3 MG/DL (ref 8.7–10.5)
CHLORIDE SERPL-SCNC: 100 MMOL/L (ref 95–110)
CO2 SERPL-SCNC: 30 MMOL/L (ref 23–29)
CREAT SERPL-MCNC: 1.4 MG/DL (ref 0.5–1.4)
EST. GFR  (NO RACE VARIABLE): 39.2 ML/MIN/1.73 M^2
GLUCOSE SERPL-MCNC: 99 MG/DL (ref 70–110)
POTASSIUM SERPL-SCNC: 4 MMOL/L (ref 3.5–5.1)
SODIUM SERPL-SCNC: 138 MMOL/L (ref 136–145)
SPECIMEN OUTDATE: ABNORMAL

## 2023-06-13 PROCEDURE — 36415 COLL VENOUS BLD VENIPUNCTURE: CPT | Mod: NTX | Performed by: STUDENT IN AN ORGANIZED HEALTH CARE EDUCATION/TRAINING PROGRAM

## 2023-06-13 PROCEDURE — 94761 N-INVAS EAR/PLS OXIMETRY MLT: CPT | Mod: NTX

## 2023-06-13 PROCEDURE — C1725 CATH, TRANSLUMIN NON-LASER: HCPCS | Mod: NTX | Performed by: INTERNAL MEDICINE

## 2023-06-13 PROCEDURE — 99233 SBSQ HOSP IP/OBS HIGH 50: CPT | Mod: 25,NTX,, | Performed by: INTERNAL MEDICINE

## 2023-06-13 PROCEDURE — 27000221 HC OXYGEN, UP TO 24 HOURS: Mod: NTX

## 2023-06-13 PROCEDURE — 25000003 PHARM REV CODE 250: Mod: NTX | Performed by: STUDENT IN AN ORGANIZED HEALTH CARE EDUCATION/TRAINING PROGRAM

## 2023-06-13 PROCEDURE — 92986 REVISION OF AORTIC VALVE: CPT | Mod: NTX,,, | Performed by: INTERNAL MEDICINE

## 2023-06-13 PROCEDURE — 20600001 HC STEP DOWN PRIVATE ROOM: Mod: NTX

## 2023-06-13 PROCEDURE — 63600175 PHARM REV CODE 636 W HCPCS: Mod: NTX | Performed by: HOSPITALIST

## 2023-06-13 PROCEDURE — 25000003 PHARM REV CODE 250: Mod: NTX | Performed by: HOSPITALIST

## 2023-06-13 PROCEDURE — 63600175 PHARM REV CODE 636 W HCPCS: Mod: NTX

## 2023-06-13 PROCEDURE — 99900035 HC TECH TIME PER 15 MIN (STAT): Mod: NTX

## 2023-06-13 PROCEDURE — C1760 CLOSURE DEV, VASC: HCPCS | Mod: NTX | Performed by: INTERNAL MEDICINE

## 2023-06-13 PROCEDURE — C1769 GUIDE WIRE: HCPCS | Mod: NTX | Performed by: INTERNAL MEDICINE

## 2023-06-13 PROCEDURE — 80048 BASIC METABOLIC PNL TOTAL CA: CPT | Mod: NTX | Performed by: HOSPITALIST

## 2023-06-13 PROCEDURE — 37000008 HC ANESTHESIA 1ST 15 MINUTES: Mod: NTX | Performed by: INTERNAL MEDICINE

## 2023-06-13 PROCEDURE — 86870 RBC ANTIBODY IDENTIFICATION: CPT | Mod: NTX | Performed by: STUDENT IN AN ORGANIZED HEALTH CARE EDUCATION/TRAINING PROGRAM

## 2023-06-13 PROCEDURE — 93010 ELECTROCARDIOGRAM REPORT: CPT | Mod: NTX,,, | Performed by: INTERNAL MEDICINE

## 2023-06-13 PROCEDURE — 99233 PR SUBSEQUENT HOSPITAL CARE,LEVL III: ICD-10-PCS | Mod: 25,NTX,, | Performed by: INTERNAL MEDICINE

## 2023-06-13 PROCEDURE — D9220A PRA ANESTHESIA: ICD-10-PCS | Mod: NTX,,, | Performed by: ANESTHESIOLOGY

## 2023-06-13 PROCEDURE — 93010 EKG 12-LEAD: ICD-10-PCS | Mod: NTX,,, | Performed by: INTERNAL MEDICINE

## 2023-06-13 PROCEDURE — 93005 ELECTROCARDIOGRAM TRACING: CPT | Mod: NTX

## 2023-06-13 PROCEDURE — 36415 COLL VENOUS BLD VENIPUNCTURE: CPT | Mod: NTX | Performed by: HOSPITALIST

## 2023-06-13 PROCEDURE — 25000003 PHARM REV CODE 250: Mod: NTX

## 2023-06-13 PROCEDURE — 86900 BLOOD TYPING SEROLOGIC ABO: CPT | Mod: NTX | Performed by: STUDENT IN AN ORGANIZED HEALTH CARE EDUCATION/TRAINING PROGRAM

## 2023-06-13 PROCEDURE — 27000191 HC C-V MONITORING: Mod: NTX

## 2023-06-13 PROCEDURE — C1894 INTRO/SHEATH, NON-LASER: HCPCS | Mod: NTX | Performed by: INTERNAL MEDICINE

## 2023-06-13 PROCEDURE — 92986 REVISION OF AORTIC VALVE: CPT | Mod: NTX | Performed by: INTERNAL MEDICINE

## 2023-06-13 PROCEDURE — 37000009 HC ANESTHESIA EA ADD 15 MINS: Mod: NTX | Performed by: INTERNAL MEDICINE

## 2023-06-13 PROCEDURE — D9220A PRA ANESTHESIA: Mod: NTX,,, | Performed by: ANESTHESIOLOGY

## 2023-06-13 PROCEDURE — 25000003 PHARM REV CODE 250: Mod: NTX | Performed by: INTERNAL MEDICINE

## 2023-06-13 PROCEDURE — 86905 BLOOD TYPING RBC ANTIGENS: CPT | Mod: NTX | Performed by: STUDENT IN AN ORGANIZED HEALTH CARE EDUCATION/TRAINING PROGRAM

## 2023-06-13 PROCEDURE — 92986 PR VAVULOPLASTY, AORTIC VALVE: ICD-10-PCS | Mod: NTX,,, | Performed by: INTERNAL MEDICINE

## 2023-06-13 RX ORDER — PROCHLORPERAZINE EDISYLATE 5 MG/ML
5 INJECTION INTRAMUSCULAR; INTRAVENOUS EVERY 30 MIN PRN
Status: DISCONTINUED | OUTPATIENT
Start: 2023-06-13 | End: 2023-06-14 | Stop reason: HOSPADM

## 2023-06-13 RX ORDER — DIPHENHYDRAMINE HCL 50 MG
50 CAPSULE ORAL ONCE
Status: COMPLETED | OUTPATIENT
Start: 2023-06-13 | End: 2023-06-13

## 2023-06-13 RX ORDER — PROPOFOL 10 MG/ML
VIAL (ML) INTRAVENOUS
Status: DISCONTINUED | OUTPATIENT
Start: 2023-06-13 | End: 2023-06-13

## 2023-06-13 RX ORDER — CEFAZOLIN SODIUM 1 G/3ML
INJECTION, POWDER, FOR SOLUTION INTRAMUSCULAR; INTRAVENOUS
Status: DISCONTINUED | OUTPATIENT
Start: 2023-06-13 | End: 2023-06-13

## 2023-06-13 RX ORDER — FENTANYL CITRATE 50 UG/ML
INJECTION, SOLUTION INTRAMUSCULAR; INTRAVENOUS
Status: DISCONTINUED | OUTPATIENT
Start: 2023-06-13 | End: 2023-06-13

## 2023-06-13 RX ORDER — SODIUM CHLORIDE 9 MG/ML
INJECTION, SOLUTION INTRAVENOUS CONTINUOUS
Status: ACTIVE | OUTPATIENT
Start: 2023-06-13 | End: 2023-06-13

## 2023-06-13 RX ORDER — FENTANYL CITRATE 50 UG/ML
25 INJECTION, SOLUTION INTRAMUSCULAR; INTRAVENOUS EVERY 5 MIN PRN
Status: DISCONTINUED | OUTPATIENT
Start: 2023-06-13 | End: 2023-06-14 | Stop reason: HOSPADM

## 2023-06-13 RX ORDER — DEXMEDETOMIDINE HYDROCHLORIDE 100 UG/ML
INJECTION, SOLUTION INTRAVENOUS
Status: DISCONTINUED | OUTPATIENT
Start: 2023-06-13 | End: 2023-06-13

## 2023-06-13 RX ORDER — MIDAZOLAM HYDROCHLORIDE 1 MG/ML
INJECTION, SOLUTION INTRAMUSCULAR; INTRAVENOUS
Status: DISCONTINUED | OUTPATIENT
Start: 2023-06-13 | End: 2023-06-13

## 2023-06-13 RX ORDER — HEPARIN SODIUM 1000 [USP'U]/ML
INJECTION, SOLUTION INTRAVENOUS; SUBCUTANEOUS
Status: DISCONTINUED | OUTPATIENT
Start: 2023-06-13 | End: 2023-06-13

## 2023-06-13 RX ORDER — ENOXAPARIN SODIUM 100 MG/ML
40 INJECTION SUBCUTANEOUS EVERY 24 HOURS
Status: DISCONTINUED | OUTPATIENT
Start: 2023-06-13 | End: 2023-06-14 | Stop reason: HOSPADM

## 2023-06-13 RX ORDER — PROTAMINE SULFATE 10 MG/ML
INJECTION, SOLUTION INTRAVENOUS
Status: DISCONTINUED | OUTPATIENT
Start: 2023-06-13 | End: 2023-06-13

## 2023-06-13 RX ADMIN — ISOSORBIDE MONONITRATE 60 MG: 60 TABLET, EXTENDED RELEASE ORAL at 09:06

## 2023-06-13 RX ADMIN — HEPARIN SODIUM 6000 UNITS: 1000 INJECTION, SOLUTION INTRAVENOUS; SUBCUTANEOUS at 12:06

## 2023-06-13 RX ADMIN — CEFAZOLIN 2 G: 330 INJECTION, POWDER, FOR SOLUTION INTRAMUSCULAR; INTRAVENOUS at 12:06

## 2023-06-13 RX ADMIN — DEXMEDETOMIDINE 16 MCG: 100 INJECTION, SOLUTION, CONCENTRATE INTRAVENOUS at 12:06

## 2023-06-13 RX ADMIN — MIDAZOLAM 0.5 MG: 1 INJECTION INTRAMUSCULAR; INTRAVENOUS at 12:06

## 2023-06-13 RX ADMIN — QUETIAPINE FUMARATE 100 MG: 25 TABLET ORAL at 10:06

## 2023-06-13 RX ADMIN — PROTAMINE SULFATE 50 MG: 10 INJECTION, SOLUTION INTRAVENOUS at 01:06

## 2023-06-13 RX ADMIN — MIDAZOLAM 1 MG: 1 INJECTION INTRAMUSCULAR; INTRAVENOUS at 11:06

## 2023-06-13 RX ADMIN — DEXMEDETOMIDINE 8 MCG: 100 INJECTION, SOLUTION, CONCENTRATE INTRAVENOUS at 12:06

## 2023-06-13 RX ADMIN — PAROXETINE HYDROCHLORIDE 50 MG: 20 TABLET, FILM COATED ORAL at 10:06

## 2023-06-13 RX ADMIN — HYDRALAZINE HYDROCHLORIDE 50 MG: 50 TABLET ORAL at 10:06

## 2023-06-13 RX ADMIN — FENTANYL CITRATE 25 MCG: 50 INJECTION INTRAMUSCULAR; INTRAVENOUS at 12:06

## 2023-06-13 RX ADMIN — PROPOFOL 20 MG: 10 INJECTION, EMULSION INTRAVENOUS at 12:06

## 2023-06-13 RX ADMIN — HYDRALAZINE HYDROCHLORIDE 50 MG: 50 TABLET ORAL at 02:06

## 2023-06-13 RX ADMIN — FENTANYL CITRATE 50 MCG: 50 INJECTION INTRAMUSCULAR; INTRAVENOUS at 12:06

## 2023-06-13 RX ADMIN — ATORVASTATIN CALCIUM 40 MG: 40 TABLET, FILM COATED ORAL at 09:06

## 2023-06-13 RX ADMIN — DIPHENHYDRAMINE HYDROCHLORIDE 50 MG: 50 CAPSULE ORAL at 10:06

## 2023-06-13 RX ADMIN — SODIUM CHLORIDE: 9 INJECTION, SOLUTION INTRAVENOUS at 10:06

## 2023-06-13 RX ADMIN — HYDRALAZINE HYDROCHLORIDE 50 MG: 50 TABLET ORAL at 06:06

## 2023-06-13 RX ADMIN — AMLODIPINE BESYLATE 10 MG: 10 TABLET ORAL at 09:06

## 2023-06-13 RX ADMIN — ENOXAPARIN SODIUM 40 MG: 40 INJECTION SUBCUTANEOUS at 06:06

## 2023-06-13 RX ADMIN — ACETAMINOPHEN 650 MG: 325 TABLET ORAL at 06:06

## 2023-06-13 RX ADMIN — ALPRAZOLAM 1 MG: 0.5 TABLET ORAL at 07:06

## 2023-06-13 RX ADMIN — CLOPIDOGREL BISULFATE 75 MG: 75 TABLET ORAL at 09:06

## 2023-06-13 RX ADMIN — PANTOPRAZOLE SODIUM 40 MG: 40 TABLET, DELAYED RELEASE ORAL at 09:06

## 2023-06-13 RX ADMIN — FLUTICASONE FUROATE AND VILANTEROL TRIFENATATE 1 PUFF: 200; 25 POWDER RESPIRATORY (INHALATION) at 09:06

## 2023-06-13 NOTE — PROGRESS NOTES
[facility]  Cardiology  Progress Note    Patient Name: Betty Bacon  MRN: [id number]  Admission Date: 6/8/2023  Hospital Length of Stay: 5 days  Code Status: Full Code   Attending Physician: James Addison MD   Primary Care Physician: Johanne Callejas MD  Expected Discharge Date: 6/14/2023  Principal Problem:Acute diastolic heart failure    Subjective:     Hospital Course:   Patient presented to cardiology with shortness of breath 2/2 to severe aortic stenosis and ILD. She has not underwent TAVR. Her Scr increased to 1.7, s/p IV lasix; now discontinued and kidney function improved. ECHO with EF 55%, severe AS, Grade I diastolic dysfunction, and PASP 37. Plan for balloon angioplasty 6/13.      Interval History:  No events overnight.  Currently stable on 5 L nasal cannula.  Creatinine slightly trended up for which IV Lasix was held. Kidney function imroving with Cr 1.4.  Plan to do BAV today.    Review of Systems   Constitutional: Negative for fever and weight gain.   Cardiovascular:  Positive for dyspnea on exertion. Negative for chest pain and leg swelling.   Respiratory:  Positive for shortness of breath. Negative for cough.    Genitourinary:  Negative for bladder incontinence.   Objective:     Vital Signs (Most Recent):  Temp: 98 °F (36.7 °C) (06/13/23 0800)  Pulse: 92 (06/13/23 0919)  Resp: 20 (06/13/23 0919)  BP: 126/62 (06/13/23 0800)  SpO2: 95 % (06/13/23 0919) Vital Signs (24h Range):  Temp:  [97.1 °F (36.2 °C)-98 °F (36.7 °C)] 98 °F (36.7 °C)  Pulse:  [] 92  Resp:  [16-20] 20  SpO2:  [94 %-98 %] 95 %  BP: (122-156)/(60-66) 126/62     Weight: 84.8 kg (186 lb 15.2 oz)  Body mass index is 34.19 kg/m².     SpO2: 95 %         Intake/Output Summary (Last 24 hours) at 6/13/2023 1110  Last data filed at 6/12/2023 1809  Gross per 24 hour   Intake 444 ml   Output --   Net 444 ml         Lines/Drains/Airways       Peripheral Intravenous Line  Duration                  Peripheral IV  - Single Lumen 06/09/23 2250 20 G Posterior;Right Hand 3 days         Peripheral IV - Single Lumen 06/13/23 0951 20 G Anterior;Distal;Left Forearm <1 day                       Physical Exam  Constitutional:       Appearance: She is ill-appearing.   HENT:      Head: Normocephalic and atraumatic.      Mouth/Throat:      Mouth: Mucous membranes are moist.   Eyes:      Extraocular Movements: Extraocular movements intact.      Pupils: Pupils are equal, round, and reactive to light.   Cardiovascular:      Rate and Rhythm: Normal rate and regular rhythm.   Pulmonary:      Effort: Pulmonary effort is normal.      Breath sounds: Normal breath sounds.   Abdominal:      General: Abdomen is flat. Bowel sounds are normal.      Palpations: Abdomen is soft.   Musculoskeletal:         General: Normal range of motion.      Cervical back: Normal range of motion.   Skin:     General: Skin is warm.      Capillary Refill: Capillary refill takes less than 2 seconds.   Neurological:      General: No focal deficit present.      Mental Status: She is alert and oriented to person, place, and time.          Significant Labs: All pertinent lab results from the last 24 hours have been reviewed.        Assessment and Plan:       * Acute diastolic heart failure  Patient was mildly volume overload on exam.  Chest x-ray shows mild pleural effusions.  Received IV Lasix 80 mg and she made 1 L urine.  Her BNP is only 115 when compared to 700 in February.    - Discontinue lasix  - Strict I's/O's  - Trend Scr    - K> 4, Mg>2  -  Amlodipine   - Isosorbide mononitrate  - Hydralazine     Valvular heart disease, severe aortic stenosis, moderate aortic regurgitation  Patient is currently being worked up for TAVR at valve clinic.  Last echo showed an a peak velocity of 4 with a mean gradient of 35 and a valve area of 1.12    - Plan for BAV likely today 6/13    Chronic respiratory failure with hypoxia  Patient is on 6 L home oxygen.  Currently requiring  requiring 6 L to keep her sats above 95. Multifactorial ILD and mild congestion due to severe aortic stenosis    - DuoNebs prn   - O2 per NC  - pulse oximetry  - breathing treatment     Interstitial lung disease  Patient has history of interstitial lung disease following pulmonology. On DuoNebs and LABA with ICS.    - continue duonebs q4h prn  - continuous O2 per Nc 6L  - pulse oximetry  - telemetry  - breathing inhalation treatment    CAD (coronary artery disease)  NEFTALY RCA 2004  2 Stents RCA 2012    - Continue plavix    Depression  Continue paroxetine  Seroquel QHS    Essential hypertension  Elevated blood pressures up to systolics in 200s on presentation.  Patient is on Imdur 60 mg, Toprol-XL 25 mg at home for her blood pressures.     - Continue hydralazine 25 TID along with the home medications    Hyperlipidemia LDL goal < 70  Atorvastatin 40    CKD (chronic kidney disease) stage 3, GFR 30-59 ml/min  Creatinine on presentation is 1.7 which is elevated from baseline. Continue to hold lasix.       VTE Risk Mitigation (From admission, onward)         Ordered     enoxaparin injection 30 mg  Every 24 hours         06/11/23 1449     IP VTE HIGH RISK PATIENT  Once         06/08/23 1454     Place sequential compression device  Until discontinued         06/08/23 1454                Stanton Harrison MD  Cardiology  [facility]

## 2023-06-13 NOTE — TRANSFER OF CARE
"Anesthesia Transfer of Care Note    Patient: Betty Bacon    Procedure(s) Performed: Procedure(s) (LRB):  REPAIR, AORTIC VALVE (N/A)    Patient location: PACU    Anesthesia Type: MAC    Transport from OR: Transported from OR on 6-10 L/min O2 by face mask with adequate spontaneous ventilation    Post pain: adequate analgesia    Post assessment: no apparent anesthetic complications and tolerated procedure well    Post vital signs: stable    Level of consciousness: awake and alert    Nausea/Vomiting: no nausea/vomiting    Complications: none    Transfer of care protocol was followed      Last vitals:   Visit Vitals  /62 (BP Location: Left arm, Patient Position: Lying)   Pulse 88   Temp 36.7 °C (98 °F) (Tympanic)   Resp 20   Ht 5' 2" (1.575 m)   Wt 84.8 kg (186 lb 15.2 oz)   SpO2 95%   BMI 34.19 kg/m²     "

## 2023-06-13 NOTE — NURSING TRANSFER
Nursing Transfer Note      6/13/2023     Reason patient is being transferred: ep pacu 2 to 324    Transfer To: ep pacu 2 to 324    Transfer via bed    Transfer with 5l nc (pt's home oxygen settings) to  portable O2, cardiac monitoring, tele box on, confirmed by tele tech    Transported by zahira reese and danitza gentile pacu rn's    Telemetry: Box Number [id number], Rate sr , Rhythm 80, and Telemetry  jerrell Jacobson sent: none    Any special needs or follow-up needed: bedrest x4hrs from 1300. Done at 1700. Hob can go to 30 degrees    Chart send with patient: Yes    Notified: daughter    Patient reassessed at: 6/13/23 1415. Next due 1445. Then [id number] x2, q1hr x4. Then routine  Upon arrival to floor: cardiac monitor applied, patient oriented to room, call bell in reach, and bed in lowest position, 5l nc wall oxygen. Purewick to wall sxn.

## 2023-06-13 NOTE — PLAN OF CARE
Pt free of falls/trauma/injuries. AAOx4 VSS Denies c/o SOB; O2Sats remain stable on 6L NC. Incentive spirometry encouraged throughout shift. Balloon angioplasty completed, accessed right groin. Palpable pulses in all extremities. Bedrest ended at 1700, patient ambulated with stand by assistance to bathroom. Incisional pain managed with PO analgesics. Generalized skin remains CDI; trace edema noted to BLEs. Plan to continue with post-op care.  Pt tolerating plan of care. [facility]    Problem: Adult Inpatient Plan of Care  Goal: Plan of Care Review  Outcome: Ongoing, Progressing  Goal: Patient-Specific Goal (Individualized)  Outcome: Ongoing, Progressing  Goal: Absence of Hospital-Acquired Illness or Injury  Outcome: Ongoing, Progressing  Goal: Optimal Comfort and Wellbeing  Outcome: Ongoing, Progressing  Goal: Readiness for Transition of Care  Outcome: Ongoing, Progressing     Problem: Fall Injury Risk  Goal: Absence of Fall and Fall-Related Injury  Outcome: Ongoing, Progressing     Problem: Electrolyte Imbalance  Goal: Electrolyte Balance  Outcome: Ongoing, Progressing

## 2023-06-13 NOTE — PLAN OF CARE
"Pt aaox4 follows commands. Pt arrived from cath lab with right groin incision. Perclose, guaze/trans film noted. Hemostasis at 1300. Bedrest x4hrs. Done at 1700.  Hob can go to 30 degrees. Pt tolerating sips of water. Pt states "I am on 6l nasal cannula at home".  Pt arrived on 6lsimple face mask and weaned to 5l nc. Sats 96%.  Pt denies pain or sob. Palpable pulses noted. Pt's daughter updated over phone, remains in pt's room.  Pt on hospital bed. 12 lead ekg done and in chart. Tele box on confirmed by tele tech. Portable oxygen tank 5l nc. Connected to wall oxyen. Purewick in place. Due to void. Denies bladder pressure to 50mmhg wall sxn.see flowsheet for full assessment.   "

## 2023-06-13 NOTE — ASSESSMENT & PLAN NOTE
Patient is currently being worked up for TAVR at valve clinic.  Last echo showed an a peak velocity of 4 with a mean gradient of 35 and a valve area of 1.12    - Plan for BAV likely today 6/13

## 2023-06-13 NOTE — BRIEF OP NOTE
Brief Operative Note:    : Neeraj Finn MD     Referring Physician: Self,Aaareflefty     All Operators: Surgeon(s):  Neeraj Finn MD     Preoperative Diagnosis: Acute on chronic congestive heart failure, unspecified heart failure type [I50.9]  Aortic stenosis [I35.0]     Postop Diagnosis: Acute on chronic congestive heart failure, unspecified heart failure type [I50.9]  Aortic stenosis [I35.0]    Treatments/Procedures: Procedure(s) (LRB):  REPAIR, AORTIC VALVE (N/A)    Access: Right CFA    Findings: severe aortic stenosis     See catheterization report for full details.    Intervention: s/p successful BAV     See catheterization report for full details.    Closure device: Perclose       Plan:  - Post cath protocol   - Bed rest x 4 hours   - Continue Plavix 75 mg daily ( home med )   - Continue high intensity statin therapy (LDL goal < 70)  - Risk factor reduction (BP <130/80 mmHg, glycemic control, etc)  - Cardiac rehab referral  - Follow up with outpatient cardiologist    Estimated Blood loss: 20 cc    Specimens removed: No    Nelson Fried MD

## 2023-06-13 NOTE — TELEPHONE ENCOUNTER
----- Message from [place] sent at 6/13/2023 10:10 AM CDT -----  [facility]  Type:  Needs Medical Advice    Who Called: Liliana from [facility]  Would the patient rather a call back or a response via [facility]? call  Best Call Back Number:   Additional Information: caller sts she needs home health orders signed. Please advise and thank you.

## 2023-06-13 NOTE — SUBJECTIVE & OBJECTIVE
Interval History:  No events overnight.  Currently stable on 5 L nasal cannula.  Creatinine slightly trended up for which IV Lasix was held. Kidney function imroving with Cr 1.4.  Plan to do BAV today.    Review of Systems   Constitutional: Negative for fever and weight gain.   Cardiovascular:  Positive for dyspnea on exertion. Negative for chest pain and leg swelling.   Respiratory:  Positive for shortness of breath. Negative for cough.    Genitourinary:  Negative for bladder incontinence.   Objective:     Vital Signs (Most Recent):  Temp: 98 °F (36.7 °C) (06/13/23 0800)  Pulse: 92 (06/13/23 0919)  Resp: 20 (06/13/23 0919)  BP: 126/62 (06/13/23 0800)  SpO2: 95 % (06/13/23 0919) Vital Signs (24h Range):  Temp:  [97.1 °F (36.2 °C)-98 °F (36.7 °C)] 98 °F (36.7 °C)  Pulse:  [] 92  Resp:  [16-20] 20  SpO2:  [94 %-98 %] 95 %  BP: (122-156)/(60-66) 126/62     Weight: 84.8 kg (186 lb 15.2 oz)  Body mass index is 34.19 kg/m².     SpO2: 95 %         Intake/Output Summary (Last 24 hours) at 6/13/2023 1110  Last data filed at 6/12/2023 1809  Gross per 24 hour   Intake 444 ml   Output --   Net 444 ml         Lines/Drains/Airways       Peripheral Intravenous Line  Duration                  Peripheral IV - Single Lumen 06/09/23 2250 20 G Posterior;Right Hand 3 days         Peripheral IV - Single Lumen 06/13/23 0951 20 G Anterior;Distal;Left Forearm <1 day                       Physical Exam  Constitutional:       Appearance: She is ill-appearing.   HENT:      Head: Normocephalic and atraumatic.      Mouth/Throat:      Mouth: Mucous membranes are moist.   Eyes:      Extraocular Movements: Extraocular movements intact.      Pupils: Pupils are equal, round, and reactive to light.   Cardiovascular:      Rate and Rhythm: Normal rate and regular rhythm.   Pulmonary:      Effort: Pulmonary effort is normal.      Breath sounds: Normal breath sounds.   Abdominal:      General: Abdomen is flat. Bowel sounds are normal.       Palpations: Abdomen is soft.   Musculoskeletal:         General: Normal range of motion.      Cervical back: Normal range of motion.   Skin:     General: Skin is warm.      Capillary Refill: Capillary refill takes less than 2 seconds.   Neurological:      General: No focal deficit present.      Mental Status: She is alert and oriented to person, place, and time.          Significant Labs: All pertinent lab results from the last 24 hours have been reviewed.

## 2023-06-14 ENCOUNTER — DOCUMENT SCAN (OUTPATIENT)
Dept: HOME HEALTH SERVICES | Facility: HOSPITAL | Age: 73
End: 2023-06-14
Payer: MEDICARE

## 2023-06-14 VITALS
SYSTOLIC BLOOD PRESSURE: 144 MMHG | WEIGHT: 187.63 LBS | OXYGEN SATURATION: 95 % | HEIGHT: 62 IN | TEMPERATURE: 98 F | BODY MASS INDEX: 34.53 KG/M2 | HEART RATE: 93 BPM | RESPIRATION RATE: 17 BRPM | DIASTOLIC BLOOD PRESSURE: 61 MMHG

## 2023-06-14 VITALS — HEART RATE: 97 BPM

## 2023-06-14 LAB
ANION GAP SERPL CALC-SCNC: 7 MMOL/L (ref 8–16)
BUN SERPL-MCNC: 35 MG/DL (ref 8–23)
CALCIUM SERPL-MCNC: 9.8 MG/DL (ref 8.7–10.5)
CHLORIDE SERPL-SCNC: 101 MMOL/L (ref 95–110)
CO2 SERPL-SCNC: 29 MMOL/L (ref 23–29)
CREAT SERPL-MCNC: 1.3 MG/DL (ref 0.5–1.4)
EST. GFR  (NO RACE VARIABLE): 42.9 ML/MIN/1.73 M^2
GLUCOSE SERPL-MCNC: 89 MG/DL (ref 70–110)
POTASSIUM SERPL-SCNC: 4.3 MMOL/L (ref 3.5–5.1)
SODIUM SERPL-SCNC: 137 MMOL/L (ref 136–145)

## 2023-06-14 PROCEDURE — 80048 BASIC METABOLIC PNL TOTAL CA: CPT | Mod: NTX | Performed by: HOSPITALIST

## 2023-06-14 PROCEDURE — 99239 HOSP IP/OBS DSCHRG MGMT >30: CPT | Mod: NTX,,, | Performed by: INTERNAL MEDICINE

## 2023-06-14 PROCEDURE — 25000003 PHARM REV CODE 250: Mod: NTX | Performed by: HOSPITALIST

## 2023-06-14 PROCEDURE — 36415 COLL VENOUS BLD VENIPUNCTURE: CPT | Mod: NTX | Performed by: HOSPITALIST

## 2023-06-14 PROCEDURE — 99239 PR HOSPITAL DISCHARGE DAY,>30 MIN: ICD-10-PCS | Mod: NTX,,, | Performed by: INTERNAL MEDICINE

## 2023-06-14 PROCEDURE — 25000003 PHARM REV CODE 250: Mod: NTX | Performed by: INTERNAL MEDICINE

## 2023-06-14 RX ORDER — QUETIAPINE FUMARATE 100 MG/1
100 TABLET, FILM COATED ORAL NIGHTLY
Start: 2023-06-14

## 2023-06-14 RX ORDER — COLCHICINE 0.6 MG/1
TABLET ORAL
Status: ON HOLD | COMMUNITY
End: 2023-06-14 | Stop reason: SDUPTHER

## 2023-06-14 RX ORDER — METOPROLOL SUCCINATE 50 MG/1
50 TABLET, EXTENDED RELEASE ORAL DAILY
Qty: 30 TABLET | Refills: 5 | Status: SHIPPED | OUTPATIENT
Start: 2023-06-14 | End: 2023-12-11

## 2023-06-14 RX ORDER — HYDRALAZINE HYDROCHLORIDE 50 MG/1
50 TABLET, FILM COATED ORAL EVERY 8 HOURS
Qty: 90 TABLET | Refills: 3 | Status: SHIPPED | OUTPATIENT
Start: 2023-06-14 | End: 2024-06-13

## 2023-06-14 RX ORDER — PANTOPRAZOLE SODIUM 40 MG/1
40 TABLET, DELAYED RELEASE ORAL
Qty: 30 TABLET | Refills: 11 | Status: SHIPPED | OUTPATIENT
Start: 2023-06-14 | End: 2024-06-13

## 2023-06-14 RX ORDER — AMLODIPINE BESYLATE 10 MG/1
10 TABLET ORAL DAILY
Qty: 30 TABLET | Refills: 5 | Status: SHIPPED | OUTPATIENT
Start: 2023-06-14 | End: 2024-06-13

## 2023-06-14 RX ORDER — MONTELUKAST SODIUM 4 MG/1
1 TABLET, CHEWABLE ORAL 2 TIMES DAILY PRN
Start: 2023-06-14

## 2023-06-14 RX ORDER — COLCHICINE 0.6 MG/1
TABLET ORAL
Start: 2023-06-14 | End: 2023-09-05

## 2023-06-14 RX ORDER — MONTELUKAST SODIUM 4 MG/1
1 TABLET, CHEWABLE ORAL 2 TIMES DAILY PRN
Status: ON HOLD | COMMUNITY
End: 2023-06-14 | Stop reason: SDUPTHER

## 2023-06-14 RX ADMIN — PAROXETINE HYDROCHLORIDE 50 MG: 20 TABLET, FILM COATED ORAL at 07:06

## 2023-06-14 RX ADMIN — PANTOPRAZOLE SODIUM 40 MG: 40 TABLET, DELAYED RELEASE ORAL at 07:06

## 2023-06-14 RX ADMIN — HYDRALAZINE HYDROCHLORIDE 50 MG: 50 TABLET ORAL at 06:06

## 2023-06-14 RX ADMIN — ACETAMINOPHEN 650 MG: 325 TABLET ORAL at 07:06

## 2023-06-14 RX ADMIN — ISOSORBIDE MONONITRATE 60 MG: 60 TABLET, EXTENDED RELEASE ORAL at 07:06

## 2023-06-14 RX ADMIN — AMLODIPINE BESYLATE 10 MG: 10 TABLET ORAL at 07:06

## 2023-06-14 RX ADMIN — ATORVASTATIN CALCIUM 40 MG: 40 TABLET, FILM COATED ORAL at 07:06

## 2023-06-14 RX ADMIN — CLOPIDOGREL BISULFATE 75 MG: 75 TABLET ORAL at 07:06

## 2023-06-14 NOTE — PLAN OF CARE
[facility] scheduled pcp f/u   Future Appointments   Date Time Provider Department Center   6/23/2023  9:20 AM Johanne Callejas MD [facility] MED [place]   7/26/2023  2:40 PM Neeraj Finn MD [facility] CARDQUINTEN [place]   7/27/2023  4:00 PM Ramirez Morrow MD [facility] NEPH [place]   8/15/2023  1:00 PM Matt Toro MD [facility] PULM [place] [facility]   8/17/2023  1:00 PM Cristian Shepherd MD [facility] [id number] [facility] [place]   11/13/2023  8:40 AM Johanne Callejas MD [facility] MED [place]

## 2023-06-14 NOTE — PLAN OF CARE
[facility]  Discharge Final Note    Primary Care Provider: Johanne Callejas MD    Expected Discharge Date: 6/14/2023    Final Discharge Note (most recent)       Final Note - 06/14/23 1416          Final Note    Assessment Type Final Discharge Note     Anticipated Discharge Disposition Home-Health Care Svc        Post-Acute Status    Post-Acute Authorization Home Health     Home Health Status Set-up Complete/Auth obtained                 Pt accepted to resume care with [facility].    Stefanie Palacio, LMSW [facility]  [id number]      Future Appointments   Date Time Provider Department Center   6/23/2023  9:20 AM Johanne Callejas MD [facility] [place]   7/26/2023  2:40 PM Neeraj Finn MD [facility] CARDVAL [facility]   7/27/2023  4:00 PM Ramirez Morrow MD [facility] NEPH [place]   8/15/2023  1:00 PM Matt Toro MD [facility] PULM [place] [facility]   8/17/2023  1:00 PM Cristian Shepherd MD [facility] [id number] [facility] [place]   11/13/2023  8:40 AM Johanne Callejas MD [facility] [place]

## 2023-06-14 NOTE — PLAN OF CARE
[facility] - Cardiology Stepdown      HOME HEALTH ORDERS  FACE TO FACE ENCOUNTER    Patient Name: Betty Bacon  YOB: 1950    PCP: Johanne Callejas MD   PCP Address: [address] / [place] [address]  PCP Phone Number: [phone]  PCP Fax: [phone]    Encounter Date: 6/8/23    Admit to Home Health    Diagnoses:  Active Hospital Problems    Diagnosis  POA    *Acute diastolic heart failure [I50.31]  Unknown     Priority: 1 - High    Valvular heart disease, severe aortic stenosis, moderate aortic regurgitation [I35.0]  Yes     Priority: 2      Chronic     moderate      Chronic respiratory failure with hypoxia [J96.11]  Yes     Priority: 3      Chronic    Interstitial lung disease [J84.9]  Yes    CAD (coronary artery disease) [I25.10]  Yes     NEFTALY RCA 7/2004  Stents x 2 RCA 10/2012      Depression [F32.A]  Yes    Essential hypertension [I10]  Yes     Chronic    Hyperlipidemia LDL goal < 70 [E78.5]  Yes     Dx updated per 2019 IMO Load      CKD (chronic kidney disease) stage 3, GFR 30-59 ml/min [N18.30]  Yes      Resolved Hospital Problems   No resolved problems to display.       Follow Up Appointments:  Future Appointments   Date Time Provider Department Center   7/26/2023  2:40 PM Neeraj Finn MD [facility] CARDVAL [facility]   7/27/2023  4:00 PM Ramirez Morrow MD [facility] NEPH [place]   8/15/2023  1:00 PM Matt Toro MD [facility] PULM [place] [facility]   8/17/2023  1:00 PM Cristian Shepherd MD [facility] [id number] [facility] [place]   11/13/2023  8:40 AM Johanne Callejas MD [facility] MED [place]       Allergies:  Review of patient's allergies indicates:   Allergen Reactions    Propoxyphene n-acetaminophen Other (See Comments)     Other reaction(s): Unknown    Codeine Anxiety       Medications: Review discharge medications with patient and family and provide education.    Current Facility-Administered Medications   Medication Dose Route Frequency Provider Last Rate Last Admin    acetaminophen tablet 650 mg   650 mg [facility]6H [facility]   650 mg at 06/14/23 0746    albuterol sulfate nebulizer solution 2.5 mg  2.5 mg Nebulization Q4H PRN Jeffery Collins MD        And    ipratropium 0.02 % nebulizer solution 0.5 mg  0.5 mg Nebulization Q4H PRN Jeffery Collins MD        ALPRAZolam tablet 1 mg  1 mg Oral BID PRN Lalo Flores MD   1 mg at 06/13/23 1919    amLODIPine tablet 10 mg  10 mg Oral Daily Jeffery Collins MD   10 mg at 06/14/23 0748    atorvastatin tablet 40 mg  40 mg Oral Daily [facility]. MD Donny   40 mg at 06/14/23 0747    clopidogreL tablet 75 mg  75 mg Oral Daily [facility]. MD Donny   75 mg at 06/14/23 0747    enoxaparin injection 40 mg  40 mg Subcutaneous Q24H (prophylaxis, 1700) Jeffery Collins MD   40 mg at 06/13/23 1800    fentaNYL 50 mcg/mL injection 25 mcg  25 mcg Intravenous Q5 Min PRN Hosea Mosley MD        fluticasone furoate-vilanteroL 200-25 mcg/dose diskus inhaler 1 puff  1 puff Inhalation Daily [facility]. MD Donny   1 puff at 06/13/23 0919    hydrALAZINE tablet 50 mg  50 mg Oral Q8H Jeffery Collins MD   50 mg at 06/14/23 0628    isosorbide mononitrate 24 hr tablet 60 mg  60 mg Oral Daily [facility]. MD Donny   60 mg at 06/14/23 0747    pantoprazole EC tablet 40 mg  40 mg Oral Daily [facility]. MD Donny   40 mg at 06/14/23 0748    paroxetine tablet 50 mg  [facility]   50 mg at 06/14/23 0747    prochlorperazine injection Soln 5 mg  5 mg Intravenous Q30 Min PRN Hosea Mosley MD        QUEtiapine tablet 100 mg  100 mg Oral QHS [facility] MD Donny   100 mg at 06/13/23 2257    sodium chloride 0.9% flush 10 mL  10 mL Intravenous PRN Jeffery Collins MD        sodium chloride 0.9% flush 10 mL  10 mL Intravenous PRN Nelson Fried MD        sodium chloride 0.9% flush 10 mL  10 mL Intravenous PRN Nelson Fried MD         Current Discharge Medication List        START taking these medications    Details   amLODIPine (NORVASC) 10 MG  tablet Take 1 tablet (10 mg total) by mouth once daily.  Qty: 30 tablet, Refills: 5    Comments: .      hydrALAZINE (APRESOLINE) 50 MG tablet Take 1 tablet (50 mg total) by mouth every 8 (eight) hours.  Qty: 90 tablet, Refills: 3    Comments: .      pantoprazole (PROTONIX) 40 MG tablet Take 1 tablet (40 mg total) by mouth before breakfast.  Qty: 30 tablet, Refills: 11           CONTINUE these medications which have NOT CHANGED    Details   acetaminophen (TYLENOL) 325 MG tablet Take 325 mg by mouth every 6 (six) hours as needed for Pain.      albuterol sulfate (PROAIR RESPICLICK) 90 mcg/actuation AePB Inhale 2 puffs into the lungs every 4 to 6 hours as needed.  Qty: 1 each, Refills: 3      albuterol-ipratropium (DUO-NEB) 2.5 mg-0.5 mg/3 mL nebulizer solution Take 3 mLs by nebulization every 6 (six) hours as needed for Wheezing. Rescue  Qty: 75 mL, Refills: 11    Associated Diagnoses: Centrilobular emphysema      atorvastatin (LIPITOR) 40 MG tablet TAKE 1 TABLET BY MOUTH EVERY DAY  Qty: 90 tablet, Refills: 3      clonazePAM (KLONOPIN) 1 MG disintegrating tablet Take 1 tablet (1 mg total) by mouth 2 (two) times daily as needed (anxiety).  Qty: 60 tablet, Refills: 1    Associated Diagnoses: Generalized anxiety disorder      clopidogreL (PLAVIX) 75 mg tablet TAKE 1 TABLET BY MOUTH EVERY DAY  Qty: 90 tablet, Refills: 3    Associated Diagnoses: Long term (current) use of anticoagulants      fluticasone propionate (FLONASE) 50 mcg/actuation nasal spray 1 spray (50 mcg total) by Each Nostril route once daily.  Qty: 16 g, Refills: PRN    Associated Diagnoses: Non-recurrent acute serous otitis media of right ear; Seasonal allergic rhinitis due to pollen      fluticasone-umeclidin-vilanter (TRELEGY ELLIPTA) 200-62.5-25 mcg inhaler Inhale 1 puff into the lungs once daily.  Qty: 180 each, Refills: 11    Associated Diagnoses: Chronic obstructive pulmonary disease, unspecified COPD type      isosorbide mononitrate (IMDUR) 60 MG 24  hr tablet TAKE 1 TABLET BY MOUTH EVERY DAY  Qty: 90 tablet, Refills: 3    Associated Diagnoses: Acute on chronic heart failure with preserved ejection fraction      !! paroxetine (PAXIL) 10 MG tablet Take 10 mg by mouth every morning. Total 50 mg      !! paroxetine (PAXIL) 40 MG tablet TAKE 1 TABLET BY MOUTH EVERY DAY  Qty: 90 tablet, Refills: 3    Associated Diagnoses: Depression, unspecified depression type      QUEtiapine (SEROQUEL) 100 MG Tab Take 100 mg by mouth once daily.       !! - Potential duplicate medications found. Please discuss with provider.        STOP taking these medications       colchicine (COLCRYS) 0.6 mg tablet Comments:   Reason for Stopping:         colestipoL (COLESTID) 1 gram Tab Comments:   Reason for Stopping:         esomeprazole (NEXIUM) 40 MG capsule Comments:   Reason for Stopping:         furosemide (LASIX) 40 MG tablet Comments:   Reason for Stopping:         metoprolol succinate (TOPROL-XL) 50 MG 24 hr tablet Comments:   Reason for Stopping:                 I have seen and examined this patient within the last 30 days. My clinical findings that support the need for the home health skilled services and home bound status are the following:no   Weakness/numbness causing balance and gait disturbance due to Valvular Heart Disease making it taxing to leave home.  Medical restrictions requiring assistance of another human to leave home due to  Dyspnea on exertion (SOB) and Home oxygen requirement.     Diet:   cardiac diet    Labs:  SN to perform labs:  CBC: Monthly; 2 month(s) and BMP: Monthly; 2 month(s) and Report Lab results to PCP.    Referrals/ Consults  Physical Therapy to evaluate and treat. Evaluate for home safety and equipment needs; Establish/upgrade home exercise program. Perform / instruct on therapeutic exercises, gait training, transfer training, and Range of Motion.  Occupational Therapy to evaluate and treat. Evaluate home environment for safety and equipment needs.  Perform/Instruct on transfers, ADL training, ROM, and therapeutic exercises.  Aide to provide assistance with personal care, ADLs, and vital signs.    Activities:   activity as tolerated    Nursing:   Agency to admit patient within 24 hours of hospital discharge unless specified on physician order or at patient request    SN to complete comprehensive assessment including routine vital signs. Instruct on disease process and s/s of complications to report to MD. Review/verify medication list sent home with the patient at time of discharge  and instruct patient/caregiver as needed. Frequency may be adjusted depending on start of care date.     Skilled nurse to perform up to 3 visits PRN for symptoms related to diagnosis    Notify MD if SBP > 160 or < 90; DBP > 90 or < 50; HR > 120 or < 50; Temp > 101; O2 < 88%; Other:       Ok to schedule additional visits based on staff availability and patient request on consecutive days within the home health episode.    When multiple disciplines ordered:    Start of Care occurs on Sunday - Wednesday schedule remaining discipline evaluations as ordered on separate consecutive days following the start of care.    Thursday SOC -schedule subsequent evaluations Friday and Monday the following week.     Friday - Saturday SOC - schedule subsequent discipline evaluations on consecutive days starting Monday of the following week.    For all post-discharge communication and subsequent orders please contact patient's primary care physician. If unable to reach primary care physician or do not receive response within 30 minutes, please contact Cardiology for clinical staff order clarification    Miscellaneous   Home Oxygen:  Oxygen at 6 L/min nasal canula to be used:  Continuously.    Home Health Aide:  Nursing Weekly, Physical Therapy Twice weekly, Occupational Therapy Twice weekly, Respiratory Therapy Twice weekly, and Home Health Aide Twice weekly    Wound Care Orders  no    I certify that  this patient is confined to her home and needs intermittent skilled nursing care, physical therapy, and occupational therapy.

## 2023-06-14 NOTE — PLAN OF CARE
Pt discharged per MD orders. Tele discontinued and returned to station. IV discontinued; catheter tip intact x.  Medication list and prescriptions reviewed; prescriptions sent to pt preferred pharmacy and printed prescriptions provided.  Pt verbalizes understanding of all written and verbal discharge instructions.  Pt awaiting family/escort arrival.  Will continue to monitor.      Problem: Adult Inpatient Plan of Care  Goal: Plan of Care Review  Outcome: Met  Goal: Patient-Specific Goal (Individualized)  Outcome: Met  Goal: Absence of Hospital-Acquired Illness or Injury  Outcome: Met  Goal: Optimal Comfort and Wellbeing  Outcome: Met  Goal: Readiness for Transition of Care  Outcome: Met     Problem: Fall Injury Risk  Goal: Absence of Fall and Fall-Related Injury  Outcome: Met     Problem: Electrolyte Imbalance  Goal: Electrolyte Balance  Outcome: Met

## 2023-06-14 NOTE — DISCHARGE SUMMARY
[facility] - Cardiology Stepdown  Cardiology  Discharge Summary      Patient Name: Betty Bacon  MRN: [id number]  Admission Date: 6/8/2023  Hospital Length of Stay: 6 days  Discharge Date and Time:  06/14/2023 11:57 AM  Attending Physician: James Addison MD    Discharging Provider: Stanton Harrison MD  Primary Care Physician: Johanne Callejas MD    HPI:   73 y.o. female with past medical history of restrictive lung disease, as asbestosis, peripheral vascular disease, severe aortic stenosis presented to the clinic for evaluation for severe symptomatic aortic stenosis.  She was complaining of having shortness of breath for which she was sent to the emergency room.  She has multiple admissions in the past for volume overload.  Patient is currently on Lasix 40 mg  daily.  Patient also complains of having orthopnea PND but denies having any lightheadedness, dizziness.  Denies having any chest pain loss of consciousness.            Procedure(s) (LRB):  REPAIR, AORTIC VALVE (N/A)     Indwelling Lines/Drains at time of discharge:  Lines/Drains/Airways       Drain  Duration             Female External Urinary Catheter 06/13/23 1314 <1 day                    Hospital Course:  Patient presented to cardiology with shortness of breath 2/2 to severe aortic stenosis and ILD (on 6L Home O2). She has not underwent TAVR. Her Scr increased to 1.7, s/p IV lasix; now discontinued and kidney function improved. ECHO with EF 55%, severe AS, Grade I diastolic dysfunction, and PASP 37. She underwent balloon aortic valvuloplasty 6/13 without complications. Discontinue furosemide and toprol XL on discharge. Continue amlodipine, hydralazine, and home medications as prescribed. Discharge with Home Health. Plan to follow up with cardiology outpatient.       Vitals:    06/14/23 0436 06/14/23 0718 06/14/23 1101 06/14/23 1138   BP: (!) 141/62 (!) 166/68 (!) 144/61    BP Location: Right arm Left arm Left arm    Patient Position: Sitting  Sitting Sitting    Pulse: 103 104 94 93   Resp: 19 16 17    Temp: 97.9 °F (36.6 °C) 98.1 °F (36.7 °C) 97.8 °F (36.6 °C)    TempSrc: Oral Oral Tympanic    SpO2: (!) 94% (!) 94% 95%    Weight:  85.1 kg (187 lb 9.8 oz)     Height:         Goals of Care Treatment Preferences:  Code Status: Full Code      Consults:   Consults (From admission, onward)          Status Ordering Provider     Inpatient consult to Cardiology  Once        Provider:  (Not yet assigned)    Completed ZAHRA ALEJANDRA            Significant Diagnostic Studies: Labs:   BMP:   Recent Labs   Lab 06/13/23  0404 06/14/23  0421   GLU 99 89    137   K 4.0 4.3    101   CO2 30* 29   BUN 45* 35*   CREATININE 1.4 1.3   CALCIUM 9.3 9.8    and CBC No results for input(s): WBC, HGB, HCT, PLT in the last 48 hours.  Cardiac Graphics: Echocardiogram:   Transthoracic echo (TTE) complete (Cupid Only):   Results for orders placed or performed during the hospital encounter of 06/08/23   Echo   Result Value Ref Range    Ascending aorta 2.70 cm    STJ 2.65 cm    AV mean gradient 44 mmHg    Ao peak mikey 4.12 m/s    Ao .49 cm    IVS 1.18 (A) 0.6 - 1.1 cm    LA size 4.09 cm    Left Atrium Major Axis 5.20 cm    Left Atrium Minor Axis 5.71 cm    LVIDd 5.01 3.5 - 6.0 cm    LVIDs 3.35 2.1 - 4.0 cm    LVOT diameter 2.00 cm    LVOT peak VTI 27.95 cm    Posterior Wall 0.89 0.6 - 1.1 cm    MV Peak A Mikey 1.10 m/s    E wave deceleration time 148.44 msec    MV Peak E Mikey 0.78 m/s    RA Major Axis 4.56 cm    RA Width 3.72 cm    RVDD 3.35 cm    Sinus 3.39 cm    TAPSE 2.30 cm    TR Max Mikey 2.90 m/s    LA WIDTH 4.21 cm    MV stenosis pressure 1/2 time 43.05 ms    LV Diastolic Volume 118.58 mL    LV Systolic Volume 45.64 mL    LVOT peak mikey 0.93 m/s    TDI LATERAL 0.07 m/s    TDI SEPTAL 0.03 m/s    LA volume (mod) 77.46 cm3    LV LATERAL E/E' RATIO 11.14 m/s    LV SEPTAL E/E' RATIO 26.00 m/s    FS 33 %    LA volume 79.67 cm3    LV mass 191.25 g    Left Ventricle  Relative Wall Thickness 0.36 cm    AV valve area 0.75 cm2    AV Velocity Ratio 0.23     AV index (prosthetic) 0.24     MV valve area p 1/2 method 5.11 cm2    E/A ratio 0.71     Mean e' 0.05 m/s    LVOT area 3.1 cm2    LVOT stroke volume 87.76 cm3    AV peak gradient 68 mmHg    E/E' ratio 15.60 m/s    LV Systolic Volume Index 24.8 mL/m2    LV Diastolic Volume Index 64.45 mL/m2    LA Volume Index 43.3 mL/m2    LV Mass Index 104 g/m2    Triscuspid Valve Regurgitation Peak Gradient 34 mmHg    LA Volume Index (Mod) 42.1 mL/m2    BSA 1.91 m2    Right Atrial Pressure (from IVC) 3 mmHg    EF 55 %    TV rest pulmonary artery pressure 37 mmHg    Narrative    · The left ventricle is normal in size with eccentric hypertrophy and   normal systolic function. The estimated ejection fraction is 55%.  · Normal right ventricular size with normal right ventricular systolic   function.  · Grade I left ventricular diastolic dysfunction.  · Moderate left atrial enlargement.  · There is severe aortic valve stenosis.  · Aortic valve area is 0.75 cm2; peak velocity is 4.12 m/s; mean gradient   is 44 mmHg.  · Mild aortic regurgitation.  · The estimated PA systolic pressure is 37 mmHg.  · Normal central venous pressure (3 mmHg).          Pending Diagnostic Studies:       None            Final Active Diagnoses:    Diagnosis Date Noted POA    PRINCIPAL PROBLEM:  Acute diastolic heart failure [I50.31] 06/08/2023 Unknown    Valvular heart disease, severe aortic stenosis, moderate aortic regurgitation [I35.0] 05/16/2013 Yes     Chronic    Chronic respiratory failure with hypoxia [J96.11] 01/26/2023 Yes     Chronic    Interstitial lung disease [J84.9] 05/28/2014 Yes    CAD (coronary artery disease) [I25.10] 10/21/2013 Yes    Depression [F32.A] 10/11/2012 Yes    Essential hypertension [I10]  Yes     Chronic    Hyperlipidemia LDL goal < 70 [E78.5]  Yes    CKD (chronic kidney disease) stage 3, GFR 30-59 ml/min [N18.30] 09/06/2012 Yes      Problems  Resolved During this Admission:     No new Assessment & Plan notes have been filed under this hospital service since the last note was generated.  Service: Cardiology      Discharged Condition: fair    Disposition: Home-Health Care [facility]    Follow Up:    Patient Instructions:      Ambulatory referral/consult to Cardiology   Standing Status: Future   Referral Priority: Routine Referral Type: Consultation   Referral Reason: Specialty Services Required   Requested Specialty: Cardiology   Number of Visits Requested: 1     Medications:  Reconciled Home Medications:      Medication List        START taking these medications      amLODIPine 10 MG tablet  Commonly known as: NORVASC  Take 1 tablet (10 mg total) by mouth once daily.     hydrALAZINE 50 MG tablet  Commonly known as: APRESOLINE  Take 1 tablet (50 mg total) by mouth every 8 (eight) hours.     pantoprazole 40 MG tablet  Commonly known as: PROTONIX  Take 1 tablet (40 mg total) by mouth before breakfast.  Replaces: esomeprazole 40 MG capsule            CHANGE how you take these medications      atorvastatin 40 MG tablet  Commonly known as: LIPITOR  TAKE 1 TABLET BY MOUTH EVERY DAY  What changed: when to take this     clopidogreL 75 mg tablet  Commonly known as: PLAVIX  TAKE 1 TABLET BY MOUTH EVERY DAY  What changed: when to take this     colchicine 0.6 mg tablet  Commonly known as: COLCRYS  Take 2 tablets (1.2mg total) by mouth at gout flare onset, may repeat 1 tablet (0.6mg total) in one hour, then take 1 tablet (0.6mg total) by mouth once daily until pain resolves  What changed:   additional instructions  Another medication with the same name was removed. Continue taking this medication, and follow the directions you see here.     colestipoL 1 gram Tab  Commonly known as: COLESTID  Take 1 tablet (1 g total) by mouth 2 (two) times daily as needed (diarrhea). Other drugs should be administered at least 1 hour before or 4 hours after colestipol.  What changed:    additional instructions  Another medication with the same name was removed. Continue taking this medication, and follow the directions you see here.     metoprolol succinate 50 MG 24 hr tablet  Commonly known as: TOPROL-XL  Take 1 tablet (50 mg total) by mouth once daily.  What changed: when to take this     * paroxetine 40 MG tablet  Commonly known as: PAXIL  TAKE 1 TABLET BY MOUTH EVERY DAY  What changed: when to take this     * paroxetine 10 MG tablet  Commonly known as: PAXIL  Take 10 mg by mouth every morning. Total 50 mg  What changed: Another medication with the same name was changed. Make sure you understand how and when to take each.     QUEtiapine 100 MG Tab  Commonly known as: SEROQUEL  Take 1 tablet (100 mg total) by mouth every evening.  What changed: when to take this           * This list has 2 medication(s) that are the same as other medications prescribed for you. Read the directions carefully, and ask your doctor or other care provider to review them with you.                CONTINUE taking these medications      acetaminophen 325 MG tablet  Commonly known as: TYLENOL  Take 325 mg by mouth every 6 (six) hours as needed for Pain.     albuterol sulfate 90 mcg/actuation inhaler  Commonly known as: PROAIR RESPICLICK  Inhale 2 puffs into the lungs every 4 to 6 hours as needed.     albuterol-ipratropium 2.5 mg-0.5 mg/3 mL nebulizer solution  Commonly known as: DUO-NEB  Take 3 mLs by nebulization every 6 (six) hours as needed for Wheezing. Rescue     clonazePAM 1 MG disintegrating tablet  Commonly known as: KlonoPIN  Take 1 tablet (1 mg total) by mouth 2 (two) times daily as needed (anxiety).     fluticasone propionate 50 mcg/actuation nasal spray  Commonly known as: FLONASE  1 spray (50 mcg total) by Each Nostril route once daily.     isosorbide mononitrate 60 MG 24 hr tablet  Commonly known as: IMDUR  TAKE 1 TABLET BY MOUTH EVERY DAY     TRELEGY ELLIPTA 200-62.5-25 mcg inhaler  Generic drug:  fluticasone-umeclidin-vilanter  Inhale 1 puff into the lungs once daily.            STOP taking these medications      esomeprazole 40 MG capsule  Commonly known as: NEXIUM  Replaced by: pantoprazole 40 MG tablet     furosemide 40 MG tablet  Commonly known as: LASIX              Time spent on the discharge of patient: 45 minutes    Stanton Harrison MD  Cardiology  [facility] - Cardiology Stepdown

## 2023-06-15 ENCOUNTER — PATIENT MESSAGE (OUTPATIENT)
Dept: FAMILY MEDICINE | Facility: CLINIC | Age: 73
End: 2023-06-15
Payer: MEDICARE

## 2023-06-15 ENCOUNTER — PATIENT MESSAGE (OUTPATIENT)
Dept: TRANSPLANT | Facility: CLINIC | Age: 73
End: 2023-06-15
Payer: MEDICARE

## 2023-06-15 ENCOUNTER — PATIENT OUTREACH (OUTPATIENT)
Dept: ADMINISTRATIVE | Facility: CLINIC | Age: 73
End: 2023-06-15
Payer: MEDICARE

## 2023-06-15 NOTE — PROGRESS NOTES
2nd Attempt made to reach patient for TCC call. Left voicemail please call [phone] leave first name, last name, and  for Ilir I will return your call.

## 2023-06-15 NOTE — ANESTHESIA POSTPROCEDURE EVALUATION
Anesthesia Post Evaluation    Patient: Betty Bacon    Procedure(s) Performed: Procedure(s) (LRB):  REPAIR, AORTIC VALVE (N/A)    Final Anesthesia Type: general      Patient location during evaluation: ICU  Patient participation: Yes- Able to Participate  Level of consciousness: awake and alert and oriented  Post-procedure vital signs: reviewed and stable  Pain management: adequate  Airway patency: patent  DHEERAJ mitigation strategies: Intraoperative administration of CPAP, nasopharyngeal airway, or oral appliance during sedation and Multimodal analgesia  PONV status at discharge: No PONV  Anesthetic complications: no      Cardiovascular status: hemodynamically stable  Respiratory status: unassisted  Hydration status: euvolemic  Follow-up not needed.          Vitals Value Taken Time   /61 06/14/23 1101   Temp 36.6 °C (97.8 °F) 06/14/23 1101   Pulse 93 06/14/23 1138   Resp 17 06/14/23 1101   SpO2 95 % 06/14/23 1101         No case tracking events are documented in the log.      Pain/Slime Score: Pain Rating Prior to Med Admin: 8 (6/14/2023  7:46 AM)

## 2023-06-15 NOTE — PROGRESS NOTES
C3 nurse attempted to contact Betty Bacon  for a TCC post hospital discharge follow up call. No answer. Left voicemail with callback information. The patient has a scheduled HOSFU appointment with Johanne Callejas MD  on 06/23/2023 @ 9:20 AM.

## 2023-06-16 ENCOUNTER — TELEPHONE (OUTPATIENT)
Dept: TRANSPLANT | Facility: CLINIC | Age: 73
End: 2023-06-16
Payer: MEDICARE

## 2023-06-16 ENCOUNTER — TELEPHONE (OUTPATIENT)
Dept: CARDIAC REHAB | Facility: HOSPITAL | Age: 73
End: 2023-06-16

## 2023-06-16 DIAGNOSIS — J96.11 CHRONIC RESPIRATORY FAILURE WITH HYPOXIA: Primary | Chronic | ICD-10-CM

## 2023-06-16 DIAGNOSIS — J84.9 INTERSTITIAL LUNG DISEASE: ICD-10-CM

## 2023-06-16 NOTE — TELEPHONE ENCOUNTER
6/19/23 - Received a response from the patient's daughter.  Message sent to  regarding scheduling an appointment in 1 -2 weeks.    6/16/23 - Received the following instructions from Dr. Linder in response to a message via [facility] from patient's daughter:    Yessica- please schedule patient in 1-2 weeks with me.  Testing: blood work-ESR, CRP, BNP; julius and modified 6MWT with lap start on 8LPM.       Thank you      Message sent to patient's daughter via [facility] regarding scheduling a follow-up appointment in 1 - 2 weeks with above testing per Dr. Linder's instructions.  Awaiting response.

## 2023-06-16 NOTE — PROGRESS NOTES
3rd Attempt made to reach patient for TCC call. Left voicemail please call [phone] leave first name, last name, and  for Ilir I will return your call.

## 2023-06-19 DIAGNOSIS — J44.9 CHRONIC OBSTRUCTIVE PULMONARY DISEASE, UNSPECIFIED COPD TYPE: Primary | ICD-10-CM

## 2023-06-19 DIAGNOSIS — J96.11 CHRONIC RESPIRATORY FAILURE WITH HYPOXIA: Primary | Chronic | ICD-10-CM

## 2023-06-19 DIAGNOSIS — J84.9 INTERSTITIAL LUNG DISEASE: ICD-10-CM

## 2023-06-20 LAB — BLOOD GROUP ANTIBODIES SERPL: NORMAL

## 2023-06-21 ENCOUNTER — TELEPHONE (OUTPATIENT)
Dept: TRANSPLANT | Facility: CLINIC | Age: 73
End: 2023-06-21
Payer: MEDICARE

## 2023-06-21 NOTE — TELEPHONE ENCOUNTER
GISELE faxed updated pulmonary rehab orders to [facility] (ph: [phone], fx: [phone]). GISELE spoke to Ludmila who confirmed new orders were received and will be processed. GSIELE provided direct contact info for any future needs. GISELE following and remains available.       ----- Message from Yessica Figueroa RN sent at 6/19/2023  3:09 PM CDT -----  Regarding: RE: pulm rehab orders - add COPD  Sandee entered the orders in clinic today.    ----- Message -----  From: Amarilis Redding LMSW  Sent: 6/16/2023   3:15 PM CDT  To: Yessica Figueroa RN  Subject: pulm rehab orders - add COPD                     Due to pt's insurance, for her pulmonary rehab orders, we need to add COPD to her diagnosis. No rush on this

## 2023-06-24 ENCOUNTER — HOSPITAL ENCOUNTER (INPATIENT)
Facility: HOSPITAL | Age: 73
LOS: 5 days | Discharge: HOSPICE | DRG: 189 | End: 2023-06-30
Attending: EMERGENCY MEDICINE | Admitting: INTERNAL MEDICINE
Payer: MEDICARE

## 2023-06-24 DIAGNOSIS — J96.02 ACUTE RESPIRATORY FAILURE WITH HYPOXIA AND HYPERCARBIA: ICD-10-CM

## 2023-06-24 DIAGNOSIS — I50.9 CHF (CONGESTIVE HEART FAILURE): ICD-10-CM

## 2023-06-24 DIAGNOSIS — J96.01 ACUTE RESPIRATORY FAILURE WITH HYPOXIA AND HYPERCARBIA: ICD-10-CM

## 2023-06-24 DIAGNOSIS — R07.9 CHEST PAIN: ICD-10-CM

## 2023-06-24 DIAGNOSIS — J96.01 ACUTE RESPIRATORY FAILURE WITH HYPOXIA: ICD-10-CM

## 2023-06-24 DIAGNOSIS — I50.9 CONGESTIVE HEART FAILURE, UNSPECIFIED HF CHRONICITY, UNSPECIFIED HEART FAILURE TYPE: Primary | ICD-10-CM

## 2023-06-24 LAB
ALBUMIN SERPL BCP-MCNC: 3.3 G/DL (ref 3.5–5.2)
ALLENS TEST: ABNORMAL
ALP SERPL-CCNC: 126 U/L (ref 55–135)
ALT SERPL W/O P-5'-P-CCNC: 13 U/L (ref 10–44)
ANION GAP SERPL CALC-SCNC: 8 MMOL/L (ref 8–16)
AST SERPL-CCNC: 14 U/L (ref 10–40)
BASOPHILS # BLD AUTO: 0.04 K/UL (ref 0–0.2)
BASOPHILS NFR BLD: 0.4 % (ref 0–1.9)
BILIRUB SERPL-MCNC: 1 MG/DL (ref 0.1–1)
BNP SERPL-MCNC: 803 PG/ML (ref 0–99)
BUN SERPL-MCNC: 36 MG/DL (ref 8–23)
CALCIUM SERPL-MCNC: 9 MG/DL (ref 8.7–10.5)
CHLORIDE SERPL-SCNC: 107 MMOL/L (ref 95–110)
CO2 SERPL-SCNC: 26 MMOL/L (ref 23–29)
CREAT SERPL-MCNC: 1.4 MG/DL (ref 0.5–1.4)
DELSYS: ABNORMAL
DIFFERENTIAL METHOD: ABNORMAL
EOSINOPHIL # BLD AUTO: 0.2 K/UL (ref 0–0.5)
EOSINOPHIL NFR BLD: 1.4 % (ref 0–8)
EP: 6
ERYTHROCYTE [DISTWIDTH] IN BLOOD BY AUTOMATED COUNT: 14.8 % (ref 11.5–14.5)
ERYTHROCYTE [SEDIMENTATION RATE] IN BLOOD BY WESTERGREN METHOD: 10 MM/H
EST. GFR  (NO RACE VARIABLE): 39.2 ML/MIN/1.73 M^2
FIO2: 50
GLUCOSE SERPL-MCNC: 118 MG/DL (ref 70–110)
GLUCOSE SERPL-MCNC: 134 MG/DL (ref 70–110)
HCO3 UR-SCNC: 26.1 MMOL/L (ref 24–28)
HCT VFR BLD AUTO: 29.1 % (ref 37–48.5)
HCT VFR BLD CALC: 28 %PCV (ref 36–54)
HGB BLD-MCNC: 8.8 G/DL (ref 12–16)
IMM GRANULOCYTES # BLD AUTO: 0.08 K/UL (ref 0–0.04)
IMM GRANULOCYTES NFR BLD AUTO: 0.7 % (ref 0–0.5)
IP: 16
LYMPHOCYTES # BLD AUTO: 1.3 K/UL (ref 1–4.8)
LYMPHOCYTES NFR BLD: 11.2 % (ref 18–48)
MAGNESIUM SERPL-MCNC: 1.4 MG/DL (ref 1.6–2.6)
MCH RBC QN AUTO: 28.7 PG (ref 27–31)
MCHC RBC AUTO-ENTMCNC: 30.2 G/DL (ref 32–36)
MCV RBC AUTO: 95 FL (ref 82–98)
MODE: ABNORMAL
MONOCYTES # BLD AUTO: 0.5 K/UL (ref 0.3–1)
MONOCYTES NFR BLD: 4.4 % (ref 4–15)
NEUTROPHILS # BLD AUTO: 9.3 K/UL (ref 1.8–7.7)
NEUTROPHILS NFR BLD: 81.9 % (ref 38–73)
NRBC BLD-RTO: 0 /100 WBC
PCO2 BLDA: 52 MMHG (ref 35–45)
PH SMN: 7.31 [PH] (ref 7.35–7.45)
PLATELET # BLD AUTO: 226 K/UL (ref 150–450)
PMV BLD AUTO: 10.1 FL (ref 9.2–12.9)
PO2 BLDA: 70 MMHG (ref 80–100)
POC BE: 0 MMOL/L
POC IONIZED CALCIUM: 1.31 MMOL/L (ref 1.06–1.42)
POC SATURATED O2: 92 % (ref 95–100)
POC TCO2: 28 MMOL/L (ref 23–27)
POTASSIUM BLD-SCNC: 4.4 MMOL/L (ref 3.5–5.1)
POTASSIUM SERPL-SCNC: 4.9 MMOL/L (ref 3.5–5.1)
PROT SERPL-MCNC: 7.2 G/DL (ref 6–8.4)
RBC # BLD AUTO: 3.07 M/UL (ref 4–5.4)
SAMPLE: ABNORMAL
SITE: ABNORMAL
SODIUM BLD-SCNC: 142 MMOL/L (ref 136–145)
SODIUM SERPL-SCNC: 141 MMOL/L (ref 136–145)
TROPONIN I SERPL HS-MCNC: 24.2 PG/ML (ref 0–14.9)
WBC # BLD AUTO: 11.39 K/UL (ref 3.9–12.7)

## 2023-06-24 PROCEDURE — 94799 UNLISTED PULMONARY SVC/PX: CPT

## 2023-06-24 PROCEDURE — 36600 WITHDRAWAL OF ARTERIAL BLOOD: CPT

## 2023-06-24 PROCEDURE — 94761 N-INVAS EAR/PLS OXIMETRY MLT: CPT

## 2023-06-24 PROCEDURE — 84295 ASSAY OF SERUM SODIUM: CPT

## 2023-06-24 PROCEDURE — 84484 ASSAY OF TROPONIN QUANT: CPT | Performed by: EMERGENCY MEDICINE

## 2023-06-24 PROCEDURE — 83880 ASSAY OF NATRIURETIC PEPTIDE: CPT | Performed by: EMERGENCY MEDICINE

## 2023-06-24 PROCEDURE — 83735 ASSAY OF MAGNESIUM: CPT | Performed by: EMERGENCY MEDICINE

## 2023-06-24 PROCEDURE — 80053 COMPREHEN METABOLIC PANEL: CPT | Performed by: EMERGENCY MEDICINE

## 2023-06-24 PROCEDURE — 99900035 HC TECH TIME PER 15 MIN (STAT)

## 2023-06-24 PROCEDURE — 93010 EKG 12-LEAD: ICD-10-PCS | Mod: ,,, | Performed by: SPECIALIST

## 2023-06-24 PROCEDURE — 99285 EMERGENCY DEPT VISIT HI MDM: CPT | Mod: 25

## 2023-06-24 PROCEDURE — 85025 COMPLETE CBC W/AUTO DIFF WBC: CPT | Performed by: EMERGENCY MEDICINE

## 2023-06-24 PROCEDURE — 94660 CPAP INITIATION&MGMT: CPT

## 2023-06-24 PROCEDURE — 82803 BLOOD GASES ANY COMBINATION: CPT

## 2023-06-24 PROCEDURE — 82330 ASSAY OF CALCIUM: CPT

## 2023-06-24 PROCEDURE — 85014 HEMATOCRIT: CPT

## 2023-06-24 PROCEDURE — 93010 ELECTROCARDIOGRAM REPORT: CPT | Mod: ,,, | Performed by: SPECIALIST

## 2023-06-24 PROCEDURE — 93005 ELECTROCARDIOGRAM TRACING: CPT | Performed by: SPECIALIST

## 2023-06-24 PROCEDURE — 99900031 HC PATIENT EDUCATION (STAT)

## 2023-06-24 PROCEDURE — 63600175 PHARM REV CODE 636 W HCPCS: Performed by: EMERGENCY MEDICINE

## 2023-06-24 PROCEDURE — 96375 TX/PRO/DX INJ NEW DRUG ADDON: CPT

## 2023-06-24 PROCEDURE — 96374 THER/PROPH/DIAG INJ IV PUSH: CPT

## 2023-06-24 PROCEDURE — 27000221 HC OXYGEN, UP TO 24 HOURS

## 2023-06-24 PROCEDURE — 84132 ASSAY OF SERUM POTASSIUM: CPT

## 2023-06-24 RX ORDER — FUROSEMIDE 10 MG/ML
40 INJECTION INTRAMUSCULAR; INTRAVENOUS
Status: COMPLETED | OUTPATIENT
Start: 2023-06-24 | End: 2023-06-24

## 2023-06-24 RX ORDER — HYDRALAZINE HYDROCHLORIDE 20 MG/ML
10 INJECTION INTRAMUSCULAR; INTRAVENOUS
Status: COMPLETED | OUTPATIENT
Start: 2023-06-24 | End: 2023-06-24

## 2023-06-24 RX ORDER — MAGNESIUM SULFATE HEPTAHYDRATE 40 MG/ML
2 INJECTION, SOLUTION INTRAVENOUS ONCE
Status: COMPLETED | OUTPATIENT
Start: 2023-06-25 | End: 2023-06-25

## 2023-06-24 RX ADMIN — FUROSEMIDE 40 MG: 10 INJECTION, SOLUTION INTRAMUSCULAR; INTRAVENOUS at 10:06

## 2023-06-24 RX ADMIN — MAGNESIUM SULFATE HEPTAHYDRATE 2 G: 40 INJECTION, SOLUTION INTRAVENOUS at 11:06

## 2023-06-24 RX ADMIN — HYDRALAZINE HYDROCHLORIDE 10 MG: 20 INJECTION INTRAMUSCULAR; INTRAVENOUS at 10:06

## 2023-06-25 ENCOUNTER — PATIENT MESSAGE (OUTPATIENT)
Dept: CARDIOLOGY | Facility: CLINIC | Age: 73
End: 2023-06-25
Payer: MEDICARE

## 2023-06-25 ENCOUNTER — CLINICAL SUPPORT (OUTPATIENT)
Dept: CARDIOLOGY | Facility: HOSPITAL | Age: 73
DRG: 189 | End: 2023-06-25
Attending: INTERNAL MEDICINE
Payer: MEDICARE

## 2023-06-25 ENCOUNTER — PATIENT MESSAGE (OUTPATIENT)
Dept: TRANSPLANT | Facility: CLINIC | Age: 73
End: 2023-06-25
Payer: MEDICARE

## 2023-06-25 VITALS — HEIGHT: 62 IN | BODY MASS INDEX: 36.44 KG/M2 | WEIGHT: 198 LBS

## 2023-06-25 PROBLEM — I50.9 CHF EXACERBATION: Status: ACTIVE | Noted: 2023-06-25

## 2023-06-25 PROBLEM — J96.02 ACUTE RESPIRATORY FAILURE WITH HYPOXIA AND HYPERCARBIA: Status: ACTIVE | Noted: 2023-06-25

## 2023-06-25 PROBLEM — J96.01 ACUTE RESPIRATORY FAILURE WITH HYPOXIA AND HYPERCARBIA: Status: ACTIVE | Noted: 2023-06-25

## 2023-06-25 LAB
ANION GAP SERPL CALC-SCNC: 7 MMOL/L (ref 8–16)
AORTIC ROOT ANNULUS: 3 CM
AORTIC VALVE CUSP SEPERATION: 1.1 CM
ASCENDING AORTA: 2.9 CM
AV INDEX (PROSTH): 0.41
AV MEAN GRADIENT: 33 MMHG
AV PEAK GRADIENT: 58 MMHG
AV REGURGITATION PRESSURE HALF TIME: 304 MS
AV VALVE AREA: 1.71 CM2
AV VELOCITY RATIO: 0.39
BSA FOR ECHO PROCEDURE: 1.98 M2
BUN SERPL-MCNC: 41 MG/DL (ref 8–23)
CALCIUM SERPL-MCNC: 9.1 MG/DL (ref 8.7–10.5)
CHLORIDE SERPL-SCNC: 107 MMOL/L (ref 95–110)
CO2 SERPL-SCNC: 27 MMOL/L (ref 23–29)
CREAT SERPL-MCNC: 1.6 MG/DL (ref 0.5–1.4)
CRP SERPL-MCNC: 3.36 MG/DL
CV ECHO LV RWT: 0.39 CM
DOP CALC AO PEAK VEL: 3.8 M/S
DOP CALC AO VTI: 97.1 CM
DOP CALC LVOT AREA: 4.2 CM2
DOP CALC LVOT DIAMETER: 2.3 CM
DOP CALC LVOT PEAK VEL: 1.49 M/S
DOP CALC LVOT STROKE VOLUME: 165.69 CM3
DOP CALC MV VTI: 38.7 CM
DOP CALCLVOT PEAK VEL VTI: 39.9 CM
E WAVE DECELERATION TIME: 106 MSEC
E/A RATIO: 1.1
E/E' RATIO: 19.33 M/S
ECHO LV POSTERIOR WALL: 1.1 CM (ref 0.6–1.1)
EJECTION FRACTION: 68 %
ERYTHROCYTE [DISTWIDTH] IN BLOOD BY AUTOMATED COUNT: 14.7 % (ref 11.5–14.5)
ERYTHROCYTE [SEDIMENTATION RATE] IN BLOOD BY WESTERGREN METHOD: 50 MM/HR (ref 0–20)
EST. GFR  (NO RACE VARIABLE): 33.4 ML/MIN/1.73 M^2
FERRITIN SERPL-MCNC: 165 NG/ML (ref 20–300)
FRACTIONAL SHORTENING: 39 % (ref 28–44)
GLUCOSE SERPL-MCNC: 204 MG/DL (ref 70–110)
HCT VFR BLD AUTO: 27.1 % (ref 37–48.5)
HGB BLD-MCNC: 8.1 G/DL (ref 12–16)
INTERVENTRICULAR SEPTUM: 1.13 CM (ref 0.6–1.1)
IRON SERPL-MCNC: 23 UG/DL (ref 30–160)
IVRT: 63 MSEC
LEFT ATRIUM VOLUME INDEX MOD: 31.1 ML/M2
LEFT ATRIUM VOLUME MOD: 59.1 CM3
LEFT INTERNAL DIMENSION IN SYSTOLE: 3.44 CM (ref 2.1–4)
LEFT VENTRICLE DIASTOLIC VOLUME INDEX: 81.21 ML/M2
LEFT VENTRICLE DIASTOLIC VOLUME: 154.3 ML
LEFT VENTRICLE MASS INDEX: 134 G/M2
LEFT VENTRICLE SYSTOLIC VOLUME INDEX: 25.7 ML/M2
LEFT VENTRICLE SYSTOLIC VOLUME: 48.79 ML
LEFT VENTRICULAR INTERNAL DIMENSION IN DIASTOLE: 5.61 CM (ref 3.5–6)
LEFT VENTRICULAR MASS: 254.64 G
LV LATERAL E/E' RATIO: 16.11 M/S
LV SEPTAL E/E' RATIO: 24.17 M/S
LVOT MG: 6 MMHG
LVOT MV: 1.1 CM/S
MAGNESIUM SERPL-MCNC: 1.8 MG/DL (ref 1.6–2.6)
MCH RBC QN AUTO: 28.7 PG (ref 27–31)
MCHC RBC AUTO-ENTMCNC: 29.9 G/DL (ref 32–36)
MCV RBC AUTO: 96 FL (ref 82–98)
MV MEAN GRADIENT: 4 MMHG
MV PEAK A VEL: 1.32 M/S
MV PEAK E VEL: 1.45 M/S
MV PEAK GRADIENT: 10 MMHG
MV STENOSIS PRESSURE HALF TIME: 45 MS
MV VALVE AREA BY CONTINUITY EQUATION: 4.28 CM2
MV VALVE AREA P 1/2 METHOD: 4.89 CM2
PISA AR MAX VEL: 3.39 M/S
PISA MRMAX VEL: 3.69 M/S
PISA TR MAX VEL: 4.02 M/S
PLATELET # BLD AUTO: 215 K/UL (ref 150–450)
PMV BLD AUTO: 10.1 FL (ref 9.2–12.9)
POTASSIUM SERPL-SCNC: 4.2 MMOL/L (ref 3.5–5.1)
PROCALCITONIN SERPL IA-MCNC: 0.22 NG/ML (ref 0–0.5)
PV MV: 0.93 M/S
PV PEAK VELOCITY: 1.63 CM/S
RA MAJOR: 5.54 CM
RA PRESSURE: 15 MMHG
RA WIDTH: 3.16 CM
RBC # BLD AUTO: 2.82 M/UL (ref 4–5.4)
RIGHT VENTRICULAR END-DIASTOLIC DIMENSION: 3.39 CM
RIGHT VENTRICULAR LENGTH IN DIASTOLE (APICAL 4-CHAMBER VIEW): 6.25 CM
RV TISSUE DOPPLER FREE WALL SYSTOLIC VELOCITY 1 (APICAL 4 CHAMBER VIEW): 14 CM/S
SATURATED IRON: 9 % (ref 20–50)
SINUS: 2.85 CM
SODIUM SERPL-SCNC: 141 MMOL/L (ref 136–145)
STJ: 2.55 CM
TDI LATERAL: 0.09 M/S
TDI SEPTAL: 0.06 M/S
TDI: 0.08 M/S
TOTAL IRON BINDING CAPACITY: 267 UG/DL (ref 250–450)
TR MAX PG: 65 MMHG
TR MEAN GRADIENT: 41 MMHG
TRANSFERRIN SERPL-MCNC: 191 MG/DL (ref 200–375)
TRICUSPID ANNULAR PLANE SYSTOLIC EXCURSION: 3.72 CM
TROPONIN I SERPL HS-MCNC: 21.6 PG/ML (ref 0–14.9)
TV REST PULMONARY ARTERY PRESSURE: 80 MMHG
WBC # BLD AUTO: 9.6 K/UL (ref 3.9–12.7)

## 2023-06-25 PROCEDURE — 99900031 HC PATIENT EDUCATION (STAT)

## 2023-06-25 PROCEDURE — 25000003 PHARM REV CODE 250: Performed by: INTERNAL MEDICINE

## 2023-06-25 PROCEDURE — 84145 PROCALCITONIN (PCT): CPT | Performed by: INTERNAL MEDICINE

## 2023-06-25 PROCEDURE — 93306 TTE W/DOPPLER COMPLETE: CPT | Mod: 26,,, | Performed by: SPECIALIST

## 2023-06-25 PROCEDURE — 99223 PR INITIAL HOSPITAL CARE,LEVL III: ICD-10-PCS | Mod: ,,, | Performed by: INTERNAL MEDICINE

## 2023-06-25 PROCEDURE — 94640 AIRWAY INHALATION TREATMENT: CPT

## 2023-06-25 PROCEDURE — 99223 1ST HOSP IP/OBS HIGH 75: CPT | Mod: ,,, | Performed by: INTERNAL MEDICINE

## 2023-06-25 PROCEDURE — 25000242 PHARM REV CODE 250 ALT 637 W/ HCPCS: Performed by: INTERNAL MEDICINE

## 2023-06-25 PROCEDURE — 93306 ECHO (CUPID ONLY): ICD-10-PCS | Mod: 26,,, | Performed by: SPECIALIST

## 2023-06-25 PROCEDURE — 63600175 PHARM REV CODE 636 W HCPCS: Performed by: INTERNAL MEDICINE

## 2023-06-25 PROCEDURE — 36415 COLL VENOUS BLD VENIPUNCTURE: CPT | Performed by: INTERNAL MEDICINE

## 2023-06-25 PROCEDURE — 94660 CPAP INITIATION&MGMT: CPT

## 2023-06-25 PROCEDURE — 80048 BASIC METABOLIC PNL TOTAL CA: CPT | Performed by: INTERNAL MEDICINE

## 2023-06-25 PROCEDURE — 82728 ASSAY OF FERRITIN: CPT | Performed by: INTERNAL MEDICINE

## 2023-06-25 PROCEDURE — 99900035 HC TECH TIME PER 15 MIN (STAT)

## 2023-06-25 PROCEDURE — 93306 TTE W/DOPPLER COMPLETE: CPT

## 2023-06-25 PROCEDURE — 86140 C-REACTIVE PROTEIN: CPT | Performed by: INTERNAL MEDICINE

## 2023-06-25 PROCEDURE — 94799 UNLISTED PULMONARY SVC/PX: CPT

## 2023-06-25 PROCEDURE — 83735 ASSAY OF MAGNESIUM: CPT | Performed by: INTERNAL MEDICINE

## 2023-06-25 PROCEDURE — 94761 N-INVAS EAR/PLS OXIMETRY MLT: CPT

## 2023-06-25 PROCEDURE — 27000221 HC OXYGEN, UP TO 24 HOURS

## 2023-06-25 PROCEDURE — 85651 RBC SED RATE NONAUTOMATED: CPT | Performed by: INTERNAL MEDICINE

## 2023-06-25 PROCEDURE — 85027 COMPLETE CBC AUTOMATED: CPT | Performed by: INTERNAL MEDICINE

## 2023-06-25 PROCEDURE — 84484 ASSAY OF TROPONIN QUANT: CPT | Performed by: INTERNAL MEDICINE

## 2023-06-25 PROCEDURE — 84466 ASSAY OF TRANSFERRIN: CPT | Performed by: INTERNAL MEDICINE

## 2023-06-25 PROCEDURE — 20000000 HC ICU ROOM

## 2023-06-25 RX ORDER — CLONAZEPAM 0.5 MG/1
0.5 TABLET ORAL 2 TIMES DAILY PRN
Status: DISCONTINUED | OUTPATIENT
Start: 2023-06-25 | End: 2023-06-28

## 2023-06-25 RX ORDER — SODIUM CHLORIDE 0.9 % (FLUSH) 0.9 %
10 SYRINGE (ML) INJECTION EVERY 12 HOURS PRN
Status: DISCONTINUED | OUTPATIENT
Start: 2023-06-25 | End: 2023-06-30 | Stop reason: HOSPADM

## 2023-06-25 RX ORDER — ISOSORBIDE MONONITRATE 60 MG/1
60 TABLET, EXTENDED RELEASE ORAL DAILY
Status: DISCONTINUED | OUTPATIENT
Start: 2023-06-25 | End: 2023-06-30 | Stop reason: HOSPADM

## 2023-06-25 RX ORDER — ALBUTEROL SULFATE 90 UG/1
1 AEROSOL, METERED RESPIRATORY (INHALATION) EVERY 4 HOURS PRN
Status: DISCONTINUED | OUTPATIENT
Start: 2023-06-25 | End: 2023-06-25

## 2023-06-25 RX ORDER — FUROSEMIDE 10 MG/ML
40 INJECTION INTRAMUSCULAR; INTRAVENOUS
Status: DISCONTINUED | OUTPATIENT
Start: 2023-06-25 | End: 2023-06-26

## 2023-06-25 RX ORDER — QUETIAPINE FUMARATE 100 MG/1
100 TABLET, FILM COATED ORAL NIGHTLY
Status: DISCONTINUED | OUTPATIENT
Start: 2023-06-25 | End: 2023-06-30 | Stop reason: HOSPADM

## 2023-06-25 RX ORDER — HEPARIN SODIUM 5000 [USP'U]/ML
5000 INJECTION, SOLUTION INTRAVENOUS; SUBCUTANEOUS EVERY 8 HOURS
Status: DISCONTINUED | OUTPATIENT
Start: 2023-06-25 | End: 2023-06-27

## 2023-06-25 RX ORDER — IBUPROFEN 200 MG
16 TABLET ORAL
Status: DISCONTINUED | OUTPATIENT
Start: 2023-06-25 | End: 2023-06-30 | Stop reason: HOSPADM

## 2023-06-25 RX ORDER — NALOXONE HCL 0.4 MG/ML
0.02 VIAL (ML) INJECTION
Status: DISCONTINUED | OUTPATIENT
Start: 2023-06-25 | End: 2023-06-30 | Stop reason: HOSPADM

## 2023-06-25 RX ORDER — PANTOPRAZOLE SODIUM 40 MG/1
40 TABLET, DELAYED RELEASE ORAL
Status: DISCONTINUED | OUTPATIENT
Start: 2023-06-25 | End: 2023-06-30 | Stop reason: HOSPADM

## 2023-06-25 RX ORDER — GLUCAGON 1 MG
1 KIT INJECTION
Status: DISCONTINUED | OUTPATIENT
Start: 2023-06-25 | End: 2023-06-30 | Stop reason: HOSPADM

## 2023-06-25 RX ORDER — HYDRALAZINE HYDROCHLORIDE 25 MG/1
50 TABLET, FILM COATED ORAL EVERY 8 HOURS
Status: DISCONTINUED | OUTPATIENT
Start: 2023-06-25 | End: 2023-06-30 | Stop reason: HOSPADM

## 2023-06-25 RX ORDER — MAGNESIUM SULFATE HEPTAHYDRATE 40 MG/ML
4 INJECTION, SOLUTION INTRAVENOUS
Status: DISCONTINUED | OUTPATIENT
Start: 2023-06-25 | End: 2023-06-30 | Stop reason: HOSPADM

## 2023-06-25 RX ORDER — METOPROLOL SUCCINATE 50 MG/1
50 TABLET, EXTENDED RELEASE ORAL DAILY
Status: DISCONTINUED | OUTPATIENT
Start: 2023-06-25 | End: 2023-06-30 | Stop reason: HOSPADM

## 2023-06-25 RX ORDER — ATORVASTATIN CALCIUM 40 MG/1
40 TABLET, FILM COATED ORAL DAILY
Status: DISCONTINUED | OUTPATIENT
Start: 2023-06-25 | End: 2023-06-29

## 2023-06-25 RX ORDER — MAGNESIUM SULFATE HEPTAHYDRATE 40 MG/ML
2 INJECTION, SOLUTION INTRAVENOUS
Status: DISCONTINUED | OUTPATIENT
Start: 2023-06-25 | End: 2023-06-30 | Stop reason: HOSPADM

## 2023-06-25 RX ORDER — AMLODIPINE BESYLATE 5 MG/1
10 TABLET ORAL DAILY
Status: DISCONTINUED | OUTPATIENT
Start: 2023-06-25 | End: 2023-06-30 | Stop reason: HOSPADM

## 2023-06-25 RX ORDER — IPRATROPIUM BROMIDE AND ALBUTEROL SULFATE 2.5; .5 MG/3ML; MG/3ML
3 SOLUTION RESPIRATORY (INHALATION) EVERY 6 HOURS
Status: DISCONTINUED | OUTPATIENT
Start: 2023-06-25 | End: 2023-06-30 | Stop reason: HOSPADM

## 2023-06-25 RX ORDER — COLCHICINE 0.6 MG/1
0.6 TABLET, FILM COATED ORAL DAILY PRN
Status: DISCONTINUED | OUTPATIENT
Start: 2023-06-25 | End: 2023-06-30 | Stop reason: HOSPADM

## 2023-06-25 RX ORDER — HYDRALAZINE HYDROCHLORIDE 20 MG/ML
10 INJECTION INTRAMUSCULAR; INTRAVENOUS EVERY 4 HOURS PRN
Status: DISCONTINUED | OUTPATIENT
Start: 2023-06-25 | End: 2023-06-30 | Stop reason: HOSPADM

## 2023-06-25 RX ORDER — CLOPIDOGREL BISULFATE 75 MG/1
75 TABLET ORAL DAILY
Status: DISCONTINUED | OUTPATIENT
Start: 2023-06-25 | End: 2023-06-30 | Stop reason: HOSPADM

## 2023-06-25 RX ORDER — ALBUTEROL SULFATE 0.83 MG/ML
2.5 SOLUTION RESPIRATORY (INHALATION) EVERY 4 HOURS PRN
Status: DISCONTINUED | OUTPATIENT
Start: 2023-06-25 | End: 2023-06-30 | Stop reason: HOSPADM

## 2023-06-25 RX ORDER — IBUPROFEN 200 MG
24 TABLET ORAL
Status: DISCONTINUED | OUTPATIENT
Start: 2023-06-25 | End: 2023-06-30 | Stop reason: HOSPADM

## 2023-06-25 RX ORDER — ACETAMINOPHEN 325 MG/1
650 TABLET ORAL EVERY 6 HOURS PRN
Status: DISCONTINUED | OUTPATIENT
Start: 2023-06-25 | End: 2023-06-30 | Stop reason: HOSPADM

## 2023-06-25 RX ADMIN — FUROSEMIDE 40 MG: 20 INJECTION, SOLUTION INTRAMUSCULAR; INTRAVENOUS at 11:06

## 2023-06-25 RX ADMIN — HYDRALAZINE HYDROCHLORIDE 50 MG: 25 TABLET ORAL at 09:06

## 2023-06-25 RX ADMIN — HEPARIN SODIUM 5000 UNITS: 5000 INJECTION INTRAVENOUS; SUBCUTANEOUS at 01:06

## 2023-06-25 RX ADMIN — AMLODIPINE BESYLATE 10 MG: 5 TABLET ORAL at 09:06

## 2023-06-25 RX ADMIN — HYDRALAZINE HYDROCHLORIDE 50 MG: 25 TABLET ORAL at 01:06

## 2023-06-25 RX ADMIN — METOPROLOL SUCCINATE 50 MG: 50 TABLET, FILM COATED, EXTENDED RELEASE ORAL at 09:06

## 2023-06-25 RX ADMIN — METHYLPREDNISOLONE SODIUM SUCCINATE 40 MG: 40 INJECTION, POWDER, FOR SOLUTION INTRAMUSCULAR; INTRAVENOUS at 02:06

## 2023-06-25 RX ADMIN — IPRATROPIUM BROMIDE AND ALBUTEROL SULFATE 3 ML: 2.5; .5 SOLUTION RESPIRATORY (INHALATION) at 12:06

## 2023-06-25 RX ADMIN — HEPARIN SODIUM 5000 UNITS: 5000 INJECTION INTRAVENOUS; SUBCUTANEOUS at 09:06

## 2023-06-25 RX ADMIN — IPRATROPIUM BROMIDE AND ALBUTEROL SULFATE 3 ML: 2.5; .5 SOLUTION RESPIRATORY (INHALATION) at 07:06

## 2023-06-25 RX ADMIN — ATORVASTATIN CALCIUM 40 MG: 40 TABLET, FILM COATED ORAL at 09:06

## 2023-06-25 RX ADMIN — PAROXETINE 50 MG: 20 TABLET, FILM COATED ORAL at 06:06

## 2023-06-25 RX ADMIN — HYDRALAZINE HYDROCHLORIDE 10 MG: 20 INJECTION INTRAMUSCULAR; INTRAVENOUS at 06:06

## 2023-06-25 RX ADMIN — CLOPIDOGREL BISULFATE 75 MG: 75 TABLET, FILM COATED ORAL at 09:06

## 2023-06-25 RX ADMIN — MAGNESIUM SULFATE HEPTAHYDRATE 2 G: 40 INJECTION, SOLUTION INTRAVENOUS at 06:06

## 2023-06-25 RX ADMIN — QUETIAPINE 100 MG: 100 TABLET ORAL at 01:06

## 2023-06-25 RX ADMIN — METHYLPREDNISOLONE SODIUM SUCCINATE 40 MG: 40 INJECTION, POWDER, FOR SOLUTION INTRAMUSCULAR; INTRAVENOUS at 01:06

## 2023-06-25 RX ADMIN — HEPARIN SODIUM 5000 UNITS: 5000 INJECTION INTRAVENOUS; SUBCUTANEOUS at 06:06

## 2023-06-25 RX ADMIN — HYDRALAZINE HYDROCHLORIDE 50 MG: 25 TABLET ORAL at 06:06

## 2023-06-25 RX ADMIN — HYDRALAZINE HYDROCHLORIDE 10 MG: 20 INJECTION INTRAMUSCULAR; INTRAVENOUS at 01:06

## 2023-06-25 RX ADMIN — IPRATROPIUM BROMIDE AND ALBUTEROL SULFATE 3 ML: 2.5; .5 SOLUTION RESPIRATORY (INHALATION) at 02:06

## 2023-06-25 RX ADMIN — ISOSORBIDE MONONITRATE 60 MG: 60 TABLET, EXTENDED RELEASE ORAL at 09:06

## 2023-06-25 RX ADMIN — IPRATROPIUM BROMIDE AND ALBUTEROL SULFATE 3 ML: 2.5; .5 SOLUTION RESPIRATORY (INHALATION) at 08:06

## 2023-06-25 RX ADMIN — ACETAMINOPHEN 650 MG: 325 TABLET ORAL at 01:06

## 2023-06-25 RX ADMIN — QUETIAPINE 100 MG: 100 TABLET ORAL at 09:06

## 2023-06-25 RX ADMIN — PANTOPRAZOLE SODIUM 40 MG: 40 TABLET, DELAYED RELEASE ORAL at 06:06

## 2023-06-25 NOTE — CARE UPDATE
CO   06/25/23 0734   Patient Assessment/Suction   Level of Consciousness (AVPU) alert   Respiratory Effort Normal;Unlabored   Expansion/Accessory Muscles/Retractions no use of accessory muscles   All Lung Fields Breath Sounds coarse;crackles   JOAQUÍN Breath Sounds coarse   LLL Breath Sounds diminished   RUL Breath Sounds crackles, fine   RML Breath Sounds crackles, fine   RLL Breath Sounds crackles, fine   Rhythm/Pattern, Respiratory assisted mechanically   Cough Frequency no cough   Skin Integrity   $ Wound Care Tech Time 15 min   Area Observed Bridge of nose   Skin Appearance without discoloration   Barrier used? Gel Cushion   PRE-TX-O2   Device (Oxygen Therapy) BIPAP   $ Is the patient on Low Flow Oxygen? Yes   Oxygen Concentration (%) 60   SpO2 96 %   Pulse Oximetry Type Continuous   $ Pulse Oximetry - Multiple Charge Pulse Oximetry - Multiple   Pulse 74   Resp 18   Aerosol Therapy   $ Aerosol Therapy Charges Aerosol Treatment   Daily Review of Necessity (SVN) completed   Respiratory Treatment Status (SVN) given   Treatment Route (SVN) in-line   Patient Position (SVN) HOB elevated   Post Treatment Assessment (SVN) breath sounds unchanged   Signs of Intolerance (SVN) none   Breath Sounds Post-Respiratory Treatment   Throughout All Fields Post-Treatment All Fields   Throughout All Fields Post-Treatment no change   Post-treatment Heart Rate (beats/min) 78   Post-treatment Resp Rate (breaths/min) 24   Ready to Wean/Extubation Screen   FIO2<=50 (chart decimal) (!) 0.6   Preset CPAP/BiPAP Settings   Mode Of Delivery BiPAP S/T   $ CPAP/BiPAP Daily Charge BiPAP/CPAP Daily   $ Is patient using? Yes   Equipment Type V60   Ipap 18   EPAP (cm H2O) 8   Pressure Support (cm H2O) 10   Set Rate (Breaths/Min) 10   Patient CPAP/BiPAP Settings   RR Total (Breaths/Min) 22   Tidal Volume (mL) 740   VE Minute Ventilation (L/min) 17.8 L/min   Peak Inspiratory Pressure (cm H2O) 18   TiTOT (%) 22   Total Leak (L/Min) 30   Patient Trigger -  ST Mode Only (%) 98   CPAP/BiPAP Alarms   High Pressure (cm H2O) 40   Low Pressure (cm H2O) 10   Minute Ventilation (L/Min) 3   High RR (breaths/min) 40   Low RR (breaths/min) 10   Apnea (Sec) 20   Education   $ Education Bronchodilator;15 min   Respiratory Evaluation   $ Care Plan Tech Time 15 min   $ Eval/Re-eval Charges Re-evaluation   NTINUE BIPAP AND TX

## 2023-06-25 NOTE — SUBJECTIVE & OBJECTIVE
Interval History:     As per subjective     Review of Systems  Objective:     Vital Signs (Most Recent):  Temp: 97.4 °F (36.3 °C) (06/25/23 1501)  Pulse: 73 (06/25/23 1501)  Resp: 17 (06/25/23 1501)  BP: (!) 141/61 (06/25/23 1501)  SpO2: 98 % (06/25/23 1501) Vital Signs (24h Range):  Temp:  [97.2 °F (36.2 °C)-99.1 °F (37.3 °C)] 97.4 °F (36.3 °C)  Pulse:  [71-85] 73  Resp:  [13-29] 17  SpO2:  [89 %-98 %] 98 %  BP: (134-202)/(58-83) 141/61     Weight: 89.9 kg (198 lb 3.1 oz)  Body mass index is 36.25 kg/m².    Intake/Output Summary (Last 24 hours) at 6/25/2023 1529  Last data filed at 6/25/2023 1307  Gross per 24 hour   Intake 120 ml   Output 575 ml   Net -455 ml         Physical Exam  Vitals and nursing note reviewed.   HENT:      Head: Normocephalic and atraumatic.   Eyes:      Conjunctiva/sclera: Conjunctivae normal.   Neck:      Vascular: No JVD.   Cardiovascular:      Rate and Rhythm: Normal rate.      Heart sounds: Normal heart sounds.   Pulmonary:      Effort: Pulmonary effort is normal.      Comments: Decreased air entry   Abdominal:      Palpations: Abdomen is soft.   Musculoskeletal:         General: Normal range of motion.   Skin:     General: Skin is warm.   Neurological:      Mental Status: She is alert and oriented to person, place, and time.   Psychiatric:         Mood and Affect: Mood normal.           Significant Labs: All pertinent labs within the past 24 hours have been reviewed.  CBC:   Recent Labs   Lab 06/24/23 2227 06/24/23 2243 06/25/23  0430   WBC 11.39  --  9.60   HGB 8.8*  --  8.1*   HCT 29.1* 28* 27.1*     --  215     CMP:   Recent Labs   Lab 06/24/23 2227 06/25/23  0430    141   K 4.9 4.2    107   CO2 26 27   * 204*   BUN 36* 41*   CREATININE 1.4 1.6*   CALCIUM 9.0 9.1   PROT 7.2  --    ALBUMIN 3.3*  --    BILITOT 1.0  --    ALKPHOS 126  --    AST 14  --    ALT 13  --    ANIONGAP 8 7*       Significant Imaging: I have reviewed all pertinent imaging  results/findings within the past 24 hours.

## 2023-06-25 NOTE — PROGRESS NOTES
[facility] Medicine  Progress Note    Patient Name: Betty Bacon  MRN: [id number]  Patient Class: IP- Inpatient   Admission Date: 6/24/2023  Length of Stay: 0 days  Attending Physician: Zaki Awad MD  Primary Care Provider: Johanne Callejas MD        Subjective:     Principal Problem:Acute respiratory failure with hypoxia and hypercarbia        HPI:  Patient is a 74 yo female with hx of     6/12/23-Heart Cath--> Patient was evaluated by the heart team and deemed cohort C for TAVR, very frail to survive. She was offered valvuloplasty as a bridge to TAVR          Last Echo 6/8/23 Summary   The left ventricle is normal in size with eccentric hypertrophy and normal systolic function. The estimated ejection fraction is 55%.   Normal right ventricular size with normal right ventricular systolic function.   Grade I left ventricular diastolic dysfunction.   Moderate left atrial enlargement.   There is severe aortic valve stenosis.   Aortic valve area is 0.75 cm2; peak velocity is 4.12 m/s; mean gradient is 44 mmHg.   Mild aortic regurgitation.   The estimated PA systolic pressure is 37 mmHg.   Normal central venous pressure (3 mmHg).        Overview/Hospital Course:  Patient seen and examined, on BiPAP and ABG noted and stable  Discussed with family members, she had aortic balloon valvuloplasty.  Her Lasix was stopped by her cardiologist after the procedure for unsure reason. Systolic  Murmur noted  Repeat ECHO ordered      Interval History:     As per subjective     Review of Systems  Objective:     Vital Signs (Most Recent):  Temp: 97.4 °F (36.3 °C) (06/25/23 1501)  Pulse: 73 (06/25/23 1501)  Resp: 17 (06/25/23 1501)  BP: (!) 141/61 (06/25/23 1501)  SpO2: 98 % (06/25/23 1501) Vital Signs (24h Range):  Temp:  [97.2 °F (36.2 °C)-99.1 °F (37.3 °C)] 97.4 °F (36.3 °C)  Pulse:  [71-85] 73  Resp:  [13-29] 17  SpO2:  [89 %-98 %] 98 %  BP: (134-202)/(58-83) 141/61     Weight: 89.9 kg (198  lb 3.1 oz)  Body mass index is 36.25 kg/m².    Intake/Output Summary (Last 24 hours) at 6/25/2023 1529  Last data filed at 6/25/2023 1307  Gross per 24 hour   Intake 120 ml   Output 575 ml   Net -455 ml         Physical Exam  Vitals and nursing note reviewed.   HENT:      Head: Normocephalic and atraumatic.   Eyes:      Conjunctiva/sclera: Conjunctivae normal.   Neck:      Vascular: No JVD.   Cardiovascular:      Rate and Rhythm: Normal rate.      Heart sounds: Normal heart sounds.   Pulmonary:      Effort: Pulmonary effort is normal.      Comments: Decreased air entry   Abdominal:      Palpations: Abdomen is soft.   Musculoskeletal:         General: Normal range of motion.   Skin:     General: Skin is warm.   Neurological:      Mental Status: She is alert and oriented to person, place, and time.   Psychiatric:         Mood and Affect: Mood normal.           Significant Labs: All pertinent labs within the past 24 hours have been reviewed.  CBC:   Recent Labs   Lab 06/24/23 2227 06/24/23 2243 06/25/23  0430   WBC 11.39  --  9.60   HGB 8.8*  --  8.1*   HCT 29.1* 28* 27.1*     --  215     CMP:   Recent Labs   Lab 06/24/23 2227 06/25/23  0430    141   K 4.9 4.2    107   CO2 26 27   * 204*   BUN 36* 41*   CREATININE 1.4 1.6*   CALCIUM 9.0 9.1   PROT 7.2  --    ALBUMIN 3.3*  --    BILITOT 1.0  --    ALKPHOS 126  --    AST 14  --    ALT 13  --    ANIONGAP 8 7*       Significant Imaging: I have reviewed all pertinent imaging results/findings within the past 24 hours.      Assessment/Plan:      Active Hospital Problems    Diagnosis    *Acute respiratory failure with hypoxia and hypercarbia    CHF exacerbation    Uncontrolled hypertension    CKD (chronic kidney disease) stage 3, GFR 30-59 ml/min     ASSESSMENT AND PLAN       #Acute hypoxemic and hypercapnic respiratory failure     #Shortness of breaths secondary to interstitial lung disease, acute HFpEF, mild-moderate TR and AR,  pHTN     #Aortic stenosis status post valvuloplasty ( Not suitable for TAVR)     # uncontrolled hypertension     # progressive anemia on Plavix    PLAN         Continue BiPAP and ABG PRN    Continue IV Lasix 40 mg q.12 hours diuresis    Solu-Medrol IV 40 mg q.12 hours,   CT chest noted , negative PE    Continue PTA medications as ordered   Heparin subQ & protonix prophylaxis           VTE Risk Mitigation (From admission, onward)         Ordered     heparin (porcine) injection 5,000 Units  Every 8 hours         06/25/23 0022     IP VTE HIGH RISK PATIENT  Once         06/25/23 0022     Place sequential compression device  Until discontinued         06/25/23 0022                Discharge Planning   BOB:      Code Status: Full Code   Is the patient medically ready for discharge?:     Reason for patient still in hospital (select all that apply): Treatment  Discharge Plan A: Home Health                  Zaki Awad MD  Department of Hospital Medicine   [facility]

## 2023-06-25 NOTE — HPI
Patient is a 74 yo female with hx of     6/12/23-Heart Cath--> Patient was evaluated by the heart team and deemed cohort C for TAVR, very frail to survive. She was offered valvuloplasty as a bridge to TAVR          Last Echo 6/8/23 Summary  The left ventricle is normal in size with eccentric hypertrophy and normal systolic function. The estimated ejection fraction is 55%.  Normal right ventricular size with normal right ventricular systolic function.  Grade I left ventricular diastolic dysfunction.  Moderate left atrial enlargement.  There is severe aortic valve stenosis.  Aortic valve area is 0.75 cm2; peak velocity is 4.12 m/s; mean gradient is 44 mmHg.  Mild aortic regurgitation.  The estimated PA systolic pressure is 37 mmHg.  Normal central venous pressure (3 mmHg).

## 2023-06-25 NOTE — PLAN OF CARE
[facility]  Initial Discharge Assessment       Primary Care Provider: Johanne Callejas MD    Admission Diagnosis: Congestive heart failure, unspecified HF chronicity, unspecified heart failure type [I50.9]    Admission Date: 6/24/2023  Expected Discharge Date: TBD    Assessment completed with Gibson Bacon Son   [phone]. Patient's demographic information was verified with her son. See DME below. Patient will resume home health with [facility] upon discharge.    Transition of Care Barriers: None    Payor: MEDICARE / Plan: MEDICARE PART A & B / Product Type: Government /     Extended Emergency Contact Information  Primary Emergency Contact: Loree Sanchez  Address: [address] [place], [place] [address] [address] [place]  Mobile Phone: [phone]  Relation: Daughter  Preferred language: English   needed? No  Secondary Emergency Contact: Gibson Bacon  Mobile Phone: [phone]  Relation: Son  Preferred language: English   needed? No    Discharge Plan A: Home Health  Discharge Plan B: Home Health      [id number] - [place], [place] - [address] [facility]  [address]  [place] [address]  Phone: [phone] Fax: [phone]    [facility]  [address]  [facility] [address]  Phone: [phone] Fax: [phone]    [facility] - [place] - [address]  [address]  [place] [address]  Phone: [phone] Fax: [phone]      Initial Assessment (most recent)       Adult Discharge Assessment - 06/25/23 1219          Discharge Assessment    Assessment Type Discharge Planning Assessment     Confirmed/corrected address, phone number and insurance Yes     Confirmed Demographics Correct on Facesheet     Source of Information family     If unable to respond/provide information was family/caregiver contacted? Yes     Contact Name/Number Gibson Bacon Son    [phone]     Communicated BOB with patient/caregiver Date not available/Unable to determine     Reason For Admission Acute respiratory failure with hypoxia and hypercarbia     People in Home child(donna), adult     Facility Arrived From: home     Do you expect to return to your current living situation? Yes     Do you have help at home or someone to help you manage your care at home? Yes     Who are your caregiver(s) and their phone number(s)? Loree Sanchez (daughter) [phone]     Prior to hospitilization cognitive status: Alert/Oriented     Current cognitive status: Unable to Assess     Walking or Climbing Stairs ambulation difficulty, requires equipment     Dressing/Bathing bathing difficulty, requires equipment     Home Accessibility wheelchair accessible     Home Layout Able to live on 1st floor     Equipment Currently Used at Home walker, rolling;bath bench;oxygen     Readmission within 30 days? Yes     Patient currently being followed by outpatient case management? No     Do you currently have service(s) that help you manage your care at home? Yes     Name and Contact number of agency [facility] ([phone])     Is the pt/caregiver preference to resume services with current agency Yes     Do you take prescription medications? Yes     Do you have prescription coverage? Yes     Coverage Payor:  MEDICARE - MEDICARE PART A & B     Do you have any problems affording any of your prescribed medications? No     Is the patient taking medications as prescribed? yes     Who is going to help you get home at discharge? Loree Sanchez (daughter) [phone]     How do you get to doctors appointments? family or friend will provide     Are you on dialysis? No     Do you take coumadin? No     Discharge Plan A Home Health     Discharge Plan B Home Health     DME Needed Upon Discharge  none     Discharge Plan discussed with: Adult children     Transition of Care Barriers None

## 2023-06-25 NOTE — ED PROVIDER NOTES
Encounter Date: 6/24/2023       History     Chief Complaint   Patient presents with    Shortness of Breath     Sob that started last night. Worsening through day. Cough, chills, nausea, increased urination without pain. Weakness. 2-3 weeks ago  had a balloon procedure for heart done.      Patient with a history of restrictive lung disease, as asbestosis, peripheral vascular disease, severe aortic stenosis presents complaining of shortness of breath.  Patient having worsening shortness of breath over the last 2-3 days.  Patient recently had a TAVR procedure done.  Since that time she is stopped her Lasix therapy.  Patient having worsening shortness of breath today.    Review of patient's allergies indicates:   Allergen Reactions    Propoxyphene n-acetaminophen Other (See Comments)     Other reaction(s): Unknown    Codeine Anxiety     Past Medical History:   Diagnosis Date    Acute coronary artery obstruction without MI 10/2012    Benign hypertension     COPD (chronic obstructive pulmonary disease)     Coronary artery disease     Disorder of kidney and ureter     Dr. Morrow    Hepatitis B core antibody positive 03/03/2021    Negative sAg, suggests previous exposure but no chronic/active Hep B. At risk for reactivation with any immunosuppression medication, steroids, chemo, etc.      History of electroconvulsive therapy     Hyperlipidemia LDL goal < 70     Left ankle sprain     Major depressive disorder, recurrent episode, severe     s/p ECT    Positive AKIRA (antinuclear antibody) 03/03/2021    PVD (peripheral vascular disease)      Past Surgical History:   Procedure Laterality Date    ANGIOGRAM, CORONARY, WITH LEFT HEART CATHETERIZATION N/A 5/1/2023    Procedure: Angiogram, Coronary, with Left Heart Cath;  Surgeon: Neeraj Finn MD;  Location: [facility] CATH LAB;  Service: Cardiology;  Laterality: N/A;    AORTIC VALVULOPLASTY N/A 6/13/2023    Procedure: REPAIR, AORTIC VALVE;  Surgeon: Neeraj Finn MD;   Location: [facility] CATH LAB;  Service: Cardiology;  Laterality: N/A;    CHOLECYSTECTOMY      CORONARY ANGIOPLASTY      CORONARY ANGIOPLASTY WITH STENT PLACEMENT  10/2012    2 stents RCA (100% stenosis)    EYE SURGERY      cataract surgery    HYSTERECTOMY      LINA, ovaries intact. uterine prolapse    ILIAC ARTERY STENT      SMALL BOWEL ENTEROSCOPY N/A 2023    Procedure: ENTEROSCOPY;  Surgeon: Margy Dumont MD;  Location: [facility] ENDO;  Service: Endoscopy;  Laterality: N/A;    TONSILLECTOMY      TOTAL VAGINAL HYSTERECTOMY       Family History   Problem Relation Age of Onset    Diabetes Mother     Heart disease Mother     Stroke Father     Heart disease Father     Heart disease Brother     Stroke Brother     Hypertension Daughter     Diabetes Maternal Aunt     Heart disease Maternal Aunt     Heart disease Maternal Uncle     Heart disease Paternal Aunt     Heart disease Paternal Uncle     Heart disease Maternal Grandfather     Diabetes Sister     Heart disease Sister     Cancer Sister         lung    Kidney disease Sister         mass, benign    Breast cancer Sister     Stroke Sister     Dementia Sister     Liver disease Sister     Melanoma Neg Hx     Psoriasis Neg Hx     Lupus Neg Hx     Eczema Neg Hx      Social History     Tobacco Use    Smoking status: Former     Packs/day: 2.00     Years: 40.00     Pack years: 80.00     Types: Cigarettes     Quit date: 2009     Years since quittin.5    Smokeless tobacco: Never   Substance Use Topics    Alcohol use: Yes     Alcohol/week: 1.0 standard drink     Types: 1 Shots of liquor per week     Comment: rare /occ    Drug use: No     Review of Systems   All other systems reviewed and are negative.    Physical Exam     Initial Vitals   BP Pulse Resp Temp SpO2   23 2203 23 2203 23 2203 23 2203 23 2200   (!) 202/83 83 (!) 29 99.1 °F (37.3 °C) (!) 94 %      MAP       --                Physical Exam    Nursing note and vitals  reviewed.  Constitutional: She appears well-developed. She appears distressed.   Pleasant, polite   HENT:   Head: Normocephalic and atraumatic.   Eyes: EOM are normal.   Neck: Neck supple.   Normal range of motion.  Cardiovascular:  Intact distal pulses.           Pulmonary/Chest: She is in respiratory distress. She has wheezes.   Abdominal: Abdomen is soft.   Musculoskeletal:         General: Normal range of motion.      Cervical back: Normal range of motion and neck supple.     Neurological: She is alert and oriented to person, place, and time.   Skin: Skin is warm and dry. Capillary refill takes less than 2 seconds.   Psychiatric: She has a normal mood and affect. Her behavior is normal. Judgment and thought content normal.       ED Course   Procedures  Labs Reviewed   MAGNESIUM - Abnormal; Notable for the following components:       Result Value    Magnesium 1.4 (*)     All other components within normal limits   CBC W/ AUTO DIFFERENTIAL - Abnormal; Notable for the following components:    RBC 3.07 (*)     Hemoglobin 8.8 (*)     Hematocrit 29.1 (*)     MCHC 30.2 (*)     RDW 14.8 (*)     Immature Granulocytes 0.7 (*)     Gran # (ANC) 9.3 (*)     Immature Grans (Abs) 0.08 (*)     Gran % 81.9 (*)     Lymph % 11.2 (*)     All other components within normal limits   COMPREHENSIVE METABOLIC PANEL - Abnormal; Notable for the following components:    Glucose 118 (*)     BUN 36 (*)     Albumin 3.3 (*)     eGFR 39.2 (*)     All other components within normal limits   TROPONIN I HIGH SENSITIVITY - Abnormal; Notable for the following components:    Troponin I High Sensitivity 24.2 (*)     All other components within normal limits   B-TYPE NATRIURETIC PEPTIDE - Abnormal; Notable for the following components:     (*)     All other components within normal limits   ISTAT PROCEDURE - Abnormal; Notable for the following components:    POC PH 7.309 (*)     POC PCO2 52.0 (*)     POC PO2 70 (*)     POC SATURATED O2 92 (*)      POC Glucose 134 (*)     POC TCO2 28 (*)     POC Hematocrit 28 (*)     All other components within normal limits   TROPONIN I HIGH SENSITIVITY          Imaging Results              X-Ray Chest AP Portable (In process)                      Medications   magnesium sulfate 2g in water 50mL IVPB (premix) (2 g Intravenous New Bag 6/24/23 2348)   sodium chloride 0.9% flush 10 mL (has no administration in time range)   naloxone 0.4 mg/mL injection 0.02 mg (has no administration in time range)   glucose chewable tablet 16 g (has no administration in time range)   glucose chewable tablet 24 g (has no administration in time range)   dextrose 50% injection 12.5 g (has no administration in time range)   dextrose 50% injection 25 g (has no administration in time range)   glucagon (human recombinant) injection 1 mg (has no administration in time range)   heparin (porcine) injection 5,000 Units (has no administration in time range)   hydrALAZINE injection 10 mg (10 mg Intravenous Given 6/24/23 2230)   furosemide injection 40 mg (40 mg Intravenous Given 6/24/23 2230)     Medical Decision Making:   Initial Assessment:   Patient is in distress requires BiPAP rescue  Differential Diagnosis:   Considerations include but are not limited to restrictive lung disease, hypoxic respiratory failure secondary to congestive heart failure, restrictive lung disease, acute kidney injury, dehydration  Clinical Tests:   Lab Tests: Reviewed and Ordered  Radiological Study: Ordered and Reviewed  Medical Tests: Reviewed and Ordered  ED Management:  MDM    Patient presents for emergent evaluation of acute respiratory failure that poses a possible threat to life and/or bodily function.    In the ED patient found to have acute hypoxic respiratory failure.   I ordered labs and personally reviewed them.  Labs significant for elevated BNP over 800, mildly elevated high sensitivity troponin of 24, creatinine 1.4.    I ordered X-rays and personally reviewed  them and reviewed the radiologist interpretation.  Xray significant for atelectasis and bilateral opacities and pulmonary edema present.    I ordered EKG and personally reviewed it.  EKG significant for regular rate and rhythm without ST elevation.        Admission MDM  I discussed the patient presentation labs, ekg, X-rays, findings with the consultant(s) hospitalist   Patient was managed in the ED with IV Solu-Medrol by EMS, breathing treatment, Lasix by myself.    The response to treatment was improved.    Patient required emergent consultation to hospitalist for admission.    Attending Critical Care:   Critical Care Times:   Direct Patient Care (initial evaluation, reassessments, and time considering the case)................................................................10 minutes.   Additional History from reviewing old medical records or taking additional history from the family, EMS, PCP, etc.......................10 minutes.   Ordering, Reviewing, and Interpreting Diagnostic Studies...............................................................................................................10 minutes.   Documentation..................................................................................................................................................................................5 minutes.   ==============================================================  · Total Critical Care Time - exclusive of procedural time: 35 minutes.  ==============================================================  Critical care was necessary to treat or prevent imminent or life-threatening deterioration of the following conditions:  Hypoxic respiratory failure.   Critical care was time spent personally by me on the following activities: obtaining history from patient or relative, examination of patient, review of x-rays / CT sent with the patient, ordering lab, x-rays, and/or EKG, development of treatment plan with  patient or relative, ordering and performing treatments and interventions, interpretation of cardiac measurements and re-evaluation of patient's conition.   Critical Care Condition: potentially life-threatening            ED Course as of 06/25/23 0025   Sat Jun 24, 2023 2348 POC PH(!): 7.309 [AP]   2348 POC PCO2(!): 52.0 [AP]   2348 POC PO2(!): 70 [AP]   2348 POC HCO3: 26.1 [AP]   2348 BNP(!): 803 [AP]   2348 Troponin I High Sensitivity(!): 24.2 [AP]   2348 Sodium: 141 [AP]   2348 Potassium: 4.9 [AP]   2348 Chloride: 107 [AP]   2348 CO2: 26 [AP]   2348 Glucose(!): 118 [AP]   2348 BUN(!): 36 [AP]   2348 Creatinine: 1.4 [AP]      ED Course User Index  [AP] Lalo Quinones MD                 Clinical Impression:   Final diagnoses:  [R07.9] Chest pain  [I50.9] Congestive heart failure, unspecified HF chronicity, unspecified heart failure type (Primary)  [J96.01] Acute respiratory failure with hypoxia        ED Disposition Condition    Admit Stable                Lalo Quinones MD  06/25/23 0026

## 2023-06-25 NOTE — CARE UPDATE
06/24/23 2234   PRE-TX-O2   SpO2 (!) 92 %   Pulse 82   Resp (!) 28   BP (!) 182/78   Preset CPAP/BiPAP Settings   Ipap 18   EPAP (cm H2O) 8   Pressure Support (cm H2O) 10   Blood Gas Puncture   Blood Gas Type arterial   Arterial Site left;radial artery   Collateral Circulation Verified Merrill's Test   Site Preparation alcohol   Pressure Held yes   Distal Pulse Present yes   Hematoma Present no   Sample Obtained/Sent to Lab yes   Oxygen Amount   (BIPAP)   Number of Attempts for ABG? 1   Attempted By? D RAMYA, RT   Labs   $ Was an ABG obtained? Arterial Puncture;ISTAT - Blood gas;ISTAT - Calcium;ISTAT - Hematocrit;ISTAT - Potassium;ISTAT - Sodium   $ Labs Tech Time 15 min     Dr. Quinones aware of BIPAP changes

## 2023-06-25 NOTE — HOSPITAL COURSE
Patient seen and examined, on BiPAP and ABG noted and stable  Discussed with family members, she had aortic balloon valvuloplasty.  Her Lasix was stopped by her cardiologist after the procedure for unsure reason. Systolic  Murmur noted  Repeat ECHO ordered    6/26  Admitted with CHF exacerbation/worsening of her previous pulmonary condition  Patient is doing well off BiPAP and good amount of diuresis no shortness of breath.  Repeat echo pending.  Patient is stable and downgraded

## 2023-06-25 NOTE — CONSULTS
Pulmonary/Critical Care Consult      Patient name: Betty Bacon  MRN: [id number]  Date: 06/25/2023    Admit Date: 6/24/2023  Consult Requested By: Zaki Awad MD    Reason for Consult: respiratory failure    HPI:    6/25/2023 - 73 y.o. female with a PMH of COPD, asbestosis (severe restriction and DLCO decrease), recent COVID pneumonia, CAD, HFpEF, severe aortic stenosis (s/p recent TAVR) presented to ER with increased SOB, generalized weakness.  She had some increased fluid retention (lasix had been held for a few days).  In ER she was placed on BiPAP and moved to ICU.  ROS as below.  She is feeling better at this time.    Review of Systems    Review of Systems   Constitutional:  Positive for chills and malaise/fatigue. Negative for diaphoresis, fever and weight loss.        + weight gain   HENT:  Negative for congestion.    Eyes:  Negative for pain.   Respiratory:  Positive for shortness of breath. Negative for cough, hemoptysis, sputum production, wheezing and stridor.    Cardiovascular:  Positive for leg swelling. Negative for chest pain, palpitations, orthopnea, claudication and PND.   Gastrointestinal:  Positive for nausea and vomiting. Negative for abdominal pain, constipation, diarrhea and heartburn.   Genitourinary:  Negative for dysuria, frequency and urgency.   Musculoskeletal:  Positive for myalgias. Negative for falls.   Neurological:  Negative for sensory change, focal weakness, seizures and weakness.   Psychiatric/Behavioral:  Negative for depression, substance abuse and suicidal ideas. The patient is not nervous/anxious.      Past Medical History    Past Medical History:   Diagnosis Date    Acute coronary artery obstruction without MI 10/2012    Benign hypertension     COPD (chronic obstructive pulmonary disease)     Coronary artery disease     Disorder of kidney and ureter     Dr. Morrow    Hepatitis B core antibody positive 03/03/2021    Negative sAg, suggests previous exposure but no  chronic/active Hep B. At risk for reactivation with any immunosuppression medication, steroids, chemo, etc.      History of electroconvulsive therapy     Hyperlipidemia LDL goal < 70     Left ankle sprain     Major depressive disorder, recurrent episode, severe     s/p ECT    Positive AKIRA (antinuclear antibody) 03/03/2021    PVD (peripheral vascular disease)        Past Surgical History    Past Surgical History:   Procedure Laterality Date    ANGIOGRAM, CORONARY, WITH LEFT HEART CATHETERIZATION N/A 5/1/2023    Procedure: Angiogram, Coronary, with Left Heart Cath;  Surgeon: Neeraj Finn MD;  Location: [facility] CATH LAB;  Service: Cardiology;  Laterality: N/A;    AORTIC VALVULOPLASTY N/A 6/13/2023    Procedure: REPAIR, AORTIC VALVE;  Surgeon: Neeraj Finn MD;  Location: [facility] CATH LAB;  Service: Cardiology;  Laterality: N/A;    CHOLECYSTECTOMY      CORONARY ANGIOPLASTY      CORONARY ANGIOPLASTY WITH STENT PLACEMENT  10/2012    2 stents RCA (100% stenosis)    EYE SURGERY      cataract surgery    HYSTERECTOMY      LINA, ovaries intact. uterine prolapse    ILIAC ARTERY STENT      SMALL BOWEL ENTEROSCOPY N/A 2/20/2023    Procedure: ENTEROSCOPY;  Surgeon: Margy Dumont MD;  Location: [facility] ENDO;  Service: Endoscopy;  Laterality: N/A;    TONSILLECTOMY      TOTAL VAGINAL HYSTERECTOMY         Medications (scheduled):      albuterol-ipratropium  3 mL Nebulization Q6H    amLODIPine  10 mg Oral Daily    atorvastatin  40 mg Oral Daily    clopidogreL  75 mg Oral Daily    furosemide (LASIX) injection  40 mg Intravenous Q12H    heparin (porcine)  5,000 Units Subcutaneous Q8H    hydrALAZINE  50 mg Oral Q8H    isosorbide mononitrate  60 mg Oral Daily    methylPREDNISolone sodium succinate injection  40 mg Intravenous Q12H    metoprolol succinate  50 mg Oral Daily    pantoprazole  40 mg Oral Before breakfast    paroxetine  50 mg Oral QAM    QUEtiapine  100 mg Oral QHS       Medications (infusions):         Medications (prn):  "    albuterol sulfate, clonazePAM, colchicine, dextrose 50%, dextrose 50%, glucagon (human recombinant), glucose, glucose, hydrALAZINE, magnesium sulfate IVPB, magnesium sulfate IVPB, naloxone, sodium chloride 0.9%    Family History:   Family History   Problem Relation Age of Onset    Diabetes Mother     Heart disease Mother     Stroke Father     Heart disease Father     Heart disease Brother     Stroke Brother     Hypertension Daughter     Diabetes Maternal Aunt     Heart disease Maternal Aunt     Heart disease Maternal Uncle     Heart disease Paternal Aunt     Heart disease Paternal Uncle     Heart disease Maternal Grandfather     Diabetes Sister     Heart disease Sister     Cancer Sister         lung    Kidney disease Sister         mass, benign    Breast cancer Sister     Stroke Sister     Dementia Sister     Liver disease Sister     Melanoma Neg Hx     Psoriasis Neg Hx     Lupus Neg Hx     Eczema Neg Hx        Social History: Tobacco:   Social History     Tobacco Use   Smoking Status Former    Packs/day: 2.00    Years: 40.00    Pack years: 80.00    Types: Cigarettes    Quit date: 2009    Years since quittin.5   Smokeless Tobacco Never                                EtOH:   Social History     Substance and Sexual Activity   Alcohol Use Yes    Alcohol/week: 1.0 standard drink    Types: 1 Shots of liquor per week    Comment: rare /occ                                Drugs:   Social History     Substance and Sexual Activity   Drug Use No     Physical Exam    Vital signs:  Temp:  [97.9 °F (36.6 °C)-99.1 °F (37.3 °C)]   Pulse:  [71-84]   Resp:  [15-29]   BP: (137-202)/(61-83)   SpO2:  [90 %-98 %]     Intake/Output: No intake or output data in the 24 hours ending 23 1124     BMI: Estimated body mass index is 36.25 kg/m² as calculated from the following:    Height as of this encounter: 5' 2" (1.575 m).    Weight as of this encounter: 89.9 kg (198 lb 3.1 oz).    Physical Exam  Vitals and nursing note " reviewed.   Constitutional:       General: She is not in acute distress.     Appearance: Normal appearance. She is not ill-appearing, toxic-appearing or diaphoretic.      Comments: Wearing BiPAP   HENT:      Head: Normocephalic and atraumatic.      Right Ear: External ear normal.      Left Ear: External ear normal.      Nose: Nose normal. No congestion or rhinorrhea.      Mouth/Throat:      Mouth: Mucous membranes are moist.      Pharynx: Oropharynx is clear. No oropharyngeal exudate or posterior oropharyngeal erythema.   Eyes:      General: No scleral icterus.        Right eye: No discharge.         Left eye: No discharge.      Extraocular Movements: Extraocular movements intact.      Conjunctiva/sclera: Conjunctivae normal.      Pupils: Pupils are equal, round, and reactive to light.   Neck:      Vascular: No carotid bruit.   Cardiovascular:      Rate and Rhythm: Normal rate and regular rhythm.      Pulses: Normal pulses.      Heart sounds: No murmur heard.    No friction rub. No gallop.   Pulmonary:      Effort: Pulmonary effort is normal. No respiratory distress.      Breath sounds: No stridor. Rales present. No wheezing or rhonchi.      Comments: No acc m use  Chest:      Chest wall: No tenderness.   Abdominal:      General: Bowel sounds are normal. There is no distension.      Tenderness: There is no abdominal tenderness. There is no guarding.   Musculoskeletal:         General: No swelling. Normal range of motion.      Cervical back: Normal range of motion and neck supple. No rigidity or tenderness.      Right lower leg: Edema present.      Left lower leg: Edema present.   Lymphadenopathy:      Cervical: No cervical adenopathy.   Skin:     General: Skin is warm and dry.      Capillary Refill: Capillary refill takes less than 2 seconds.      Coloration: Skin is not jaundiced.      Findings: No bruising.   Neurological:      General: No focal deficit present.      Mental Status: She is alert and oriented to  person, place, and time. Mental status is at baseline.      Cranial Nerves: No cranial nerve deficit.      Sensory: No sensory deficit.      Motor: No weakness.   Psychiatric:         Mood and Affect: Mood normal.         Behavior: Behavior normal.         Thought Content: Thought content normal.         Judgment: Judgment normal.       Laboratory    Recent Labs   Lab 06/25/23  0430   WBC 9.60   RBC 2.82*   HGB 8.1*   HCT 27.1*      MCV 96   MCH 28.7   MCHC 29.9*       Recent Labs   Lab 06/24/23  2227 06/25/23  0430   CALCIUM 9.0 9.1   PROT 7.2  --     141   K 4.9 4.2   CO2 26 27    107   BUN 36* 41*   CREATININE 1.4 1.6*   ALKPHOS 126  --    ALT 13  --    AST 14  --    BILITOT 1.0  --        No results for input(s): PT, INR, APTT in the last 24 hours.    No results for input(s): CPK, CPKMB, TROPONINI, MB in the last 24 hours.    Additional labs: reviewed    Microbiology:       Microbiology Results (last 7 days)       ** No results found for the last 168 hours. **            Radiology    X-Ray Chest AP Portable  Chest single view    CLINICAL DATA: Chest pain, shortness of breath    FINDINGS: Comparison to February 21. Heart size is normal. Aortic arch is calcified. There is increasing abnormal bilateral interstitial prominence, with new patchy airspace disease in the midlung zones and at the lung bases. Findings could be on the basis of congestive failure/pulmonary edema or pneumonia. There is a small right pleural effusion.    IMPRESSION:  1. Increasing abnormal interstitial prominence, with new patchy airspace disease in the mid and lower lung zones. Findings could be on the basis of pulmonary edema or pneumonia.    Electronically signed by:  Robert Calderón MD  6/25/2023 8:31 AM CDT Workstation: [id number]  CT Chest Without Contrast  EXAM: CT chest without contrast.    TECHNIQUE: Helical CT of the chest was performed without contrast in axial plane with coronal and sagittal reconstructions.  All CT scans at this facility use dose modulation, iterative reconstruction, and/or weight based dosing when appropriate to reduce radiation dose to as low as reasonably achievable.  465  mGy-cm DLP.    COMPARISON: Chest x-ray from today and February 21, 2023 and report of chest CT from January 25, 2023    HISTORY: Short of breath    FINDINGS:  There is very extensive interlobular septal thickening throughout both lung fields. There is subpleural fibrosis. There are small pleural effusions bilaterally. There are multiple areas of patchy consolidation as well in both lung fields.  There is no pathologic supraclavicular, axillary, mediastinal or hilar adenopathy.    There is no pericardial effusion or pneumothorax.  The aorta demonstrates  advanced  atherosclerotic calcifications with no aneurysm. The upper abdomen is benign with no acute abnormality.  There are no acute osseous abnormalities.  The thyroid gland is unremarkable.  There are advanced coronary calcifications.  -----------------------------------------------------  IMPRESSION:  1. Lung findings as above are worse compared to previous report of chest CT. This is most likely on the basis of fibrotic lung disease with some degree of superimposed pulmonary edema.  2. Advanced atherosclerosis.  -----------------------------------------------------    Electronically signed by:  Adolph Montes MD  6/25/2023 2:39 AM CDT Workstation: [id number]        Additional Studies    reviewed    Ventilator Information    Oxygen Concentration (%):  [50-60] 60             Recent Labs     06/24/23  2243   PH 7.309*   PCO2 52.0*   PO2 70*   HCO3 26.1   POCSATURATED 92*   BE 0         Impression    Active Hospital Problems    Diagnosis  POA    *Acute respiratory failure with hypoxia and hypercarbia [J96.01, J96.02]  Unknown    CHF exacerbation [I50.9]  Unknown    Uncontrolled hypertension [I10]  Unknown    CKD (chronic kidney disease) stage 3, GFR 30-59 ml/min [N18.30]  Yes       Resolved Hospital Problems   No resolved problems to display.       Plan    Not clear if this is ILD exacerbation, a component of CHF or both.  She does not have significant COPD by PFT  Repeat ECHO ordered to assess Aos after TAVR  BiPAP as needed, O2 as able  Steroids - will check ESR, CRP  Diurese but watch closely  Check procalcitonin  Watch H/H - no active bleeding reported    Thank you for this consult.  I will follow with you while the patient is hospitalized.        Cristian Singh MD  [facility] Pulmonary/Critical Care  06/25/2023

## 2023-06-25 NOTE — PLAN OF CARE
POC reviewed with patient and family. Continuous Bipap in use. Patient tolerating well. Now on HFNC 6L. Sinus rhythm. Echocardiogram completed. Hypertension managed with scheduled meds and prn hydralazine. Patient unable to void with purewick. In and out cath performed. Still retaining after. yoder catheter placed for retention.  Afebrile. Wound care consult for pre-hospital reddened areas of groin and abdomen folds. Safety measures in place.    Problem: Adult Inpatient Plan of Care  Goal: Plan of Care Review  Outcome: Ongoing, Progressing  Goal: Patient-Specific Goal (Individualized)  Outcome: Ongoing, Progressing  Goal: Absence of Hospital-Acquired Illness or Injury  Outcome: Ongoing, Progressing  Goal: Optimal Comfort and Wellbeing  Outcome: Ongoing, Progressing  Goal: Readiness for Transition of Care  Outcome: Ongoing, Progressing     Problem: Skin Injury Risk Increased  Goal: Skin Health and Integrity  Outcome: Ongoing, Progressing     Problem: Skin Injury Risk Increased  Goal: Skin Health and Integrity  Outcome: Ongoing, Progressing     Problem: Fall Injury Risk  Goal: Absence of Fall and Fall-Related Injury  Outcome: Ongoing, Progressing     Problem: Gas Exchange Impaired  Goal: Optimal Gas Exchange  Outcome: Ongoing, Progressing     Problem: Noninvasive Ventilation Acute  Goal: Effective Unassisted Ventilation and Oxygenation  Outcome: Ongoing, Progressing     Problem: Fatigue  Goal: Improved Activity Tolerance  Outcome: Ongoing, Progressing     Problem: Oral Intake Inadequate  Goal: Improved Oral Intake  Outcome: Ongoing, Progressing

## 2023-06-25 NOTE — PROGRESS NOTES
Automatic Inhaler to Nebulizer Interchange    albuterol (Ventolin, ProAir, or Proventil)  mcg given multiple times per day changed to albuterol 2.5 mg Q4H PRN Wheezing, Shortness of Breath  per [facility] Automatic Therapeutic Substitutions Protocol.    Please contact pharmacy at extension [phone] with any questions.     Thank you,   Rudy Moreno

## 2023-06-25 NOTE — H&P
[facility] [place] - Emergency Dept    History & Physical      Patient Name: Betty Bacon  MRN: [id number]  Admission Date: 6/24/2023  Attending Physician: Jamel Yates MD   Primary Care Provider: Johanne Callejas MD         Patient information was obtained from patient, past medical records, and ER records.     Subjective:     Principal Problem:Acute respiratory failure with hypoxia and hypercarbia    Chief Complaint:   Chief Complaint   Patient presents with    Shortness of Breath     Sob that started last night. Worsening through day. Cough, chills, nausea, increased urination without pain. Weakness. 2-3 weeks ago  had a balloon procedure for heart done.         HPI:  73-year-old female history of restrictive lung disease, aortic stenosis status post aortic valvuloplasty 6/12, hypertension, heart failure with preserved EF.     Patient presents to the ER after progressive decline with weakness and sob after valvuloplasty.  She would stopped her Lasix for about 3 days according to instructions from the proceduralist.  Has had about 2 lb weight gain according to daughter, leg swelling.    In the ER patient had elevated BNP, with short of breath and placed on BiPAP.  ABG on Bipap 50% FiO2 with CO2 retention and pO2 70.  Patient to be admitted for ICU for respiratory failure.    Past Medical History:   Diagnosis Date    Acute coronary artery obstruction without MI 10/2012    Benign hypertension     COPD (chronic obstructive pulmonary disease)     Coronary artery disease     Disorder of kidney and ureter     Dr. Morrow    Hepatitis B core antibody positive 03/03/2021    Negative sAg, suggests previous exposure but no chronic/active Hep B. At risk for reactivation with any immunosuppression medication, steroids, chemo, etc.      History of electroconvulsive therapy     Hyperlipidemia LDL goal < 70     Left ankle sprain     Major depressive disorder, recurrent episode, severe     s/p ECT    Positive  AKIRA (antinuclear antibody) 03/03/2021    PVD (peripheral vascular disease)        Past Surgical History:   Procedure Laterality Date    ANGIOGRAM, CORONARY, WITH LEFT HEART CATHETERIZATION N/A 5/1/2023    Procedure: Angiogram, Coronary, with Left Heart Cath;  Surgeon: Neeraj Finn MD;  Location: [facility] CATH LAB;  Service: Cardiology;  Laterality: N/A;    AORTIC VALVULOPLASTY N/A 6/13/2023    Procedure: REPAIR, AORTIC VALVE;  Surgeon: Neeraj Finn MD;  Location: [facility] CATH LAB;  Service: Cardiology;  Laterality: N/A;    CHOLECYSTECTOMY      CORONARY ANGIOPLASTY      CORONARY ANGIOPLASTY WITH STENT PLACEMENT  10/2012    2 stents RCA (100% stenosis)    EYE SURGERY      cataract surgery    HYSTERECTOMY      LINA, ovaries intact. uterine prolapse    ILIAC ARTERY STENT      SMALL BOWEL ENTEROSCOPY N/A 2/20/2023    Procedure: ENTEROSCOPY;  Surgeon: Margy Dumont MD;  Location: [facility];  Service: Endoscopy;  Laterality: N/A;    TONSILLECTOMY      TOTAL VAGINAL HYSTERECTOMY         Review of patient's allergies indicates:   Allergen Reactions    Propoxyphene n-acetaminophen Other (See Comments)     Other reaction(s): Unknown    Codeine Anxiety       No current facility-administered medications on file prior to encounter.     Current Outpatient Medications on File Prior to Encounter   Medication Sig    amLODIPine (NORVASC) 10 MG tablet Take 1 tablet (10 mg total) by mouth once daily.    colchicine (COLCRYS) 0.6 mg tablet Take 2 tablets (1.2mg total) by mouth at gout flare onset, may repeat 1 tablet (0.6mg total) in one hour, then take 1 tablet (0.6mg total) by mouth once daily until pain resolves    acetaminophen (TYLENOL) 325 MG tablet Take 325 mg by mouth every 6 (six) hours as needed for Pain.    albuterol sulfate (PROAIR RESPICLICK) 90 mcg/actuation AePB Inhale 2 puffs into the lungs every 4 to 6 hours as needed.    albuterol-ipratropium (DUO-NEB) 2.5 mg-0.5 mg/3 mL nebulizer solution Take 3 mLs by  nebulization every 6 (six) hours as needed for Wheezing. Rescue    atorvastatin (LIPITOR) 40 MG tablet TAKE 1 TABLET BY MOUTH EVERY DAY (Patient taking differently: Take 40 mg by mouth once daily.)    clonazePAM (KLONOPIN) 1 MG disintegrating tablet Take 1 tablet (1 mg total) by mouth 2 (two) times daily as needed (anxiety).    clopidogreL (PLAVIX) 75 mg tablet TAKE 1 TABLET BY MOUTH EVERY DAY (Patient taking differently: Take 75 mg by mouth once daily.)    colestipoL (COLESTID) 1 gram Tab Take 1 tablet (1 g total) by mouth 2 (two) times daily as needed (diarrhea). Other drugs should be administered at least 1 hour before or 4 hours after colestipol.    fluticasone propionate (FLONASE) 50 mcg/actuation nasal spray 1 spray (50 mcg total) by Each Nostril route once daily.    fluticasone-umeclidin-vilanter (TRELEGY ELLIPTA) 200-62.5-25 mcg inhaler Inhale 1 puff into the lungs once daily.    hydrALAZINE (APRESOLINE) 50 MG tablet Take 1 tablet (50 mg total) by mouth every 8 (eight) hours.    isosorbide mononitrate (IMDUR) 60 MG 24 hr tablet TAKE 1 TABLET BY MOUTH EVERY DAY    metoprolol succinate (TOPROL-XL) 50 MG 24 hr tablet Take 1 tablet (50 mg total) by mouth once daily.    pantoprazole (PROTONIX) 40 MG tablet Take 1 tablet (40 mg total) by mouth before breakfast.    paroxetine (PAXIL) 10 MG tablet Take 10 mg by mouth every morning. Total 50 mg    paroxetine (PAXIL) 40 MG tablet TAKE 1 TABLET BY MOUTH EVERY DAY (Patient taking differently: Take 40 mg by mouth once daily.)    QUEtiapine (SEROQUEL) 100 MG Tab Take 1 tablet (100 mg total) by mouth every evening.    [DISCONTINUED] TRELEGY ELLIPTA 200-62.5-25 mcg inhaler INHALE 1 PUFF INTO THE LUNGS ONCE DAILY.     Family History       Problem Relation (Age of Onset)    Breast cancer Sister    Cancer Sister    Dementia Sister    Diabetes Mother, Maternal Aunt, Sister    Heart disease Mother, Father, Brother, Maternal Aunt, Maternal Uncle, Paternal Aunt, Paternal Uncle,  Maternal Grandfather, Sister    Hypertension Daughter    Kidney disease Sister    Liver disease Sister    Stroke Father, Brother, Sister          Tobacco Use    Smoking status: Former     Packs/day: 2.00     Years: 40.00     Pack years: 80.00     Types: Cigarettes     Quit date: 2009     Years since quittin.5    Smokeless tobacco: Never   Substance and Sexual Activity    Alcohol use: Yes     Alcohol/week: 1.0 standard drink     Types: 1 Shots of liquor per week     Comment: rare /occ    Drug use: No    Sexual activity: Never     Birth control/protection: Abstinence     Review of Systems  Objective:     Vital Signs (Most Recent):  Temp: 99.1 °F (37.3 °C) (23)  Pulse: 80 (23)  Resp: (!) 23 (23)  BP: (!) 188/78 (23)  SpO2: (!) 93 % (23) Vital Signs (24h Range):  Temp:  [99.1 °F (37.3 °C)] 99.1 °F (37.3 °C)  Pulse:  [80-83] 80  Resp:  [23-29] 23  SpO2:  [92 %-94 %] 93 %  BP: (182-202)/(78-83) 188/78     Weight: 85.3 kg (188 lb)  Body mass index is 34.39 kg/m².    Physical Exam       Nursing note and vitals reviewed.  Constitutional: She appears well-developed. She appears distressed.   Pleasant, polite   HENT:   Head: Normocephalic and atraumatic.   Eyes: EOM are normal.   Neck: Neck supple.   Normal range of motion.  Cardiovascular:  Intact distal pulses.           Pulmonary/Chest: She is in respiratory distress. She has wheezes/crackles. +Pedal edema  Abdominal: Abdomen is soft.   Musculoskeletal:         General: Normal range of motion.      Cervical back: Normal range of motion and neck supple.      Neurological: She is alert and oriented to person, place, and time.   Skin: Skin is warm and dry. Capillary refill takes less than 2 seconds.   Psychiatric: She has a normal mood and affect. Her behavior is normal. Judgment and thought content normal.     Significant Labs: All pertinent labs within the past 24 hours have been reviewed.    Significant  Imaging: I have reviewed all pertinent imaging results/findings within the past 24 hours.    Assessment/Plan:     Active Diagnoses:    Diagnosis Date Noted POA    PRINCIPAL PROBLEM:  Acute respiratory failure with hypoxia and hypercarbia [J96.01, J96.02] 06/25/2023 Unknown    CHF exacerbation [I50.9] 06/25/2023 Unknown    Uncontrolled hypertension [I10]  Unknown    CKD (chronic kidney disease) stage 3, GFR 30-59 ml/min [N18.30] 09/06/2012 Yes      Problems Resolved During this Admission:     VTE Risk Mitigation (From admission, onward)           Ordered     heparin (porcine) injection 5,000 Units  Every 8 hours         06/25/23 0022     IP VTE HIGH RISK PATIENT  Once         06/25/23 0022     Place sequential compression device  Until discontinued         06/25/23 0022                  #Acute hypoxemic and hypercapnic respiratory failure    #Shortness of breaths secondary to interstitial lung disease, acute HFpEF, mild-moderate TR and AR, pHTN    #Aortic stenosis status post valvuloplasty  -her cardiologist's concerned she would not tolerate sedation for TAVR    # uncontrolled hypertension    # progressive anemia on Plavix    Admit to ICU  Continue BiPAP  Continue IV Lasix 40 mg q.12 hours  Solu-Medrol IV 40 mg q.12 hours, ATC breathing treatments  Caution with over diuresis in the setting of AS  Cardiology, pulmonology consult  CT chest  Check iron studies occult blood stool, consider GI consult  Continue PTA medications as ordered  Heparin subQ & protonix prophylaxis  High risk for clinical decline  Rest of plan per hospital course  Critical care time 40 minutes        Jamel Yates MD  Department of Hospital Medicine   [facility] - Emergency Dept

## 2023-06-25 NOTE — CONSULTS
[facility]  Department of Cardiology  Consult Note      PATIENT NAME: Betty Bacon  MRN: [id number]  TODAY'S DATE: 06/25/2023  ADMIT DATE: 6/24/2023                          CONSULT REQUESTED BY: Zaki Awad MD    SUBJECTIVE     PRINCIPAL PROBLEM: Acute respiratory failure with hypoxia and hypercarbia      REASON FOR CONSULT:  SOB      HPI:    73 year old female patient with a PMH significant for  restrictive lung disease, asbestosis, PVD, anemia, CAD history of PCI in 2012, recent angiogram May 1 no obstructive disease and patent stents, severe AS s/p balloon valvuloplasty on 6/13 admitted with acute respiratory failure. BNP  803. CXR shows pulmonary edema.  Creatinine 1.6 Hemoglobin 8.1.Blood pressure over 200s over night.          FROM H AND P       Shortness of Breath       Sob that started last night. Worsening through day. Cough, chills, nausea, increased urination without pain. Weakness. 2-3 weeks ago  had a balloon procedure for heart done.          HPI:  73-year-old female history of restrictive lung disease, aortic stenosis status post aortic valvuloplasty 6/12, hypertension, heart failure with preserved EF.      Patient presents to the ER after progressive decline with weakness and sob after valvuloplasty.  She would stopped her Lasix for about 3 days according to instructions from the proceduralist.  Has had about 2 lb weight gain according to daughter, leg swelling.     In the ER patient had elevated BNP, with short of breath and placed on BiPAP.  ABG on Bipap 50% FiO2 with CO2 retention and pO2 70.  Patient to be admitted for ICU for respiratory failure.  Review of patient's allergies indicates:   Allergen Reactions    Propoxyphene n-acetaminophen Other (See Comments)     Other reaction(s): Unknown    Codeine Anxiety       Past Medical History:   Diagnosis Date    Acute coronary artery obstruction without MI 10/2012    Benign hypertension     COPD (chronic obstructive pulmonary  disease)     Coronary artery disease     Disorder of kidney and ureter     Dr. Morrow    Hepatitis B core antibody positive 2021    Negative sAg, suggests previous exposure but no chronic/active Hep B. At risk for reactivation with any immunosuppression medication, steroids, chemo, etc.      History of electroconvulsive therapy     Hyperlipidemia LDL goal < 70     Left ankle sprain     Major depressive disorder, recurrent episode, severe     s/p ECT    Positive AKIRA (antinuclear antibody) 2021    PVD (peripheral vascular disease)      Past Surgical History:   Procedure Laterality Date    ANGIOGRAM, CORONARY, WITH LEFT HEART CATHETERIZATION N/A 2023    Procedure: Angiogram, Coronary, with Left Heart Cath;  Surgeon: Neeraj Finn MD;  Location: [facility] CATH LAB;  Service: Cardiology;  Laterality: N/A;    AORTIC VALVULOPLASTY N/A 2023    Procedure: REPAIR, AORTIC VALVE;  Surgeon: Neeraj Finn MD;  Location: [facility] CATH LAB;  Service: Cardiology;  Laterality: N/A;    CHOLECYSTECTOMY      CORONARY ANGIOPLASTY      CORONARY ANGIOPLASTY WITH STENT PLACEMENT  10/2012    2 stents RCA (100% stenosis)    EYE SURGERY      cataract surgery    HYSTERECTOMY      LINA, ovaries intact. uterine prolapse    ILIAC ARTERY STENT      SMALL BOWEL ENTEROSCOPY N/A 2023    Procedure: ENTEROSCOPY;  Surgeon: Margy Dumont MD;  Location: [facility] ENDO;  Service: Endoscopy;  Laterality: N/A;    TONSILLECTOMY      TOTAL VAGINAL HYSTERECTOMY       Social History     Tobacco Use    Smoking status: Former     Packs/day: 2.00     Years: 40.00     Pack years: 80.00     Types: Cigarettes     Quit date: 2009     Years since quittin.5    Smokeless tobacco: Never   Substance Use Topics    Alcohol use: Yes     Alcohol/week: 1.0 standard drink     Types: 1 Shots of liquor per week     Comment: rare /occ    Drug use: No        REVIEW OF SYSTEMS    +sob  +weakness    OBJECTIVE     VITAL SIGNS (Most Recent)  Temp:  97.9 °F (36.6 °C) (06/25/23 0701)  Pulse: 74 (06/25/23 0734)  Resp: 18 (06/25/23 0734)  BP: (!) 171/75 (06/25/23 0734)  SpO2: 96 % (06/25/23 0734)    VENTILATION STATUS  Resp: 18 (06/25/23 0734)  SpO2: 96 % (06/25/23 0734)  Oxygen Concentration (%):  [50-60] 60        I & O (Last 24H):No intake or output data in the 24 hours ending 06/25/23 0926    WEIGHTS  Wt Readings from Last 3 Encounters:   06/25/23 0103 89.9 kg (198 lb 3.1 oz)   06/24/23 2203 85.3 kg (188 lb)   06/14/23 0718 85.1 kg (187 lb 9.8 oz)   06/11/23 0725 84.8 kg (186 lb 15.2 oz)   06/09/23 0825 83.5 kg (184 lb)   06/09/23 0757 83.9 kg (184 lb 15.5 oz)   06/08/23 1603 86.2 kg (190 lb)   06/08/23 1137 86.2 kg (190 lb)   06/08/23 0937 82.1 kg (181 lb)       PHYSICAL EXAM  GENERAL: elderly female on bipap  HEENT: Normocephalic  NECK: No JVD. No bruit..   CARDIAC: systolic murmur present  CHEST ANATOMY: normal.   LUNGS: diminished  ABDOMEN: Soft   EXTREMITIES: No cyanosis, clubbing or edema noted at this time.  CENTRAL NERVOUS SYSTEM: No focal motor or sensory deficits noted. .   MUSCLE STRENGTH & TONE: No noteable weakness, atrophy or abnormal movement.     HOME MEDICATIONS:  No current facility-administered medications on file prior to encounter.     Current Outpatient Medications on File Prior to Encounter   Medication Sig Dispense Refill    amLODIPine (NORVASC) 10 MG tablet Take 1 tablet (10 mg total) by mouth once daily. 30 tablet 5    colchicine (COLCRYS) 0.6 mg tablet Take 2 tablets (1.2mg total) by mouth at gout flare onset, may repeat 1 tablet (0.6mg total) in one hour, then take 1 tablet (0.6mg total) by mouth once daily until pain resolves      acetaminophen (TYLENOL) 325 MG tablet Take 325 mg by mouth every 6 (six) hours as needed for Pain.      albuterol sulfate (PROAIR RESPICLICK) 90 mcg/actuation AePB Inhale 2 puffs into the lungs every 4 to 6 hours as needed. 1 each 3    albuterol-ipratropium (DUO-NEB) 2.5 mg-0.5 mg/3 mL nebulizer solution  Take 3 mLs by nebulization every 6 (six) hours as needed for Wheezing. Rescue 75 mL 11    atorvastatin (LIPITOR) 40 MG tablet TAKE 1 TABLET BY MOUTH EVERY DAY (Patient taking differently: Take 40 mg by mouth once daily.) 90 tablet 3    clonazePAM (KLONOPIN) 1 MG disintegrating tablet Take 1 tablet (1 mg total) by mouth 2 (two) times daily as needed (anxiety). 60 tablet 1    clopidogreL (PLAVIX) 75 mg tablet TAKE 1 TABLET BY MOUTH EVERY DAY (Patient taking differently: Take 75 mg by mouth once daily.) 90 tablet 3    colestipoL (COLESTID) 1 gram Tab Take 1 tablet (1 g total) by mouth 2 (two) times daily as needed (diarrhea). Other drugs should be administered at least 1 hour before or 4 hours after colestipol.      fluticasone propionate (FLONASE) 50 mcg/actuation nasal spray 1 spray (50 mcg total) by Each Nostril route once daily. 16 g PRN    fluticasone-umeclidin-vilanter (TRELEGY ELLIPTA) 200-62.5-25 mcg inhaler Inhale 1 puff into the lungs once daily. 180 each 11    hydrALAZINE (APRESOLINE) 50 MG tablet Take 1 tablet (50 mg total) by mouth every 8 (eight) hours. 90 tablet 3    isosorbide mononitrate (IMDUR) 60 MG 24 hr tablet TAKE 1 TABLET BY MOUTH EVERY DAY 90 tablet 3    metoprolol succinate (TOPROL-XL) 50 MG 24 hr tablet Take 1 tablet (50 mg total) by mouth once daily. 30 tablet 5    pantoprazole (PROTONIX) 40 MG tablet Take 1 tablet (40 mg total) by mouth before breakfast. 30 tablet 11    paroxetine (PAXIL) 10 MG tablet Take 10 mg by mouth every morning. Total 50 mg      paroxetine (PAXIL) 40 MG tablet TAKE 1 TABLET BY MOUTH EVERY DAY (Patient taking differently: Take 40 mg by mouth once daily.) 90 tablet 3    QUEtiapine (SEROQUEL) 100 MG Tab Take 1 tablet (100 mg total) by mouth every evening.      [DISCONTINUED] TRELEGY ELLIPTA 200-62.5-25 mcg inhaler INHALE 1 PUFF INTO THE LUNGS ONCE DAILY. 180 each 2       SCHEDULED MEDS:   albuterol-ipratropium  3 mL Nebulization Q6H    amLODIPine  10 mg Oral Daily     atorvastatin  40 mg Oral Daily    clopidogreL  75 mg Oral Daily    furosemide (LASIX) injection  40 mg Intravenous Q12H    heparin (porcine)  5,000 Units Subcutaneous Q8H    hydrALAZINE  50 mg Oral Q8H    isosorbide mononitrate  60 mg Oral Daily    methylPREDNISolone sodium succinate injection  40 mg Intravenous Q12H    metoprolol succinate  50 mg Oral Daily    pantoprazole  40 mg Oral Before breakfast    paroxetine  50 mg Oral QAM    QUEtiapine  100 mg Oral QHS       CONTINUOUS INFUSIONS:    PRN MEDS:albuterol sulfate, clonazePAM, colchicine, dextrose 50%, dextrose 50%, glucagon (human recombinant), glucose, glucose, hydrALAZINE, magnesium sulfate IVPB, magnesium sulfate IVPB, naloxone, sodium chloride 0.9%    LABS AND DIAGNOSTICS     CBC LAST 3 DAYS  Recent Labs   Lab 06/24/23 2227 06/24/23 2243 06/25/23  0430   WBC 11.39  --  9.60   RBC 3.07*  --  2.82*   HGB 8.8*  --  8.1*   HCT 29.1* 28* 27.1*   MCV 95  --  96   MCH 28.7  --  28.7   MCHC 30.2*  --  29.9*   RDW 14.8*  --  14.7*     --  215   MPV 10.1  --  10.1   GRAN 81.9*  9.3*  --   --    LYMPH 11.2*  1.3  --   --    MONO 4.4  0.5  --   --    BASO 0.04  --   --    NRBC 0  --   --        COAGULATION LAST 3 DAYS  No results for input(s): LABPT, INR, APTT in the last 168 hours.    CHEMISTRY LAST 3 DAYS  Recent Labs   Lab 06/24/23 2227 06/24/23 2243 06/25/23  0430     --  141   K 4.9  --  4.2     --  107   CO2 26  --  27   ANIONGAP 8  --  7*   BUN 36*  --  41*   CREATININE 1.4  --  1.6*   *  --  204*   CALCIUM 9.0  --  9.1   PH  --  7.309*  --    MG 1.4*  --  1.8   ALBUMIN 3.3*  --   --    PROT 7.2  --   --    ALKPHOS 126  --   --    ALT 13  --   --    AST 14  --   --    BILITOT 1.0  --   --        CARDIAC PROFILE LAST 3 DAYS  Recent Labs   Lab 06/24/23  2227 06/25/23  0430   *  --    TROPONINIHS 24.2* 21.6*       ENDOCRINE LAST 3 DAYS  No results for input(s): TSH, PROCAL in the last 168 hours.    LAST ARTERIAL BLOOD  GAS  ABG  Recent Labs   Lab 06/24/23  2243   PH 7.309*   PO2 70*   PCO2 52.0*   HCO3 26.1   BE 0       LAST 7 DAYS MICROBIOLOGY   Microbiology Results (last 7 days)       ** No results found for the last 168 hours. **            MOST RECENT IMAGING  X-Ray Chest AP Portable  Chest single view    CLINICAL DATA: Chest pain, shortness of breath    FINDINGS: Comparison to February 21. Heart size is normal. Aortic arch is calcified. There is increasing abnormal bilateral interstitial prominence, with new patchy airspace disease in the midlung zones and at the lung bases. Findings could be on the basis of congestive failure/pulmonary edema or pneumonia. There is a small right pleural effusion.    IMPRESSION:  1. Increasing abnormal interstitial prominence, with new patchy airspace disease in the mid and lower lung zones. Findings could be on the basis of pulmonary edema or pneumonia.    Electronically signed by:  Robert Calderón MD  6/25/2023 8:31 AM CDT Workstation: [id number]  CT Chest Without Contrast  EXAM: CT chest without contrast.    TECHNIQUE: Helical CT of the chest was performed without contrast in axial plane with coronal and sagittal reconstructions. All CT scans at this facility use dose modulation, iterative reconstruction, and/or weight based dosing when appropriate to reduce radiation dose to as low as reasonably achievable.  465  mGy-cm DLP.    COMPARISON: Chest x-ray from today and February 21, 2023 and report of chest CT from January 25, 2023    HISTORY: Short of breath    FINDINGS:  There is very extensive interlobular septal thickening throughout both lung fields. There is subpleural fibrosis. There are small pleural effusions bilaterally. There are multiple areas of patchy consolidation as well in both lung fields.  There is no pathologic supraclavicular, axillary, mediastinal or hilar adenopathy.    There is no pericardial effusion or pneumothorax.  The aorta demonstrates  advanced   atherosclerotic calcifications with no aneurysm. The upper abdomen is benign with no acute abnormality.  There are no acute osseous abnormalities.  The thyroid gland is unremarkable.  There are advanced coronary calcifications.  -----------------------------------------------------  IMPRESSION:  1. Lung findings as above are worse compared to previous report of chest CT. This is most likely on the basis of fibrotic lung disease with some degree of superimposed pulmonary edema.  2. Advanced atherosclerosis.  -----------------------------------------------------    Electronically signed by:  Adolph Montes MD  6/25/2023 2:39 AM CDT Workstation: [id number]      ECHOCARDIOGRAM RESULTS (last 5)  Results for orders placed during the hospital encounter of 06/08/23    Echo    Interpretation Summary  · The left ventricle is normal in size with eccentric hypertrophy and normal systolic function. The estimated ejection fraction is 55%.  · Normal right ventricular size with normal right ventricular systolic function.  · Grade I left ventricular diastolic dysfunction.  · Moderate left atrial enlargement.  · There is severe aortic valve stenosis.  · Aortic valve area is 0.75 cm2; peak velocity is 4.12 m/s; mean gradient is 44 mmHg.  · Mild aortic regurgitation.  · The estimated PA systolic pressure is 37 mmHg.  · Normal central venous pressure (3 mmHg).      Results for orders placed during the hospital encounter of 05/01/23    Echo    Interpretation Summary  · Moderate left atrial enlargement.  · The left ventricle is mildly enlarged with mild concentric hypertrophy and normal systolic function.  · The estimated ejection fraction is 60%.  · Grade II left ventricular diastolic dysfunction.  · Mild right atrial enlargement.  · Normal right ventricular size with normal right ventricular systolic function.  · Aortic valve area is 1.16 cm2; peak velocity is 4.03 m/s; mean gradient is 34 mmHg.  · There is moderate-to-severe aortic  valve stenosis.  · Mild-to-moderate aortic regurgitation.  · Mild to moderate tricuspid regurgitation.  · Intermediate central venous pressure (8 mmHg).  · There is pulmonary hypertension. The estimated PA systolic pressure is 51 mmHg.  · Trivial pericardial effusion.      Results for orders placed during the hospital encounter of 12/20/22    Echo    Interpretation Summary  · The left ventricle is normal in size with severe concentric hypertrophy and normal systolic function.  · Grade II left ventricular diastolic dysfunction.  · The estimated ejection fraction is 65%.  · Normal right ventricular size with normal right ventricular systolic function.  · Moderate aortic regurgitation.  · There is severe aortic valve stenosis.  · Aortic valve area is 0.94 cm2; peak velocity is 3.29 m/s; mean gradient is 26 mmHg.  · Mild pulmonic regurgitation.  · Mild tricuspid regurgitation.  · There is mild pulmonary hypertension.  · Normal central venous pressure (3 mmHg).  · The estimated PA systolic pressure is 44 mmHg.      Results for orders placed during the hospital encounter of 12/01/22    Echo    Interpretation Summary  · The left ventricle is normal in size with moderate concentric hypertrophy and normal systolic function.  · The estimated ejection fraction is 55%.  · Grade I left ventricular diastolic dysfunction.  · Normal right ventricular size with normal right ventricular systolic function.  · Severe left atrial enlargement.  · Moderate right atrial enlargement.  · Mild aortic regurgitation.  · There is moderate-to-severe aortic valve stenosis.  · Aortic valve area is 1.16 cm2; peak velocity is 3.59 m/s; mean gradient is 31 mmHg.  · Moderate tricuspid regurgitation.  · Normal central venous pressure (3 mmHg).  · The estimated PA systolic pressure is 53 mmHg.  · There is pulmonary hypertension.      Results for orders placed in visit on 06/02/22    Echo    Interpretation Summary  · The left ventricle is normal in size  with concentric hypertrophy and normal systolic function.  · The estimated ejection fraction is 55%.  · Grade II left ventricular diastolic dysfunction.  · Normal right ventricular size with normal right ventricular systolic function.  · Severe left atrial enlargement.  · Mild right atrial enlargement.  · The aortic valve is trileaflet but malformed.  · Moderate aortic regurgitation.  · There is moderate-to-severe aortic valve stenosis.  · Aortic valve area is 1.00 cm2; peak velocity is 3.38 m/s; mean gradient is 29 mmHg.  · Mild mitral regurgitation.  · Mild to moderate tricuspid regurgitation.  · Normal central venous pressure (3 mmHg).  · The estimated PA systolic pressure is 51 mmHg.  · There is pulmonary hypertension.      CURRENT/PREVIOUS VISIT EKG  Results for orders placed or performed during the hospital encounter of 06/24/23   EKG 12-lead    Collection Time: 06/24/23  9:58 PM    Narrative    Test Reason : R07.9,    Vent. Rate : 084 BPM     Atrial Rate : 084 BPM     P-R Int : 152 ms          QRS Dur : 088 ms      QT Int : 394 ms       P-R-T Axes : 000 003 030 degrees     QTc Int : 465 ms    Normal sinus rhythm  Anterior infarct ,age undetermined  Abnormal ECG  When compared with ECG of 13-JUN-2023 13:27,  Anterior infarct is now Present    Referred By: [email or web address]   SELF           Confirmed By:            ASSESSMENT/PLAN:     Active Hospital Problems    Diagnosis    *Acute respiratory failure with hypoxia and hypercarbia    CHF exacerbation    Uncontrolled hypertension    CKD (chronic kidney disease) stage 3, GFR 30-59 ml/min       ASSESSMENT & PLAN:     Acute on Chronic Respiratory Failure with Hypoxia and Hypercapneia  Acute CHF exacerbation  HTN Urgency  CKD  Anemia  Severe AS S/P [place] Valvuloplasty on 6/13  Pulmonary HTN      RECOMMENDATIONS:    Continue to diurese  Continue to control BP   ECHO  ILD per pulmonary  I believe this is a component of both pulmonary and cardiac sources.  Will  follow    Stefanie Marroquin NP  Department of Cardiology  Date of Service: 06/25/2023        I have personally interviewed and examined the patient, I have reviewed the Nurse Practitioner's history and physical, assessment, and plan. I have personally evaluated the patient at bedside and agree with the findings and made appropriate changes as necessary in recommendations.    Elham Hilario MD  Department of Cardiology  [facility]  6/25/23

## 2023-06-25 NOTE — CARE UPDATE
06/24/23 2200   Patient Assessment/Suction   Level of Consciousness (AVPU) alert   Respiratory Effort Mild;Short of breath   Expansion/Accessory Muscles/Retractions accessory muscle use   All Lung Fields Breath Sounds diminished   JOAQUÍN Breath Sounds crackles   LLL Breath Sounds diminished   RUL Breath Sounds crackles   RML Breath Sounds crackles   RLL Breath Sounds crackles   Rhythm/Pattern, Respiratory shortness of breath   Cough Frequency no cough   Skin Integrity   $ Wound Care Tech Time 15 min   Area Observed Bridge of nose;Corner lip   Skin Appearance without discoloration   PRE-TX-O2   Device (Oxygen Therapy) BIPAP   $ Is the patient on Low Flow Oxygen? Yes   Oxygen Concentration (%) 50   SpO2 (!) 94 %   Pulse Oximetry Type Continuous   $ Pulse Oximetry - Multiple Charge Pulse Oximetry - Multiple   Ready to Wean/Extubation Screen   FIO2<=50 (chart decimal) 0.5   Preset CPAP/BiPAP Settings   Mode Of Delivery BiPAP   $ CPAP/BiPAP Daily Charge BiPAP/CPAP Daily   $ Initial CPAP/BiPAP Setup? Yes   $ Is patient using? Yes   Size of Mask Small   Sized Appropriately? Yes   Equipment Type V60   Airway Device Type small full face mask   Ipap 16   EPAP (cm H2O) 8   Pressure Support (cm H2O) 8   Set Rate (Breaths/Min) 10   ITime (sec) 1   Rise Time (sec) 3   Patient CPAP/BiPAP Settings   CPAP/BIPAP ID 13   RR Total (Breaths/Min) 29   Tidal Volume (mL) 603   VE Minute Ventilation (L/min) 17.3 L/min   Peak Inspiratory Pressure (cm H2O) 17   TiTOT (%) 28   Total Leak (L/Min) 30   Patient Trigger - ST Mode Only (%) 100   CPAP/BiPAP Alarms   High Pressure (cm H2O) 40   Low Pressure (cm H2O) 2   Minute Ventilation (L/Min) 3   High RR (breaths/min) 40   Low RR (breaths/min) 10   Apnea (Sec) 20   Education   $ Education BiPAP;15 min   Respiratory Evaluation   $ Care Plan Tech Time 15 min   $ Eval/Re-eval Charges Evaluation   Evaluation For New Orders   Admitting Diagnosis shortness of breath   Home Oxygen   Has Home Oxygen? Yes    Liter Flow 6   Duration continuous   Route nasal cannula   Mode continuous

## 2023-06-26 ENCOUNTER — TELEPHONE (OUTPATIENT)
Dept: TRANSPLANT | Facility: CLINIC | Age: 73
End: 2023-06-26
Payer: MEDICARE

## 2023-06-26 LAB
ANION GAP SERPL CALC-SCNC: 5 MMOL/L (ref 8–16)
BUN SERPL-MCNC: 51 MG/DL (ref 8–23)
CALCIUM SERPL-MCNC: 8.8 MG/DL (ref 8.7–10.5)
CHLORIDE SERPL-SCNC: 105 MMOL/L (ref 95–110)
CO2 SERPL-SCNC: 27 MMOL/L (ref 23–29)
CREAT SERPL-MCNC: 1.8 MG/DL (ref 0.5–1.4)
ERYTHROCYTE [DISTWIDTH] IN BLOOD BY AUTOMATED COUNT: 14.9 % (ref 11.5–14.5)
EST. GFR  (NO RACE VARIABLE): 29 ML/MIN/1.73 M^2
GLUCOSE SERPL-MCNC: 125 MG/DL (ref 70–110)
HCT VFR BLD AUTO: 23.4 % (ref 37–48.5)
HGB BLD-MCNC: 7.1 G/DL (ref 12–16)
MCH RBC QN AUTO: 28.7 PG (ref 27–31)
MCHC RBC AUTO-ENTMCNC: 30.3 G/DL (ref 32–36)
MCV RBC AUTO: 95 FL (ref 82–98)
PLATELET # BLD AUTO: 201 K/UL (ref 150–450)
PMV BLD AUTO: 10.4 FL (ref 9.2–12.9)
POTASSIUM SERPL-SCNC: 4.3 MMOL/L (ref 3.5–5.1)
RBC # BLD AUTO: 2.47 M/UL (ref 4–5.4)
SODIUM SERPL-SCNC: 137 MMOL/L (ref 136–145)
WBC # BLD AUTO: 9.07 K/UL (ref 3.9–12.7)

## 2023-06-26 PROCEDURE — 21400001 HC TELEMETRY ROOM

## 2023-06-26 PROCEDURE — 94761 N-INVAS EAR/PLS OXIMETRY MLT: CPT

## 2023-06-26 PROCEDURE — 63600175 PHARM REV CODE 636 W HCPCS: Performed by: INTERNAL MEDICINE

## 2023-06-26 PROCEDURE — 25000003 PHARM REV CODE 250: Performed by: INTERNAL MEDICINE

## 2023-06-26 PROCEDURE — 99233 PR SUBSEQUENT HOSPITAL CARE,LEVL III: ICD-10-PCS | Mod: ,,, | Performed by: INTERNAL MEDICINE

## 2023-06-26 PROCEDURE — 25000242 PHARM REV CODE 250 ALT 637 W/ HCPCS: Performed by: INTERNAL MEDICINE

## 2023-06-26 PROCEDURE — 99232 SBSQ HOSP IP/OBS MODERATE 35: CPT | Mod: ,,, | Performed by: INTERNAL MEDICINE

## 2023-06-26 PROCEDURE — 99900035 HC TECH TIME PER 15 MIN (STAT)

## 2023-06-26 PROCEDURE — 80048 BASIC METABOLIC PNL TOTAL CA: CPT | Performed by: INTERNAL MEDICINE

## 2023-06-26 PROCEDURE — 85027 COMPLETE CBC AUTOMATED: CPT | Performed by: INTERNAL MEDICINE

## 2023-06-26 PROCEDURE — 99900031 HC PATIENT EDUCATION (STAT)

## 2023-06-26 PROCEDURE — 94660 CPAP INITIATION&MGMT: CPT

## 2023-06-26 PROCEDURE — 99233 SBSQ HOSP IP/OBS HIGH 50: CPT | Mod: ,,, | Performed by: INTERNAL MEDICINE

## 2023-06-26 PROCEDURE — 94799 UNLISTED PULMONARY SVC/PX: CPT

## 2023-06-26 PROCEDURE — 99232 PR SUBSEQUENT HOSPITAL CARE,LEVL II: ICD-10-PCS | Mod: ,,, | Performed by: INTERNAL MEDICINE

## 2023-06-26 PROCEDURE — 36415 COLL VENOUS BLD VENIPUNCTURE: CPT | Performed by: INTERNAL MEDICINE

## 2023-06-26 PROCEDURE — 27000221 HC OXYGEN, UP TO 24 HOURS

## 2023-06-26 PROCEDURE — 94640 AIRWAY INHALATION TREATMENT: CPT

## 2023-06-26 RX ORDER — CHLORHEXIDINE GLUCONATE ORAL RINSE 1.2 MG/ML
15 SOLUTION DENTAL 2 TIMES DAILY
Status: DISCONTINUED | OUTPATIENT
Start: 2023-06-26 | End: 2023-06-29

## 2023-06-26 RX ORDER — FUROSEMIDE 10 MG/ML
40 INJECTION INTRAMUSCULAR; INTRAVENOUS 2 TIMES DAILY
Status: DISCONTINUED | OUTPATIENT
Start: 2023-06-26 | End: 2023-06-27

## 2023-06-26 RX ORDER — MUPIROCIN 20 MG/G
OINTMENT TOPICAL 2 TIMES DAILY
Status: DISCONTINUED | OUTPATIENT
Start: 2023-06-26 | End: 2023-06-30 | Stop reason: HOSPADM

## 2023-06-26 RX ORDER — PREDNISONE 20 MG/1
60 TABLET ORAL DAILY
Status: DISCONTINUED | OUTPATIENT
Start: 2023-06-26 | End: 2023-06-30 | Stop reason: HOSPADM

## 2023-06-26 RX ORDER — BUDESONIDE 0.5 MG/2ML
0.5 INHALANT ORAL EVERY 12 HOURS
Status: DISCONTINUED | OUTPATIENT
Start: 2023-06-26 | End: 2023-06-26

## 2023-06-26 RX ORDER — BUDESONIDE 0.5 MG/2ML
0.5 INHALANT ORAL EVERY 12 HOURS
Status: DISCONTINUED | OUTPATIENT
Start: 2023-06-26 | End: 2023-06-30 | Stop reason: HOSPADM

## 2023-06-26 RX ADMIN — FUROSEMIDE 40 MG: 20 INJECTION, SOLUTION INTRAMUSCULAR; INTRAVENOUS at 12:06

## 2023-06-26 RX ADMIN — IPRATROPIUM BROMIDE AND ALBUTEROL SULFATE 3 ML: 2.5; .5 SOLUTION RESPIRATORY (INHALATION) at 02:06

## 2023-06-26 RX ADMIN — IPRATROPIUM BROMIDE AND ALBUTEROL SULFATE 3 ML: 2.5; .5 SOLUTION RESPIRATORY (INHALATION) at 07:06

## 2023-06-26 RX ADMIN — CHLORHEXIDINE GLUCONATE 15 ML: 1.2 RINSE ORAL at 09:06

## 2023-06-26 RX ADMIN — FUROSEMIDE 40 MG: 10 INJECTION, SOLUTION INTRAVENOUS at 05:06

## 2023-06-26 RX ADMIN — AMLODIPINE BESYLATE 10 MG: 5 TABLET ORAL at 10:06

## 2023-06-26 RX ADMIN — HYDRALAZINE HYDROCHLORIDE 50 MG: 25 TABLET ORAL at 03:06

## 2023-06-26 RX ADMIN — IPRATROPIUM BROMIDE AND ALBUTEROL SULFATE 3 ML: 2.5; .5 SOLUTION RESPIRATORY (INHALATION) at 01:06

## 2023-06-26 RX ADMIN — HEPARIN SODIUM 5000 UNITS: 5000 INJECTION INTRAVENOUS; SUBCUTANEOUS at 06:06

## 2023-06-26 RX ADMIN — CLONAZEPAM 0.5 MG: 0.5 TABLET ORAL at 09:06

## 2023-06-26 RX ADMIN — CLONAZEPAM 0.5 MG: 0.5 TABLET ORAL at 12:06

## 2023-06-26 RX ADMIN — PREDNISONE 60 MG: 20 TABLET ORAL at 10:06

## 2023-06-26 RX ADMIN — HEPARIN SODIUM 5000 UNITS: 5000 INJECTION INTRAVENOUS; SUBCUTANEOUS at 09:06

## 2023-06-26 RX ADMIN — PAROXETINE 50 MG: 20 TABLET, FILM COATED ORAL at 06:06

## 2023-06-26 RX ADMIN — ATORVASTATIN CALCIUM 40 MG: 40 TABLET, FILM COATED ORAL at 10:06

## 2023-06-26 RX ADMIN — MUPIROCIN 1 G: 20 OINTMENT TOPICAL at 09:06

## 2023-06-26 RX ADMIN — HEPARIN SODIUM 5000 UNITS: 5000 INJECTION INTRAVENOUS; SUBCUTANEOUS at 03:06

## 2023-06-26 RX ADMIN — BUDESONIDE 0.5 MG: 0.5 INHALANT RESPIRATORY (INHALATION) at 07:06

## 2023-06-26 RX ADMIN — METHYLPREDNISOLONE SODIUM SUCCINATE 40 MG: 40 INJECTION, POWDER, FOR SOLUTION INTRAMUSCULAR; INTRAVENOUS at 03:06

## 2023-06-26 RX ADMIN — QUETIAPINE 100 MG: 100 TABLET ORAL at 09:06

## 2023-06-26 RX ADMIN — ISOSORBIDE MONONITRATE 60 MG: 60 TABLET, EXTENDED RELEASE ORAL at 10:06

## 2023-06-26 RX ADMIN — HYDRALAZINE HYDROCHLORIDE 50 MG: 25 TABLET ORAL at 06:06

## 2023-06-26 RX ADMIN — PANTOPRAZOLE SODIUM 40 MG: 40 TABLET, DELAYED RELEASE ORAL at 06:06

## 2023-06-26 RX ADMIN — CLONAZEPAM 0.5 MG: 0.5 TABLET ORAL at 03:06

## 2023-06-26 RX ADMIN — CHLORHEXIDINE GLUCONATE 15 ML: 1.2 RINSE ORAL at 10:06

## 2023-06-26 RX ADMIN — HYDRALAZINE HYDROCHLORIDE 50 MG: 25 TABLET ORAL at 09:06

## 2023-06-26 RX ADMIN — BUDESONIDE 0.5 MG: 0.5 INHALANT RESPIRATORY (INHALATION) at 10:06

## 2023-06-26 RX ADMIN — METOPROLOL SUCCINATE 50 MG: 50 TABLET, FILM COATED, EXTENDED RELEASE ORAL at 10:06

## 2023-06-26 RX ADMIN — MUPIROCIN 1 G: 20 OINTMENT TOPICAL at 10:06

## 2023-06-26 RX ADMIN — CLOPIDOGREL BISULFATE 75 MG: 75 TABLET, FILM COATED ORAL at 10:06

## 2023-06-26 RX ADMIN — FUROSEMIDE 40 MG: 10 INJECTION, SOLUTION INTRAVENOUS at 10:06

## 2023-06-26 NOTE — PROGRESS NOTES
[facility]  Department of Cardiology  Progress Note      PATIENT NAME: Betty Bacon  MRN: [id number]  TODAY'S DATE: 06/26/2023  ADMIT DATE: 6/24/2023                          CONSULT REQUESTED BY: Zaki Awad MD    SUBJECTIVE     PRINCIPAL PROBLEM: Acute respiratory failure with hypoxia and hypercarbia    6/26/23:  Patient seen resting in bed with family at bedside.  She reports that her breathing has improved somewhat.  Nurse states that she still continues to desaturate with movement at times.  Patient is pulmonary fibrosis doctor has been in touch and is offering palliative versus comfort care due to end-stage pulmonary fibrosis.  Patient is diuresing well on furosemide.    REASON FOR CONSULT:  SOB      HPI:    73 year old female patient with a PMH significant for  restrictive lung disease, asbestosis, PVD, anemia, CAD history of PCI in 2012, recent angiogram May 1 no obstructive disease and patent stents, severe AS s/p balloon valvuloplasty on 6/13 admitted with acute respiratory failure. BNP  803. CXR shows pulmonary edema.  Creatinine 1.6 Hemoglobin 8.1.Blood pressure over 200s over night.          FROM H AND P       Shortness of Breath       Sob that started last night. Worsening through day. Cough, chills, nausea, increased urination without pain. Weakness. 2-3 weeks ago  had a balloon procedure for heart done.          HPI:  73-year-old female history of restrictive lung disease, aortic stenosis status post aortic valvuloplasty 6/12, hypertension, heart failure with preserved EF.      Patient presents to the ER after progressive decline with weakness and sob after valvuloplasty.  She would stopped her Lasix for about 3 days according to instructions from the proceduralist.  Has had about 2 lb weight gain according to daughter, leg swelling.     In the ER patient had elevated BNP, with short of breath and placed on BiPAP.  ABG on Bipap 50% FiO2 with CO2 retention and pO2 70.  Patient  to be admitted for ICU for respiratory failure.  Review of patient's allergies indicates:   Allergen Reactions    Propoxyphene n-acetaminophen Other (See Comments)     Other reaction(s): Unknown    Codeine Anxiety       Past Medical History:   Diagnosis Date    Acute coronary artery obstruction without MI 10/2012    Benign hypertension     COPD (chronic obstructive pulmonary disease)     Coronary artery disease     Disorder of kidney and ureter     Dr. Morrow    Hepatitis B core antibody positive 03/03/2021    Negative sAg, suggests previous exposure but no chronic/active Hep B. At risk for reactivation with any immunosuppression medication, steroids, chemo, etc.      History of electroconvulsive therapy     Hyperlipidemia LDL goal < 70     Left ankle sprain     Major depressive disorder, recurrent episode, severe     s/p ECT    Positive AKIRA (antinuclear antibody) 03/03/2021    PVD (peripheral vascular disease)      Past Surgical History:   Procedure Laterality Date    ANGIOGRAM, CORONARY, WITH LEFT HEART CATHETERIZATION N/A 5/1/2023    Procedure: Angiogram, Coronary, with Left Heart Cath;  Surgeon: Neeraj Finn MD;  Location: [facility] CATH LAB;  Service: Cardiology;  Laterality: N/A;    AORTIC VALVULOPLASTY N/A 6/13/2023    Procedure: REPAIR, AORTIC VALVE;  Surgeon: Neeraj Finn MD;  Location: [facility] CATH LAB;  Service: Cardiology;  Laterality: N/A;    CHOLECYSTECTOMY      CORONARY ANGIOPLASTY      CORONARY ANGIOPLASTY WITH STENT PLACEMENT  10/2012    2 stents RCA (100% stenosis)    EYE SURGERY      cataract surgery    HYSTERECTOMY      LINA, ovaries intact. uterine prolapse    ILIAC ARTERY STENT      SMALL BOWEL ENTEROSCOPY N/A 2/20/2023    Procedure: ENTEROSCOPY;  Surgeon: Margy Dumont MD;  Location: [facility] ENDO;  Service: Endoscopy;  Laterality: N/A;    TONSILLECTOMY      TOTAL VAGINAL HYSTERECTOMY       Social History     Tobacco Use    Smoking status: Former     Packs/day: 2.00     Years: 40.00      Pack years: 80.00     Types: Cigarettes     Quit date: 2009     Years since quittin.5    Smokeless tobacco: Never   Substance Use Topics    Alcohol use: Yes     Alcohol/week: 1.0 standard drink     Types: 1 Shots of liquor per week     Comment: rare /occ    Drug use: No        REVIEW OF SYSTEMS    +sob  +weakness    OBJECTIVE     VITAL SIGNS (Most Recent)  Temp: 97.7 °F (36.5 °C) (23 0730)  Pulse: 68 (23 0800)  Resp: 14 (23)  BP: (!) 116/52 (23)  SpO2: (!) 94 % (23)    VENTILATION STATUS  Resp: 14 (23)  SpO2: (!) 94 % (23)  Oxygen Concentration (%):  [44-55] 44        I & O (Last 24H):  Intake/Output Summary (Last 24 hours) at 2023 0901  Last data filed at 2023 0645  Gross per 24 hour   Intake 480 ml   Output 1620 ml   Net -1140 ml       WEIGHTS  Wt Readings from Last 3 Encounters:   23 0103 89.9 kg (198 lb 3.1 oz)   23 2203 85.3 kg (188 lb)   23 1138 89.8 kg (198 lb)   23 0718 85.1 kg (187 lb 9.8 oz)   23 0725 84.8 kg (186 lb 15.2 oz)   23 0825 83.5 kg (184 lb)   23 0757 83.9 kg (184 lb 15.5 oz)   23 1603 86.2 kg (190 lb)   23 1137 86.2 kg (190 lb)       PHYSICAL EXAM  GENERAL: elderly female, NAD  HEENT: Normocephalic  NECK: No JVD. No bruit..   CARDIAC: systolic murmur present  CHEST ANATOMY: normal.   LUNGS: diminished  ABDOMEN: Soft   EXTREMITIES: No cyanosis, clubbing or edema noted at this time.  CENTRAL NERVOUS SYSTEM: No focal motor or sensory deficits noted. .   MUSCLE STRENGTH & TONE: No noteable weakness, atrophy or abnormal movement.     HOME MEDICATIONS:  No current facility-administered medications on file prior to encounter.     Current Outpatient Medications on File Prior to Encounter   Medication Sig Dispense Refill    amLODIPine (NORVASC) 10 MG tablet Take 1 tablet (10 mg total) by mouth once daily. 30 tablet 5    colchicine (COLCRYS) 0.6 mg tablet Take 2  tablets (1.2mg total) by mouth at gout flare onset, may repeat 1 tablet (0.6mg total) in one hour, then take 1 tablet (0.6mg total) by mouth once daily until pain resolves      acetaminophen (TYLENOL) 325 MG tablet Take 325 mg by mouth every 6 (six) hours as needed for Pain.      albuterol sulfate (PROAIR RESPICLICK) 90 mcg/actuation AePB Inhale 2 puffs into the lungs every 4 to 6 hours as needed. 1 each 3    albuterol-ipratropium (DUO-NEB) 2.5 mg-0.5 mg/3 mL nebulizer solution Take 3 mLs by nebulization every 6 (six) hours as needed for Wheezing. Rescue 75 mL 11    atorvastatin (LIPITOR) 40 MG tablet TAKE 1 TABLET BY MOUTH EVERY DAY (Patient taking differently: Take 40 mg by mouth once daily.) 90 tablet 3    clonazePAM (KLONOPIN) 1 MG disintegrating tablet Take 1 tablet (1 mg total) by mouth 2 (two) times daily as needed (anxiety). 60 tablet 1    clopidogreL (PLAVIX) 75 mg tablet TAKE 1 TABLET BY MOUTH EVERY DAY (Patient taking differently: Take 75 mg by mouth once daily.) 90 tablet 3    colestipoL (COLESTID) 1 gram Tab Take 1 tablet (1 g total) by mouth 2 (two) times daily as needed (diarrhea). Other drugs should be administered at least 1 hour before or 4 hours after colestipol.      fluticasone propionate (FLONASE) 50 mcg/actuation nasal spray 1 spray (50 mcg total) by Each Nostril route once daily. 16 g PRN    fluticasone-umeclidin-vilanter (TRELEGY ELLIPTA) 200-62.5-25 mcg inhaler Inhale 1 puff into the lungs once daily. 180 each 11    hydrALAZINE (APRESOLINE) 50 MG tablet Take 1 tablet (50 mg total) by mouth every 8 (eight) hours. 90 tablet 3    isosorbide mononitrate (IMDUR) 60 MG 24 hr tablet TAKE 1 TABLET BY MOUTH EVERY DAY 90 tablet 3    metoprolol succinate (TOPROL-XL) 50 MG 24 hr tablet Take 1 tablet (50 mg total) by mouth once daily. 30 tablet 5    pantoprazole (PROTONIX) 40 MG tablet Take 1 tablet (40 mg total) by mouth before breakfast. 30 tablet 11    paroxetine (PAXIL) 10 MG tablet Take 10 mg by  mouth every morning. Total 50 mg      paroxetine (PAXIL) 40 MG tablet TAKE 1 TABLET BY MOUTH EVERY DAY (Patient taking differently: Take 40 mg by mouth once daily.) 90 tablet 3    QUEtiapine (SEROQUEL) 100 MG Tab Take 1 tablet (100 mg total) by mouth every evening.      [DISCONTINUED] TRELEGY ELLIPTA 200-62.5-25 mcg inhaler INHALE 1 PUFF INTO THE LUNGS ONCE DAILY. 180 each 2       SCHEDULED MEDS:   albuterol-ipratropium  3 mL Nebulization Q6H    amLODIPine  10 mg Oral Daily    atorvastatin  40 mg Oral Daily    chlorhexidine  15 mL Mouth/Throat BID    clopidogreL  75 mg Oral Daily    furosemide (LASIX) injection  40 mg Intravenous Q12H    heparin (porcine)  5,000 Units Subcutaneous Q8H    hydrALAZINE  50 mg Oral Q8H    isosorbide mononitrate  60 mg Oral Daily    methylPREDNISolone sodium succinate injection  40 mg Intravenous Q12H    metoprolol succinate  50 mg Oral Daily    mupirocin   Nasal BID    pantoprazole  40 mg Oral Before breakfast    paroxetine  50 mg Oral QAM    QUEtiapine  100 mg Oral QHS       CONTINUOUS INFUSIONS:    PRN MEDS:acetaminophen, albuterol sulfate, clonazePAM, colchicine, dextrose 50%, dextrose 50%, glucagon (human recombinant), glucose, glucose, hydrALAZINE, magnesium sulfate IVPB, magnesium sulfate IVPB, naloxone, sodium chloride 0.9%    LABS AND DIAGNOSTICS     CBC LAST 3 DAYS  Recent Labs   Lab 06/24/23  2227 06/24/23  2243 06/25/23  0430 06/26/23  0443   WBC 11.39  --  9.60 9.07   RBC 3.07*  --  2.82* 2.47*   HGB 8.8*  --  8.1* 7.1*   HCT 29.1* 28* 27.1* 23.4*   MCV 95  --  96 95   MCH 28.7  --  28.7 28.7   MCHC 30.2*  --  29.9* 30.3*   RDW 14.8*  --  14.7* 14.9*     --  215 201   MPV 10.1  --  10.1 10.4   GRAN 81.9*  9.3*  --   --   --    LYMPH 11.2*  1.3  --   --   --    MONO 4.4  0.5  --   --   --    BASO 0.04  --   --   --    NRBC 0  --   --   --          COAGULATION LAST 3 DAYS  No results for input(s): LABPT, INR, APTT in the last 168 hours.    CHEMISTRY LAST 3  DAYS  Recent Labs   Lab 06/24/23 2227 06/24/23 2243 06/25/23  0430 06/26/23  0443     --  141 137   K 4.9  --  4.2 4.3     --  107 105   CO2 26  --  27 27   ANIONGAP 8  --  7* 5*   BUN 36*  --  41* 51*   CREATININE 1.4  --  1.6* 1.8*   *  --  204* 125*   CALCIUM 9.0  --  9.1 8.8   PH  --  7.309*  --   --    MG 1.4*  --  1.8  --    ALBUMIN 3.3*  --   --   --    PROT 7.2  --   --   --    ALKPHOS 126  --   --   --    ALT 13  --   --   --    AST 14  --   --   --    BILITOT 1.0  --   --   --          CARDIAC PROFILE LAST 3 DAYS  Recent Labs   Lab 06/24/23 2227 06/25/23  0430   *  --    TROPONINIHS 24.2* 21.6*         ENDOCRINE LAST 3 DAYS  Recent Labs   Lab 06/25/23  1320   PROCAL 0.22       LAST ARTERIAL BLOOD GAS  ABG  Recent Labs   Lab 06/24/23  2243   PH 7.309*   PO2 70*   PCO2 52.0*   HCO3 26.1   BE 0         LAST 7 DAYS MICROBIOLOGY   Microbiology Results (last 7 days)       ** No results found for the last 168 hours. **            MOST RECENT IMAGING  Cardiac catheterization    The estimated blood loss was <50 mL.    Indication     Betty Bacon is a 73 y.o. female  referred by Dr Borjas for   severe AS.      Patient was evaluated by the heart team and deemed cohort C for TAVR, very   frail to survive. She was offered valvuloplasty as a bridge to TAVR    The patient was showed a power point presentation including a detailed   discussion about risk of  complete heart block, stroke, MI, bleeding   access site complications including limb loss, allergy, kidney failure   including dialysis and death.    Operators  Surgeon(s) and Role:     * Neeraj Finn MD - Primary     * Nelson Fried MD - Fellow     Description of procedure     The patient was brought to the catheterization lab in the fasting state,   she was prepped in the usual sterile fashion. The anesthesiologist   administered the sedation. An arterial line was placed by the anesthesia   department. Access was  obtained  in the right common femoral artery with a   micropuncture kit. Two proglide devices were used to pre-close the artery   and a 12Fr sheath was inserted.  The aortic valve was crossed with the   help of a AL2 catheter and a stiff straight glidewire. Using a 6Fr pigtail   catheter a curved shaped stiff wire was placed in the ventricle. A balloon   aortic   valvuloplasty was performed with a 22 mm True FLow balloon.     An aortogram showed trace AI.     The patient was transported to the recovery unit in stable condition      Pre procedure AV mean gradient 30 mmHg, post BAV 20 mm Hg    Complications: None  Estimated blood loss: <50 mL  Contrast used: 20 cc    Summary:     Successful BAV with 22 mm TrueFlow balloon.     Recommendations:    Routine post cath care  Follow up with me in 6 weeks    Neeraj Blevins MD [facility]  Interventional Cardiology  Structural/Valvular heart disease  [phone]    The procedure log was documented by Documenter: Lalo Quintanilla RT;   RT Levon; Chidi Brink and verified by Neerja Blevins MD.    Date: 6/13/2023  Time: 3:26 PM      ECHOCARDIOGRAM RESULTS (last 5)  Results for orders placed during the hospital encounter of 06/08/23    Echo    Interpretation Summary  · The left ventricle is normal in size with eccentric hypertrophy and normal systolic function. The estimated ejection fraction is 55%.  · Normal right ventricular size with normal right ventricular systolic function.  · Grade I left ventricular diastolic dysfunction.  · Moderate left atrial enlargement.  · There is severe aortic valve stenosis.  · Aortic valve area is 0.75 cm2; peak velocity is 4.12 m/s; mean gradient is 44 mmHg.  · Mild aortic regurgitation.  · The estimated PA systolic pressure is 37 mmHg.  · Normal central venous pressure (3 mmHg).      Results for orders placed during the hospital encounter of 05/01/23    Echo    Interpretation Summary  · Moderate left atrial enlargement.  · The left  ventricle is mildly enlarged with mild concentric hypertrophy and normal systolic function.  · The estimated ejection fraction is 60%.  · Grade II left ventricular diastolic dysfunction.  · Mild right atrial enlargement.  · Normal right ventricular size with normal right ventricular systolic function.  · Aortic valve area is 1.16 cm2; peak velocity is 4.03 m/s; mean gradient is 34 mmHg.  · There is moderate-to-severe aortic valve stenosis.  · Mild-to-moderate aortic regurgitation.  · Mild to moderate tricuspid regurgitation.  · Intermediate central venous pressure (8 mmHg).  · There is pulmonary hypertension. The estimated PA systolic pressure is 51 mmHg.  · Trivial pericardial effusion.      Results for orders placed during the hospital encounter of 12/20/22    Echo    Interpretation Summary  · The left ventricle is normal in size with severe concentric hypertrophy and normal systolic function.  · Grade II left ventricular diastolic dysfunction.  · The estimated ejection fraction is 65%.  · Normal right ventricular size with normal right ventricular systolic function.  · Moderate aortic regurgitation.  · There is severe aortic valve stenosis.  · Aortic valve area is 0.94 cm2; peak velocity is 3.29 m/s; mean gradient is 26 mmHg.  · Mild pulmonic regurgitation.  · Mild tricuspid regurgitation.  · There is mild pulmonary hypertension.  · Normal central venous pressure (3 mmHg).  · The estimated PA systolic pressure is 44 mmHg.      Results for orders placed during the hospital encounter of 12/01/22    Echo    Interpretation Summary  · The left ventricle is normal in size with moderate concentric hypertrophy and normal systolic function.  · The estimated ejection fraction is 55%.  · Grade I left ventricular diastolic dysfunction.  · Normal right ventricular size with normal right ventricular systolic function.  · Severe left atrial enlargement.  · Moderate right atrial enlargement.  · Mild aortic regurgitation.  ·  There is moderate-to-severe aortic valve stenosis.  · Aortic valve area is 1.16 cm2; peak velocity is 3.59 m/s; mean gradient is 31 mmHg.  · Moderate tricuspid regurgitation.  · Normal central venous pressure (3 mmHg).  · The estimated PA systolic pressure is 53 mmHg.  · There is pulmonary hypertension.      Results for orders placed in visit on 06/02/22    Echo    Interpretation Summary  · The left ventricle is normal in size with concentric hypertrophy and normal systolic function.  · The estimated ejection fraction is 55%.  · Grade II left ventricular diastolic dysfunction.  · Normal right ventricular size with normal right ventricular systolic function.  · Severe left atrial enlargement.  · Mild right atrial enlargement.  · The aortic valve is trileaflet but malformed.  · Moderate aortic regurgitation.  · There is moderate-to-severe aortic valve stenosis.  · Aortic valve area is 1.00 cm2; peak velocity is 3.38 m/s; mean gradient is 29 mmHg.  · Mild mitral regurgitation.  · Mild to moderate tricuspid regurgitation.  · Normal central venous pressure (3 mmHg).  · The estimated PA systolic pressure is 51 mmHg.  · There is pulmonary hypertension.      CURRENT/PREVIOUS VISIT EKG  Results for orders placed or performed during the hospital encounter of 06/24/23   EKG 12-lead    Collection Time: 06/24/23  9:58 PM    Narrative    Test Reason : R07.9,    Vent. Rate : 084 BPM     Atrial Rate : 084 BPM     P-R Int : 152 ms          QRS Dur : 088 ms      QT Int : 394 ms       P-R-T Axes : 000 003 030 degrees     QTc Int : 465 ms    Normal sinus rhythm  Anterior infarct ,age undetermined  Abnormal ECG  When compared with ECG of 13-JUN-2023 13:27,  Anterior infarct is now Present    Referred By: [email or web address]   SELF           Confirmed By:            ASSESSMENT/PLAN:     Active Hospital Problems    Diagnosis    *Acute respiratory failure with hypoxia and hypercarbia    CHF exacerbation    CARMELITA (acute kidney injury)    Uncontrolled  hypertension    CKD (chronic kidney disease) stage 3, GFR 30-59 ml/min       ASSESSMENT & PLAN:     Acute on Chronic Respiratory Failure with Hypoxia and Hypercapneia  Acute CHF exacerbation  Acute on chronic anemia   HTN Urgency  CKD  Severe AS S/P Ballon Valvuloplasty on 6/13  Pulmonary HTN      RECOMMENDATIONS:    Continue furosemide 40 mg IV b.i.d..  Continue to check and replace potassium and magnesium. Goal for potassium is 4.0, and goal for magnesium is 2.0.   H&H down today. Trend labs. Transfuse if it drops further.   Plan is to optimize patient's fluid status before dc home. Would certainly continue at least low dose furosemide at discharge.     Renee Levi NP  Department of Cardiology  Date of Service: 06/26/2023        I have personally interviewed and examined the patient, I have reviewed the Nurse Practitioner's history and physical, assessment, and plan. I have personally evaluated the patient at bedside and agree with the findings and made appropriate changes as necessary in recommendations.    Elham Hilario MD  Department of Cardiology  [facility]  6/26/23

## 2023-06-26 NOTE — PHYSICIAN QUERY
"PT Name: Betty Bacon  MR #: [id number]     DOCUMENTATION CLARIFICATION     CDS/: Carola Piyushdino               Contact information: [phone]  This form is a permanent document in the medical record.    Query Date: June 26, 2023    By submitting this query, we are merely seeking further clarification of documentation.    Please utilize your independent clinical judgment when addressing the question(s) below.    The Medical Record contains the following:  Indicator Location in Medical Record   Risk factors; 72yo female presents w/ severe AS s/p balloon valvuloplasty on 6/13 admitted with acute respiratory failure.  PMHx CKD 3   Cards consult 06/25   Clinical indicators;  Cr 1.4>1.6>1.8  GFR 39>33>29    2lb weight gain w/ leg swelling  A/C diastolic HF    06/25 -1.020L    "She had some increased fluid retention (lasix had been held for a few days). "    "She would stopped her Lasix for about 3 days according to instructions from the proceduralist."   Labs      H&P       VS flowsheet    Pulmonary consult 06/25     H&P       Intervention;  Cardiology and pulmonary consults  Admitted to ICU  BiPAP     Lasix 40mg IV BID (06/26)    Cardiac diet  daily wts     MD orders        MAR    MD orders       [facility] approved diagnostic criteria for acute kidney injury is based on KDIGO criteria:    An increase in serum creatinine > 0.3mg/dl within 48 hours  OR  Increase in serum creatinine to > 1.5x baseline, which is known or presumed to have occurred within the prior 7 days  OR  Urine volume <0.5 ml/kg/hr for 6 hours       The clinical guidelines noted above are only a system guideline. It does not replace the providers clinical judgment.     Dear Dr Awad, please document a diagnosis to correlate w/ clinical indicators and interventions provided. Thank-you.     [    ] Acute Kidney Failure/Injury unable to bolus IVF's 2nd to A/C diastolic HF and acute respiratory failure     [   x ] Other Acute Kidney " Failure/Injury (please specify)     [    ] Other (please specify)         Please document in your progress notes daily for the duration of treatment until resolved and include in your discharge summary.

## 2023-06-26 NOTE — PLAN OF CARE
Problem: Adult Inpatient Plan of Care  Goal: Plan of Care Review  Outcome: Ongoing, Progressing  Flowsheets (Taken 6/26/2023 0523)  Plan of Care Reviewed With:   patient   daughter  Goal: Patient-Specific Goal (Individualized)  Outcome: Ongoing, Progressing  Flowsheets (Taken 6/26/2023 0523)  Individualized Care Needs: none stated.     Problem: Skin Injury Risk Increased  Goal: Skin Health and Integrity  Outcome: Ongoing, Progressing  Intervention: Optimize Skin Protection  Flowsheets (Taken 6/26/2023 0523)  Pressure Reduction Techniques:   frequent weight shift encouraged   sit time limited to 1 hour  Pressure Reduction Devices:   foam padding utilized   positioning supports utilized   pressure-redistributing mattress utilized   specialty bed utilized  Skin Protection:   incontinence pads utilized   silicone foam dressing in place   skin-to-device areas padded   tubing/devices free from skin contact  Head of Bed (HOB) Positioning: HOB at 30-45 degrees     Problem: Fall Injury Risk  Goal: Absence of Fall and Fall-Related Injury  Outcome: Ongoing, Progressing  Intervention: Identify and Manage Contributors  Flowsheets (Taken 6/26/2023 0523)  Self-Care Promotion:   BADL personal objects within reach   BADL personal routines maintained   independence encouraged  Medication Review/Management: medications reviewed     Problem: Infection  Goal: Absence of Infection Signs and Symptoms  Outcome: Ongoing, Progressing  Intervention: Prevent or Manage Infection  Flowsheets (Taken 6/26/2023 0523)  Fever Reduction/Comfort Measures: lightweight bedding  Infection Management: aseptic technique maintained     Problem: Gas Exchange Impaired  Goal: Optimal Gas Exchange  Outcome: Ongoing, Progressing  Intervention: Optimize Oxygenation and Ventilation  Flowsheets (Taken 6/26/2023 0523)  Airway/Ventilation Management:   airway patency maintained   calming measures promoted   humidification applied  Head of Bed (HOB) Positioning: HOB  at 30-45 degrees     Problem: Noninvasive Ventilation Acute  Goal: Effective Unassisted Ventilation and Oxygenation  Outcome: Ongoing, Progressing  Intervention: Monitor and Manage Noninvasive Ventilation  Flowsheets (Taken 6/26/2023 0523)  Airway/Ventilation Management:   airway patency maintained   calming measures promoted   humidification applied  NPPV/CPAP Maintenance:   mask secure   nasal pillows adjusted   nasal pillows secure   tubes secured     Problem: Fatigue  Goal: Improved Activity Tolerance  Outcome: Ongoing, Progressing  Intervention: Promote Improved Energy  Flowsheets (Taken 6/26/2023 0523)  Fatigue Management:   paced activity encouraged   activity assistance provided  Sleep/Rest Enhancement:   regular sleep/rest pattern promoted   relaxation techniques promoted   awakenings minimized   consistent schedule promoted

## 2023-06-26 NOTE — PROGRESS NOTES
Notified hospitalist MD of resulted H/H this morning. See below for details.        06/26/23 0522   Critical Value Communication   Time Result Received 0522   Critical Test #1 H/H   Critical Test #1 Result 7.1/23.4   Name of Notified Physician/Designee YARED Franklin MD   Date Notified 06/26/23   Time Notified 0530   Read Back Verification Yes   Provider Notification   Reason for Communication Other (Comment)  (notifying MD of decreased H/H)   Provider Name Rigoberto UPTON   Method of Communication Other (Comment)  (secure chat)   Response In department  (MD aware. No new orders at this time.)   Notification Time 0530

## 2023-06-26 NOTE — PROGRESS NOTES
Progress Note  Pulmonary/Critical Care      PATIENT NAME: Betty Bacon  MRN: [id number]  TODAY'S DATE: 2023  9:44 AM  ADMIT DATE: 2023  AGE: 73 y.o. : 1950    HPI / INTERVAL HISTORY  2023 - 73 y.o. female with a PMH of COPD, asbestosis (severe restriction and DLCO decrease), recent COVID pneumonia, CAD, HFpEF, severe aortic stenosis (s/p recent TAVR) presented to ER with increased SOB, generalized weakness.  She had some increased fluid retention (lasix had been held for a few days).  In ER she was placed on BiPAP and moved to ICU.  ROS as below.  She is feeling better at this time.    23: Off of BiPAP since early last night. She could not tolerate it for more than 30 minutes trying to sleep. She is currently saturating ~88% on her home 6 L NC with minimal exertion.    SMOKING HISTORY  Quit 25 years ago.  Smoked 2 PPD x 30 years.     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    REVIEW OF SYSTEMS  GENERAL: Feeling well.  EYES: Vision is good.  ENT: No sinusitis or pharyngitis.   HEART: No chest pain or palpitations.  LUNGS: No SOB, cough, sputum, or wheezing.  GI: No abdominal pain, nausea, vomiting, or diarrhea.  : No urinary urgency, pain, or change in frequency.  SKIN: No lesions or rashes.  MUSCULOSKELETAL: No joint pain or myalgias.  NEURO: No headaches or neuropathy.  LYMPH: No edema or adenopathy.  PSYCH: No anxiety or depression.  ENDO: No weight change.    (See H&P for complete medical, surgical, family, and social history.)    VITAL SIGNS (MOST RECENT)  Temp: 97.7 °F (36.5 °C) (23 0730)  Pulse: 90 (23 0900)  Resp: 15 (23 0900)  BP: (!) 141/60 (23 0900)  SpO2: 97 % (23 09)    INTAKE AND OUTPUT (LAST 24 HOURS):  Intake/Output Summary (Last 24 hours) at 2023 0944  Last data filed at 2023 0645  Gross per 24 hour   Intake 480 ml   Output 1620 ml   Net -1140 ml        WEIGHT  Wt Readings from Last 1 Encounters:   06/25/23 89.9 kg (198 lb 3.1 oz)       PHYSICAL EXAM  GENERAL:  NAD.  HEENT: EOMI  NECK: Supple.   HEART: Normal rate and regular rhythm. 2/6 loud systolic murmur at RUSB radiating to carotids  LUNGS: b/l upper lung field crackles to anterior auscultation and b/l basilar crackles to posterior auscultation. Otherwise clear.   ABDOMEN: Bowel sounds present. Non-tender, no masses palpated.  EXTREMITIES: Normal muscle tone and joint movement, no cyanosis or clubbing.   LYMPHATICS: Trace pretibial pitting edema  SKIN: Dry, intact, no lesions.   NEURO: No gross abnormalities.  PSYCH: Appropriate affect.    RESPIRATORY SUPPORT  Oxygen Concentration (%):  [44-55] 44           LAST ARTERIAL BLOOD GAS  ABG  Recent Labs   Lab 06/24/23  2243   PH 7.309*   PO2 70*   PCO2 52.0*   HCO3 26.1   BE 0       ACUTE PHASE REACTANT (LAST 24 HOURS)  Recent Labs   Lab 06/25/23  1320   CRP 3.36*       CBC LAST (LAST 24 HOURS)  Recent Labs   Lab 06/26/23  0443   WBC 9.07   RBC 2.47*   HGB 7.1*   HCT 23.4*   MCV 95   MCH 28.7   MCHC 30.3*   RDW 14.9*      MPV 10.4       CHEMISTRY LAST (LAST 24 HOURS)  Recent Labs   Lab 06/26/23  0443      K 4.3      CO2 27   ANIONGAP 5*   BUN 51*   CREATININE 1.8*   *   CALCIUM 8.8       COAGULATION LAST (LAST 24 HOURS)  No results for input(s): LABPT, INR, APTT in the last 24 hours.    CARDIAC PROFILE (LAST 24 HOURS)  Recent Labs   Lab 06/24/23  2227   *       LAST 7 DAYS MICROBIOLOGY   Microbiology Results (last 7 days)       ** No results found for the last 168 hours. **            MOST RECENT IMAGING  Cardiac catheterization    The estimated blood loss was <50 mL.    Indication     Betty Bacon is a 73 y.o. female  referred by Dr Borjas for   severe AS.      Patient was evaluated by the heart team and deemed cohort C for TAVR, very   frail to survive. She was offered valvuloplasty as a bridge to TAVR    The  patient was showed a power point presentation including a detailed   discussion about risk of  complete heart block, stroke, MI, bleeding   access site complications including limb loss, allergy, kidney failure   including dialysis and death.    Operators  Surgeon(s) and Role:     * Neeraj Finn MD - Primary     * Nelson Fried MD - Fellow     Description of procedure     The patient was brought to the catheterization lab in the fasting state,   she was prepped in the usual sterile fashion. The anesthesiologist   administered the sedation. An arterial line was placed by the anesthesia   department. Access was obtained  in the right common femoral artery with a   micropuncture kit. Two proglide devices were used to pre-close the artery   and a 12Fr sheath was inserted.  The aortic valve was crossed with the   help of a AL2 catheter and a stiff straight glidewire. Using a 6Fr pigtail   catheter a curved shaped stiff wire was placed in the ventricle. A balloon   aortic   valvuloplasty was performed with a 22 mm True FLow balloon.     An aortogram showed trace AI.     The patient was transported to the recovery unit in stable condition      Pre procedure AV mean gradient 30 mmHg, post BAV 20 mm Hg    Complications: None  Estimated blood loss: <50 mL  Contrast used: 20 cc    Summary:     Successful BAV with 22 mm TrueFlow balloon.     Recommendations:    Routine post cath care  Follow up with me in 6 weeks    Neeraj Blevins MD [facility]  Interventional Cardiology  Structural/Valvular heart disease  [phone]    The procedure log was documented by Documenter: Lalo Quintanilla RT;   RT Levon; Chidi Brink and verified by Neeraj Blevins MD.    Date: 6/13/2023  Time: 3:26 PM      CURRENT VISIT EKG  Results for orders placed or performed during the hospital encounter of 06/24/23   EKG 12-lead    Narrative    Test Reason : R07.9,    Vent. Rate : 084 BPM     Atrial Rate : 084 BPM     P-R Int : 152 ms           QRS Dur : 088 ms      QT Int : 394 ms       P-R-T Axes : 000 003 030 degrees     QTc Int : 465 ms    Normal sinus rhythm  Anterior infarct ,age undetermined  Abnormal ECG  When compared with ECG of 13-JUN-2023 13:27,  Anterior infarct is now Present    Referred By: [email or web address]   SELF           Confirmed By:        ECHOCARDIOGRAM RESULTS  Results for orders placed during the hospital encounter of 06/24/23    Echo    Interpretation Summary  · The left ventricle is normal in size with moderate concentric hypertrophy and normal systolic function.  · The estimated ejection fraction is 68%.  · Grade II left ventricular diastolic dysfunction.  · There is abnormal septal wall motion. There is systolic flattening of the interventricular septum consistent with right ventricle pressure overload.  · Normal right ventricular size with normal right ventricular systolic function.  · Moderate aortic regurgitation.  · There is moderate aortic valve stenosis.  · Aortic valve area is 1.71 cm2; peak velocity is 3.80 m/s; mean gradient is 33 mmHg.  · Mild mitral regurgitation.  · Moderate tricuspid regurgitation.  · There is moderate pulmonary hypertension.  · Elevated central venous pressure (15 mmHg).  · The estimated PA systolic pressure is 80 mmHg.    Patient has moderately severe aortic valve stenosis  Left ventricle ejection fractions well-maintained  Grade 2 diastolic dysfunction with mean left atrial pressure of 15 which is elevated  Pulmonary artery pressure elevated approximally 80 mm Hg    CT chest 6/25/23: ILD with diffuse interstitial thickening worse than prior and small b/l pleural effusions likely due to pulmonary edema. No dense consolidation to suggest pneumonia.      ASSESSMENT/PLAN:   Betty Bacon is a 73 y.o. female with a PMH significant for COPD, asbestosis (severe restriction and DLCO decrease), COVID pneumonia, CAD, HFpEF, severe aortic stenosis s/p balloon valvuloplasty (6/12/23) on 6 L NC at baseline  on whom we have been consulted for acute on chronic hypoxic, hypercapnic respiratory failure.    #Acute on chronic hypoxic and hypercapnic respiratory failure  #Acutely decompensated HFpEF (acute on chronic diastolic heart failure)  #Severe aortic stenosis s/p balloon valvuloplasty  #Pulmonary hypertension, likely groups 2 and 3  BNP elevated to 803 at admission (higher than all but one prior measurement.)  - continue diuresis  - continue GDMT  - continue supplemental O2 as needed for SpO2 >88%    #COPD exacerbation likely secondary to heart failure exacerbation  #ILD exacerbation likely secondary to heart failure exacerbation  ESR and CRP elevated.  Procalcitonin normal at 0.22.  - steroids weaned to 60 mg daily  - no indication for antibiotics at this time  - continue scheduled DEVON/GARETH nebs  - scheduled ICS nebs added  - patient does not wish to use BiPAP any longer for sleep in the hospital or at home if it can be avoided  - consultation with Dr. Linder of the ILD clinic at [facility] is scheduled for 6/29/23  - f/u w/ me is scheduled for 8/15/23    #Acute on chronic anemia  - transfuse PRBCs if Hgb <7.0 or actively exsanguinating    Dispo: Transferring to telemetry.    Discussed with RN. Chest imaging personally viewed and interpreted by me.     Matt Toro MD  Pulmonary and Critical Care Medicine  Pending sale to [facility]  Date of Service: 06/26/2023  9:44 AM

## 2023-06-26 NOTE — SUBJECTIVE & OBJECTIVE
Interval History:     As per subjective     Review of Systems  Objective:     Vital Signs (Most Recent):  Temp: 98 °F (36.7 °C) (06/26/23 1530)  Pulse: 83 (06/26/23 1600)  Resp: (!) 48 (06/26/23 1600)  BP: (!) 161/70 (06/26/23 1600)  SpO2: (!) 91 % (06/26/23 1600) Vital Signs (24h Range):  Temp:  [97.7 °F (36.5 °C)-98.4 °F (36.9 °C)] 98 °F (36.7 °C)  Pulse:  [68-90] 83  Resp:  [13-48] 48  SpO2:  [89 %-100 %] 91 %  BP: (116-172)/(52-87) 161/70     Weight: 89.9 kg (198 lb 3.1 oz)  Body mass index is 36.25 kg/m².    Intake/Output Summary (Last 24 hours) at 6/26/2023 1718  Last data filed at 6/26/2023 0645  Gross per 24 hour   Intake 300 ml   Output 1045 ml   Net -745 ml         Physical Exam  Vitals and nursing note reviewed.   HENT:      Head: Normocephalic and atraumatic.   Eyes:      Conjunctiva/sclera: Conjunctivae normal.   Neck:      Vascular: No JVD.   Cardiovascular:      Heart sounds: Normal heart sounds.   Pulmonary:      Effort: Pulmonary effort is normal.      Breath sounds: Normal breath sounds.   Abdominal:      Palpations: Abdomen is soft.   Skin:     General: Skin is warm.   Neurological:      Mental Status: She is alert and oriented to person, place, and time.   Psychiatric:         Mood and Affect: Mood normal.           Significant Labs: All pertinent labs within the past 24 hours have been reviewed.  CBC:   Recent Labs   Lab 06/24/23 2227 06/24/23 2243 06/25/23  0430 06/26/23  0443   WBC 11.39  --  9.60 9.07   HGB 8.8*  --  8.1* 7.1*   HCT 29.1* 28* 27.1* 23.4*     --  215 201     CMP:   Recent Labs   Lab 06/24/23 2227 06/25/23  0430 06/26/23  0443    141 137   K 4.9 4.2 4.3    107 105   CO2 26 27 27   * 204* 125*   BUN 36* 41* 51*   CREATININE 1.4 1.6* 1.8*   CALCIUM 9.0 9.1 8.8   PROT 7.2  --   --    ALBUMIN 3.3*  --   --    BILITOT 1.0  --   --    ALKPHOS 126  --   --    AST 14  --   --    ALT 13  --   --    ANIONGAP 8 7* 5*       Significant Imaging: I have reviewed  all pertinent imaging results/findings within the past 24 hours.

## 2023-06-26 NOTE — PROGRESS NOTES
[facility]  Progress Note    Patient Name: Betty Bacon  MRN: [id number]  Patient Class: IP- Inpatient   Admission Date: 6/24/2023  Length of Stay: 1 days  Attending Physician: Zaki Awad MD  Primary Care Provider: Johanne Callejas MD        Subjective:     Principal Problem:Acute respiratory failure with hypoxia and hypercarbia        HPI:  Patient is a 74 yo female with hx of     6/12/23-Heart Cath--> Patient was evaluated by the heart team and deemed cohort C for TAVR, very frail to survive. She was offered valvuloplasty as a bridge to TAVR          Last Echo 6/8/23 Summary   The left ventricle is normal in size with eccentric hypertrophy and normal systolic function. The estimated ejection fraction is 55%.   Normal right ventricular size with normal right ventricular systolic function.   Grade I left ventricular diastolic dysfunction.   Moderate left atrial enlargement.   There is severe aortic valve stenosis.   Aortic valve area is 0.75 cm2; peak velocity is 4.12 m/s; mean gradient is 44 mmHg.   Mild aortic regurgitation.   The estimated PA systolic pressure is 37 mmHg.   Normal central venous pressure (3 mmHg).        Overview/Hospital Course:  Patient seen and examined, on BiPAP and ABG noted and stable  Discussed with family members, she had aortic balloon valvuloplasty.  Her Lasix was stopped by her cardiologist after the procedure for unsure reason. Systolic  Murmur noted  Repeat ECHO ordered    6/26  Admitted with CHF exacerbation/worsening of her previous pulmonary condition  Patient is doing well off BiPAP and good amount of diuresis no shortness of breath.  Repeat echo pending.  Patient is stable and downgraded        Interval History:     As per subjective     Review of Systems  Objective:     Vital Signs (Most Recent):  Temp: 98 °F (36.7 °C) (06/26/23 1530)  Pulse: 83 (06/26/23 1600)  Resp: (!) 48 (06/26/23 1600)  BP: (!) 161/70 (06/26/23  1600)  SpO2: (!) 91 % (06/26/23 1600) Vital Signs (24h Range):  Temp:  [97.7 °F (36.5 °C)-98.4 °F (36.9 °C)] 98 °F (36.7 °C)  Pulse:  [68-90] 83  Resp:  [13-48] 48  SpO2:  [89 %-100 %] 91 %  BP: (116-172)/(52-87) 161/70     Weight: 89.9 kg (198 lb 3.1 oz)  Body mass index is 36.25 kg/m².    Intake/Output Summary (Last 24 hours) at 6/26/2023 1718  Last data filed at 6/26/2023 0645  Gross per 24 hour   Intake 300 ml   Output 1045 ml   Net -745 ml         Physical Exam  Vitals and nursing note reviewed.   HENT:      Head: Normocephalic and atraumatic.   Eyes:      Conjunctiva/sclera: Conjunctivae normal.   Neck:      Vascular: No JVD.   Cardiovascular:      Heart sounds: Normal heart sounds.   Pulmonary:      Effort: Pulmonary effort is normal.      Breath sounds: Normal breath sounds.   Abdominal:      Palpations: Abdomen is soft.   Skin:     General: Skin is warm.   Neurological:      Mental Status: She is alert and oriented to person, place, and time.   Psychiatric:         Mood and Affect: Mood normal.           Significant Labs: All pertinent labs within the past 24 hours have been reviewed.  CBC:   Recent Labs   Lab 06/24/23 2227 06/24/23 2243 06/25/23  0430 06/26/23  0443   WBC 11.39  --  9.60 9.07   HGB 8.8*  --  8.1* 7.1*   HCT 29.1* 28* 27.1* 23.4*     --  215 201     CMP:   Recent Labs   Lab 06/24/23 2227 06/25/23  0430 06/26/23  0443    141 137   K 4.9 4.2 4.3    107 105   CO2 26 27 27   * 204* 125*   BUN 36* 41* 51*   CREATININE 1.4 1.6* 1.8*   CALCIUM 9.0 9.1 8.8   PROT 7.2  --   --    ALBUMIN 3.3*  --   --    BILITOT 1.0  --   --    ALKPHOS 126  --   --    AST 14  --   --    ALT 13  --   --    ANIONGAP 8 7* 5*       Significant Imaging: I have reviewed all pertinent imaging results/findings within the past 24 hours.      Assessment/Plan:      Active Hospital Problems    Diagnosis    *Acute respiratory failure with hypoxia and hypercarbia    CHF exacerbation    CARMELITA (acute  kidney injury)    Uncontrolled hypertension    CKD (chronic kidney disease) stage 3, GFR 30-59 ml/min       ASSESSMENT AND PLAN         #Acute hypoxemic and hypercapnic respiratory failure off bipap     #Shortness of breaths secondary to interstitial lung disease, acute HFpEF, mild-moderate TR and AR, pHTN     #Aortic stenosis status post valvuloplasty ( Not suitable for TAVR)      # uncontrolled hypertension     # progressive anemia on Plavix     PLAN          Continue BiPAP and ABG PRN    Continue IV Lasix 40 mg q.12 hours diuresis    PO steroid    CT chest noted , negative PE    Continue PTA medications as ordered   Heparin subQ & protonix prophylaxis   Patient soon going to need blood transfusion but no signs of bleeding    [place]inder [place] [facility] [place] . She want to be contacted at discharge for discharge medications        VTE Risk Mitigation (From admission, onward)         Ordered     heparin (porcine) injection 5,000 Units  Every 8 hours         06/25/23 0022     IP VTE HIGH RISK PATIENT  Once         06/25/23 0022     Place sequential compression device  Until discontinued         06/25/23 0022                Discharge Planning   BOB: 6/26/2025     Code Status: Full Code   Is the patient medically ready for discharge?:     Reason for patient still in hospital (select all that apply): Treatment  Discharge Plan A: Home Health                  Zaki Awad MD  Department of Hospital Medicine   [facility]

## 2023-06-26 NOTE — ADDENDUM NOTE
Addendum  created 06/26/23 0838 by Amandeep Diop MD    Attestation recorded in Intraprocedure, Intraprocedure Attestations filed, Intraprocedure Event edited

## 2023-06-26 NOTE — CARE UPDATE
06/25/23 2018   Patient Assessment/Suction   Level of Consciousness (AVPU) alert   Respiratory Effort Unlabored   Expansion/Accessory Muscles/Retractions no use of accessory muscles   All Lung Fields Breath Sounds coarse   Rhythm/Pattern, Respiratory no shortness of breath reported   Cough Frequency infrequent   Cough Type no productive sputum   PRE-TX-O2   Device (Oxygen Therapy) high flow nasal cannula   $ Is the patient on Low Flow Oxygen? Yes   Flow (L/min) 6   SpO2 (!) 94 %   Pulse Oximetry Type Continuous   $ Pulse Oximetry - Multiple Charge Pulse Oximetry - Multiple   Pulse 77   Resp (!) 22   Positioning   Head of Bed (HOB) Positioning HOB elevated;HOB at 30 degrees   Aerosol Therapy   $ Aerosol Therapy Charges Aerosol Treatment   Daily Review of Necessity (SVN) completed   Respiratory Treatment Status (SVN) given   Treatment Route (SVN) mouthpiece   Patient Position (SVN) semi-Lieberman's   Post Treatment Assessment (SVN) breath sounds improved   Signs of Intolerance (SVN) none   Breath Sounds Post-Respiratory Treatment   Throughout All Fields Post-Treatment All Fields   Throughout All Fields Post-Treatment aeration increased   Post-treatment Heart Rate (beats/min) 82   Post-treatment Resp Rate (breaths/min) 22   Education   $ Education Bronchodilator;15 min   Respiratory Evaluation   $ Care Plan Tech Time 15 min   $ Eval/Re-eval Charges Re-evaluation

## 2023-06-26 NOTE — TELEPHONE ENCOUNTER
"Received the following message from Loree Sanchez, patient's daughter, via [facility]:    "Good Evening,  I wanted to let you guys know that my mom has been admitted to [facility] after becoming very ill Saturday evening.  She was extremely short of breath and weak.  She's currently in ICU and has been placed on Lasix with a bipap machine.  Should we see about having her transferred to your facility for further care.  [facility] was the closest cardiac unit that we could get her to from my home in [place].  The ER Physician said that she was holding carbon dioxide in her lungs and asked about a prognosis. I did explain that she was under your care for Pulmonary Fibrosis and Dr. Zhu for her Aortic Stenosis.   I'm kind of lost in what we should do.  Thank you so much,  Loree Sanchez  [phone]"    Message forwarded to Dr. Linder.    Received the following message from Dr. Linder:     I spoke to the daughter. She may want me to talk to the doctor's from there. Can you call her up in 30-45 mins with information on how to call he up through [facility] and she may also give you information on how I can reach the doctors    Contacted Loree, patient's daughter, to provide her with contact numbers for Dr. Linder.  She gave the phone to patient's nurse.  Provided the nurse with the phone numbers on how to contact Dr. Linder.  Loree requested that we cancel her mom's appointments for this week.  She stated that she will keep us updated on her mom's status.  She stated that Dr. Linder informed her today that her mom has end stage lung disease.  She stated, "My brothers and I are going to meet with mom today.  We are going to see what she wants to do.  I was expecting to hear that today."  Informed Loree that she can speak with the staff to determine if they have a Palliative Care Team at the hospital.  Informed her about Palliative Care.  Instructed her to contact us if we can be of further assistance.  She verbalized " her understanding and gratitude.  She stated that she will keep us updated on her mom's decisions and status.

## 2023-06-26 NOTE — PLAN OF CARE
06/26/23 0759   Patient Assessment/Suction   Level of Consciousness (AVPU) alert   Respiratory Effort Normal   Expansion/Accessory Muscles/Retractions no use of accessory muscles   All Lung Fields Breath Sounds Anterior:;Lateral:   JOAQUÍN Breath Sounds coarse   LLL Breath Sounds crackles, coarse   Rhythm/Pattern, Respiratory shortness of breath   Cough Frequency infrequent   Cough Type dry;fair;nonproductive   PRE-TX-O2   Device (Oxygen Therapy) high flow nasal cannula   $ Is the patient on Low Flow Oxygen? Yes   Flow (L/min) 6   SpO2 96 %   Pulse Oximetry Type Continuous   $ Pulse Oximetry - Multiple Charge Pulse Oximetry - Multiple   Probe Placed On (Pulse Ox) Left:   Pulse 68   Resp 18   Aerosol Therapy   $ Aerosol Therapy Charges Aerosol Treatment   Daily Review of Necessity (SVN) completed   Respiratory Treatment Status (SVN) given   Treatment Route (SVN) mouthpiece   Patient Position (SVN) semi-Lieberman's   Post Treatment Assessment (SVN) breath sounds improved   Signs of Intolerance (SVN) none   Breath Sounds Post-Respiratory Treatment   Throughout All Fields Post-Treatment All Fields   Throughout All Fields Post-Treatment aeration increased   Post-treatment Heart Rate (beats/min) 86   Post-treatment Resp Rate (breaths/min) 20   Preset CPAP/BiPAP Settings   Mode Of Delivery BiPAP S/T   $ CPAP/BiPAP Daily Charge BiPAP/CPAP Daily   Education   $ Education Bronchodilator;15 min   Respiratory Evaluation   $ Care Plan Tech Time 15 min   $ Eval/Re-eval Charges Re-evaluation   Home Oxygen   Has Home Oxygen? Yes   Liter Flow 6   Duration continuous   Route nasal cannula   Mode continuous

## 2023-06-27 LAB
ANION GAP SERPL CALC-SCNC: 8 MMOL/L (ref 8–16)
BUN SERPL-MCNC: 66 MG/DL (ref 8–23)
CALCIUM SERPL-MCNC: 8.8 MG/DL (ref 8.7–10.5)
CHLORIDE SERPL-SCNC: 101 MMOL/L (ref 95–110)
CO2 SERPL-SCNC: 30 MMOL/L (ref 23–29)
CREAT SERPL-MCNC: 1.9 MG/DL (ref 0.5–1.4)
ERYTHROCYTE [DISTWIDTH] IN BLOOD BY AUTOMATED COUNT: 15.2 % (ref 11.5–14.5)
EST. GFR  (NO RACE VARIABLE): 27.2 ML/MIN/1.73 M^2
GLUCOSE SERPL-MCNC: 114 MG/DL (ref 70–110)
HCT VFR BLD AUTO: 25.2 % (ref 37–48.5)
HGB BLD-MCNC: 7.4 G/DL (ref 12–16)
MCH RBC QN AUTO: 28 PG (ref 27–31)
MCHC RBC AUTO-ENTMCNC: 29.4 G/DL (ref 32–36)
MCV RBC AUTO: 96 FL (ref 82–98)
PLATELET # BLD AUTO: 224 K/UL (ref 150–450)
PMV BLD AUTO: 10.7 FL (ref 9.2–12.9)
POTASSIUM SERPL-SCNC: 4.1 MMOL/L (ref 3.5–5.1)
RBC # BLD AUTO: 2.64 M/UL (ref 4–5.4)
SODIUM SERPL-SCNC: 139 MMOL/L (ref 136–145)
WBC # BLD AUTO: 9.49 K/UL (ref 3.9–12.7)

## 2023-06-27 PROCEDURE — 86900 BLOOD TYPING SEROLOGIC ABO: CPT | Performed by: FAMILY MEDICINE

## 2023-06-27 PROCEDURE — 36415 COLL VENOUS BLD VENIPUNCTURE: CPT | Performed by: INTERNAL MEDICINE

## 2023-06-27 PROCEDURE — 86902 BLOOD TYPE ANTIGEN DONOR EA: CPT | Performed by: FAMILY MEDICINE

## 2023-06-27 PROCEDURE — 25000003 PHARM REV CODE 250: Performed by: INTERNAL MEDICINE

## 2023-06-27 PROCEDURE — 99233 SBSQ HOSP IP/OBS HIGH 50: CPT | Mod: ,,, | Performed by: INTERNAL MEDICINE

## 2023-06-27 PROCEDURE — 25000242 PHARM REV CODE 250 ALT 637 W/ HCPCS: Performed by: INTERNAL MEDICINE

## 2023-06-27 PROCEDURE — 94640 AIRWAY INHALATION TREATMENT: CPT

## 2023-06-27 PROCEDURE — 99232 SBSQ HOSP IP/OBS MODERATE 35: CPT | Mod: ,,, | Performed by: INTERNAL MEDICINE

## 2023-06-27 PROCEDURE — 86870 RBC ANTIBODY IDENTIFICATION: CPT | Performed by: FAMILY MEDICINE

## 2023-06-27 PROCEDURE — 99232 PR SUBSEQUENT HOSPITAL CARE,LEVL II: ICD-10-PCS | Mod: ,,, | Performed by: INTERNAL MEDICINE

## 2023-06-27 PROCEDURE — 99900035 HC TECH TIME PER 15 MIN (STAT)

## 2023-06-27 PROCEDURE — 25000003 PHARM REV CODE 250: Performed by: HOSPITALIST

## 2023-06-27 PROCEDURE — 94799 UNLISTED PULMONARY SVC/PX: CPT

## 2023-06-27 PROCEDURE — 94660 CPAP INITIATION&MGMT: CPT

## 2023-06-27 PROCEDURE — 27100171 HC OXYGEN HIGH FLOW UP TO 24 HOURS

## 2023-06-27 PROCEDURE — 99900031 HC PATIENT EDUCATION (STAT)

## 2023-06-27 PROCEDURE — 25000003 PHARM REV CODE 250: Performed by: NURSE PRACTITIONER

## 2023-06-27 PROCEDURE — 86922 COMPATIBILITY TEST ANTIGLOB: CPT | Performed by: FAMILY MEDICINE

## 2023-06-27 PROCEDURE — 80048 BASIC METABOLIC PNL TOTAL CA: CPT | Performed by: INTERNAL MEDICINE

## 2023-06-27 PROCEDURE — 36415 COLL VENOUS BLD VENIPUNCTURE: CPT | Performed by: FAMILY MEDICINE

## 2023-06-27 PROCEDURE — 94761 N-INVAS EAR/PLS OXIMETRY MLT: CPT

## 2023-06-27 PROCEDURE — 99233 PR SUBSEQUENT HOSPITAL CARE,LEVL III: ICD-10-PCS | Mod: ,,, | Performed by: INTERNAL MEDICINE

## 2023-06-27 PROCEDURE — 27000221 HC OXYGEN, UP TO 24 HOURS

## 2023-06-27 PROCEDURE — 63600175 PHARM REV CODE 636 W HCPCS: Performed by: INTERNAL MEDICINE

## 2023-06-27 PROCEDURE — 85027 COMPLETE CBC AUTOMATED: CPT | Performed by: INTERNAL MEDICINE

## 2023-06-27 PROCEDURE — 21400001 HC TELEMETRY ROOM

## 2023-06-27 RX ORDER — HYDROCODONE BITARTRATE AND ACETAMINOPHEN 500; 5 MG/1; MG/1
TABLET ORAL
Status: DISCONTINUED | OUTPATIENT
Start: 2023-06-27 | End: 2023-06-30 | Stop reason: HOSPADM

## 2023-06-27 RX ORDER — FUROSEMIDE 10 MG/ML
40 INJECTION INTRAMUSCULAR; INTRAVENOUS 2 TIMES DAILY
Status: DISCONTINUED | OUTPATIENT
Start: 2023-06-28 | End: 2023-06-30 | Stop reason: HOSPADM

## 2023-06-27 RX ORDER — LORAZEPAM 0.5 MG/1
0.5 TABLET ORAL ONCE
Status: COMPLETED | OUTPATIENT
Start: 2023-06-27 | End: 2023-06-27

## 2023-06-27 RX ORDER — FUROSEMIDE 20 MG/1
20 TABLET ORAL DAILY
Status: DISCONTINUED | OUTPATIENT
Start: 2023-06-27 | End: 2023-06-27

## 2023-06-27 RX ORDER — FUROSEMIDE 10 MG/ML
40 INJECTION INTRAMUSCULAR; INTRAVENOUS ONCE
Status: COMPLETED | OUTPATIENT
Start: 2023-06-28 | End: 2023-06-28

## 2023-06-27 RX ORDER — TRAZODONE HYDROCHLORIDE 50 MG/1
50 TABLET ORAL NIGHTLY
Status: DISCONTINUED | OUTPATIENT
Start: 2023-06-27 | End: 2023-06-30 | Stop reason: HOSPADM

## 2023-06-27 RX ADMIN — IPRATROPIUM BROMIDE AND ALBUTEROL SULFATE 3 ML: 2.5; .5 SOLUTION RESPIRATORY (INHALATION) at 12:06

## 2023-06-27 RX ADMIN — IPRATROPIUM BROMIDE AND ALBUTEROL SULFATE 3 ML: 2.5; .5 SOLUTION RESPIRATORY (INHALATION) at 01:06

## 2023-06-27 RX ADMIN — PANTOPRAZOLE SODIUM 40 MG: 40 TABLET, DELAYED RELEASE ORAL at 06:06

## 2023-06-27 RX ADMIN — CLONAZEPAM 0.5 MG: 0.5 TABLET ORAL at 09:06

## 2023-06-27 RX ADMIN — METOPROLOL SUCCINATE 50 MG: 50 TABLET, FILM COATED, EXTENDED RELEASE ORAL at 10:06

## 2023-06-27 RX ADMIN — IPRATROPIUM BROMIDE AND ALBUTEROL SULFATE 3 ML: 2.5; .5 SOLUTION RESPIRATORY (INHALATION) at 07:06

## 2023-06-27 RX ADMIN — BUDESONIDE 0.5 MG: 0.5 INHALANT RESPIRATORY (INHALATION) at 07:06

## 2023-06-27 RX ADMIN — AMLODIPINE BESYLATE 10 MG: 5 TABLET ORAL at 10:06

## 2023-06-27 RX ADMIN — PAROXETINE 50 MG: 20 TABLET, FILM COATED ORAL at 06:06

## 2023-06-27 RX ADMIN — ATORVASTATIN CALCIUM 40 MG: 40 TABLET, FILM COATED ORAL at 10:06

## 2023-06-27 RX ADMIN — LORAZEPAM 0.5 MG: 0.5 TABLET ORAL at 02:06

## 2023-06-27 RX ADMIN — HEPARIN SODIUM 5000 UNITS: 5000 INJECTION INTRAVENOUS; SUBCUTANEOUS at 06:06

## 2023-06-27 RX ADMIN — MUPIROCIN 1 G: 20 OINTMENT TOPICAL at 09:06

## 2023-06-27 RX ADMIN — CLOPIDOGREL BISULFATE 75 MG: 75 TABLET, FILM COATED ORAL at 10:06

## 2023-06-27 RX ADMIN — CHLORHEXIDINE GLUCONATE 15 ML: 1.2 RINSE ORAL at 10:06

## 2023-06-27 RX ADMIN — HYDRALAZINE HYDROCHLORIDE 50 MG: 25 TABLET ORAL at 06:06

## 2023-06-27 RX ADMIN — MUPIROCIN 1 G: 20 OINTMENT TOPICAL at 10:06

## 2023-06-27 RX ADMIN — QUETIAPINE 100 MG: 100 TABLET ORAL at 09:06

## 2023-06-27 RX ADMIN — HYDRALAZINE HYDROCHLORIDE 50 MG: 25 TABLET ORAL at 02:06

## 2023-06-27 RX ADMIN — PREDNISONE 60 MG: 20 TABLET ORAL at 10:06

## 2023-06-27 RX ADMIN — HEPARIN SODIUM 5000 UNITS: 5000 INJECTION INTRAVENOUS; SUBCUTANEOUS at 02:06

## 2023-06-27 RX ADMIN — ISOSORBIDE MONONITRATE 60 MG: 60 TABLET, EXTENDED RELEASE ORAL at 10:06

## 2023-06-27 RX ADMIN — HYDRALAZINE HYDROCHLORIDE 50 MG: 25 TABLET ORAL at 09:06

## 2023-06-27 RX ADMIN — CHLORHEXIDINE GLUCONATE 15 ML: 1.2 RINSE ORAL at 09:06

## 2023-06-27 NOTE — NURSING
0776 Patient with expiratory grunting, dyspnea, RR 28. Skin dusky with >4 second refill. O2 saturation 48%. O2 Hi Flow intact at 6L. Patient reports refused bipap. Bipap applied by RT. O2 sat 98%.

## 2023-06-27 NOTE — PROGRESS NOTES
Critical access hospital  Department of Cardiology  Progress Note      PATIENT NAME: Betty Bacon  MRN: [id number]  TODAY'S DATE: 06/27/2023  ADMIT DATE: 6/24/2023                          CONSULT REQUESTED BY: Lynette Shea MD    SUBJECTIVE     PRINCIPAL PROBLEM: Acute respiratory failure with hypoxia and hypercarbia    6/27/23:    Patient seen resting in bed with family at bedside. She is noted to be easily VILLA. She is on vapotherm currently. Patient is upset because discussions have started about possibly LTAC placement.     6/26/23:  Patient seen resting in bed with family at bedside.  She reports that her breathing has improved somewhat.  Nurse states that she still continues to desaturate with movement at times.  Patient is pulmonary fibrosis doctor has been in touch and is offering palliative versus comfort care due to end-stage pulmonary fibrosis.  Patient is diuresing well on furosemide.    REASON FOR CONSULT:  SOB      HPI:    73 year old female patient with a PMH significant for  restrictive lung disease, asbestosis, PVD, anemia, CAD history of PCI in 2012, recent angiogram May 1 no obstructive disease and patent stents, severe AS s/p balloon valvuloplasty on 6/13 admitted with acute respiratory failure. BNP  803. CXR shows pulmonary edema.  Creatinine 1.6 Hemoglobin 8.1.Blood pressure over 200s over night.          FROM H AND P       Shortness of Breath       Sob that started last night. Worsening through day. Cough, chills, nausea, increased urination without pain. Weakness. 2-3 weeks ago  had a balloon procedure for heart done.          HPI:  73-year-old female history of restrictive lung disease, aortic stenosis status post aortic valvuloplasty 6/12, hypertension, heart failure with preserved EF.      Patient presents to the ER after progressive decline with weakness and sob after valvuloplasty.  She would stopped her Lasix for about 3 days according to instructions from the proceduralist.   Has had about 2 lb weight gain according to daughter, leg swelling.     In the ER patient had elevated BNP, with short of breath and placed on BiPAP.  ABG on Bipap 50% FiO2 with CO2 retention and pO2 70.  Patient to be admitted for ICU for respiratory failure.  Review of patient's allergies indicates:   Allergen Reactions    Propoxyphene n-acetaminophen Other (See Comments)     Other reaction(s): Unknown    Codeine Anxiety       Past Medical History:   Diagnosis Date    Acute coronary artery obstruction without MI 10/2012    Benign hypertension     COPD (chronic obstructive pulmonary disease)     Coronary artery disease     Disorder of kidney and ureter     Dr. Morrow    Hepatitis B core antibody positive 03/03/2021    Negative sAg, suggests previous exposure but no chronic/active Hep B. At risk for reactivation with any immunosuppression medication, steroids, chemo, etc.      History of electroconvulsive therapy     Hyperlipidemia LDL goal < 70     Left ankle sprain     Major depressive disorder, recurrent episode, severe     s/p ECT    Positive AKIRA (antinuclear antibody) 03/03/2021    PVD (peripheral vascular disease)      Past Surgical History:   Procedure Laterality Date    ANGIOGRAM, CORONARY, WITH LEFT HEART CATHETERIZATION N/A 5/1/2023    Procedure: Angiogram, Coronary, with Left Heart Cath;  Surgeon: Neeraj Finn MD;  Location: [facility] CATH LAB;  Service: Cardiology;  Laterality: N/A;    AORTIC VALVULOPLASTY N/A 6/13/2023    Procedure: REPAIR, AORTIC VALVE;  Surgeon: Neeraj Finn MD;  Location: [facility] CATH LAB;  Service: Cardiology;  Laterality: N/A;    CHOLECYSTECTOMY      CORONARY ANGIOPLASTY      CORONARY ANGIOPLASTY WITH STENT PLACEMENT  10/2012    2 stents RCA (100% stenosis)    EYE SURGERY      cataract surgery    HYSTERECTOMY      LINA, ovaries intact. uterine prolapse    ILIAC ARTERY STENT      SMALL BOWEL ENTEROSCOPY N/A 2/20/2023    Procedure: ENTEROSCOPY;  Surgeon: Margy Dumont MD;   Location: [facility];  Service: Endoscopy;  Laterality: N/A;    TONSILLECTOMY      TOTAL VAGINAL HYSTERECTOMY       Social History     Tobacco Use    Smoking status: Former     Packs/day: 2.00     Years: 40.00     Pack years: 80.00     Types: Cigarettes     Quit date: 2009     Years since quittin.5    Smokeless tobacco: Never   Substance Use Topics    Alcohol use: Yes     Alcohol/week: 1.0 standard drink     Types: 1 Shots of liquor per week     Comment: rare /occ    Drug use: No        REVIEW OF SYSTEMS    +sob  +weakness    OBJECTIVE     VITAL SIGNS (Most Recent)  Temp: 97.6 °F (36.4 °C) (23)  Pulse: 70 (23)  Resp: 18 (23)  BP: (!) 141/59 (nurse jhonatan notified) (23)  SpO2: 97 % (23)    VENTILATION STATUS  Resp: 18 (23)  SpO2: 97 % (23)  Oxygen Concentration (%):  [0.7-70] 70        I & O (Last 24H):  Intake/Output Summary (Last 24 hours) at 2023 0947  Last data filed at 2023 0407  Gross per 24 hour   Intake 840 ml   Output 2100 ml   Net -1260 ml         WEIGHTS  Wt Readings from Last 3 Encounters:   23 0400 89.9 kg (198 lb 1.8 oz)   23 0103 89.9 kg (198 lb 3.1 oz)   23 2203 85.3 kg (188 lb)   23 1138 89.8 kg (198 lb)   23 0718 85.1 kg (187 lb 9.8 oz)   23 0725 84.8 kg (186 lb 15.2 oz)   23 0825 83.5 kg (184 lb)   23 075 83.9 kg (184 lb 15.5 oz)   23 1603 86.2 kg (190 lb)   23 113 86.2 kg (190 lb)       PHYSICAL EXAM  GENERAL: elderly female, NAD  HEENT: Normocephalic  NECK: No JVD. No bruit..   CARDIAC: s1 s2 regular systolic murmur present  CHEST ANATOMY: normal.   LUNGS: basilar crackles   ABDOMEN: Soft   EXTREMITIES: No cyanosis, clubbing or edema noted at this time.  CENTRAL NERVOUS SYSTEM: No focal motor or sensory deficits noted. .   MUSCLE STRENGTH & TONE: No noteable weakness, atrophy or abnormal movement.     HOME MEDICATIONS:  No current  facility-administered medications on file prior to encounter.     Current Outpatient Medications on File Prior to Encounter   Medication Sig Dispense Refill    amLODIPine (NORVASC) 10 MG tablet Take 1 tablet (10 mg total) by mouth once daily. 30 tablet 5    colchicine (COLCRYS) 0.6 mg tablet Take 2 tablets (1.2mg total) by mouth at gout flare onset, may repeat 1 tablet (0.6mg total) in one hour, then take 1 tablet (0.6mg total) by mouth once daily until pain resolves      acetaminophen (TYLENOL) 325 MG tablet Take 325 mg by mouth every 6 (six) hours as needed for Pain.      albuterol sulfate (PROAIR RESPICLICK) 90 mcg/actuation AePB Inhale 2 puffs into the lungs every 4 to 6 hours as needed. 1 each 3    albuterol-ipratropium (DUO-NEB) 2.5 mg-0.5 mg/3 mL nebulizer solution Take 3 mLs by nebulization every 6 (six) hours as needed for Wheezing. Rescue 75 mL 11    atorvastatin (LIPITOR) 40 MG tablet TAKE 1 TABLET BY MOUTH EVERY DAY (Patient taking differently: Take 40 mg by mouth once daily.) 90 tablet 3    clonazePAM (KLONOPIN) 1 MG disintegrating tablet Take 1 tablet (1 mg total) by mouth 2 (two) times daily as needed (anxiety). 60 tablet 1    clopidogreL (PLAVIX) 75 mg tablet TAKE 1 TABLET BY MOUTH EVERY DAY (Patient taking differently: Take 75 mg by mouth once daily.) 90 tablet 3    colestipoL (COLESTID) 1 gram Tab Take 1 tablet (1 g total) by mouth 2 (two) times daily as needed (diarrhea). Other drugs should be administered at least 1 hour before or 4 hours after colestipol.      fluticasone propionate (FLONASE) 50 mcg/actuation nasal spray 1 spray (50 mcg total) by Each Nostril route once daily. 16 g PRN    fluticasone-umeclidin-vilanter (TRELEGY ELLIPTA) 200-62.5-25 mcg inhaler Inhale 1 puff into the lungs once daily. 180 each 11    hydrALAZINE (APRESOLINE) 50 MG tablet Take 1 tablet (50 mg total) by mouth every 8 (eight) hours. 90 tablet 3    isosorbide mononitrate (IMDUR) 60 MG 24 hr tablet TAKE 1 TABLET BY  MOUTH EVERY DAY 90 tablet 3    metoprolol succinate (TOPROL-XL) 50 MG 24 hr tablet Take 1 tablet (50 mg total) by mouth once daily. 30 tablet 5    pantoprazole (PROTONIX) 40 MG tablet Take 1 tablet (40 mg total) by mouth before breakfast. 30 tablet 11    paroxetine (PAXIL) 10 MG tablet Take 10 mg by mouth every morning. Total 50 mg      paroxetine (PAXIL) 40 MG tablet TAKE 1 TABLET BY MOUTH EVERY DAY (Patient taking differently: Take 40 mg by mouth once daily.) 90 tablet 3    QUEtiapine (SEROQUEL) 100 MG Tab Take 1 tablet (100 mg total) by mouth every evening.      [DISCONTINUED] TRELEGY ELLIPTA 200-62.5-25 mcg inhaler INHALE 1 PUFF INTO THE LUNGS ONCE DAILY. 180 each 2       SCHEDULED MEDS:   albuterol-ipratropium  3 mL Nebulization Q6H    amLODIPine  10 mg Oral Daily    atorvastatin  40 mg Oral Daily    budesonide  0.5 mg Nebulization Q12H    chlorhexidine  15 mL Mouth/Throat BID    clopidogreL  75 mg Oral Daily    furosemide  20 mg Oral Daily    heparin (porcine)  5,000 Units Subcutaneous Q8H    hydrALAZINE  50 mg Oral Q8H    isosorbide mononitrate  60 mg Oral Daily    metoprolol succinate  50 mg Oral Daily    mupirocin   Nasal BID    pantoprazole  40 mg Oral Before breakfast    paroxetine  50 mg Oral QAM    predniSONE  60 mg Oral Daily    QUEtiapine  100 mg Oral QHS       CONTINUOUS INFUSIONS:    PRN MEDS:acetaminophen, albuterol sulfate, clonazePAM, colchicine, dextrose 50%, dextrose 50%, glucagon (human recombinant), glucose, glucose, hydrALAZINE, magnesium sulfate IVPB, magnesium sulfate IVPB, naloxone, sodium chloride 0.9%    LABS AND DIAGNOSTICS     CBC LAST 3 DAYS  Recent Labs   Lab 06/24/23  2227 06/24/23  2243 06/25/23  0430 06/26/23  0443 06/27/23  0424   WBC 11.39  --  9.60 9.07 9.49   RBC 3.07*  --  2.82* 2.47* 2.64*   HGB 8.8*  --  8.1* 7.1* 7.4*   HCT 29.1*   < > 27.1* 23.4* 25.2*   MCV 95  --  96 95 96   MCH 28.7  --  28.7 28.7 28.0   MCHC 30.2*  --  29.9* 30.3* 29.4*   RDW 14.8*  --  14.7* 14.9*  15.2*     --  215 201 224   MPV 10.1  --  10.1 10.4 10.7   GRAN 81.9*  9.3*  --   --   --   --    LYMPH 11.2*  1.3  --   --   --   --    MONO 4.4  0.5  --   --   --   --    BASO 0.04  --   --   --   --    NRBC 0  --   --   --   --     < > = values in this interval not displayed.         COAGULATION LAST 3 DAYS  No results for input(s): LABPT, INR, APTT in the last 168 hours.    CHEMISTRY LAST 3 DAYS  Recent Labs   Lab 06/24/23 2227 06/24/23 2243 06/25/23  0430 06/26/23  0443 06/27/23  0424     --  141 137 139   K 4.9  --  4.2 4.3 4.1     --  107 105 101   CO2 26  --  27 27 30*   ANIONGAP 8  --  7* 5* 8   BUN 36*  --  41* 51* 66*   CREATININE 1.4  --  1.6* 1.8* 1.9*   *  --  204* 125* 114*   CALCIUM 9.0  --  9.1 8.8 8.8   PH  --  7.309*  --   --   --    MG 1.4*  --  1.8  --   --    ALBUMIN 3.3*  --   --   --   --    PROT 7.2  --   --   --   --    ALKPHOS 126  --   --   --   --    ALT 13  --   --   --   --    AST 14  --   --   --   --    BILITOT 1.0  --   --   --   --          CARDIAC PROFILE LAST 3 DAYS  Recent Labs   Lab 06/24/23 2227 06/25/23  0430   *  --    TROPONINIHS 24.2* 21.6*         ENDOCRINE LAST 3 DAYS  Recent Labs   Lab 06/25/23  1320   PROCAL 0.22         LAST ARTERIAL BLOOD GAS  ABG  Recent Labs   Lab 06/24/23 2243   PH 7.309*   PO2 70*   PCO2 52.0*   HCO3 26.1   BE 0         LAST 7 DAYS MICROBIOLOGY   Microbiology Results (last 7 days)       ** No results found for the last 168 hours. **            MOST RECENT IMAGING  Cardiac catheterization    The estimated blood loss was <50 mL.    Indication     Betty Amite Bromwell is a 73 y.o. female  referred by Dr Borjas for   severe AS.      Patient was evaluated by the heart team and deemed cohort C for TAVR, very   frail to survive. She was offered valvuloplasty as a bridge to TAVR    The patient was showed a power point presentation including a detailed   discussion about risk of  complete heart block, stroke,  MI, bleeding   access site complications including limb loss, allergy, kidney failure   including dialysis and death.    Operators  Surgeon(s) and Role:     * Neeraj Finn MD - Primary     * Nelson Fried MD - Fellow     Description of procedure     The patient was brought to the catheterization lab in the fasting state,   she was prepped in the usual sterile fashion. The anesthesiologist   administered the sedation. An arterial line was placed by the anesthesia   department. Access was obtained  in the right common femoral artery with a   micropuncture kit. Two proglide devices were used to pre-close the artery   and a 12Fr sheath was inserted.  The aortic valve was crossed with the   help of a AL2 catheter and a stiff straight glidewire. Using a 6Fr pigtail   catheter a curved shaped stiff wire was placed in the ventricle. A balloon   aortic   valvuloplasty was performed with a 22 mm True FLow balloon.     An aortogram showed trace AI.     The patient was transported to the recovery unit in stable condition      Pre procedure AV mean gradient 30 mmHg, post BAV 20 mm Hg    Complications: None  Estimated blood loss: <50 mL  Contrast used: 20 cc    Summary:     Successful BAV with 22 mm TrueFlow balloon.     Recommendations:    Routine post cath care  Follow up with me in 6 weeks    Neeraj Blevins MD [facility]  Interventional Cardiology  Structural/Valvular heart disease  [phone]    The procedure log was documented by Documenter: Lalo Quintanilla RT;   RT Levon; Chidi Brink and verified by Neeraj Blevins MD.    Date: 6/13/2023  Time: 3:26 PM      ECHOCARDIOGRAM RESULTS (last 5)  Results for orders placed during the hospital encounter of 06/08/23    Echo    Interpretation Summary  · The left ventricle is normal in size with eccentric hypertrophy and normal systolic function. The estimated ejection fraction is 55%.  · Normal right ventricular size with normal right ventricular systolic function.  ·  Grade I left ventricular diastolic dysfunction.  · Moderate left atrial enlargement.  · There is severe aortic valve stenosis.  · Aortic valve area is 0.75 cm2; peak velocity is 4.12 m/s; mean gradient is 44 mmHg.  · Mild aortic regurgitation.  · The estimated PA systolic pressure is 37 mmHg.  · Normal central venous pressure (3 mmHg).      Results for orders placed during the hospital encounter of 05/01/23    Echo    Interpretation Summary  · Moderate left atrial enlargement.  · The left ventricle is mildly enlarged with mild concentric hypertrophy and normal systolic function.  · The estimated ejection fraction is 60%.  · Grade II left ventricular diastolic dysfunction.  · Mild right atrial enlargement.  · Normal right ventricular size with normal right ventricular systolic function.  · Aortic valve area is 1.16 cm2; peak velocity is 4.03 m/s; mean gradient is 34 mmHg.  · There is moderate-to-severe aortic valve stenosis.  · Mild-to-moderate aortic regurgitation.  · Mild to moderate tricuspid regurgitation.  · Intermediate central venous pressure (8 mmHg).  · There is pulmonary hypertension. The estimated PA systolic pressure is 51 mmHg.  · Trivial pericardial effusion.      Results for orders placed during the hospital encounter of 12/20/22    Echo    Interpretation Summary  · The left ventricle is normal in size with severe concentric hypertrophy and normal systolic function.  · Grade II left ventricular diastolic dysfunction.  · The estimated ejection fraction is 65%.  · Normal right ventricular size with normal right ventricular systolic function.  · Moderate aortic regurgitation.  · There is severe aortic valve stenosis.  · Aortic valve area is 0.94 cm2; peak velocity is 3.29 m/s; mean gradient is 26 mmHg.  · Mild pulmonic regurgitation.  · Mild tricuspid regurgitation.  · There is mild pulmonary hypertension.  · Normal central venous pressure (3 mmHg).  · The estimated PA systolic pressure is 44  mmHg.      Results for orders placed during the hospital encounter of 12/01/22    Echo    Interpretation Summary  · The left ventricle is normal in size with moderate concentric hypertrophy and normal systolic function.  · The estimated ejection fraction is 55%.  · Grade I left ventricular diastolic dysfunction.  · Normal right ventricular size with normal right ventricular systolic function.  · Severe left atrial enlargement.  · Moderate right atrial enlargement.  · Mild aortic regurgitation.  · There is moderate-to-severe aortic valve stenosis.  · Aortic valve area is 1.16 cm2; peak velocity is 3.59 m/s; mean gradient is 31 mmHg.  · Moderate tricuspid regurgitation.  · Normal central venous pressure (3 mmHg).  · The estimated PA systolic pressure is 53 mmHg.  · There is pulmonary hypertension.      Results for orders placed in visit on 06/02/22    Echo    Interpretation Summary  · The left ventricle is normal in size with concentric hypertrophy and normal systolic function.  · The estimated ejection fraction is 55%.  · Grade II left ventricular diastolic dysfunction.  · Normal right ventricular size with normal right ventricular systolic function.  · Severe left atrial enlargement.  · Mild right atrial enlargement.  · The aortic valve is trileaflet but malformed.  · Moderate aortic regurgitation.  · There is moderate-to-severe aortic valve stenosis.  · Aortic valve area is 1.00 cm2; peak velocity is 3.38 m/s; mean gradient is 29 mmHg.  · Mild mitral regurgitation.  · Mild to moderate tricuspid regurgitation.  · Normal central venous pressure (3 mmHg).  · The estimated PA systolic pressure is 51 mmHg.  · There is pulmonary hypertension.      CURRENT/PREVIOUS VISIT EKG  Results for orders placed or performed during the hospital encounter of 06/24/23   EKG 12-lead    Collection Time: 06/24/23  9:58 PM    Narrative    Test Reason : R07.9,    Vent. Rate : 084 BPM     Atrial Rate : 084 BPM     P-R Int : 152 ms           QRS Dur : 088 ms      QT Int : 394 ms       P-R-T Axes : 000 003 030 degrees     QTc Int : 465 ms    Normal sinus rhythm    Abnormal ECG  When compared with ECG of 13-JUN-2023 13:27,  Anterior infarct ? ant mi   Confirmed by Thai UPTON, Aleksey ZELAYA ([id number]) on 6/26/2023 6:49:57 PM    Referred By: SELENA   SELF           Confirmed By:Aleksey Andre MD           ASSESSMENT/PLAN:     Active Hospital Problems    Diagnosis    *Acute respiratory failure with hypoxia and hypercarbia    CHF exacerbation    CARMELITA (acute kidney injury)    Uncontrolled hypertension    CKD (chronic kidney disease) stage 3, GFR 30-59 ml/min       ASSESSMENT & PLAN:     Acute on Chronic Respiratory Failure with Hypoxia and Hypercapneia  Acute CHF exacerbation  Acute on chronic anemia   HTN Urgency  CKD  Severe AS S/P [place] Valvuloplasty on 6/13  Pulmonary HTN      RECOMMENDATIONS:    BUN/Creat are increasing on labs. Will change to furosemide 20 mg po daily. This will likely need to be her daily maintenance dose.   Family discussed that she may be going to palliative care due to end stage lung disease. Would continue routine heart medications so long as she is functional. Palliative/hospice can give PRN IV furosemide when needed as well.   Consider palliative consult while inpatient.   Will sign off and see PRN.     Renee Levi NP  Department of Cardiology  Date of Service: 06/27/2023        I have personally interviewed and examined the patient, I have reviewed the Nurse Practitioner's history and physical, assessment, and plan. I have personally evaluated the patient at bedside and agree with the findings and made appropriate changes as necessary in recommendations.    Elham Hilario MD  Department of Cardiology  [facility]  6/27/23

## 2023-06-27 NOTE — NURSING
[facility]  Wound Care    Patient Name:  Betty Bacon   MRN:  [id number]  Date: 2023  Diagnosis: Acute respiratory failure with hypoxia and hypercarbia    History:     Past Medical History:   Diagnosis Date    Acute coronary artery obstruction without MI 10/2012    Benign hypertension     COPD (chronic obstructive pulmonary disease)     Coronary artery disease     Disorder of kidney and ureter     Dr. Morrow    Hepatitis B core antibody positive 2021    Negative sAg, suggests previous exposure but no chronic/active Hep B. At risk for reactivation with any immunosuppression medication, steroids, chemo, etc.      History of electroconvulsive therapy     Hyperlipidemia LDL goal < 70     Left ankle sprain     Major depressive disorder, recurrent episode, severe     s/p ECT    Positive AKIRA (antinuclear antibody) 2021    PVD (peripheral vascular disease)        Social History     Socioeconomic History    Marital status:    Tobacco Use    Smoking status: Former     Packs/day: 2.00     Years: 40.00     Pack years: 80.00     Types: Cigarettes     Quit date: 2009     Years since quittin.5    Smokeless tobacco: Never   Substance and Sexual Activity    Alcohol use: Yes     Alcohol/week: 1.0 standard drink     Types: 1 Shots of liquor per week     Comment: rare /occ    Drug use: No    Sexual activity: Never     Birth control/protection: Abstinence     Social Determinants of Health     Financial Resource Strain: Low Risk     Difficulty of Paying Living Expenses: Not hard at all   Food Insecurity: No Food Insecurity    Worried About Running Out of Food in the Last Year: Never true    Ran Out of Food in the Last Year: Never true   Transportation Needs: No Transportation Needs    Lack of Transportation (Medical): No    Lack of Transportation (Non-Medical): No   Physical Activity: Insufficiently Active    Days of Exercise per Week: 3 days    Minutes of Exercise per Session:  30 min   Stress: Stress Concern Present    Feeling of Stress : To some extent   Social Connections: Socially Isolated    Frequency of Communication with Friends and Family: More than three times a week    Frequency of Social Gatherings with Friends and Family: More than three times a week    Attends Rastafarian Services: Never    Active Member of Clubs or Organizations: No    Attends Club or Organization Meetings: Never    Marital Status:    Housing Stability: Low Risk     Unable to Pay for Housing in the Last Year: No    Number of Places Lived in the Last Year: 1    Unstable Housing in the Last Year: No       Precautions:     Allergies as of 06/24/2023 - Reviewed 06/24/2023   Allergen Reaction Noted    Propoxyphene n-acetaminophen Other (See Comments) 03/06/2006    Codeine Anxiety 12/01/2011       [facility] Assessment Details/Treatment   06/27/2023  73 yr old female  family at bedside Fears she will be placed in a nursing home   Bilat groin  red rash  yeast    Recommendation:   Folds ,groin and under breast  Clean area with chlorhexidine/ns. Pat dry. Apply Triad and antifungal powder place on an air flow pad daily   Turn reposition q2 as pt's condition will allow.  Bilat heel pillows   Float and elevate heels of bed with pillows.  air mattress

## 2023-06-27 NOTE — NURSING TRANSFER
Nursing Transfer Note      6/26/2023     Reason patient is being transferred: Downgrade in care    Transfer To: Cardiology B room 2505 from ICU room 2216    Transfer via bed    Transfer with 6L to O2, cardiac monitoring    Transported by Myself, lorelei PHILLIP, and Nathan MCKEON    Telemetry: Box        Chart send with patient: Yes    Notified: daughter    Patient reassessed at: 6/26/23 2230 (date, time)  1  Upon arrival to floor: cardiac monitor applied, patient oriented to room, call bell in reach, and bed in lowest position

## 2023-06-27 NOTE — NURSING
Dr. Shea at bedside. Requests RT change mask to smaller size and add chin strap. RT notified of request.

## 2023-06-27 NOTE — CARE UPDATE
06/26/23 1958   Patient Assessment/Suction   Level of Consciousness (AVPU) alert   Respiratory Effort Unlabored   Expansion/Accessory Muscles/Retractions expansion symmetric   All Lung Fields Breath Sounds crackles, coarse   Rhythm/Pattern, Respiratory depth regular;pattern regular   Cough Frequency infrequent   Cough Type good;nonproductive   PRE-TX-O2   Device (Oxygen Therapy) high flow nasal cannula   $ Is the patient on Low Flow Oxygen? Yes   SpO2 97 %   $ Pulse Oximetry - Multiple Charge Pulse Oximetry - Multiple   Pulse 77   Resp 14   Aerosol Therapy   $ Aerosol Therapy Charges Aerosol Treatment   Daily Review of Necessity (SVN) completed   Respiratory Treatment Status (SVN) given   Treatment Route (SVN) mouthpiece   Patient Position (SVN) semi-Lieberman's   Post Treatment Assessment (SVN) breath sounds unchanged   Signs of Intolerance (SVN) none   Breath Sounds Post-Respiratory Treatment   Throughout All Fields Post-Treatment All Fields   Throughout All Fields Post-Treatment no change   Post-treatment Heart Rate (beats/min) 75   Post-treatment Resp Rate (breaths/min) 14   Education   $ Education BiPAP;15 min

## 2023-06-27 NOTE — PLAN OF CARE
Acknowledged LTAC consult. Met with pt, daughter and 2 sons to discuss LTAC placement and gave list of LTACs.  Pt not interested in LTAC and daughter stated pt lives with her and has a very strong support system and would like to take pt home. They also had questions about the medical reason for LTAC recommendation.      MARJAN De Anda CM met with pt's daughter (SW present) and explained that pt was on vaperthem and now on BIPAP, if remain on BIPAP may be able to go home, but not sure if that's an option at this time.  Neetu will f/u with daughter.  GISELE will assist with dc placement, once the appropriate dc plan has been determined.    Pt is currently active with [facility] and daughter requested hospital bed and BSC.     06/27/23 4390   Post-Acute Status   Post-Acute Authorization Placement   Post-Acute Placement Status Patient List Provided   Patient choice form signed by patient/caregiver List from System Post-Acute Care   Discharge Plan   Discharge Plan A Long-term acute care facility (LTAC)   Discharge Plan B Home Health

## 2023-06-27 NOTE — CARE UPDATE
06/27/23 0717   Patient Assessment/Suction   Level of Consciousness (AVPU) alert   Respiratory Effort Normal;Unlabored   Expansion/Accessory Muscles/Retractions no use of accessory muscles;no retractions   All Lung Fields Breath Sounds diminished   Rhythm/Pattern, Respiratory unlabored;pattern regular;depth regular;chest wiggle adequate;no shortness of breath reported   Cough Frequency no cough   Skin Integrity   $ Wound Care Tech Time 15 min   Area Observed Bridge of nose   Skin Appearance without discoloration   Barrier used? Gel Cushion   PRE-TX-O2   Device (Oxygen Therapy) high flow nasal cannula   $ Is the patient on Low Flow Oxygen? Yes   Flow (L/min) 7   SpO2 96 %   Pulse Oximetry Type Intermittent   $ Pulse Oximetry - Multiple Charge Pulse Oximetry - Multiple   Pulse 79   Resp 18   Aerosol Therapy   $ Aerosol Therapy Charges Aerosol Treatment   Daily Review of Necessity (SVN) completed   Respiratory Treatment Status (SVN) given   Treatment Route (SVN) oxygen;mask   Patient Position (SVN) HOB elevated   Post Treatment Assessment (SVN) increased aeration   Signs of Intolerance (SVN) none   Breath Sounds Post-Respiratory Treatment   Throughout All Fields Post-Treatment All Fields   Throughout All Fields Post-Treatment aeration increased   Post-treatment Heart Rate (beats/min) 80   Post-treatment Resp Rate (breaths/min) 18   Preset CPAP/BiPAP Settings   Mode Of Delivery BiPAP;Standby   $ CPAP/BiPAP Daily Charge BiPAP/CPAP Daily   Equipment Type V60   Respiratory Evaluation   $ Care Plan Tech Time 15 min

## 2023-06-27 NOTE — NURSING
Nurses Note -- 4 Eyes  6/26/2023    10:35 PM      Skin assessed during: Transfer      [x] No Altered Skin Integrity Present    []Prevention Measures Documented      [] Yes- Altered Skin Integrity Present or Discovered   [] LDA Added if Not in Epic (Describe Wound)   [] New Altered Skin Integrity was Present on Admit and Documented in LDA   [] Wound Image Taken    Wound Care Consulted? No    Attending Nurse:  Adi Cortes RN     Second RN/Staff Member:  [id number]

## 2023-06-27 NOTE — PROGRESS NOTES
Progress Note  Pulmonary/Critical Care      PATIENT NAME: Betty Bacon  MRN: [id number]  TODAY'S DATE: 2023  9:44 AM  ADMIT DATE: 2023  AGE: 73 y.o. : 1950    HPI / INTERVAL HISTORY  2023 - 73 y.o. female with a PMH of COPD, asbestosis (severe restriction and DLCO decrease), recent COVID pneumonia, CAD, HFpEF, severe aortic stenosis (s/p recent TAVR) presented to ER with increased SOB, generalized weakness.  She had some increased fluid retention (lasix had been held for a few days).  In ER she was placed on BiPAP and moved to ICU.  ROS as below.  She is feeling better at this time.    23: Off of BiPAP since early last night. She could not tolerate it for more than 30 minutes trying to sleep. She is currently saturating ~88% on her home 6 L NC with minimal exertion.    23: Net -1 L in past 24 hours; net -2 L for the admission. Desaturated to 50s (asymptomatic) on wall flow 9 LPM. Was put on BiPAP for a couple of hours. I tried her on 15 LPM just now, and she desatted to 70s (also asymptomatic). We will try Vapotherm. Patient now agrees to try BiPAP at night.    SMOKING HISTORY  Quit 25 years ago.  Smoked 2 PPD x 30 years.     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    REVIEW OF SYSTEMS  GENERAL: Feeling well.  EYES: Vision is good.  ENT: No sinusitis or pharyngitis.   HEART: No chest pain or palpitations.  LUNGS: No SOB, cough, sputum, or wheezing.  GI: No abdominal pain, nausea, vomiting, or diarrhea.  : No urinary urgency, pain, or change in frequency.  SKIN: No lesions or rashes.  MUSCULOSKELETAL: No joint pain or myalgias.  NEURO: No headaches or neuropathy.  LYMPH: No edema or adenopathy.  PSYCH: No anxiety or depression.  ENDO: No weight change.    (See H&P for complete medical, surgical, family, and social history.)    VITAL SIGNS (MOST RECENT)  Temp: 97.6 °F (36.4 °C) (23 0735)  Pulse:  70 (06/27/23 0735)  Resp: 18 (06/27/23 0717)  BP: (!) 141/59 (nurse jhonatan notified) (06/27/23 0735)  SpO2: 97 % (06/27/23 0753)    INTAKE AND OUTPUT (LAST 24 HOURS):  Intake/Output Summary (Last 24 hours) at 6/27/2023 1052  Last data filed at 6/27/2023 1022  Gross per 24 hour   Intake 840 ml   Output 2100 ml   Net -1260 ml       WEIGHT  Wt Readings from Last 1 Encounters:   06/27/23 89.9 kg (198 lb 1.8 oz)       PHYSICAL EXAM  GENERAL:  NAD.  HEENT: EOMI  NECK: Supple.   HEART: Normal rate and regular rhythm. 2/6 loud systolic murmur at RUSB radiating to carotids  LUNGS: b/l upper lung field crackles to anterior auscultation and b/l basilar crackles to posterior auscultation. Otherwise clear.   ABDOMEN: Bowel sounds present. Non-tender, no masses palpated.  EXTREMITIES: Normal muscle tone and joint movement, no cyanosis or clubbing.   LYMPHATICS: Trace pretibial pitting edema  SKIN: Dry, intact, no lesions.   NEURO: No gross abnormalities.  PSYCH: Appropriate affect.    RESPIRATORY SUPPORT  Oxygen Concentration (%):  [0.7-70] 70           LAST ARTERIAL BLOOD GAS  ABG  Recent Labs   Lab 06/24/23 2243   PH 7.309*   PO2 70*   PCO2 52.0*   HCO3 26.1   BE 0       ACUTE PHASE REACTANT (LAST 24 HOURS)  No results for input(s): FERRITIN, CRP, LDH, DDIMER in the last 24 hours.      CBC LAST (LAST 24 HOURS)  Recent Labs   Lab 06/27/23  0424   WBC 9.49   RBC 2.64*   HGB 7.4*   HCT 25.2*   MCV 96   MCH 28.0   MCHC 29.4*   RDW 15.2*      MPV 10.7       CHEMISTRY LAST (LAST 24 HOURS)  Recent Labs   Lab 06/27/23  0424      K 4.1      CO2 30*   ANIONGAP 8   BUN 66*   CREATININE 1.9*   *   CALCIUM 8.8       COAGULATION LAST (LAST 24 HOURS)  No results for input(s): LABPT, INR, APTT in the last 24 hours.    CARDIAC PROFILE (LAST 24 HOURS)  Recent Labs   Lab 06/24/23  2227   *       LAST 7 DAYS MICROBIOLOGY   Microbiology Results (last 7 days)       ** No results found for the last 168 hours. **             MOST RECENT IMAGING  Cardiac catheterization    The estimated blood loss was <50 mL.    Indication     Betty Bacon is a 73 y.o. female  referred by Dr Borjas for   severe AS.      Patient was evaluated by the heart team and deemed cohort C for TAVR, very   frail to survive. She was offered valvuloplasty as a bridge to TAVR    The patient was showed a power point presentation including a detailed   discussion about risk of  complete heart block, stroke, MI, bleeding   access site complications including limb loss, allergy, kidney failure   including dialysis and death.    Operators  Surgeon(s) and Role:     * Neeraj Finn MD - Primary     * Nelson Fried MD - Fellow     Description of procedure     The patient was brought to the catheterization lab in the fasting state,   she was prepped in the usual sterile fashion. The anesthesiologist   administered the sedation. An arterial line was placed by the anesthesia   department. Access was obtained  in the right common femoral artery with a   micropuncture kit. Two proglide devices were used to pre-close the artery   and a 12Fr sheath was inserted.  The aortic valve was crossed with the   help of a AL2 catheter and a stiff straight glidewire. Using a 6Fr pigtail   catheter a curved shaped stiff wire was placed in the ventricle. A balloon   aortic   valvuloplasty was performed with a 22 mm True FLow balloon.     An aortogram showed trace AI.     The patient was transported to the recovery unit in stable condition      Pre procedure AV mean gradient 30 mmHg, post BAV 20 mm Hg    Complications: None  Estimated blood loss: <50 mL  Contrast used: 20 cc    Summary:     Successful BAV with 22 mm TrueFlow balloon.     Recommendations:    Routine post cath care  Follow up with me in 6 weeks    Neeraj Blevins MD [facility]  Interventional Cardiology  Structural/Valvular heart disease  [phone]    The procedure log was documented by Documenter: Lalo  Dwight RT;   Caty Hurst RT; Chidi Brink and verified by Neeraj Blevins MD.    Date: 6/13/2023  Time: 3:26 PM      CURRENT VISIT EKG  Results for orders placed or performed during the hospital encounter of 06/24/23   EKG 12-lead    Narrative    Test Reason : R07.9,    Vent. Rate : 084 BPM     Atrial Rate : 084 BPM     P-R Int : 152 ms          QRS Dur : 088 ms      QT Int : 394 ms       P-R-T Axes : 000 003 030 degrees     QTc Int : 465 ms    Normal sinus rhythm  Anterior infarct ,age undetermined  Abnormal ECG  When compared with ECG of 13-JUN-2023 13:27,  Anterior infarct is now Present    Referred By: [email or web address]   SELF           Confirmed By:        ECHOCARDIOGRAM RESULTS  Results for orders placed during the hospital encounter of 06/24/23    Echo    Interpretation Summary  · The left ventricle is normal in size with moderate concentric hypertrophy and normal systolic function.  · The estimated ejection fraction is 68%.  · Grade II left ventricular diastolic dysfunction.  · There is abnormal septal wall motion. There is systolic flattening of the interventricular septum consistent with right ventricle pressure overload.  · Normal right ventricular size with normal right ventricular systolic function.  · Moderate aortic regurgitation.  · There is moderate aortic valve stenosis.  · Aortic valve area is 1.71 cm2; peak velocity is 3.80 m/s; mean gradient is 33 mmHg.  · Mild mitral regurgitation.  · Moderate tricuspid regurgitation.  · There is moderate pulmonary hypertension.  · Elevated central venous pressure (15 mmHg).  · The estimated PA systolic pressure is 80 mmHg.    Patient has moderately severe aortic valve stenosis  Left ventricle ejection fractions well-maintained  Grade 2 diastolic dysfunction with mean left atrial pressure of 15 which is elevated  Pulmonary artery pressure elevated approximally 80 mm Hg    CT chest 6/25/23: ILD with diffuse interstitial thickening worse than prior and small b/l  pleural effusions likely due to pulmonary edema. No dense consolidation to suggest pneumonia.      ASSESSMENT/PLAN:   Betty Bacon is a 73 y.o. female with a PMH significant for COPD, asbestosis (severe restriction and DLCO decrease), COVID pneumonia, CAD, HFpEF, severe aortic stenosis s/p balloon valvuloplasty (6/12/23) on 6 L NC at baseline on whom we have been consulted for acute on chronic hypoxic, hypercapnic respiratory failure.    #Acute on chronic hypoxic and hypercapnic respiratory failure  #Acutely decompensated HFpEF (acute on chronic diastolic heart failure)  #Severe aortic stenosis s/p balloon valvuloplasty  #Pulmonary hypertension, likely groups 2 and 3  BNP elevated to 803 at admission (higher than all but one prior measurement.)  - continue diuresis  - continue GDMT  - continue supplemental O2 as needed for SpO2 >88%    #COPD exacerbation likely secondary to heart failure exacerbation  #ILD exacerbation likely secondary to heart failure exacerbation  ESR and CRP elevated.  Procalcitonin normal at 0.22.  - Vapotherm for now  - BiPAP overnight  - continue steroids 60 mg daily  - continue scheduled DEVON/GARETH/ICS nebs  - patient does not wish to use BiPAP any longer for sleep in the hospital or at home if it can be avoided  - consultation with Dr. Linder of the ILD clinic at [facility] [place] is scheduled for 6/29/23  - f/u w/ me is scheduled for 8/15/23    #Acute on chronic anemia  - transfuse PRBCs if Hgb <7.0 or actively exsanguinating    Discussed with RN. Chest imaging personally viewed and interpreted by me.     Matt Toro MD  Pulmonary and Critical Care Medicine  [facility]  Date of Service: 06/27/2023  9:44 AM

## 2023-06-27 NOTE — PLAN OF CARE
Problem: Adult Inpatient Plan of Care  Goal: Plan of Care Review  Outcome: Ongoing, Progressing  Goal: Optimal Comfort and Wellbeing  Outcome: Ongoing, Progressing     Problem: Fall Injury Risk  Goal: Absence of Fall and Fall-Related Injury  Outcome: Ongoing, Progressing     Problem: Infection  Goal: Absence of Infection Signs and Symptoms  Outcome: Ongoing, Progressing     Problem: Gas Exchange Impaired  Goal: Optimal Gas Exchange  Outcome: Ongoing, Progressing

## 2023-06-28 ENCOUNTER — PATIENT MESSAGE (OUTPATIENT)
Dept: PULMONOLOGY | Facility: CLINIC | Age: 73
End: 2023-06-28
Payer: MEDICARE

## 2023-06-28 LAB
ABO + RH BLD: ABNORMAL
ANION GAP SERPL CALC-SCNC: 7 MMOL/L (ref 8–16)
BLD GP AB SCN CELLS X3 SERPL QL: ABNORMAL
BLD PROD TYP BPU: NORMAL
BLOOD GROUP ANTIBODIES SERPL: NORMAL
BLOOD UNIT EXPIRATION DATE: NORMAL
BLOOD UNIT TYPE CODE: 5100
BLOOD UNIT TYPE: NORMAL
BUN SERPL-MCNC: 68 MG/DL (ref 8–23)
CALCIUM SERPL-MCNC: 8.9 MG/DL (ref 8.7–10.5)
CHLORIDE SERPL-SCNC: 103 MMOL/L (ref 95–110)
CO2 SERPL-SCNC: 30 MMOL/L (ref 23–29)
CODING SYSTEM: NORMAL
CREAT SERPL-MCNC: 1.8 MG/DL (ref 0.5–1.4)
CROSSMATCH INTERPRETATION: NORMAL
DISPENSE STATUS: NORMAL
ERYTHROCYTE [DISTWIDTH] IN BLOOD BY AUTOMATED COUNT: 16.1 % (ref 11.5–14.5)
EST. GFR  (NO RACE VARIABLE): 29 ML/MIN/1.73 M^2
GLUCOSE SERPL-MCNC: 106 MG/DL (ref 70–110)
HCT VFR BLD AUTO: 28.6 % (ref 37–48.5)
HGB BLD-MCNC: 8.7 G/DL (ref 12–16)
MCH RBC QN AUTO: 28.2 PG (ref 27–31)
MCHC RBC AUTO-ENTMCNC: 30.4 G/DL (ref 32–36)
MCV RBC AUTO: 93 FL (ref 82–98)
NUM UNITS TRANS PACKED RBC: NORMAL
PLATELET # BLD AUTO: 204 K/UL (ref 150–450)
PMV BLD AUTO: 10.1 FL (ref 9.2–12.9)
POTASSIUM SERPL-SCNC: 4.2 MMOL/L (ref 3.5–5.1)
RBC # BLD AUTO: 3.08 M/UL (ref 4–5.4)
SODIUM SERPL-SCNC: 140 MMOL/L (ref 136–145)
SPECIMEN OUTDATE: ABNORMAL
WBC # BLD AUTO: 8.27 K/UL (ref 3.9–12.7)

## 2023-06-28 PROCEDURE — 94799 UNLISTED PULMONARY SVC/PX: CPT

## 2023-06-28 PROCEDURE — 25000003 PHARM REV CODE 250: Performed by: INTERNAL MEDICINE

## 2023-06-28 PROCEDURE — 99900031 HC PATIENT EDUCATION (STAT)

## 2023-06-28 PROCEDURE — 99900035 HC TECH TIME PER 15 MIN (STAT)

## 2023-06-28 PROCEDURE — 21400001 HC TELEMETRY ROOM

## 2023-06-28 PROCEDURE — 80048 BASIC METABOLIC PNL TOTAL CA: CPT | Performed by: INTERNAL MEDICINE

## 2023-06-28 PROCEDURE — 36415 COLL VENOUS BLD VENIPUNCTURE: CPT | Performed by: INTERNAL MEDICINE

## 2023-06-28 PROCEDURE — 94640 AIRWAY INHALATION TREATMENT: CPT

## 2023-06-28 PROCEDURE — 99232 PR SUBSEQUENT HOSPITAL CARE,LEVL II: ICD-10-PCS | Mod: ,,, | Performed by: INTERNAL MEDICINE

## 2023-06-28 PROCEDURE — 25000003 PHARM REV CODE 250: Performed by: FAMILY MEDICINE

## 2023-06-28 PROCEDURE — 85027 COMPLETE CBC AUTOMATED: CPT | Performed by: INTERNAL MEDICINE

## 2023-06-28 PROCEDURE — 99232 SBSQ HOSP IP/OBS MODERATE 35: CPT | Mod: ,,, | Performed by: INTERNAL MEDICINE

## 2023-06-28 PROCEDURE — 94660 CPAP INITIATION&MGMT: CPT

## 2023-06-28 PROCEDURE — 63600175 PHARM REV CODE 636 W HCPCS: Performed by: INTERNAL MEDICINE

## 2023-06-28 PROCEDURE — 36430 TRANSFUSION BLD/BLD COMPNT: CPT

## 2023-06-28 PROCEDURE — 25000242 PHARM REV CODE 250 ALT 637 W/ HCPCS: Performed by: INTERNAL MEDICINE

## 2023-06-28 PROCEDURE — 94761 N-INVAS EAR/PLS OXIMETRY MLT: CPT

## 2023-06-28 PROCEDURE — 63600175 PHARM REV CODE 636 W HCPCS: Performed by: FAMILY MEDICINE

## 2023-06-28 PROCEDURE — P9016 RBC LEUKOCYTES REDUCED: HCPCS | Performed by: FAMILY MEDICINE

## 2023-06-28 PROCEDURE — 27100171 HC OXYGEN HIGH FLOW UP TO 24 HOURS

## 2023-06-28 RX ADMIN — FUROSEMIDE 40 MG: 10 INJECTION, SOLUTION INTRAVENOUS at 09:06

## 2023-06-28 RX ADMIN — IPRATROPIUM BROMIDE AND ALBUTEROL SULFATE 3 ML: 2.5; .5 SOLUTION RESPIRATORY (INHALATION) at 12:06

## 2023-06-28 RX ADMIN — PANTOPRAZOLE SODIUM 40 MG: 40 TABLET, DELAYED RELEASE ORAL at 05:06

## 2023-06-28 RX ADMIN — HYDRALAZINE HYDROCHLORIDE 50 MG: 25 TABLET ORAL at 01:06

## 2023-06-28 RX ADMIN — AMLODIPINE BESYLATE 10 MG: 5 TABLET ORAL at 09:06

## 2023-06-28 RX ADMIN — PAROXETINE 50 MG: 20 TABLET, FILM COATED ORAL at 09:06

## 2023-06-28 RX ADMIN — BUDESONIDE 0.5 MG: 0.5 INHALANT RESPIRATORY (INHALATION) at 07:06

## 2023-06-28 RX ADMIN — IPRATROPIUM BROMIDE AND ALBUTEROL SULFATE 3 ML: 2.5; .5 SOLUTION RESPIRATORY (INHALATION) at 07:06

## 2023-06-28 RX ADMIN — METOPROLOL SUCCINATE 50 MG: 50 TABLET, FILM COATED, EXTENDED RELEASE ORAL at 09:06

## 2023-06-28 RX ADMIN — CHLORHEXIDINE GLUCONATE 15 ML: 1.2 RINSE ORAL at 09:06

## 2023-06-28 RX ADMIN — ISOSORBIDE MONONITRATE 60 MG: 60 TABLET, EXTENDED RELEASE ORAL at 09:06

## 2023-06-28 RX ADMIN — ATORVASTATIN CALCIUM 40 MG: 40 TABLET, FILM COATED ORAL at 09:06

## 2023-06-28 RX ADMIN — CLOPIDOGREL BISULFATE 75 MG: 75 TABLET, FILM COATED ORAL at 09:06

## 2023-06-28 RX ADMIN — PREDNISONE 60 MG: 20 TABLET ORAL at 09:06

## 2023-06-28 RX ADMIN — FUROSEMIDE 40 MG: 10 INJECTION, SOLUTION INTRAVENOUS at 05:06

## 2023-06-28 RX ADMIN — QUETIAPINE 100 MG: 100 TABLET ORAL at 09:06

## 2023-06-28 RX ADMIN — TRAZODONE HYDROCHLORIDE 50 MG: 50 TABLET ORAL at 09:06

## 2023-06-28 RX ADMIN — HYDRALAZINE HYDROCHLORIDE 50 MG: 25 TABLET ORAL at 05:06

## 2023-06-28 RX ADMIN — MUPIROCIN 1 G: 20 OINTMENT TOPICAL at 09:06

## 2023-06-28 RX ADMIN — IPRATROPIUM BROMIDE AND ALBUTEROL SULFATE 3 ML: 2.5; .5 SOLUTION RESPIRATORY (INHALATION) at 02:06

## 2023-06-28 RX ADMIN — TRAZODONE HYDROCHLORIDE 50 MG: 50 TABLET ORAL at 02:06

## 2023-06-28 RX ADMIN — HYDRALAZINE HYDROCHLORIDE 50 MG: 25 TABLET ORAL at 09:06

## 2023-06-28 NOTE — CARE UPDATE
06/27/23 1947   Patient Assessment/Suction   Level of Consciousness (AVPU) alert   Respiratory Effort Normal;Unlabored   Expansion/Accessory Muscles/Retractions no use of accessory muscles   All Lung Fields Breath Sounds diminished   Rhythm/Pattern, Respiratory unlabored;pattern regular;depth regular   PRE-TX-O2   Device (Oxygen Therapy) nasal cannula with humidification  (vapotherm)   $ Is the patient on High Flow Oxygen? Yes   $ Vapotherm Equipment Vapotherm Circuit;Nasal Cannula   Humidification temp set 33   Flow (L/min) 25   Oxygen Concentration (%) 70   SpO2 96 %   Pulse Oximetry Type Intermittent   $ Pulse Oximetry - Multiple Charge Pulse Oximetry - Multiple   Pulse 71   Resp 18   Aerosol Therapy   $ Aerosol Therapy Charges Aerosol Treatment   Daily Review of Necessity (SVN) completed   Respiratory Treatment Status (SVN) given   Treatment Route (SVN) mask;oxygen   Patient Position (SVN) HOB elevated   Post Treatment Assessment (SVN) increased aeration   Signs of Intolerance (SVN) none   Ready to Wean/Extubation Screen   FIO2<=50 (chart decimal) (!) 0.7   Preset CPAP/BiPAP Settings   Mode Of Delivery BiPAP;Standby   $ CPAP/BiPAP Daily Charge BiPAP/CPAP Daily   Education   $ Education Bronchodilator;15 min   Respiratory Evaluation   $ Care Plan Tech Time 15 min

## 2023-06-28 NOTE — RESPIRATORY THERAPY
Pt continuously refusing BiPAP for tonight despite low sats episode from today. Sats staying 96% on Vapotherm 25L 70%. Will continue to monitor.

## 2023-06-28 NOTE — PROGRESS NOTES
Progress Note  Pulmonary/Critical Care      PATIENT NAME: Betty Bacon  MRN: [id number]  TODAY'S DATE: 2023  9:44 AM  ADMIT DATE: 2023  AGE: 73 y.o. : 1950    HPI / INTERVAL HISTORY  2023 - 73 y.o. female with a PMH of COPD, asbestosis (severe restriction and DLCO decrease), recent COVID pneumonia, CAD, HFpEF, severe aortic stenosis (s/p recent TAVR) presented to ER with increased SOB, generalized weakness.  She had some increased fluid retention (lasix had been held for a few days).  In ER she was placed on BiPAP and moved to ICU.  ROS as below.  She is feeling better at this time.    23: Off of BiPAP since early last night. She could not tolerate it for more than 30 minutes trying to sleep. She is currently saturating ~88% on her home 6 L NC with minimal exertion.    23: Net -1 L in past 24 hours; net -2 L for the admission. Desaturated to 50s (asymptomatic) on wall flow 9 LPM. Was put on BiPAP for a couple of hours. I tried her on 15 LPM just now, and she desatted to 70s (also asymptomatic). We will try Vapotherm. Patient now agrees to try BiPAP at night.    23: Net -1 L in the past 24 hours and net -3 L for the admission. Doing well on 12 LPM at this time. Feeling well. Was unable to tolerate BiPAP last night.    SMOKING HISTORY  Quit 25 years ago.  Smoked 2 PPD x 30 years.     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    REVIEW OF SYSTEMS  GENERAL: Feeling well.  EYES: Vision is good.  ENT: No sinusitis or pharyngitis.   HEART: No chest pain or palpitations.  LUNGS: No SOB, cough, sputum, or wheezing.  GI: No abdominal pain, nausea, vomiting, or diarrhea.  : No urinary urgency, pain, or change in frequency.  SKIN: No lesions or rashes.  MUSCULOSKELETAL: No joint pain or myalgias.  NEURO: No headaches or neuropathy.  LYMPH: No edema or adenopathy.  PSYCH: No anxiety or depression.  ENDO: No  weight change.    (See H&P for complete medical, surgical, family, and social history.)    VITAL SIGNS (MOST RECENT)  Temp: 97.4 °F (36.3 °C) (06/28/23 1125)  Pulse: 75 (06/28/23 1125)  Resp: 18 (06/28/23 1125)  BP: (!) 185/75 (06/28/23 1125)  SpO2: 96 % (06/28/23 1125)    INTAKE AND OUTPUT (LAST 24 HOURS):  Intake/Output Summary (Last 24 hours) at 6/28/2023 1239  Last data filed at 6/28/2023 0845  Gross per 24 hour   Intake 400 ml   Output 1250 ml   Net -850 ml       WEIGHT  Wt Readings from Last 1 Encounters:   06/28/23 93 kg (205 lb)       PHYSICAL EXAM  GENERAL:  NAD.  HEENT: EOMI  NECK: Supple.   HEART: Normal rate and regular rhythm. 2/6 loud systolic murmur at RUSB radiating to carotids  LUNGS: b/l upper lung field crackles to anterior auscultation and b/l basilar crackles to posterior auscultation. Otherwise clear.   ABDOMEN: Bowel sounds present. Non-tender, no masses palpated.  EXTREMITIES: Normal muscle tone and joint movement, no cyanosis or clubbing.   LYMPHATICS: Trace pretibial pitting edema  SKIN: Dry, intact, no lesions.   NEURO: No gross abnormalities.  PSYCH: Appropriate affect.    RESPIRATORY SUPPORT  Oxygen Concentration (%):  [60-70] 65           LAST ARTERIAL BLOOD GAS  ABG  Recent Labs   Lab 06/24/23  2243   PH 7.309*   PO2 70*   PCO2 52.0*   HCO3 26.1   BE 0       ACUTE PHASE REACTANT (LAST 24 HOURS)  No results for input(s): FERRITIN, CRP, LDH, DDIMER in the last 24 hours.      CBC LAST (LAST 24 HOURS)  Recent Labs   Lab 06/28/23  0659   WBC 8.27   RBC 3.08*   HGB 8.7*   HCT 28.6*   MCV 93   MCH 28.2   MCHC 30.4*   RDW 16.1*      MPV 10.1       CHEMISTRY LAST (LAST 24 HOURS)  Recent Labs   Lab 06/28/23  0659      K 4.2      CO2 30*   ANIONGAP 7*   BUN 68*   CREATININE 1.8*      CALCIUM 8.9       COAGULATION LAST (LAST 24 HOURS)  No results for input(s): LABPT, INR, APTT in the last 24 hours.    CARDIAC PROFILE (LAST 24 HOURS)  Recent Labs   Lab 06/24/23  2227   BNP  803*       LAST 7 DAYS MICROBIOLOGY   Microbiology Results (last 7 days)       ** No results found for the last 168 hours. **            MOST RECENT IMAGING  Cardiac catheterization    The estimated blood loss was <50 mL.    Indication     Betty Bacon is a 73 y.o. female  referred by Dr Borjas for   severe AS.      Patient was evaluated by the heart team and deemed cohort C for TAVR, very   frail to survive. She was offered valvuloplasty as a bridge to TAVR    The patient was showed a power point presentation including a detailed   discussion about risk of  complete heart block, stroke, MI, bleeding   access site complications including limb loss, allergy, kidney failure   including dialysis and death.    Operators  Surgeon(s) and Role:     * Neeraj Finn MD - Primary     * Nelson Fried MD - Fellow     Description of procedure     The patient was brought to the catheterization lab in the fasting state,   she was prepped in the usual sterile fashion. The anesthesiologist   administered the sedation. An arterial line was placed by the anesthesia   department. Access was obtained  in the right common femoral artery with a   micropuncture kit. Two proglide devices were used to pre-close the artery   and a 12Fr sheath was inserted.  The aortic valve was crossed with the   help of a AL2 catheter and a stiff straight glidewire. Using a 6Fr pigtail   catheter a curved shaped stiff wire was placed in the ventricle. A balloon   aortic   valvuloplasty was performed with a 22 mm True FLow balloon.     An aortogram showed trace AI.     The patient was transported to the recovery unit in stable condition      Pre procedure AV mean gradient 30 mmHg, post BAV 20 mm Hg    Complications: None  Estimated blood loss: <50 mL  Contrast used: 20 cc    Summary:     Successful BAV with 22 mm TrueFlow balloon.     Recommendations:    Routine post cath care  Follow up with me in 6 weeks    Neeraj Blevins MD [facility]  [facility]  Interventional Cardiology  Structural/Valvular heart disease  [phone]    The procedure log was documented by Documenter: Lalo Quintanilla RT;   Caty Hurst RT; Chidi Brink and verified by Neeraj Blevins MD.    Date: 6/13/2023  Time: 3:26 PM      CURRENT VISIT EKG  Results for orders placed or performed during the hospital encounter of 06/24/23   EKG 12-lead    Narrative    Test Reason : R07.9,    Vent. Rate : 084 BPM     Atrial Rate : 084 BPM     P-R Int : 152 ms          QRS Dur : 088 ms      QT Int : 394 ms       P-R-T Axes : 000 003 030 degrees     QTc Int : 465 ms    Normal sinus rhythm  Anterior infarct ,age undetermined  Abnormal ECG  When compared with ECG of 13-JUN-2023 13:27,  Anterior infarct is now Present    Referred By: [email or web address]   SELF           Confirmed By:        ECHOCARDIOGRAM RESULTS  Results for orders placed during the hospital encounter of 06/24/23    Echo    Interpretation Summary  · The left ventricle is normal in size with moderate concentric hypertrophy and normal systolic function.  · The estimated ejection fraction is 68%.  · Grade II left ventricular diastolic dysfunction.  · There is abnormal septal wall motion. There is systolic flattening of the interventricular septum consistent with right ventricle pressure overload.  · Normal right ventricular size with normal right ventricular systolic function.  · Moderate aortic regurgitation.  · There is moderate aortic valve stenosis.  · Aortic valve area is 1.71 cm2; peak velocity is 3.80 m/s; mean gradient is 33 mmHg.  · Mild mitral regurgitation.  · Moderate tricuspid regurgitation.  · There is moderate pulmonary hypertension.  · Elevated central venous pressure (15 mmHg).  · The estimated PA systolic pressure is 80 mmHg.    Patient has moderately severe aortic valve stenosis  Left ventricle ejection fractions well-maintained  Grade 2 diastolic dysfunction with mean left atrial pressure of 15 which is elevated  Pulmonary  artery pressure elevated approximally 80 mm Hg    CT chest 6/25/23: ILD with diffuse interstitial thickening worse than prior and small b/l pleural effusions likely due to pulmonary edema. No dense consolidation to suggest pneumonia.      ASSESSMENT/PLAN:   Betty Bacon is a 73 y.o. female with a PMH significant for COPD, asbestosis (severe restriction and DLCO decrease), COVID pneumonia, CAD, HFpEF, severe aortic stenosis s/p balloon valvuloplasty (6/12/23) on 6 L NC at baseline on whom we have been consulted for acute on chronic hypoxic, hypercapnic respiratory failure.    #Acute on chronic hypoxic and hypercapnic respiratory failure  #Acutely decompensated HFpEF (acute on chronic diastolic heart failure)  #Severe aortic stenosis s/p balloon valvuloplasty  #Pulmonary hypertension, likely groups 2 and 3  BNP elevated to 803 at admission (higher than all but one prior measurement.)  - continue diuresis  - continue GDMT  - continue supplemental O2 as needed for SpO2 >88%    #COPD exacerbation likely secondary to heart failure exacerbation  #ILD exacerbation likely secondary to heart failure exacerbation  ESR and CRP elevated.  Procalcitonin normal at 0.22.  - wean O2 as tolerated for a minimum SpO2 88%  - continue steroids 60 mg daily  - continue scheduled DEVON/GARETH/ICS nebs  - patient does not wish to use BiPAP any longer for sleep in the hospital or at home if it can be avoided  - f/u w/ me is scheduled for 8/15/23    #Acute on chronic anemia  - transfuse PRBCs if Hgb <7.0 or actively exsanguinating    Discussed with RN, hospitalist. Chest imaging personally viewed and interpreted by me.     Matt Toro MD  Pulmonary and Critical Care Medicine  [facility]  Date of Service: 06/28/2023  3:17 PM

## 2023-06-28 NOTE — PROGRESS NOTES
"[facility] Medicine  Progress Note    Patient Name: Betty Bacon  MRN: [id number]  Patient Class: IP- Inpatient   Admission Date: 6/24/2023  Length of Stay: 2 days  Attending Physician: Lynette Shea MD  Primary Care Provider: Johanne Callejas MD        Subjective:     Principal Problem:Acute respiratory failure with hypoxia and hypercarbia    Patient seen and examined, on BiPAP and ABG noted and stable  Discussed with family members, she had aortic balloon valvuloplasty.  Her Lasix was stopped by her cardiologist after the procedure for unsure reason. Systolic  Murmur noted  Repeat ECHO ordered    6/26  Admitted with CHF exacerbation/worsening of her previous pulmonary condition  Patient is doing well off BiPAP and good amount of diuresis no shortness of breath.  Repeat echo pending.  Patient is stable and downgraded    6/27:  pt refused BIPAP overnight as she states she cannot sleep with it on; she was later found with SaO2 50s in AM, started on BIPAP this AM with improvement.  BIPAP use encouraged; trazodone started QHS.  Pulmonology following, thank you.  Pt denies SOB while on BIPAP.  No subjective fever, no chills.  No CP.        ROS:  All systems reviewed and are negative except as noted per above.    PHYSICAL EXAM    BP (!) 144/63 (BP Location: Right arm, Patient Position: Lying) Comment: nurse jhonatan notfied  Pulse 71   Temp 98.1 °F (36.7 °C) (Oral)   Resp 18   Ht 5' 2" (1.575 m)   Wt 89.9 kg (198 lb 1.8 oz)   SpO2 96%   Breastfeeding No   BMI 36.23 kg/m²     Gen: alert, responsive  HEENT:  Eyes - no pallor  External ears with no lesions  Nares patent  Mouth, Throat:  trachea midline   CV: RRR, +MURMUR  Lungs: RALES, on BIPAP  Abd: +BS, soft, NT, ND  Ext: no atrophy; +1 BLE edema  Skin: warm, dry  Neuro: grossly intact  Psych: pleasant    Assessment/Plan:      Active Hospital Problems    Diagnosis    *Acute respiratory failure with hypoxia and hypercarbia    CHF " exacerbation    CARMELITA (acute kidney injury)    Uncontrolled hypertension    CKD (chronic kidney disease) stage 3, GFR 30-59 ml/min       ASSESSMENT AND PLAN        Acute on chronic hypoxemic and hypercapnic respiratory failure   Due to acute on chronic HFpEF + AS s/p balloon valvuloplasty   Complicated by asbestosis with ILD, pHTN  - on 6LNC at baseline  # uncontrolled hypertension, IMPROVING  # progressive anemia on Plavix  Continue BiPAP   Continue IV Lasix   STRICT I/OS, DAILY WEIGHTS, 1.5L FLUID RESTRCTION  Steroid, NEBS   Trending CBC, pRBC transfusion prn  Dr Linder of ILD clinic [facility] [place] . She want to be contacted at discharge for discharge medications     Chronic conditions as noted above/below; home medications reviewed personally by me and restarted as appropriate  Electrolyte derangement:  Trending BMP; Mg; replacement prn  DVT ppx: heparin HELD DUE TO ANEMIA REQUIRING TRANSFUSION  Dispo:  Discharge Plan A: Long-term acute care facility (LTAC)    FULL CODE    Lynette Shea MD  Department of Hospital Medicine   Critical access hospital

## 2023-06-28 NOTE — PLAN OF CARE
Problem: Adult Inpatient Plan of Care  Goal: Plan of Care Review  Outcome: Ongoing, Progressing  Goal: Patient-Specific Goal (Individualized)  Outcome: Ongoing, Progressing  Goal: Absence of Hospital-Acquired Illness or Injury  Outcome: Ongoing, Progressing  Goal: Optimal Comfort and Wellbeing  Outcome: Ongoing, Progressing  Goal: Readiness for Transition of Care  Outcome: Ongoing, Progressing     Problem: Skin Injury Risk Increased  Goal: Skin Health and Integrity  Outcome: Ongoing, Progressing     Problem: Fall Injury Risk  Goal: Absence of Fall and Fall-Related Injury  Outcome: Ongoing, Progressing     Problem: Infection  Goal: Absence of Infection Signs and Symptoms  Outcome: Ongoing, Progressing     Problem: Gas Exchange Impaired  Goal: Optimal Gas Exchange  Outcome: Ongoing, Progressing     Problem: Noninvasive Ventilation Acute  Goal: Effective Unassisted Ventilation and Oxygenation  Outcome: Ongoing, Progressing     Problem: Fatigue  Goal: Improved Activity Tolerance  Outcome: Ongoing, Progressing     Problem: Oral Intake Inadequate  Goal: Improved Oral Intake  Outcome: Ongoing, Progressing     Problem: Fluid and Electrolyte Imbalance (Acute Kidney Injury/Impairment)  Goal: Fluid and Electrolyte Balance  Outcome: Ongoing, Progressing     Problem: Oral Intake Inadequate (Acute Kidney Injury/Impairment)  Goal: Optimal Nutrition Intake  Outcome: Ongoing, Progressing     Problem: Renal Function Impairment (Acute Kidney Injury/Impairment)  Goal: Effective Renal Function  Outcome: Ongoing, Progressing     Problem: Impaired Wound Healing  Goal: Optimal Wound Healing  Outcome: Ongoing, Progressing

## 2023-06-28 NOTE — PLAN OF CARE
06/28/23 0730   Patient Assessment/Suction   Level of Consciousness (AVPU) alert   Respiratory Effort Normal;Unlabored   All Lung Fields Breath Sounds diminished;crackles, fine   Rhythm/Pattern, Respiratory no shortness of breath reported   Skin Integrity   $ Wound Care Tech Time 15 min   Area Observed Bridge of nose;Nares   Skin Appearance without discoloration   PRE-TX-O2   Device (Oxygen Therapy) high flow nasal cannula w/device   $ Is the patient on High Flow Oxygen? Yes   $ Noninvasive Daily Charge Noninvasive Daily   Humidification temp set 33   Humidification temp actual 33   Flow (L/min) 25   Oxygen Concentration (%) 70   SpO2 96 %   Pulse Oximetry Type Continuous   $ Pulse Oximetry - Multiple Charge Pulse Oximetry - Multiple   Pulse 79   Resp 18   Aerosol Therapy   $ Aerosol Therapy Charges Aerosol Treatment   Daily Review of Necessity (SVN) completed   Respiratory Treatment Status (SVN) given   Treatment Route (SVN) mask;oxygen   Patient Position (SVN) Lieberman's;HOB elevated   Post Treatment Assessment (SVN) breath sounds improved   Signs of Intolerance (SVN) none   Ready to Wean/Extubation Screen   FIO2<=50 (chart decimal) (!) 0.7   Preset CPAP/BiPAP Settings   Mode Of Delivery Standby;BiPAP   $ CPAP/BiPAP Daily Charge BiPAP/CPAP Daily   $ Initial CPAP/BiPAP Setup? No   $ Is patient using? No/refused   Sized Appropriately? Yes   Equipment Type V60   Ipap 12   EPAP (cm H2O) 5   Pressure Support (cm H2O) 7   Set Rate (Breaths/Min) 14   ITime (sec) 1   Rise Time (sec) 3   Education   $ Education BiPAP;Bronchodilator;15 min   Respiratory Evaluation   $ Care Plan Tech Time 15 min   $ Eval/Re-eval Charges Re-evaluation   Home Oxygen   Has Home Oxygen? Yes   Liter Flow 6

## 2023-06-28 NOTE — NURSING
Monitored frequently this shift. Initial assessment included Buttocks red. Tailbone padded with mepilex. Wound care at bedside this shift. MD Ordered 1.5L fluid restriction. Repositioned q2h this shift. Telemetry remained intact this shift. Patient declined scds this shift. MD performed trial off Bipap this am. Desaturated to 69 within 3 minutes. MD ordered vapotherm. RT applied to patient. Currently on Vapotherm 25Lpn/70%/33C. Tolerating well. 1107 Dr. Toro requested Humidified air. SN notified RT. Dr. Toro educated on avoiding use of sleep medication and benzo's. Reported this to oncoming shift. Saturations improved this shift while on vapotherm. No further assessment changes noted. bed in low position;wheels locked;call light in reach;upper side rails raised x 2;ID band on. Son at bedside this shift. Assistive device/personal item within reach;bed alarm set;nonskid shoes/socks;instructed to call staff for mobility;  environmental surveillance performed;equipment surfaces disinfected;hand hygiene promoted;single patient room provided;repositioning q2h per staff. HOB elevated. Pillows in use. Report given to MARJAN Ramos

## 2023-06-29 ENCOUNTER — TELEPHONE (OUTPATIENT)
Dept: FAMILY MEDICINE | Facility: CLINIC | Age: 73
End: 2023-06-29
Payer: MEDICARE

## 2023-06-29 LAB
ANION GAP SERPL CALC-SCNC: 6 MMOL/L (ref 8–16)
BNP SERPL-MCNC: 1005 PG/ML (ref 0–99)
BUN SERPL-MCNC: 66 MG/DL (ref 8–23)
CALCIUM SERPL-MCNC: 9.1 MG/DL (ref 8.7–10.5)
CHLORIDE SERPL-SCNC: 100 MMOL/L (ref 95–110)
CO2 SERPL-SCNC: 33 MMOL/L (ref 23–29)
CREAT SERPL-MCNC: 1.8 MG/DL (ref 0.5–1.4)
ERYTHROCYTE [DISTWIDTH] IN BLOOD BY AUTOMATED COUNT: 15.7 % (ref 11.5–14.5)
EST. GFR  (NO RACE VARIABLE): 29 ML/MIN/1.73 M^2
GLUCOSE SERPL-MCNC: 115 MG/DL (ref 70–110)
HCT VFR BLD AUTO: 30 % (ref 37–48.5)
HGB BLD-MCNC: 9.2 G/DL (ref 12–16)
MCH RBC QN AUTO: 28.7 PG (ref 27–31)
MCHC RBC AUTO-ENTMCNC: 30.7 G/DL (ref 32–36)
MCV RBC AUTO: 94 FL (ref 82–98)
PLATELET # BLD AUTO: 199 K/UL (ref 150–450)
PMV BLD AUTO: 10.5 FL (ref 9.2–12.9)
POTASSIUM SERPL-SCNC: 4.2 MMOL/L (ref 3.5–5.1)
RBC # BLD AUTO: 3.21 M/UL (ref 4–5.4)
SODIUM SERPL-SCNC: 139 MMOL/L (ref 136–145)
WBC # BLD AUTO: 6.98 K/UL (ref 3.9–12.7)

## 2023-06-29 PROCEDURE — 94640 AIRWAY INHALATION TREATMENT: CPT

## 2023-06-29 PROCEDURE — 36415 COLL VENOUS BLD VENIPUNCTURE: CPT | Performed by: INTERNAL MEDICINE

## 2023-06-29 PROCEDURE — 97530 THERAPEUTIC ACTIVITIES: CPT

## 2023-06-29 PROCEDURE — 63600175 PHARM REV CODE 636 W HCPCS: Performed by: FAMILY MEDICINE

## 2023-06-29 PROCEDURE — 80048 BASIC METABOLIC PNL TOTAL CA: CPT | Performed by: INTERNAL MEDICINE

## 2023-06-29 PROCEDURE — 97116 GAIT TRAINING THERAPY: CPT

## 2023-06-29 PROCEDURE — 25000003 PHARM REV CODE 250: Performed by: INTERNAL MEDICINE

## 2023-06-29 PROCEDURE — 63600175 PHARM REV CODE 636 W HCPCS: Performed by: INTERNAL MEDICINE

## 2023-06-29 PROCEDURE — 25000242 PHARM REV CODE 250 ALT 637 W/ HCPCS: Performed by: INTERNAL MEDICINE

## 2023-06-29 PROCEDURE — 27000221 HC OXYGEN, UP TO 24 HOURS

## 2023-06-29 PROCEDURE — 83880 ASSAY OF NATRIURETIC PEPTIDE: CPT | Performed by: INTERNAL MEDICINE

## 2023-06-29 PROCEDURE — 99233 PR SUBSEQUENT HOSPITAL CARE,LEVL III: ICD-10-PCS | Mod: ,,, | Performed by: INTERNAL MEDICINE

## 2023-06-29 PROCEDURE — 85027 COMPLETE CBC AUTOMATED: CPT | Performed by: INTERNAL MEDICINE

## 2023-06-29 PROCEDURE — 97161 PT EVAL LOW COMPLEX 20 MIN: CPT

## 2023-06-29 PROCEDURE — 99233 SBSQ HOSP IP/OBS HIGH 50: CPT | Mod: ,,, | Performed by: INTERNAL MEDICINE

## 2023-06-29 PROCEDURE — 99900035 HC TECH TIME PER 15 MIN (STAT)

## 2023-06-29 PROCEDURE — 94761 N-INVAS EAR/PLS OXIMETRY MLT: CPT

## 2023-06-29 PROCEDURE — 99900031 HC PATIENT EDUCATION (STAT)

## 2023-06-29 PROCEDURE — 25000003 PHARM REV CODE 250: Performed by: FAMILY MEDICINE

## 2023-06-29 PROCEDURE — 21400001 HC TELEMETRY ROOM

## 2023-06-29 PROCEDURE — 94618 PULMONARY STRESS TESTING: CPT

## 2023-06-29 PROCEDURE — 94660 CPAP INITIATION&MGMT: CPT

## 2023-06-29 RX ORDER — ALPRAZOLAM 0.25 MG/1
0.25 TABLET ORAL 3 TIMES DAILY PRN
Status: DISCONTINUED | OUTPATIENT
Start: 2023-06-29 | End: 2023-06-30 | Stop reason: HOSPADM

## 2023-06-29 RX ADMIN — HYDRALAZINE HYDROCHLORIDE 50 MG: 25 TABLET ORAL at 09:06

## 2023-06-29 RX ADMIN — BUDESONIDE 0.5 MG: 0.5 INHALANT RESPIRATORY (INHALATION) at 08:06

## 2023-06-29 RX ADMIN — PAROXETINE 50 MG: 20 TABLET, FILM COATED ORAL at 08:06

## 2023-06-29 RX ADMIN — BUDESONIDE 0.5 MG: 0.5 INHALANT RESPIRATORY (INHALATION) at 07:06

## 2023-06-29 RX ADMIN — HYDRALAZINE HYDROCHLORIDE 50 MG: 25 TABLET ORAL at 05:06

## 2023-06-29 RX ADMIN — MUPIROCIN 1 G: 20 OINTMENT TOPICAL at 08:06

## 2023-06-29 RX ADMIN — CHLORHEXIDINE GLUCONATE 15 ML: 1.2 RINSE ORAL at 08:06

## 2023-06-29 RX ADMIN — PANTOPRAZOLE SODIUM 40 MG: 40 TABLET, DELAYED RELEASE ORAL at 05:06

## 2023-06-29 RX ADMIN — ISOSORBIDE MONONITRATE 60 MG: 60 TABLET, EXTENDED RELEASE ORAL at 08:06

## 2023-06-29 RX ADMIN — AMLODIPINE BESYLATE 10 MG: 5 TABLET ORAL at 08:06

## 2023-06-29 RX ADMIN — CLOPIDOGREL BISULFATE 75 MG: 75 TABLET, FILM COATED ORAL at 08:06

## 2023-06-29 RX ADMIN — HYDRALAZINE HYDROCHLORIDE 50 MG: 25 TABLET ORAL at 01:06

## 2023-06-29 RX ADMIN — FUROSEMIDE 40 MG: 10 INJECTION, SOLUTION INTRAVENOUS at 08:06

## 2023-06-29 RX ADMIN — ALPRAZOLAM 0.25 MG: 0.25 TABLET ORAL at 08:06

## 2023-06-29 RX ADMIN — PREDNISONE 60 MG: 20 TABLET ORAL at 08:06

## 2023-06-29 RX ADMIN — IPRATROPIUM BROMIDE AND ALBUTEROL SULFATE 3 ML: 2.5; .5 SOLUTION RESPIRATORY (INHALATION) at 02:06

## 2023-06-29 RX ADMIN — TRAZODONE HYDROCHLORIDE 50 MG: 50 TABLET ORAL at 08:06

## 2023-06-29 RX ADMIN — ALPRAZOLAM 0.25 MG: 0.25 TABLET ORAL at 01:06

## 2023-06-29 RX ADMIN — IPRATROPIUM BROMIDE AND ALBUTEROL SULFATE 3 ML: 2.5; .5 SOLUTION RESPIRATORY (INHALATION) at 07:06

## 2023-06-29 RX ADMIN — ATORVASTATIN CALCIUM 40 MG: 40 TABLET, FILM COATED ORAL at 08:06

## 2023-06-29 RX ADMIN — QUETIAPINE 100 MG: 100 TABLET ORAL at 08:06

## 2023-06-29 RX ADMIN — IPRATROPIUM BROMIDE AND ALBUTEROL SULFATE 3 ML: 2.5; .5 SOLUTION RESPIRATORY (INHALATION) at 12:06

## 2023-06-29 RX ADMIN — IPRATROPIUM BROMIDE AND ALBUTEROL SULFATE 3 ML: 2.5; .5 SOLUTION RESPIRATORY (INHALATION) at 08:06

## 2023-06-29 RX ADMIN — METOPROLOL SUCCINATE 50 MG: 50 TABLET, FILM COATED, EXTENDED RELEASE ORAL at 08:06

## 2023-06-29 NOTE — CONSULTS
CM, GISELE and MD all spoke to patient and family about the benefit of LTAC when discharged.  Patient and family adamant about returning home.  Requesting hospital bed and bedside commode.  Referrals sent to [facility] via [employer].  NATHAN following.

## 2023-06-29 NOTE — RESPIRATORY THERAPY
06/29/23 1414   Home Oxygen Qualification   $ Home O2 Qualification Pulmonary Stress Test/6 min walk;Tech time 30 minutes   Room Air SpO2 At Rest (!) 85 %   SpO2 During Ambulation on O2 91 %   Heart Rate on O2 81 bpm   Ambulation O2 LPM 15 LPM   SpO2 Post Ambulation 94 %   Post Ambulation Heart Rate 73 bpm   Post Ambulation O2 LPM 10 LPM   Home O2 Eval Comments pt requires 10lpm at rest. 15lpm on exertion

## 2023-06-29 NOTE — PLAN OF CARE
Received secure message from [facility],which stated pt and family want to go home tomorrow with [facility].    Sent clinicals to [facility] via [employer] and called [phone], spoke to Vilma, informed of referral sent.      Called Ashlyn [phone] and she has already talked to pt's daughter, Loree and DME to be delivered to home today and plan home tomorrow w/ hospice, by ambulance due to pt's high O2 level.     06/29/23 1439   Post-Acute Status   Post-Acute Authorization Hospice   Post-Acute Placement Status Referrals Sent   Discharge Plan   Discharge Plan A Hospice/home

## 2023-06-29 NOTE — PLAN OF CARE
CM notified of higher oxygen requirements.  CM spoke to AJ at [facility] who stated they can still accommodate the amount of oxygen needed.  AJ stated to put in the note that patient's 5L concentrator will need to be exchanged for 10L concentrator.  New home oxygen eval ordered.  CM awaiting new oxygen order to be sent to [facility].        06/29/23 1310   Post-Acute Status   Post-Acute Authorization Home Health   Home Health Status Set-up Complete/Auth obtained   Patient choice form signed by patient/caregiver List with quality metrics by geographic area provided   Discharge Plan   Discharge Plan A Home Health   Discharge Plan B Home Health

## 2023-06-29 NOTE — PT/OT/SLP EVAL
"Physical Therapy Evaluation and Discharge Note    Patient Name:  Betty Bacon   MRN:  [id number]    Recommendations:     Discharge Recommendations: home, home with hospice (per pt and RN)  Discharge Equipment Recommendations: none   Barriers to discharge: None    Assessment:     Betty Bacon is a 73 y.o. female admitted with a medical diagnosis of Acute respiratory failure with hypoxia and hypercarbia.  Pt found HOB elevated with adult children & RT present and pt agreeable to PT session. Pt tolerated session fairly due to generalized weakness from bed rest, but progressed from minimal A to CGA by the end of gait trial with RT managing pt's oxygen monitoring and titration for home use recommendations. Pt required 10Lpm at rest and 15L for gait at slow kelvin x 2 trials of 75' with RW. Pt intends to go home with her daughter on hospice care. PT provided education regarding mobility at home every 1-2 hours as tolerated with close supervision from family to maximize her quality of life.     Recent Surgery: * No surgery found *      Plan:     During this hospitalization, patient does not require further acute PT services.  Please re-consult if situation changes.      Subjective     Chief Complaint: "I've been in the bed for several days so I feel shaky."  Patient/Family Comments/goals: Home with family and hospice care.  Pain/Comfort:  Pain Rating 1: 0/10  Pain Rating Post-Intervention 1: 0/10    Patients cultural, spiritual, Amish conflicts given the current situation:      Living Environment:  Pt will be staying with her daughter in a H with EDUIN.  Prior to admission, patients level of function was modified independent with Rolling walker and O2..  Equipment used at home: walker, rolling, shower chair, nebulizer, oxygen.  DME owned (not currently used): none.  Upon discharge, patient will have assistance from her daughter and son.    Objective:     Communicated with MARJAN Carreon prior to " session.  Patient found HOB elevated with yoder catheter, oxygen, peripheral IV, pulse ox (continuous), telemetry upon PT entry to room.    General Precautions: Standard, fall, respiratory    Orthopedic Precautions:N/A   Braces: N/A  Respiratory Status: High flow w/ humidification, flow 10 L/min    Exams:  Cognitive Exam:  Patient is oriented to Person, Place, Time, and Situation  RLE ROM: WFL  LLE ROM: WFL    Functional Mobility:  Bed Mobility:     Scooting: stand by assistance  Supine to Sit: stand by assistance  Transfers:     Sit to Stand:  stand by assistance and minimum assistance with rolling walker  Gait: x 2 trials of 75' each with RW and minimal A > progressing to CGA w/ belt as pt gained confidence.     AM-PAC 6 CLICK MOBILITY  Total Score:20       Treatment and Education:  Pt was educated on the following: call light use, importance of OOB activity and functional mobility to negate the negative effects of prolonged bed rest during this hospitalization, safe transfers/ambulation and recommendations for mobility or LE/UE TE every 1-2 hours at home to maintain strength, activity tolerance and quality of life.      AM-PAC 6 CLICK MOBILITY  Total Score:20     Patient left up in chair with all lines intact, call button in reach, RN notified, and pt's son present.    GOALS:   Multidisciplinary Problems       Physical Therapy Goals       Not on file                    History:     Past Medical History:   Diagnosis Date    Acute coronary artery obstruction without MI 10/2012    Benign hypertension     COPD (chronic obstructive pulmonary disease)     Coronary artery disease     Disorder of kidney and ureter     Dr. Morrow    Hepatitis B core antibody positive 03/03/2021    Negative sAg, suggests previous exposure but no chronic/active Hep B. At risk for reactivation with any immunosuppression medication, steroids, chemo, etc.      History of electroconvulsive therapy     Hyperlipidemia LDL goal < 70     Left  ankle sprain     Major depressive disorder, recurrent episode, severe     s/p ECT    Positive AKIRA (antinuclear antibody) 03/03/2021    PVD (peripheral vascular disease)        Past Surgical History:   Procedure Laterality Date    ANGIOGRAM, CORONARY, WITH LEFT HEART CATHETERIZATION N/A 5/1/2023    Procedure: Angiogram, Coronary, with Left Heart Cath;  Surgeon: Neeraj Finn MD;  Location: [facility] CATH LAB;  Service: Cardiology;  Laterality: N/A;    AORTIC VALVULOPLASTY N/A 6/13/2023    Procedure: REPAIR, AORTIC VALVE;  Surgeon: Neeraj Finn MD;  Location: [facility] CATH LAB;  Service: Cardiology;  Laterality: N/A;    CHOLECYSTECTOMY      CORONARY ANGIOPLASTY      CORONARY ANGIOPLASTY WITH STENT PLACEMENT  10/2012    2 stents RCA (100% stenosis)    EYE SURGERY      cataract surgery    HYSTERECTOMY      LINA, ovaries intact. uterine prolapse    ILIAC ARTERY STENT      SMALL BOWEL ENTEROSCOPY N/A 2/20/2023    Procedure: ENTEROSCOPY;  Surgeon: Margy Dumont MD;  Location: [facility] ENDO;  Service: Endoscopy;  Laterality: N/A;    TONSILLECTOMY      TOTAL VAGINAL HYSTERECTOMY         Time Tracking:     PT Received On: 06/29/23  PT Start Time: 1343     PT Stop Time: 1415  PT Total Time (min): 32 min     Billable Minutes: Evaluation 8, Gait Training 16, and Therapeutic Activity 8      06/29/2023

## 2023-06-29 NOTE — PLAN OF CARE
06/29/23 0852   Patient Assessment/Suction   Level of Consciousness (AVPU) alert   Respiratory Effort Normal;Unlabored   All Lung Fields Breath Sounds diminished;clear   Skin Integrity   $ Wound Care Tech Time 15 min   Area Observed Bridge of nose   Skin Appearance without discoloration   PRE-TX-O2   Device (Oxygen Therapy) nasal cannula with humidification   $ Is the patient on Low Flow Oxygen? Yes   Flow (L/min) 8   SpO2 95 %   Pulse Oximetry Type Continuous   $ Pulse Oximetry - Multiple Charge Pulse Oximetry - Multiple   Pulse 68   Resp 18   Aerosol Therapy   $ Aerosol Therapy Charges Aerosol Treatment   Daily Review of Necessity (SVN) completed   Respiratory Treatment Status (SVN) given   Treatment Route (SVN) mask;oxygen   Patient Position (SVN) Lieberman's;HOB elevated   Post Treatment Assessment (SVN) breath sounds improved   Signs of Intolerance (SVN) none   Preset CPAP/BiPAP Settings   Mode Of Delivery Standby;BiPAP   $ CPAP/BiPAP Daily Charge BiPAP/CPAP Daily   $ Initial CPAP/BiPAP Setup? No   $ Is patient using? No/refused   Equipment Type V60   Ipap 12   EPAP (cm H2O) 5   Pressure Support (cm H2O) 7   Set Rate (Breaths/Min) 14   ITime (sec) 1   Rise Time (sec) 3   Education   $ Education BiPAP;Bronchodilator   Respiratory Evaluation   $ Care Plan Tech Time 15 min   Home Oxygen   Has Home Oxygen? Yes   Liter Flow 6     Vapotherm removed, weaning as tolerated to home regimen

## 2023-06-29 NOTE — PROGRESS NOTES
Progress Note  Pulmonary/Critical Care      PATIENT NAME: Betty Bacon  MRN: [id number]  TODAY'S DATE: 2023  9:44 AM  ADMIT DATE: 2023  AGE: 73 y.o. : 1950    HPI / INTERVAL HISTORY  2023 - 73 y.o. female with a PMH of COPD, asbestosis (severe restriction and DLCO decrease), recent COVID pneumonia, CAD, HFpEF, severe aortic stenosis (s/p recent TAVR) presented to ER with increased SOB, generalized weakness.  She had some increased fluid retention (lasix had been held for a few days).  In ER she was placed on BiPAP and moved to ICU.  ROS as below.  She is feeling better at this time.    23: Off of BiPAP since early last night. She could not tolerate it for more than 30 minutes trying to sleep. She is currently saturating ~88% on her home 6 L NC with minimal exertion.    23: Net -1 L in past 24 hours; net -2 L for the admission. Desaturated to 50s (asymptomatic) on wall flow 9 LPM. Was put on BiPAP for a couple of hours. I tried her on 15 LPM just now, and she desatted to 70s (also asymptomatic). We will try Vapotherm. Patient now agrees to try BiPAP at night.    23: Net -1 L in the past 24 hours and net -3 L for the admission. Doing well on 12 LPM at this time. Feeling well. Was unable to tolerate BiPAP last night.    23: Net -3.3 L in the past 24 hours and net -6.3 L for the admission. I asked the hospitalist to order BNP added on to this morning's labs, and surprisingly it was still quite elevated at 1005, which is even higher than when Ms. Bacon was admitted. However, her creatinine has risen to 1.8, which is high than her recent baseline of 1.3. Further aggressive diuresis may be harmful given her worsening kidney function and her aortic stenosis.    SMOKING HISTORY  Quit 25 years ago.  Smoked 2 PPD x 30 years.     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    REVIEW OF SYSTEMS  GENERAL:  Feeling well.  EYES: Vision is good.  ENT: No sinusitis or pharyngitis.   HEART: No chest pain or palpitations.  LUNGS: No SOB, cough, sputum, or wheezing.  GI: No abdominal pain, nausea, vomiting, or diarrhea.  : No urinary urgency, pain, or change in frequency.  SKIN: No lesions or rashes.  MUSCULOSKELETAL: No joint pain or myalgias.  NEURO: No headaches or neuropathy.  LYMPH: No edema or adenopathy.  PSYCH: No anxiety or depression.  ENDO: No weight change.    (See H&P for complete medical, surgical, family, and social history.)    VITAL SIGNS (MOST RECENT)  Temp: 97.3 °F (36.3 °C) (06/29/23 0720)  Pulse: 68 (06/29/23 0852)  Resp: 18 (06/29/23 0852)  BP: (!) 195/77 (06/29/23 0720)  SpO2: 95 % (06/29/23 0852)    INTAKE AND OUTPUT (LAST 24 HOURS):  Intake/Output Summary (Last 24 hours) at 6/29/2023 1103  Last data filed at 6/29/2023 0938  Gross per 24 hour   Intake 240 ml   Output 4200 ml   Net -3960 ml       WEIGHT  Wt Readings from Last 1 Encounters:   06/29/23 90.3 kg (199 lb 1.6 oz)       PHYSICAL EXAM  GENERAL:  NAD.  HEENT: EOMI  NECK: Supple.   HEART: Normal rate and regular rhythm. 2/6 loud systolic murmur at RUSB radiating to carotids  LUNGS: b/l upper lung field crackles to anterior auscultation and b/l basilar crackles to posterior auscultation. Otherwise clear.   ABDOMEN: Bowel sounds present. Non-tender, no masses palpated.  EXTREMITIES: Normal muscle tone and joint movement, no cyanosis or clubbing.   LYMPHATICS: Trace pretibial pitting edema  SKIN: Dry, intact, no lesions.   NEURO: No gross abnormalities.  PSYCH: Appropriate affect.    RESPIRATORY SUPPORT  Oxygen Concentration (%):  [60-65] 60           LAST ARTERIAL BLOOD GAS  ABG  Recent Labs   Lab 06/24/23  2243   PH 7.309*   PO2 70*   PCO2 52.0*   HCO3 26.1   BE 0       ACUTE PHASE REACTANT (LAST 24 HOURS)  No results for input(s): FERRITIN, CRP, LDH, DDIMER in the last 24 hours.      CBC LAST (LAST 24 HOURS)  Recent Labs   Lab 06/29/23  6341    WBC 6.98   RBC 3.21*   HGB 9.2*   HCT 30.0*   MCV 94   MCH 28.7   MCHC 30.7*   RDW 15.7*      MPV 10.5       CHEMISTRY LAST (LAST 24 HOURS)  Recent Labs   Lab 06/29/23  0322      K 4.2      CO2 33*   ANIONGAP 6*   BUN 66*   CREATININE 1.8*   *   CALCIUM 9.1       COAGULATION LAST (LAST 24 HOURS)  No results for input(s): LABPT, INR, APTT in the last 24 hours.    CARDIAC PROFILE (LAST 24 HOURS)  Recent Labs   Lab 06/24/23  2227   *       LAST 7 DAYS MICROBIOLOGY   Microbiology Results (last 7 days)       ** No results found for the last 168 hours. **            MOST RECENT IMAGING  Cardiac catheterization    The estimated blood loss was <50 mL.    Indication     Betty Bacon is a 73 y.o. female  referred by Dr Borjas for   severe AS.      Patient was evaluated by the heart team and deemed cohort C for TAVR, very   frail to survive. She was offered valvuloplasty as a bridge to TAVR    The patient was showed a power point presentation including a detailed   discussion about risk of  complete heart block, stroke, MI, bleeding   access site complications including limb loss, allergy, kidney failure   including dialysis and death.    Operators  Surgeon(s) and Role:     * Neeraj Finn MD - Primary     * Nelson Fried MD - Fellow     Description of procedure     The patient was brought to the catheterization lab in the fasting state,   she was prepped in the usual sterile fashion. The anesthesiologist   administered the sedation. An arterial line was placed by the anesthesia   department. Access was obtained  in the right common femoral artery with a   micropuncture kit. Two proglide devices were used to pre-close the artery   and a 12Fr sheath was inserted.  The aortic valve was crossed with the   help of a AL2 catheter and a stiff straight glidewire. Using a 6Fr pigtail   catheter a curved shaped stiff wire was placed in the ventricle. A balloon   aortic   valvuloplasty  was performed with a 22 mm True FLow balloon.     An aortogram showed trace AI.     The patient was transported to the recovery unit in stable condition      Pre procedure AV mean gradient 30 mmHg, post BAV 20 mm Hg    Complications: None  Estimated blood loss: <50 mL  Contrast used: 20 cc    Summary:     Successful BAV with 22 mm TrueFlow balloon.     Recommendations:    Routine post cath care  Follow up with me in 6 weeks    Neeraj Blevins MD [facility]  Interventional Cardiology  Structural/Valvular heart disease  [phone]    The procedure log was documented by Documenter: Lalo Quintanilla RT;   Caty Hurst RT; Chidi Brink and verified by Neeraj Blevins MD.    Date: 6/13/2023  Time: 3:26 PM      CURRENT VISIT EKG  Results for orders placed or performed during the hospital encounter of 06/24/23   EKG 12-lead    Narrative    Test Reason : R07.9,    Vent. Rate : 084 BPM     Atrial Rate : 084 BPM     P-R Int : 152 ms          QRS Dur : 088 ms      QT Int : 394 ms       P-R-T Axes : 000 003 030 degrees     QTc Int : 465 ms    Normal sinus rhythm  Anterior infarct ,age undetermined  Abnormal ECG  When compared with ECG of 13-JUN-2023 13:27,  Anterior infarct is now Present    Referred By: [email or web address]   SELF           Confirmed By:        ECHOCARDIOGRAM RESULTS  Results for orders placed during the hospital encounter of 06/24/23    Echo    Interpretation Summary  · The left ventricle is normal in size with moderate concentric hypertrophy and normal systolic function.  · The estimated ejection fraction is 68%.  · Grade II left ventricular diastolic dysfunction.  · There is abnormal septal wall motion. There is systolic flattening of the interventricular septum consistent with right ventricle pressure overload.  · Normal right ventricular size with normal right ventricular systolic function.  · Moderate aortic regurgitation.  · There is moderate aortic valve stenosis.  · Aortic valve area is 1.71 cm2; peak velocity  is 3.80 m/s; mean gradient is 33 mmHg.  · Mild mitral regurgitation.  · Moderate tricuspid regurgitation.  · There is moderate pulmonary hypertension.  · Elevated central venous pressure (15 mmHg).  · The estimated PA systolic pressure is 80 mmHg.    Patient has moderately severe aortic valve stenosis  Left ventricle ejection fractions well-maintained  Grade 2 diastolic dysfunction with mean left atrial pressure of 15 which is elevated  Pulmonary artery pressure elevated approximally 80 mm Hg    CT chest 6/25/23: ILD with diffuse interstitial thickening worse than prior and small b/l pleural effusions likely due to pulmonary edema. No dense consolidation to suggest pneumonia.      ASSESSMENT/PLAN:   Betty Bacon is a 73 y.o. female with a PMH significant for COPD, asbestosis (severe restriction and DLCO decrease), COVID pneumonia, CAD, HFpEF, severe aortic stenosis s/p balloon valvuloplasty (6/12/23) on 6 L NC at baseline on whom we have been consulted for acute on chronic hypoxic, hypercapnic respiratory failure.    #Acute on chronic hypoxic and hypercapnic respiratory failure  #Acutely decompensated HFpEF (acute on chronic diastolic heart failure)  #Severe aortic stenosis s/p balloon valvuloplasty  #Pulmonary hypertension, likely groups 2 and 3  #Acute kidney inury, likely due to aggressive diuresis   BNP elevated to 803 at admission (higher than all but one prior measurement.)  - continue diuresis but de-escalate to 40 mg PO daily  - continue GDMT  - continue supplemental O2 as needed for SpO2 >88%    #COPD exacerbation likely secondary to heart failure exacerbation  #ILD exacerbation likely secondary to heart failure exacerbation  ESR and CRP elevated.  Procalcitonin normal at 0.22.  - wean O2 as tolerated for a minimum SpO2 88%  - continue steroids 60 mg daily  - continue scheduled DEVON/GARETH/ICS nebs  - patient does not wish to use BiPAP any longer for sleep in the hospital or at home if it can be  avoided  - f/u w/ me is scheduled for 8/15/23    #Acute on chronic anemia  - transfuse PRBCs if Hgb <7.0 or actively exsanguinating    Goals of care: We had a blanche, in-depth discussion of prognosis. The patient has advanced ILD without a reasonable expectation of recovery. Her lung function will likely continue to deteriorate. We discussed Hospice and the decision not to come back to the hospital. This is consistent with the patient's primary goal of going home to be with friends and family for as long as she can. They have opted to go home with Hospice.    Discussed with the patient's daughter and the hospitalist. Chest imaging personally viewed and interpreted by me, as above.     Matt Toro MD  Pulmonary and Critical Care Medicine  [facility]  Date of Service: 06/29/2023  3:17 PM

## 2023-06-29 NOTE — PROGRESS NOTES
"[facility]  Progress Note    Patient Name: Betty Bacon  MRN: [id number]  Patient Class: IP- Inpatient   Admission Date: 6/24/2023  Length of Stay: 3 days  Attending Physician: Lynette Shea MD  Primary Care Provider: Johanne Callejas MD        Subjective:     Principal Problem:Acute respiratory failure with hypoxia and hypercarbia    Patient seen and examined, on BiPAP and ABG noted and stable  Discussed with family members, she had aortic balloon valvuloplasty.  Her Lasix was stopped by her cardiologist after the procedure for unsure reason. Systolic  Murmur noted  Repeat ECHO ordered    6/26  Admitted with CHF exacerbation/worsening of her previous pulmonary condition  Patient is doing well off BiPAP and good amount of diuresis no shortness of breath.  Repeat echo pending.  Patient is stable and downgraded    6/27:  pt refused BIPAP overnight as she states she cannot sleep with it on; she was later found with SaO2 50s in AM, started on BIPAP this AM with improvement.  BIPAP use encouraged; trazodone started QHS.  Pulmonology following, thank you.  Pt denies SOB while on BIPAP.  No subjective fever, no chills.  No CP.        6/28:  pt comfortable on 10L NC at this time.  Gave pRBC overnight with lasix due to continued CHF exacerbation; pt tolerated well.  Pt slept well for the first time with trazodone.  Pt reports she cannot ever tolerate BIPAP and wishes to be discharged home without one.  We will try to obtain facemask for home usage (she mouth-breathes at night).  No cp, no SOB.  Continued LE edema, improving.  Pt's case discussed with pulmonologist Dr Toro.  Discussed discharge planning with .      ROS:  All systems reviewed and are negative except as noted per above.    PHYSICAL EXAM    BP (!) 162/70 (BP Location: Right arm, Patient Position: Sitting)   Pulse 73   Temp 97.4 °F (36.3 °C) (Oral)   Resp 18   Ht 5' 2" (1.575 m)   Wt 93 kg (205 lb)   " SpO2 95%   Breastfeeding No   BMI 37.49 kg/m²     Gen: alert, responsive  HEENT:  Eyes - no pallor  External ears with no lesions  Nares patent  Mouth, Throat:  trachea midline   CV: RRR, +MURMUR  Lungs: RALES, improved, on NC  Abd: +BS, soft, NT, ND  Ext: no atrophy; +1 BLE edema, improving  Skin: warm, dry  Neuro: grossly intact  Psych: pleasant    Assessment/Plan:      Active Hospital Problems    Diagnosis    *Acute respiratory failure with hypoxia and hypercarbia    CHF exacerbation    CARMELITA (acute kidney injury)    Uncontrolled hypertension    CKD (chronic kidney disease) stage 3, GFR 30-59 ml/min       ASSESSMENT AND PLAN      Acute on chronic hypoxemic/hypercapnic respiratory failure   Due to acute on chronic HFpEF   + AS s/p balloon valvuloplasty   Complicated by asbestosis with ILD, pHTN  - on 6LNC at baseline  # uncontrolled hypertension, IMPROVING  # progressive anemia on Plavix  Continue supplemental oxygen  Continue IV Lasix   STRICT I/OS, DAILY WEIGHTS, 1.5L FLUID RESTRCTION  Steroid, NEBS   Trending CBC, pRBC transfusion prn  Dr Linder of ILD clinic [facility] as OP . She want to be contacted at discharge for discharge medications     Chronic conditions as noted above/below; home medications reviewed personally by me and restarted as appropriate  Electrolyte derangement:  Trending BMP; Mg; replacement prn  DVT ppx: heparin HELD DUE TO ANEMIA REQUIRING TRANSFUSION  Dispo:  Discharge Plan A: Long-term acute care facility (LTAC)    FULL CODE    Lynette Shea MD  Department of Hospital Medicine   Critical access hospital

## 2023-06-29 NOTE — PLAN OF CARE
Problem: Adult Inpatient Plan of Care  Goal: Patient-Specific Goal (Individualized)  Outcome: Ongoing, Progressing     Recommendations  1.) Continue cardiac diet, fluid per MD.   2.) RD to monitor and provide recommendations PRN.     Goals:   1.) Pt to consume/tolerate >75% of meals.  Nutrition Goal Status: new

## 2023-06-29 NOTE — PROGRESS NOTES
"[facility]  Adult Nutrition   Progress Note (Initial Assessment)     SUMMARY     Recommendations  1.) Continue cardiac diet, fluid per MD.   2.) RD to monitor and provide recommendations PRN.    Goals:   1.) Pt to consume/tolerate >75% of meals.  Nutrition Goal Status: new    Dietitian Rounds Brief  Patient presents to the ER after progressive decline with weakness and sob after valvuloplasty. Per chart, pt with good PO intake of meals. Pt consuming 50-75% of all meals. No chew/swallowing issues. No GI distress. LBM 6/27. Skin: dermatitis to groin area. Wt reviewed. NFPE not completed as pt planning to d/c on hospice.    Diet order:   Current Diet Order: cardiac, 1500mL fluid restriction       Evaluation of Received Nutrient/Fluid Intake  Energy Calories Required: meeting needs  Protein Required: meeting needs  Fluid Required: meeting needs  Tolerance: tolerating     % Intake of Estimated Energy Needs: 50 - 75 %  % Meal Intake: 50 - 75 %      Intake/Output Summary (Last 24 hours) at 6/29/2023 1620  Last data filed at 6/29/2023 1500  Gross per 24 hour   Intake 360 ml   Output 2400 ml   Net -2040 ml        Anthropometrics  Temp: 98.1 °F (36.7 °C)  Height Method: Stated  Height: 5' 2" (157.5 cm)  Height (inches): 62 in  Weight Method: Bed Scale  Weight: 90.3 kg (199 lb 1.6 oz)  Weight (lb): 199.1 lb  Ideal Body Weight (IBW), Female: 110 lb  % Ideal Body Weight, Female (lb): 180.18 %  BMI (Calculated): 36.4  BMI Grade: 35 - 39.9 - obesity - grade II       Estimated/Assessed Needs  Weight Used For Calorie Calculations: 90.3 kg (199 lb 1.2 oz)  Energy Calorie Requirements (kcal): 6406-6419  Energy Need Method: Kcal/kg (20-25)  Protein Requirements: 72-90 (0.8-1.0)  Weight Used For Protein Calculations: 90.3 kg (199 lb 1.2 oz)  Fluid Requirements (mL): 8259-2531     RDA Method (mL): 1806       Reason for Assessment  Reason For Assessment: length of stay  Diagnosis: pulmonary disease  Relevant Medical " History: COPD, asbestosis (severe restriction and DLCO decrease), recent COVID pneumonia, CAD, HFpEF, severe aortic stenosis (s/p recent TAVR) presented to ER with increased SOB, generalized weakness    Nutrition/Diet History  Food Allergies: NKFA  Factors Affecting Nutritional Intake: None identified at this time    Nutrition Risk Screen  Nutrition Risk Screen: no indicators present       Altered Skin Integrity 06/25/23 0800 Left anterior Groin #1 Moisture associated dermatitis-Wound Image: Images linked  MST Score: 0  Have you recently lost weight without trying?: No  Weight loss score: 0  Have you been eating poorly because of a decreased appetite?: No  Appetite score: 0       Weight History:  Wt Readings from Last 5 Encounters:   06/29/23 90.3 kg (199 lb 1.6 oz)   06/25/23 89.8 kg (198 lb)   06/14/23 85.1 kg (187 lb 9.8 oz)   06/08/23 82.1 kg (181 lb)   06/08/23 86.2 kg (190 lb 0.6 oz)        Lab/Procedures/Meds: Pertinent Labs/Meds Reviewed    Medications:Pertinent Medications Reviewed  Scheduled Meds:   albuterol-ipratropium  3 mL Nebulization Q6H    amLODIPine  10 mg Oral Daily    atorvastatin  40 mg Oral Daily    budesonide  0.5 mg Nebulization Q12H    chlorhexidine  15 mL Mouth/Throat BID    clopidogreL  75 mg Oral Daily    furosemide (LASIX) injection  40 mg Intravenous BID    hydrALAZINE  50 mg Oral Q8H    isosorbide mononitrate  60 mg Oral Daily    metoprolol succinate  50 mg Oral Daily    mupirocin   Nasal BID    pantoprazole  40 mg Oral Before breakfast    paroxetine  50 mg Oral QAM    predniSONE  60 mg Oral Daily    QUEtiapine  100 mg Oral QHS    traZODone  50 mg Oral QHS     Continuous Infusions:  PRN Meds:.sodium chloride, acetaminophen, albuterol sulfate, ALPRAZolam, colchicine, dextrose 50%, dextrose 50%, glucagon (human recombinant), glucose, glucose, hydrALAZINE, magnesium sulfate IVPB, magnesium sulfate IVPB, naloxone, sodium chloride 0.9%    Labs: Pertinent Labs Reviewed  Clinical  Chemistry:  Recent Labs   Lab 06/24/23  2227 06/25/23  0430 06/26/23  0443 06/29/23  0322    141   < > 139   K 4.9 4.2   < > 4.2    107   < > 100   CO2 26 27   < > 33*   * 204*   < > 115*   BUN 36* 41*   < > 66*   CREATININE 1.4 1.6*   < > 1.8*   CALCIUM 9.0 9.1   < > 9.1   PROT 7.2  --   --   --    ALBUMIN 3.3*  --   --   --    BILITOT 1.0  --   --   --    ALKPHOS 126  --   --   --    AST 14  --   --   --    ALT 13  --   --   --    ANIONGAP 8 7*   < > 6*   MG 1.4* 1.8  --   --     < > = values in this interval not displayed.     CBC:   Recent Labs   Lab 06/29/23  0322   WBC 6.98   RBC 3.21*   HGB 9.2*   HCT 30.0*      MCV 94   MCH 28.7   MCHC 30.7*     Cardiac Profile:  Recent Labs   Lab 06/24/23 2227 06/29/23  0322   * 1,005*     Inflammatory Labs:  Recent Labs   Lab 06/25/23  1320   CRP 3.36*     Monitor and Evaluation  Food and Nutrient Intake: energy intake, food and beverage intake  Food and Nutrient Adminstration: diet order  Knowledge/Beliefs/Attitudes: food and nutrition knowledge/skill  Physical Activity and Function: nutrition-related ADLs and IADLs  Anthropometric Measurements: weight, weight change  Biochemical Data, Medical Tests and Procedures: electrolyte and renal panel, gastrointestinal profile, glucose/endocrine profile  Nutrition-Focused Physical Findings: overall appearance     Nutrition Risk  Level of Risk/Frequency of Follow-up: low     Nutrition Follow-Up  RD Follow-up?: Yes      Stefanie Maier, CHRISTA 06/29/2023 4:20 PM

## 2023-06-29 NOTE — PLAN OF CARE
Problem: Adult Inpatient Plan of Care  Goal: Plan of Care Review  Outcome: Ongoing, Progressing  Goal: Patient-Specific Goal (Individualized)  Outcome: Ongoing, Progressing  Goal: Absence of Hospital-Acquired Illness or Injury  Outcome: Ongoing, Progressing  Goal: Optimal Comfort and Wellbeing  Outcome: Ongoing, Progressing  Goal: Readiness for Transition of Care  Outcome: Ongoing, Progressing     Problem: Skin Injury Risk Increased  Goal: Skin Health and Integrity  Outcome: Ongoing, Progressing     Problem: Fall Injury Risk  Goal: Absence of Fall and Fall-Related Injury  Outcome: Ongoing, Progressing     Problem: Infection  Goal: Absence of Infection Signs and Symptoms  Outcome: Ongoing, Progressing     Problem: Gas Exchange Impaired  Goal: Optimal Gas Exchange  Outcome: Ongoing, Progressing     Problem: Noninvasive Ventilation Acute  Goal: Effective Unassisted Ventilation and Oxygenation  Outcome: Ongoing, Progressing     Problem: Fatigue  Goal: Improved Activity Tolerance  Outcome: Ongoing, Progressing     Problem: Oral Intake Inadequate  Goal: Improved Oral Intake  Outcome: Ongoing, Progressing     Problem: Fluid and Electrolyte Imbalance (Acute Kidney Injury/Impairment)  Goal: Fluid and Electrolyte Balance  Outcome: Ongoing, Progressing     Problem: Oral Intake Inadequate (Acute Kidney Injury/Impairment)  Goal: Optimal Nutrition Intake  Outcome: Ongoing, Progressing     Problem: Renal Function Impairment (Acute Kidney Injury/Impairment)  Goal: Effective Renal Function  Outcome: Ongoing, Progressing     Problem: Impaired Wound Healing  Goal: Optimal Wound Healing  Outcome: Ongoing, Progressing     Problem: Coping Ineffective  Goal: Effective Coping  Outcome: Ongoing, Progressing

## 2023-06-29 NOTE — CONSULTS
Consult noted; chart reviewed.  FULL CODE; no ACP docs noted. Pt came from home with daughter. Is . Has 4 children and many grandchildren/ great grandchildren. Pt is AAOx4 and has capacity for complex medical decision making. Anxious but pleasant. Pt understands her medical condition and states her physicians have updated her on her prognosis. Family present and also confirms this.     Advance Care Planning     Date: 2023    Today a voluntary meeting took place: bedside    Patient Participation: Patient is able to participate     Attendees (Name and  Relationship to patient):  Daughter Loree and 2 Sons    Staff attendees (Name and  Role): Eryn Quintana RN    ACP Conversation (General): Understanding of current condition COPD, CHF,CKD  'Living well': What does living well mean to you? .  Other (specify below) Goals of care     Code Status: Full Code    ACP Documents: None    Goals of care: The patient and family endorses that what is most important right now is to focus on spending time at home, avoiding the hospital, remaining as independent as possible, symptom/pain control, and quality of life, even if it means sacrificing a little time.    Accordingly, we have decided that the best plan to meet the patient's goals includes enrolling in hospice care. Pt educated at length on the goals and philosophy of Hospice care. She, without prompting, expressed these were her wishes. 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 Pt expressed it is important to her to have a peaceful death and to make the most out of the time she has left and to be with her family. She does not wish to return to the hospital.     Pt has plenty of family support that are willing and able to care for her full time.       Recommendations/  Follow-up tasks: Other (specify below) Family to meet with \[facility]\"" [facility]. CM and Medical team updated. Dr. Diamond notified. Pt would like to return home with  Daughter tomorrow.        Length of ACP   conversation in minutes: 45 minutes

## 2023-06-29 NOTE — PLAN OF CARE
06/28/23 1930   Patient Assessment/Suction   Level of Consciousness (AVPU) alert   Respiratory Effort Normal;Unlabored   All Lung Fields Breath Sounds diminished   PRE-TX-O2   Device (Oxygen Therapy) high flow nasal cannula   $ Is the patient on Low Flow Oxygen? Yes   Flow (L/min) 10   SpO2 (!) 90 %  (per MD Toro 88% or greater for sats)   Pulse Oximetry Type Continuous   $ Pulse Oximetry - Multiple Charge Pulse Oximetry - Multiple   Pulse 73   Resp 18   Aerosol Therapy   $ Aerosol Therapy Charges Aerosol Treatment   Daily Review of Necessity (SVN) completed   Respiratory Treatment Status (SVN) given   Treatment Route (SVN) mask   Patient Position (SVN) semi-Lieberman's   Post Treatment Assessment (SVN) increased aeration   Signs of Intolerance (SVN) none   Breath Sounds Post-Respiratory Treatment   Throughout All Fields Post-Treatment All Fields   Throughout All Fields Post-Treatment aeration increased   Post-treatment Heart Rate (beats/min) 75   Post-treatment Resp Rate (breaths/min) 19

## 2023-06-30 VITALS
BODY MASS INDEX: 36.64 KG/M2 | HEIGHT: 62 IN | SYSTOLIC BLOOD PRESSURE: 149 MMHG | OXYGEN SATURATION: 95 % | DIASTOLIC BLOOD PRESSURE: 66 MMHG | RESPIRATION RATE: 18 BRPM | HEART RATE: 69 BPM | TEMPERATURE: 98 F | WEIGHT: 199.13 LBS

## 2023-06-30 PROBLEM — Z51.5 HOSPICE CARE: Status: ACTIVE | Noted: 2023-06-30

## 2023-06-30 PROCEDURE — 97530 THERAPEUTIC ACTIVITIES: CPT

## 2023-06-30 PROCEDURE — 97165 OT EVAL LOW COMPLEX 30 MIN: CPT

## 2023-06-30 PROCEDURE — 63600175 PHARM REV CODE 636 W HCPCS: Performed by: INTERNAL MEDICINE

## 2023-06-30 PROCEDURE — 25000242 PHARM REV CODE 250 ALT 637 W/ HCPCS: Performed by: INTERNAL MEDICINE

## 2023-06-30 PROCEDURE — 25000003 PHARM REV CODE 250: Performed by: INTERNAL MEDICINE

## 2023-06-30 PROCEDURE — 99900035 HC TECH TIME PER 15 MIN (STAT)

## 2023-06-30 PROCEDURE — 63600175 PHARM REV CODE 636 W HCPCS: Performed by: FAMILY MEDICINE

## 2023-06-30 PROCEDURE — 25000003 PHARM REV CODE 250: Performed by: FAMILY MEDICINE

## 2023-06-30 PROCEDURE — 94640 AIRWAY INHALATION TREATMENT: CPT

## 2023-06-30 PROCEDURE — 27000221 HC OXYGEN, UP TO 24 HOURS

## 2023-06-30 PROCEDURE — 94761 N-INVAS EAR/PLS OXIMETRY MLT: CPT

## 2023-06-30 RX ORDER — POTASSIUM CHLORIDE 20 MEQ/1
20 TABLET, EXTENDED RELEASE ORAL DAILY
Qty: 30 TABLET | Refills: 0 | Status: SHIPPED | OUTPATIENT
Start: 2023-06-30

## 2023-06-30 RX ORDER — FUROSEMIDE 40 MG/1
40 TABLET ORAL 2 TIMES DAILY
Qty: 60 TABLET | Refills: 0 | Status: SHIPPED | OUTPATIENT
Start: 2023-06-30 | End: 2024-06-29

## 2023-06-30 RX ORDER — PREDNISONE 20 MG/1
60 TABLET ORAL DAILY
Qty: 15 TABLET | Refills: 0 | Status: SHIPPED | OUTPATIENT
Start: 2023-07-01 | End: 2023-07-06

## 2023-06-30 RX ORDER — TRAZODONE HYDROCHLORIDE 50 MG/1
50 TABLET ORAL NIGHTLY
Qty: 30 TABLET | Refills: 11 | Status: SHIPPED | OUTPATIENT
Start: 2023-06-30 | End: 2024-06-29

## 2023-06-30 RX ADMIN — MUPIROCIN 1 G: 20 OINTMENT TOPICAL at 08:06

## 2023-06-30 RX ADMIN — BUDESONIDE 0.5 MG: 0.5 INHALANT RESPIRATORY (INHALATION) at 11:06

## 2023-06-30 RX ADMIN — AMLODIPINE BESYLATE 10 MG: 5 TABLET ORAL at 08:06

## 2023-06-30 RX ADMIN — FUROSEMIDE 40 MG: 10 INJECTION, SOLUTION INTRAVENOUS at 08:06

## 2023-06-30 RX ADMIN — CLOPIDOGREL BISULFATE 75 MG: 75 TABLET, FILM COATED ORAL at 08:06

## 2023-06-30 RX ADMIN — METOPROLOL SUCCINATE 50 MG: 50 TABLET, FILM COATED, EXTENDED RELEASE ORAL at 08:06

## 2023-06-30 RX ADMIN — ALPRAZOLAM 0.25 MG: 0.25 TABLET ORAL at 12:06

## 2023-06-30 RX ADMIN — IPRATROPIUM BROMIDE AND ALBUTEROL SULFATE 3 ML: 2.5; .5 SOLUTION RESPIRATORY (INHALATION) at 11:06

## 2023-06-30 RX ADMIN — PANTOPRAZOLE SODIUM 40 MG: 40 TABLET, DELAYED RELEASE ORAL at 05:06

## 2023-06-30 RX ADMIN — PREDNISONE 60 MG: 20 TABLET ORAL at 08:06

## 2023-06-30 RX ADMIN — HYDRALAZINE HYDROCHLORIDE 50 MG: 25 TABLET ORAL at 05:06

## 2023-06-30 RX ADMIN — ISOSORBIDE MONONITRATE 60 MG: 60 TABLET, EXTENDED RELEASE ORAL at 08:06

## 2023-06-30 RX ADMIN — PAROXETINE 50 MG: 20 TABLET, FILM COATED ORAL at 08:06

## 2023-06-30 NOTE — NURSING
Discharge instructions reviewed with pt. Questions answered. Copy of discharge instructions given to pt. Discharge home via ambulance. Accompanied by spouse.

## 2023-06-30 NOTE — PLAN OF CARE
CM spoke to patient's daughter, Loree, this morning to follow up with equipment delivery.  Equipment has not arrived at the home yet but stated she will let me know as soon as it does.  NATHAN followed up with Ashlyn with [facility] as well who stated they are just waiting on equipment delivery.  Ashlyn will call CM back when delivered so CM can set up ambulance transport. Discharge orders requested from MD.       06/30/23 0843   Post-Acute Status   Post-Acute Authorization Hospice   Hospice Status Pending equipment/medication delivery   Patient choice form signed by patient/caregiver List with quality metrics by geographic area provided   Discharge Delays (!) Home Medical Equipment (Insurance, Delivery)   Discharge Plan   Discharge Plan A Hospice/home   Discharge Plan B Hospice/home

## 2023-06-30 NOTE — DISCHARGE SUMMARY
"[facility]  Discharge Summary      Patient Name: Betty Bacon  MRN: [id number]  ALDO: [id number]  Patient Class: IP- Inpatient  Admission Date: 6/24/2023  Discharge Date and Time: 6/30/2023  1:57 PM  Discharging Provider: Lynette Shea MD  Primary Care Provider: Johanne Callejas MD    Hospital Course:     Pt admitted for:    Acute on chronic hypoxemic/hypercapnic respiratory failure   Due to acute on chronic HFpEF   + AS s/p balloon valvuloplasty   Complicated by asbestosis with ILD, pHTN  - on 6LNC at baseline  # uncontrolled hypertension, IMPROVING  # progressive anemia on Plavix  Continue supplemental oxygen  Continue IV Lasix   STRICT I/OS, DAILY WEIGHTS, 1.5L FLUID RESTRCTION  Steroid, NEBS   Trending CBC, pRBC transfusion prn     Pt ultimately decided to go home with home hospice services.    BP (!) 149/66 (BP Location: Left arm, Patient Position: Sitting)   Pulse 69   Temp 97.6 °F (36.4 °C) (Oral)   Resp 18   Ht 5' 2" (1.575 m)   Wt 90.3 kg (199 lb 1.6 oz)   SpO2 95%   Breastfeeding No   BMI 36.42 kg/m²     Alert, responsive  Currently on 10L NC  No increased WOB  Gen: alert, responsive  HEENT:  Eyes - no pallor  External ears with no lesions  Nares patent  Mouth, Throat:  trachea midline   Neuro: grossly intact  Psych: pleasant    Final Active Diagnoses:    Diagnosis Date Noted POA    PRINCIPAL PROBLEM:  Hospice care [Z51.5] 06/30/2023 Not Applicable    Acute respiratory failure with hypoxia and hypercarbia [J96.01, J96.02] 06/25/2023 Yes    CHF exacerbation [I50.9] 06/25/2023 Yes    CARMELITA (acute kidney injury) [N17.9] 01/27/2023 Yes    CKD (chronic kidney disease) stage 3, GFR 30-59 ml/min [N18.30] 09/06/2012 Yes      Problems Resolved During this Admission:    Diagnosis Date Noted Date Resolved POA    Uncontrolled hypertension [I10]  06/30/2023 Yes       Discharged Condition: poor    Disposition: Hospice/Home      Patient Instructions:      HOSPITAL BED FOR " "HOME USE     Order Specific Question Answer Comments   Type: Semi-electric    Length of need (1-99 months): 99    Does patient have medical equipment at home? walker, rolling    Does patient have medical equipment at home? bath bench    Does patient have medical equipment at home? oxygen    Height: 5' 2" (1.575 m)    Weight: 90.3 kg (199 lb 1.6 oz)    Please check all that apply: Patient requires positioning of the body in ways not feasible in an ordinary bed due to a medical condition which is expected to last at least one month.    Please check all that apply: Patient requires the head of bed to be elevated more than 30 degrees most of the time due to congestive heart failure, chronic pulmonary disease, or aspiration.  Pillows and wedges have been considered and ruled out.      COMMODE FOR HOME USE     Order Specific Question Answer Comments   Type: Standard    Height: 5' 2" (1.575 m)    Weight: 90.3 kg (199 lb 1.6 oz)    Does patient have medical equipment at home? walker, rolling    Does patient have medical equipment at home? bath bench    Does patient have medical equipment at home? oxygen    Length of need (1-99 months): 99      Pending Diagnostic Studies:       None           Medications:  Reconciled Home Medications:      Medication List        START taking these medications      furosemide 40 MG tablet  Commonly known as: LASIX  Take 1 tablet (40 mg total) by mouth 2 (two) times daily.     potassium chloride SA 20 MEQ tablet  Commonly known as: K-DUR,KLOR-CON  Take 1 tablet (20 mEq total) by mouth once daily.     traZODone 50 MG tablet  Commonly known as: DESYREL  Take 1 tablet (50 mg total) by mouth every evening.            CHANGE how you take these medications      clopidogreL 75 mg tablet  Commonly known as: PLAVIX  TAKE 1 TABLET BY MOUTH EVERY DAY  What changed: when to take this     * paroxetine 40 MG tablet  Commonly known as: PAXIL  TAKE 1 TABLET BY MOUTH EVERY DAY  What changed: when to take " this     * paroxetine 10 MG tablet  Commonly known as: PAXIL  Take 10 mg by mouth every morning. Total 50 mg  What changed: Another medication with the same name was changed. Make sure you understand how and when to take each.           * This list has 2 medication(s) that are the same as other medications prescribed for you. Read the directions carefully, and ask your doctor or other care provider to review them with you.                CONTINUE taking these medications      acetaminophen 325 MG tablet  Commonly known as: TYLENOL  Take 325 mg by mouth every 6 (six) hours as needed for Pain.     albuterol sulfate 90 mcg/actuation inhaler  Commonly known as: PROAIR RESPICLICK  Inhale 2 puffs into the lungs every 4 to 6 hours as needed.     albuterol-ipratropium 2.5 mg-0.5 mg/3 mL nebulizer solution  Commonly known as: DUO-NEB  Take 3 mLs by nebulization every 6 (six) hours as needed for Wheezing. Rescue     amLODIPine 10 MG tablet  Commonly known as: NORVASC  Take 1 tablet (10 mg total) by mouth once daily.     clonazePAM 1 MG disintegrating tablet  Commonly known as: KlonoPIN  Take 1 tablet (1 mg total) by mouth 2 (two) times daily as needed (anxiety).     colchicine 0.6 mg tablet  Commonly known as: COLCRYS  Take 2 tablets (1.2mg total) by mouth at gout flare onset, may repeat 1 tablet (0.6mg total) in one hour, then take 1 tablet (0.6mg total) by mouth once daily until pain resolves     colestipoL 1 gram Tab  Commonly known as: COLESTID  Take 1 tablet (1 g total) by mouth 2 (two) times daily as needed (diarrhea). Other drugs should be administered at least 1 hour before or 4 hours after colestipol.     fluticasone propionate 50 mcg/actuation nasal spray  Commonly known as: FLONASE  1 spray (50 mcg total) by Each Nostril route once daily.     hydrALAZINE 50 MG tablet  Commonly known as: APRESOLINE  Take 1 tablet (50 mg total) by mouth every 8 (eight) hours.     isosorbide mononitrate 60 MG 24 hr tablet  Commonly  known as: IMDUR  TAKE 1 TABLET BY MOUTH EVERY DAY     metoprolol succinate 50 MG 24 hr tablet  Commonly known as: TOPROL-XL  Take 1 tablet (50 mg total) by mouth once daily.     pantoprazole 40 MG tablet  Commonly known as: PROTONIX  Take 1 tablet (40 mg total) by mouth before breakfast.     QUEtiapine 100 MG Tab  Commonly known as: SEROQUEL  Take 1 tablet (100 mg total) by mouth every evening.     TRELEGY ELLIPTA 200-62.5-25 mcg inhaler  Generic drug: fluticasone-umeclidin-vilanter  Inhale 1 puff into the lungs once daily.            STOP taking these medications      atorvastatin 40 MG tablet  Commonly known as: LIPITOR            ASK your doctor about these medications      predniSONE 20 MG tablet  Commonly known as: DELTASONE  Take 3 tablets (60 mg total) by mouth once daily. for 5 days  Ask about: Should I take this medication?            Time spent on the discharge of patient: 39 minutes    Lynette Shea MD  Department of Hospital Medicine  [facility]

## 2023-06-30 NOTE — PLAN OF CARE
DC orders and chart reviewed. No discharge needs noted.  Patient cleared for discharge from .  Patient is discharging home with [facility].  Per Ashlyn with [facility], equipment has been delivered to the home and patient is ready for transport.  Ambulance tranport order place for estimated  time of 12:30.        06/30/23 1149   Final Note   Assessment Type Final Discharge Note   Anticipated Discharge Disposition HospiceHome   What phone number can be called within the next 1-3 days to see how you are doing after discharge? [phone]   Post-Acute Status   Post-Acute Authorization Hospice   Hospice Status Set-up Complete/Auth obtained   Patient choice form signed by patient/caregiver List with quality metrics by geographic area provided   Discharge Delays (!) Ambulance Transport/Facility Transport

## 2023-06-30 NOTE — PT/OT/SLP EVAL
"Occupational Therapy Evaluation and Treatment    Name: Betty Bacon  MRN: [id number]  Admitting Diagnosis: Hospice care  Recent Surgery: * No surgery found *      Recommendations:     Discharge Recommendations: home, home with hospice  Level of Assistance Recommended: 24 hours light assistance and 24 hours supervision  Discharge Equipment Recommendations: bedside commode  Barriers to discharge: None    Assessment:     Betty Bacon is a 73 y.o. female with a medical diagnosis of Hospice care. She presents with performance deficits affecting function including weakness, impaired balance, impaired endurance, impaired functional mobility, impaired self care skills, impaired cardiopulmonary response to activity, edema. Patient agreeable to OT evaluation/treatment. CGA with RW sit<>stand and ambulating in room with verbal cues throughout for pursed lip breathing techniques. OTR providing education/instruction regarding safety awareness/fall prevention in home environment including use AD/DME. Patient verbalizes/demonstrates understanding. OTR providing education regarding patient's performance during therapy session when he arrived at end of session.    Patient to discharge home with hospice. DME recommendations: bedside commode. Patient already owns RW and SC.    Rehab Prognosis: Good; patient would benefit from acute OT services to address these deficits and reach maximum level of function.    Plan:     Patient to be seen 3 x/week to address the above listed problems via self-care/home management, therapeutic activities  Plan of Care Expires: 07/14/23  Plan of Care Reviewed with: patient, son    Subjective     Chief Complaint: Has not had a true BM (only a little bit yesterday)  Patient Comments/Goals: Wants to get out of chair and mobilize "I may as well do it while I can!"  Pain/Comfort:  Pain Rating 1: 0/10    Patients cultural, spiritual, Confucianism conflicts given the current situation: " no    Social History:  Living Environment: Patient  is to discharge home to daughter's house  in a single story home with walk-in shower  Prior Level of Function: Prior to admission, patient was modified independent  Roles and Routines: Patient was not driving prior to admission.  Equipment Used at Home: walker, rolling, shower chair, oxygen, nebulizer  DME owned (not currently used): none  Assistance Upon Discharge:  Daughter and home hospice care    Objective:     Communicated with Nurse prior to session. Patient found up in chair with oxygen, peripheral IV, telemetry, pulse ox (continuous) upon OT entry to room.    General Precautions: Standard, fall, respiratory   Orthopedic Precautions: N/A   Braces: N/A    Respiratory Status: Nasal cannula    Occupational Performance    Gait belt applied - Yes    Functional Mobility/Transfers:  Sit <> Stand Transfer with contact guard assistance with rolling walker and verbal cues for transfer sequence/technique with proper hand placement  Functional Mobility: With CGA and RW with verbal cues for pursed lip breathing techniques throughout, patient ambulating approximately 15 ft total in room     Activities of Daily Living:  Feeding: supervision  Grooming: supervision  Lower Body Dressing: Supervision to don/doff socks from bedside chair  Toileting: CGA to perform toileting tasks from bedside commode level    Cognitive/Visual Perceptual:  Oriented x 3; pleasant/cooperative; follows single step commands; safety awareness/insight impaired     Physical Exam:  Dominant hand: Right  Upper Extremity Range of Motion:     -       Right Upper Extremity: WFL  -       Left Upper Extremity: WFL  Upper Extremity Strength:    -       Right Upper Extremity: WFL  -       Left Upper Extremity: WFL    AMPAC 6 Click ADL:  AMPAC Total Score: 18    Treatment & Education:  Therapist provided facilitation and instruction of proper body mechanics, energy conservation, and fall prevention strategies  during tasks listed above  Patient educated on role of OT, POC, and goals for therapy  OTR providing reinforcement of education/instruction regarding correct transfer sequence/techniques and reinforcing use of RW with all mobility upon discharge as part of fall prevention - patient verbalizes understanding and in agreement  OTR initiating discharge planning - patient to discharge to daughter's home on hospice    Patient left up in chair with all lines intact, call button in reach, chair alarm on, and son present.    GOALS:   Multidisciplinary Problems       Occupational Therapy Goals          Problem: Occupational Therapy    Goal Priority Disciplines Outcome Interventions   Occupational Therapy Goal     OT, PT/OT     Description: Goals to be met by: 7/14/2023     Patient will increase functional independence with ADLs by performing:    UE Dressing with [place].  LE Dressing with Modified [place].  Grooming while standing with Supervision.  Toileting from toilet with Supervision for hygiene and clothing management.   Toilet transfer to toilet with Supervision.                         History:     Past Medical History:   Diagnosis Date    Acute coronary artery obstruction without MI 10/2012    Benign hypertension     COPD (chronic obstructive pulmonary disease)     Coronary artery disease     Disorder of kidney and ureter     Dr. Morrow    Hepatitis B core antibody positive 03/03/2021    Negative sAg, suggests previous exposure but no chronic/active Hep B. At risk for reactivation with any immunosuppression medication, steroids, chemo, etc.      History of electroconvulsive therapy     Hyperlipidemia LDL goal < 70     Left ankle sprain     Major depressive disorder, recurrent episode, severe     s/p ECT    Positive AKIRA (antinuclear antibody) 03/03/2021    PVD (peripheral vascular disease)          Past Surgical History:   Procedure Laterality Date    ANGIOGRAM, CORONARY, WITH LEFT HEART CATHETERIZATION N/A  5/1/2023    Procedure: Angiogram, Coronary, with Left Heart Cath;  Surgeon: Neeraj Finn MD;  Location: [facility] CATH LAB;  Service: Cardiology;  Laterality: N/A;    AORTIC VALVULOPLASTY N/A 6/13/2023    Procedure: REPAIR, AORTIC VALVE;  Surgeon: Neeraj Finn MD;  Location: [facility] CATH LAB;  Service: Cardiology;  Laterality: N/A;    CHOLECYSTECTOMY      CORONARY ANGIOPLASTY      CORONARY ANGIOPLASTY WITH STENT PLACEMENT  10/2012    2 stents RCA (100% stenosis)    EYE SURGERY      cataract surgery    HYSTERECTOMY      LINA, ovaries intact. uterine prolapse    ILIAC ARTERY STENT      SMALL BOWEL ENTEROSCOPY N/A 2/20/2023    Procedure: ENTEROSCOPY;  Surgeon: Margy Dumont MD;  Location: [facility];  Service: Endoscopy;  Laterality: N/A;    TONSILLECTOMY      TOTAL VAGINAL HYSTERECTOMY         Time Tracking:     OT Date of Treatment: 06/30/23  OT Start Time: 1207  OT Stop Time: 1223  OT Total Time (min): 16 min    Billable Minutes: Evaluation 8 and Therapeutic Activity 8    6/30/2023

## 2023-06-30 NOTE — CARE UPDATE
06/29/23 1943   Patient Assessment/Suction   Level of Consciousness (AVPU) alert   Respiratory Effort Normal;Unlabored   Expansion/Accessory Muscles/Retractions expansion symmetric;no retractions;no use of accessory muscles   All Lung Fields Breath Sounds Anterior:;Lateral:;clear;diminished   Rhythm/Pattern, Respiratory depth regular;pattern regular;unlabored   PRE-TX-O2   Device (Oxygen Therapy) high flow nasal cannula   Flow (L/min) 10   SpO2 96 %   Pulse Oximetry Type Continuous   $ Pulse Oximetry - Multiple Charge Pulse Oximetry - Multiple   Pulse 68   Resp 18   Aerosol Therapy   $ Aerosol Therapy Charges Aerosol Treatment   Daily Review of Necessity (SVN) completed   Respiratory Treatment Status (SVN) given   Treatment Route (SVN) mask;oxygen   Patient Position (SVN) sitting in chair   Post Treatment Assessment (SVN) breath sounds unchanged   Signs of Intolerance (SVN) none   Breath Sounds Post-Respiratory Treatment   Throughout All Fields Post-Treatment All Fields   Throughout All Fields Post-Treatment no change   Post-treatment Heart Rate (beats/min) 65   Post-treatment Resp Rate (breaths/min) 18   Preset CPAP/BiPAP Settings   Mode Of Delivery Standby   Respiratory Evaluation   $ Care Plan Tech Time 15 min

## 2023-06-30 NOTE — PROGRESS NOTES
[facility]  Progress Note    Patient Name: Betty aBcon  MRN: [id number]  Patient Class: IP- Inpatient   Admission Date: 6/24/2023  Length of Stay: 4 days  Attending Physician: Lynette Shea MD  Primary Care Provider: Johanne Callejas MD      Assessment/Plan:      Active Hospital Problems    Diagnosis  POA    *Acute respiratory failure with hypoxia and hypercarbia [J96.01, J96.02]  Unknown    CHF exacerbation [I50.9]  Unknown    CARMELITA (acute kidney injury) [N17.9]  Unknown    Uncontrolled hypertension [I10]  Unknown    CKD (chronic kidney disease) stage 3, GFR 30-59 ml/min [N18.30]  Yes      Resolved Hospital Problems   No resolved problems to display.     ASSESSMENT AND PLAN      Acute on chronic hypoxemic/hypercapnic respiratory failure   Due to acute on chronic HFpEF   + AS s/p balloon valvuloplasty   Complicated by asbestosis with ILD, pHTN  - on 6LNC at baseline  # uncontrolled hypertension, IMPROVING  # progressive anemia on Plavix  Continue supplemental oxygen  Continue IV Lasix   STRICT I/OS, DAILY WEIGHTS, 1.5L FLUID RESTRCTION  Steroid, NEBS   Trending CBC, pRBC transfusion prn  [place] [facility] [place] She want to be contacted at discharge for discharge medications     PT DISCUSSING POSSIBILITY OF HOSPICE WITH HER FAMILY.    Chronic conditions as noted above/below; home medications reviewed personally by me and restarted as appropriate  Electrolyte derangement:  Trending BMP; Mg; replacement prn  DVT ppx: heparin HELD DUE TO ANEMIA REQUIRING TRANSFUSION  Dispo:  Discharge Plan A: Hospice/home  ?  FULL CODE    Subjective:     Principal Problem:Acute respiratory failure with hypoxia and hypercarbia    6/27:  pt refused BIPAP overnight as she states she cannot sleep with it on; she was later found with SaO2 50s in AM, started on BIPAP this AM with improvement.  BIPAP use encouraged; trazodone started QHS.  Pulmonology following, thank you.  Pt denies SOB  "while on BIPAP.  No subjective fever, no chills.  No CP.        6/28:  pt comfortable on 10L NC at this time.  Gave pRBC overnight with lasix due to continued CHF exacerbation; pt tolerated well.  Pt slept well for the first time with trazodone.  Pt reports she cannot ever tolerate BIPAP and wishes to be discharged home without one.  We will try to obtain facemask for home usage (she mouth-breathes at night).  No cp, no SOB.  Continued LE edema, improving.  Pt's case discussed with pulmonologist Dr Toro.  Discussed discharge planning with .      6/29:  Pt interested in hospice services; she is currently discussing this with her family.    ROS:  All systems reviewed and are negative except as noted per above.    PHYSICAL EXAM    BP (!) 181/58 (BP Location: Right arm, Patient Position: Lying) Comment: nurse radha notified  Pulse 71   Temp 98.1 °F (36.7 °C) (Oral)   Resp 18   Ht 5' 2" (1.575 m)   Wt 90.3 kg (199 lb 1.6 oz)   SpO2 95%   Breastfeeding No   BMI 36.42 kg/m²     Alert, responsive  Currently on 10L NC  No increased WOB  Gen: alert, responsive  HEENT:  Eyes - no pallor  External ears with no lesions  Nares patent  Mouth, Throat:  trachea midline   Neuro: grossly intact  Psych: abbey Shea MD  Department of Hospital Medicine   [facility]  "

## 2023-06-30 NOTE — PLAN OF CARE
Goals to be met by: 7/14/2023     Patient will increase functional independence with ADLs by performing:    UE Dressing with [place].  LE Dressing with Modified [place].  Grooming while standing with Supervision.  Toileting from toilet with Supervision for hygiene and clothing management.   Toilet transfer to toilet with Supervision.    OT POC initiated and established.

## 2023-07-10 ENCOUNTER — TELEPHONE (OUTPATIENT)
Dept: FAMILY MEDICINE | Facility: CLINIC | Age: 73
End: 2023-07-10

## 2023-07-10 NOTE — TELEPHONE ENCOUNTER
----- Message from Shanna Alexander sent at 7/10/2023  9:36 AM CDT -----  Type: Needs Medical Advice  Who Called:  [facility]   Best Call Back Number: [phone] / fax [phone]    Additional Information cecelia gonzalez is needing signed papers faxed to office regards to home health please advise

## 2023-07-11 DIAGNOSIS — E55.9 VITAMIN D DEFICIENCY: ICD-10-CM

## 2023-07-11 DIAGNOSIS — N18.32 STAGE 3B CHRONIC KIDNEY DISEASE: Primary | ICD-10-CM

## 2023-07-19 ENCOUNTER — DOCUMENT SCAN (OUTPATIENT)
Dept: HOME HEALTH SERVICES | Facility: HOSPITAL | Age: 73
End: 2023-07-19

## 2023-07-19 ENCOUNTER — DOCUMENT SCAN (OUTPATIENT)
Dept: HOME HEALTH SERVICES | Facility: HOSPITAL | Age: 73
End: 2023-07-19
Payer: MEDICARE

## 2023-07-20 ENCOUNTER — PATIENT MESSAGE (OUTPATIENT)
Dept: CARDIOLOGY | Facility: CLINIC | Age: 73
End: 2023-07-20
Payer: MEDICARE

## 2023-07-20 ENCOUNTER — PATIENT MESSAGE (OUTPATIENT)
Dept: NEPHROLOGY | Facility: CLINIC | Age: 73
End: 2023-07-20
Payer: MEDICARE

## 2023-07-20 ENCOUNTER — PATIENT MESSAGE (OUTPATIENT)
Dept: FAMILY MEDICINE | Facility: CLINIC | Age: 73
End: 2023-07-20
Payer: MEDICARE

## 2023-07-26 DIAGNOSIS — J44.1 COPD EXACERBATION: ICD-10-CM

## 2023-07-26 DIAGNOSIS — J84.9 ILD (INTERSTITIAL LUNG DISEASE): Primary | ICD-10-CM

## 2023-07-26 DIAGNOSIS — J44.9 COPD MIXED TYPE: ICD-10-CM

## 2023-07-27 ENCOUNTER — PATIENT MESSAGE (OUTPATIENT)
Dept: PULMONOLOGY | Facility: CLINIC | Age: 73
End: 2023-07-27
Payer: MEDICARE

## 2023-08-03 DIAGNOSIS — F41.1 GENERALIZED ANXIETY DISORDER: ICD-10-CM

## 2023-08-03 NOTE — TELEPHONE ENCOUNTER
No care due was identified.  [facility] Embedded Care Due Messages. Reference number: [id number].   8/03/2023 3:26:16 PM CDT

## 2023-08-04 RX ORDER — CLONAZEPAM 1 MG/1
1 TABLET, ORALLY DISINTEGRATING ORAL 2 TIMES DAILY PRN
Qty: 60 TABLET | Refills: 1 | Status: SHIPPED | OUTPATIENT
Start: 2023-08-04

## 2023-09-02 NOTE — TELEPHONE ENCOUNTER
No care due was identified.  [facility] Embedded Care Due Messages. Reference number: [id number].   9/02/2023 12:20:22 PM CDT

## 2023-09-05 RX ORDER — COLCHICINE 0.6 MG/1
TABLET ORAL
Qty: 20 TABLET | Refills: 0 | Status: SHIPPED | OUTPATIENT
Start: 2023-09-05

## 2023-09-18 PROBLEM — J96.11 CHRONIC RESPIRATORY FAILURE WITH HYPOXIA: Chronic | Status: RESOLVED | Noted: 2023-01-26 | Resolved: 2023-09-18

## 2023-10-02 PROBLEM — J96.01 ACUTE RESPIRATORY FAILURE WITH HYPOXIA AND HYPERCARBIA: Status: RESOLVED | Noted: 2023-06-25 | Resolved: 2023-10-02

## 2023-10-02 PROBLEM — J96.02 ACUTE RESPIRATORY FAILURE WITH HYPOXIA AND HYPERCARBIA: Status: RESOLVED | Noted: 2023-06-25 | Resolved: 2023-10-02

## 2023-10-02 PROBLEM — N17.9 AKI (ACUTE KIDNEY INJURY): Status: RESOLVED | Noted: 2023-01-27 | Resolved: 2023-10-02

## 2023-10-07 ENCOUNTER — PATIENT MESSAGE (OUTPATIENT)
Dept: CARDIOLOGY | Facility: CLINIC | Age: 73
End: 2023-10-07
Payer: MEDICARE

## 2023-10-07 ENCOUNTER — PATIENT MESSAGE (OUTPATIENT)
Dept: NEPHROLOGY | Facility: CLINIC | Age: 73
End: 2023-10-07
Payer: MEDICARE

## 2023-10-09 NOTE — TELEPHONE ENCOUNTER
Family reports patient passing away.   Infliximab Counseling:  I discussed with the patient the risks of infliximab including but not limited to myelosuppression, immunosuppression, autoimmune hepatitis, demyelinating diseases, lymphoma, and serious infections.  The patient understands that monitoring is required including a PPD at baseline and must alert us or the primary physician if symptoms of infection or other concerning signs are noted.

## 2024-07-18 NOTE — TELEPHONE ENCOUNTER
----- Message from Sierra Mary MA sent at 8/6/2020  9:34 AM CDT -----  Type:  Patient Returning Call    Who Called:  Betty  Who Left Message for Patient:  unknown  Does the patient know what this is regarding?:  results  Best Call Back Number:  [phone]  Additional Information:         Weekly/Yes

## 2024-10-21 NOTE — TELEPHONE ENCOUNTER
Called pt to schedule stroke return visit, left message to call back. Patient was seen by the stoke team about 5 months ago, at that time it was recommended patient follow up with the stroke team to check in, review plan and optimize overall stroke risk management and we would like to offer an appointment.     Spoke to patient, scheduled. Mychart access confirmed. Preferred in person.     NADIA PIKE CMA     Patient notified. Verbalized understanding. Patient states she will be looking forward to call from Cardiologist regarding changes in medication.

## 2025-02-24 NOTE — PROGRESS NOTES
Subjective:       Patient ID: Betty Bacon is a 67 y.o. White female who presents for follow-up evaluation of Follow-up and Chronic Kidney Disease    HPI     She is recovering from acute bronchitis, finally feeling better. No LE edema and no SOB. Her appetite is just now improving and she is pushing fluids. No new medications since last visit except ABX. No LUTS    Review of Systems   Constitutional: Negative for activity change, appetite change, fatigue and unexpected weight change.   HENT: Negative for facial swelling.    Eyes: Negative for visual disturbance.   Respiratory: Negative for shortness of breath.    Cardiovascular: Negative for chest pain and leg swelling.   Gastrointestinal: Negative for constipation and diarrhea.   Genitourinary: Negative for difficulty urinating, dysuria and hematuria.   Musculoskeletal: Negative for arthralgias.   Neurological: Negative for weakness and headaches.       Objective:      Physical Exam   Constitutional: She is oriented to person, place, and time. She appears well-nourished.   HENT:   Mouth/Throat: Oropharynx is clear and moist.   Neck: No JVD present.   Cardiovascular: S1 normal and S2 normal.  Exam reveals no friction rub.    Pulmonary/Chest: Breath sounds normal. She has no wheezes. She has no rales.   Abdominal: Soft.   Musculoskeletal: She exhibits no edema.   Neurological: She is alert and oriented to person, place, and time.   Skin: Skin is warm and dry.   Psychiatric: She has a normal mood and affect.   Vitals reviewed.      Assessment:       1. CKD (chronic kidney disease) stage 3, GFR 30-59 ml/min    2. Former smoker    3. Essential hypertension    4. PAD (peripheral artery disease)    5. Coronary artery disease involving native coronary artery of native heart without angina pectoris    6. Aortic valve stenosis, etiology of cardiac valve disease unspecified    7. Chronic obstructive pulmonary disease, unspecified COPD type        Plan:              CKD Stage 3 with stable kidney function and proteinuria.      HTN--controlled    Mineral and Bone Disease--continue D3    Pulmonary with acute bronchitis-improved      RTC 5 months with labs prior   with PT services/Long Term Care/SNF

## 2025-05-28 NOTE — TELEPHONE ENCOUNTER
Patient requests refill of Xanax. Last prescription 11/2017. She only takes this occasionally.   Yes

## (undated) DEVICE — KIT MNTR POLE MT DUL 12&48 MAC

## (undated) DEVICE — SHEATH INTRODUCER 4FR 11CM

## (undated) DEVICE — KIT MICROINTRO 4F .018X40X7CM

## (undated) DEVICE — CATH DXTERITY AL20 100CM 6FR

## (undated) DEVICE — DEVICE PERCLOSE SUT CLSR 6FR

## (undated) DEVICE — CATH TRUE FLOW 22MM 3.5X110CM

## (undated) DEVICE — FLEXSHEATH DRYSEAL 12FR 33CM

## (undated) DEVICE — CATH DXTERITY PG145 110CM 6FR

## (undated) DEVICE — GUIDEWIRE SAFARI2 XS CRV 275CM

## (undated) DEVICE — OMNIPAQUE 350MG 150ML VIAL

## (undated) DEVICE — CATH JACKY RADIAL 5FR 100CM

## (undated) DEVICE — STOPCOCK 3-WAY

## (undated) DEVICE — GUIDEWIRE SUPRA CORE 035 190CM

## (undated) DEVICE — DRAPE ANGIO BRACH 38X44IN

## (undated) DEVICE — SPIKE CONTRAST CONTROLLER

## (undated) DEVICE — KIT GLIDESHEATH SLEND 6FR 10CM

## (undated) DEVICE — TRAY CATH LAB OMC

## (undated) DEVICE — KIT CUSTOM MANIFOLD

## (undated) DEVICE — PAD DEFIB CADENCE ADULT R2

## (undated) DEVICE — TRANSDUCER ADULT DISP

## (undated) DEVICE — SHEATH INTRODUCER 8FR 11CM

## (undated) DEVICE — HEMOSTAT VASC BAND REG 24CM

## (undated) DEVICE — LINE 60IN PRESSURE MON.

## (undated) DEVICE — GUIDEWIRE STF .035X260CM STR